# Patient Record
Sex: FEMALE | Race: WHITE
[De-identification: names, ages, dates, MRNs, and addresses within clinical notes are randomized per-mention and may not be internally consistent; named-entity substitution may affect disease eponyms.]

---

## 2017-11-16 ENCOUNTER — TRANSCRIPTION ENCOUNTER (OUTPATIENT)
Age: 42
End: 2017-11-16

## 2019-07-28 ENCOUNTER — INPATIENT (INPATIENT)
Facility: HOSPITAL | Age: 44
LOS: 11 days | Discharge: HOME | End: 2019-08-09
Attending: INTERNAL MEDICINE | Admitting: INTERNAL MEDICINE
Payer: MEDICAID

## 2019-07-28 VITALS
SYSTOLIC BLOOD PRESSURE: 117 MMHG | OXYGEN SATURATION: 98 % | HEART RATE: 140 BPM | WEIGHT: 130.07 LBS | TEMPERATURE: 97 F | RESPIRATION RATE: 20 BRPM | DIASTOLIC BLOOD PRESSURE: 88 MMHG

## 2019-07-28 DIAGNOSIS — Z98.890 OTHER SPECIFIED POSTPROCEDURAL STATES: Chronic | ICD-10-CM

## 2019-07-28 LAB
ALBUMIN SERPL ELPH-MCNC: 4 G/DL — SIGNIFICANT CHANGE UP (ref 3.5–5.2)
ALP SERPL-CCNC: 141 U/L — HIGH (ref 30–115)
ALT FLD-CCNC: 143 U/L — HIGH (ref 0–41)
ANION GAP SERPL CALC-SCNC: 21 MMOL/L — HIGH (ref 7–14)
APAP SERPL-MCNC: <5 UG/ML — LOW (ref 10–30)
APPEARANCE UR: ABNORMAL
AST SERPL-CCNC: 519 U/L — HIGH (ref 0–41)
BACTERIA # UR AUTO: ABNORMAL
BASOPHILS # BLD AUTO: 0.06 K/UL — SIGNIFICANT CHANGE UP (ref 0–0.2)
BASOPHILS NFR BLD AUTO: 0.5 % — SIGNIFICANT CHANGE UP (ref 0–1)
BILIRUB SERPL-MCNC: 1.6 MG/DL — HIGH (ref 0.2–1.2)
BILIRUB UR-MCNC: NEGATIVE — SIGNIFICANT CHANGE UP
BUN SERPL-MCNC: 13 MG/DL — SIGNIFICANT CHANGE UP (ref 10–20)
CALCIUM SERPL-MCNC: 9.2 MG/DL — SIGNIFICANT CHANGE UP (ref 8.5–10.1)
CHLORIDE SERPL-SCNC: 91 MMOL/L — LOW (ref 98–110)
CHOLEST SERPL-MCNC: 124 MG/DL — SIGNIFICANT CHANGE UP (ref 100–200)
CO2 SERPL-SCNC: 22 MMOL/L — SIGNIFICANT CHANGE UP (ref 17–32)
COLOR SPEC: YELLOW — SIGNIFICANT CHANGE UP
CREAT SERPL-MCNC: 0.9 MG/DL — SIGNIFICANT CHANGE UP (ref 0.7–1.5)
DIFF PNL FLD: NEGATIVE — SIGNIFICANT CHANGE UP
EOSINOPHIL # BLD AUTO: 0.01 K/UL — SIGNIFICANT CHANGE UP (ref 0–0.7)
EOSINOPHIL NFR BLD AUTO: 0.1 % — SIGNIFICANT CHANGE UP (ref 0–8)
EPI CELLS # UR: 12 /HPF — HIGH (ref 0–5)
ETHANOL SERPL-MCNC: <10 MG/DL — SIGNIFICANT CHANGE UP
GLUCOSE SERPL-MCNC: 113 MG/DL — HIGH (ref 70–99)
GLUCOSE UR QL: NEGATIVE — SIGNIFICANT CHANGE UP
HCG UR QL: NEGATIVE — SIGNIFICANT CHANGE UP
HCT VFR BLD CALC: 40.8 % — SIGNIFICANT CHANGE UP (ref 37–47)
HDLC SERPL-MCNC: 27 MG/DL — LOW
HGB BLD-MCNC: 14.7 G/DL — SIGNIFICANT CHANGE UP (ref 12–16)
HYALINE CASTS # UR AUTO: 6 /LPF — SIGNIFICANT CHANGE UP (ref 0–7)
IMM GRANULOCYTES NFR BLD AUTO: 0.3 % — SIGNIFICANT CHANGE UP (ref 0.1–0.3)
KETONES UR-MCNC: ABNORMAL
LACTATE SERPL-SCNC: 2.1 MMOL/L — SIGNIFICANT CHANGE UP (ref 0.5–2.2)
LEUKOCYTE ESTERASE UR-ACNC: ABNORMAL
LIDOCAIN IGE QN: >600 U/L — HIGH (ref 7–60)
LIPID PNL WITH DIRECT LDL SERPL: 37 MG/DL — SIGNIFICANT CHANGE UP (ref 4–129)
LYMPHOCYTES # BLD AUTO: 0.81 K/UL — LOW (ref 1.2–3.4)
LYMPHOCYTES # BLD AUTO: 6.6 % — LOW (ref 20.5–51.1)
MCHC RBC-ENTMCNC: 34.3 PG — HIGH (ref 27–31)
MCHC RBC-ENTMCNC: 36 G/DL — SIGNIFICANT CHANGE UP (ref 32–37)
MCV RBC AUTO: 95.1 FL — SIGNIFICANT CHANGE UP (ref 81–99)
MONOCYTES # BLD AUTO: 0.48 K/UL — SIGNIFICANT CHANGE UP (ref 0.1–0.6)
MONOCYTES NFR BLD AUTO: 3.9 % — SIGNIFICANT CHANGE UP (ref 1.7–9.3)
NEUTROPHILS # BLD AUTO: 10.85 K/UL — HIGH (ref 1.4–6.5)
NEUTROPHILS NFR BLD AUTO: 88.6 % — HIGH (ref 42.2–75.2)
NITRITE UR-MCNC: NEGATIVE — SIGNIFICANT CHANGE UP
NRBC # BLD: 0 /100 WBCS — SIGNIFICANT CHANGE UP (ref 0–0)
PH UR: 6 — SIGNIFICANT CHANGE UP (ref 5–8)
PLATELET # BLD AUTO: 257 K/UL — SIGNIFICANT CHANGE UP (ref 130–400)
POTASSIUM SERPL-MCNC: 3.6 MMOL/L — SIGNIFICANT CHANGE UP (ref 3.5–5)
POTASSIUM SERPL-SCNC: 3.6 MMOL/L — SIGNIFICANT CHANGE UP (ref 3.5–5)
PROT SERPL-MCNC: 8.3 G/DL — HIGH (ref 6–8)
PROT UR-MCNC: ABNORMAL
RBC # BLD: 4.29 M/UL — SIGNIFICANT CHANGE UP (ref 4.2–5.4)
RBC # FLD: 11.9 % — SIGNIFICANT CHANGE UP (ref 11.5–14.5)
RBC CASTS # UR COMP ASSIST: 3 /HPF — SIGNIFICANT CHANGE UP (ref 0–4)
SODIUM SERPL-SCNC: 134 MMOL/L — LOW (ref 135–146)
SP GR SPEC: 1.03 — HIGH (ref 1.01–1.02)
TOTAL CHOLESTEROL/HDL RATIO MEASUREMENT: 4.6 RATIO — SIGNIFICANT CHANGE UP (ref 4–5.5)
TRIGL SERPL-MCNC: 400 MG/DL — HIGH (ref 10–149)
UROBILINOGEN FLD QL: ABNORMAL
WBC # BLD: 12.25 K/UL — HIGH (ref 4.8–10.8)
WBC # FLD AUTO: 12.25 K/UL — HIGH (ref 4.8–10.8)
WBC UR QL: 13 /HPF — HIGH (ref 0–5)

## 2019-07-28 PROCEDURE — 76705 ECHO EXAM OF ABDOMEN: CPT | Mod: 26

## 2019-07-28 PROCEDURE — 74177 CT ABD & PELVIS W/CONTRAST: CPT | Mod: 26

## 2019-07-28 PROCEDURE — 99285 EMERGENCY DEPT VISIT HI MDM: CPT

## 2019-07-28 PROCEDURE — 71045 X-RAY EXAM CHEST 1 VIEW: CPT | Mod: 26

## 2019-07-28 PROCEDURE — 93010 ELECTROCARDIOGRAM REPORT: CPT

## 2019-07-28 PROCEDURE — 99223 1ST HOSP IP/OBS HIGH 75: CPT

## 2019-07-28 PROCEDURE — 99223 1ST HOSP IP/OBS HIGH 75: CPT | Mod: AI

## 2019-07-28 RX ORDER — OXYCODONE HYDROCHLORIDE 5 MG/1
5 TABLET ORAL EVERY 6 HOURS
Refills: 0 | Status: DISCONTINUED | OUTPATIENT
Start: 2019-07-28 | End: 2019-07-31

## 2019-07-28 RX ORDER — METRONIDAZOLE 500 MG
500 TABLET ORAL ONCE
Refills: 0 | Status: COMPLETED | OUTPATIENT
Start: 2019-07-28 | End: 2019-07-28

## 2019-07-28 RX ORDER — MORPHINE SULFATE 50 MG/1
4 CAPSULE, EXTENDED RELEASE ORAL ONCE
Refills: 0 | Status: DISCONTINUED | OUTPATIENT
Start: 2019-07-28 | End: 2019-07-28

## 2019-07-28 RX ORDER — THIAMINE MONONITRATE (VIT B1) 100 MG
100 TABLET ORAL ONCE
Refills: 0 | Status: DISCONTINUED | OUTPATIENT
Start: 2019-07-28 | End: 2019-07-28

## 2019-07-28 RX ORDER — CIPROFLOXACIN LACTATE 400MG/40ML
400 VIAL (ML) INTRAVENOUS EVERY 12 HOURS
Refills: 0 | Status: DISCONTINUED | OUTPATIENT
Start: 2019-07-28 | End: 2019-08-02

## 2019-07-28 RX ORDER — SODIUM CHLORIDE 9 MG/ML
1000 INJECTION INTRAMUSCULAR; INTRAVENOUS; SUBCUTANEOUS
Refills: 0 | Status: DISCONTINUED | OUTPATIENT
Start: 2019-07-28 | End: 2019-07-28

## 2019-07-28 RX ORDER — CIPROFLOXACIN LACTATE 400MG/40ML
VIAL (ML) INTRAVENOUS
Refills: 0 | Status: DISCONTINUED | OUTPATIENT
Start: 2019-07-28 | End: 2019-08-02

## 2019-07-28 RX ORDER — SODIUM CHLORIDE 9 MG/ML
2000 INJECTION INTRAMUSCULAR; INTRAVENOUS; SUBCUTANEOUS ONCE
Refills: 0 | Status: COMPLETED | OUTPATIENT
Start: 2019-07-28 | End: 2019-07-28

## 2019-07-28 RX ORDER — CIPROFLOXACIN LACTATE 400MG/40ML
400 VIAL (ML) INTRAVENOUS ONCE
Refills: 0 | Status: COMPLETED | OUTPATIENT
Start: 2019-07-28 | End: 2019-07-28

## 2019-07-28 RX ORDER — ENOXAPARIN SODIUM 100 MG/ML
40 INJECTION SUBCUTANEOUS DAILY
Refills: 0 | Status: DISCONTINUED | OUTPATIENT
Start: 2019-07-28 | End: 2019-08-09

## 2019-07-28 RX ORDER — ONDANSETRON 8 MG/1
4 TABLET, FILM COATED ORAL ONCE
Refills: 0 | Status: COMPLETED | OUTPATIENT
Start: 2019-07-28 | End: 2019-07-28

## 2019-07-28 RX ORDER — THIAMINE MONONITRATE (VIT B1) 100 MG
100 TABLET ORAL DAILY
Refills: 0 | Status: DISCONTINUED | OUTPATIENT
Start: 2019-07-28 | End: 2019-07-28

## 2019-07-28 RX ORDER — SODIUM CHLORIDE 9 MG/ML
1000 INJECTION, SOLUTION INTRAVENOUS
Refills: 0 | Status: DISCONTINUED | OUTPATIENT
Start: 2019-07-28 | End: 2019-07-28

## 2019-07-28 RX ORDER — SODIUM CHLORIDE 9 MG/ML
1000 INJECTION, SOLUTION INTRAVENOUS
Refills: 0 | Status: DISCONTINUED | OUTPATIENT
Start: 2019-07-28 | End: 2019-07-29

## 2019-07-28 RX ORDER — MORPHINE SULFATE 50 MG/1
2 CAPSULE, EXTENDED RELEASE ORAL EVERY 6 HOURS
Refills: 0 | Status: DISCONTINUED | OUTPATIENT
Start: 2019-07-28 | End: 2019-07-28

## 2019-07-28 RX ORDER — METRONIDAZOLE 500 MG
500 TABLET ORAL EVERY 8 HOURS
Refills: 0 | Status: DISCONTINUED | OUTPATIENT
Start: 2019-07-28 | End: 2019-08-02

## 2019-07-28 RX ORDER — FOLIC ACID 0.8 MG
1 TABLET ORAL DAILY
Refills: 0 | Status: DISCONTINUED | OUTPATIENT
Start: 2019-07-28 | End: 2019-08-09

## 2019-07-28 RX ORDER — PANTOPRAZOLE SODIUM 20 MG/1
40 TABLET, DELAYED RELEASE ORAL
Refills: 0 | Status: DISCONTINUED | OUTPATIENT
Start: 2019-07-28 | End: 2019-08-09

## 2019-07-28 RX ORDER — SODIUM CHLORIDE 9 MG/ML
1000 INJECTION, SOLUTION INTRAVENOUS ONCE
Refills: 0 | Status: COMPLETED | OUTPATIENT
Start: 2019-07-28 | End: 2019-07-28

## 2019-07-28 RX ORDER — SODIUM CHLORIDE 9 MG/ML
2000 INJECTION, SOLUTION INTRAVENOUS ONCE
Refills: 0 | Status: COMPLETED | OUTPATIENT
Start: 2019-07-28 | End: 2019-07-28

## 2019-07-28 RX ORDER — MORPHINE SULFATE 50 MG/1
2 CAPSULE, EXTENDED RELEASE ORAL
Refills: 0 | Status: DISCONTINUED | OUTPATIENT
Start: 2019-07-28 | End: 2019-07-28

## 2019-07-28 RX ORDER — THIAMINE MONONITRATE (VIT B1) 100 MG
100 TABLET ORAL DAILY
Refills: 0 | Status: DISCONTINUED | OUTPATIENT
Start: 2019-07-28 | End: 2019-08-09

## 2019-07-28 RX ORDER — CEFTRIAXONE 500 MG/1
1000 INJECTION, POWDER, FOR SOLUTION INTRAMUSCULAR; INTRAVENOUS EVERY 24 HOURS
Refills: 0 | Status: DISCONTINUED | OUTPATIENT
Start: 2019-07-28 | End: 2019-07-28

## 2019-07-28 RX ORDER — HYDROMORPHONE HYDROCHLORIDE 2 MG/ML
1 INJECTION INTRAMUSCULAR; INTRAVENOUS; SUBCUTANEOUS ONCE
Refills: 0 | Status: DISCONTINUED | OUTPATIENT
Start: 2019-07-28 | End: 2019-07-28

## 2019-07-28 RX ORDER — METRONIDAZOLE 500 MG
TABLET ORAL
Refills: 0 | Status: DISCONTINUED | OUTPATIENT
Start: 2019-07-28 | End: 2019-08-02

## 2019-07-28 RX ORDER — CEFEPIME 1 G/1
2000 INJECTION, POWDER, FOR SOLUTION INTRAMUSCULAR; INTRAVENOUS ONCE
Refills: 0 | Status: COMPLETED | OUTPATIENT
Start: 2019-07-28 | End: 2019-07-28

## 2019-07-28 RX ADMIN — MORPHINE SULFATE 2 MILLIGRAM(S): 50 CAPSULE, EXTENDED RELEASE ORAL at 20:58

## 2019-07-28 RX ADMIN — SODIUM CHLORIDE 250 MILLILITER(S): 9 INJECTION, SOLUTION INTRAVENOUS at 15:00

## 2019-07-28 RX ADMIN — SODIUM CHLORIDE 2000 MILLILITER(S): 9 INJECTION, SOLUTION INTRAVENOUS at 13:58

## 2019-07-28 RX ADMIN — Medication 1 TABLET(S): at 11:19

## 2019-07-28 RX ADMIN — OXYCODONE HYDROCHLORIDE 5 MILLIGRAM(S): 5 TABLET ORAL at 12:30

## 2019-07-28 RX ADMIN — MORPHINE SULFATE 2 MILLIGRAM(S): 50 CAPSULE, EXTENDED RELEASE ORAL at 15:00

## 2019-07-28 RX ADMIN — HYDROMORPHONE HYDROCHLORIDE 1 MILLIGRAM(S): 2 INJECTION INTRAMUSCULAR; INTRAVENOUS; SUBCUTANEOUS at 03:06

## 2019-07-28 RX ADMIN — MORPHINE SULFATE 4 MILLIGRAM(S): 50 CAPSULE, EXTENDED RELEASE ORAL at 01:48

## 2019-07-28 RX ADMIN — ONDANSETRON 4 MILLIGRAM(S): 8 TABLET, FILM COATED ORAL at 01:48

## 2019-07-28 RX ADMIN — HYDROMORPHONE HYDROCHLORIDE 1 MILLIGRAM(S): 2 INJECTION INTRAMUSCULAR; INTRAVENOUS; SUBCUTANEOUS at 05:00

## 2019-07-28 RX ADMIN — Medication 100 MILLIGRAM(S): at 05:59

## 2019-07-28 RX ADMIN — CEFEPIME 100 MILLIGRAM(S): 1 INJECTION, POWDER, FOR SOLUTION INTRAMUSCULAR; INTRAVENOUS at 07:40

## 2019-07-28 RX ADMIN — SODIUM CHLORIDE 250 MILLILITER(S): 9 INJECTION, SOLUTION INTRAVENOUS at 20:52

## 2019-07-28 RX ADMIN — MORPHINE SULFATE 4 MILLIGRAM(S): 50 CAPSULE, EXTENDED RELEASE ORAL at 03:00

## 2019-07-28 RX ADMIN — Medication 100 MILLIGRAM(S): at 14:29

## 2019-07-28 RX ADMIN — OXYCODONE HYDROCHLORIDE 5 MILLIGRAM(S): 5 TABLET ORAL at 22:43

## 2019-07-28 RX ADMIN — PANTOPRAZOLE SODIUM 40 MILLIGRAM(S): 20 TABLET, DELAYED RELEASE ORAL at 11:20

## 2019-07-28 RX ADMIN — Medication 100 MILLIGRAM(S): at 22:49

## 2019-07-28 RX ADMIN — HYDROMORPHONE HYDROCHLORIDE 1 MILLIGRAM(S): 2 INJECTION INTRAMUSCULAR; INTRAVENOUS; SUBCUTANEOUS at 23:52

## 2019-07-28 RX ADMIN — MORPHINE SULFATE 2 MILLIGRAM(S): 50 CAPSULE, EXTENDED RELEASE ORAL at 10:56

## 2019-07-28 RX ADMIN — OXYCODONE HYDROCHLORIDE 5 MILLIGRAM(S): 5 TABLET ORAL at 23:00

## 2019-07-28 RX ADMIN — MORPHINE SULFATE 2 MILLIGRAM(S): 50 CAPSULE, EXTENDED RELEASE ORAL at 18:15

## 2019-07-28 RX ADMIN — Medication 200 MILLIGRAM(S): at 18:15

## 2019-07-28 RX ADMIN — Medication 100 MILLIGRAM(S): at 11:19

## 2019-07-28 RX ADMIN — OXYCODONE HYDROCHLORIDE 5 MILLIGRAM(S): 5 TABLET ORAL at 11:50

## 2019-07-28 RX ADMIN — MORPHINE SULFATE 2 MILLIGRAM(S): 50 CAPSULE, EXTENDED RELEASE ORAL at 11:11

## 2019-07-28 RX ADMIN — SODIUM CHLORIDE 4000 MILLILITER(S): 9 INJECTION, SOLUTION INTRAVENOUS at 01:48

## 2019-07-28 RX ADMIN — Medication 200 MILLIGRAM(S): at 11:50

## 2019-07-28 RX ADMIN — SODIUM CHLORIDE 200 MILLILITER(S): 9 INJECTION, SOLUTION INTRAVENOUS at 09:21

## 2019-07-28 RX ADMIN — SODIUM CHLORIDE 4000 MILLILITER(S): 9 INJECTION INTRAMUSCULAR; INTRAVENOUS; SUBCUTANEOUS at 04:02

## 2019-07-28 RX ADMIN — ONDANSETRON 4 MILLIGRAM(S): 8 TABLET, FILM COATED ORAL at 22:45

## 2019-07-28 RX ADMIN — ENOXAPARIN SODIUM 40 MILLIGRAM(S): 100 INJECTION SUBCUTANEOUS at 11:20

## 2019-07-28 RX ADMIN — HYDROMORPHONE HYDROCHLORIDE 1 MILLIGRAM(S): 2 INJECTION INTRAMUSCULAR; INTRAVENOUS; SUBCUTANEOUS at 23:33

## 2019-07-28 RX ADMIN — Medication 1 MILLIGRAM(S): at 11:19

## 2019-07-28 NOTE — ED PROVIDER NOTE - OBJECTIVE STATEMENT
43 y/o female with PMH of EtOH abuse (drinks 23- alcoholic beverages per day, no history of withdraw or seizures) and pancreatitis secondary to EtOH abuse who presents to ED for 3 days of multiple episodes of NBNB vomiting and diffused crampy abdominal pain, similar to prior episodes of pancreatitis. Reports recent EtOH binge drinking as she was celebrating her birthday. No hx of abdominal surgery. No fever or chills.

## 2019-07-28 NOTE — ED ADULT NURSE REASSESSMENT NOTE - NS ED NURSE REASSESS COMMENT FT1
pt aox4 in no acute distress, ivf infusing as per order, abx administered. pt placed on cardiac monitor. nsr. report endorsed to ed hold rn

## 2019-07-28 NOTE — ED PROVIDER NOTE - PROGRESS NOTE DETAILS
pt w/ elevated LFTs, lipase. c/f gallstone pancreatitis, alt hb pathology- cholecystitis, choledocolithiasis, no fever- less likely cholangitis. pain improved, pending ct ap and RUQ sono Patient to be admitted to an inpatient floor. Case discussed with and care endorsed to medical admitting resident. Admitting physician notified.

## 2019-07-28 NOTE — H&P ADULT - ATTENDING COMMENTS
Agree with resident's note, HPI, PE, assessment and plan.  Pt was seen and examined independently.     Pt c/o severe pain in abdomen, states it is worse than when she came here, no more vomiting, states pain medications don't help. Pt states she did not have UO up until now when she urinated a little.    Pt is tachycardic, HR in , diaphoretic.    Physical exam:  pt is in mild distress due to pain  HEENT: PERRL  NECK: supple, no JVD  RESP: CTA b/l, no crackles, rhonchi  CVS: S1S2, tachycardia  GI: +BS, abd is very tender to palpation even in lower abdomen  Extremities: no c/c/edema  NEURO: AOx3, no focal deficit  H/L: no enlarged LN noted     Labs: Lipase, Serum: >600 U/L (07.28.19 @ 01:20)  lactate 2.1  07-28    134<L>  |  91<L>  |  13  ----------------------------<  113<H>  3.6   |  22  |  0.9    Ca    9.2      28 Jul 2019 01:20    TPro  8.3<H>  /  Alb  4.0  /  TBili  1.6<H>  /  DBili  x   /  AST  519<H>  /  ALT  143<H>  /  AlkPhos  141<H>  07-28      CT abd: IMPRESSION:  Moderate volume peripancreatic fluid consistent with acute pancreatitis;   no evidence of pancreatic necrosis or mesenteric vein thrombosis.  Segmental thickening of the ascending colon as well as the distal   transverse and descending colon consistent with concurrent mildly   complicated colitis.    US abd: Hepatic steatosis. Previously described dilation of the CBD is no longer   seen.  No sonographic evidence of acute cholecystitis.  Peripancreatic fluid, better demonstrated on concurrent CT    A/p: # acute pancreatitis related to   - NPO  - Agree with resident's note, HPI, PE, assessment and plan.  Pt was seen and examined independently.     Pt c/o severe pain in abdomen, states it is worse than when she came here, no more vomiting, states pain medications don't help. Pt states she did not have UO up until now when she urinated a little.    Pt is tachycardic, HR in , diaphoretic.    Physical exam:  pt is in mild distress due to pain  HEENT: PERRL  NECK: supple, no JVD  RESP: CTA b/l, no crackles, rhonchi  CVS: S1S2, tachycardia  GI: +BS, abd is very tender to palpation even in lower abdomen  Extremities: no c/c/edema  NEURO: AOx3, no focal deficit  H/L: no enlarged LN noted     Labs: Lipase, Serum: >600 U/L (07.28.19 @ 01:20)  lactate 2.1  07-28    134<L>  |  91<L>  |  13  ----------------------------<  113<H>  3.6   |  22  |  0.9    Ca    9.2      28 Jul 2019 01:20    TPro  8.3<H>  /  Alb  4.0  /  TBili  1.6<H>  /  DBili  x   /  AST  519<H>  /  ALT  143<H>  /  AlkPhos  141<H>  07-28      CT abd: IMPRESSION:  Moderate volume peripancreatic fluid consistent with acute pancreatitis;   no evidence of pancreatic necrosis or mesenteric vein thrombosis.  Segmental thickening of the ascending colon as well as the distal   transverse and descending colon consistent with concurrent mildly   complicated colitis.    US abd: Hepatic steatosis. Previously described dilation of the CBD is no longer   seen.  No sonographic evidence of acute cholecystitis.  Peripancreatic fluid, better demonstrated on concurrent CT    A/p: # acute pancreatitis related to alcohol intake  - NPO  - IV fluids, will increase to 250 cc/hr  - monitor UO  - pain meds Morphine 2 mg Q4 hrs PRN  - GI eval  - ICU eval as per GI recommendations  - send lipid profile   - repeat Lipase in AM    # Alcoholic hepatitis   - send hepatitis panel     # Alcohol abuse  - no Hx of withdrawal  - pt states she does not binge  - monitor for withdrawal, Ativan PRN  - check Mg, Phos  - start Thiamin and Folate    # Colitis on CT abd, pt has diffuse pain, hard to assess for pain due to diverticulitis, elevated WBC which might be due to pancreatitis  - cont ABx,     DVT ppx     #Progress Note Handoff  Pending  Consults: GI  Tests: lipase, lipids, hep panel  Family Discussion: not needed  Future Disposition: home
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
## 2019-07-28 NOTE — ED PROVIDER NOTE - CLINICAL SUMMARY MEDICAL DECISION MAKING FREE TEXT BOX
pt here abdominal pain, uncomfortable appearing req multiple doses narcotic pain medications. labs significant for elevated lipase, transaminitis, mild leukocytosis. CTAP showing colitis, pancreatitis. RUQ sono w/o evidence of cholecystitis, choledocolithiasis or cholangitis. pain w/ moderate improvement during ED course. will admit to medicine for further w/u, monitoring, treatment, gi eval
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
## 2019-07-28 NOTE — H&P ADULT - NSHPSOCIALHISTORY_GEN_ALL_CORE
Denied hx of smoking, drinks alcohol daily around 2-3 drinks per day and sometimes more.  Denied hx of recreational drug use.

## 2019-07-28 NOTE — CONSULT NOTE ADULT - ASSESSMENT
45 y/o female with PMH of EtOH abuse (drinks 2-3 alcoholic beverages per day, no history of withdraw or seizures) and recurrent pancreatitis secondary to EtOH abuse presents to ED for 3 days of multiple episodes of NBNB vomiting and diffused crampy abdominal pain, similar to prior episodes of pancreatitis.  patient was found to have high lipase and pancreatitis on CT , in addition to colitis.    ct abdomen: acute pancreatitis , no evidence of necrosis , colitis  US Abd: gallbladder sludge / hepatic steatosis peripancreatic fluid   lipase> 600  transaminitis     #severe alcoholic pancreatitis:   pulse 140 at presentation  ICU evaluation   keep patient NPO  aggressive IV hydration with /hr   trend HEMATOCRIT AND BUN  if not trending down , increase IV fluids   morphine PRN for pain control  please check lipid profile     #colitis? : episodes of diarrhea and vomiting   colonic thickening on CT scan   mostly reactive   continue with ciprofloxacin and flagyl for now     #transaminitis: with high AST/ ALT ration  mostly alcoholic hepatitis   alcohol abstinence   thiamin and folic acid   trend lfts     #alcohol abuse: watch for signs and symptoms of withdrawal

## 2019-07-28 NOTE — H&P ADULT - NSHPLABSRESULTS_GEN_ALL_CORE
Complete Blood Count + Automated Diff (07.28.19 @ 01:20)    WBC Count: 12.25 K/uL    RBC Count: 4.29 M/uL    Hemoglobin: 14.7 g/dL    Hematocrit: 40.8 %    Mean Cell Volume: 95.1 fL    Mean Cell Hemoglobin: 34.3 pg    Mean Cell Hemoglobin Conc: 36.0 g/dL    Red Cell Distrib Width: 11.9 %    Platelet Count - Automated: 257 K/uL    Auto Neutrophil #: 10.85 K/uL    Auto Lymphocyte #: 0.81 K/uL    Auto Monocyte #: 0.48 K/uL    Auto Eosinophil #: 0.01 K/uL    Auto Basophil #: 0.06 K/uL    Auto Neutrophil %: 88.6: Differential percentages must be correlated with absolute numbers for  clinical significance. %    Auto Lymphocyte %: 6.6 %    Auto Monocyte %: 3.9 %    Auto Eosinophil %: 0.1 %    Auto Basophil %: 0.5 %    Auto Immature Granulocyte %: 0.3 %    Nucleated RBC: 0 /100 WBCs    Comprehensive Metabolic Panel (07.28.19 @ 01:20)    Sodium, Serum: 134 mmol/L    Potassium, Serum: 3.6 mmol/L    Chloride, Serum: 91 mmol/L    Carbon Dioxide, Serum: 22 mmol/L    Anion Gap, Serum: 21 mmol/L    Blood Urea Nitrogen, Serum: 13 mg/dL    Creatinine, Serum: 0.9 mg/dL    Glucose, Serum: 113 mg/dL    Calcium, Total Serum: 9.2 mg/dL    Protein Total, Serum: 8.3 g/dL    Albumin, Serum: 4.0 g/dL    Bilirubin Total, Serum: 1.6 mg/dL    Alkaline Phosphatase, Serum: 141 U/L    Aspartate Aminotransferase (AST/SGOT): 519 U/L    Alanine Aminotransferase (ALT/SGPT): 143 U/L    eGFR if Non : 79: Interpretative comment    Lipase, Serum (07.28.19 @ 01:20)    Lipase, Serum: >600 U/L    < from: US Abdomen Limited (07.28.19 @ 04:57) >    IMPRESSION:    Hepatic steatosis. Previously described dilation of the CBD is no longer   seen.    No sonographic evidence of acute cholecystitis.    Peripancreatic fluid, better demonstrated on concurrent CT    < end of copied text >      < from: CT Abdomen and Pelvis w/ IV Cont (07.28.19 @ 04:21) >    IMPRESSION:    Moderate volume peripancreatic fluid consistent with acute pancreatitis;   no evidence of pancreatic necrosis or mesenteric vein thrombosis.    Segmental thickening of the ascending colon as well as the distal   transverse and descending colon consistent with concurrent mildly   complicated colitis.                    ******PRELIMINARY REPORT******    ******PRELIMINARY REPORT******          < end of copied text >
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
## 2019-07-28 NOTE — ED ADULT NURSE NOTE - OBJECTIVE STATEMENT
pt aox4 complaining of abdominal pain, nausea. denies radiation to chest. denies pmh/ difficulty with urinating, bm. pt aox4 complaining of abdominal pain, nausea.  denies radiation to chest. denies pmh/ difficulty with urinating, bm. admits to consuming alcohol on daily basis. denies withdrawal symptoms. pt afebrile. respirations even / unlabored. iv in place, labs sent. will continue to monitor / assess

## 2019-07-28 NOTE — CONSULT NOTE ADULT - ASSESSMENT
IMPRESSION:  - Acute Pancreatitis likely 2/2 alcohol use  - H/o Alcohol abuse  - Colitis   - Transaminitis     PLAN:    CNS: Avoid CNS depressants    HEENT: Oral care    PULMONARY:  HOB @ 45 degrees , aspiration precautions. Get abg      CARDIOVASCULAR: check CE 2 set , EKG     GI: GI prophylaxis.  NPO , LR @ 250 ml/hr       GI recs f/u       f/u TG and lipid profile stat       f/u LFTs        RENAL:  Follow up lytes.Correct as needed  Ketones+ likely alcoholic ketoacidosis                  INFECTIOUS DISEASE: c/w ciprofloxacin and flagyl as per GI for colitis . Follow up cultures    HEMATOLOGICAL:  DVT prophylaxis.    ENDOCRINE:  Follow up FS.  Insulin protocol if needed.    MUSCULOSKELETAL: OOB to chair     MICU monitoring IMPRESSION:  - Acute Pancreatitis likely 2/2 alcohol use  - H/o Alcohol abuse  - Colitis   - Transaminitis     PLAN:    CNS: Avoid CNS depressants, watch for DTs    HEENT: Oral care    PULMONARY:  HOB @ 45 degrees , aspiration precautions. Get abg      CARDIOVASCULAR: check CE 2 set , EKG     GI: GI prophylaxis.  NPO , LR @ 250 ml/hr       GI recs f/u       f/u TG and lipid profile stat       f/u LFTs        RENAL:  Follow up lytes. Correct as needed    Ketones+ likely alcoholic ketoacidosis                  INFECTIOUS DISEASE: c/w ciprofloxacin and flagyl as per GI for colitis . Follow up cultures    HEMATOLOGICAL:  DVT prophylaxis.    ENDOCRINE:  Follow up FS.  Insulin protocol if needed.    MUSCULOSKELETAL: OOB to chair     Transfer to MICU for monitoring IMPRESSION:  - Acute Pancreatitis likely 2/2 alcohol use  - H/o Alcohol abuse  - Colitis   - Transaminitis     PLAN:    CNS: Avoid CNS depressants, watch for DTs. CIWA protocol    HEENT: Oral care    PULMONARY:  HOB @ 45 degrees , aspiration precautions. Get abg      CARDIOVASCULAR: check CE 2 set , EKG     GI: GI prophylaxis.  NPO , LR @ 250 ml/hr       GI recs f/u       f/u TG and lipid profile stat       f/u LFTs        RENAL:  Follow up lytes. Correct as needed    Ketones+ likely alcoholic ketoacidosis                  INFECTIOUS DISEASE: c/w ciprofloxacin and flagyl as per GI for colitis . Follow up cultures    HEMATOLOGICAL:  DVT prophylaxis.    ENDOCRINE:  Follow up FS.  Insulin protocol if needed.    MUSCULOSKELETAL: OOB to chair     Transfer to MICU for monitoring

## 2019-07-28 NOTE — H&P ADULT - NSHPPHYSICALEXAM_GEN_ALL_CORE
PHYSICAL EXAM:  GENERAL: NAD,   HEAD:  Atraumatic, Normocephalic  EYES: EOMI, PERRLA, conjunctiva and sclera clear  ENT: Moist mucous membranes  NECK: Supple, No JVD  CHEST/LUNG: Clear to auscultation bilaterally; No rales, rhonchi, wheezing, or rubs. Unlabored respirations  HEART: Regular rate and rhythm; No murmurs, rubs, or gallops  ABDOMEN: Bowel sounds present; Soft, tender epigastrium and RUQ, No guarding or generalized tenderness. Nondistended. No hepatomegaly  EXTREMITIES:  2+ Peripheral Pulses, brisk capillary refill. No clubbing, cyanosis, or edema  NERVOUS SYSTEM:  Alert & Oriented X3, speech clear. No deficits   MSK: FROM all 4 extremities, full and equal strength  SKIN: No rashes or lesions
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
## 2019-07-28 NOTE — ED PROVIDER NOTE - PHYSICAL EXAMINATION
CONSTITUTIONAL: Uncomfortable  SKIN: warm, dry  HEAD: Normocephalic; atraumatic.  EYES: no conjunctival injection. PERRL.   ENT: No nasal discharge; airway clear.  NECK: Supple; non tender.  CARD: S1, S2 normal; no murmurs, gallops, or rubs. Regular rate and rhythm.   RESP: No wheezes, rales or rhonchi.  ABD: soft moderate diffuse ttp  EXT: Normal ROM.  No clubbing, cyanosis or edema.   LYMPH: No acute cervical adenopathy.  NEURO: Alert, oriented, grossly unremarkable  PSYCH: Cooperative, appropriate.

## 2019-07-28 NOTE — ED PROVIDER NOTE - ATTENDING CONTRIBUTION TO CARE
43 yo f hx etoh abuse, pancreatitis  pt states it was her birthday so she drank all week. pt w/ 3 days mid abdo pain c/w previous pancreatitis. nausea w/ nbnb vomiting. no urinary/vaginal sx. no f/c/cough.    vss  gen- uncomfortable appearing, aaox3  card-rrr  lungs-ctab, no wheezing or rhonchi  abd-moderate diffuse abdo tenderness  neuro- full str/sensation, cn ii-xii grossly intact, normal coordination    likely etoh pancreatitis  will get belly labs, supportive care w/ fluids, morphine, zofran  serial exam  likely admit 45 yo f hx etoh abuse, pancreatitis  pt states it was her birthday so she drank all week. pt w/ 3 days mid abdo pain c/w previous pancreatitis. nausea w/ nbnb vomiting. no urinary/vaginal sx. no f/c/cough.    vss  gen- uncomfortable appearing, aaox3  card-rrr  lungs-ctab, no wheezing or rhonchi  abd-moderate diffuse abdo tenderness  neuro- full str/sensation, cn ii-xii grossly intact, normal coordination    likely etoh pancreatitis, r/o alternate intraabdo pathology- hb disease  will get belly labs, supportive care w/ fluids, morphine, zofran  serial exam  likely admit

## 2019-07-29 LAB
ALBUMIN SERPL ELPH-MCNC: 2.8 G/DL — LOW (ref 3.5–5.2)
ALP SERPL-CCNC: 71 U/L — SIGNIFICANT CHANGE UP (ref 30–115)
ALT FLD-CCNC: 56 U/L — HIGH (ref 0–41)
ANION GAP SERPL CALC-SCNC: 12 MMOL/L — SIGNIFICANT CHANGE UP (ref 7–14)
ANION GAP SERPL CALC-SCNC: 13 MMOL/L — SIGNIFICANT CHANGE UP (ref 7–14)
ANION GAP SERPL CALC-SCNC: 22 MMOL/L — HIGH (ref 7–14)
APTT BLD: 28.9 SEC — SIGNIFICANT CHANGE UP (ref 27–39.2)
AST SERPL-CCNC: 208 U/L — HIGH (ref 0–41)
BASOPHILS # BLD AUTO: 0.02 K/UL — SIGNIFICANT CHANGE UP (ref 0–0.2)
BASOPHILS NFR BLD AUTO: 0.3 % — SIGNIFICANT CHANGE UP (ref 0–1)
BILIRUB DIRECT SERPL-MCNC: 0.6 MG/DL — HIGH (ref 0–0.2)
BILIRUB INDIRECT FLD-MCNC: 0.7 MG/DL — SIGNIFICANT CHANGE UP (ref 0.2–1.2)
BILIRUB SERPL-MCNC: 1.3 MG/DL — HIGH (ref 0.2–1.2)
BUN SERPL-MCNC: 3 MG/DL — LOW (ref 10–20)
BUN SERPL-MCNC: <3 MG/DL — LOW (ref 10–20)
BUN SERPL-MCNC: <3 MG/DL — LOW (ref 10–20)
CALCIUM SERPL-MCNC: 6.1 MG/DL — LOW (ref 8.5–10.1)
CALCIUM SERPL-MCNC: 7.8 MG/DL — LOW (ref 8.5–10.1)
CALCIUM SERPL-MCNC: 7.8 MG/DL — LOW (ref 8.5–10.1)
CHLORIDE SERPL-SCNC: 98 MMOL/L — SIGNIFICANT CHANGE UP (ref 98–110)
CHOLEST SERPL-MCNC: 110 MG/DL — SIGNIFICANT CHANGE UP (ref 100–200)
CK MB CFR SERPL CALC: <1 NG/ML — SIGNIFICANT CHANGE UP (ref 0.6–6.3)
CK MB CFR SERPL CALC: <1 NG/ML — SIGNIFICANT CHANGE UP (ref 0.6–6.3)
CK SERPL-CCNC: 11 U/L — SIGNIFICANT CHANGE UP (ref 0–225)
CK SERPL-CCNC: 35 U/L — SIGNIFICANT CHANGE UP (ref 0–225)
CO2 SERPL-SCNC: 24 MMOL/L — SIGNIFICANT CHANGE UP (ref 17–32)
CO2 SERPL-SCNC: 25 MMOL/L — SIGNIFICANT CHANGE UP (ref 17–32)
CO2 SERPL-SCNC: 9 MMOL/L — CRITICAL LOW (ref 17–32)
CREAT SERPL-MCNC: 0.5 MG/DL — LOW (ref 0.7–1.5)
CREAT SERPL-MCNC: 0.5 MG/DL — LOW (ref 0.7–1.5)
CREAT SERPL-MCNC: <0.5 MG/DL — LOW (ref 0.7–1.5)
CULTURE RESULTS: NO GROWTH — SIGNIFICANT CHANGE UP
CULTURE RESULTS: SIGNIFICANT CHANGE UP
EOSINOPHIL # BLD AUTO: 0.02 K/UL — SIGNIFICANT CHANGE UP (ref 0–0.7)
EOSINOPHIL NFR BLD AUTO: 0.3 % — SIGNIFICANT CHANGE UP (ref 0–8)
ESTIMATED AVERAGE GLUCOSE: 88 MG/DL — SIGNIFICANT CHANGE UP (ref 68–114)
GGT SERPL-CCNC: 544 U/L — HIGH (ref 1–40)
GLUCOSE BLDC GLUCOMTR-MCNC: 93 MG/DL — SIGNIFICANT CHANGE UP (ref 70–99)
GLUCOSE SERPL-MCNC: 26 MG/DL — CRITICAL LOW (ref 70–99)
GLUCOSE SERPL-MCNC: 84 MG/DL — SIGNIFICANT CHANGE UP (ref 70–99)
GLUCOSE SERPL-MCNC: 91 MG/DL — SIGNIFICANT CHANGE UP (ref 70–99)
HBA1C BLD-MCNC: 4.7 % — SIGNIFICANT CHANGE UP (ref 4–5.6)
HCT VFR BLD CALC: 37.7 % — SIGNIFICANT CHANGE UP (ref 37–47)
HDLC SERPL-MCNC: 27 MG/DL — LOW
HGB BLD-MCNC: 13 G/DL — SIGNIFICANT CHANGE UP (ref 12–16)
IMM GRANULOCYTES NFR BLD AUTO: 0.3 % — SIGNIFICANT CHANGE UP (ref 0.1–0.3)
INR BLD: 1.05 RATIO — SIGNIFICANT CHANGE UP (ref 0.65–1.3)
LACTATE SERPL-SCNC: 0.8 MMOL/L — SIGNIFICANT CHANGE UP (ref 0.5–2.2)
LIDOCAIN IGE QN: >600 U/L — HIGH (ref 7–60)
LIPID PNL WITH DIRECT LDL SERPL: 52 MG/DL — SIGNIFICANT CHANGE UP (ref 4–129)
LYMPHOCYTES # BLD AUTO: 0.95 K/UL — LOW (ref 1.2–3.4)
LYMPHOCYTES # BLD AUTO: 16.5 % — LOW (ref 20.5–51.1)
MAGNESIUM SERPL-MCNC: 0.4 MG/DL — CRITICAL LOW (ref 1.8–2.4)
MAGNESIUM SERPL-MCNC: 1.1 MG/DL — LOW (ref 1.8–2.4)
MAGNESIUM SERPL-MCNC: 2.1 MG/DL — SIGNIFICANT CHANGE UP (ref 1.8–2.4)
MCHC RBC-ENTMCNC: 33.8 PG — HIGH (ref 27–31)
MCHC RBC-ENTMCNC: 34.5 G/DL — SIGNIFICANT CHANGE UP (ref 32–37)
MCV RBC AUTO: 97.9 FL — SIGNIFICANT CHANGE UP (ref 81–99)
MONOCYTES # BLD AUTO: 0.25 K/UL — SIGNIFICANT CHANGE UP (ref 0.1–0.6)
MONOCYTES NFR BLD AUTO: 4.3 % — SIGNIFICANT CHANGE UP (ref 1.7–9.3)
NEUTROPHILS # BLD AUTO: 4.51 K/UL — SIGNIFICANT CHANGE UP (ref 1.4–6.5)
NEUTROPHILS NFR BLD AUTO: 78.3 % — HIGH (ref 42.2–75.2)
NRBC # BLD: 0 /100 WBCS — SIGNIFICANT CHANGE UP (ref 0–0)
PHOSPHATE SERPL-MCNC: 0.7 MG/DL — LOW (ref 2.1–4.9)
PHOSPHATE SERPL-MCNC: 2.3 MG/DL — SIGNIFICANT CHANGE UP (ref 2.1–4.9)
PHOSPHATE SERPL-MCNC: 2.3 MG/DL — SIGNIFICANT CHANGE UP (ref 2.1–4.9)
PLATELET # BLD AUTO: 158 K/UL — SIGNIFICANT CHANGE UP (ref 130–400)
POTASSIUM SERPL-MCNC: 3.3 MMOL/L — LOW (ref 3.5–5)
POTASSIUM SERPL-MCNC: 3.3 MMOL/L — LOW (ref 3.5–5)
POTASSIUM SERPL-MCNC: 3.6 MMOL/L — SIGNIFICANT CHANGE UP (ref 3.5–5)
POTASSIUM SERPL-SCNC: 3.3 MMOL/L — LOW (ref 3.5–5)
POTASSIUM SERPL-SCNC: 3.3 MMOL/L — LOW (ref 3.5–5)
POTASSIUM SERPL-SCNC: 3.6 MMOL/L — SIGNIFICANT CHANGE UP (ref 3.5–5)
PROT SERPL-MCNC: 5.6 G/DL — LOW (ref 6–8)
PROTHROM AB SERPL-ACNC: 12.1 SEC — SIGNIFICANT CHANGE UP (ref 9.95–12.87)
RBC # BLD: 3.85 M/UL — LOW (ref 4.2–5.4)
RBC # FLD: 12 % — SIGNIFICANT CHANGE UP (ref 11.5–14.5)
SODIUM SERPL-SCNC: 129 MMOL/L — LOW (ref 135–146)
SODIUM SERPL-SCNC: 134 MMOL/L — LOW (ref 135–146)
SODIUM SERPL-SCNC: 136 MMOL/L — SIGNIFICANT CHANGE UP (ref 135–146)
SPECIMEN SOURCE: SIGNIFICANT CHANGE UP
SPECIMEN SOURCE: SIGNIFICANT CHANGE UP
TOTAL CHOLESTEROL/HDL RATIO MEASUREMENT: 4.1 RATIO — SIGNIFICANT CHANGE UP (ref 4–5.5)
TRIGL SERPL-MCNC: 206 MG/DL — HIGH (ref 10–149)
TROPONIN T SERPL-MCNC: <0.01 NG/ML — SIGNIFICANT CHANGE UP
TROPONIN T SERPL-MCNC: <0.01 NG/ML — SIGNIFICANT CHANGE UP
WBC # BLD: 5.77 K/UL — SIGNIFICANT CHANGE UP (ref 4.8–10.8)
WBC # FLD AUTO: 5.77 K/UL — SIGNIFICANT CHANGE UP (ref 4.8–10.8)

## 2019-07-29 PROCEDURE — 99233 SBSQ HOSP IP/OBS HIGH 50: CPT

## 2019-07-29 PROCEDURE — 99223 1ST HOSP IP/OBS HIGH 75: CPT

## 2019-07-29 RX ORDER — MAGNESIUM SULFATE 500 MG/ML
2 VIAL (ML) INJECTION EVERY 4 HOURS
Refills: 0 | Status: COMPLETED | OUTPATIENT
Start: 2019-07-29 | End: 2019-07-29

## 2019-07-29 RX ORDER — HYDROMORPHONE HYDROCHLORIDE 2 MG/ML
1 INJECTION INTRAMUSCULAR; INTRAVENOUS; SUBCUTANEOUS EVERY 4 HOURS
Refills: 0 | Status: DISCONTINUED | OUTPATIENT
Start: 2019-07-29 | End: 2019-07-30

## 2019-07-29 RX ORDER — POTASSIUM CHLORIDE 20 MEQ
40 PACKET (EA) ORAL EVERY 4 HOURS
Refills: 0 | Status: COMPLETED | OUTPATIENT
Start: 2019-07-29 | End: 2019-07-30

## 2019-07-29 RX ORDER — POTASSIUM CHLORIDE 20 MEQ
20 PACKET (EA) ORAL ONCE
Refills: 0 | Status: COMPLETED | OUTPATIENT
Start: 2019-07-29 | End: 2019-07-29

## 2019-07-29 RX ORDER — CHLORHEXIDINE GLUCONATE 213 G/1000ML
1 SOLUTION TOPICAL
Refills: 0 | Status: DISCONTINUED | OUTPATIENT
Start: 2019-07-29 | End: 2019-08-09

## 2019-07-29 RX ORDER — MAGNESIUM SULFATE 500 MG/ML
2 VIAL (ML) INJECTION EVERY 4 HOURS
Refills: 0 | Status: DISCONTINUED | OUTPATIENT
Start: 2019-07-29 | End: 2019-07-29

## 2019-07-29 RX ORDER — HYDROMORPHONE HYDROCHLORIDE 2 MG/ML
1 INJECTION INTRAMUSCULAR; INTRAVENOUS; SUBCUTANEOUS ONCE
Refills: 0 | Status: DISCONTINUED | OUTPATIENT
Start: 2019-07-29 | End: 2019-07-29

## 2019-07-29 RX ORDER — ONDANSETRON 8 MG/1
4 TABLET, FILM COATED ORAL EVERY 8 HOURS
Refills: 0 | Status: DISCONTINUED | OUTPATIENT
Start: 2019-07-29 | End: 2019-08-09

## 2019-07-29 RX ORDER — SODIUM CHLORIDE 9 MG/ML
1000 INJECTION, SOLUTION INTRAVENOUS
Refills: 0 | Status: DISCONTINUED | OUTPATIENT
Start: 2019-07-29 | End: 2019-07-30

## 2019-07-29 RX ADMIN — Medication 200 MILLIGRAM(S): at 06:04

## 2019-07-29 RX ADMIN — SODIUM CHLORIDE 200 MILLILITER(S): 9 INJECTION, SOLUTION INTRAVENOUS at 10:35

## 2019-07-29 RX ADMIN — HYDROMORPHONE HYDROCHLORIDE 1 MILLIGRAM(S): 2 INJECTION INTRAMUSCULAR; INTRAVENOUS; SUBCUTANEOUS at 17:24

## 2019-07-29 RX ADMIN — HYDROMORPHONE HYDROCHLORIDE 1 MILLIGRAM(S): 2 INJECTION INTRAMUSCULAR; INTRAVENOUS; SUBCUTANEOUS at 14:40

## 2019-07-29 RX ADMIN — Medication 100 MILLIGRAM(S): at 11:56

## 2019-07-29 RX ADMIN — HYDROMORPHONE HYDROCHLORIDE 1 MILLIGRAM(S): 2 INJECTION INTRAMUSCULAR; INTRAVENOUS; SUBCUTANEOUS at 07:00

## 2019-07-29 RX ADMIN — Medication 50 MILLIEQUIVALENT(S): at 10:35

## 2019-07-29 RX ADMIN — Medication 100 MILLIGRAM(S): at 06:03

## 2019-07-29 RX ADMIN — HYDROMORPHONE HYDROCHLORIDE 1 MILLIGRAM(S): 2 INJECTION INTRAMUSCULAR; INTRAVENOUS; SUBCUTANEOUS at 14:55

## 2019-07-29 RX ADMIN — Medication 25 GRAM(S): at 17:15

## 2019-07-29 RX ADMIN — Medication 100 MILLIGRAM(S): at 13:25

## 2019-07-29 RX ADMIN — CHLORHEXIDINE GLUCONATE 1 APPLICATION(S): 213 SOLUTION TOPICAL at 06:04

## 2019-07-29 RX ADMIN — HYDROMORPHONE HYDROCHLORIDE 1 MILLIGRAM(S): 2 INJECTION INTRAMUSCULAR; INTRAVENOUS; SUBCUTANEOUS at 10:40

## 2019-07-29 RX ADMIN — Medication 25 GRAM(S): at 13:23

## 2019-07-29 RX ADMIN — PANTOPRAZOLE SODIUM 40 MILLIGRAM(S): 20 TABLET, DELAYED RELEASE ORAL at 06:03

## 2019-07-29 RX ADMIN — HYDROMORPHONE HYDROCHLORIDE 1 MILLIGRAM(S): 2 INJECTION INTRAMUSCULAR; INTRAVENOUS; SUBCUTANEOUS at 06:40

## 2019-07-29 RX ADMIN — Medication 200 MILLIGRAM(S): at 17:15

## 2019-07-29 RX ADMIN — HYDROMORPHONE HYDROCHLORIDE 1 MILLIGRAM(S): 2 INJECTION INTRAMUSCULAR; INTRAVENOUS; SUBCUTANEOUS at 02:54

## 2019-07-29 RX ADMIN — Medication 1 TABLET(S): at 11:56

## 2019-07-29 RX ADMIN — HYDROMORPHONE HYDROCHLORIDE 1 MILLIGRAM(S): 2 INJECTION INTRAMUSCULAR; INTRAVENOUS; SUBCUTANEOUS at 10:55

## 2019-07-29 RX ADMIN — HYDROMORPHONE HYDROCHLORIDE 1 MILLIGRAM(S): 2 INJECTION INTRAMUSCULAR; INTRAVENOUS; SUBCUTANEOUS at 22:12

## 2019-07-29 RX ADMIN — ENOXAPARIN SODIUM 40 MILLIGRAM(S): 100 INJECTION SUBCUTANEOUS at 11:56

## 2019-07-29 RX ADMIN — ONDANSETRON 4 MILLIGRAM(S): 8 TABLET, FILM COATED ORAL at 14:40

## 2019-07-29 RX ADMIN — HYDROMORPHONE HYDROCHLORIDE 1 MILLIGRAM(S): 2 INJECTION INTRAMUSCULAR; INTRAVENOUS; SUBCUTANEOUS at 21:42

## 2019-07-29 RX ADMIN — HYDROMORPHONE HYDROCHLORIDE 1 MILLIGRAM(S): 2 INJECTION INTRAMUSCULAR; INTRAVENOUS; SUBCUTANEOUS at 02:24

## 2019-07-29 RX ADMIN — Medication 100 MILLIGRAM(S): at 22:07

## 2019-07-29 RX ADMIN — HYDROMORPHONE HYDROCHLORIDE 1 MILLIGRAM(S): 2 INJECTION INTRAMUSCULAR; INTRAVENOUS; SUBCUTANEOUS at 18:00

## 2019-07-29 RX ADMIN — Medication 1 MILLIGRAM(S): at 11:56

## 2019-07-29 NOTE — PROGRESS NOTE ADULT - SUBJECTIVE AND OBJECTIVE BOX
Hepatology/GI follow up note: Pt seen and examined at bedside.     For questions and inquiries please page [phone].  For urgent matters or after 5pm and on weekends please page the fellow on call through the GI paging system.    44y Female seen for:    Subjective/Interval events:    Review of system  General:  (-) weight loss, (-) fevers  Eyes:  (-) visual changes  CV:  (-) chest pain  Resp: (-) SOB, (-) wheezing  GI: (-) abdominal pain,  (-) nausea, (-) vomiting, (-) dysphagia, (-) diarrhea, (-) constipation, (-) rectal bleeding, (-) melena, (-) hematemesis.  Neuro: (-) confusion, (-) weakness  Psych:  (-) Hallucinations  Heme:  (-) easy bruisability    Past medical/surgical Hx:  PAST MEDICAL & SURGICAL HISTORY:  No pertinent past medical history  S/P arthroscopy: meniscal repair    Home Medications:  Last Order Reconciliation Date: Not Done      Allergies:  Compazine (Unknown)      Current Medications:   chlorhexidine 4% Liquid 1 Application(s) Topical <User Schedule>  ciprofloxacin   IVPB      ciprofloxacin   IVPB 400 milliGRAM(s) IV Intermittent every 12 hours  enoxaparin Injectable 40 milliGRAM(s) SubCutaneous daily  folic acid 1 milliGRAM(s) Oral daily  HYDROmorphone  Injectable 1 milliGRAM(s) IV Push every 4 hours PRN  lactated ringers. 1000 milliLiter(s) IV Continuous <Continuous>  LORazepam     Tablet 2 milliGRAM(s) Oral every 2 hours PRN  magnesium sulfate  IVPB 2 Gram(s) IV Intermittent every 4 hours  metroNIDAZOLE  IVPB 500 milliGRAM(s) IV Intermittent every 8 hours  metroNIDAZOLE  IVPB      multivitamin 1 Tablet(s) Oral daily  ondansetron Injectable 4 milliGRAM(s) IV Push every 8 hours PRN  oxyCODONE    IR 5 milliGRAM(s) Oral every 6 hours PRN  pantoprazole    Tablet 40 milliGRAM(s) Oral before breakfast  thiamine 100 milliGRAM(s) Oral daily        Physical exam:  T(C): 37.4 (19 @ 12:00), Max: 37.6 (19 @ 08:00)  HR: 90 (19 @ 14:00) (86 - 110)  BP: 107/80 (19 @ 14:00) (89/52 - 124/82)  RR: 16 (19 @ 14:00) (13 - 48)  SpO2: 95% (19 @ 14:00) (94% - 100%)    GENERAL: NAD  HEAD:  Atraumatic, Normocephalic  EYES: Sclera:NL  NECK: Supple, no JVD or thyromegaly  CHEST/LUNG: Good bilateral air entry  HEART: normal S1, S2. Regular  ABDOMEN: (-) distended, (-) tender, (-) rebound, (+) BS, (-)HSM  EXTREMITIES: (-) edema  NEUROLOGY: (-) asterixis  SKIN: (-) jaundice  LEROY: (-) melena (-) brbpr      Data:                        13.0   5.77  )-----------( 158      ( 2019 05:16 )             37.7     MCV 97.9 (19)    RDW 12.0 (19)    HGB trend:  13.0  19 @ 05:16  14.7  19 @ 01:20        WBC trend:  5.77  19 @ 05:16  12.25  19 @ 01:20        134<L>  |  98  |  3<L>  ----------------------------<  91  3.3<L>   |  24  |  0.5<L>    Ca    7.8<L>      2019 05:16  Phos  2.3       Mg     1.1         TPro  5.6<L>  /  Alb  2.8<L>  /  TBili  1.3<H>  /  DBili  0.6<H>  /  AST  208<H>  /  ALT  56<H>  /  AlkPhos  71      Liver panel trend:  TBili 1.3   /      /   ALT 56   /   AlkP 71   /   Tptn 5.6   /   Alb 2.8    /   DBili 0.6        TBili 1.6   /      /      /   AlkP 141   /   Tptn 8.3   /   Alb 4.0    /   DBili --              PT/INR - ( 2019 05:16 )   PT: 12.10 sec;   INR: 1.05 ratio         PTT - ( 2019 05:16 )  PTT:28.9 sec    Culture - Urine (collected 2019 14:35)  Source: .Urine msue&#x27;d twice  Final Report (2019 13:22):    No growth    Culture - Urine (collected 2019 01:44)  Source: .Urine Clean Catch (Midstream)  Final Report (2019 08:47):    >=3 organisms. Probable collection contamination.      Urinalysis Basic - ( 2019 01:44 )    Color: Yellow / Appearance: Slightly Turbid / S.029 / pH: x  Gluc: x / Ketone: Moderate  / Bili: Negative / Urobili: 3 mg/dL   Blood: x / Protein: 30 mg/dL / Nitrite: Negative   Leuk Esterase: Small / RBC: 3 /HPF / WBC 13 /HPF   Sq Epi: x / Non Sq Epi: 12 /HPF / Bacteria: Moderate Hepatology/GI follow up note: Pt seen and examined at bedside.     For questions and inquiries please page [phone].  For urgent matters or after 5pm and on weekends please page the fellow on call through the GI paging system.    44y Female seen for: ETOH pancreatitis Chronic pancreatitis EToH hepatitis    Subjective/Interval events:  Still in excruciating pain  NPO    Review of system  General:  (-) weight loss, (-) fevers  Eyes:  (-) visual changes  CV:  (-) chest pain  Resp: (-) SOB, (-) wheezing  GI: (-) abdominal pain,  (-) nausea, (-) vomiting, (-) dysphagia, (-) diarrhea, (-) constipation, (-) rectal bleeding, (-) melena, (-) hematemesis.  Neuro: (-) confusion, (-) weakness  Psych:  (-) Hallucinations  Heme:  (-) easy bruisability    Past medical/surgical Hx:  PAST MEDICAL & SURGICAL HISTORY:  No pertinent past medical history  S/P arthroscopy: meniscal repair    Home Medications:  Last Order Reconciliation Date: Not Done      Allergies:  Compazine (Unknown)      Current Medications:   chlorhexidine 4% Liquid 1 Application(s) Topical <User Schedule>  ciprofloxacin   IVPB      ciprofloxacin   IVPB 400 milliGRAM(s) IV Intermittent every 12 hours  enoxaparin Injectable 40 milliGRAM(s) SubCutaneous daily  folic acid 1 milliGRAM(s) Oral daily  HYDROmorphone  Injectable 1 milliGRAM(s) IV Push every 4 hours PRN  lactated ringers. 1000 milliLiter(s) IV Continuous <Continuous>  LORazepam     Tablet 2 milliGRAM(s) Oral every 2 hours PRN  magnesium sulfate  IVPB 2 Gram(s) IV Intermittent every 4 hours  metroNIDAZOLE  IVPB 500 milliGRAM(s) IV Intermittent every 8 hours  metroNIDAZOLE  IVPB      multivitamin 1 Tablet(s) Oral daily  ondansetron Injectable 4 milliGRAM(s) IV Push every 8 hours PRN  oxyCODONE    IR 5 milliGRAM(s) Oral every 6 hours PRN  pantoprazole    Tablet 40 milliGRAM(s) Oral before breakfast  thiamine 100 milliGRAM(s) Oral daily        Physical exam:  T(C): 37.4 (19 @ 12:00), Max: 37.6 (19 @ 08:00)  HR: 90 (19 @ 14:00) (86 - 110)  BP: 107/80 (19 @ 14:00) (89/52 - 124/82)  RR: 16 (19 @ 14:00) (13 - 48)  SpO2: 95% (19 @ 14:00) (94% - 100%)    GENERAL: NAD  HEAD:  Atraumatic, Normocephalic  EYES: Sclera:NL  NECK: Supple, no JVD or thyromegaly  CHEST/LUNG: Good bilateral air entry  HEART: normal S1, S2. Regular  ABDOMEN: (-) distended, (-) tender, (-) rebound, (+) BS, (-)HSM  EXTREMITIES: (-) edema  NEUROLOGY: (-) asterixis  SKIN: (-) jaundice  LEROY: (-) melena (-) brbpr      Data:                        13.0   5.77  )-----------( 158      ( 2019 05:16 )             37.7     MCV 97.9 (19)    RDW 12.0 (19)    HGB trend:  13.0  19 @ 05:16  14.7  19 @ 01:20        WBC trend:  5.77  19 @ 05:16  12.25  19 @ 01:20        134<L>  |  98  |  3<L>  ----------------------------<  91  3.3<L>   |  24  |  0.5<L>    Ca    7.8<L>      2019 05:16  Phos  2.3       Mg     1.1         TPro  5.6<L>  /  Alb  2.8<L>  /  TBili  1.3<H>  /  DBili  0.6<H>  /  AST  208<H>  /  ALT  56<H>  /  AlkPhos  71      Liver panel trend:  TBili 1.3   /      /   ALT 56   /   AlkP 71   /   Tptn 5.6   /   Alb 2.8    /   DBili 0.6        TBili 1.6   /      /      /   AlkP 141   /   Tptn 8.3   /   Alb 4.0    /   DBili --              PT/INR - ( 2019 05:16 )   PT: 12.10 sec;   INR: 1.05 ratio         PTT - ( 2019 05:16 )  PTT:28.9 sec    Culture - Urine (collected 2019 14:35)  Source: .Urine msue&#x27;d twice  Final Report (2019 13:22):    No growth    Culture - Urine (collected 2019 01:44)  Source: .Urine Clean Catch (Midstream)  Final Report (2019 08:47):    >=3 organisms. Probable collection contamination.      Urinalysis Basic - ( 2019 01:44 )    Color: Yellow / Appearance: Slightly Turbid / S.029 / pH: x  Gluc: x / Ketone: Moderate  / Bili: Negative / Urobili: 3 mg/dL   Blood: x / Protein: 30 mg/dL / Nitrite: Negative   Leuk Esterase: Small / RBC: 3 /HPF / WBC 13 /HPF   Sq Epi: x / Non Sq Epi: 12 /HPF / Bacteria: Moderate
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
## 2019-07-29 NOTE — PROGRESS NOTE ADULT - ASSESSMENT
IMPRESSION:  Acute Pancreatitis likely 2/2 alcohol use  H/o Alcohol abuse  Colitis   Transaminitis improving       PLAN:    CNS: Pain control; CIWA protocol; Thiamine folate;     HEENT: Oral care    PULMONARY:  HOB @ 45 degrees , aspiration precautions.      CARDIOVASCULAR: Keep I=O; CE x2;      GI: GI prophylaxis.  Start clears once tolerated; LR @ 200 ml/hr; GI recs f/u        RENAL:  Follow up lytes. Correct as needed                   INFECTIOUS DISEASE: c/w ciprofloxacin and flagyl as per GI for colitis . Follow up cultures.    HEMATOLOGICAL:  DVT prophylaxis.     ENDOCRINE:  Follow up FS.  Insulin protocol if needed.    MUSCULOSKELETAL: OOB to chair     Possible transfer afternoon

## 2019-07-29 NOTE — PROGRESS NOTE ADULT - ASSESSMENT
43 y/o female with PMH of EtOH abuse (drinks 2-3 alcoholic beverages per day, no history of withdraw or seizures) and recurrent pancreatitis secondary to EtOH abuse presents to ED for 3 days of multiple episodes of NBNB vomiting and diffused crampy abdominal pain, similar to prior episodes of pancreatitis.  patient was found to have high lipase and pancreatitis on CT , in addition to colitis.    ct abdomen: acute pancreatitis , no evidence of necrosis , colitis  US Abd: gallbladder sludge / hepatic steatosis peripancreatic fluid   lipase> 600  transaminitis     1)ETOH pancreatitis. No end organ damage  2)Radiographic evidence suggestive of chronic pancreatitis  3)Abnormal CT- ?R colitis  4)Cholelithiasis/sludge  5)Alcohol use disease    Rec:  - IV hydration until pain resolves  - If by tomorrow cannot start oral feeds will recommend enteral NJ tube placement  - Check stool elastase, fat (qualitative) RO steatorhea (likely in part cause of diarrhea)  - Will likely benefit from pancreatic enzyme replacement  - Abstinence  - Elective CCY 43 y/o female with PMH of EtOH abuse (drinks 2-3 alcoholic beverages per day, no history of withdraw or seizures) and recurrent pancreatitis secondary to EtOH abuse presents to ED for 3 days of multiple episodes of NBNB vomiting and diffused crampy abdominal pain, similar to prior episodes of pancreatitis.  patient was found to have high lipase and pancreatitis on CT , in addition to colitis.    ct abdomen: acute pancreatitis , no evidence of necrosis , colitis  US Abd: gallbladder sludge / hepatic steatosis peripancreatic fluid   lipase> 600  transaminitis     1)ETOH pancreatitis. No end organ damage  2)Radiographic evidence suggestive of chronic pancreatitis  3)Abnormal CT- ?R colitis  4)Cholelithiasis/sludge  5)Alcohol use disease    Rec:  - IV hydration until pain resolves (@250 +cc/Hr of LR)  - If by tomorrow cannot start oral feeds will recommend enteral NJ tube placement  - Check stool elastase, fat (qualitative) RO steatorhea (likely in part cause of diarrhea)  - Will likely benefit from pancreatic enzyme replacement  - Abstinence  - Elective CCY

## 2019-07-30 LAB
ANION GAP SERPL CALC-SCNC: 11 MMOL/L — SIGNIFICANT CHANGE UP (ref 7–14)
BASOPHILS # BLD AUTO: 0.02 K/UL — SIGNIFICANT CHANGE UP (ref 0–0.2)
BASOPHILS NFR BLD AUTO: 0.4 % — SIGNIFICANT CHANGE UP (ref 0–1)
BUN SERPL-MCNC: <3 MG/DL — LOW (ref 10–20)
CALCIUM SERPL-MCNC: 7.9 MG/DL — LOW (ref 8.5–10.1)
CHLORIDE SERPL-SCNC: 95 MMOL/L — LOW (ref 98–110)
CO2 SERPL-SCNC: 28 MMOL/L — SIGNIFICANT CHANGE UP (ref 17–32)
CREAT SERPL-MCNC: 0.5 MG/DL — LOW (ref 0.7–1.5)
EOSINOPHIL # BLD AUTO: 0.04 K/UL — SIGNIFICANT CHANGE UP (ref 0–0.7)
EOSINOPHIL NFR BLD AUTO: 0.8 % — SIGNIFICANT CHANGE UP (ref 0–8)
GLUCOSE SERPL-MCNC: 99 MG/DL — SIGNIFICANT CHANGE UP (ref 70–99)
HCT VFR BLD CALC: 29 % — LOW (ref 37–47)
HGB BLD-MCNC: 10 G/DL — LOW (ref 12–16)
IMM GRANULOCYTES NFR BLD AUTO: 0.4 % — HIGH (ref 0.1–0.3)
LYMPHOCYTES # BLD AUTO: 0.65 K/UL — LOW (ref 1.2–3.4)
LYMPHOCYTES # BLD AUTO: 12.9 % — LOW (ref 20.5–51.1)
MAGNESIUM SERPL-MCNC: 1.4 MG/DL — LOW (ref 1.8–2.4)
MCHC RBC-ENTMCNC: 34 PG — HIGH (ref 27–31)
MCHC RBC-ENTMCNC: 34.5 G/DL — SIGNIFICANT CHANGE UP (ref 32–37)
MCV RBC AUTO: 98.6 FL — SIGNIFICANT CHANGE UP (ref 81–99)
MONOCYTES # BLD AUTO: 0.31 K/UL — SIGNIFICANT CHANGE UP (ref 0.1–0.6)
MONOCYTES NFR BLD AUTO: 6.2 % — SIGNIFICANT CHANGE UP (ref 1.7–9.3)
NEUTROPHILS # BLD AUTO: 3.99 K/UL — SIGNIFICANT CHANGE UP (ref 1.4–6.5)
NEUTROPHILS NFR BLD AUTO: 79.3 % — HIGH (ref 42.2–75.2)
NRBC # BLD: 0 /100 WBCS — SIGNIFICANT CHANGE UP (ref 0–0)
PHOSPHATE SERPL-MCNC: 2.3 MG/DL — SIGNIFICANT CHANGE UP (ref 2.1–4.9)
PLATELET # BLD AUTO: 136 K/UL — SIGNIFICANT CHANGE UP (ref 130–400)
POTASSIUM SERPL-MCNC: 3.6 MMOL/L — SIGNIFICANT CHANGE UP (ref 3.5–5)
POTASSIUM SERPL-SCNC: 3.6 MMOL/L — SIGNIFICANT CHANGE UP (ref 3.5–5)
RBC # BLD: 2.94 M/UL — LOW (ref 4.2–5.4)
RBC # FLD: 11.9 % — SIGNIFICANT CHANGE UP (ref 11.5–14.5)
SODIUM SERPL-SCNC: 134 MMOL/L — LOW (ref 135–146)
WBC # BLD: 5.03 K/UL — SIGNIFICANT CHANGE UP (ref 4.8–10.8)
WBC # FLD AUTO: 5.03 K/UL — SIGNIFICANT CHANGE UP (ref 4.8–10.8)

## 2019-07-30 PROCEDURE — 99232 SBSQ HOSP IP/OBS MODERATE 35: CPT

## 2019-07-30 PROCEDURE — 99233 SBSQ HOSP IP/OBS HIGH 50: CPT

## 2019-07-30 RX ORDER — MAGNESIUM SULFATE 500 MG/ML
2 VIAL (ML) INJECTION DAILY
Refills: 0 | Status: DISCONTINUED | OUTPATIENT
Start: 2019-07-30 | End: 2019-08-02

## 2019-07-30 RX ORDER — ACETAMINOPHEN 500 MG
650 TABLET ORAL EVERY 8 HOURS
Refills: 0 | Status: DISCONTINUED | OUTPATIENT
Start: 2019-07-30 | End: 2019-07-30

## 2019-07-30 RX ORDER — DOCUSATE SODIUM 100 MG
100 CAPSULE ORAL THREE TIMES A DAY
Refills: 0 | Status: DISCONTINUED | OUTPATIENT
Start: 2019-07-30 | End: 2019-08-09

## 2019-07-30 RX ORDER — ACETAMINOPHEN 500 MG
650 TABLET ORAL EVERY 6 HOURS
Refills: 0 | Status: DISCONTINUED | OUTPATIENT
Start: 2019-07-30 | End: 2019-08-08

## 2019-07-30 RX ORDER — SODIUM CHLORIDE 9 MG/ML
1000 INJECTION, SOLUTION INTRAVENOUS
Refills: 0 | Status: DISCONTINUED | OUTPATIENT
Start: 2019-07-30 | End: 2019-08-01

## 2019-07-30 RX ORDER — HYDROMORPHONE HYDROCHLORIDE 2 MG/ML
2 INJECTION INTRAMUSCULAR; INTRAVENOUS; SUBCUTANEOUS EVERY 4 HOURS
Refills: 0 | Status: DISCONTINUED | OUTPATIENT
Start: 2019-07-30 | End: 2019-07-31

## 2019-07-30 RX ORDER — POTASSIUM CHLORIDE 20 MEQ
40 PACKET (EA) ORAL ONCE
Refills: 0 | Status: COMPLETED | OUTPATIENT
Start: 2019-07-30 | End: 2019-07-30

## 2019-07-30 RX ORDER — SENNA PLUS 8.6 MG/1
2 TABLET ORAL AT BEDTIME
Refills: 0 | Status: DISCONTINUED | OUTPATIENT
Start: 2019-07-30 | End: 2019-08-09

## 2019-07-30 RX ADMIN — Medication 40 MILLIEQUIVALENT(S): at 02:04

## 2019-07-30 RX ADMIN — SODIUM CHLORIDE 200 MILLILITER(S): 9 INJECTION, SOLUTION INTRAVENOUS at 06:10

## 2019-07-30 RX ADMIN — Medication 100 MILLIGRAM(S): at 15:02

## 2019-07-30 RX ADMIN — Medication 100 MILLIGRAM(S): at 06:11

## 2019-07-30 RX ADMIN — Medication 100 MILLIGRAM(S): at 18:25

## 2019-07-30 RX ADMIN — TRAMADOL HYDROCHLORIDE 50 MILLIGRAM(S): 50 TABLET ORAL at 22:51

## 2019-07-30 RX ADMIN — HYDROMORPHONE HYDROCHLORIDE 1 MILLIGRAM(S): 2 INJECTION INTRAMUSCULAR; INTRAVENOUS; SUBCUTANEOUS at 02:07

## 2019-07-30 RX ADMIN — Medication 1 TABLET(S): at 15:02

## 2019-07-30 RX ADMIN — Medication 200 MILLIGRAM(S): at 06:11

## 2019-07-30 RX ADMIN — HYDROMORPHONE HYDROCHLORIDE 2 MILLIGRAM(S): 2 INJECTION INTRAMUSCULAR; INTRAVENOUS; SUBCUTANEOUS at 21:18

## 2019-07-30 RX ADMIN — SENNA PLUS 2 TABLET(S): 8.6 TABLET ORAL at 21:18

## 2019-07-30 RX ADMIN — Medication 40 MILLIEQUIVALENT(S): at 06:11

## 2019-07-30 RX ADMIN — Medication 50 GRAM(S): at 13:37

## 2019-07-30 RX ADMIN — HYDROMORPHONE HYDROCHLORIDE 1 MILLIGRAM(S): 2 INJECTION INTRAMUSCULAR; INTRAVENOUS; SUBCUTANEOUS at 06:13

## 2019-07-30 RX ADMIN — Medication 100 MILLIGRAM(S): at 21:18

## 2019-07-30 RX ADMIN — Medication 100 MILLIGRAM(S): at 22:09

## 2019-07-30 RX ADMIN — Medication 650 MILLIGRAM(S): at 21:18

## 2019-07-30 RX ADMIN — PANTOPRAZOLE SODIUM 40 MILLIGRAM(S): 20 TABLET, DELAYED RELEASE ORAL at 06:11

## 2019-07-30 RX ADMIN — Medication 1 MILLIGRAM(S): at 15:02

## 2019-07-30 RX ADMIN — HYDROMORPHONE HYDROCHLORIDE 1 MILLIGRAM(S): 2 INJECTION INTRAMUSCULAR; INTRAVENOUS; SUBCUTANEOUS at 06:48

## 2019-07-30 RX ADMIN — ONDANSETRON 4 MILLIGRAM(S): 8 TABLET, FILM COATED ORAL at 06:14

## 2019-07-30 RX ADMIN — HYDROMORPHONE HYDROCHLORIDE 2 MILLIGRAM(S): 2 INJECTION INTRAMUSCULAR; INTRAVENOUS; SUBCUTANEOUS at 15:00

## 2019-07-30 RX ADMIN — HYDROMORPHONE HYDROCHLORIDE 2 MILLIGRAM(S): 2 INJECTION INTRAMUSCULAR; INTRAVENOUS; SUBCUTANEOUS at 22:09

## 2019-07-30 RX ADMIN — HYDROMORPHONE HYDROCHLORIDE 1 MILLIGRAM(S): 2 INJECTION INTRAMUSCULAR; INTRAVENOUS; SUBCUTANEOUS at 10:58

## 2019-07-30 RX ADMIN — Medication 200 MILLIGRAM(S): at 18:25

## 2019-07-30 RX ADMIN — HYDROMORPHONE HYDROCHLORIDE 1 MILLIGRAM(S): 2 INJECTION INTRAMUSCULAR; INTRAVENOUS; SUBCUTANEOUS at 02:37

## 2019-07-30 RX ADMIN — SODIUM CHLORIDE 200 MILLILITER(S): 9 INJECTION, SOLUTION INTRAVENOUS at 00:36

## 2019-07-30 RX ADMIN — Medication 650 MILLIGRAM(S): at 22:09

## 2019-07-30 NOTE — PROGRESS NOTE ADULT - SUBJECTIVE AND OBJECTIVE BOX
Patient today still complains of abdominal pain, described as colicky, diffuse, associated with nausea but no vomiting.   No fever, no chills, no diaphoresis.   Patient today has had no episodes of diarrhea.   Trial of water caused the patient abdominal pain, will keep her on ice chips for today and follow tomorrow.       chlorhexidine 4% Liquid 1 Application(s) Topical <User Schedule>  ciprofloxacin   IVPB      ciprofloxacin   IVPB 400 milliGRAM(s) IV Intermittent every 12 hours  docusate sodium 100 milliGRAM(s) Oral three times a day  enoxaparin Injectable 40 milliGRAM(s) SubCutaneous daily  folic acid 1 milliGRAM(s) Oral daily  HYDROmorphone  Injectable 2 milliGRAM(s) IV Push every 4 hours PRN  lactated ringers. 1000 milliLiter(s) IV Continuous <Continuous>  LORazepam     Tablet 2 milliGRAM(s) Oral every 2 hours PRN  magnesium sulfate  IVPB 2 Gram(s) IV Intermittent daily  metroNIDAZOLE  IVPB 500 milliGRAM(s) IV Intermittent every 8 hours  metroNIDAZOLE  IVPB      multivitamin 1 Tablet(s) Oral daily  ondansetron Injectable 4 milliGRAM(s) IV Push every 8 hours PRN  oxyCODONE    IR 5 milliGRAM(s) Oral every 6 hours PRN  pantoprazole    Tablet 40 milliGRAM(s) Oral before breakfast  senna 2 Tablet(s) Oral at bedtime  thiamine 100 milliGRAM(s) Oral daily      PHYSICAL EXAM:  General: A/ox 3, No acute Distress  Neck: Supple, NO JVD  Cardiac: S1 S2 heard regular, No audible murmurs  Pulmonary: Good bilateral breath sounds, Breathing unlabored, No Rhonchi/Rales/Wheezing  Abdomen: Soft, tender diffusely on palpation, + normoactive BS   Extremities: No Rashes, No edema  Neuro: A/o x 3, No focal deficits  Psch: normal mood , normal affect    T(C): 37.1 (07-30-19 @ 05:42), Max: 37.3 (07-29-19 @ 20:20)  HR: 92 (07-30-19 @ 05:42) (90 - 106)  BP: 119/68 (07-30-19 @ 05:42) (107/80 - 132/81)  RR: 18 (07-30-19 @ 05:42) (12 - 18)  SpO2: 97% (07-29-19 @ 21:57) (94% - 100%)    LABS:                        10.0   5.03  )-----------( 136      ( 30 Jul 2019 07:13 )             29.0     07-30    134<L>  |  95<L>  |  <3<L>  ----------------------------<  99  3.6   |  28  |  0.5<L>    Ca    7.9<L>      30 Jul 2019 07:13  Phos  2.3     07-30  Mg     1.4     07-30    TPro  5.6<L>  /  Alb  2.8<L>  /  TBili  1.3<H>  /  DBili  0.6<H>  /  AST  208<H>  /  ALT  56<H>  /  AlkPhos  71  07-29    PT/INR - ( 29 Jul 2019 05:16 )   PT: 12.10 sec;   INR: 1.05 ratio         PTT - ( 29 Jul 2019 05:16 )  PTT:28.9 sec
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
## 2019-07-30 NOTE — PROGRESS NOTE ADULT - ASSESSMENT
43 y/o female with PMH of EtOH abuse (drinks 2-3 alcoholic beverages per day, no history of withdraw or seizures) and recurrent pancreatitis secondary to EtOH abuse presents to ED for 3 days of multiple episodes of NBNB vomiting and diffused crampy abdominal pain, similar to prior episodes of pancreatitis.  patient was found to have high lipase and pancreatitis on CT , in addition to colitis.    ct abdomen: acute pancreatitis , no evidence of necrosis , colitis  US Abd: gallbladder sludge / hepatic steatosis peripancreatic fluid   lipase> 600  transaminitis     1)ETOH pancreatitis. No end organ damage  2)Radiographic evidence suggestive of chronic pancreatitis  3)Abnormal CT- ?R colitis  4)Cholelithiasis/sludge  5)Alcohol use disease    Rec:  - REPEAT CMP TODAY (TREND LFTS)  - IV hydration until pain resolves (@250 +cc/Hr of LR)  - Trial of clears if tolerates than advance if not then start NJ tube and start feeds  - Check stool elastase, fat (qualitative) RO steatorhea (likely in part cause of diarrhea)  - Will likely benefit from pancreatic enzyme replacement  - Abstinence  - Elective CCY

## 2019-07-30 NOTE — PROGRESS NOTE ADULT - SUBJECTIVE AND OBJECTIVE BOX
Hepatology/GI follow up note: Pt seen and examined at bedside.     For questions and inquiries please page [phone].  For urgent matters or after 5pm and on weekends please page the fellow on call through the GI paging system.    44y Female seen for: Etoh pancreatitis, chronic pancreatitis    Subjective/Interval events:  still complaining of pain    Review of system  General:  (-) weight loss, (-) fevers  Eyes:  (-) visual changes  CV:  (-) chest pain  Resp: (-) SOB, (-) wheezing  GI: (-) abdominal pain,  (-) nausea, (-) vomiting, (-) dysphagia, (-) diarrhea, (-) constipation, (-) rectal bleeding, (-) melena, (-) hematemesis.  Neuro: (-) confusion, (-) weakness  Psych:  (-) Hallucinations  Heme:  (-) easy bruisability    Past medical/surgical Hx:  PAST MEDICAL & SURGICAL HISTORY:  No pertinent past medical history  S/P arthroscopy: meniscal repair    Home Medications:  Last Order Reconciliation Date: Not Done      Allergies:  Compazine (Unknown)      Current Medications:   chlorhexidine 4% Liquid 1 Application(s) Topical <User Schedule>  ciprofloxacin   IVPB      ciprofloxacin   IVPB 400 milliGRAM(s) IV Intermittent every 12 hours  docusate sodium 100 milliGRAM(s) Oral three times a day  enoxaparin Injectable 40 milliGRAM(s) SubCutaneous daily  folic acid 1 milliGRAM(s) Oral daily  HYDROmorphone  Injectable 2 milliGRAM(s) IV Push every 4 hours PRN  lactated ringers. 1000 milliLiter(s) IV Continuous <Continuous>  LORazepam     Tablet 2 milliGRAM(s) Oral every 2 hours PRN  magnesium sulfate  IVPB 2 Gram(s) IV Intermittent daily  metroNIDAZOLE  IVPB 500 milliGRAM(s) IV Intermittent every 8 hours  metroNIDAZOLE  IVPB      multivitamin 1 Tablet(s) Oral daily  ondansetron Injectable 4 milliGRAM(s) IV Push every 8 hours PRN  oxyCODONE    IR 5 milliGRAM(s) Oral every 6 hours PRN  pantoprazole    Tablet 40 milliGRAM(s) Oral before breakfast  senna 2 Tablet(s) Oral at bedtime  thiamine 100 milliGRAM(s) Oral daily        Physical exam:  T(C): 37.3 (07-30-19 @ 13:55), Max: 37.3 (07-29-19 @ 20:20)  HR: 98 (07-30-19 @ 13:55) (92 - 106)  BP: 122/86 (07-30-19 @ 13:55) (119/68 - 132/81)  RR: 18 (07-30-19 @ 13:55) (12 - 18)  SpO2: 97% (07-29-19 @ 21:57) (96% - 100%)    GENERAL: NAD  HEAD:  Atraumatic, Normocephalic  EYES: Sclera:NL  NECK: Supple, no JVD or thyromegaly  CHEST/LUNG: Good bilateral air entry  HEART: normal S1, S2. Regular  ABDOMEN: (-) distended, (-) tender, (-) rebound, (+) BS, (-)HSM  EXTREMITIES: (-) edema  NEUROLOGY: (-) asterixis  SKIN: (-) jaundice  LEROY: (-) melena (-) brbpr      Data:                        10.0   5.03  )-----------( 136      ( 30 Jul 2019 07:13 )             29.0     MCV 98.6 (07-30-19)    RDW 11.9 (07-30-19)    HGB trend:  10.0  07-30-19 @ 07:13  13.0  07-29-19 @ 05:16  14.7  07-28-19 @ 01:20        WBC trend:  5.03  07-30-19 @ 07:13  5.77  07-29-19 @ 05:16  12.25  07-28-19 @ 01:20    07-30    134<L>  |  95<L>  |  <3<L>  ----------------------------<  99  3.6   |  28  |  0.5<L>    Ca    7.9<L>      30 Jul 2019 07:13  Phos  2.3     07-30  Mg     1.4     07-30    TPro  5.6<L>  /  Alb  2.8<L>  /  TBili  1.3<H>  /  DBili  0.6<H>  /  AST  208<H>  /  ALT  56<H>  /  AlkPhos  71  07-29    Liver panel trend:  TBili 1.3   /      /   ALT 56   /   AlkP 71   /   Tptn 5.6   /   Alb 2.8    /   DBili 0.6      07-29  TBili 1.6   /      /      /   AlkP 141   /   Tptn 8.3   /   Alb 4.0    /   DBili --      07-28        PT/INR - ( 29 Jul 2019 05:16 )   PT: 12.10 sec;   INR: 1.05 ratio         PTT - ( 29 Jul 2019 05:16 )  PTT:28.9 sec    Culture - Blood (collected 29 Jul 2019 05:16)  Source: .Blood None  Preliminary Report (30 Jul 2019 14:02):    No growth to date.    Culture - Blood (collected 29 Jul 2019 05:16)  Source: .Blood None  Preliminary Report (30 Jul 2019 14:02):    No growth to date.    Culture - Urine (collected 28 Jul 2019 14:35)  Source: .Urine msue&#x27;d twice  Final Report (29 Jul 2019 13:22):    No growth    Culture - Urine (collected 28 Jul 2019 01:44)  Source: .Urine Clean Catch (Midstream)  Final Report (29 Jul 2019 08:47):    >=3 organisms. Probable collection contamination.

## 2019-07-30 NOTE — SBIRT NOTE ADULT - NSSBIRTNALOXDUR_GEN_A_CORE
15 minutes.  Pt. admitted to alcohol use but declined discussion due to too many previous discussions.  Pt. accepted resources.

## 2019-07-31 LAB
ALBUMIN SERPL ELPH-MCNC: 2.5 G/DL — LOW (ref 3.5–5.2)
ALP SERPL-CCNC: 59 U/L — SIGNIFICANT CHANGE UP (ref 30–115)
ALT FLD-CCNC: 33 U/L — SIGNIFICANT CHANGE UP (ref 0–41)
ANION GAP SERPL CALC-SCNC: 12 MMOL/L — SIGNIFICANT CHANGE UP (ref 7–14)
AST SERPL-CCNC: 134 U/L — HIGH (ref 0–41)
BASOPHILS # BLD AUTO: 0.03 K/UL — SIGNIFICANT CHANGE UP (ref 0–0.2)
BASOPHILS NFR BLD AUTO: 0.6 % — SIGNIFICANT CHANGE UP (ref 0–1)
BILIRUB SERPL-MCNC: 0.8 MG/DL — SIGNIFICANT CHANGE UP (ref 0.2–1.2)
BUN SERPL-MCNC: <3 MG/DL — LOW (ref 10–20)
CALCIUM SERPL-MCNC: 8.4 MG/DL — LOW (ref 8.5–10.1)
CHLORIDE SERPL-SCNC: 98 MMOL/L — SIGNIFICANT CHANGE UP (ref 98–110)
CO2 SERPL-SCNC: 28 MMOL/L — SIGNIFICANT CHANGE UP (ref 17–32)
CREAT SERPL-MCNC: 0.5 MG/DL — LOW (ref 0.7–1.5)
EOSINOPHIL # BLD AUTO: 0.1 K/UL — SIGNIFICANT CHANGE UP (ref 0–0.7)
EOSINOPHIL NFR BLD AUTO: 2 % — SIGNIFICANT CHANGE UP (ref 0–8)
GLUCOSE SERPL-MCNC: 74 MG/DL — SIGNIFICANT CHANGE UP (ref 70–99)
HCT VFR BLD CALC: 27.9 % — LOW (ref 37–47)
HGB BLD-MCNC: 9.5 G/DL — LOW (ref 12–16)
IMM GRANULOCYTES NFR BLD AUTO: 0.2 % — SIGNIFICANT CHANGE UP (ref 0.1–0.3)
LIDOCAIN IGE QN: 458 U/L — HIGH (ref 7–60)
LYMPHOCYTES # BLD AUTO: 0.84 K/UL — LOW (ref 1.2–3.4)
LYMPHOCYTES # BLD AUTO: 16.5 % — LOW (ref 20.5–51.1)
MCHC RBC-ENTMCNC: 33.8 PG — HIGH (ref 27–31)
MCHC RBC-ENTMCNC: 34.1 G/DL — SIGNIFICANT CHANGE UP (ref 32–37)
MCV RBC AUTO: 99.3 FL — HIGH (ref 81–99)
MONOCYTES # BLD AUTO: 0.41 K/UL — SIGNIFICANT CHANGE UP (ref 0.1–0.6)
MONOCYTES NFR BLD AUTO: 8.1 % — SIGNIFICANT CHANGE UP (ref 1.7–9.3)
NEUTROPHILS # BLD AUTO: 3.69 K/UL — SIGNIFICANT CHANGE UP (ref 1.4–6.5)
NEUTROPHILS NFR BLD AUTO: 72.6 % — SIGNIFICANT CHANGE UP (ref 42.2–75.2)
NRBC # BLD: 0 /100 WBCS — SIGNIFICANT CHANGE UP (ref 0–0)
PLATELET # BLD AUTO: 141 K/UL — SIGNIFICANT CHANGE UP (ref 130–400)
POTASSIUM SERPL-MCNC: 4.2 MMOL/L — SIGNIFICANT CHANGE UP (ref 3.5–5)
POTASSIUM SERPL-SCNC: 4.2 MMOL/L — SIGNIFICANT CHANGE UP (ref 3.5–5)
PROT SERPL-MCNC: 5.1 G/DL — LOW (ref 6–8)
RBC # BLD: 2.81 M/UL — LOW (ref 4.2–5.4)
RBC # FLD: 11.7 % — SIGNIFICANT CHANGE UP (ref 11.5–14.5)
SODIUM SERPL-SCNC: 138 MMOL/L — SIGNIFICANT CHANGE UP (ref 135–146)
WBC # BLD: 5.08 K/UL — SIGNIFICANT CHANGE UP (ref 4.8–10.8)
WBC # FLD AUTO: 5.08 K/UL — SIGNIFICANT CHANGE UP (ref 4.8–10.8)

## 2019-07-31 PROCEDURE — 99233 SBSQ HOSP IP/OBS HIGH 50: CPT

## 2019-07-31 RX ORDER — TRAMADOL HYDROCHLORIDE 50 MG/1
50 TABLET ORAL EVERY 6 HOURS
Refills: 0 | Status: DISCONTINUED | OUTPATIENT
Start: 2019-07-31 | End: 2019-08-02

## 2019-07-31 RX ORDER — HYDROMORPHONE HYDROCHLORIDE 2 MG/ML
1 INJECTION INTRAMUSCULAR; INTRAVENOUS; SUBCUTANEOUS EVERY 4 HOURS
Refills: 0 | Status: DISCONTINUED | OUTPATIENT
Start: 2019-07-31 | End: 2019-08-06

## 2019-07-31 RX ADMIN — HYDROMORPHONE HYDROCHLORIDE 1 MILLIGRAM(S): 2 INJECTION INTRAMUSCULAR; INTRAVENOUS; SUBCUTANEOUS at 18:46

## 2019-07-31 RX ADMIN — HYDROMORPHONE HYDROCHLORIDE 1 MILLIGRAM(S): 2 INJECTION INTRAMUSCULAR; INTRAVENOUS; SUBCUTANEOUS at 23:30

## 2019-07-31 RX ADMIN — Medication 50 GRAM(S): at 13:13

## 2019-07-31 RX ADMIN — HYDROMORPHONE HYDROCHLORIDE 2 MILLIGRAM(S): 2 INJECTION INTRAMUSCULAR; INTRAVENOUS; SUBCUTANEOUS at 01:51

## 2019-07-31 RX ADMIN — HYDROMORPHONE HYDROCHLORIDE 2 MILLIGRAM(S): 2 INJECTION INTRAMUSCULAR; INTRAVENOUS; SUBCUTANEOUS at 05:56

## 2019-07-31 RX ADMIN — Medication 1 MILLIGRAM(S): at 12:06

## 2019-07-31 RX ADMIN — TRAMADOL HYDROCHLORIDE 50 MILLIGRAM(S): 50 TABLET ORAL at 22:21

## 2019-07-31 RX ADMIN — SODIUM CHLORIDE 200 MILLILITER(S): 9 INJECTION, SOLUTION INTRAVENOUS at 05:30

## 2019-07-31 RX ADMIN — Medication 1 TABLET(S): at 12:06

## 2019-07-31 RX ADMIN — Medication 200 MILLIGRAM(S): at 18:46

## 2019-07-31 RX ADMIN — Medication 100 MILLIGRAM(S): at 05:29

## 2019-07-31 RX ADMIN — Medication 100 MILLIGRAM(S): at 13:14

## 2019-07-31 RX ADMIN — HYDROMORPHONE HYDROCHLORIDE 1 MILLIGRAM(S): 2 INJECTION INTRAMUSCULAR; INTRAVENOUS; SUBCUTANEOUS at 15:01

## 2019-07-31 RX ADMIN — TRAMADOL HYDROCHLORIDE 50 MILLIGRAM(S): 50 TABLET ORAL at 12:36

## 2019-07-31 RX ADMIN — Medication 100 MILLIGRAM(S): at 12:06

## 2019-07-31 RX ADMIN — Medication 100 MILLIGRAM(S): at 21:56

## 2019-07-31 RX ADMIN — Medication 200 MILLIGRAM(S): at 05:29

## 2019-07-31 RX ADMIN — ENOXAPARIN SODIUM 40 MILLIGRAM(S): 100 INJECTION SUBCUTANEOUS at 12:07

## 2019-07-31 RX ADMIN — SODIUM CHLORIDE 200 MILLILITER(S): 9 INJECTION, SOLUTION INTRAVENOUS at 01:02

## 2019-07-31 RX ADMIN — HYDROMORPHONE HYDROCHLORIDE 2 MILLIGRAM(S): 2 INJECTION INTRAMUSCULAR; INTRAVENOUS; SUBCUTANEOUS at 06:37

## 2019-07-31 RX ADMIN — PANTOPRAZOLE SODIUM 40 MILLIGRAM(S): 20 TABLET, DELAYED RELEASE ORAL at 05:29

## 2019-07-31 RX ADMIN — SODIUM CHLORIDE 200 MILLILITER(S): 9 INJECTION, SOLUTION INTRAVENOUS at 13:13

## 2019-07-31 RX ADMIN — SENNA PLUS 2 TABLET(S): 8.6 TABLET ORAL at 21:56

## 2019-07-31 RX ADMIN — HYDROMORPHONE HYDROCHLORIDE 1 MILLIGRAM(S): 2 INJECTION INTRAMUSCULAR; INTRAVENOUS; SUBCUTANEOUS at 14:46

## 2019-07-31 RX ADMIN — TRAMADOL HYDROCHLORIDE 50 MILLIGRAM(S): 50 TABLET ORAL at 12:06

## 2019-07-31 RX ADMIN — SODIUM CHLORIDE 200 MILLILITER(S): 9 INJECTION, SOLUTION INTRAVENOUS at 18:22

## 2019-07-31 RX ADMIN — Medication 100 MILLIGRAM(S): at 14:26

## 2019-07-31 RX ADMIN — HYDROMORPHONE HYDROCHLORIDE 1 MILLIGRAM(S): 2 INJECTION INTRAMUSCULAR; INTRAVENOUS; SUBCUTANEOUS at 19:01

## 2019-07-31 RX ADMIN — HYDROMORPHONE HYDROCHLORIDE 1 MILLIGRAM(S): 2 INJECTION INTRAMUSCULAR; INTRAVENOUS; SUBCUTANEOUS at 23:45

## 2019-07-31 RX ADMIN — ONDANSETRON 4 MILLIGRAM(S): 8 TABLET, FILM COATED ORAL at 12:06

## 2019-07-31 RX ADMIN — HYDROMORPHONE HYDROCHLORIDE 2 MILLIGRAM(S): 2 INJECTION INTRAMUSCULAR; INTRAVENOUS; SUBCUTANEOUS at 02:21
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
## 2019-07-31 NOTE — PROGRESS NOTE ADULT - SUBJECTIVE AND OBJECTIVE BOX
Hepatology/GI follow up note: Pt seen and examined at bedside.     For questions and inquiries please page [phone].  For urgent matters or after 5pm and on weekends please page the fellow on call through the GI paging system.    44y Female seen for: Etoh pancreatitis, chronic pancreatitis    Subjective/Interval events:  Still complaining of pain      Review of system  General:  (-) weight loss, (-) fevers  Eyes:  (-) visual changes  CV:  (-) chest pain  Resp: (-) SOB, (-) wheezing  GI: (+) abdominal pain,  (-) nausea, (-) vomiting, (-) dysphagia, (-) diarrhea, (-) constipation, (-) rectal bleeding, (-) melena, (-) hematemesis.  Neuro: (-) confusion, (-) weakness  Psych:  (-) Hallucinations  Heme:  (-) easy bruisability    Past medical/surgical Hx:  PAST MEDICAL & SURGICAL HISTORY:  No pertinent past medical history  S/P arthroscopy: meniscal repair    Home Medications:  Last Order Reconciliation Date: Not Done      Allergies:  Compazine (Unknown)      Current Medications:   acetaminophen   Tablet .. 650 milliGRAM(s) Oral every 6 hours PRN  chlorhexidine 4% Liquid 1 Application(s) Topical <User Schedule>  ciprofloxacin   IVPB      ciprofloxacin   IVPB 400 milliGRAM(s) IV Intermittent every 12 hours  docusate sodium 100 milliGRAM(s) Oral three times a day  enoxaparin Injectable 40 milliGRAM(s) SubCutaneous daily  folic acid 1 milliGRAM(s) Oral daily  HYDROmorphone  Injectable 2 milliGRAM(s) IV Push every 4 hours PRN  lactated ringers. 1000 milliLiter(s) IV Continuous <Continuous>  LORazepam     Tablet 2 milliGRAM(s) Oral every 2 hours PRN  magnesium sulfate  IVPB 2 Gram(s) IV Intermittent daily  metroNIDAZOLE  IVPB 500 milliGRAM(s) IV Intermittent every 8 hours  metroNIDAZOLE  IVPB      multivitamin 1 Tablet(s) Oral daily  ondansetron Injectable 4 milliGRAM(s) IV Push every 8 hours PRN  oxyCODONE    IR 5 milliGRAM(s) Oral every 6 hours PRN  pantoprazole    Tablet 40 milliGRAM(s) Oral before breakfast  senna 2 Tablet(s) Oral at bedtime  thiamine 100 milliGRAM(s) Oral daily        Physical exam:  T(C): 36.6 (07-31-19 @ 05:44), Max: 38.1 (07-30-19 @ 20:31)  HR: 87 (07-31-19 @ 05:44) (87 - 98)  BP: 109/78 (07-31-19 @ 05:44) (109/78 - 126/67)  RR: 17 (07-31-19 @ 05:44) (17 - 18)  SpO2: --    GENERAL: NAD  HEAD:  Atraumatic, Normocephalic  EYES: Sclera:NL  NECK: Supple, no JVD or thyromegaly  CHEST/LUNG: Good bilateral air entry  HEART: normal S1, S2. Regular  ABDOMEN: (-) distended, (+) tender, (-) rebound, (+) BS, (-)HSM  EXTREMITIES: (-) edema  NEUROLOGY: (-) asterixis  SKIN: (-) jaundice        Data:                        9.5    5.08  )-----------( 141      ( 31 Jul 2019 05:26 )             27.9     MCV 99.3 (07-31-19)    RDW 11.7 (07-31-19)    HGB trend:  9.5  07-31-19 @ 05:26  10.0  07-30-19 @ 07:13  13.0  07-29-19 @ 05:16        WBC trend:  5.08  07-31-19 @ 05:26  5.03  07-30-19 @ 07:13  5.77  07-29-19 @ 05:16    07-31    138  |  98  |  <3<L>  ----------------------------<  74  4.2   |  28  |  0.5<L>    Ca    8.4<L>      31 Jul 2019 05:26  Phos  2.3     07-30  Mg     1.4     07-30    TPro  5.1<L>  /  Alb  2.5<L>  /  TBili  0.8  /  DBili  x   /  AST  134<H>  /  ALT  33  /  AlkPhos  59  07-31    Liver panel trend:  TBili 0.8   /      /   ALT 33   /   AlkP 59   /   Tptn 5.1   /   Alb 2.5    /   DBili --      07-31  TBili 1.3   /      /   ALT 56   /   AlkP 71   /   Tptn 5.6   /   Alb 2.8    /   DBili 0.6      07-29  TBili 1.6   /      /      /   AlkP 141   /   Tptn 8.3   /   Alb 4.0    /   DBili --      07-28            Culture - Blood (collected 29 Jul 2019 05:16)  Source: .Blood None  Preliminary Report (30 Jul 2019 14:02):    No growth to date.    Culture - Blood (collected 29 Jul 2019 05:16)  Source: .Blood None  Preliminary Report (30 Jul 2019 14:02):    No growth to date.    Culture - Urine (collected 28 Jul 2019 14:35)  Source: .Urine msue&#x27;d twice  Final Report (29 Jul 2019 13:22):    No growth

## 2019-07-31 NOTE — PROGRESS NOTE ADULT - ASSESSMENT
43 y/o female with PMH of EtOH abuse (drinks 2-3 alcoholic beverages per day, no history of withdraw or seizures) and recurrent pancreatitis secondary to EtOH abuse presents to ED for 3 days of multiple episodes of NBNB vomiting and diffused crampy abdominal pain, similar to prior episodes of pancreatitis.  patient was found to have high lipase and pancreatitis on CT , in addition to colitis.    ct abdomen: acute pancreatitis , no evidence of necrosis , colitis  US Abd: gallbladder sludge / hepatic steatosis peripancreatic fluid   lipase> 600  transaminitis     1)ETOH pancreatitis. No end organ damage  2)Radiographic evidence suggestive of chronic pancreatitis  3)Abnormal CT- ?R colitis  4)Cholelithiasis/sludge  5)Alcohol use disease    Rec:  - Start PO tramadol  - IV hydration until pain resolves (@250 +cc/Hr of LR)  - Trial of clears if tolerates than advance if not then start NJ tube and start feeds  - Check stool elastase, fat (qualitative) RO steatorhea (likely in part cause of diarrhea)  - Will likely benefit from pancreatic enzyme replacement  - Abstinence  - Elective CCY 43 y/o female with PMH of EtOH abuse (drinks 2-3 alcoholic beverages per day, no history of withdraw or seizures) and recurrent pancreatitis secondary to EtOH abuse presents to ED for 3 days of multiple episodes of NBNB vomiting and diffused crampy abdominal pain, similar to prior episodes of pancreatitis.  patient was found to have high lipase and pancreatitis on CT , in addition to colitis.    ct abdomen: acute pancreatitis , no evidence of necrosis , colitis  US Abd: gallbladder sludge / hepatic steatosis peripancreatic fluid   lipase> 600  transaminitis     1)ETOH pancreatitis. No end organ damage  2)Radiographic evidence suggestive of chronic pancreatitis  3)Abnormal CT- ?R colitis  4)Cholelithiasis/sludge  5)Alcohol use disease    Rec:  - Start PO tramadol  - IV hydration until pain resolves (@250 +cc/Hr of LR)  - Trial of clears if tolerates than advance if not then start NJ tube and start feeds  - Check stool elastase, fat (qualitative) RO steatorhea (likely in part cause of diarrhea)  - Will likely benefit from pancreatic enzyme replacement  - Alcohol abstinence d/w pt  - Elective CCY

## 2019-07-31 NOTE — PROGRESS NOTE ADULT - SUBJECTIVE AND OBJECTIVE BOX
The patient today complains of abdominal pain and is not tolerating liquids.  She had one episode of low grade fever, no chills, no diaphoresis, no cough, no dyspnea, no dysuria, no change in bowel habits.        acetaminophen   Tablet .. 650 milliGRAM(s) Oral every 6 hours PRN  chlorhexidine 4% Liquid 1 Application(s) Topical <User Schedule>  ciprofloxacin   IVPB      ciprofloxacin   IVPB 400 milliGRAM(s) IV Intermittent every 12 hours  docusate sodium 100 milliGRAM(s) Oral three times a day  enoxaparin Injectable 40 milliGRAM(s) SubCutaneous daily  folic acid 1 milliGRAM(s) Oral daily  HYDROmorphone  Injectable 1 milliGRAM(s) IV Push every 4 hours PRN  lactated ringers. 1000 milliLiter(s) IV Continuous <Continuous>  LORazepam     Tablet 2 milliGRAM(s) Oral every 2 hours PRN  magnesium sulfate  IVPB 2 Gram(s) IV Intermittent daily  metroNIDAZOLE  IVPB 500 milliGRAM(s) IV Intermittent every 8 hours  metroNIDAZOLE  IVPB      multivitamin 1 Tablet(s) Oral daily  ondansetron Injectable 4 milliGRAM(s) IV Push every 8 hours PRN  pantoprazole    Tablet 40 milliGRAM(s) Oral before breakfast  senna 2 Tablet(s) Oral at bedtime  thiamine 100 milliGRAM(s) Oral daily  traMADol 50 milliGRAM(s) Oral every 6 hours PRN      PHYSICAL EXAM:  General: A/ox 3, No acute Distress  Neck: Supple, NO JVD  Cardiac: S1 S2 heard regular/irregular, No audible murmurs  Pulmonary: Good bilateral breath sounds, Breathing unlabored, No Rhonchi/Rales/Wheezing  Abdomen: Soft, Non -tender, +BS   Extremities: No Rashes, No edema  Neuro: A/o x 3, No focal deficits  Psch: normal mood , normal affect    T(C): 37.6 (07-31-19 @ 12:00), Max: 38.1 (07-30-19 @ 20:31)  HR: 104 (07-31-19 @ 12:00) (87 - 104)  BP: 134/86 (07-31-19 @ 12:00) (109/78 - 134/86)  RR: 18 (07-31-19 @ 12:00) (17 - 18)  SpO2: --    LABS:                        9.5    5.08  )-----------( 141      ( 31 Jul 2019 05:26 )             27.9     07-31    138  |  98  |  <3<L>  ----------------------------<  74  4.2   |  28  |  0.5<L>    Ca    8.4<L>      31 Jul 2019 05:26  Phos  2.3     07-30  Mg     1.4     07-30    TPro  5.1<L>  /  Alb  2.5<L>  /  TBili  0.8  /  DBili  x   /  AST  134<H>  /  ALT  33  /  AlkPhos  59  07-31      Alanine Aminotransferase (ALT/SGPT): 33 U/L (07-31-19 @ 05:26)  Aspartate Aminotransferase (AST/SGOT): 134 U/L (07-31-19 @ 05:26) The patient today complains of abdominal pain and is not tolerating liquids.  She had one episode of low grade fever, no chills, no diaphoresis, no cough, no dyspnea, no dysuria, no change in bowel habits.        acetaminophen   Tablet .. 650 milliGRAM(s) Oral every 6 hours PRN  chlorhexidine 4% Liquid 1 Application(s) Topical <User Schedule>  ciprofloxacin   IVPB      ciprofloxacin   IVPB 400 milliGRAM(s) IV Intermittent every 12 hours  docusate sodium 100 milliGRAM(s) Oral three times a day  enoxaparin Injectable 40 milliGRAM(s) SubCutaneous daily  folic acid 1 milliGRAM(s) Oral daily  HYDROmorphone  Injectable 1 milliGRAM(s) IV Push every 4 hours PRN  lactated ringers. 1000 milliLiter(s) IV Continuous <Continuous>  LORazepam     Tablet 2 milliGRAM(s) Oral every 2 hours PRN  magnesium sulfate  IVPB 2 Gram(s) IV Intermittent daily  metroNIDAZOLE  IVPB 500 milliGRAM(s) IV Intermittent every 8 hours  metroNIDAZOLE  IVPB      multivitamin 1 Tablet(s) Oral daily  ondansetron Injectable 4 milliGRAM(s) IV Push every 8 hours PRN  pantoprazole    Tablet 40 milliGRAM(s) Oral before breakfast  senna 2 Tablet(s) Oral at bedtime  thiamine 100 milliGRAM(s) Oral daily  traMADol 50 milliGRAM(s) Oral every 6 hours PRN      PHYSICAL EXAM:  General: A/ox 3, No acute Distress  Neck: Supple, NO JVD  Cardiac: S1 S2 heard regular, No audible murmurs  Pulmonary: Good bilateral breath sounds, Breathing unlabored, No Rhonchi/Rales/Wheezing  Abdomen: Soft, tender abdomen on minimal palpation, +ve normoactive BS.  Extremities: No Rashes, No edema  Neuro: A/o x 3, No focal deficits  Psch: normal mood , normal affect    T(C): 37.6 (07-31-19 @ 12:00), Max: 38.1 (07-30-19 @ 20:31)  HR: 104 (07-31-19 @ 12:00) (87 - 104)  BP: 134/86 (07-31-19 @ 12:00) (109/78 - 134/86)  RR: 18 (07-31-19 @ 12:00) (17 - 18)  SpO2: --    LABS:                        9.5    5.08  )-----------( 141      ( 31 Jul 2019 05:26 )             27.9     07-31    138  |  98  |  <3<L>  ----------------------------<  74  4.2   |  28  |  0.5<L>    Ca    8.4<L>      31 Jul 2019 05:26  Phos  2.3     07-30  Mg     1.4     07-30    TPro  5.1<L>  /  Alb  2.5<L>  /  TBili  0.8  /  DBili  x   /  AST  134<H>  /  ALT  33  /  AlkPhos  59  07-31      Alanine Aminotransferase (ALT/SGPT): 33 U/L (07-31-19 @ 05:26)  Aspartate Aminotransferase (AST/SGOT): 134 U/L (07-31-19 @ 05:26)

## 2019-08-01 LAB
ALBUMIN SERPL ELPH-MCNC: 2.8 G/DL — LOW (ref 3.5–5.2)
ALP SERPL-CCNC: 64 U/L — SIGNIFICANT CHANGE UP (ref 30–115)
ALT FLD-CCNC: 29 U/L — SIGNIFICANT CHANGE UP (ref 0–41)
ANION GAP SERPL CALC-SCNC: 12 MMOL/L — SIGNIFICANT CHANGE UP (ref 7–14)
ANION GAP SERPL CALC-SCNC: 14 MMOL/L — SIGNIFICANT CHANGE UP (ref 7–14)
AST SERPL-CCNC: 109 U/L — HIGH (ref 0–41)
BASOPHILS # BLD AUTO: 0.02 K/UL — SIGNIFICANT CHANGE UP (ref 0–0.2)
BASOPHILS NFR BLD AUTO: 0.5 % — SIGNIFICANT CHANGE UP (ref 0–1)
BILIRUB SERPL-MCNC: 0.5 MG/DL — SIGNIFICANT CHANGE UP (ref 0.2–1.2)
BUN SERPL-MCNC: <3 MG/DL — LOW (ref 10–20)
BUN SERPL-MCNC: <3 MG/DL — LOW (ref 10–20)
CALCIUM SERPL-MCNC: 8.1 MG/DL — LOW (ref 8.5–10.1)
CALCIUM SERPL-MCNC: 8.5 MG/DL — SIGNIFICANT CHANGE UP (ref 8.5–10.1)
CHLORIDE SERPL-SCNC: 96 MMOL/L — LOW (ref 98–110)
CHLORIDE SERPL-SCNC: 97 MMOL/L — LOW (ref 98–110)
CO2 SERPL-SCNC: 25 MMOL/L — SIGNIFICANT CHANGE UP (ref 17–32)
CO2 SERPL-SCNC: 31 MMOL/L — SIGNIFICANT CHANGE UP (ref 17–32)
CREAT SERPL-MCNC: 0.5 MG/DL — LOW (ref 0.7–1.5)
CREAT SERPL-MCNC: 0.5 MG/DL — LOW (ref 0.7–1.5)
EOSINOPHIL # BLD AUTO: 0.06 K/UL — SIGNIFICANT CHANGE UP (ref 0–0.7)
EOSINOPHIL NFR BLD AUTO: 1.5 % — SIGNIFICANT CHANGE UP (ref 0–8)
GLUCOSE SERPL-MCNC: 91 MG/DL — SIGNIFICANT CHANGE UP (ref 70–99)
GLUCOSE SERPL-MCNC: 95 MG/DL — SIGNIFICANT CHANGE UP (ref 70–99)
HCT VFR BLD CALC: 28.8 % — LOW (ref 37–47)
HGB BLD-MCNC: 10 G/DL — LOW (ref 12–16)
IMM GRANULOCYTES NFR BLD AUTO: 0.3 % — SIGNIFICANT CHANGE UP (ref 0.1–0.3)
LIDOCAIN IGE QN: >600 U/L — HIGH (ref 7–60)
LYMPHOCYTES # BLD AUTO: 0.68 K/UL — LOW (ref 1.2–3.4)
LYMPHOCYTES # BLD AUTO: 17.3 % — LOW (ref 20.5–51.1)
MAGNESIUM SERPL-MCNC: 1.4 MG/DL — LOW (ref 1.8–2.4)
MAGNESIUM SERPL-MCNC: 2.1 MG/DL — SIGNIFICANT CHANGE UP (ref 1.8–2.4)
MCHC RBC-ENTMCNC: 33.9 PG — HIGH (ref 27–31)
MCHC RBC-ENTMCNC: 34.7 G/DL — SIGNIFICANT CHANGE UP (ref 32–37)
MCV RBC AUTO: 97.6 FL — SIGNIFICANT CHANGE UP (ref 81–99)
MONOCYTES # BLD AUTO: 0.42 K/UL — SIGNIFICANT CHANGE UP (ref 0.1–0.6)
MONOCYTES NFR BLD AUTO: 10.7 % — HIGH (ref 1.7–9.3)
NEUTROPHILS # BLD AUTO: 2.75 K/UL — SIGNIFICANT CHANGE UP (ref 1.4–6.5)
NEUTROPHILS NFR BLD AUTO: 69.7 % — SIGNIFICANT CHANGE UP (ref 42.2–75.2)
NRBC # BLD: 0 /100 WBCS — SIGNIFICANT CHANGE UP (ref 0–0)
PLATELET # BLD AUTO: 166 K/UL — SIGNIFICANT CHANGE UP (ref 130–400)
POTASSIUM SERPL-MCNC: 3.3 MMOL/L — LOW (ref 3.5–5)
POTASSIUM SERPL-MCNC: 4.1 MMOL/L — SIGNIFICANT CHANGE UP (ref 3.5–5)
POTASSIUM SERPL-SCNC: 3.3 MMOL/L — LOW (ref 3.5–5)
POTASSIUM SERPL-SCNC: 4.1 MMOL/L — SIGNIFICANT CHANGE UP (ref 3.5–5)
PROT SERPL-MCNC: 5.5 G/DL — LOW (ref 6–8)
RBC # BLD: 2.95 M/UL — LOW (ref 4.2–5.4)
RBC # FLD: 11.5 % — SIGNIFICANT CHANGE UP (ref 11.5–14.5)
SODIUM SERPL-SCNC: 135 MMOL/L — SIGNIFICANT CHANGE UP (ref 135–146)
SODIUM SERPL-SCNC: 140 MMOL/L — SIGNIFICANT CHANGE UP (ref 135–146)
WBC # BLD: 3.94 K/UL — LOW (ref 4.8–10.8)
WBC # FLD AUTO: 3.94 K/UL — LOW (ref 4.8–10.8)

## 2019-08-01 PROCEDURE — 74177 CT ABD & PELVIS W/CONTRAST: CPT | Mod: 26

## 2019-08-01 PROCEDURE — 99233 SBSQ HOSP IP/OBS HIGH 50: CPT

## 2019-08-01 RX ORDER — POTASSIUM CHLORIDE 20 MEQ
20 PACKET (EA) ORAL ONCE
Refills: 0 | Status: COMPLETED | OUTPATIENT
Start: 2019-08-01 | End: 2019-08-01

## 2019-08-01 RX ORDER — SODIUM CHLORIDE 9 MG/ML
1000 INJECTION INTRAMUSCULAR; INTRAVENOUS; SUBCUTANEOUS
Refills: 0 | Status: DISCONTINUED | OUTPATIENT
Start: 2019-08-01 | End: 2019-08-08

## 2019-08-01 RX ORDER — MAGNESIUM SULFATE 500 MG/ML
2 VIAL (ML) INJECTION ONCE
Refills: 0 | Status: COMPLETED | OUTPATIENT
Start: 2019-08-01 | End: 2019-08-01

## 2019-08-01 RX ADMIN — SODIUM CHLORIDE 200 MILLILITER(S): 9 INJECTION, SOLUTION INTRAVENOUS at 08:03

## 2019-08-01 RX ADMIN — TRAMADOL HYDROCHLORIDE 50 MILLIGRAM(S): 50 TABLET ORAL at 21:49

## 2019-08-01 RX ADMIN — Medication 200 MILLIGRAM(S): at 06:32

## 2019-08-01 RX ADMIN — SODIUM CHLORIDE 250 MILLILITER(S): 9 INJECTION INTRAMUSCULAR; INTRAVENOUS; SUBCUTANEOUS at 21:48

## 2019-08-01 RX ADMIN — HYDROMORPHONE HYDROCHLORIDE 1 MILLIGRAM(S): 2 INJECTION INTRAMUSCULAR; INTRAVENOUS; SUBCUTANEOUS at 09:45

## 2019-08-01 RX ADMIN — Medication 50 GRAM(S): at 09:42

## 2019-08-01 RX ADMIN — SODIUM CHLORIDE 200 MILLILITER(S): 9 INJECTION, SOLUTION INTRAVENOUS at 00:47

## 2019-08-01 RX ADMIN — Medication 50 MILLIEQUIVALENT(S): at 12:27

## 2019-08-01 RX ADMIN — ENOXAPARIN SODIUM 40 MILLIGRAM(S): 100 INJECTION SUBCUTANEOUS at 12:26

## 2019-08-01 RX ADMIN — TRAMADOL HYDROCHLORIDE 50 MILLIGRAM(S): 50 TABLET ORAL at 12:27

## 2019-08-01 RX ADMIN — CHLORHEXIDINE GLUCONATE 1 APPLICATION(S): 213 SOLUTION TOPICAL at 05:29

## 2019-08-01 RX ADMIN — HYDROMORPHONE HYDROCHLORIDE 1 MILLIGRAM(S): 2 INJECTION INTRAMUSCULAR; INTRAVENOUS; SUBCUTANEOUS at 05:28

## 2019-08-01 RX ADMIN — Medication 100 MILLIGRAM(S): at 22:02

## 2019-08-01 RX ADMIN — Medication 100 MILLIGRAM(S): at 05:30

## 2019-08-01 RX ADMIN — Medication 100 MILLIGRAM(S): at 23:03

## 2019-08-01 RX ADMIN — SODIUM CHLORIDE 250 MILLILITER(S): 9 INJECTION INTRAMUSCULAR; INTRAVENOUS; SUBCUTANEOUS at 12:28

## 2019-08-01 RX ADMIN — Medication 100 MILLIGRAM(S): at 14:28

## 2019-08-01 RX ADMIN — Medication 100 MILLIGRAM(S): at 14:29

## 2019-08-01 RX ADMIN — TRAMADOL HYDROCHLORIDE 50 MILLIGRAM(S): 50 TABLET ORAL at 12:57

## 2019-08-01 RX ADMIN — SENNA PLUS 2 TABLET(S): 8.6 TABLET ORAL at 23:03

## 2019-08-01 RX ADMIN — Medication 50 GRAM(S): at 14:30

## 2019-08-01 RX ADMIN — Medication 100 MILLIGRAM(S): at 05:29

## 2019-08-01 RX ADMIN — HYDROMORPHONE HYDROCHLORIDE 1 MILLIGRAM(S): 2 INJECTION INTRAMUSCULAR; INTRAVENOUS; SUBCUTANEOUS at 10:00

## 2019-08-01 RX ADMIN — ONDANSETRON 4 MILLIGRAM(S): 8 TABLET, FILM COATED ORAL at 09:44

## 2019-08-01 RX ADMIN — SODIUM CHLORIDE 250 MILLILITER(S): 9 INJECTION, SOLUTION INTRAVENOUS at 09:41

## 2019-08-01 RX ADMIN — HYDROMORPHONE HYDROCHLORIDE 1 MILLIGRAM(S): 2 INJECTION INTRAMUSCULAR; INTRAVENOUS; SUBCUTANEOUS at 18:44

## 2019-08-01 RX ADMIN — HYDROMORPHONE HYDROCHLORIDE 1 MILLIGRAM(S): 2 INJECTION INTRAMUSCULAR; INTRAVENOUS; SUBCUTANEOUS at 19:00

## 2019-08-01 RX ADMIN — SODIUM CHLORIDE 250 MILLILITER(S): 9 INJECTION INTRAMUSCULAR; INTRAVENOUS; SUBCUTANEOUS at 18:47

## 2019-08-01 RX ADMIN — HYDROMORPHONE HYDROCHLORIDE 1 MILLIGRAM(S): 2 INJECTION INTRAMUSCULAR; INTRAVENOUS; SUBCUTANEOUS at 14:31

## 2019-08-01 RX ADMIN — PANTOPRAZOLE SODIUM 40 MILLIGRAM(S): 20 TABLET, DELAYED RELEASE ORAL at 05:29

## 2019-08-01 RX ADMIN — Medication 1 TABLET(S): at 12:26

## 2019-08-01 RX ADMIN — Medication 100 MILLIGRAM(S): at 12:26

## 2019-08-01 RX ADMIN — Medication 200 MILLIGRAM(S): at 18:41

## 2019-08-01 RX ADMIN — HYDROMORPHONE HYDROCHLORIDE 1 MILLIGRAM(S): 2 INJECTION INTRAMUSCULAR; INTRAVENOUS; SUBCUTANEOUS at 05:58

## 2019-08-01 RX ADMIN — HYDROMORPHONE HYDROCHLORIDE 1 MILLIGRAM(S): 2 INJECTION INTRAMUSCULAR; INTRAVENOUS; SUBCUTANEOUS at 23:03

## 2019-08-01 RX ADMIN — Medication 1 MILLIGRAM(S): at 12:26

## 2019-08-01 RX ADMIN — HYDROMORPHONE HYDROCHLORIDE 1 MILLIGRAM(S): 2 INJECTION INTRAMUSCULAR; INTRAVENOUS; SUBCUTANEOUS at 14:46

## 2019-08-01 RX ADMIN — ONDANSETRON 4 MILLIGRAM(S): 8 TABLET, FILM COATED ORAL at 23:03

## 2019-08-01 NOTE — DIETITIAN INITIAL EVALUATION ADULT. - PHYSICAL APPEARANCE
well nourished/BMI 22.1 IBW: 120# Pt reports on past hospital adm 2 months ago she went in at 140 and lost 20#. has now slowly regained some of it back. Unable to complete full NFPF as pt covered in blankets, but no physical wasting observed from face. BS 20 skin intact.

## 2019-08-01 NOTE — DIETITIAN INITIAL EVALUATION ADULT. - OTHER INFO
Pt adm for nausea, vomiting and pain in abdomen. Patient today complains of epigastric pain with every sip of water and claims that the pain has not abated. Alcohol induced acute pancreatitis. Diarrhea with crampy lower abd pain resolved. Transaminitis - most likely alcohol induced. [facility] protocol. GI following.
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
## 2019-08-01 NOTE — PROGRESS NOTE ADULT - SUBJECTIVE AND OBJECTIVE BOX
Patient today complains of epigastric pain with every sip of water and claims that the pain has not abated.   Patient had one episode of low grade fever, no other significant complaints.     acetaminophen   Tablet .. 650 milliGRAM(s) Oral every 6 hours PRN  chlorhexidine 4% Liquid 1 Application(s) Topical <User Schedule>  ciprofloxacin   IVPB      ciprofloxacin   IVPB 400 milliGRAM(s) IV Intermittent every 12 hours  docusate sodium 100 milliGRAM(s) Oral three times a day  enoxaparin Injectable 40 milliGRAM(s) SubCutaneous daily  folic acid 1 milliGRAM(s) Oral daily  HYDROmorphone  Injectable 1 milliGRAM(s) IV Push every 4 hours PRN  lactated ringers. 1000 milliLiter(s) IV Continuous <Continuous>  LORazepam     Tablet 2 milliGRAM(s) Oral every 2 hours PRN  magnesium sulfate  IVPB 2 Gram(s) IV Intermittent daily  metroNIDAZOLE  IVPB 500 milliGRAM(s) IV Intermittent every 8 hours  metroNIDAZOLE  IVPB      multivitamin 1 Tablet(s) Oral daily  ondansetron Injectable 4 milliGRAM(s) IV Push every 8 hours PRN  pantoprazole    Tablet 40 milliGRAM(s) Oral before breakfast  potassium chloride  20 mEq/100 mL IVPB 20 milliEquivalent(s) IV Intermittent once  senna 2 Tablet(s) Oral at bedtime  thiamine 100 milliGRAM(s) Oral daily  traMADol 50 milliGRAM(s) Oral every 6 hours PRN      PHYSICAL EXAM:  General: A/ox 3, No acute Distress  Neck: Supple, NO JVD  Cardiac: S1 S2 heard regular, No audible murmurs  Pulmonary: Good bilateral breath sounds, Breathing unlabored, No Rhonchi/Rales/Wheezing  Abdomen: Soft, tender abdomen on palpation around the epigastric and basil-umbilical region, +BS   Extremities: No Rashes, No edema  Neuro: A/o x 3, No focal deficits  Psch: normal mood , normal affect    T(C): 36.6 (08-01-19 @ 04:55), Max: 37.6 (07-31-19 @ 12:00)  HR: 86 (08-01-19 @ 04:55) (58 - 104)  BP: 125/77 (08-01-19 @ 04:55) (125/77 - 143/89)  RR: 18 (08-01-19 @ 04:55) (18 - 18)  SpO2: 93% (07-31-19 @ 20:01) (93% - 93%)    LABS:                        10.0   3.94  )-----------( 166      ( 01 Aug 2019 06:06 )             28.8     08-01    140  |  97<L>  |  <3<L>  ----------------------------<  95  3.3<L>   |  31  |  0.5<L>    Ca    8.5      01 Aug 2019 06:06  Mg     1.4     08-01    TPro  5.5<L>  /  Alb  2.8<L>  /  TBili  0.5  /  DBili  x   /  AST  109<H>  /  ALT  29  /  AlkPhos  64  08-01      Aspartate Aminotransferase (AST/SGOT): 109 U/L (08-01-19 @ 06:06)  Alanine Aminotransferase (ALT/SGPT): 29 U/L (08-01-19 @ 06:06)

## 2019-08-01 NOTE — CHART NOTE - NSCHARTNOTEFT_GEN_A_CORE
Upon Nutritional Assessment by the Registered Dietitian your patient was determined to meet criteria / has evidence of the following diagnosis/diagnoses:          [ ]  Mild Protein Calorie Malnutrition        [ ]  Moderate Protein Calorie Malnutrition        [x] Severe Protein Calorie Malnutrition (acute)        [ ] Unspecified Protein Calorie Malnutrition        [ ] Underweight / BMI <19        [ ] Morbid Obesity / BMI > 40      Findings as based on:  •  Comprehensive nutrition assessment and consultation  •  Food acceptance and intake status from observations by staff    Criteria Met:  energy intake <50% of estimated needs for >5 days and 2% weight loss in 1 week    Treatment:    The following diet has been recommended: When medically feasible, adv to clear liquid diet and add Ensure clear TID.       PROVIDER Section:     By signing this assessment you are acknowledging and agree with the diagnosis/diagnoses assigned by the Registered Dietician    Comments:

## 2019-08-01 NOTE — PROGRESS NOTE ADULT - SUBJECTIVE AND OBJECTIVE BOX
Hepatology/GI follow up note: Pt seen and examined at bedside.     For questions and inquiries please page [phone].  For urgent matters or after 5pm and on weekends please page the fellow on call through the GI paging system.    44y Female seen for: ETOH pancreatitis/ chronic pancreatitis    Subjective/Interval events:  still having pain with clears    Review of system  General:  (-) weight loss, (-) fevers  Eyes:  (-) visual changes  CV:  (-) chest pain  Resp: (-) SOB, (-) wheezing  GI: (+) abdominal pain,  (+) nausea, (-) vomiting, (-) dysphagia, (-) diarrhea, (-) constipation, (-) rectal bleeding, (-) melena, (-) hematemesis.  Neuro: (-) confusion, (-) weakness  Psych:  (-) Hallucinations  Heme:  (-) easy bruisability    Past medical/surgical Hx:  PAST MEDICAL & SURGICAL HISTORY:  No pertinent past medical history  S/P arthroscopy: meniscal repair    Home Medications:  Last Order Reconciliation Date: Not Done      Allergies:  Compazine (Unknown)      Current Medications:   acetaminophen   Tablet .. 650 milliGRAM(s) Oral every 6 hours PRN  chlorhexidine 4% Liquid 1 Application(s) Topical <User Schedule>  ciprofloxacin   IVPB      ciprofloxacin   IVPB 400 milliGRAM(s) IV Intermittent every 12 hours  docusate sodium 100 milliGRAM(s) Oral three times a day  enoxaparin Injectable 40 milliGRAM(s) SubCutaneous daily  folic acid 1 milliGRAM(s) Oral daily  HYDROmorphone  Injectable 1 milliGRAM(s) IV Push every 4 hours PRN  LORazepam     Tablet 2 milliGRAM(s) Oral every 2 hours PRN  magnesium sulfate  IVPB 2 Gram(s) IV Intermittent daily  metroNIDAZOLE  IVPB 500 milliGRAM(s) IV Intermittent every 8 hours  metroNIDAZOLE  IVPB      multivitamin 1 Tablet(s) Oral daily  ondansetron Injectable 4 milliGRAM(s) IV Push every 8 hours PRN  pantoprazole    Tablet 40 milliGRAM(s) Oral before breakfast  senna 2 Tablet(s) Oral at bedtime  sodium chloride 0.9%. 1000 milliLiter(s) IV Continuous <Continuous>  thiamine 100 milliGRAM(s) Oral daily  traMADol 50 milliGRAM(s) Oral every 6 hours PRN        Physical exam:  T(C): 36.5 (08-01-19 @ 21:33), Max: 36.6 (08-01-19 @ 04:55)  HR: 73 (08-01-19 @ 21:33) (73 - 86)  BP: 140/85 (08-01-19 @ 21:33) (125/77 - 140/85)  RR: 18 (08-01-19 @ 21:33) (18 - 18)  SpO2: 95% (08-01-19 @ 12:56) (95% - 95%)    GENERAL: NAD  HEAD:  Atraumatic, Normocephalic  EYES: Sclera:NL  NECK: Supple, no JVD or thyromegaly  CHEST/LUNG: Good bilateral air entry  HEART: normal S1, S2. Regular  ABDOMEN: (-) distended, (+) tender, (-) rebound, (+) BS, (-)HSM  EXTREMITIES: (-) edema  NEUROLOGY: (-) asterixis  SKIN: (-) jaundice  LEROY: (-) melena (-) brbpr      Data:                        10.0   3.94  )-----------( 166      ( 01 Aug 2019 06:06 )             28.8     MCV 97.6 (08-01-19)    RDW 11.5 (08-01-19)    HGB trend:  10.0  08-01-19 @ 06:06  9.5  07-31-19 @ 05:26  10.0  07-30-19 @ 07:13        WBC trend:  3.94  08-01-19 @ 06:06  5.08  07-31-19 @ 05:26  5.03  07-30-19 @ 07:13    08-01    135  |  96<L>  |  <3<L>  ----------------------------<  91  4.1   |  25  |  0.5<L>    Ca    8.1<L>      01 Aug 2019 17:49  Mg     2.1     08-01    TPro  5.5<L>  /  Alb  2.8<L>  /  TBili  0.5  /  DBili  x   /  AST  109<H>  /  ALT  29  /  AlkPhos  64  08-01    Liver panel trend:  TBili 0.5   /      /   ALT 29   /   AlkP 64   /   Tptn 5.5   /   Alb 2.8    /   DBili --      08-01  TBili 0.8   /      /   ALT 33   /   AlkP 59   /   Tptn 5.1   /   Alb 2.5    /   DBili --      07-31  TBili 1.3   /      /   ALT 56   /   AlkP 71   /   Tptn 5.6   /   Alb 2.8    /   DBili 0.6      07-29  TBili 1.6   /      /      /   AlkP 141   /   Tptn 8.3   /   Alb 4.0    /   DBili --      07-28

## 2019-08-01 NOTE — DIETITIAN INITIAL EVALUATION ADULT. - FACTORS AFF FOOD INTAKE
Pt reported eating a healthy diet at home (mostly salads and fruit, low fat diet). Pt reports not tolerating clear liquids yesterday c/o of pain. Does not want NJ tube - would like to trial clears again.  Pt is receptive to trial ensure clear as she has had this before. Pt expressed concern for her nutritional status. LBM 7/30. NKFA. No problems chewing/swallowing. No nutrition supplements PTA./pain

## 2019-08-01 NOTE — DIETITIAN INITIAL EVALUATION ADULT. - ENERGY NEEDS
estimated calorie needs: 1476 - 1845 kcals/day (MSJx 1.2 - 1.5 AF for acute PCM)  estimated protein needs: 70 - 87 gms/day (1.2 - 1.5gm/kg for same as above)  estimated fluid needs: per LIP

## 2019-08-01 NOTE — PROGRESS NOTE ADULT - ASSESSMENT
43 y/o female with PMH of EtOH abuse (drinks 2-3 alcoholic beverages per day, no history of withdraw or seizures) and recurrent pancreatitis secondary to EtOH abuse presents to ED for 3 days of multiple episodes of NBNB vomiting and diffused crampy abdominal pain, similar to prior episodes of pancreatitis.  patient was found to have high lipase and pancreatitis on CT , in addition to colitis.    ct abdomen: acute pancreatitis , no evidence of necrosis , colitis  US Abd: gallbladder sludge / hepatic steatosis peripancreatic fluid   lipase> 600  transaminitis     1)ETOH pancreatitis. No end organ damage  2)Radiographic evidence suggestive of chronic pancreatitis  3)Abnormal CT- ?R colitis  4)Cholelithiasis/sludge  5)Alcohol use disease    Rec:  - C/W PO tramadol  - IV hydration until pain resolves (@250 +cc/Hr of LR)  - Discussed NJ tube: pt refused and stated she might be able to eat with pain meds- Her pain is likely consistent with chronic pancreatitis rather than acute pancreatitis.  - If pain is limited and controled with meds with pain free periods between meal than ok to advance diet  - Check stool elastase, fat (qualitative) RO steatorhea (likely in part cause of diarrhea)  - Will likely benefit from pancreatic enzyme replacement  - Abstinence  - Elective CCY 43 y/o female with PMH of EtOH abuse (drinks 2-3 alcoholic beverages per day, no history of withdraw or seizures) and recurrent pancreatitis secondary to EtOH abuse presents to ED for 3 days of multiple episodes of NBNB vomiting and diffused crampy abdominal pain, similar to prior episodes of pancreatitis.  patient was found to have high lipase and pancreatitis on CT , in addition to colitis.    ct abdomen: acute pancreatitis , no evidence of necrosis , colitis  US Abd: gallbladder sludge / hepatic steatosis peripancreatic fluid   lipase> 600  transaminitis     1)ETOH pancreatitis. No end organ damage  2)Radiographic evidence suggestive of chronic pancreatitis  3)Abnormal CT- ?R colitis  4)Cholelithiasis/sludge  5)Alcohol use disease    Rec:  - C/W PO tramadol  - IV hydration until pain resolves (@250 +cc/Hr of LR)  - Discussed NJ tube: pt refused and stated she might be able to eat with pain meds- Her pain is likely consistent with chronic pancreatitis rather than acute pancreatitis.  - If pain is limited and controled with meds with pain free periods between meals than ok to advance diet  - Check stool elastase, fat (qualitative) RO steatorhea (likely in part cause of diarrhea)  - Will likely benefit from pancreatic enzyme replacement as outpt  - Alcohol abstinence  - Elective CCY

## 2019-08-02 LAB
ANION GAP SERPL CALC-SCNC: 14 MMOL/L — SIGNIFICANT CHANGE UP (ref 7–14)
BASOPHILS # BLD AUTO: 0.03 K/UL — SIGNIFICANT CHANGE UP (ref 0–0.2)
BASOPHILS NFR BLD AUTO: 0.9 % — SIGNIFICANT CHANGE UP (ref 0–1)
BUN SERPL-MCNC: <3 MG/DL — LOW (ref 10–20)
CALCIUM SERPL-MCNC: 8 MG/DL — LOW (ref 8.5–10.1)
CHLORIDE SERPL-SCNC: 98 MMOL/L — SIGNIFICANT CHANGE UP (ref 98–110)
CO2 SERPL-SCNC: 27 MMOL/L — SIGNIFICANT CHANGE UP (ref 17–32)
CREAT SERPL-MCNC: 0.5 MG/DL — LOW (ref 0.7–1.5)
EOSINOPHIL # BLD AUTO: 0.09 K/UL — SIGNIFICANT CHANGE UP (ref 0–0.7)
EOSINOPHIL NFR BLD AUTO: 2.6 % — SIGNIFICANT CHANGE UP (ref 0–8)
GLUCOSE SERPL-MCNC: 106 MG/DL — HIGH (ref 70–99)
HCT VFR BLD CALC: 28 % — LOW (ref 37–47)
HGB BLD-MCNC: 9.7 G/DL — LOW (ref 12–16)
IMM GRANULOCYTES NFR BLD AUTO: 0 % — LOW (ref 0.1–0.3)
LYMPHOCYTES # BLD AUTO: 0.83 K/UL — LOW (ref 1.2–3.4)
LYMPHOCYTES # BLD AUTO: 23.8 % — SIGNIFICANT CHANGE UP (ref 20.5–51.1)
MAGNESIUM SERPL-MCNC: 1.6 MG/DL — LOW (ref 1.8–2.4)
MCHC RBC-ENTMCNC: 34 PG — HIGH (ref 27–31)
MCHC RBC-ENTMCNC: 34.6 G/DL — SIGNIFICANT CHANGE UP (ref 32–37)
MCV RBC AUTO: 98.2 FL — SIGNIFICANT CHANGE UP (ref 81–99)
MONOCYTES # BLD AUTO: 0.43 K/UL — SIGNIFICANT CHANGE UP (ref 0.1–0.6)
MONOCYTES NFR BLD AUTO: 12.3 % — HIGH (ref 1.7–9.3)
NEUTROPHILS # BLD AUTO: 2.11 K/UL — SIGNIFICANT CHANGE UP (ref 1.4–6.5)
NEUTROPHILS NFR BLD AUTO: 60.4 % — SIGNIFICANT CHANGE UP (ref 42.2–75.2)
NRBC # BLD: 0 /100 WBCS — SIGNIFICANT CHANGE UP (ref 0–0)
PLATELET # BLD AUTO: 192 K/UL — SIGNIFICANT CHANGE UP (ref 130–400)
POTASSIUM SERPL-MCNC: 3.6 MMOL/L — SIGNIFICANT CHANGE UP (ref 3.5–5)
POTASSIUM SERPL-SCNC: 3.6 MMOL/L — SIGNIFICANT CHANGE UP (ref 3.5–5)
RBC # BLD: 2.85 M/UL — LOW (ref 4.2–5.4)
RBC # FLD: 11.6 % — SIGNIFICANT CHANGE UP (ref 11.5–14.5)
SODIUM SERPL-SCNC: 139 MMOL/L — SIGNIFICANT CHANGE UP (ref 135–146)
WBC # BLD: 3.49 K/UL — LOW (ref 4.8–10.8)
WBC # FLD AUTO: 3.49 K/UL — LOW (ref 4.8–10.8)

## 2019-08-02 PROCEDURE — 99233 SBSQ HOSP IP/OBS HIGH 50: CPT

## 2019-08-02 RX ORDER — TRAMADOL HYDROCHLORIDE 50 MG/1
50 TABLET ORAL EVERY 6 HOURS
Refills: 0 | Status: DISCONTINUED | OUTPATIENT
Start: 2019-08-02 | End: 2019-08-09

## 2019-08-02 RX ADMIN — HYDROMORPHONE HYDROCHLORIDE 1 MILLIGRAM(S): 2 INJECTION INTRAMUSCULAR; INTRAVENOUS; SUBCUTANEOUS at 03:11

## 2019-08-02 RX ADMIN — HYDROMORPHONE HYDROCHLORIDE 1 MILLIGRAM(S): 2 INJECTION INTRAMUSCULAR; INTRAVENOUS; SUBCUTANEOUS at 07:57

## 2019-08-02 RX ADMIN — Medication 200 MILLIGRAM(S): at 05:56

## 2019-08-02 RX ADMIN — HYDROMORPHONE HYDROCHLORIDE 1 MILLIGRAM(S): 2 INJECTION INTRAMUSCULAR; INTRAVENOUS; SUBCUTANEOUS at 08:44

## 2019-08-02 RX ADMIN — ONDANSETRON 4 MILLIGRAM(S): 8 TABLET, FILM COATED ORAL at 08:01

## 2019-08-02 RX ADMIN — Medication 100 MILLIGRAM(S): at 21:11

## 2019-08-02 RX ADMIN — Medication 100 MILLIGRAM(S): at 05:56

## 2019-08-02 RX ADMIN — HYDROMORPHONE HYDROCHLORIDE 1 MILLIGRAM(S): 2 INJECTION INTRAMUSCULAR; INTRAVENOUS; SUBCUTANEOUS at 12:14

## 2019-08-02 RX ADMIN — ENOXAPARIN SODIUM 40 MILLIGRAM(S): 100 INJECTION SUBCUTANEOUS at 11:21

## 2019-08-02 RX ADMIN — SODIUM CHLORIDE 250 MILLILITER(S): 9 INJECTION INTRAMUSCULAR; INTRAVENOUS; SUBCUTANEOUS at 20:41

## 2019-08-02 RX ADMIN — TRAMADOL HYDROCHLORIDE 50 MILLIGRAM(S): 50 TABLET ORAL at 05:58

## 2019-08-02 RX ADMIN — SODIUM CHLORIDE 250 MILLILITER(S): 9 INJECTION INTRAMUSCULAR; INTRAVENOUS; SUBCUTANEOUS at 17:00

## 2019-08-02 RX ADMIN — PANTOPRAZOLE SODIUM 40 MILLIGRAM(S): 20 TABLET, DELAYED RELEASE ORAL at 05:57

## 2019-08-02 RX ADMIN — Medication 100 MILLIGRAM(S): at 11:21

## 2019-08-02 RX ADMIN — SODIUM CHLORIDE 250 MILLILITER(S): 9 INJECTION INTRAMUSCULAR; INTRAVENOUS; SUBCUTANEOUS at 12:16

## 2019-08-02 RX ADMIN — Medication 1 MILLIGRAM(S): at 11:21

## 2019-08-02 RX ADMIN — HYDROMORPHONE HYDROCHLORIDE 1 MILLIGRAM(S): 2 INJECTION INTRAMUSCULAR; INTRAVENOUS; SUBCUTANEOUS at 16:43

## 2019-08-02 RX ADMIN — HYDROMORPHONE HYDROCHLORIDE 1 MILLIGRAM(S): 2 INJECTION INTRAMUSCULAR; INTRAVENOUS; SUBCUTANEOUS at 13:07

## 2019-08-02 RX ADMIN — HYDROMORPHONE HYDROCHLORIDE 1 MILLIGRAM(S): 2 INJECTION INTRAMUSCULAR; INTRAVENOUS; SUBCUTANEOUS at 20:41

## 2019-08-02 RX ADMIN — Medication 100 MILLIGRAM(S): at 05:57

## 2019-08-02 RX ADMIN — HYDROMORPHONE HYDROCHLORIDE 1 MILLIGRAM(S): 2 INJECTION INTRAMUSCULAR; INTRAVENOUS; SUBCUTANEOUS at 17:03

## 2019-08-02 RX ADMIN — Medication 1 TABLET(S): at 11:21

## 2019-08-02 RX ADMIN — SENNA PLUS 2 TABLET(S): 8.6 TABLET ORAL at 21:11

## 2019-08-02 RX ADMIN — ONDANSETRON 4 MILLIGRAM(S): 8 TABLET, FILM COATED ORAL at 17:05

## 2019-08-02 RX ADMIN — Medication 100 MILLIGRAM(S): at 13:06

## 2019-08-02 RX ADMIN — TRAMADOL HYDROCHLORIDE 50 MILLIGRAM(S): 50 TABLET ORAL at 19:43

## 2019-08-02 RX ADMIN — SODIUM CHLORIDE 250 MILLILITER(S): 9 INJECTION INTRAMUSCULAR; INTRAVENOUS; SUBCUTANEOUS at 08:05

## 2019-08-02 NOTE — PROGRESS NOTE ADULT - SUBJECTIVE AND OBJECTIVE BOX
Patient today still has pain intermittently and is still needing hydromorphone for pain control.  No fever today, no diarrhea. Reports that even though pain is still there today, it feels better than yesterday.  GI follow up today - will try to restart liquid diet.       acetaminophen   Tablet .. 650 milliGRAM(s) Oral every 6 hours PRN  chlorhexidine 4% Liquid 1 Application(s) Topical <User Schedule>  ciprofloxacin   IVPB      ciprofloxacin   IVPB 400 milliGRAM(s) IV Intermittent every 12 hours  docusate sodium 100 milliGRAM(s) Oral three times a day  enoxaparin Injectable 40 milliGRAM(s) SubCutaneous daily  folic acid 1 milliGRAM(s) Oral daily  HYDROmorphone  Injectable 1 milliGRAM(s) IV Push every 4 hours PRN  LORazepam     Tablet 2 milliGRAM(s) Oral every 2 hours PRN  metroNIDAZOLE  IVPB 500 milliGRAM(s) IV Intermittent every 8 hours  metroNIDAZOLE  IVPB      multivitamin 1 Tablet(s) Oral daily  ondansetron Injectable 4 milliGRAM(s) IV Push every 8 hours PRN  pantoprazole    Tablet 40 milliGRAM(s) Oral before breakfast  senna 2 Tablet(s) Oral at bedtime  sodium chloride 0.9%. 1000 milliLiter(s) IV Continuous <Continuous>  thiamine 100 milliGRAM(s) Oral daily  traMADol 50 milliGRAM(s) Oral every 6 hours PRN      PHYSICAL EXAM:  General: A/ox 3, No acute Distress  Neck: Supple, NO JVD  Cardiac: S1 S2 heard regular, No audible murmurs  Pulmonary: Good bilateral breath sounds, Breathing unlabored, No Rhonchi/Rales/Wheezing  Abdomen: Soft, tender abdomen on palpation around the epigastric and basil-umbilical region, +BS   Extremities: No Rashes, No edema  Neuro: A/o x 3, No focal deficits  Psch: normal mood , normal affect    T(C): 36.5 (08-02-19 @ 05:38), Max: 36.5 (08-01-19 @ 21:33)  HR: 81 (08-02-19 @ 05:38) (73 - 81)  BP: 135/80 (08-02-19 @ 05:38) (135/80 - 140/85)  RR: 17 (08-02-19 @ 05:38) (17 - 18)  SpO2: 95% (08-02-19 @ 07:50) (95% - 95%)    LABS:                        9.7    3.49  )-----------( 192      ( 02 Aug 2019 06:43 )             28.0     08-02    139  |  98  |  <3<L>  ----------------------------<  106<H>  3.6   |  27  |  0.5<L>    Ca    8.0<L>      02 Aug 2019 06:43  Mg     1.6     08-02    TPro  5.5<L>  /  Alb  2.8<L>  /  TBili  0.5  /  DBili  x   /  AST  109<H>  /  ALT  29  /  AlkPhos  64  08-01
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
## 2019-08-03 LAB
ANION GAP SERPL CALC-SCNC: 13 MMOL/L — SIGNIFICANT CHANGE UP (ref 7–14)
BASOPHILS # BLD AUTO: 0.02 K/UL — SIGNIFICANT CHANGE UP (ref 0–0.2)
BASOPHILS NFR BLD AUTO: 0.6 % — SIGNIFICANT CHANGE UP (ref 0–1)
BUN SERPL-MCNC: <3 MG/DL — LOW (ref 10–20)
CALCIUM SERPL-MCNC: 8.3 MG/DL — LOW (ref 8.5–10.1)
CHLORIDE SERPL-SCNC: 97 MMOL/L — LOW (ref 98–110)
CO2 SERPL-SCNC: 28 MMOL/L — SIGNIFICANT CHANGE UP (ref 17–32)
CREAT SERPL-MCNC: 0.5 MG/DL — LOW (ref 0.7–1.5)
CULTURE RESULTS: SIGNIFICANT CHANGE UP
CULTURE RESULTS: SIGNIFICANT CHANGE UP
EOSINOPHIL # BLD AUTO: 0.07 K/UL — SIGNIFICANT CHANGE UP (ref 0–0.7)
EOSINOPHIL NFR BLD AUTO: 2.3 % — SIGNIFICANT CHANGE UP (ref 0–8)
GLUCOSE SERPL-MCNC: 94 MG/DL — SIGNIFICANT CHANGE UP (ref 70–99)
HCT VFR BLD CALC: 30.6 % — LOW (ref 37–47)
HGB BLD-MCNC: 10.4 G/DL — LOW (ref 12–16)
IMM GRANULOCYTES NFR BLD AUTO: 0.3 % — SIGNIFICANT CHANGE UP (ref 0.1–0.3)
LYMPHOCYTES # BLD AUTO: 0.81 K/UL — LOW (ref 1.2–3.4)
LYMPHOCYTES # BLD AUTO: 26.1 % — SIGNIFICANT CHANGE UP (ref 20.5–51.1)
MCHC RBC-ENTMCNC: 33.2 PG — HIGH (ref 27–31)
MCHC RBC-ENTMCNC: 34 G/DL — SIGNIFICANT CHANGE UP (ref 32–37)
MCV RBC AUTO: 97.8 FL — SIGNIFICANT CHANGE UP (ref 81–99)
MONOCYTES # BLD AUTO: 0.41 K/UL — SIGNIFICANT CHANGE UP (ref 0.1–0.6)
MONOCYTES NFR BLD AUTO: 13.2 % — HIGH (ref 1.7–9.3)
NEUTROPHILS # BLD AUTO: 1.78 K/UL — SIGNIFICANT CHANGE UP (ref 1.4–6.5)
NEUTROPHILS NFR BLD AUTO: 57.5 % — SIGNIFICANT CHANGE UP (ref 42.2–75.2)
NRBC # BLD: 0 /100 WBCS — SIGNIFICANT CHANGE UP (ref 0–0)
PLATELET # BLD AUTO: 282 K/UL — SIGNIFICANT CHANGE UP (ref 130–400)
POTASSIUM SERPL-MCNC: 3.1 MMOL/L — LOW (ref 3.5–5)
POTASSIUM SERPL-SCNC: 3.1 MMOL/L — LOW (ref 3.5–5)
RBC # BLD: 3.13 M/UL — LOW (ref 4.2–5.4)
RBC # FLD: 11.6 % — SIGNIFICANT CHANGE UP (ref 11.5–14.5)
SODIUM SERPL-SCNC: 138 MMOL/L — SIGNIFICANT CHANGE UP (ref 135–146)
SPECIMEN SOURCE: SIGNIFICANT CHANGE UP
SPECIMEN SOURCE: SIGNIFICANT CHANGE UP
WBC # BLD: 3.1 K/UL — LOW (ref 4.8–10.8)
WBC # FLD AUTO: 3.1 K/UL — LOW (ref 4.8–10.8)

## 2019-08-03 PROCEDURE — 99233 SBSQ HOSP IP/OBS HIGH 50: CPT

## 2019-08-03 RX ORDER — POTASSIUM CHLORIDE 20 MEQ
20 PACKET (EA) ORAL ONCE
Refills: 0 | Status: COMPLETED | OUTPATIENT
Start: 2019-08-03 | End: 2019-08-03

## 2019-08-03 RX ORDER — MAGNESIUM SULFATE 500 MG/ML
2 VIAL (ML) INJECTION ONCE
Refills: 0 | Status: COMPLETED | OUTPATIENT
Start: 2019-08-03 | End: 2019-08-03

## 2019-08-03 RX ADMIN — TRAMADOL HYDROCHLORIDE 50 MILLIGRAM(S): 50 TABLET ORAL at 14:54

## 2019-08-03 RX ADMIN — Medication 100 MILLIGRAM(S): at 13:45

## 2019-08-03 RX ADMIN — HYDROMORPHONE HYDROCHLORIDE 1 MILLIGRAM(S): 2 INJECTION INTRAMUSCULAR; INTRAVENOUS; SUBCUTANEOUS at 09:42

## 2019-08-03 RX ADMIN — Medication 1 TABLET(S): at 11:57

## 2019-08-03 RX ADMIN — Medication 1 MILLIGRAM(S): at 11:57

## 2019-08-03 RX ADMIN — HYDROMORPHONE HYDROCHLORIDE 1 MILLIGRAM(S): 2 INJECTION INTRAMUSCULAR; INTRAVENOUS; SUBCUTANEOUS at 22:35

## 2019-08-03 RX ADMIN — SODIUM CHLORIDE 250 MILLILITER(S): 9 INJECTION INTRAMUSCULAR; INTRAVENOUS; SUBCUTANEOUS at 21:32

## 2019-08-03 RX ADMIN — TRAMADOL HYDROCHLORIDE 50 MILLIGRAM(S): 50 TABLET ORAL at 11:57

## 2019-08-03 RX ADMIN — SODIUM CHLORIDE 250 MILLILITER(S): 9 INJECTION INTRAMUSCULAR; INTRAVENOUS; SUBCUTANEOUS at 09:41

## 2019-08-03 RX ADMIN — Medication 100 MILLIGRAM(S): at 21:30

## 2019-08-03 RX ADMIN — PANTOPRAZOLE SODIUM 40 MILLIGRAM(S): 20 TABLET, DELAYED RELEASE ORAL at 06:00

## 2019-08-03 RX ADMIN — Medication 50 GRAM(S): at 21:32

## 2019-08-03 RX ADMIN — HYDROMORPHONE HYDROCHLORIDE 1 MILLIGRAM(S): 2 INJECTION INTRAMUSCULAR; INTRAVENOUS; SUBCUTANEOUS at 05:59

## 2019-08-03 RX ADMIN — ONDANSETRON 4 MILLIGRAM(S): 8 TABLET, FILM COATED ORAL at 17:55

## 2019-08-03 RX ADMIN — Medication 50 MILLIEQUIVALENT(S): at 11:57

## 2019-08-03 RX ADMIN — HYDROMORPHONE HYDROCHLORIDE 1 MILLIGRAM(S): 2 INJECTION INTRAMUSCULAR; INTRAVENOUS; SUBCUTANEOUS at 14:00

## 2019-08-03 RX ADMIN — SENNA PLUS 2 TABLET(S): 8.6 TABLET ORAL at 21:30

## 2019-08-03 RX ADMIN — HYDROMORPHONE HYDROCHLORIDE 1 MILLIGRAM(S): 2 INJECTION INTRAMUSCULAR; INTRAVENOUS; SUBCUTANEOUS at 22:04

## 2019-08-03 RX ADMIN — SODIUM CHLORIDE 250 MILLILITER(S): 9 INJECTION INTRAMUSCULAR; INTRAVENOUS; SUBCUTANEOUS at 13:44

## 2019-08-03 RX ADMIN — ENOXAPARIN SODIUM 40 MILLIGRAM(S): 100 INJECTION SUBCUTANEOUS at 11:57

## 2019-08-03 RX ADMIN — HYDROMORPHONE HYDROCHLORIDE 1 MILLIGRAM(S): 2 INJECTION INTRAMUSCULAR; INTRAVENOUS; SUBCUTANEOUS at 17:53

## 2019-08-03 RX ADMIN — Medication 100 MILLIGRAM(S): at 06:00

## 2019-08-03 RX ADMIN — HYDROMORPHONE HYDROCHLORIDE 1 MILLIGRAM(S): 2 INJECTION INTRAMUSCULAR; INTRAVENOUS; SUBCUTANEOUS at 09:37

## 2019-08-03 RX ADMIN — HYDROMORPHONE HYDROCHLORIDE 1 MILLIGRAM(S): 2 INJECTION INTRAMUSCULAR; INTRAVENOUS; SUBCUTANEOUS at 13:44

## 2019-08-03 RX ADMIN — SODIUM CHLORIDE 250 MILLILITER(S): 9 INJECTION INTRAMUSCULAR; INTRAVENOUS; SUBCUTANEOUS at 06:00

## 2019-08-03 RX ADMIN — HYDROMORPHONE HYDROCHLORIDE 1 MILLIGRAM(S): 2 INJECTION INTRAMUSCULAR; INTRAVENOUS; SUBCUTANEOUS at 17:58

## 2019-08-03 RX ADMIN — Medication 50 MILLIEQUIVALENT(S): at 14:48

## 2019-08-03 RX ADMIN — HYDROMORPHONE HYDROCHLORIDE 1 MILLIGRAM(S): 2 INJECTION INTRAMUSCULAR; INTRAVENOUS; SUBCUTANEOUS at 00:47

## 2019-08-03 RX ADMIN — ONDANSETRON 4 MILLIGRAM(S): 8 TABLET, FILM COATED ORAL at 05:59

## 2019-08-03 RX ADMIN — Medication 100 MILLIGRAM(S): at 11:57

## 2019-08-03 NOTE — PROGRESS NOTE ADULT - SUBJECTIVE AND OBJECTIVE BOX
S : No improvement  Still has abdo pain with whatever she eats/drinks.       All other pertinent ROS negative.      Vital Signs Last 24 Hrs  T(C): 36.4 (03 Aug 2019 13:21), Max: 37 (02 Aug 2019 21:00)  T(F): 97.5 (03 Aug 2019 13:21), Max: 98.6 (02 Aug 2019 21:00)  HR: 72 (03 Aug 2019 13:21) (71 - 74)  BP: 162/96 (03 Aug 2019 13:21) (133/89 - 162/96)  BP(mean): --  RR: 18 (03 Aug 2019 13:21) (17 - 18)  SpO2: --  PHYSICAL EXAM:    Constitutional: NAD, awake and alert, well-developed  HEENT: PERR, EOMI, Normal Hearing, MMM  Neck: Soft and supple, No LAD, No JVD  Respiratory: Breath sounds are clear bilaterally, No wheezing, rales or rhonchi  Cardiovascular: S1 and S2, regular rate and rhythm, no Murmurs, gallops or rubs  Gastrointestinal: Bowel Sounds present, soft, tender, nondistended, no guarding, no rebound  Extremities: No peripheral edema    MEDICATIONS:  MEDICATIONS  (STANDING):  chlorhexidine 4% Liquid 1 Application(s) Topical <User Schedule>  docusate sodium 100 milliGRAM(s) Oral three times a day  enoxaparin Injectable 40 milliGRAM(s) SubCutaneous daily  folic acid 1 milliGRAM(s) Oral daily  multivitamin 1 Tablet(s) Oral daily  pantoprazole    Tablet 40 milliGRAM(s) Oral before breakfast  senna 2 Tablet(s) Oral at bedtime  sodium chloride 0.9%. 1000 milliLiter(s) (250 mL/Hr) IV Continuous <Continuous>  thiamine 100 milliGRAM(s) Oral daily      LABS: All Labs Reviewed:                        10.4   3.10  )-----------( 282      ( 03 Aug 2019 06:14 )             30.6     08-03    138  |  97<L>  |  <3<L>  ----------------------------<  94  3.1<L>   |  28  |  0.5<L>    Ca    8.3<L>      03 Aug 2019 06:14  Mg     1.6     08-02            Blood Culture:     Radiology: reviewed

## 2019-08-03 NOTE — PROGRESS NOTE ADULT - ASSESSMENT
Patient seen and examined independently. I personally had a face-to-face encounter with the patient, examined the patient myself and reviewed the plan of care with the housestaff. Agree with resident's note but my note supersedes that of the resident in the matters hereby listed.     #Acuite alcohol induced pancreatitis : NS @ 250 . clear liquid diet  pain meds PRN  monitor.    #CT showing peripancreatic fluid collections : likely seroma. repeat CT in 2-4 weeks.   Still active .

## 2019-08-04 LAB
ALBUMIN SERPL ELPH-MCNC: 3.2 G/DL — LOW (ref 3.5–5.2)
ALP SERPL-CCNC: 58 U/L — SIGNIFICANT CHANGE UP (ref 30–115)
ALT FLD-CCNC: 23 U/L — SIGNIFICANT CHANGE UP (ref 0–41)
ANION GAP SERPL CALC-SCNC: 11 MMOL/L — SIGNIFICANT CHANGE UP (ref 7–14)
AST SERPL-CCNC: 92 U/L — HIGH (ref 0–41)
BASOPHILS # BLD AUTO: 0.03 K/UL — SIGNIFICANT CHANGE UP (ref 0–0.2)
BASOPHILS NFR BLD AUTO: 0.9 % — SIGNIFICANT CHANGE UP (ref 0–1)
BILIRUB DIRECT SERPL-MCNC: 0.2 MG/DL — SIGNIFICANT CHANGE UP (ref 0–0.2)
BILIRUB INDIRECT FLD-MCNC: 0.2 MG/DL — SIGNIFICANT CHANGE UP (ref 0.2–1.2)
BILIRUB SERPL-MCNC: 0.4 MG/DL — SIGNIFICANT CHANGE UP (ref 0.2–1.2)
BUN SERPL-MCNC: <3 MG/DL — LOW (ref 10–20)
CALCIUM SERPL-MCNC: 8.2 MG/DL — LOW (ref 8.5–10.1)
CHLORIDE SERPL-SCNC: 101 MMOL/L — SIGNIFICANT CHANGE UP (ref 98–110)
CO2 SERPL-SCNC: 28 MMOL/L — SIGNIFICANT CHANGE UP (ref 17–32)
CREAT SERPL-MCNC: 0.5 MG/DL — LOW (ref 0.7–1.5)
EOSINOPHIL # BLD AUTO: 0.08 K/UL — SIGNIFICANT CHANGE UP (ref 0–0.7)
EOSINOPHIL NFR BLD AUTO: 2.5 % — SIGNIFICANT CHANGE UP (ref 0–8)
GLUCOSE SERPL-MCNC: 96 MG/DL — SIGNIFICANT CHANGE UP (ref 70–99)
HCT VFR BLD CALC: 29.5 % — LOW (ref 37–47)
HGB BLD-MCNC: 10.1 G/DL — LOW (ref 12–16)
IMM GRANULOCYTES NFR BLD AUTO: 0.3 % — SIGNIFICANT CHANGE UP (ref 0.1–0.3)
LYMPHOCYTES # BLD AUTO: 0.93 K/UL — LOW (ref 1.2–3.4)
LYMPHOCYTES # BLD AUTO: 29.2 % — SIGNIFICANT CHANGE UP (ref 20.5–51.1)
MCHC RBC-ENTMCNC: 33.3 PG — HIGH (ref 27–31)
MCHC RBC-ENTMCNC: 34.2 G/DL — SIGNIFICANT CHANGE UP (ref 32–37)
MCV RBC AUTO: 97.4 FL — SIGNIFICANT CHANGE UP (ref 81–99)
MONOCYTES # BLD AUTO: 0.44 K/UL — SIGNIFICANT CHANGE UP (ref 0.1–0.6)
MONOCYTES NFR BLD AUTO: 13.8 % — HIGH (ref 1.7–9.3)
NEUTROPHILS # BLD AUTO: 1.7 K/UL — SIGNIFICANT CHANGE UP (ref 1.4–6.5)
NEUTROPHILS NFR BLD AUTO: 53.3 % — SIGNIFICANT CHANGE UP (ref 42.2–75.2)
NRBC # BLD: 0 /100 WBCS — SIGNIFICANT CHANGE UP (ref 0–0)
PLATELET # BLD AUTO: 330 K/UL — SIGNIFICANT CHANGE UP (ref 130–400)
POTASSIUM SERPL-MCNC: 3.3 MMOL/L — LOW (ref 3.5–5)
POTASSIUM SERPL-SCNC: 3.3 MMOL/L — LOW (ref 3.5–5)
PROT SERPL-MCNC: 5.8 G/DL — LOW (ref 6–8)
RBC # BLD: 3.03 M/UL — LOW (ref 4.2–5.4)
RBC # FLD: 11.6 % — SIGNIFICANT CHANGE UP (ref 11.5–14.5)
SODIUM SERPL-SCNC: 140 MMOL/L — SIGNIFICANT CHANGE UP (ref 135–146)
WBC # BLD: 3.19 K/UL — LOW (ref 4.8–10.8)
WBC # FLD AUTO: 3.19 K/UL — LOW (ref 4.8–10.8)

## 2019-08-04 PROCEDURE — 99233 SBSQ HOSP IP/OBS HIGH 50: CPT

## 2019-08-04 RX ORDER — MAGNESIUM SULFATE 500 MG/ML
2 VIAL (ML) INJECTION ONCE
Refills: 0 | Status: COMPLETED | OUTPATIENT
Start: 2019-08-04 | End: 2019-08-04

## 2019-08-04 RX ORDER — POTASSIUM CHLORIDE 20 MEQ
20 PACKET (EA) ORAL EVERY 6 HOURS
Refills: 0 | Status: COMPLETED | OUTPATIENT
Start: 2019-08-04 | End: 2019-08-04

## 2019-08-04 RX ADMIN — HYDROMORPHONE HYDROCHLORIDE 1 MILLIGRAM(S): 2 INJECTION INTRAMUSCULAR; INTRAVENOUS; SUBCUTANEOUS at 23:57

## 2019-08-04 RX ADMIN — SODIUM CHLORIDE 250 MILLILITER(S): 9 INJECTION INTRAMUSCULAR; INTRAVENOUS; SUBCUTANEOUS at 10:21

## 2019-08-04 RX ADMIN — Medication 50 GRAM(S): at 12:14

## 2019-08-04 RX ADMIN — Medication 100 MILLIGRAM(S): at 21:01

## 2019-08-04 RX ADMIN — HYDROMORPHONE HYDROCHLORIDE 1 MILLIGRAM(S): 2 INJECTION INTRAMUSCULAR; INTRAVENOUS; SUBCUTANEOUS at 19:21

## 2019-08-04 RX ADMIN — HYDROMORPHONE HYDROCHLORIDE 1 MILLIGRAM(S): 2 INJECTION INTRAMUSCULAR; INTRAVENOUS; SUBCUTANEOUS at 15:07

## 2019-08-04 RX ADMIN — Medication 1 TABLET(S): at 11:57

## 2019-08-04 RX ADMIN — HYDROMORPHONE HYDROCHLORIDE 1 MILLIGRAM(S): 2 INJECTION INTRAMUSCULAR; INTRAVENOUS; SUBCUTANEOUS at 02:35

## 2019-08-04 RX ADMIN — HYDROMORPHONE HYDROCHLORIDE 1 MILLIGRAM(S): 2 INJECTION INTRAMUSCULAR; INTRAVENOUS; SUBCUTANEOUS at 02:05

## 2019-08-04 RX ADMIN — TRAMADOL HYDROCHLORIDE 50 MILLIGRAM(S): 50 TABLET ORAL at 12:12

## 2019-08-04 RX ADMIN — HYDROMORPHONE HYDROCHLORIDE 1 MILLIGRAM(S): 2 INJECTION INTRAMUSCULAR; INTRAVENOUS; SUBCUTANEOUS at 23:26

## 2019-08-04 RX ADMIN — TRAMADOL HYDROCHLORIDE 50 MILLIGRAM(S): 50 TABLET ORAL at 12:42

## 2019-08-04 RX ADMIN — HYDROMORPHONE HYDROCHLORIDE 1 MILLIGRAM(S): 2 INJECTION INTRAMUSCULAR; INTRAVENOUS; SUBCUTANEOUS at 10:19

## 2019-08-04 RX ADMIN — Medication 1 MILLIGRAM(S): at 11:58

## 2019-08-04 RX ADMIN — Medication 50 MILLIEQUIVALENT(S): at 17:38

## 2019-08-04 RX ADMIN — Medication 50 MILLIEQUIVALENT(S): at 11:57

## 2019-08-04 RX ADMIN — SODIUM CHLORIDE 250 MILLILITER(S): 9 INJECTION INTRAMUSCULAR; INTRAVENOUS; SUBCUTANEOUS at 05:50

## 2019-08-04 RX ADMIN — Medication 100 MILLIGRAM(S): at 05:11

## 2019-08-04 RX ADMIN — HYDROMORPHONE HYDROCHLORIDE 1 MILLIGRAM(S): 2 INJECTION INTRAMUSCULAR; INTRAVENOUS; SUBCUTANEOUS at 20:36

## 2019-08-04 RX ADMIN — HYDROMORPHONE HYDROCHLORIDE 1 MILLIGRAM(S): 2 INJECTION INTRAMUSCULAR; INTRAVENOUS; SUBCUTANEOUS at 06:15

## 2019-08-04 RX ADMIN — HYDROMORPHONE HYDROCHLORIDE 1 MILLIGRAM(S): 2 INJECTION INTRAMUSCULAR; INTRAVENOUS; SUBCUTANEOUS at 06:43

## 2019-08-04 RX ADMIN — HYDROMORPHONE HYDROCHLORIDE 1 MILLIGRAM(S): 2 INJECTION INTRAMUSCULAR; INTRAVENOUS; SUBCUTANEOUS at 15:37

## 2019-08-04 RX ADMIN — Medication 100 MILLIGRAM(S): at 11:58

## 2019-08-04 RX ADMIN — SENNA PLUS 2 TABLET(S): 8.6 TABLET ORAL at 21:01

## 2019-08-04 RX ADMIN — ENOXAPARIN SODIUM 40 MILLIGRAM(S): 100 INJECTION SUBCUTANEOUS at 11:58

## 2019-08-04 RX ADMIN — PANTOPRAZOLE SODIUM 40 MILLIGRAM(S): 20 TABLET, DELAYED RELEASE ORAL at 05:12

## 2019-08-04 RX ADMIN — SODIUM CHLORIDE 250 MILLILITER(S): 9 INJECTION INTRAMUSCULAR; INTRAVENOUS; SUBCUTANEOUS at 01:47

## 2019-08-04 RX ADMIN — ONDANSETRON 4 MILLIGRAM(S): 8 TABLET, FILM COATED ORAL at 06:16

## 2019-08-04 RX ADMIN — SODIUM CHLORIDE 250 MILLILITER(S): 9 INJECTION INTRAMUSCULAR; INTRAVENOUS; SUBCUTANEOUS at 23:25

## 2019-08-04 RX ADMIN — TRAMADOL HYDROCHLORIDE 50 MILLIGRAM(S): 50 TABLET ORAL at 21:00

## 2019-08-04 RX ADMIN — HYDROMORPHONE HYDROCHLORIDE 1 MILLIGRAM(S): 2 INJECTION INTRAMUSCULAR; INTRAVENOUS; SUBCUTANEOUS at 10:49

## 2019-08-04 RX ADMIN — TRAMADOL HYDROCHLORIDE 50 MILLIGRAM(S): 50 TABLET ORAL at 21:42

## 2019-08-04 RX ADMIN — SODIUM CHLORIDE 250 MILLILITER(S): 9 INJECTION INTRAMUSCULAR; INTRAVENOUS; SUBCUTANEOUS at 19:18
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
## 2019-08-04 NOTE — PROGRESS NOTE ADULT - SUBJECTIVE AND OBJECTIVE BOX
Patient today still has abdominal pain when she eats anything, barely tolerating water.   Not tolerating jello.  No other complaints      acetaminophen   Tablet .. 650 milliGRAM(s) Oral every 6 hours PRN  chlorhexidine 4% Liquid 1 Application(s) Topical <User Schedule>  docusate sodium 100 milliGRAM(s) Oral three times a day  enoxaparin Injectable 40 milliGRAM(s) SubCutaneous daily  folic acid 1 milliGRAM(s) Oral daily  HYDROmorphone  Injectable 1 milliGRAM(s) IV Push every 4 hours PRN  LORazepam     Tablet 2 milliGRAM(s) Oral every 2 hours PRN  multivitamin 1 Tablet(s) Oral daily  ondansetron Injectable 4 milliGRAM(s) IV Push every 8 hours PRN  pantoprazole    Tablet 40 milliGRAM(s) Oral before breakfast  senna 2 Tablet(s) Oral at bedtime  sodium chloride 0.9%. 1000 milliLiter(s) IV Continuous <Continuous>  thiamine 100 milliGRAM(s) Oral daily  traMADol 50 milliGRAM(s) Oral every 6 hours PRN      PHYSICAL EXAM:  General: A/ox 3, No acute Distress  Neck: Supple, NO JVD  Cardiac: S1 S2 heard regular, No audible murmurs  Pulmonary: Good bilateral breath sounds, Breathing unlabored, No Rhonchi/Rales/Wheezing  Abdomen: Soft, Non -tender on palpation today, +BS   Extremities: No Rashes, No edema  Neuro: A/o x 3, No focal deficits  Psch: normal mood , normal affect    T(C): 36.4 (08-04-19 @ 05:34), Max: 36.4 (08-03-19 @ 13:21)  HR: 87 (08-04-19 @ 05:34) (72 - 87)  BP: 142/89 (08-04-19 @ 05:34) (138/91 - 162/96)  RR: 18 (08-04-19 @ 05:34) (18 - 18)  SpO2: --    LABS:                        10.1   3.19  )-----------( 330      ( 04 Aug 2019 09:20 )             29.5     08-04    140  |  101  |  <3<L>  ----------------------------<  96  3.3<L>   |  28  |  0.5<L>    Ca    8.2<L>      04 Aug 2019 09:20    TPro  5.8<L>  /  Alb  3.2<L>  /  TBili  0.4  /  DBili  0.2  /  AST  92<H>  /  ALT  23  /  AlkPhos  58  08-04      Aspartate Aminotransferase (AST/SGOT): 92 U/L (08-04-19 @ 09:20)  Alanine Aminotransferase (ALT/SGPT): 23 U/L (08-04-19 @ 09:20)

## 2019-08-05 LAB
ALBUMIN SERPL ELPH-MCNC: 3.2 G/DL — LOW (ref 3.5–5.2)
ALP SERPL-CCNC: 60 U/L — SIGNIFICANT CHANGE UP (ref 30–115)
ALT FLD-CCNC: 21 U/L — SIGNIFICANT CHANGE UP (ref 0–41)
ANION GAP SERPL CALC-SCNC: 10 MMOL/L — SIGNIFICANT CHANGE UP (ref 7–14)
AST SERPL-CCNC: 87 U/L — HIGH (ref 0–41)
BASOPHILS # BLD AUTO: 0.04 K/UL — SIGNIFICANT CHANGE UP (ref 0–0.2)
BASOPHILS NFR BLD AUTO: 1 % — SIGNIFICANT CHANGE UP (ref 0–1)
BILIRUB DIRECT SERPL-MCNC: 0.2 MG/DL — SIGNIFICANT CHANGE UP (ref 0–0.2)
BILIRUB INDIRECT FLD-MCNC: 0.1 MG/DL — LOW (ref 0.2–1.2)
BILIRUB SERPL-MCNC: 0.3 MG/DL — SIGNIFICANT CHANGE UP (ref 0.2–1.2)
BUN SERPL-MCNC: <3 MG/DL — LOW (ref 10–20)
CALCIUM SERPL-MCNC: 8.4 MG/DL — LOW (ref 8.5–10.1)
CHLORIDE SERPL-SCNC: 102 MMOL/L — SIGNIFICANT CHANGE UP (ref 98–110)
CO2 SERPL-SCNC: 29 MMOL/L — SIGNIFICANT CHANGE UP (ref 17–32)
CREAT SERPL-MCNC: 0.5 MG/DL — LOW (ref 0.7–1.5)
EOSINOPHIL # BLD AUTO: 0.08 K/UL — SIGNIFICANT CHANGE UP (ref 0–0.7)
EOSINOPHIL NFR BLD AUTO: 2 % — SIGNIFICANT CHANGE UP (ref 0–8)
GLUCOSE SERPL-MCNC: 88 MG/DL — SIGNIFICANT CHANGE UP (ref 70–99)
HCT VFR BLD CALC: 29.7 % — LOW (ref 37–47)
HGB BLD-MCNC: 10.1 G/DL — LOW (ref 12–16)
IMM GRANULOCYTES NFR BLD AUTO: 0.5 % — HIGH (ref 0.1–0.3)
LYMPHOCYTES # BLD AUTO: 1.19 K/UL — LOW (ref 1.2–3.4)
LYMPHOCYTES # BLD AUTO: 30.4 % — SIGNIFICANT CHANGE UP (ref 20.5–51.1)
MAGNESIUM SERPL-MCNC: 1.6 MG/DL — LOW (ref 1.8–2.4)
MCHC RBC-ENTMCNC: 33 PG — HIGH (ref 27–31)
MCHC RBC-ENTMCNC: 34 G/DL — SIGNIFICANT CHANGE UP (ref 32–37)
MCV RBC AUTO: 97.1 FL — SIGNIFICANT CHANGE UP (ref 81–99)
MONOCYTES # BLD AUTO: 0.62 K/UL — HIGH (ref 0.1–0.6)
MONOCYTES NFR BLD AUTO: 15.9 % — HIGH (ref 1.7–9.3)
NEUTROPHILS # BLD AUTO: 1.96 K/UL — SIGNIFICANT CHANGE UP (ref 1.4–6.5)
NEUTROPHILS NFR BLD AUTO: 50.2 % — SIGNIFICANT CHANGE UP (ref 42.2–75.2)
NRBC # BLD: 0 /100 WBCS — SIGNIFICANT CHANGE UP (ref 0–0)
PLATELET # BLD AUTO: 374 K/UL — SIGNIFICANT CHANGE UP (ref 130–400)
POTASSIUM SERPL-MCNC: 3.6 MMOL/L — SIGNIFICANT CHANGE UP (ref 3.5–5)
POTASSIUM SERPL-SCNC: 3.6 MMOL/L — SIGNIFICANT CHANGE UP (ref 3.5–5)
PROT SERPL-MCNC: 5.9 G/DL — LOW (ref 6–8)
RBC # BLD: 3.06 M/UL — LOW (ref 4.2–5.4)
RBC # FLD: 11.8 % — SIGNIFICANT CHANGE UP (ref 11.5–14.5)
SODIUM SERPL-SCNC: 141 MMOL/L — SIGNIFICANT CHANGE UP (ref 135–146)
WBC # BLD: 3.91 K/UL — LOW (ref 4.8–10.8)
WBC # FLD AUTO: 3.91 K/UL — LOW (ref 4.8–10.8)

## 2019-08-05 PROCEDURE — 99233 SBSQ HOSP IP/OBS HIGH 50: CPT

## 2019-08-05 PROCEDURE — 99232 SBSQ HOSP IP/OBS MODERATE 35: CPT

## 2019-08-05 RX ORDER — MAGNESIUM SULFATE 500 MG/ML
2 VIAL (ML) INJECTION ONCE
Refills: 0 | Status: COMPLETED | OUTPATIENT
Start: 2019-08-05 | End: 2019-08-05

## 2019-08-05 RX ORDER — LIPASE/PROTEASE/AMYLASE 16-48-48K
3 CAPSULE,DELAYED RELEASE (ENTERIC COATED) ORAL
Refills: 0 | Status: DISCONTINUED | OUTPATIENT
Start: 2019-08-05 | End: 2019-08-09

## 2019-08-05 RX ORDER — LIPASE/PROTEASE/AMYLASE 16-48-48K
1 CAPSULE,DELAYED RELEASE (ENTERIC COATED) ORAL
Refills: 0 | Status: DISCONTINUED | OUTPATIENT
Start: 2019-08-05 | End: 2019-08-05

## 2019-08-05 RX ADMIN — HYDROMORPHONE HYDROCHLORIDE 1 MILLIGRAM(S): 2 INJECTION INTRAMUSCULAR; INTRAVENOUS; SUBCUTANEOUS at 08:41

## 2019-08-05 RX ADMIN — HYDROMORPHONE HYDROCHLORIDE 1 MILLIGRAM(S): 2 INJECTION INTRAMUSCULAR; INTRAVENOUS; SUBCUTANEOUS at 03:28

## 2019-08-05 RX ADMIN — HYDROMORPHONE HYDROCHLORIDE 1 MILLIGRAM(S): 2 INJECTION INTRAMUSCULAR; INTRAVENOUS; SUBCUTANEOUS at 20:13

## 2019-08-05 RX ADMIN — Medication 100 MILLIGRAM(S): at 21:34

## 2019-08-05 RX ADMIN — HYDROMORPHONE HYDROCHLORIDE 1 MILLIGRAM(S): 2 INJECTION INTRAMUSCULAR; INTRAVENOUS; SUBCUTANEOUS at 04:00

## 2019-08-05 RX ADMIN — SODIUM CHLORIDE 250 MILLILITER(S): 9 INJECTION INTRAMUSCULAR; INTRAVENOUS; SUBCUTANEOUS at 11:57

## 2019-08-05 RX ADMIN — ENOXAPARIN SODIUM 40 MILLIGRAM(S): 100 INJECTION SUBCUTANEOUS at 11:43

## 2019-08-05 RX ADMIN — Medication 50 GRAM(S): at 17:45

## 2019-08-05 RX ADMIN — PANTOPRAZOLE SODIUM 40 MILLIGRAM(S): 20 TABLET, DELAYED RELEASE ORAL at 05:27

## 2019-08-05 RX ADMIN — Medication 1 TABLET(S): at 11:46

## 2019-08-05 RX ADMIN — ONDANSETRON 4 MILLIGRAM(S): 8 TABLET, FILM COATED ORAL at 20:17

## 2019-08-05 RX ADMIN — ONDANSETRON 4 MILLIGRAM(S): 8 TABLET, FILM COATED ORAL at 08:41

## 2019-08-05 RX ADMIN — HYDROMORPHONE HYDROCHLORIDE 1 MILLIGRAM(S): 2 INJECTION INTRAMUSCULAR; INTRAVENOUS; SUBCUTANEOUS at 20:40

## 2019-08-05 RX ADMIN — Medication 1 MILLIGRAM(S): at 11:43

## 2019-08-05 RX ADMIN — Medication 100 MILLIGRAM(S): at 11:44

## 2019-08-05 RX ADMIN — Medication 100 MILLIGRAM(S): at 05:27

## 2019-08-05 RX ADMIN — Medication 100 MILLIGRAM(S): at 16:07

## 2019-08-05 RX ADMIN — SODIUM CHLORIDE 250 MILLILITER(S): 9 INJECTION INTRAMUSCULAR; INTRAVENOUS; SUBCUTANEOUS at 03:28

## 2019-08-05 RX ADMIN — Medication 3 CAPSULE(S): at 21:34

## 2019-08-05 RX ADMIN — SODIUM CHLORIDE 250 MILLILITER(S): 9 INJECTION INTRAMUSCULAR; INTRAVENOUS; SUBCUTANEOUS at 16:15

## 2019-08-05 RX ADMIN — HYDROMORPHONE HYDROCHLORIDE 1 MILLIGRAM(S): 2 INJECTION INTRAMUSCULAR; INTRAVENOUS; SUBCUTANEOUS at 16:07

## 2019-08-05 RX ADMIN — HYDROMORPHONE HYDROCHLORIDE 1 MILLIGRAM(S): 2 INJECTION INTRAMUSCULAR; INTRAVENOUS; SUBCUTANEOUS at 11:42

## 2019-08-05 RX ADMIN — SENNA PLUS 2 TABLET(S): 8.6 TABLET ORAL at 21:34

## 2019-08-05 RX ADMIN — Medication 3 CAPSULE(S): at 17:45
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
## 2019-08-05 NOTE — PROGRESS NOTE ADULT - SUBJECTIVE AND OBJECTIVE BOX
Patient is a 44y old Female who presents with a chief complaint of nausea, vomiting and pain in abdomen.  The patient is no longer complaining of nausea or vomiting but she is still in pain. She said that the pain is 9-10/10 after 3 hours of taking the pain med. When she is not in pain she is tolerating soft det (jelly). when she is in pain she cant tolerate it because of the pain    Primary diagnosis of Pancreatitis     Today is hospital day 8d.     PAST MEDICAL & SURGICAL HISTORY  No pertinent past medical history  S/P arthroscopy: meniscal repair    SOCIAL HISTORY:  Negative for smoking/alcohol/drug use.     ALLERGIES:  Compazine (Unknown)    MEDICATIONS:  STANDING MEDICATIONS  chlorhexidine 4% Liquid 1 Application(s) Topical <User Schedule>  docusate sodium 100 milliGRAM(s) Oral three times a day  enoxaparin Injectable 40 milliGRAM(s) SubCutaneous daily  folic acid 1 milliGRAM(s) Oral daily  multivitamin 1 Tablet(s) Oral daily  pantoprazole    Tablet 40 milliGRAM(s) Oral before breakfast  senna 2 Tablet(s) Oral at bedtime  sodium chloride 0.9%. 1000 milliLiter(s) IV Continuous <Continuous>  thiamine 100 milliGRAM(s) Oral daily    PRN MEDICATIONS  acetaminophen   Tablet .. 650 milliGRAM(s) Oral every 6 hours PRN  HYDROmorphone  Injectable 1 milliGRAM(s) IV Push every 4 hours PRN  ondansetron Injectable 4 milliGRAM(s) IV Push every 8 hours PRN  traMADol 50 milliGRAM(s) Oral every 6 hours PRN    VITALS:   T(F): 97.5  HR: 77  BP: 148/96  RR: 18  SpO2: --    LABS:                        10.1   3.91  )-----------( 374      ( 05 Aug 2019 06:07 )             29.7     08-05    141  |  102  |  <3<L>  ----------------------------<  88  3.6   |  29  |  0.5<L>    Ca    8.4<L>      05 Aug 2019 06:07  Mg     1.6     08-05    TPro  5.9<L>  /  Alb  3.2<L>  /  TBili  0.3  /  DBili  0.2  /  AST  87<H>  /  ALT  21  /  AlkPhos  60  08-05                  RADIOLOGY:    PHYSICAL EXAM:  GENERAL: NAD, well-groomed, well-developed  HEAD:  Atraumatic, Normocephalic  EYES: EOMI, PERRLA, conjunctiva and sclera clear  ENMT: No tonsillar erythema, exudates, or enlargement; Moist mucous membranes, Good dentition, No lesions  NECK: Supple, No JVD, Normal thyroid  NERVOUS SYSTEM:  Alert & Oriented X3, Good concentration; Motor Strength 5/5 B/L upper and lower extremitie  CHEST/LUNG: Clear to percussion bilaterally; No rales, rhonchi, wheezing, or rubs  HEART: Regular rate and rhythm; No murmurs, rubs, or gallops  ABDOMEN: Soft, Nondistended; Bowel sounds present, tender to palpation mostly in the epigastric and RUQ regions  LYMPH: No lymphadenopathy noted  SKIN: No rashes or lesions Patient is a 44y old Female who presents with a chief complaint of nausea, vomiting and pain in abdomen.  The patient is no longer complaining of nausea or vomiting but she is still in pain. She said that the pain is 9-10/10 after 3 hours of taking the pain med. When she is not in pain she is tolerating clear liquid diet. when she is in pain she cant tolerate anything PO    Primary diagnosis of Pancreatitis     Today is hospital day 8d.     PAST MEDICAL & SURGICAL HISTORY  No pertinent past medical history  S/P arthroscopy: meniscal repair    SOCIAL HISTORY:  Negative for smoking/alcohol/drug use.     ALLERGIES:  Compazine (Unknown)    MEDICATIONS:  STANDING MEDICATIONS  chlorhexidine 4% Liquid 1 Application(s) Topical <User Schedule>  docusate sodium 100 milliGRAM(s) Oral three times a day  enoxaparin Injectable 40 milliGRAM(s) SubCutaneous daily  folic acid 1 milliGRAM(s) Oral daily  multivitamin 1 Tablet(s) Oral daily  pantoprazole    Tablet 40 milliGRAM(s) Oral before breakfast  senna 2 Tablet(s) Oral at bedtime  sodium chloride 0.9%. 1000 milliLiter(s) IV Continuous <Continuous>  thiamine 100 milliGRAM(s) Oral daily    PRN MEDICATIONS  acetaminophen   Tablet .. 650 milliGRAM(s) Oral every 6 hours PRN  HYDROmorphone  Injectable 1 milliGRAM(s) IV Push every 4 hours PRN  ondansetron Injectable 4 milliGRAM(s) IV Push every 8 hours PRN  traMADol 50 milliGRAM(s) Oral every 6 hours PRN    VITALS:   T(F): 97.5  HR: 77  BP: 148/96  RR: 18  SpO2: --    LABS:                        10.1   3.91  )-----------( 374      ( 05 Aug 2019 06:07 )             29.7     08-05    141  |  102  |  <3<L>  ----------------------------<  88  3.6   |  29  |  0.5<L>    Ca    8.4<L>      05 Aug 2019 06:07  Mg     1.6     08-05    TPro  5.9<L>  /  Alb  3.2<L>  /  TBili  0.3  /  DBili  0.2  /  AST  87<H>  /  ALT  21  /  AlkPhos  60  08-05                  RADIOLOGY:    PHYSICAL EXAM:  GENERAL: NAD, well-groomed, well-developed  HEAD:  Atraumatic, Normocephalic  EYES: EOMI, PERRLA, conjunctiva and sclera clear  ENMT: No tonsillar erythema, exudates, or enlargement; Moist mucous membranes, Good dentition, No lesions  NECK: Supple, No JVD, Normal thyroid  NERVOUS SYSTEM:  Alert & Oriented X3, Good concentration; Motor Strength 5/5 B/L upper and lower extremitie  CHEST/LUNG: Clear to percussion bilaterally; No rales, rhonchi, wheezing, or rubs  HEART: Regular rate and rhythm; No murmurs, rubs, or gallops  ABDOMEN: Soft, Nondistended; Bowel sounds present, tender to palpation mostly in the epigastric and RUQ regions  LYMPH: No lymphadenopathy noted  SKIN: No rashes or lesions

## 2019-08-05 NOTE — PROGRESS NOTE ADULT - SUBJECTIVE AND OBJECTIVE BOX
Patient is a 45 y/o female with PMH of EtOH abuse (drinks 2-3 alcoholic beverages per day, no history of withdraw or seizures) and recurrent pancreatitis( first started five years ago secondary to EtOH abuse) that presented to ED for 3 days of multiple episodes of NBNB vomiting and diffused crampy abdominal pain, similar to prior episodes of pancreatitis. Patient feels somewhat better since admission but still having trouble tolerating diet. When she tries to eat she becomes nauseous and has abdominal pain.     PAST MEDICAL & SURGICAL HISTORY:  No pertinent past medical history  S/P arthroscopy: meniscal repair        MEDICATIONS  (STANDING):  chlorhexidine 4% Liquid 1 Application(s) Topical <User Schedule>  docusate sodium 100 milliGRAM(s) Oral three times a day  enoxaparin Injectable 40 milliGRAM(s) SubCutaneous daily  folic acid 1 milliGRAM(s) Oral daily  magnesium sulfate  IVPB 2 Gram(s) IV Intermittent once  multivitamin 1 Tablet(s) Oral daily  pantoprazole    Tablet 40 milliGRAM(s) Oral before breakfast  senna 2 Tablet(s) Oral at bedtime  sodium chloride 0.9%. 1000 milliLiter(s) (250 mL/Hr) IV Continuous <Continuous>  thiamine 100 milliGRAM(s) Oral daily    MEDICATIONS  (PRN):  acetaminophen   Tablet .. 650 milliGRAM(s) Oral every 6 hours PRN Temp greater or equal to 38C (100.4F)  HYDROmorphone  Injectable 1 milliGRAM(s) IV Push every 4 hours PRN Severe Pain (7 - 10)  ondansetron Injectable 4 milliGRAM(s) IV Push every 8 hours PRN Nausea and/or Vomiting  traMADol 50 milliGRAM(s) Oral every 6 hours PRN Moderate Pain (4 - 6)      Allergies  Compazine (Unknown)    Review of Systems  General:  Denies Fatigue, Denies Fever, Denies Weakness ,Denies Weight Loss   HEENT: Denies Trouble Swallowing ,Denies  Sore Throat , Denies Change in hearing/vision/speech ,Denies Dizziness    Cardio: Denies  Chest Pain , Palpitations    Respiratory: Denies worsening of SOB, Denies Cough  Abdomen: See detailed HPI  Neuro: Denies Headache Denies Dizziness, Denies Paresthesias  MSK: Denies pain in Bones/Joints/Muscles   Psych: Patient denies depression, denies suicidal or homicidal ideations  Integ: Patient Denies rash, or new skin lesions     Vital Signs:  T(F): 97.6   HR: 66  BP: 148/85  RR: 18  Physical Exam  Gen: NAD  HEENT: NC/AT  Cardio: S1/S2   Resp: CTA B/L  Abdomen: Soft, ND/NT  Neuro: AAOx3  Extremities: FROM x 4                            10.1   3.91  )-----------( 374      ( 05 Aug 2019 06:07 )             29.7       Auto Neutrophil %: 50.2 % (08-05-19 @ 06:07)  Auto Immature Granulocyte %: 0.5 % (08-05-19 @ 06:07)    08-05    141  |  102  |  <3<L>  ----------------------------<  88  3.6   |  29  |  0.5<L>      Calcium, Total Serum: 8.4 mg/dL (08-05-19 @ 06:07)      LFTs:             5.9  | 0.3  | 87       ------------------[60      ( 05 Aug 2019 06:07 )  3.2  | 0.2  | 21              RADIOLOGY & ADDITIONAL STUDIES:  CT Abdomen and Pelvis w/ IV Cont 08.01.19  IMPRESSION:     No evidence of pancreatic process at this time.    Developing acute peripancreatic fluid collection measuring 3.1 x 8.1 cm

## 2019-08-05 NOTE — PROGRESS NOTE ADULT - ASSESSMENT
Patient is a 43 y/o female with PMH of EtOH abuse (drinks 2-3 alcoholic beverages per day, no history of withdraw or seizures) and recurrent pancreatitis( first started five years ago secondary to EtOH abuse) that presented to ED for 3 days of multiple episodes of NBNB vomiting and diffused crampy abdominal pain, similar to prior episodes of pancreatitis. Patient feels somewhat better since admission but still having trouble tolerating diet. When she tries to eat she becomes nauseous and has abdominal pain. [facility] notified for concern of collection. She does have a small 3.8cm collection on CT but not mature and small.    Pancreatitis / Pancreatic collection  - I ordered Creon to take four times daily  - Low fat diet  - Will need repeat CT scan in 2 weeks, if pain more profound can order sooner  - Will likely do better tolerating diet after on Pancreatic enzymes  - Will follow

## 2019-08-05 NOTE — CHART NOTE - NSCHARTNOTEFT_GEN_A_CORE
Registered Dietitian Follow-Up     Patient Profile Reviewed                           Yes [x]   No []     Nutrition History Previously Obtained        Yes [x]  No []       Pertinent Subjective Information: Saw pt at lunch time, reports she is NPO now for tests. Brought pt Ensure clear - pt will save it until she is on clear liquid diet again. Pt is happy that it will get her some nutrition. She was on liquids yesterday and reports she did not tolerate it well.      Pertinent Medical Interventions: GI following - added creon. Alcohol induced acute pancreatitis. Diarrhea resolved.      Diet order: NPO as of this afternoon     Anthropometrics:  - Ht. 162.56 cm  - Wt. 58.3 (7/29)   - %wt change - no new weights   - BMI 22.1   - IBW: 120#      Pertinent Lab Data: (8/5) H/H 10.1/29.8  Cr 0.5  BUN <3  Mg 1.6      Pertinent Meds: lovenox, NaCl 0.9%, MgSO4, creon, ultram colace, senna, zofran, folic acid, MVI, protonix     Physical Findings:  - Appearance: alert oriented  - GI function: LBM 7/27  - Tubes:  - Oral/Mouth cavity: No issues chewing/swallowing.   - Skin: BS 19 skin intact     Nutrition Requirements  Weight Used: 58.3 kg needs cont from RD assessment 8/1     estimated calorie needs: 1476 - 1845 kcals/day (MSJx 1.2 - 1.5 AF for acute PCM)  estimated protein needs: 70 - 87 gms/day (1.2 - 1.5gm/kg for same as above)  estimated fluid needs: per LIP     Nutrient Intake: not meeting needs         [] Previous Nutrition Diagnosis: Inadeqate Energy Intake/ severe PCM            [x} Ongoing          [] Resolved    [] No active nutrition diagnosis identified at this time     Nutrition Diagnostic #1  Problem:  Etiology:  Statement:     Nutrition Diagnostic #2  Problem:  Etiology:  Statement:     Nutrition Intervention meals/snack, med food supplement      Goal/Expected Outcome: Pt to adv diet and consume >75% of liquids, aidan supplements in 3 days      Indicator/Monitoring: RD to monitor energy intake, diet order, body comp, NFPF    Recommendation: When diet adv, add Ensure Clear TID

## 2019-08-06 LAB
ANION GAP SERPL CALC-SCNC: 13 MMOL/L — SIGNIFICANT CHANGE UP (ref 7–14)
BUN SERPL-MCNC: <3 MG/DL — LOW (ref 10–20)
CALCIUM SERPL-MCNC: 8.4 MG/DL — LOW (ref 8.5–10.1)
CHLORIDE SERPL-SCNC: 103 MMOL/L — SIGNIFICANT CHANGE UP (ref 98–110)
CO2 SERPL-SCNC: 27 MMOL/L — SIGNIFICANT CHANGE UP (ref 17–32)
CREAT SERPL-MCNC: 0.5 MG/DL — LOW (ref 0.7–1.5)
GLUCOSE SERPL-MCNC: 85 MG/DL — SIGNIFICANT CHANGE UP (ref 70–99)
MAGNESIUM SERPL-MCNC: 1.4 MG/DL — LOW (ref 1.8–2.4)
POTASSIUM SERPL-MCNC: 3.2 MMOL/L — LOW (ref 3.5–5)
POTASSIUM SERPL-SCNC: 3.2 MMOL/L — LOW (ref 3.5–5)
SODIUM SERPL-SCNC: 143 MMOL/L — SIGNIFICANT CHANGE UP (ref 135–146)

## 2019-08-06 PROCEDURE — 99233 SBSQ HOSP IP/OBS HIGH 50: CPT

## 2019-08-06 RX ORDER — POTASSIUM CHLORIDE 20 MEQ
40 PACKET (EA) ORAL EVERY 12 HOURS
Refills: 0 | Status: COMPLETED | OUTPATIENT
Start: 2019-08-06 | End: 2019-08-07

## 2019-08-06 RX ORDER — POLYETHYLENE GLYCOL 3350 17 G/17G
17 POWDER, FOR SOLUTION ORAL DAILY
Refills: 0 | Status: DISCONTINUED | OUTPATIENT
Start: 2019-08-06 | End: 2019-08-09

## 2019-08-06 RX ORDER — DOCUSATE SODIUM 100 MG
100 CAPSULE ORAL DAILY
Refills: 0 | Status: DISCONTINUED | OUTPATIENT
Start: 2019-08-06 | End: 2019-08-06

## 2019-08-06 RX ORDER — MAGNESIUM SULFATE 500 MG/ML
2 VIAL (ML) INJECTION ONCE
Refills: 0 | Status: COMPLETED | OUTPATIENT
Start: 2019-08-06 | End: 2019-08-06

## 2019-08-06 RX ORDER — HYDROMORPHONE HYDROCHLORIDE 2 MG/ML
1.5 INJECTION INTRAMUSCULAR; INTRAVENOUS; SUBCUTANEOUS
Refills: 0 | Status: DISCONTINUED | OUTPATIENT
Start: 2019-08-06 | End: 2019-08-08

## 2019-08-06 RX ORDER — SENNA PLUS 8.6 MG/1
2 TABLET ORAL AT BEDTIME
Refills: 0 | Status: DISCONTINUED | OUTPATIENT
Start: 2019-08-06 | End: 2019-08-06

## 2019-08-06 RX ORDER — HYDROMORPHONE HYDROCHLORIDE 2 MG/ML
1.5 INJECTION INTRAMUSCULAR; INTRAVENOUS; SUBCUTANEOUS EVERY 4 HOURS
Refills: 0 | Status: DISCONTINUED | OUTPATIENT
Start: 2019-08-06 | End: 2019-08-06

## 2019-08-06 RX ORDER — HYDROMORPHONE HYDROCHLORIDE 2 MG/ML
1.5 INJECTION INTRAMUSCULAR; INTRAVENOUS; SUBCUTANEOUS
Refills: 0 | Status: DISCONTINUED | OUTPATIENT
Start: 2019-08-06 | End: 2019-08-06

## 2019-08-06 RX ADMIN — SENNA PLUS 2 TABLET(S): 8.6 TABLET ORAL at 21:45

## 2019-08-06 RX ADMIN — HYDROMORPHONE HYDROCHLORIDE 1 MILLIGRAM(S): 2 INJECTION INTRAMUSCULAR; INTRAVENOUS; SUBCUTANEOUS at 08:01

## 2019-08-06 RX ADMIN — HYDROMORPHONE HYDROCHLORIDE 1.5 MILLIGRAM(S): 2 INJECTION INTRAMUSCULAR; INTRAVENOUS; SUBCUTANEOUS at 12:04

## 2019-08-06 RX ADMIN — HYDROMORPHONE HYDROCHLORIDE 1.5 MILLIGRAM(S): 2 INJECTION INTRAMUSCULAR; INTRAVENOUS; SUBCUTANEOUS at 16:34

## 2019-08-06 RX ADMIN — ENOXAPARIN SODIUM 40 MILLIGRAM(S): 100 INJECTION SUBCUTANEOUS at 12:06

## 2019-08-06 RX ADMIN — Medication 3 CAPSULE(S): at 07:25

## 2019-08-06 RX ADMIN — HYDROMORPHONE HYDROCHLORIDE 1.5 MILLIGRAM(S): 2 INJECTION INTRAMUSCULAR; INTRAVENOUS; SUBCUTANEOUS at 20:22

## 2019-08-06 RX ADMIN — SODIUM CHLORIDE 250 MILLILITER(S): 9 INJECTION INTRAMUSCULAR; INTRAVENOUS; SUBCUTANEOUS at 07:24

## 2019-08-06 RX ADMIN — Medication 50 GRAM(S): at 22:30

## 2019-08-06 RX ADMIN — Medication 1 TABLET(S): at 12:05

## 2019-08-06 RX ADMIN — Medication 1 MILLIGRAM(S): at 12:05

## 2019-08-06 RX ADMIN — Medication 3 CAPSULE(S): at 12:05

## 2019-08-06 RX ADMIN — Medication 100 MILLIGRAM(S): at 13:42

## 2019-08-06 RX ADMIN — Medication 40 MILLIEQUIVALENT(S): at 20:22

## 2019-08-06 RX ADMIN — Medication 100 MILLIGRAM(S): at 06:13

## 2019-08-06 RX ADMIN — Medication 100 MILLIGRAM(S): at 21:45

## 2019-08-06 RX ADMIN — ONDANSETRON 4 MILLIGRAM(S): 8 TABLET, FILM COATED ORAL at 22:32

## 2019-08-06 RX ADMIN — HYDROMORPHONE HYDROCHLORIDE 1 MILLIGRAM(S): 2 INJECTION INTRAMUSCULAR; INTRAVENOUS; SUBCUTANEOUS at 04:17

## 2019-08-06 RX ADMIN — Medication 3 CAPSULE(S): at 16:34

## 2019-08-06 RX ADMIN — HYDROMORPHONE HYDROCHLORIDE 1 MILLIGRAM(S): 2 INJECTION INTRAMUSCULAR; INTRAVENOUS; SUBCUTANEOUS at 04:42

## 2019-08-06 RX ADMIN — Medication 3 CAPSULE(S): at 21:45

## 2019-08-06 RX ADMIN — PANTOPRAZOLE SODIUM 40 MILLIGRAM(S): 20 TABLET, DELAYED RELEASE ORAL at 07:25

## 2019-08-06 RX ADMIN — HYDROMORPHONE HYDROCHLORIDE 1.5 MILLIGRAM(S): 2 INJECTION INTRAMUSCULAR; INTRAVENOUS; SUBCUTANEOUS at 20:52

## 2019-08-06 RX ADMIN — Medication 100 MILLIGRAM(S): at 12:05

## 2019-08-06 RX ADMIN — HYDROMORPHONE HYDROCHLORIDE 1 MILLIGRAM(S): 2 INJECTION INTRAMUSCULAR; INTRAVENOUS; SUBCUTANEOUS at 00:40

## 2019-08-06 RX ADMIN — HYDROMORPHONE HYDROCHLORIDE 1 MILLIGRAM(S): 2 INJECTION INTRAMUSCULAR; INTRAVENOUS; SUBCUTANEOUS at 00:17

## 2019-08-06 RX ADMIN — SODIUM CHLORIDE 250 MILLILITER(S): 9 INJECTION INTRAMUSCULAR; INTRAVENOUS; SUBCUTANEOUS at 21:45

## 2019-08-06 RX ADMIN — SODIUM CHLORIDE 250 MILLILITER(S): 9 INJECTION INTRAMUSCULAR; INTRAVENOUS; SUBCUTANEOUS at 16:12

## 2019-08-06 RX ADMIN — HYDROMORPHONE HYDROCHLORIDE 1.5 MILLIGRAM(S): 2 INJECTION INTRAMUSCULAR; INTRAVENOUS; SUBCUTANEOUS at 23:33

## 2019-08-06 NOTE — MEDICAL STUDENT PROGRESS NOTE(EDUCATION) - SUBJECTIVE AND OBJECTIVE BOX
45 y/o female with PMH of EtOH abuse and recurrent pancreatitis admitted after 3 days of NBNB vomiting and abdominal pain following an episode of binge drinking. This is hospital day 9.  Overnight, she was in a lot of pain, waking up every couple of hours. This morning the pain continues in the epigastric area radiating to the back. She states that the pain subsides only temporarily for an hour after dilaudid is administered, but resurfaces shortly after.  She was not able to tolerate clears yesterday , as pain and nausea follow shortly after any attempts. Denies diarrhea, fever, chills, diaphoresis.       PAST MEDICAL & SURGICAL HISTORY  No pertinent past medical history  S/P arthroscopy: meniscal repair    ALLERGIES:  Compazine (Unknown)    MEDICATIONS:  STANDING MEDICATIONS  chlorhexidine 4% Liquid 1 Application(s) Topical <User Schedule>  docusate sodium 100 milliGRAM(s) Oral three times a day  enoxaparin Injectable 40 milliGRAM(s) SubCutaneous daily  folic acid 1 milliGRAM(s) Oral daily  multivitamin 1 Tablet(s) Oral daily  pancrelipase  (CREON 12,000 Lipase Units) 3 Capsule(s) Oral four times a day with meals  pantoprazole    Tablet 40 milliGRAM(s) Oral before breakfast  senna 2 Tablet(s) Oral at bedtime  sodium chloride 0.9%. 1000 milliLiter(s) IV Continuous <Continuous>  thiamine 100 milliGRAM(s) Oral daily    PRN MEDICATIONS  acetaminophen   Tablet .. 650 milliGRAM(s) Oral every 6 hours PRN  HYDROmorphone  Injectable 1.5 milliGRAM(s) IV Push four times a day PRN  ondansetron Injectable 4 milliGRAM(s) IV Push every 8 hours PRN  traMADol 50 milliGRAM(s) Oral every 6 hours PRN    VITALS:   T(F): 97.4  HR: 88  BP: 137/77  RR: 18  SpO2: --    LABS:                        10.1   3.91  )-----------( 374      ( 05 Aug 2019 06:07 )             29.7     08-06    143  |  103  |  <3<L>  ----------------------------<  85  3.2<L>   |  27  |  0.5<L>    Ca    8.4<L>      06 Aug 2019 06:39  Mg     1.4     08-06    TPro  5.9<L>  /  Alb  3.2<L>  /  TBili  0.3  /  DBili  0.2  /  AST  87<H>  /  ALT  21  /  AlkPhos  60  08-05      RADIOLOGY:  CT Abdomen and Pelvis w/IV contrast - 8/1/19  Impression:  No evidence of pancreatic process at this time.  Developing acute peripancreatic fluid collection measuring 3.1 x 8.1 cm        PHYSICAL EXAM:  General: A/O x3, afebrile, no acute distress, but winces with pain with movement, visibly frustrated with constant pain   Head: atraumatic, normocephalic  Neck: supple, no JVD, no lymphadenopathy, no masses   Cardio: S1S2 Regular, no audible murmurs, rubs, gallops  Respiratory: Lungs clear bilaterally, no rales, rhonchi, wheezing, no use of accessory muscle   Abdomen: soft, nondistended, tenderness to palpation in all 4 quadrants more so in epigastric region, +bowel sounds  Extremities: No swelling, no pitting edema, no redness, no tenderness   Skin: No rashes  Psych: Normal affect, normal mood 43 y/o female with PMH of EtOH abuse and recurrent pancreatitis admitted after 3 days of NBNB vomiting and abdominal pain following an episode of binge drinking. This is hospital day 9.  Overnight, she was in a lot of pain, waking up every couple of hours. This morning the pain continues in the epigastric area radiating to the back. She states that the pain subsides only temporarily for an hour after dilaudid is administered, but resurfaces shortly after.  She was not able to tolerate clears yesterday , as pain and nausea follow shortly after any attempts. Denies diarrhea, fever, chills, diaphoresis.       PAST MEDICAL & SURGICAL HISTORY  No pertinent past medical history  S/P arthroscopy: meniscal repair    ALLERGIES:  Compazine (Unknown)    MEDICATIONS:  STANDING MEDICATIONS  chlorhexidine 4% Liquid 1 Application(s) Topical <User Schedule>  docusate sodium 100 milliGRAM(s) Oral three times a day  enoxaparin Injectable 40 milliGRAM(s) SubCutaneous daily  folic acid 1 milliGRAM(s) Oral daily  multivitamin 1 Tablet(s) Oral daily  pancrelipase  (CREON 12,000 Lipase Units) 3 Capsule(s) Oral four times a day with meals  pantoprazole    Tablet 40 milliGRAM(s) Oral before breakfast  senna 2 Tablet(s) Oral at bedtime  sodium chloride 0.9%. 1000 milliLiter(s) IV Continuous <Continuous>  thiamine 100 milliGRAM(s) Oral daily    PRN MEDICATIONS  acetaminophen   Tablet .. 650 milliGRAM(s) Oral every 6 hours PRN  HYDROmorphone  Injectable 1.5 milliGRAM(s) IV Push four times a day PRN  ondansetron Injectable 4 milliGRAM(s) IV Push every 8 hours PRN  traMADol 50 milliGRAM(s) Oral every 6 hours PRN    VITALS:   T(F): 97.4  HR: 88  BP: 137/77  RR: 18  SpO2: --    LABS:                        10.1   3.91  )-----------( 374      ( 05 Aug 2019 06:07 )             29.7     08-06    143  |  103  |  <3<L>  ----------------------------<  85  3.2<L>   |  27  |  0.5<L>    Ca    8.4<L>      06 Aug 2019 06:39  Mg     1.4     08-06    TPro  5.9<L>  /  Alb  3.2<L>  /  TBili  0.3  /  DBili  0.2  /  AST  87<H>  /  ALT  21  /  AlkPhos  60  08-05      RADIOLOGY:  CT Abdomen and Pelvis w/IV contrast - 8/1/19  Impression:  No evidence of pancreatic process at this time.  Developing acute peripancreatic fluid collection measuring 3.1 x 8.1 cm        PHYSICAL EXAM:  General: A/O x3, afebrile, no acute distress, but winces with pain with movement, visibly frustrated with constant pain   Head: atraumatic, normocephalic  Neck: supple, no JVD, no lymphadenopathy, no masses   Cardio: S1S2 Regular, no audible murmurs, rubs, gallops  Respiratory: Lungs clear bilaterally, no rales, rhonchi, wheezing, no use of accessory muscle   Abdomen: soft, nondistended, tenderness to palpation in all 4 quadrants more so in epigastric region, Hypoactive bowel sounds  Extremities: No swelling, no pitting edema, no redness, no tenderness   Skin: No rashes  Psych: Normal affect, normal mood

## 2019-08-06 NOTE — PROGRESS NOTE ADULT - ASSESSMENT
Patient seen and examined independently. I personally had a face-to-face encounter with the patient, examined the patient myself and reviewed the plan of care with the housestaff. Agree with resident's note but my note supersedes that of the resident in the matters hereby listed.     #Acuite alcohol induced pancreatitis : NS @ 250 .   Start introducing low fat diet  increased to dilaudid IV 1.5mg Q3H PRN  pain meds PRN   administer pacnreatic enzymes along with low fat diet.    #CT showing peripancreatic fluid collections : likely seroma. repeat CT in 2-4 weeks.     #Hypomagnesemia, hypokalemia: replete.     Still active .

## 2019-08-06 NOTE — MEDICAL STUDENT PROGRESS NOTE(EDUCATION) - NS MD HP STUD ASPLAN ASSES FT
This is a 44 year old female with PMH of recurrent pancreatitis secondary to chronic EtOH abuse, admitted with chief complaint of nausea, NBNB vomiting and abdominal pain x3 says following an episode of binge drinking, currently still in pain, not tolerating clears.
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
## 2019-08-06 NOTE — PROGRESS NOTE ADULT - SUBJECTIVE AND OBJECTIVE BOX
S : No new events/complaints      All other pertinent ROS negative.      08-05-19 @ 07:01  -  08-06-19 @ 07:00  --------------------------------------------------------  IN: 2500 mL / OUT: 0 mL / NET: 2500 mL      Vital Signs Last 24 Hrs  T(C): 36.6 (06 Aug 2019 12:43), Max: 37.1 (05 Aug 2019 21:05)  T(F): 97.8 (06 Aug 2019 12:43), Max: 98.7 (05 Aug 2019 21:05)  HR: 91 (06 Aug 2019 12:43) (74 - 91)  BP: 154/96 (06 Aug 2019 12:43) (137/77 - 154/96)  BP(mean): --  RR: 18 (06 Aug 2019 12:43) (18 - 18)  SpO2: --  PHYSICAL EXAM:    Constitutional: NAD, awake and alert, well-developed  HEENT: PERR, EOMI, Normal Hearing, MMM  Neck: Soft and supple, No LAD, No JVD  Respiratory: Breath sounds are clear bilaterally, No wheezing, rales or rhonchi  Cardiovascular: S1 and S2, regular rate and rhythm, no Murmurs, gallops or rubs  Gastrointestinal: Bowel Sounds present, soft. epigastrium tender, nondistended, no guarding, no rebound  Extremities: No peripheral edema      MEDICATIONS:  MEDICATIONS  (STANDING):  chlorhexidine 4% Liquid 1 Application(s) Topical <User Schedule>  docusate sodium 100 milliGRAM(s) Oral three times a day  enoxaparin Injectable 40 milliGRAM(s) SubCutaneous daily  folic acid 1 milliGRAM(s) Oral daily  multivitamin 1 Tablet(s) Oral daily  pancrelipase  (CREON 12,000 Lipase Units) 3 Capsule(s) Oral four times a day with meals  pantoprazole    Tablet 40 milliGRAM(s) Oral before breakfast  senna 2 Tablet(s) Oral at bedtime  sodium chloride 0.9%. 1000 milliLiter(s) (250 mL/Hr) IV Continuous <Continuous>  thiamine 100 milliGRAM(s) Oral daily      LABS: All Labs Reviewed:                        10.1   3.91  )-----------( 374      ( 05 Aug 2019 06:07 )             29.7     08-06    143  |  103  |  <3<L>  ----------------------------<  85  3.2<L>   |  27  |  0.5<L>    Ca    8.4<L>      06 Aug 2019 06:39  Mg     1.4     08-06    TPro  5.9<L>  /  Alb  3.2<L>  /  TBili  0.3  /  DBili  0.2  /  AST  87<H>  /  ALT  21  /  AlkPhos  60  08-05          Blood Culture:     Radiology: reviewed

## 2019-08-07 LAB
ALBUMIN SERPL ELPH-MCNC: 3.5 G/DL — SIGNIFICANT CHANGE UP (ref 3.5–5.2)
ALP SERPL-CCNC: 65 U/L — SIGNIFICANT CHANGE UP (ref 30–115)
ALT FLD-CCNC: 23 U/L — SIGNIFICANT CHANGE UP (ref 0–41)
ANION GAP SERPL CALC-SCNC: 10 MMOL/L — SIGNIFICANT CHANGE UP (ref 7–14)
AST SERPL-CCNC: 100 U/L — HIGH (ref 0–41)
BASOPHILS # BLD AUTO: 0.03 K/UL — SIGNIFICANT CHANGE UP (ref 0–0.2)
BASOPHILS NFR BLD AUTO: 0.8 % — SIGNIFICANT CHANGE UP (ref 0–1)
BILIRUB DIRECT SERPL-MCNC: 0.2 MG/DL — SIGNIFICANT CHANGE UP (ref 0–0.2)
BILIRUB INDIRECT FLD-MCNC: 0.1 MG/DL — LOW (ref 0.2–1.2)
BILIRUB SERPL-MCNC: 0.3 MG/DL — SIGNIFICANT CHANGE UP (ref 0.2–1.2)
BUN SERPL-MCNC: <3 MG/DL — LOW (ref 10–20)
CALCIUM SERPL-MCNC: 9 MG/DL — SIGNIFICANT CHANGE UP (ref 8.5–10.1)
CHLORIDE SERPL-SCNC: 104 MMOL/L — SIGNIFICANT CHANGE UP (ref 98–110)
CO2 SERPL-SCNC: 27 MMOL/L — SIGNIFICANT CHANGE UP (ref 17–32)
CREAT SERPL-MCNC: 0.5 MG/DL — LOW (ref 0.7–1.5)
EOSINOPHIL # BLD AUTO: 0.07 K/UL — SIGNIFICANT CHANGE UP (ref 0–0.7)
EOSINOPHIL NFR BLD AUTO: 1.8 % — SIGNIFICANT CHANGE UP (ref 0–8)
GLUCOSE SERPL-MCNC: 91 MG/DL — SIGNIFICANT CHANGE UP (ref 70–99)
HCT VFR BLD CALC: 31.1 % — LOW (ref 37–47)
HGB BLD-MCNC: 10.8 G/DL — LOW (ref 12–16)
IMM GRANULOCYTES NFR BLD AUTO: 0.3 % — SIGNIFICANT CHANGE UP (ref 0.1–0.3)
LYMPHOCYTES # BLD AUTO: 1.26 K/UL — SIGNIFICANT CHANGE UP (ref 1.2–3.4)
LYMPHOCYTES # BLD AUTO: 33.1 % — SIGNIFICANT CHANGE UP (ref 20.5–51.1)
MAGNESIUM SERPL-MCNC: 1.7 MG/DL — LOW (ref 1.8–2.4)
MCHC RBC-ENTMCNC: 34.1 PG — HIGH (ref 27–31)
MCHC RBC-ENTMCNC: 34.7 G/DL — SIGNIFICANT CHANGE UP (ref 32–37)
MCV RBC AUTO: 98.1 FL — SIGNIFICANT CHANGE UP (ref 81–99)
MONOCYTES # BLD AUTO: 0.43 K/UL — SIGNIFICANT CHANGE UP (ref 0.1–0.6)
MONOCYTES NFR BLD AUTO: 11.3 % — HIGH (ref 1.7–9.3)
NEUTROPHILS # BLD AUTO: 2.01 K/UL — SIGNIFICANT CHANGE UP (ref 1.4–6.5)
NEUTROPHILS NFR BLD AUTO: 52.7 % — SIGNIFICANT CHANGE UP (ref 42.2–75.2)
NRBC # BLD: 0 /100 WBCS — SIGNIFICANT CHANGE UP (ref 0–0)
PHOSPHATE SERPL-MCNC: 4.1 MG/DL — SIGNIFICANT CHANGE UP (ref 2.1–4.9)
PLATELET # BLD AUTO: 477 K/UL — HIGH (ref 130–400)
POTASSIUM SERPL-MCNC: 3.7 MMOL/L — SIGNIFICANT CHANGE UP (ref 3.5–5)
POTASSIUM SERPL-SCNC: 3.7 MMOL/L — SIGNIFICANT CHANGE UP (ref 3.5–5)
PROT SERPL-MCNC: 6.6 G/DL — SIGNIFICANT CHANGE UP (ref 6–8)
RBC # BLD: 3.17 M/UL — LOW (ref 4.2–5.4)
RBC # FLD: 11.8 % — SIGNIFICANT CHANGE UP (ref 11.5–14.5)
SODIUM SERPL-SCNC: 141 MMOL/L — SIGNIFICANT CHANGE UP (ref 135–146)
WBC # BLD: 3.81 K/UL — LOW (ref 4.8–10.8)
WBC # FLD AUTO: 3.81 K/UL — LOW (ref 4.8–10.8)

## 2019-08-07 PROCEDURE — 99233 SBSQ HOSP IP/OBS HIGH 50: CPT

## 2019-08-07 RX ORDER — ACETAMINOPHEN 500 MG
650 TABLET ORAL ONCE
Refills: 0 | Status: COMPLETED | OUTPATIENT
Start: 2019-08-07 | End: 2019-08-07

## 2019-08-07 RX ORDER — MAGNESIUM SULFATE 500 MG/ML
1 VIAL (ML) INJECTION ONCE
Refills: 0 | Status: COMPLETED | OUTPATIENT
Start: 2019-08-07 | End: 2019-08-07

## 2019-08-07 RX ADMIN — Medication 3 CAPSULE(S): at 21:54

## 2019-08-07 RX ADMIN — Medication 3 CAPSULE(S): at 08:07

## 2019-08-07 RX ADMIN — HYDROMORPHONE HYDROCHLORIDE 1.5 MILLIGRAM(S): 2 INJECTION INTRAMUSCULAR; INTRAVENOUS; SUBCUTANEOUS at 12:16

## 2019-08-07 RX ADMIN — SODIUM CHLORIDE 250 MILLILITER(S): 9 INJECTION INTRAMUSCULAR; INTRAVENOUS; SUBCUTANEOUS at 22:01

## 2019-08-07 RX ADMIN — HYDROMORPHONE HYDROCHLORIDE 1.5 MILLIGRAM(S): 2 INJECTION INTRAMUSCULAR; INTRAVENOUS; SUBCUTANEOUS at 00:03

## 2019-08-07 RX ADMIN — Medication 100 GRAM(S): at 14:39

## 2019-08-07 RX ADMIN — Medication 3 CAPSULE(S): at 18:34

## 2019-08-07 RX ADMIN — HYDROMORPHONE HYDROCHLORIDE 1.5 MILLIGRAM(S): 2 INJECTION INTRAMUSCULAR; INTRAVENOUS; SUBCUTANEOUS at 18:33

## 2019-08-07 RX ADMIN — HYDROMORPHONE HYDROCHLORIDE 1.5 MILLIGRAM(S): 2 INJECTION INTRAMUSCULAR; INTRAVENOUS; SUBCUTANEOUS at 18:48

## 2019-08-07 RX ADMIN — Medication 100 MILLIGRAM(S): at 21:54

## 2019-08-07 RX ADMIN — ONDANSETRON 4 MILLIGRAM(S): 8 TABLET, FILM COATED ORAL at 21:54

## 2019-08-07 RX ADMIN — HYDROMORPHONE HYDROCHLORIDE 1.5 MILLIGRAM(S): 2 INJECTION INTRAMUSCULAR; INTRAVENOUS; SUBCUTANEOUS at 15:43

## 2019-08-07 RX ADMIN — PANTOPRAZOLE SODIUM 40 MILLIGRAM(S): 20 TABLET, DELAYED RELEASE ORAL at 05:49

## 2019-08-07 RX ADMIN — HYDROMORPHONE HYDROCHLORIDE 1.5 MILLIGRAM(S): 2 INJECTION INTRAMUSCULAR; INTRAVENOUS; SUBCUTANEOUS at 05:47

## 2019-08-07 RX ADMIN — SODIUM CHLORIDE 250 MILLILITER(S): 9 INJECTION INTRAMUSCULAR; INTRAVENOUS; SUBCUTANEOUS at 05:47

## 2019-08-07 RX ADMIN — HYDROMORPHONE HYDROCHLORIDE 1.5 MILLIGRAM(S): 2 INJECTION INTRAMUSCULAR; INTRAVENOUS; SUBCUTANEOUS at 15:28

## 2019-08-07 RX ADMIN — SENNA PLUS 2 TABLET(S): 8.6 TABLET ORAL at 21:54

## 2019-08-07 RX ADMIN — Medication 100 MILLIGRAM(S): at 05:48

## 2019-08-07 RX ADMIN — Medication 3 CAPSULE(S): at 12:19

## 2019-08-07 RX ADMIN — HYDROMORPHONE HYDROCHLORIDE 1.5 MILLIGRAM(S): 2 INJECTION INTRAMUSCULAR; INTRAVENOUS; SUBCUTANEOUS at 08:48

## 2019-08-07 RX ADMIN — Medication 40 MILLIEQUIVALENT(S): at 05:48

## 2019-08-07 RX ADMIN — ENOXAPARIN SODIUM 40 MILLIGRAM(S): 100 INJECTION SUBCUTANEOUS at 12:19

## 2019-08-07 RX ADMIN — HYDROMORPHONE HYDROCHLORIDE 1.5 MILLIGRAM(S): 2 INJECTION INTRAMUSCULAR; INTRAVENOUS; SUBCUTANEOUS at 06:17

## 2019-08-07 RX ADMIN — HYDROMORPHONE HYDROCHLORIDE 1.5 MILLIGRAM(S): 2 INJECTION INTRAMUSCULAR; INTRAVENOUS; SUBCUTANEOUS at 12:31

## 2019-08-07 RX ADMIN — Medication 1 TABLET(S): at 12:19

## 2019-08-07 RX ADMIN — HYDROMORPHONE HYDROCHLORIDE 1.5 MILLIGRAM(S): 2 INJECTION INTRAMUSCULAR; INTRAVENOUS; SUBCUTANEOUS at 21:54

## 2019-08-07 RX ADMIN — HYDROMORPHONE HYDROCHLORIDE 1.5 MILLIGRAM(S): 2 INJECTION INTRAMUSCULAR; INTRAVENOUS; SUBCUTANEOUS at 02:35

## 2019-08-07 RX ADMIN — Medication 1 MILLIGRAM(S): at 12:19

## 2019-08-07 RX ADMIN — HYDROMORPHONE HYDROCHLORIDE 1.5 MILLIGRAM(S): 2 INJECTION INTRAMUSCULAR; INTRAVENOUS; SUBCUTANEOUS at 03:05

## 2019-08-07 RX ADMIN — Medication 100 MILLIGRAM(S): at 13:26

## 2019-08-07 RX ADMIN — HYDROMORPHONE HYDROCHLORIDE 1.5 MILLIGRAM(S): 2 INJECTION INTRAMUSCULAR; INTRAVENOUS; SUBCUTANEOUS at 22:05

## 2019-08-07 RX ADMIN — Medication 100 MILLIGRAM(S): at 12:18

## 2019-08-07 RX ADMIN — POLYETHYLENE GLYCOL 3350 17 GRAM(S): 17 POWDER, FOR SOLUTION ORAL at 12:20

## 2019-08-07 NOTE — MEDICAL STUDENT PROGRESS NOTE(EDUCATION) - SUBJECTIVE AND OBJECTIVE BOX
24H events:    Patient is a 44 year old female with history of recurrent pancreatitis secondary to chronic alcohol abuse, admitted for episode of acute pancreatitis with chief complain of nausea, vomiting, and abdominal pain, now on hospital day 10. Overnight, there were no acute events. This morning she feels better, admitting the pain is manageable with the Dilaudid q3H. Admits to constant abdominal pain in the epigastric region, which is exacerbated by eating, accompanied shortly after with nausea. She was able to tolerate some soup last night and this morning. Denies fever, chills, diaphoresis, vomiting, urinary frequency, diarrhea.         PAST MEDICAL & SURGICAL HISTORY  No pertinent past medical history  S/P arthroscopy: meniscal repair    SOCIAL HISTORY:  Positive for chronic alcohol use ( 2-3 drinks/day)     ALLERGIES:  Compazine (Unknown)    MEDICATIONS:  STANDING MEDICATIONS  chlorhexidine 4% Liquid 1 Application(s) Topical <User Schedule>  docusate sodium 100 milliGRAM(s) Oral three times a day  enoxaparin Injectable 40 milliGRAM(s) SubCutaneous daily  folic acid 1 milliGRAM(s) Oral daily  multivitamin 1 Tablet(s) Oral daily  pancrelipase  (CREON 12,000 Lipase Units) 3 Capsule(s) Oral four times a day with meals  pantoprazole    Tablet 40 milliGRAM(s) Oral before breakfast  polyethylene glycol 3350 17 Gram(s) Oral daily  senna 2 Tablet(s) Oral at bedtime  sodium chloride 0.9%. 1000 milliLiter(s) IV Continuous <Continuous>  thiamine 100 milliGRAM(s) Oral daily    PRN MEDICATIONS  acetaminophen   Tablet .. 650 milliGRAM(s) Oral every 6 hours PRN  HYDROmorphone  Injectable 1.5 milliGRAM(s) IV Push every 3 hours PRN  ondansetron Injectable 4 milliGRAM(s) IV Push every 8 hours PRN  traMADol 50 milliGRAM(s) Oral every 6 hours PRN    VITALS:   T(F): 97.9  HR: 72  BP: 153/88  RR: 18  SpO2: --    LABS:    08-06    143  |  103  |  <3<L>  ----------------------------<  85  3.2<L>   |  27  |  0.5<L>    Ca    8.4<L>      06 Aug 2019 06:39  Mg     1.4     08-06                    RADIOLOGY:  CT abdomen and pelvis with IV contrast (8/1)    No evidence of pancreatic process at this time.    Developing acute peripancreatic fluid collection measuring 3.1 x 8.1 cm      PHYSICAL EXAM:  General: A/O x3, no acute distress, resting comfortably, afebrile   Head: Atraumatic, normocephalic   Neck: Supple, no lymphadenopathy, no JVD   Cardio: S1 S2 regular, no murmurs, rubs, or gallops   Respiratory: Lungs clear to ausculation bilaterally, no rales, rhonchi, or wheezing   Abdomen: Soft, nondistended, tender to light palpation in all 4 quadrants, + bowel sounds   Extremities: no swelling, no edema   Skin: no rashes  Psych: Normal mood, normal affect
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
## 2019-08-07 NOTE — MEDICAL STUDENT PROGRESS NOTE(EDUCATION) - NS MD HP STUD ASPLAN ASSES FT
This is a 44 year old female with a history of recurrent pancreatitis secondary to chronic alcohol abuse(2-3 drinks daily), who presented to the ED on 7/28 following 3 days of NBNB vomiting and abdominal pain, diagnosed with acute pancreatitis, clinically doing better and tolerating low fat diet, but still experiencing constant abdominal pain.

## 2019-08-07 NOTE — PROGRESS NOTE ADULT - SUBJECTIVE AND OBJECTIVE BOX
LAURA BARAJAS  [facility] T2-3A 022 A ([facility] T2-3A)            Patient was evaluated and examined  by bedside, still c/o on/off abdominal pain, requesting IV pain meds, no nausea, no vomiting, poor oral intake, no BM        REVIEW OF SYSTEMS:  please see pertinent positives mentioned above, all other 12 ROS negative      T(C): , Max: 37.3 (08-06-19 @ 20:32)  HR: 86 (08-07-19 @ 13:03)  BP: 140/96 (08-07-19 @ 13:03)  RR: 18 (08-07-19 @ 13:03)  SpO2: 97% (08-07-19 @ 14:17)  CAPILLARY BLOOD GLUCOSE          PHYSICAL EXAM:  General: NAD, AAOX3, patient is laying comfortably in bed  HEENT: AT, NC, Supple, NO JVD, NO CB  Lungs: CTA B/L, no wheezing, no rhonchi  CVS: normal S1, S2, RRR, NO M/G/R  Abdomen: soft, bowel sounds present, epigastric tenderness, non-distended  Extremities: no edema, no clubbing, no cyanosis, positive peripheral pulses b/l  Neuro: no acute focal neurological deficits  Skin: no rush, no ecchymosis      LAB  CBC  Date: 08-07-19 @ 12:29  Mean cell Fniizzrpan21.1  Mean cell Hemoglobin Conc34.7  Mean cell Volum 98.1  Platelet count-Automate 477  RBC Count 3.17  Red Cell Distrib Width11.8  WBC Count3.81  % Albumin, Urine--  Hematocrit 31.1  Hemoglobin 10.8  CBC  Date: 08-05-19 @ 06:07  Mean cell Jwzywryews31.0  Mean cell Hemoglobin Conc34.0  Mean cell Volum 97.1  Platelet count-Automate 374  RBC Count 3.06  Red Cell Distrib Width11.8  WBC Count3.91  % Albumin, Urine--  Hematocrit 29.7  Hemoglobin 10.1  CBC  Date: 08-04-19 @ 09:20  Mean cell Lvjpwdhccf91.3  Mean cell Hemoglobin Conc34.2  Mean cell Volum 97.4  Platelet count-Automate 330  RBC Count 3.03  Red Cell Distrib Width11.6  WBC Count3.19  % Albumin, Urine--  Hematocrit 29.5  Hemoglobin 10.1  CBC  Date: 08-03-19 @ 06:14  Mean cell Irnclqczbi60.2  Mean cell Hemoglobin Conc34.0  Mean cell Volum 97.8  Platelet count-Automate 282  RBC Count 3.13  Red Cell Distrib Width11.6  WBC Count3.10  % Albumin, Urine--  Hematocrit 30.6  Hemoglobin 10.4  CBC  Date: 08-02-19 @ 06:43  Mean cell Tpkkmxymjj11.0  Mean cell Hemoglobin Conc34.6  Mean cell Volum 98.2  Platelet count-Automate 192  RBC Count 2.85  Red Cell Distrib Width11.6  WBC Count3.49  % Albumin, Urine--  Hematocrit 28.0  Hemoglobin 9.7  CBC  Date: 08-01-19 @ 06:06  Mean cell Yhxefpaauz27.9  Mean cell Hemoglobin Conc34.7  Mean cell Volum 97.6  Platelet count-Automate 166  RBC Count 2.95  Red Cell Distrib Width11.5  WBC Count3.94  % Albumin, Urine--  Hematocrit 28.8  Hemoglobin 10.0    [facility]  08-07-19 @ 12:29  Blood Gas Arterial-Calcium,Ionized--  Blood Urea Nitrogen, Serum <3 mg/dL<L> [10 - 20]  Carbon Dioxide, Serum27 mmol/L [17 - 32]  Chloride, Zizrz683 mmol/L [98 - 110]  Creatinie, Serum0.5 mg/dL<L> [0.7 - 1.5]  Glucose, Serum91 mg/dL [70 - 99]  Potassium, Serum3.7 mmol/L [3.5 - 5.0]  Sodium, Serum 141 mmol/L [135 - 146]  [facility]  08-06-19 @ 06:39  Blood Gas Arterial-Calcium,Ionized--  Blood Urea Nitrogen, Serum <3 mg/dL<L> [10 - 20]  Carbon Dioxide, Serum27 mmol/L [17 - 32]  Chloride, [id number] mmol/L [98 - 110]  Creatinie, Serum0.5 mg/dL<L> [0.7 - 1.5]  Glucose, Serum85 mg/dL [70 - 99]  Potassium, Serum3.2 mmol/L<L> [3.5 - 5.0]  Sodium, Serum 143 mmol/L [135 - 146]  [facility]  08-05-19 @ 06:07  Blood Gas Arterial-Calcium,Ionized--  Blood Urea Nitrogen, Serum <3 mg/dL<L> [10 - 20]  Carbon Dioxide, Serum29 mmol/L [17 - 32]  Chloride, [id number] mmol/L [98 - 110]  Creatinie, Serum0.5 mg/dL<L> [0.7 - 1.5]  Glucose, Serum88 mg/dL [70 - 99]  Potassium, Serum3.6 mmol/L [3.5 - 5.0]  Sodium, Serum 141 mmol/L [135 - 146]  [facility]  08-04-19 @ 09:20  Blood Gas Arterial-Calcium,Ionized--  Blood Urea Nitrogen, Serum <3 mg/dL<L> [10 - 20]  Carbon Dioxide, Serum28 mmol/L [17 - 32]  Chloride, [id number] mmol/L [98 - 110]  Creatinie, Serum0.5 mg/dL<L> [0.7 - 1.5]  Glucose, Serum96 mg/dL [70 - 99]  Potassium, Serum3.3 mmol/L<L> [3.5 - 5.0]  Sodium, Serum 140 mmol/L [135 - 146]  [facility]  08-03-19 @ 06:14  Blood Gas Arterial-Calcium,Ionized--  Blood Urea Nitrogen, Serum <3 mg/dL<L> [10 - 20]  Carbon Dioxide, Serum28 mmol/L [17 - 32]  Chloride, Serum97 mmol/L<L> [98 - 110]  Creatinie, Serum0.5 mg/dL<L> [0.7 - 1.5]  Glucose, Serum94 mg/dL [70 - 99]  Potassium, Serum3.1 mmol/L<L> [3.5 - 5.0]  Sodium, Serum 138 mmol/L [135 - 146]              Microbiology:    Culture - Blood (collected 07-29-19 @ 05:16)  Source: .Blood None  Final Report (08-03-19 @ 14:00):    No growth at 5 days.    Culture - Blood (collected 07-29-19 @ 05:16)  Source: .Blood None  Final Report (08-03-19 @ 14:00):    No growth at 5 days.    Culture - Urine (collected 07-28-19 @ 14:35)  Source: .Urine msue&#x27;d twice  Final Report (07-29-19 @ 13:22):    No growth    Culture - Urine (collected 07-28-19 @ 01:44)  Source: .Urine Clean Catch (Midstream)  Final Report (07-29-19 @ 08:47):    >=3 organisms. Probable collection contamination.        Medications:  acetaminophen   Tablet .. 650 milliGRAM(s) Oral every 6 hours PRN  chlorhexidine 4% Liquid 1 Application(s) Topical <User Schedule>  docusate sodium 100 milliGRAM(s) Oral three times a day  enoxaparin Injectable 40 milliGRAM(s) SubCutaneous daily  folic acid 1 milliGRAM(s) Oral daily  HYDROmorphone  Injectable 1.5 milliGRAM(s) IV Push every 3 hours PRN  multivitamin 1 Tablet(s) Oral daily  ondansetron Injectable 4 milliGRAM(s) IV Push every 8 hours PRN  pancrelipase  (CREON 12,000 Lipase Units) 3 Capsule(s) Oral four times a day with meals  pantoprazole    Tablet 40 milliGRAM(s) Oral before breakfast  polyethylene glycol 3350 17 Gram(s) Oral daily  senna 2 Tablet(s) Oral at bedtime  sodium chloride 0.9%. 1000 milliLiter(s) IV Continuous <Continuous>  thiamine 100 milliGRAM(s) Oral daily  traMADol 50 milliGRAM(s) Oral every 6 hours PRN        Assessment and Plan:  #Acuite alcohol induced pancreatitis : continue IVF, f/up lipase in am  increased to dilaudid IV 1.5mg Q3H PRN  pain meds PRN   administer pacnreatic enzymes along with low fat diet.    #CT showing peripancreatic fluid collections : likely seroma. repeat CT in 2-4 weeks.     #Hypomagnesemia, hypokalemia: replete.     # Anemia due to chronic alcohol abuse- h/h stable    #Progress Note Handoff: will try to taper down IV dilaudid, once pt. will tolerate diet  Family discussion: pt by bedside Disposition: Home_once medically stable

## 2019-08-08 LAB
ALBUMIN SERPL ELPH-MCNC: 3.2 G/DL — LOW (ref 3.5–5.2)
ALP SERPL-CCNC: 58 U/L — SIGNIFICANT CHANGE UP (ref 30–115)
ALT FLD-CCNC: 22 U/L — SIGNIFICANT CHANGE UP (ref 0–41)
ANION GAP SERPL CALC-SCNC: 12 MMOL/L — SIGNIFICANT CHANGE UP (ref 7–14)
AST SERPL-CCNC: 92 U/L — HIGH (ref 0–41)
BILIRUB DIRECT SERPL-MCNC: <0.2 MG/DL — SIGNIFICANT CHANGE UP (ref 0–0.2)
BILIRUB INDIRECT FLD-MCNC: >0.1 MG/DL — LOW (ref 0.2–1.2)
BILIRUB SERPL-MCNC: 0.3 MG/DL — SIGNIFICANT CHANGE UP (ref 0.2–1.2)
BUN SERPL-MCNC: 3 MG/DL — LOW (ref 10–20)
CALCIUM SERPL-MCNC: 8.8 MG/DL — SIGNIFICANT CHANGE UP (ref 8.5–10.1)
CHLORIDE SERPL-SCNC: 105 MMOL/L — SIGNIFICANT CHANGE UP (ref 98–110)
CO2 SERPL-SCNC: 26 MMOL/L — SIGNIFICANT CHANGE UP (ref 17–32)
CREAT SERPL-MCNC: 0.5 MG/DL — LOW (ref 0.7–1.5)
GLUCOSE SERPL-MCNC: 91 MG/DL — SIGNIFICANT CHANGE UP (ref 70–99)
HCT VFR BLD CALC: 28.2 % — LOW (ref 37–47)
HGB BLD-MCNC: 9.5 G/DL — LOW (ref 12–16)
LIDOCAIN IGE QN: 84 U/L — HIGH (ref 7–60)
MCHC RBC-ENTMCNC: 33.3 PG — HIGH (ref 27–31)
MCHC RBC-ENTMCNC: 33.7 G/DL — SIGNIFICANT CHANGE UP (ref 32–37)
MCV RBC AUTO: 98.9 FL — SIGNIFICANT CHANGE UP (ref 81–99)
NRBC # BLD: 0 /100 WBCS — SIGNIFICANT CHANGE UP (ref 0–0)
PLATELET # BLD AUTO: 461 K/UL — HIGH (ref 130–400)
POTASSIUM SERPL-MCNC: 3.7 MMOL/L — SIGNIFICANT CHANGE UP (ref 3.5–5)
POTASSIUM SERPL-SCNC: 3.7 MMOL/L — SIGNIFICANT CHANGE UP (ref 3.5–5)
PROT SERPL-MCNC: 6.1 G/DL — SIGNIFICANT CHANGE UP (ref 6–8)
RBC # BLD: 2.85 M/UL — LOW (ref 4.2–5.4)
RBC # FLD: 11.8 % — SIGNIFICANT CHANGE UP (ref 11.5–14.5)
SODIUM SERPL-SCNC: 143 MMOL/L — SIGNIFICANT CHANGE UP (ref 135–146)
WBC # BLD: 3.41 K/UL — LOW (ref 4.8–10.8)
WBC # FLD AUTO: 3.41 K/UL — LOW (ref 4.8–10.8)

## 2019-08-08 PROCEDURE — 99233 SBSQ HOSP IP/OBS HIGH 50: CPT

## 2019-08-08 RX ORDER — ACETAMINOPHEN 500 MG
650 TABLET ORAL EVERY 6 HOURS
Refills: 0 | Status: DISCONTINUED | OUTPATIENT
Start: 2019-08-08 | End: 2019-08-09

## 2019-08-08 RX ORDER — HYDROMORPHONE HYDROCHLORIDE 2 MG/ML
2 INJECTION INTRAMUSCULAR; INTRAVENOUS; SUBCUTANEOUS EVERY 6 HOURS
Refills: 0 | Status: DISCONTINUED | OUTPATIENT
Start: 2019-08-08 | End: 2019-08-09

## 2019-08-08 RX ORDER — SODIUM CHLORIDE 9 MG/ML
1000 INJECTION INTRAMUSCULAR; INTRAVENOUS; SUBCUTANEOUS
Refills: 0 | Status: DISCONTINUED | OUTPATIENT
Start: 2019-08-08 | End: 2019-08-09

## 2019-08-08 RX ORDER — HYDROMORPHONE HYDROCHLORIDE 2 MG/ML
1 INJECTION INTRAMUSCULAR; INTRAVENOUS; SUBCUTANEOUS EVERY 4 HOURS
Refills: 0 | Status: DISCONTINUED | OUTPATIENT
Start: 2019-08-08 | End: 2019-08-08

## 2019-08-08 RX ADMIN — SENNA PLUS 2 TABLET(S): 8.6 TABLET ORAL at 21:51

## 2019-08-08 RX ADMIN — ONDANSETRON 4 MILLIGRAM(S): 8 TABLET, FILM COATED ORAL at 08:15

## 2019-08-08 RX ADMIN — SODIUM CHLORIDE 250 MILLILITER(S): 9 INJECTION INTRAMUSCULAR; INTRAVENOUS; SUBCUTANEOUS at 08:30

## 2019-08-08 RX ADMIN — CHLORHEXIDINE GLUCONATE 1 APPLICATION(S): 213 SOLUTION TOPICAL at 05:41

## 2019-08-08 RX ADMIN — HYDROMORPHONE HYDROCHLORIDE 1.5 MILLIGRAM(S): 2 INJECTION INTRAMUSCULAR; INTRAVENOUS; SUBCUTANEOUS at 04:45

## 2019-08-08 RX ADMIN — Medication 1 MILLIGRAM(S): at 11:56

## 2019-08-08 RX ADMIN — HYDROMORPHONE HYDROCHLORIDE 2 MILLIGRAM(S): 2 INJECTION INTRAMUSCULAR; INTRAVENOUS; SUBCUTANEOUS at 19:11

## 2019-08-08 RX ADMIN — Medication 100 MILLIGRAM(S): at 11:56

## 2019-08-08 RX ADMIN — Medication 1 TABLET(S): at 11:56

## 2019-08-08 RX ADMIN — POLYETHYLENE GLYCOL 3350 17 GRAM(S): 17 POWDER, FOR SOLUTION ORAL at 11:56

## 2019-08-08 RX ADMIN — Medication 3 CAPSULE(S): at 21:50

## 2019-08-08 RX ADMIN — Medication 100 MILLIGRAM(S): at 13:11

## 2019-08-08 RX ADMIN — Medication 3 CAPSULE(S): at 08:15

## 2019-08-08 RX ADMIN — SODIUM CHLORIDE 100 MILLILITER(S): 9 INJECTION INTRAMUSCULAR; INTRAVENOUS; SUBCUTANEOUS at 11:57

## 2019-08-08 RX ADMIN — SODIUM CHLORIDE 100 MILLILITER(S): 9 INJECTION INTRAMUSCULAR; INTRAVENOUS; SUBCUTANEOUS at 17:28

## 2019-08-08 RX ADMIN — Medication 100 MILLIGRAM(S): at 21:50

## 2019-08-08 RX ADMIN — HYDROMORPHONE HYDROCHLORIDE 1.5 MILLIGRAM(S): 2 INJECTION INTRAMUSCULAR; INTRAVENOUS; SUBCUTANEOUS at 07:53

## 2019-08-08 RX ADMIN — HYDROMORPHONE HYDROCHLORIDE 1.5 MILLIGRAM(S): 2 INJECTION INTRAMUSCULAR; INTRAVENOUS; SUBCUTANEOUS at 01:27

## 2019-08-08 RX ADMIN — TRAMADOL HYDROCHLORIDE 50 MILLIGRAM(S): 50 TABLET ORAL at 22:21

## 2019-08-08 RX ADMIN — HYDROMORPHONE HYDROCHLORIDE 1.5 MILLIGRAM(S): 2 INJECTION INTRAMUSCULAR; INTRAVENOUS; SUBCUTANEOUS at 07:38

## 2019-08-08 RX ADMIN — Medication 3 CAPSULE(S): at 17:27

## 2019-08-08 RX ADMIN — ENOXAPARIN SODIUM 40 MILLIGRAM(S): 100 INJECTION SUBCUTANEOUS at 11:57

## 2019-08-08 RX ADMIN — HYDROMORPHONE HYDROCHLORIDE 2 MILLIGRAM(S): 2 INJECTION INTRAMUSCULAR; INTRAVENOUS; SUBCUTANEOUS at 11:55

## 2019-08-08 RX ADMIN — HYDROMORPHONE HYDROCHLORIDE 2 MILLIGRAM(S): 2 INJECTION INTRAMUSCULAR; INTRAVENOUS; SUBCUTANEOUS at 12:25

## 2019-08-08 RX ADMIN — TRAMADOL HYDROCHLORIDE 50 MILLIGRAM(S): 50 TABLET ORAL at 15:10

## 2019-08-08 RX ADMIN — Medication 100 MILLIGRAM(S): at 05:40

## 2019-08-08 RX ADMIN — Medication 3 CAPSULE(S): at 11:56

## 2019-08-08 RX ADMIN — TRAMADOL HYDROCHLORIDE 50 MILLIGRAM(S): 50 TABLET ORAL at 14:41

## 2019-08-08 RX ADMIN — TRAMADOL HYDROCHLORIDE 50 MILLIGRAM(S): 50 TABLET ORAL at 21:51

## 2019-08-08 RX ADMIN — HYDROMORPHONE HYDROCHLORIDE 2 MILLIGRAM(S): 2 INJECTION INTRAMUSCULAR; INTRAVENOUS; SUBCUTANEOUS at 18:44

## 2019-08-08 RX ADMIN — HYDROMORPHONE HYDROCHLORIDE 1.5 MILLIGRAM(S): 2 INJECTION INTRAMUSCULAR; INTRAVENOUS; SUBCUTANEOUS at 04:24

## 2019-08-08 RX ADMIN — PANTOPRAZOLE SODIUM 40 MILLIGRAM(S): 20 TABLET, DELAYED RELEASE ORAL at 05:40

## 2019-08-08 RX ADMIN — HYDROMORPHONE HYDROCHLORIDE 1.5 MILLIGRAM(S): 2 INJECTION INTRAMUSCULAR; INTRAVENOUS; SUBCUTANEOUS at 01:11

## 2019-08-08 NOTE — MEDICAL STUDENT PROGRESS NOTE(EDUCATION) - NS MD HP STUD ASPLAN PLAN FT
Acute on chronic pancreatitis secondary to chronic alcohol abuse, resolving   - Change Dilaudid to PO 1mg Q4H, Tramadol Q6H and Tyelonol PRN  - Reduce IV hydration to NS @100   - Continue with low fat diet and Creon 4 x daily   - Lipase level 84, improved significantly since admission   - Inquire about getting CT scan sooner to assess for any changes in peripancreatic fluid collection seen on last CT   - Speak with GI regarding prolonged course of this episode of pancreatitis     Diarrhea on admission, now resolved   - CT showed colitis   - completed 4 day course of Cipro and Flagyl  - no diarrhea since admission   - pending stool elastase     Transaminitis, hepatocellular pattern  - most likely due to chronic alcohol abuse  - trending down, AST 92, ALT and ALP WNL   - trend LFTs and avoid hepatoxic drugs     Chronic alcohol abuse   - patient educated on the likelihood of pancreatitis reoccurring if she continues to drink alcohol  - CIWA protocol  - no history of withdrawal seizures  - supplement with thiamine/folate for suspected deficiency     DVT ppx  - Lovenox 40mg     GI ppx  - Protonix 40 mg PO    Diet  - low fat diet, advance as tolerated     Activity  -increase as tolerated     Dispo  -from home     Code status  - full code

## 2019-08-08 NOTE — CHART NOTE - NSCHARTNOTEFT_GEN_A_CORE
Registered Dietitian Follow-Up     Patient Profile Reviewed                           Yes [x]   No []     Nutrition History Previously Obtained        Yes [x]  No []       Pertinent Subjective Information: Pt reports she was tolerating clears better yesterday and drank ensure clear. Diet adv to Low fat today for lunch. Pt had not started on lunch at time of visit.      Pertinent Medical Interventions: Acute alcohol induced pancreatitis : clinically patient is stable. Patient has been on IV opioids for 1 week and has high risk to develop dependency to it. Continue pancreatic enzymes along with low fat diet. Hypomagnesemia, hypokalemia: replete.      Diet order: Low Fat, ensure clear BID     Anthropometrics:   Ht. 162.56 cm  - Wt. 58.3 (7/29)   - %wt change - no new weights   - BMI 22.1   - IBW: 120#      Pertinent Lab Data: (8/8) H/H 9.5/28.2  BUN 3  Cr 0.5       Pertinent Meds: miralax, creon, lovenox, creon, colace, zofran, folic acid, MVI, thiamin      Physical Findings:  - Appearance: alert oriented  - GI function: LBM 7/27  - Tubes:  - Oral/Mouth cavity: No issues chewing/swallowing.   - Skin: BS 22 skin intact       Nutrition Requirements  Weight Used: 58.3 kg needs cont from RD assessment 8/1     estimated calorie needs: 1476 - 1845 kcals/day (MSJx 1.2 - 1.5 AF for acute PCM)  estimated protein needs: 70 - 87 gms/day (1.2 - 1.5gm/kg for same as above)  estimated fluid needs: per LIP     Nutrient Intake: not meeting needs         [] Previous Nutrition Diagnosis: Inadeqate Energy Intake/ severe PCM            [x} Ongoing          [] Resolved    [] No active nutrition diagnosis identified at this time     Nutrition Intervention meals/snack, med food supplement      Goal/Expected Outcome: Pt to  consume >75% of meal tray, aidan supplements in 4 days      Indicator/Monitoring: RD to monitor energy intake, diet order, body comp, NFPF    Recommendation: Continue Low fat diet w/ ensure clear BID

## 2019-08-08 NOTE — PROGRESS NOTE ADULT - ASSESSMENT
Patient is a 43 y/o female with PMH of EtOH abuse (drinks 2-3 alcoholic beverages per day, no history of withdraw or seizures) and recurrent pancreatitis( first started five years ago secondary to EtOH abuse) that presented to ED for 3 days of multiple episodes of NBNB vomiting and diffused crampy abdominal pain, similar to prior episodes of pancreatitis. Patient feels somewhat better since admission but still having trouble tolerating diet. When she tries to eat she becomes nauseous and has abdominal pain. Advanced GI notified for concern of collection. She does have a small 3.8cm collection on CT but not mature and small.    1- recent Pancreatitis with 3 x 8 cm Pancreatic collection on ct abd 8/1, GI called again for persistent complains of abdominal pain, patient notes she is improved since her admission but says pain is recurring and prefers to get iv pain meds instead of po, she said she did not eat today bcz of her pain, admits to nausea, no V, no Fever, no indication for endoscopic intervention at this time, c/w low fat diet, pain control, ambulate patient, c/w Creon, would repeat imaging in few weeks

## 2019-08-08 NOTE — MEDICAL STUDENT PROGRESS NOTE(EDUCATION) - NS MD HP STUD ASPLAN ASSES FT
This is a 44 year old patients with history of EtOH abuse and recurrent pancreatitis, on hospital day 11, for acute pancreatitis following an episode of heavy drinking. She is clinically improving with a decrease in lipase, and able to tolerate a low fat diet with administration of pain medication and creon.

## 2019-08-08 NOTE — PROGRESS NOTE ADULT - SUBJECTIVE AND OBJECTIVE BOX
LAURA BARAJAS  [facility] T2-3A [id number] A ([facility] T2-3A)            Patient was evaluated and examined  by bedside, still c/o abdominal pain, does not want to eat, unless she receives a dose of Dilaudid, I had discussed with patient that she needs to be transitioned to PO Dilaudid and IV Dilaudid is not the drug of choice for treatment of epigastric pain, patient was encouraged to ambulate and continue slow po intake with clear ensure supplements. Clinically patient looked very comfortable during my bedside conversation, patient was very demanding to leave her on IV Dilaudid.        REVIEW OF SYSTEMS:  please see pertinent positives mentioned above, all other 12 ROS negative      T(C): , Max: 36.8 (08-07-19 @ 22:12)  HR: 83 (08-08-19 @ 04:46)  BP: 132/87 (08-08-19 @ 04:46)  RR: 18 (08-08-19 @ 04:46)  SpO2: 96% (08-08-19 @ 10:13)  CAPILLARY BLOOD GLUCOSE          PHYSICAL EXAM:  General: NAD, AAOX3, patient is laying comfortably in bed  HEENT: AT, NC, Supple, NO JVD, NO CB  Lungs: CTA B/L, no wheezing, no rhonchi  CVS: normal S1, S2, RRR, NO M/G/R  Abdomen: soft, bowel sounds present, normoactive in all 4 quadrants, mild  epigastric tenderness, non-distended  Extremities: no edema, no clubbing, no cyanosis, positive peripheral pulses b/l  Neuro: no acute focal neurological deficits  Skin: no rush, no ecchymosis      LAB  CBC  Date: 08-08-19 @ 06:11  Mean cell Nmlpyuyoxg83.3  Mean cell Hemoglobin Conc33.7  Mean cell Volum 98.9  Platelet count-Automate 461  RBC Count 2.85  Red Cell Distrib Width11.8  WBC Count3.41  % Albumin, Urine--  Hematocrit 28.2  Hemoglobin 9.5  CBC  Date: 08-07-19 @ 12:29  Mean cell Qsafxluqvw76.1  Mean cell Hemoglobin Conc34.7  Mean cell Volum 98.1  Platelet count-Automate 477  RBC Count 3.17  Red Cell Distrib Width11.8  WBC Count3.81  % Albumin, Urine--  Hematocrit 31.1  Hemoglobin 10.8  CBC  Date: 08-05-19 @ 06:07  Mean cell Iajqiombpf38.0  Mean cell Hemoglobin Conc34.0  Mean cell Volum 97.1  Platelet count-Automate 374  RBC Count 3.06  Red Cell Distrib Width11.8  WBC Count3.91  % Albumin, Urine--  Hematocrit 29.7  Hemoglobin 10.1  CBC  Date: 08-04-19 @ 09:20  Mean cell Yfztkjxgad76.3  Mean cell Hemoglobin Conc34.2  Mean cell Volum 97.4  Platelet count-Automate 330  RBC Count 3.03  Red Cell Distrib Width11.6  WBC Count3.19  % Albumin, Urine--  Hematocrit 29.5  Hemoglobin 10.1  CBC  Date: 08-03-19 @ 06:14  Mean cell Kxenbhyqqe99.2  Mean cell Hemoglobin Conc34.0  Mean cell Volum 97.8  Platelet count-Automate 282  RBC Count 3.13  Red Cell Distrib Width11.6  WBC Count3.10  % Albumin, Urine--  Hematocrit 30.6  Hemoglobin 10.4  CBC  Date: 08-02-19 @ 06:43  Mean cell Ylriokwrio86.0  Mean cell Hemoglobin Conc34.6  Mean cell Volum 98.2  Platelet count-Automate 192  RBC Count 2.85  Red Cell Distrib Width11.6  WBC Count3.49  % Albumin, Urine--  Hematocrit 28.0  Hemoglobin 9.7    BMP  08-08-19 @ 06:11  Blood Gas Arterial-Calcium,Ionized--  Blood Urea Nitrogen, Serum 3 mg/dL<L> [10 - 20]  Carbon Dioxide, Serum26 mmol/L [17 - 32]  Chloride, [id number] mmol/L [98 - 110]  Creatinie, Serum0.5 mg/dL<L> [0.7 - 1.5]  Glucose, Serum91 mg/dL [70 - 99]  Potassium, Serum3.7 mmol/L [3.5 - 5.0]  Sodium, Serum 143 mmol/L [135 - 146]  [facility]  08-07-19 @ 12:29  Blood Gas Arterial-Calcium,Ionized--  Blood Urea Nitrogen, Serum <3 mg/dL<L> [10 - 20]  Carbon Dioxide, Serum27 mmol/L [17 - 32]  Chloride, Fzisy083 mmol/L [98 - 110]  Creatinie, Serum0.5 mg/dL<L> [0.7 - 1.5]  Glucose, Serum91 mg/dL [70 - 99]  Potassium, Serum3.7 mmol/L [3.5 - 5.0]  Sodium, Serum 141 mmol/L [135 - 146]  [facility]  08-06-19 @ 06:39  Blood Gas Arterial-Calcium,Ionized--  Blood Urea Nitrogen, Serum <3 mg/dL<L> [10 - 20]  Carbon Dioxide, Serum27 mmol/L [17 - 32]  Chloride, Mrhfo050 mmol/L [98 - 110]  Creatinie, Serum0.5 mg/dL<L> [0.7 - 1.5]  Glucose, Serum85 mg/dL [70 - 99]  Potassium, Serum3.2 mmol/L<L> [3.5 - 5.0]  Sodium, Serum 143 mmol/L [135 - 146]  [facility]  08-05-19 @ 06:07  Blood Gas Arterial-Calcium,Ionized--  Blood Urea Nitrogen, Serum <3 mg/dL<L> [10 - 20]  Carbon Dioxide, Serum29 mmol/L [17 - 32]  Chloride, Gtcgb857 mmol/L [98 - 110]  Creatinie, Serum0.5 mg/dL<L> [0.7 - 1.5]  Glucose, Serum88 mg/dL [70 - 99]  Potassium, Serum3.6 mmol/L [3.5 - 5.0]  Sodium, Serum 141 mmol/L [135 - 146]  [facility]  08-04-19 @ 09:20  Blood Gas Arterial-Calcium,Ionized--  Blood Urea Nitrogen, Serum <3 mg/dL<L> [10 - 20]  Carbon Dioxide, Serum28 mmol/L [17 - 32]  Chloride, Bieqv242 mmol/L [98 - 110]  Creatinie, Serum0.5 mg/dL<L> [0.7 - 1.5]  Glucose, Serum96 mg/dL [70 - 99]  Potassium, Serum3.3 mmol/L<L> [3.5 - 5.0]  Sodium, Serum 140 mmol/L [135 - 146]              Microbiology:    Culture - Blood (collected 07-29-19 @ 05:16)  Source: .Blood None  Final Report (08-03-19 @ 14:00):    No growth at 5 days.    Culture - Blood (collected 07-29-19 @ 05:16)  Source: .Blood None  Final Report (08-03-19 @ 14:00):    No growth at 5 days.    Culture - Urine (collected 07-28-19 @ 14:35)  Source: .Urine msue&#x27;d twice  Final Report (07-29-19 @ 13:22):    No growth    Culture - Urine (collected 07-28-19 @ 01:44)  Source: .Urine Clean Catch (Midstream)  Final Report (07-29-19 @ 08:47):    >=3 organisms. Probable collection contamination.        Medications:  acetaminophen   Tablet .. 650 milliGRAM(s) Oral every 6 hours PRN  aluminum hydroxide/magnesium hydroxide/simethicone Suspension 30 milliLiter(s) Oral every 4 hours PRN  chlorhexidine 4% Liquid 1 Application(s) Topical <User Schedule>  docusate sodium 100 milliGRAM(s) Oral three times a day  enoxaparin Injectable 40 milliGRAM(s) SubCutaneous daily  folic acid 1 milliGRAM(s) Oral daily  HYDROmorphone   Tablet 2 milliGRAM(s) Oral every 6 hours PRN  multivitamin 1 Tablet(s) Oral daily  ondansetron Injectable 4 milliGRAM(s) IV Push every 8 hours PRN  pancrelipase  (CREON 12,000 Lipase Units) 3 Capsule(s) Oral four times a day with meals  pantoprazole    Tablet 40 milliGRAM(s) Oral before breakfast  polyethylene glycol 3350 17 Gram(s) Oral daily  senna 2 Tablet(s) Oral at bedtime  sodium chloride 0.9%. 1000 milliLiter(s) IV Continuous <Continuous>  thiamine 100 milliGRAM(s) Oral daily  traMADol 50 milliGRAM(s) Oral every 6 hours PRN        Assessment and Plan:  #Acuite alcohol induced pancreatitis : clinically patient is stable, on abdominal exam, abdomen is soft with normoactive bowel sounds, no abdominal distention. Patient has been on IV opioids for 1 week and has high risk to develop dependency to it. will d/c IV dilaudid today and switch it to PO Dilaudid with tapering down dose.   -decrease IVF to NS@ 100ml/hr  -add on clear ensure supplement tx.   -continue  pancreatic enzymes along with low fat diet.  -f/up advanced GI     #CT showing peripancreatic fluid collections : likely seroma. repeat CT in 2-4 weeks.     #Hypomagnesemia, hypokalemia: replete.     # Anemia due to chronic alcohol abuse- h/h stable    #Progress Note Handoff: tapering down dilaudid to prevent dependency.   Family discussion: pt by bedside Disposition: Home_once patient tolerating diet well

## 2019-08-08 NOTE — PROGRESS NOTE ADULT - SUBJECTIVE AND OBJECTIVE BOX
INTERVAL HPI/OVERNIGHT EVENTS:    Patient is a 45 y/o female with PMH of EtOH abuse (drinks 2-3 alcoholic beverages per day, no history of withdraw or seizures) and recurrent pancreatitis( first started five years ago secondary to EtOH abuse) that presented to ED for 3 days of multiple episodes of NBNB vomiting and diffused crampy abdominal pain, similar to prior episodes of pancreatitis. Patient feels somewhat better since admission but still having trouble tolerating diet. When she tries to eat she becomes nauseous and has abdominal pain.     8/8 GI called again for persistent complains of abdominal pain, patient notes she is improved since her admission but says pain is recurring and prefers to get iv pain meds instead of po, she said she did not eat today bcz of her pain, admits to nausea, no V    PAST MEDICAL & SURGICAL HISTORY:  No pertinent past medical history  S/P arthroscopy: meniscal repair      Home Medications:      MEDICATIONS  (STANDING):  chlorhexidine 4% Liquid 1 Application(s) Topical <User Schedule>  docusate sodium 100 milliGRAM(s) Oral three times a day  enoxaparin Injectable 40 milliGRAM(s) SubCutaneous daily  folic acid 1 milliGRAM(s) Oral daily  multivitamin 1 Tablet(s) Oral daily  pancrelipase  (CREON 12,000 Lipase Units) 3 Capsule(s) Oral four times a day with meals  pantoprazole    Tablet 40 milliGRAM(s) Oral before breakfast  polyethylene glycol 3350 17 Gram(s) Oral daily  senna 2 Tablet(s) Oral at bedtime  sodium chloride 0.9%. 1000 milliLiter(s) (100 mL/Hr) IV Continuous <Continuous>  thiamine 100 milliGRAM(s) Oral daily    MEDICATIONS  (PRN):  acetaminophen   Tablet .. 650 milliGRAM(s) Oral every 6 hours PRN Temp greater or equal to 38C (100.4F), Mild Pain (1 - 3)  aluminum hydroxide/magnesium hydroxide/simethicone Suspension 30 milliLiter(s) Oral every 4 hours PRN Dyspepsia  HYDROmorphone   Tablet 2 milliGRAM(s) Oral every 6 hours PRN Severe Pain (7 - 10)  ondansetron Injectable 4 milliGRAM(s) IV Push every 8 hours PRN Nausea and/or Vomiting  traMADol 50 milliGRAM(s) Oral every 6 hours PRN Moderate Pain (4 - 6)      Allergies    Compazine (Unknown)    Intolerances        Review of systems  General: mild fatigue   Skin: no new rash   Ophtalmologic: no new visual changes   Respiratory: no new shortness of breath   Cardiovascular: no new chest pain   Gastrointestinal: as per H&P   Genitourinary: no new dysuria   Neurological: no new weakness   otherwise as described above     PHYSICAL EXAM:   Vital Signs:  Vital Signs Last 24 Hrs  T(C): 36.5 (08 Aug 2019 13:28), Max: 36.8 (07 Aug 2019 22:12)  T(F): 97.7 (08 Aug 2019 13:28), Max: 98.2 (07 Aug 2019 22:12)  HR: 97 (08 Aug 2019 13:28) (69 - 97)  BP: 136/94 (08 Aug 2019 13:28) (132/87 - 167/97)  BP(mean): --  RR: 18 (08 Aug 2019 13:28) (18 - 18)  SpO2: 96% (08 Aug 2019 10:13) (95% - 96%)  Daily     Daily     GENERAL:  no distress  SKIN: no new changes   HEENT:  NC/AT,  anicteric  CHEST:   no new increased effort, breath sounds clear  HEART:  Regular rhythm  ABDOMEN:  Soft, + epigastric tender, no rigidity   EXTREMITIES:  no new cyanosis  PSYCHIATRIC: normal affect      LABS:                        9.5    3.41  )-----------( 461      ( 08 Aug 2019 06:11 )             28.2       Hemoglobin: 9.5 g/dL (08-08-19 @ 06:11)  Hemoglobin: 10.8 g/dL (08-07-19 @ 12:29)      08-08    143  |  105  |  3<L>  ----------------------------<  91  3.7   |  26  |  0.5<L>    Ca    8.8      08 Aug 2019 06:11  Phos  4.1     08-07  Mg     1.7     08-07    TPro  6.1  /  Alb  3.2<L>  /  TBili  0.3  /  DBili  <0.2  /  AST  92<H>  /  ALT  22  /  AlkPhos  58  08-08          Alanine Aminotransferase (ALT/SGPT): 22 U/L (08-08-19 @ 06:11)  Alkaline Phosphatase, Serum: 58 U/L (08-08-19 @ 06:11)  Bilirubin Direct, Serum: <0.2 mg/dL (08-08-19 @ 06:11)  Aspartate Aminotransferase (AST/SGOT): 92 U/L (08-08-19 @ 06:11)  Alanine Aminotransferase (ALT/SGPT): 23 U/L (08-07-19 @ 12:29)  Alkaline Phosphatase, Serum: 65 U/L (08-07-19 @ 12:29)  Bilirubin Direct, Serum: 0.2 mg/dL (08-07-19 @ 12:29)  Aspartate Aminotransferase (AST/SGOT): 100 U/L (08-07-19 @ 12:29)            RADIOLOGY & ADDITIONAL TESTS:

## 2019-08-08 NOTE — MEDICAL STUDENT PROGRESS NOTE(EDUCATION) - SUBJECTIVE AND OBJECTIVE BOX
24H events:    This is a 44 year old female with hx of EtOH abuse and recurrent pancreatitis, admitted following 3 days of NBNB vomiting and abdominal pain, diagnosed with acute pancreatitis. This is hospital day 11. Overnight, there were no acute events, although she stated she had abdominal pain on/off throughout the night. Last night she was able to tolerate 2 soups but when she attempted to eat salad at dinner it resulted in too much pain. This morning she is resting comfortably and admits to feeling better than yesterday, but still complains of epigastric pain radiating to the back for which she is using an ice pack to help alleviate in between administration of Dilaudid. Denies fever, chills, diaphoresis, vomiting, diarrhea.     PAST MEDICAL & SURGICAL HISTORY  No pertinent past medical history  S/P arthroscopy: meniscal repair    SOCIAL HISTORY:  Chronic alcohol abuse (2-3 drinks daily)    ALLERGIES:  Compazine (Unknown)    MEDICATIONS:  STANDING MEDICATIONS  chlorhexidine 4% Liquid 1 Application(s) Topical <User Schedule>  docusate sodium 100 milliGRAM(s) Oral three times a day  enoxaparin Injectable 40 milliGRAM(s) SubCutaneous daily  folic acid 1 milliGRAM(s) Oral daily  multivitamin 1 Tablet(s) Oral daily  pancrelipase  (CREON 12,000 Lipase Units) 3 Capsule(s) Oral four times a day with meals  pantoprazole    Tablet 40 milliGRAM(s) Oral before breakfast  polyethylene glycol 3350 17 Gram(s) Oral daily  senna 2 Tablet(s) Oral at bedtime  sodium chloride 0.9%. 1000 milliLiter(s) IV Continuous <Continuous>  thiamine 100 milliGRAM(s) Oral daily    PRN MEDICATIONS  acetaminophen   Tablet .. 650 milliGRAM(s) Oral every 6 hours PRN  HYDROmorphone  Injectable 1.5 milliGRAM(s) IV Push every 3 hours PRN  ondansetron Injectable 4 milliGRAM(s) IV Push every 8 hours PRN  traMADol 50 milliGRAM(s) Oral every 6 hours PRN    VITALS:   T(F): 97.6  HR: 83  BP: 132/87  RR: 18  SpO2: 95%    LABS:                        9.5    3.41  )-----------( 461      ( 08 Aug 2019 06:11 )             28.2     08-08    143  |  105  |  3<L>  ----------------------------<  91  3.7   |  26  |  0.5<L>    Ca    8.8      08 Aug 2019 06:11  Phos  4.1     08-07  Mg     1.7     08-07    TPro  6.1  /  Alb  3.2<L>  /  TBili  0.3  /  DBili  <0.2  /  AST  92<H>  /  ALT  22  /  AlkPhos  58  08-08      Lipase: 84            RADIOLOGY:  CT Abdomen and Pelvis w/ IV Cont 8/1  IMPRESSION:     No evidence of pancreatic process at this time.    Developing acute peripancreatic fluid collection measuring 3.1 x 8.1 cm    PHYSICAL EXAM:  General: A/O x3, resting comfortably, no acute distress   Head: atraumatic, normocephalic   Neck: supple, no lymphadenopathy, no masses, no JVD   Chest/lungs: Lungs clear to auscultation bilaterally, no rales, rhonchi, crackles, wheezing   Heart: S1S2 regular, no murmurs, rubs, or gallops   Abdomen: soft, nondistended, bowel sounds present, tenderness to palpation in epigastric region  Extremities: No swelling, no cyanosis, no edema  Skin: No rashes, no redness   Pysch: normal mood, normal affect

## 2019-08-09 ENCOUNTER — TRANSCRIPTION ENCOUNTER (OUTPATIENT)
Age: 44
End: 2019-08-09

## 2019-08-09 VITALS
TEMPERATURE: 97 F | RESPIRATION RATE: 18 BRPM | DIASTOLIC BLOOD PRESSURE: 91 MMHG | HEART RATE: 76 BPM | SYSTOLIC BLOOD PRESSURE: 129 MMHG

## 2019-08-09 LAB
ALBUMIN SERPL ELPH-MCNC: 3.5 G/DL — SIGNIFICANT CHANGE UP (ref 3.5–5.2)
ALP SERPL-CCNC: 67 U/L — SIGNIFICANT CHANGE UP (ref 30–115)
ALT FLD-CCNC: 25 U/L — SIGNIFICANT CHANGE UP (ref 0–41)
ANION GAP SERPL CALC-SCNC: 13 MMOL/L — SIGNIFICANT CHANGE UP (ref 7–14)
AST SERPL-CCNC: 102 U/L — HIGH (ref 0–41)
BILIRUB DIRECT SERPL-MCNC: 0.2 MG/DL — SIGNIFICANT CHANGE UP (ref 0–0.2)
BILIRUB INDIRECT FLD-MCNC: 0.1 MG/DL — LOW (ref 0.2–1.2)
BILIRUB SERPL-MCNC: 0.3 MG/DL — SIGNIFICANT CHANGE UP (ref 0.2–1.2)
BUN SERPL-MCNC: <3 MG/DL — LOW (ref 10–20)
CALCIUM SERPL-MCNC: 9.5 MG/DL — SIGNIFICANT CHANGE UP (ref 8.5–10.1)
CHLORIDE SERPL-SCNC: 100 MMOL/L — SIGNIFICANT CHANGE UP (ref 98–110)
CO2 SERPL-SCNC: 29 MMOL/L — SIGNIFICANT CHANGE UP (ref 17–32)
CREAT SERPL-MCNC: 0.7 MG/DL — SIGNIFICANT CHANGE UP (ref 0.7–1.5)
GLUCOSE SERPL-MCNC: 103 MG/DL — HIGH (ref 70–99)
HCT VFR BLD CALC: 31.8 % — LOW (ref 37–47)
HGB BLD-MCNC: 11 G/DL — LOW (ref 12–16)
MCHC RBC-ENTMCNC: 33.4 PG — HIGH (ref 27–31)
MCHC RBC-ENTMCNC: 34.6 G/DL — SIGNIFICANT CHANGE UP (ref 32–37)
MCV RBC AUTO: 96.7 FL — SIGNIFICANT CHANGE UP (ref 81–99)
NRBC # BLD: 0 /100 WBCS — SIGNIFICANT CHANGE UP (ref 0–0)
PLATELET # BLD AUTO: 526 K/UL — HIGH (ref 130–400)
POTASSIUM SERPL-MCNC: 3.7 MMOL/L — SIGNIFICANT CHANGE UP (ref 3.5–5)
POTASSIUM SERPL-SCNC: 3.7 MMOL/L — SIGNIFICANT CHANGE UP (ref 3.5–5)
PROT SERPL-MCNC: 6.9 G/DL — SIGNIFICANT CHANGE UP (ref 6–8)
RBC # BLD: 3.29 M/UL — LOW (ref 4.2–5.4)
RBC # FLD: 11.6 % — SIGNIFICANT CHANGE UP (ref 11.5–14.5)
SODIUM SERPL-SCNC: 142 MMOL/L — SIGNIFICANT CHANGE UP (ref 135–146)
WBC # BLD: 3.76 K/UL — LOW (ref 4.8–10.8)
WBC # FLD AUTO: 3.76 K/UL — LOW (ref 4.8–10.8)

## 2019-08-09 PROCEDURE — 99239 HOSP IP/OBS DSCHRG MGMT >30: CPT

## 2019-08-09 RX ORDER — LIPASE/PROTEASE/AMYLASE 16-48-48K
3 CAPSULE,DELAYED RELEASE (ENTERIC COATED) ORAL
Qty: 360 | Refills: 0
Start: 2019-08-09 | End: 2019-09-07

## 2019-08-09 RX ORDER — FOLIC ACID 0.8 MG
1 TABLET ORAL
Qty: 14 | Refills: 0
Start: 2019-08-09 | End: 2019-08-22

## 2019-08-09 RX ADMIN — SODIUM CHLORIDE 100 MILLILITER(S): 9 INJECTION INTRAMUSCULAR; INTRAVENOUS; SUBCUTANEOUS at 02:54

## 2019-08-09 RX ADMIN — Medication 100 MILLIGRAM(S): at 11:23

## 2019-08-09 RX ADMIN — ENOXAPARIN SODIUM 40 MILLIGRAM(S): 100 INJECTION SUBCUTANEOUS at 11:22

## 2019-08-09 RX ADMIN — PANTOPRAZOLE SODIUM 40 MILLIGRAM(S): 20 TABLET, DELAYED RELEASE ORAL at 05:30

## 2019-08-09 RX ADMIN — HYDROMORPHONE HYDROCHLORIDE 2 MILLIGRAM(S): 2 INJECTION INTRAMUSCULAR; INTRAVENOUS; SUBCUTANEOUS at 16:46

## 2019-08-09 RX ADMIN — TRAMADOL HYDROCHLORIDE 50 MILLIGRAM(S): 50 TABLET ORAL at 05:36

## 2019-08-09 RX ADMIN — POLYETHYLENE GLYCOL 3350 17 GRAM(S): 17 POWDER, FOR SOLUTION ORAL at 11:23

## 2019-08-09 RX ADMIN — Medication 3 CAPSULE(S): at 08:03

## 2019-08-09 RX ADMIN — Medication 1 TABLET(S): at 11:23

## 2019-08-09 RX ADMIN — Medication 3 CAPSULE(S): at 16:45

## 2019-08-09 RX ADMIN — HYDROMORPHONE HYDROCHLORIDE 2 MILLIGRAM(S): 2 INJECTION INTRAMUSCULAR; INTRAVENOUS; SUBCUTANEOUS at 01:35

## 2019-08-09 RX ADMIN — HYDROMORPHONE HYDROCHLORIDE 2 MILLIGRAM(S): 2 INJECTION INTRAMUSCULAR; INTRAVENOUS; SUBCUTANEOUS at 10:32

## 2019-08-09 RX ADMIN — Medication 3 CAPSULE(S): at 11:22

## 2019-08-09 RX ADMIN — Medication 100 MILLIGRAM(S): at 05:30

## 2019-08-09 RX ADMIN — HYDROMORPHONE HYDROCHLORIDE 2 MILLIGRAM(S): 2 INJECTION INTRAMUSCULAR; INTRAVENOUS; SUBCUTANEOUS at 01:03

## 2019-08-09 RX ADMIN — TRAMADOL HYDROCHLORIDE 50 MILLIGRAM(S): 50 TABLET ORAL at 06:07

## 2019-08-09 RX ADMIN — TRAMADOL HYDROCHLORIDE 50 MILLIGRAM(S): 50 TABLET ORAL at 14:44

## 2019-08-09 RX ADMIN — Medication 1 MILLIGRAM(S): at 11:23

## 2019-08-09 NOTE — PROGRESS NOTE ADULT - SUBJECTIVE AND OBJECTIVE BOX
INTERVAL HPI/OVERNIGHT EVENTS:    Patient is a 43 y/o female with PMH of EtOH abuse (drinks 2-3 alcoholic beverages per day, no history of withdraw or seizures) and recurrent pancreatitis( first started five years ago secondary to EtOH abuse) that presented to ED for 3 days of multiple episodes of NBNB vomiting and diffused crampy abdominal pain, similar to prior episodes of pancreatitis. Patient feels somewhat better since admission but still having trouble tolerating diet. When she tries to eat she becomes nauseous and has abdominal pain.     8/8 GI called again for persistent complains of abdominal pain, patient notes she is improved since her admission but says pain is recurring and prefers to get iv pain meds instead of po, she said she did not eat today bcz of her pain, admits to nausea, no V  8/9 still c/o abd pain and is asking to have her pain meds iv and more frequently     PAST MEDICAL & SURGICAL HISTORY:  No pertinent past medical history  S/P arthroscopy: meniscal repair      Home Medications:      MEDICATIONS  (STANDING):  chlorhexidine 4% Liquid 1 Application(s) Topical <User Schedule>  docusate sodium 100 milliGRAM(s) Oral three times a day  enoxaparin Injectable 40 milliGRAM(s) SubCutaneous daily  folic acid 1 milliGRAM(s) Oral daily  multivitamin 1 Tablet(s) Oral daily  pancrelipase  (CREON 12,000 Lipase Units) 3 Capsule(s) Oral four times a day with meals  pantoprazole    Tablet 40 milliGRAM(s) Oral before breakfast  polyethylene glycol 3350 17 Gram(s) Oral daily  senna 2 Tablet(s) Oral at bedtime  sodium chloride 0.9%. 1000 milliLiter(s) (100 mL/Hr) IV Continuous <Continuous>  thiamine 100 milliGRAM(s) Oral daily    MEDICATIONS  (PRN):  acetaminophen   Tablet .. 650 milliGRAM(s) Oral every 6 hours PRN Temp greater or equal to 38C (100.4F), Mild Pain (1 - 3)  aluminum hydroxide/magnesium hydroxide/simethicone Suspension 30 milliLiter(s) Oral every 4 hours PRN Dyspepsia  HYDROmorphone   Tablet 2 milliGRAM(s) Oral every 6 hours PRN Severe Pain (7 - 10)  ondansetron Injectable 4 milliGRAM(s) IV Push every 8 hours PRN Nausea and/or Vomiting  traMADol 50 milliGRAM(s) Oral every 6 hours PRN Moderate Pain (4 - 6)      Allergies    Compazine (Unknown)    Intolerances        Review of systems  General: mild fatigue   Skin: no new rash   Ophtalmologic: no new visual changes   Respiratory: no new shortness of breath   Cardiovascular: no new chest pain   Gastrointestinal: as per H&P   Genitourinary: no new dysuria   Neurological: no new weakness   otherwise as described above     PHYSICAL EXAM:   Vital Signs:  Vital Signs Last 24 Hrs  T(C): 36.1 (09 Aug 2019 04:56), Max: 37.2 (08 Aug 2019 21:41)  T(F): 97 (09 Aug 2019 04:56), Max: 99 (08 Aug 2019 21:41)  HR: 76 (09 Aug 2019 04:56) (76 - 97)  BP: 129/91 (09 Aug 2019 04:56) (129/91 - 139/90)  BP(mean): --  RR: 18 (09 Aug 2019 04:56) (18 - 18)  SpO2: 98% (08 Aug 2019 19:42) (96% - 98%)  Daily     Daily     GENERAL:  no distress  SKIN: no new changes   HEENT:  NC/AT,  anicteric  CHEST:   no new increased effort, breath sounds clear  HEART:  Regular rhythm  ABDOMEN:  Soft, +  abd tender, no rigidity, no rebound   EXTREMITIES:  no new cyanosis  PSYCHIATRIC: normal affect      LABS:                        11.0   3.76  )-----------( 526      ( 09 Aug 2019 06:33 )             31.8       Hemoglobin: 11.0 g/dL (08-09-19 @ 06:33)  Hemoglobin: 9.5 g/dL (08-08-19 @ 06:11)  Hemoglobin: 10.8 g/dL (08-07-19 @ 12:29)      08-09    142  |  100  |  <3<L>  ----------------------------<  103<H>  3.7   |  29  |  0.7    Ca    9.5      09 Aug 2019 06:33  Phos  4.1     08-07  Mg     1.7     08-07    TPro  6.9  /  Alb  3.5  /  TBili  0.3  /  DBili  0.2  /  AST  102<H>  /  ALT  25  /  AlkPhos  67  08-09          Bilirubin Direct, Serum: 0.2 mg/dL (08-09-19 @ 06:33)  Aspartate Aminotransferase (AST/SGOT): 102 U/L (08-09-19 @ 06:33)  Alanine Aminotransferase (ALT/SGPT): 25 U/L (08-09-19 @ 06:33)  Alkaline Phosphatase, Serum: 67 U/L (08-09-19 @ 06:33)  Alanine Aminotransferase (ALT/SGPT): 22 U/L (08-08-19 @ 06:11)  Alkaline Phosphatase, Serum: 58 U/L (08-08-19 @ 06:11)  Bilirubin Direct, Serum: <0.2 mg/dL (08-08-19 @ 06:11)  Aspartate Aminotransferase (AST/SGOT): 92 U/L (08-08-19 @ 06:11)  Alanine Aminotransferase (ALT/SGPT): 23 U/L (08-07-19 @ 12:29)  Alkaline Phosphatase, Serum: 65 U/L (08-07-19 @ 12:29)  Bilirubin Direct, Serum: 0.2 mg/dL (08-07-19 @ 12:29)  Aspartate Aminotransferase (AST/SGOT): 100 U/L (08-07-19 @ 12:29)            RADIOLOGY & ADDITIONAL TESTS:

## 2019-08-09 NOTE — PROGRESS NOTE ADULT - NSHPATTENDINGPLANDISCUSS_GEN_ALL_CORE
ICU team
house staff
care team
resident, RN, CM, patient
care team

## 2019-08-09 NOTE — DISCHARGE NOTE PROVIDER - PROVIDER TOKENS
PROVIDER:[TOKEN:[[id number]],FOLLOWUP:[2 weeks]],FREE:[LAST:[MAP],FIRST:[Clinic],PHONE:[phone]],FAX:[(   )    -],ADDRESS:[21st of August 2019. 12:30 pm  [facility] clinic   [address] [place] [address]],FOLLOWUP:[2 weeks]]

## 2019-08-09 NOTE — MEDICAL STUDENT PROGRESS NOTE(EDUCATION) - NS MD HP STUD ASPLAN PLAN FT
Acute on chronic pancreatitis secondary to chronic alcohol abuse, resolving   - Changed to Dilaudid to PO 2mg Q6H, Tramadol Q6H and Tylenol PRN  - IV hydration to NS @100   - Continue with low fat diet and Creon 4 x daily   - As per GI, no endoscopic intervention needed at this time, follow up with CT in 2 weeks   - get [facility] follow up outpatient     Diarrhea on admission, now resolved   - Last BM last night, normal, no diarrhea, no blood, no mucous  - CT on admission showed colitis   - completed 4 day course of Cipro and Flagyl  - pending stool elastase     Transaminitis, hepatocellular pattern  - most likely due to chronic alcohol abuse  - trend LFTs and avoid hepatoxic drugs     Chronic alcohol abuse   - patient educated on the likelihood of pancreatitis reoccurring if she continues to drink alcohol  - no history of withdrawal seizures  - follow up with outpatient alcohol rehab    DVT ppx  - Lovenox 40mg     GI ppx  - Protonix 40 mg PO    Diet  - low fat diet, advance as tolerated     Activity  -increase as tolerated     Dispo  -from home     Code status  - full code

## 2019-08-09 NOTE — PROGRESS NOTE ADULT - ASSESSMENT
Patient is a 45 y/o female with PMH of EtOH abuse (drinks 2-3 alcoholic beverages per day, no history of withdraw or seizures) and recurrent pancreatitis( first started five years ago secondary to EtOH abuse) that presented to ED for 3 days of multiple episodes of NBNB vomiting and diffused crampy abdominal pain, similar to prior episodes of pancreatitis. Patient feels somewhat better since admission but still having trouble tolerating diet. When she tries to eat she becomes nauseous and has abdominal pain. [facility] notified for concern of collection. She does have a small 3.8cm collection on CT but not mature and small.    1- recent Pancreatitis with 3 x 8 cm Pancreatic collection on ct abd 8/1, GI called again for persistent complains of abdominal pain, patient notes she is improved since her admission but says pain is recurring and prefers to get iv pain meds instead of po, she said she did not eat today bcz of her pain, admits to nausea, no V, no Fever, no indication for endoscopic intervention at this time, c/w low fat diet, pain control, ambulate patient, c/w Creon, would repeat imaging in few weeks

## 2019-08-09 NOTE — MEDICAL STUDENT PROGRESS NOTE(EDUCATION) - SUBJECTIVE AND OBJECTIVE BOX
24H events:  44 year old patient with history of EtOH abuse and recurrent pancreatitis, admitted for acute pancreatitis following 3 days of NBNB vomiting and abdominal pain x 3 days after an episode of heavy drinking. This is hospital day 12 and she is clinically improving. Was able to tolerate water, but did not eat anything last night or this morning. Continues to complain of epigastric pain. Normal BM last night. Felt clammy this morning and is requesting pain meds to be Q4 instead of Q6. Denies fever, chills, vomiting, urinary frequency, diarrhea.     PAST MEDICAL & SURGICAL HISTORY  No pertinent past medical history  S/P arthroscopy: meniscal repair    SOCIAL HISTORY:  Chronic alcohol abuse     ALLERGIES:  Compazine (Unknown)    MEDICATIONS:  STANDING MEDICATIONS  chlorhexidine 4% Liquid 1 Application(s) Topical <User Schedule>  docusate sodium 100 milliGRAM(s) Oral three times a day  enoxaparin Injectable 40 milliGRAM(s) SubCutaneous daily  folic acid 1 milliGRAM(s) Oral daily  multivitamin 1 Tablet(s) Oral daily  pancrelipase  (CREON 12,000 Lipase Units) 3 Capsule(s) Oral four times a day with meals  pantoprazole    Tablet 40 milliGRAM(s) Oral before breakfast  polyethylene glycol 3350 17 Gram(s) Oral daily  senna 2 Tablet(s) Oral at bedtime  sodium chloride 0.9%. 1000 milliLiter(s) IV Continuous <Continuous>  thiamine 100 milliGRAM(s) Oral daily    PRN MEDICATIONS  acetaminophen   Tablet .. 650 milliGRAM(s) Oral every 6 hours PRN  aluminum hydroxide/magnesium hydroxide/simethicone Suspension 30 milliLiter(s) Oral every 4 hours PRN  HYDROmorphone   Tablet 2 milliGRAM(s) Oral every 6 hours PRN  ondansetron Injectable 4 milliGRAM(s) IV Push every 8 hours PRN  traMADol 50 milliGRAM(s) Oral every 6 hours PRN    VITALS:   T(F): 97  HR: 76  BP: 129/91  RR: 18  SpO2: 98%    LABS:                        11.0   3.76  )-----------( 526      ( 09 Aug 2019 06:33 )             31.8     08-09    142  |  100  |  <3<L>  ----------------------------<  103<H>  3.7   |  29  |  0.7    Ca    9.5      09 Aug 2019 06:33  Phos  4.1     08-07  Mg     1.7     08-07    TPro  6.9  /  Alb  3.5  /  TBili  0.3  /  DBili  0.2  /  AST  102<H>  /  ALT  25  /  AlkPhos  67  08-09                  RADIOLOGY:    CT Abdomen and Pelvis w/ IV Cont (08.01.19)  No evidence of pancreatic process at this time.    Developing acute peripancreatic fluid collection measuring 3.1 x 8.1 cm      PHYSICAL EXAM:  General: A/O x3, no acute distress  Head: normocephalic, atraumatic   Neck: Supple, no lymphadenopathy , no masses, no JVD   Chest: Lungs clear to auscultation bilaterally, no wheezing, rales, rhonchi  Heart: S1S2 regular, no murmurs, rubs, gallops   Abdomen: soft, nondistended, positive bowel sounds, tenderness to palpation in epigastric region   Extremities: no swelling, no cyanosis   Skin: no rashes, no redness   Psych: normal affect, normal affect

## 2019-08-09 NOTE — MEDICAL STUDENT PROGRESS NOTE(EDUCATION) - NS MD HP STUD ASPLAN ASSES FT
44 year old female with history of recurrent pancreatitis secondary to chronic alcohol abuse, admitted for acute pancreatitis following 3 days of NBNB vomiting and abdominal pain after an episode of heavy drinking, still complaining of epigastric pain but improving from a clinical standpoint. Patients states she cannot tolerate PO intake without more pain medication and she is adamant about receiving more. She is refusing to eat without it. As per GI, no endoscopic intervention at this time.

## 2019-08-09 NOTE — DISCHARGE NOTE PROVIDER - NSDCCPCAREPLAN_GEN_ALL_CORE_FT
PRINCIPAL DISCHARGE DIAGNOSIS  Diagnosis: Pancreatitis  Assessment and Plan of Treatment: You were diagnosed with acute pancreatitis which is an inflammation of the pancreas. You had an Abdominal CT scan which didnt show any necrosis or infection. You recieved analgesics and IV fluid. Your vitals were stable and lab work up were within normal limits, If you had any serious concern or complaints present yourself to the nearest ED or call 911.      SECONDARY DISCHARGE DIAGNOSES  Diagnosis: Colitis  Assessment and Plan of Treatment: PRINCIPAL DISCHARGE DIAGNOSIS  Diagnosis: Pancreatitis  Assessment and Plan of Treatment: You were diagnosed with acute pancreatitis which is an inflammation of the pancreas. You had an Abdominal CT scan which didnt show any necrosis or infection. You recieved analgesics and IV fluid. Your vitals were stable and lab work up were within normal limits, If you had any serious concern or complaints present yourself to the nearest ED or call 911. You need to obtain a complete blood count on August the 21st on your primary care doctor follow up      SECONDARY DISCHARGE DIAGNOSES  Diagnosis: Colitis  Assessment and Plan of Treatment:

## 2019-08-09 NOTE — DISCHARGE NOTE NURSING/CASE MANAGEMENT/SOCIAL WORK - NSDCDPATPORTLINK_GEN_ALL_CORE
You can access the [facility] Patient Portal, offered by [facility], by registering with the following website: [email or web address]

## 2019-08-09 NOTE — PROGRESS NOTE ADULT - SUBJECTIVE AND OBJECTIVE BOX
BARAJASLAURA WILKERSON  [facility] T2-3A 022 A ([facility] T2-3A)            Patient was evaluated and examined  by bedside, reports that abdominal pain has decreased today, still requesting Dilaudid medication for pain control, tolerating diet( small amounts) , no nausea , no vomiting, normal BM, no dysuria         REVIEW OF SYSTEMS:  please see pertinent positives mentioned above, all other 12 ROS negative      T(C): , Max: 37.2 (08-08-19 @ 21:41)  HR: 76 (08-09-19 @ 04:56)  BP: 129/91 (08-09-19 @ 04:56)  RR: 18 (08-09-19 @ 04:56)  SpO2: 98% (08-08-19 @ 19:42)  CAPILLARY BLOOD GLUCOSE          PHYSICAL EXAM:  General: NAD, AAOX3, patient is laying comfortably in bed  HEENT: AT, NC, Supple, NO JVD, NO CB  Lungs: CTA B/L, no wheezing, no rhonchi  CVS: normal S1, S2, RRR, NO M/G/R  Abdomen: soft, bowel sounds present, minimally  tender in epigastric area, non-distended  Extremities: no edema, no clubbing, no cyanosis, positive peripheral pulses b/l  Neuro: no acute focal neurological deficits  Skin: no rash, no ecchymosis      LAB  CBC  Date: 08-09-19 @ 06:33  Mean cell Tqtlgmwaxn42.4  Mean cell Hemoglobin Conc34.6  Mean cell Volum 96.7  Platelet count-Automate 526  RBC Count 3.29  Red Cell Distrib Width11.6  WBC Count3.76  % Albumin, Urine--  Hematocrit 31.8  Hemoglobin 11.0  CBC  Date: 08-08-19 @ 06:11  Mean cell Vyzfpgbbfg60.3  Mean cell Hemoglobin Conc33.7  Mean cell Volum 98.9  Platelet count-Automate 461  RBC Count 2.85  Red Cell Distrib Width11.8  WBC Count3.41  % Albumin, Urine--  Hematocrit 28.2  Hemoglobin 9.5  CBC  Date: 08-07-19 @ 12:29  Mean cell Pmsqddsepg44.1  Mean cell Hemoglobin Conc34.7  Mean cell Volum 98.1  Platelet count-Automate 477  RBC Count 3.17  Red Cell Distrib Width11.8  WBC Count3.81  % Albumin, Urine--  Hematocrit 31.1  Hemoglobin 10.8  CBC  Date: 08-05-19 @ 06:07  Mean cell Ikfzslnxbn03.0  Mean cell Hemoglobin Conc34.0  Mean cell Volum 97.1  Platelet count-Automate 374  RBC Count 3.06  Red Cell Distrib Width11.8  WBC Count3.91  % Albumin, Urine--  Hematocrit 29.7  Hemoglobin 10.1  CBC  Date: 08-04-19 @ 09:20  Mean cell Drmeohjfaj65.3  Mean cell Hemoglobin Conc34.2  Mean cell Volum 97.4  Platelet count-Automate 330  RBC Count 3.03  Red Cell Distrib Width11.6  WBC Count3.19  % Albumin, Urine--  Hematocrit 29.5  Hemoglobin 10.1  CBC  Date: 08-03-19 @ 06:14  Mean cell Rjmnbniaaj68.2  Mean cell Hemoglobin Conc34.0  Mean cell Volum 97.8  Platelet count-Automate 282  RBC Count 3.13  Red Cell Distrib Width11.6  WBC Count3.10  % Albumin, Urine--  Hematocrit 30.6  Hemoglobin 10.4    [facility]  08-09-19 @ 06:33  Blood Gas Arterial-Calcium,Ionized--  Blood Urea Nitrogen, Serum <3 mg/dL<L> [10 - 20]  Carbon Dioxide, Serum29 mmol/L [17 - 32]  Chloride, Vcjyp201 mmol/L [98 - 110]  Creatinie, Serum0.7 mg/dL [0.7 - 1.5]  Glucose, Kwvug542 mg/dL<H> [70 - 99]  Potassium, Serum3.7 mmol/L [3.5 - 5.0]  Sodium, Serum 142 mmol/L [135 - 146]  [facility]  08-08-19 @ 06:11  Blood Gas Arterial-Calcium,Ionized--  Blood Urea Nitrogen, Serum 3 mg/dL<L> [10 - 20]  Carbon Dioxide, Serum26 mmol/L [17 - 32]  Chloride, Jycct599 mmol/L [98 - 110]  Creatinie, Serum0.5 mg/dL<L> [0.7 - 1.5]  Glucose, Serum91 mg/dL [70 - 99]  Potassium, Serum3.7 mmol/L [3.5 - 5.0]  Sodium, Serum 143 mmol/L [135 - 146]  [facility]  08-07-19 @ 12:29  Blood Gas Arterial-Calcium,Ionized--  Blood Urea Nitrogen, Serum <3 mg/dL<L> [10 - 20]  Carbon Dioxide, Serum27 mmol/L [17 - 32]  Chloride, Egvqd169 mmol/L [98 - 110]  Creatinie, Serum0.5 mg/dL<L> [0.7 - 1.5]  Glucose, Serum91 mg/dL [70 - 99]  Potassium, Serum3.7 mmol/L [3.5 - 5.0]  Sodium, Serum 141 mmol/L [135 - 146]  [facility]  08-06-19 @ 06:39  Blood Gas Arterial-Calcium,Ionized--  Blood Urea Nitrogen, Serum <3 mg/dL<L> [10 - 20]  Carbon Dioxide, Serum27 mmol/L [17 - 32]  Chloride, Hjkol347 mmol/L [98 - 110]  Creatinie, Serum0.5 mg/dL<L> [0.7 - 1.5]  Glucose, Serum85 mg/dL [70 - 99]  Potassium, Serum3.2 mmol/L<L> [3.5 - 5.0]  Sodium, Serum 143 mmol/L [135 - 146]  [facility]  08-05-19 @ 06:07  Blood Gas Arterial-Calcium,Ionized--  Blood Urea Nitrogen, Serum <3 mg/dL<L> [10 - 20]  Carbon Dioxide, Serum29 mmol/L [17 - 32]  Chloride, Alxvp241 mmol/L [98 - 110]  Creatinie, Serum0.5 mg/dL<L> [0.7 - 1.5]  Glucose, Serum88 mg/dL [70 - 99]  Potassium, Serum3.6 mmol/L [3.5 - 5.0]  Sodium, Serum 141 mmol/L [135 - 146]              Microbiology:    Culture - Blood (collected 07-29-19 @ 05:16)  Source: .Blood None  Final Report (08-03-19 @ 14:00):    No growth at 5 days.    Culture - Blood (collected 07-29-19 @ 05:16)  Source: .Blood None  Final Report (08-03-19 @ 14:00):    No growth at 5 days.    Culture - Urine (collected 07-28-19 @ 14:35)  Source: .Urine msue&#x27;d twice  Final Report (07-29-19 @ 13:22):    No growth    Culture - Urine (collected 07-28-19 @ 01:44)  Source: .Urine Clean Catch (Midstream)  Final Report (07-29-19 @ 08:47):    >=3 organisms. Probable collection contamination.        Medications:  acetaminophen   Tablet .. 650 milliGRAM(s) Oral every 6 hours PRN  aluminum hydroxide/magnesium hydroxide/simethicone Suspension 30 milliLiter(s) Oral every 4 hours PRN  chlorhexidine 4% Liquid 1 Application(s) Topical <User Schedule>  docusate sodium 100 milliGRAM(s) Oral three times a day  enoxaparin Injectable 40 milliGRAM(s) SubCutaneous daily  folic acid 1 milliGRAM(s) Oral daily  HYDROmorphone   Tablet 2 milliGRAM(s) Oral every 6 hours PRN  multivitamin 1 Tablet(s) Oral daily  ondansetron Injectable 4 milliGRAM(s) IV Push every 8 hours PRN  pancrelipase  (CREON 12,000 Lipase Units) 3 Capsule(s) Oral four times a day with meals  pantoprazole    Tablet 40 milliGRAM(s) Oral before breakfast  polyethylene glycol 3350 17 Gram(s) Oral daily  senna 2 Tablet(s) Oral at bedtime  thiamine 100 milliGRAM(s) Oral daily  traMADol 50 milliGRAM(s) Oral every 6 hours PRN        Assessment and Plan:  #Acuite alcohol induced pancreatitis : clinically patient is stable, on abdominal exam, abdomen is soft with normoactive bowel sounds, no abdominal distention. Patient has been on IV opioids for 1 week and has high risk to develop dependency to it. IV dilaudid was d/c  and switched it to PO Dilaudid with tapering down dose. - upon d/c home patient was prescribed Percocet 5/325 mg - 1 tab po every 8 hours as needed for severe pain for next 5 days.   -will d/c IVF  --continue  pancreatic enzymes along with low fat diet.  -f/up [facility] in 2 weeks post d/c home   -alcohol cessation counseling d/w pt.     #CT showing peripancreatic fluid collections : likely seroma. repeat CT in 2-4 weeks and f/up results with GI specialist    #Hypomagnesemia, hypokalemia: repleted    # Anemia due to chronic alcohol abuse- h/h stable, daily thiamine and folate tx.     # Reactive thrombocytosis- patient was instructed to f/up CBC  post d/c home at [facility] to assess platelets count.       #Progress Note Handoff: patient is clinically improved today, tolerating diet , will be d/c home today with outpatient GI f/up   Family discussion: pt by bedside Disposition: Home_today

## 2019-08-09 NOTE — DISCHARGE NOTE PROVIDER - HOSPITAL COURSE
This is a 4 year old patient with history of EtOH abuse and recurrent pancreatitis, admitted for acute pancreatitis following 3 days of NBNB vomiting and abdominal pain x 3 days after an episode of heavy drinking. This is hospital day 12 and she is clinically improving. Was able to tolerate water, but did not eat anything last night or this morning. Continues to complain of epigastric pain. Normal BM last night. Felt clammy this morning and is requesting pain meds to be Q4 instead of Q6. Denies fever, chills, vomiting, urinary frequency, diarrhea.         Abdominal CT scan was done at admission and it showed evidence of Acute pancreatitis. The patient was treated by keeping NPO, IV fluid and pain management.        A repeat CT scan of The Abdomen on August 1st showed no active process in the pancreas and no evidence of necrosis or abscess formation. However, it showed some peripancreatic fluid collection.         The patient was switched from IV to oral pain medications and she was tolerating soft diet of small amounts. Beside the improving pain, she denied any symptoms and had a bowel movement one day prior to discharge.        -Follow up with the GI doctor    -Follow up with your PCP This is a 44 year old patient with history of EtOH abuse and recurrent pancreatitis, admitted for acute pancreatitis following 3 days of NBNB vomiting and abdominal pain x 3 days after an episode of heavy drinking. This is hospital day 12 and she is clinically improving. Was able to tolerate water, but did not eat anything last night or this morning. Continues to complain of epigastric pain. Normal BM last night. Felt clammy this morning and is requesting pain meds to be Q4 instead of Q6. Denies fever, chills, vomiting, urinary frequency, diarrhea.         Abdominal CT scan was done at admission and it showed evidence of Acute pancreatitis. The patient was treated by keeping NPO, IV fluid and pain management.        A repeat CT scan of The Abdomen on August 1st showed no active process in the pancreas and no evidence of necrosis or abscess formation. However, it showed some peripancreatic fluid collection.         The patient was switched from IV to oral pain medications and she was tolerating soft diet of small amounts. Beside the improving pain, she denied any symptoms and had a bowel movement one day prior to discharge.        -Follow up with the GI doctor    -Follow up with your PCP    -Repeat CT Abdomen  scan on the 15th of August 2019

## 2019-08-09 NOTE — PROGRESS NOTE ADULT - REASON FOR ADMISSION
nausea, vomiting and pain in abdomen

## 2019-08-14 DIAGNOSIS — E87.6 HYPOKALEMIA: ICD-10-CM

## 2019-08-14 DIAGNOSIS — K80.20 CALCULUS OF GALLBLADDER WITHOUT CHOLECYSTITIS WITHOUT OBSTRUCTION: ICD-10-CM

## 2019-08-14 DIAGNOSIS — E51.9 THIAMINE DEFICIENCY, UNSPECIFIED: ICD-10-CM

## 2019-08-14 DIAGNOSIS — K85.20 ALCOHOL INDUCED ACUTE PANCREATITIS WITHOUT NECROSIS OR INFECTION: ICD-10-CM

## 2019-08-14 DIAGNOSIS — E83.42 HYPOMAGNESEMIA: ICD-10-CM

## 2019-08-14 DIAGNOSIS — E43 UNSPECIFIED SEVERE PROTEIN-CALORIE MALNUTRITION: ICD-10-CM

## 2019-08-14 DIAGNOSIS — K70.10 ALCOHOLIC HEPATITIS WITHOUT ASCITES: ICD-10-CM

## 2019-08-14 DIAGNOSIS — D64.9 ANEMIA, UNSPECIFIED: ICD-10-CM

## 2019-08-14 DIAGNOSIS — K52.9 NONINFECTIVE GASTROENTERITIS AND COLITIS, UNSPECIFIED: ICD-10-CM

## 2019-08-14 DIAGNOSIS — R74.0 NONSPECIFIC ELEVATION OF LEVELS OF TRANSAMINASE AND LACTIC ACID DEHYDROGENASE [LDH]: ICD-10-CM

## 2019-08-14 DIAGNOSIS — E53.8 DEFICIENCY OF OTHER SPECIFIED B GROUP VITAMINS: ICD-10-CM

## 2019-08-14 DIAGNOSIS — F41.9 ANXIETY DISORDER, UNSPECIFIED: ICD-10-CM

## 2019-08-14 DIAGNOSIS — F10.10 ALCOHOL ABUSE, UNCOMPLICATED: ICD-10-CM

## 2019-08-18 ENCOUNTER — INPATIENT (INPATIENT)
Facility: HOSPITAL | Age: 44
LOS: 2 days | Discharge: HOME | End: 2019-08-21
Attending: INTERNAL MEDICINE | Admitting: INTERNAL MEDICINE
Payer: MEDICAID

## 2019-08-18 VITALS
DIASTOLIC BLOOD PRESSURE: 74 MMHG | HEART RATE: 126 BPM | TEMPERATURE: 98 F | RESPIRATION RATE: 18 BRPM | SYSTOLIC BLOOD PRESSURE: 126 MMHG | OXYGEN SATURATION: 98 %

## 2019-08-18 DIAGNOSIS — Z98.890 OTHER SPECIFIED POSTPROCEDURAL STATES: Chronic | ICD-10-CM

## 2019-08-18 LAB
ALBUMIN SERPL ELPH-MCNC: 4.6 G/DL — SIGNIFICANT CHANGE UP (ref 3.5–5.2)
ALP SERPL-CCNC: 72 U/L — SIGNIFICANT CHANGE UP (ref 30–115)
ALT FLD-CCNC: 24 U/L — SIGNIFICANT CHANGE UP (ref 0–41)
ANION GAP SERPL CALC-SCNC: 14 MMOL/L — SIGNIFICANT CHANGE UP (ref 7–14)
AST SERPL-CCNC: 73 U/L — HIGH (ref 0–41)
BASOPHILS # BLD AUTO: 0.04 K/UL — SIGNIFICANT CHANGE UP (ref 0–0.2)
BASOPHILS NFR BLD AUTO: 0.9 % — SIGNIFICANT CHANGE UP (ref 0–1)
BILIRUB SERPL-MCNC: 0.4 MG/DL — SIGNIFICANT CHANGE UP (ref 0.2–1.2)
BUN SERPL-MCNC: 6 MG/DL — LOW (ref 10–20)
CALCIUM SERPL-MCNC: 10.3 MG/DL — HIGH (ref 8.5–10.1)
CHLORIDE SERPL-SCNC: 101 MMOL/L — SIGNIFICANT CHANGE UP (ref 98–110)
CO2 SERPL-SCNC: 25 MMOL/L — SIGNIFICANT CHANGE UP (ref 17–32)
CREAT SERPL-MCNC: 0.7 MG/DL — SIGNIFICANT CHANGE UP (ref 0.7–1.5)
EOSINOPHIL # BLD AUTO: 0 K/UL — SIGNIFICANT CHANGE UP (ref 0–0.7)
EOSINOPHIL NFR BLD AUTO: 0 % — SIGNIFICANT CHANGE UP (ref 0–8)
GLUCOSE SERPL-MCNC: 98 MG/DL — SIGNIFICANT CHANGE UP (ref 70–99)
HCG SERPL QL: NEGATIVE — SIGNIFICANT CHANGE UP
HCT VFR BLD CALC: 40.4 % — SIGNIFICANT CHANGE UP (ref 37–47)
HGB BLD-MCNC: 13.5 G/DL — SIGNIFICANT CHANGE UP (ref 12–16)
IMM GRANULOCYTES NFR BLD AUTO: 0.4 % — HIGH (ref 0.1–0.3)
LACTATE SERPL-SCNC: 1.1 MMOL/L — SIGNIFICANT CHANGE UP (ref 0.5–2.2)
LIDOCAIN IGE QN: 96 U/L — HIGH (ref 7–60)
LYMPHOCYTES # BLD AUTO: 0.64 K/UL — LOW (ref 1.2–3.4)
LYMPHOCYTES # BLD AUTO: 14 % — LOW (ref 20.5–51.1)
MCHC RBC-ENTMCNC: 32.6 PG — HIGH (ref 27–31)
MCHC RBC-ENTMCNC: 33.4 G/DL — SIGNIFICANT CHANGE UP (ref 32–37)
MCV RBC AUTO: 97.6 FL — SIGNIFICANT CHANGE UP (ref 81–99)
MONOCYTES # BLD AUTO: 0.28 K/UL — SIGNIFICANT CHANGE UP (ref 0.1–0.6)
MONOCYTES NFR BLD AUTO: 6.1 % — SIGNIFICANT CHANGE UP (ref 1.7–9.3)
NEUTROPHILS # BLD AUTO: 3.59 K/UL — SIGNIFICANT CHANGE UP (ref 1.4–6.5)
NEUTROPHILS NFR BLD AUTO: 78.6 % — HIGH (ref 42.2–75.2)
NRBC # BLD: 0 /100 WBCS — SIGNIFICANT CHANGE UP (ref 0–0)
PLATELET # BLD AUTO: 318 K/UL — SIGNIFICANT CHANGE UP (ref 130–400)
POTASSIUM SERPL-MCNC: 4.1 MMOL/L — SIGNIFICANT CHANGE UP (ref 3.5–5)
POTASSIUM SERPL-SCNC: 4.1 MMOL/L — SIGNIFICANT CHANGE UP (ref 3.5–5)
PROT SERPL-MCNC: 8.1 G/DL — HIGH (ref 6–8)
RBC # BLD: 4.14 M/UL — LOW (ref 4.2–5.4)
RBC # FLD: 11.7 % — SIGNIFICANT CHANGE UP (ref 11.5–14.5)
SODIUM SERPL-SCNC: 140 MMOL/L — SIGNIFICANT CHANGE UP (ref 135–146)
WBC # BLD: 4.57 K/UL — LOW (ref 4.8–10.8)
WBC # FLD AUTO: 4.57 K/UL — LOW (ref 4.8–10.8)

## 2019-08-18 PROCEDURE — 74177 CT ABD & PELVIS W/CONTRAST: CPT | Mod: 26

## 2019-08-18 PROCEDURE — 99285 EMERGENCY DEPT VISIT HI MDM: CPT

## 2019-08-18 RX ORDER — MORPHINE SULFATE 50 MG/1
4 CAPSULE, EXTENDED RELEASE ORAL ONCE
Refills: 0 | Status: DISCONTINUED | OUTPATIENT
Start: 2019-08-18 | End: 2019-08-18

## 2019-08-18 RX ORDER — ONDANSETRON 8 MG/1
4 TABLET, FILM COATED ORAL ONCE
Refills: 0 | Status: COMPLETED | OUTPATIENT
Start: 2019-08-18 | End: 2019-08-18

## 2019-08-18 RX ORDER — MORPHINE SULFATE 50 MG/1
6 CAPSULE, EXTENDED RELEASE ORAL ONCE
Refills: 0 | Status: DISCONTINUED | OUTPATIENT
Start: 2019-08-18 | End: 2019-08-18

## 2019-08-18 RX ORDER — SODIUM CHLORIDE 9 MG/ML
2300 INJECTION, SOLUTION INTRAVENOUS ONCE
Refills: 0 | Status: COMPLETED | OUTPATIENT
Start: 2019-08-18 | End: 2019-08-18

## 2019-08-18 RX ORDER — FAMOTIDINE 10 MG/ML
20 INJECTION INTRAVENOUS ONCE
Refills: 0 | Status: COMPLETED | OUTPATIENT
Start: 2019-08-18 | End: 2019-08-18

## 2019-08-18 RX ADMIN — SODIUM CHLORIDE 2300 MILLILITER(S): 9 INJECTION, SOLUTION INTRAVENOUS at 19:59

## 2019-08-18 RX ADMIN — MORPHINE SULFATE 4 MILLIGRAM(S): 50 CAPSULE, EXTENDED RELEASE ORAL at 21:34

## 2019-08-18 RX ADMIN — FAMOTIDINE 20 MILLIGRAM(S): 10 INJECTION INTRAVENOUS at 21:33

## 2019-08-18 RX ADMIN — MORPHINE SULFATE 6 MILLIGRAM(S): 50 CAPSULE, EXTENDED RELEASE ORAL at 20:00

## 2019-08-18 RX ADMIN — ONDANSETRON 4 MILLIGRAM(S): 8 TABLET, FILM COATED ORAL at 20:00

## 2019-08-18 RX ADMIN — MORPHINE SULFATE 4 MILLIGRAM(S): 50 CAPSULE, EXTENDED RELEASE ORAL at 22:50

## 2019-08-18 NOTE — ED ADULT TRIAGE NOTE - CHIEF COMPLAINT QUOTE
Patient states she was recently diagnosed with pancreatitis, complains of abdominal pain, nausea/vomiting and diarrhea, difficulty keeping food and fluids down.

## 2019-08-18 NOTE — ED ADULT NURSE NOTE - OBJECTIVE STATEMENT
pt states that she was recently admitted to ICU for 2 weeks for pancreatitis, state she was sent home with prescription for pancreatic enzymes and vitamins which she wasn't able to  until Wednesday, states that friday she developed vomitting, states last night she had sweats/ chills, n/v, pain, unable to keep food or fluids down

## 2019-08-19 LAB
ANION GAP SERPL CALC-SCNC: 11 MMOL/L — SIGNIFICANT CHANGE UP (ref 7–14)
BASOPHILS # BLD AUTO: 0.03 K/UL — SIGNIFICANT CHANGE UP (ref 0–0.2)
BASOPHILS NFR BLD AUTO: 0.9 % — SIGNIFICANT CHANGE UP (ref 0–1)
BUN SERPL-MCNC: 4 MG/DL — LOW (ref 10–20)
CALCIUM SERPL-MCNC: 9.5 MG/DL — SIGNIFICANT CHANGE UP (ref 8.5–10.1)
CHLORIDE SERPL-SCNC: 102 MMOL/L — SIGNIFICANT CHANGE UP (ref 98–110)
CHOLEST SERPL-MCNC: 158 MG/DL — SIGNIFICANT CHANGE UP (ref 100–200)
CO2 SERPL-SCNC: 25 MMOL/L — SIGNIFICANT CHANGE UP (ref 17–32)
CREAT SERPL-MCNC: 0.5 MG/DL — LOW (ref 0.7–1.5)
EOSINOPHIL # BLD AUTO: 0.09 K/UL — SIGNIFICANT CHANGE UP (ref 0–0.7)
EOSINOPHIL NFR BLD AUTO: 2.8 % — SIGNIFICANT CHANGE UP (ref 0–8)
ETHANOL SERPL-MCNC: <10 MG/DL — SIGNIFICANT CHANGE UP
GLUCOSE SERPL-MCNC: 88 MG/DL — SIGNIFICANT CHANGE UP (ref 70–99)
HCT VFR BLD CALC: 33.3 % — LOW (ref 37–47)
HDLC SERPL-MCNC: 57 MG/DL — SIGNIFICANT CHANGE UP
HGB BLD-MCNC: 11 G/DL — LOW (ref 12–16)
IMM GRANULOCYTES NFR BLD AUTO: 0.3 % — SIGNIFICANT CHANGE UP (ref 0.1–0.3)
LIPID PNL WITH DIRECT LDL SERPL: 89 MG/DL — SIGNIFICANT CHANGE UP (ref 4–129)
LYMPHOCYTES # BLD AUTO: 0.93 K/UL — LOW (ref 1.2–3.4)
LYMPHOCYTES # BLD AUTO: 29 % — SIGNIFICANT CHANGE UP (ref 20.5–51.1)
MAGNESIUM SERPL-MCNC: 1.3 MG/DL — LOW (ref 1.8–2.4)
MCHC RBC-ENTMCNC: 32.4 PG — HIGH (ref 27–31)
MCHC RBC-ENTMCNC: 33 G/DL — SIGNIFICANT CHANGE UP (ref 32–37)
MCV RBC AUTO: 97.9 FL — SIGNIFICANT CHANGE UP (ref 81–99)
MONOCYTES # BLD AUTO: 0.36 K/UL — SIGNIFICANT CHANGE UP (ref 0.1–0.6)
MONOCYTES NFR BLD AUTO: 11.2 % — HIGH (ref 1.7–9.3)
NEUTROPHILS # BLD AUTO: 1.79 K/UL — SIGNIFICANT CHANGE UP (ref 1.4–6.5)
NEUTROPHILS NFR BLD AUTO: 55.8 % — SIGNIFICANT CHANGE UP (ref 42.2–75.2)
NRBC # BLD: 0 /100 WBCS — SIGNIFICANT CHANGE UP (ref 0–0)
PHOSPHATE SERPL-MCNC: 4.5 MG/DL — SIGNIFICANT CHANGE UP (ref 2.1–4.9)
PLATELET # BLD AUTO: 228 K/UL — SIGNIFICANT CHANGE UP (ref 130–400)
POTASSIUM SERPL-MCNC: 3.8 MMOL/L — SIGNIFICANT CHANGE UP (ref 3.5–5)
POTASSIUM SERPL-SCNC: 3.8 MMOL/L — SIGNIFICANT CHANGE UP (ref 3.5–5)
RBC # BLD: 3.4 M/UL — LOW (ref 4.2–5.4)
RBC # FLD: 11.6 % — SIGNIFICANT CHANGE UP (ref 11.5–14.5)
SODIUM SERPL-SCNC: 138 MMOL/L — SIGNIFICANT CHANGE UP (ref 135–146)
TOTAL CHOLESTEROL/HDL RATIO MEASUREMENT: 2.8 RATIO — LOW (ref 4–5.5)
TRIGL SERPL-MCNC: 144 MG/DL — SIGNIFICANT CHANGE UP (ref 10–149)
WBC # BLD: 3.21 K/UL — LOW (ref 4.8–10.8)
WBC # FLD AUTO: 3.21 K/UL — LOW (ref 4.8–10.8)

## 2019-08-19 PROCEDURE — 76705 ECHO EXAM OF ABDOMEN: CPT | Mod: 26

## 2019-08-19 PROCEDURE — 99223 1ST HOSP IP/OBS HIGH 75: CPT

## 2019-08-19 PROCEDURE — 93010 ELECTROCARDIOGRAM REPORT: CPT

## 2019-08-19 RX ORDER — FAMOTIDINE 10 MG/ML
20 INJECTION INTRAVENOUS DAILY
Refills: 0 | Status: DISCONTINUED | OUTPATIENT
Start: 2019-08-19 | End: 2019-08-20

## 2019-08-19 RX ORDER — ENOXAPARIN SODIUM 100 MG/ML
40 INJECTION SUBCUTANEOUS DAILY
Refills: 0 | Status: DISCONTINUED | OUTPATIENT
Start: 2019-08-19 | End: 2019-08-21

## 2019-08-19 RX ORDER — FOLIC ACID 0.8 MG
1 TABLET ORAL DAILY
Refills: 0 | Status: DISCONTINUED | OUTPATIENT
Start: 2019-08-19 | End: 2019-08-21

## 2019-08-19 RX ORDER — ONDANSETRON 8 MG/1
4 TABLET, FILM COATED ORAL EVERY 6 HOURS
Refills: 0 | Status: DISCONTINUED | OUTPATIENT
Start: 2019-08-19 | End: 2019-08-21

## 2019-08-19 RX ORDER — OXYCODONE HYDROCHLORIDE 5 MG/1
10 TABLET ORAL EVERY 12 HOURS
Refills: 0 | Status: DISCONTINUED | OUTPATIENT
Start: 2019-08-19 | End: 2019-08-21

## 2019-08-19 RX ORDER — LIPASE/PROTEASE/AMYLASE 16-48-48K
3 CAPSULE,DELAYED RELEASE (ENTERIC COATED) ORAL
Refills: 0 | Status: DISCONTINUED | OUTPATIENT
Start: 2019-08-19 | End: 2019-08-21

## 2019-08-19 RX ORDER — CHLORHEXIDINE GLUCONATE 213 G/1000ML
1 SOLUTION TOPICAL DAILY
Refills: 0 | Status: DISCONTINUED | OUTPATIENT
Start: 2019-08-19 | End: 2019-08-21

## 2019-08-19 RX ORDER — HYDROMORPHONE HYDROCHLORIDE 2 MG/ML
1 INJECTION INTRAMUSCULAR; INTRAVENOUS; SUBCUTANEOUS ONCE
Refills: 0 | Status: DISCONTINUED | OUTPATIENT
Start: 2019-08-19 | End: 2019-08-19

## 2019-08-19 RX ORDER — HYDROMORPHONE HYDROCHLORIDE 2 MG/ML
1 INJECTION INTRAMUSCULAR; INTRAVENOUS; SUBCUTANEOUS EVERY 4 HOURS
Refills: 0 | Status: DISCONTINUED | OUTPATIENT
Start: 2019-08-19 | End: 2019-08-20

## 2019-08-19 RX ORDER — SODIUM CHLORIDE 9 MG/ML
1000 INJECTION, SOLUTION INTRAVENOUS
Refills: 0 | Status: DISCONTINUED | OUTPATIENT
Start: 2019-08-19 | End: 2019-08-21

## 2019-08-19 RX ADMIN — HYDROMORPHONE HYDROCHLORIDE 1 MILLIGRAM(S): 2 INJECTION INTRAMUSCULAR; INTRAVENOUS; SUBCUTANEOUS at 06:29

## 2019-08-19 RX ADMIN — MORPHINE SULFATE 4 MILLIGRAM(S): 50 CAPSULE, EXTENDED RELEASE ORAL at 00:04

## 2019-08-19 RX ADMIN — Medication 3 CAPSULE(S): at 22:24

## 2019-08-19 RX ADMIN — HYDROMORPHONE HYDROCHLORIDE 1 MILLIGRAM(S): 2 INJECTION INTRAMUSCULAR; INTRAVENOUS; SUBCUTANEOUS at 15:42

## 2019-08-19 RX ADMIN — OXYCODONE HYDROCHLORIDE 10 MILLIGRAM(S): 5 TABLET ORAL at 18:16

## 2019-08-19 RX ADMIN — HYDROMORPHONE HYDROCHLORIDE 1 MILLIGRAM(S): 2 INJECTION INTRAMUSCULAR; INTRAVENOUS; SUBCUTANEOUS at 11:00

## 2019-08-19 RX ADMIN — ENOXAPARIN SODIUM 40 MILLIGRAM(S): 100 INJECTION SUBCUTANEOUS at 11:08

## 2019-08-19 RX ADMIN — FAMOTIDINE 20 MILLIGRAM(S): 10 INJECTION INTRAVENOUS at 15:41

## 2019-08-19 RX ADMIN — SODIUM CHLORIDE 200 MILLILITER(S): 9 INJECTION, SOLUTION INTRAVENOUS at 06:19

## 2019-08-19 RX ADMIN — HYDROMORPHONE HYDROCHLORIDE 1 MILLIGRAM(S): 2 INJECTION INTRAMUSCULAR; INTRAVENOUS; SUBCUTANEOUS at 07:00

## 2019-08-19 RX ADMIN — MORPHINE SULFATE 4 MILLIGRAM(S): 50 CAPSULE, EXTENDED RELEASE ORAL at 00:30

## 2019-08-19 RX ADMIN — ONDANSETRON 4 MILLIGRAM(S): 8 TABLET, FILM COATED ORAL at 10:46

## 2019-08-19 RX ADMIN — HYDROMORPHONE HYDROCHLORIDE 1 MILLIGRAM(S): 2 INJECTION INTRAMUSCULAR; INTRAVENOUS; SUBCUTANEOUS at 20:32

## 2019-08-19 RX ADMIN — SODIUM CHLORIDE 200 MILLILITER(S): 9 INJECTION, SOLUTION INTRAVENOUS at 11:07

## 2019-08-19 RX ADMIN — Medication 3 CAPSULE(S): at 09:04

## 2019-08-19 RX ADMIN — SODIUM CHLORIDE 200 MILLILITER(S): 9 INJECTION, SOLUTION INTRAVENOUS at 22:24

## 2019-08-19 RX ADMIN — MORPHINE SULFATE 6 MILLIGRAM(S): 50 CAPSULE, EXTENDED RELEASE ORAL at 07:20

## 2019-08-19 RX ADMIN — HYDROMORPHONE HYDROCHLORIDE 1 MILLIGRAM(S): 2 INJECTION INTRAMUSCULAR; INTRAVENOUS; SUBCUTANEOUS at 02:30

## 2019-08-19 RX ADMIN — HYDROMORPHONE HYDROCHLORIDE 1 MILLIGRAM(S): 2 INJECTION INTRAMUSCULAR; INTRAVENOUS; SUBCUTANEOUS at 02:25

## 2019-08-19 RX ADMIN — Medication 1 MILLIGRAM(S): at 11:08

## 2019-08-19 RX ADMIN — Medication 1 TABLET(S): at 11:08

## 2019-08-19 RX ADMIN — SODIUM CHLORIDE 200 MILLILITER(S): 9 INJECTION, SOLUTION INTRAVENOUS at 18:16

## 2019-08-19 RX ADMIN — HYDROMORPHONE HYDROCHLORIDE 1 MILLIGRAM(S): 2 INJECTION INTRAMUSCULAR; INTRAVENOUS; SUBCUTANEOUS at 10:46

## 2019-08-19 RX ADMIN — HYDROMORPHONE HYDROCHLORIDE 1 MILLIGRAM(S): 2 INJECTION INTRAMUSCULAR; INTRAVENOUS; SUBCUTANEOUS at 20:02

## 2019-08-19 NOTE — H&P ADULT - NSHPLABSRESULTS_GEN_ALL_CORE
Labs:                        13.5   4.57  )-----------( 318      ( 18 Aug 2019 19:55 )             40.4             08-18    140  |  101  |  6<L>  ----------------------------<  98  4.1   |  25  |  0.7    Ca    10.3<H>      18 Aug 2019 19:55    TPro  8.1<H>  /  Alb  4.6  /  TBili  0.4  /  DBili  x   /  AST  73<H>  /  ALT  24  /  AlkPhos  72  08-18    LIVER FUNCTIONS - ( 18 Aug 2019 19:55 )  Alb: 4.6 g/dL / Pro: 8.1 g/dL / ALK PHOS: 72 U/L / ALT: 24 U/L / AST: 73 U/L / GGT: x                  Lipase, Serum (08.18.19 @ 19:55)    Lipase, Serum: 96 U/L    Imaging:  < from: CT Abdomen and Pelvis w/ IV Cont (08.18.19 @ 23:55) >  IMPRESSION:  Acute on chronic pancreatitis.  No peripancreatic fluid collection.  < end of copied text >    ECG: Labs:                        13.5   4.57  )-----------( 318      ( 18 Aug 2019 19:55 )             40.4             08-18    140  |  101  |  6<L>  ----------------------------<  98  4.1   |  25  |  0.7    Ca    10.3<H>      18 Aug 2019 19:55    TPro  8.1<H>  /  Alb  4.6  /  TBili  0.4  /  DBili  x   /  AST  73<H>  /  ALT  24  /  AlkPhos  72  08-18    LIVER FUNCTIONS - ( 18 Aug 2019 19:55 )  Alb: 4.6 g/dL / Pro: 8.1 g/dL / ALK PHOS: 72 U/L / ALT: 24 U/L / AST: 73 U/L / GGT: x                  Lipase, Serum (08.18.19 @ 19:55)    Lipase, Serum: 96 U/L    Imaging:  < from: CT Abdomen and Pelvis w/ IV Cont (08.18.19 @ 23:55) >  IMPRESSION:  Acute on chronic pancreatitis.  No peripancreatic fluid collection.  < end of copied text >

## 2019-08-19 NOTE — ED PROVIDER NOTE - PHYSICAL EXAMINATION
Physical Exam    Vital Signs: I have reviewed the initial vital signs.  Constitutional: well-nourished, appears stated age, no acute distress  Eyes: PERRLA, and symmetrical lids.  ENT: Neck supple with no adenopathy, moist MM.  Cardiovascular: regular rate, regular rhythm, well-perfused extremities  Respiratory: unlabored respiratory effort, clear to auscultation bilaterally  Gastrointestinal: soft, +epigastric abd pian, no guarding, non distended   Musculoskeletal: supple neck, no lower extremity edema  Integumentary: warm, dry, no rash  Neurologic: awake, alert, extremities’ motor and sensory functions grossly intact  Psychiatric: A&Ox3, appropriate mood, appropriate affect

## 2019-08-19 NOTE — H&P ADULT - NSHPREVIEWOFSYSTEMS_GEN_ALL_CORE
CONSTITUTIONAL: subjective chills, No weakness, fevers   EYES/ENT: No visual changes;  No vertigo or throat pain   NECK: No pain or stiffness  RESPIRATORY: No cough, wheezing, hemoptysis; No shortness of breath  CARDIOVASCULAR: No chest pain or palpitations  GASTROINTESTINAL: see HPI  GENITOURINARY: No dysuria, frequency or hematuria  NEUROLOGICAL: No numbness or weakness  SKIN: No itching, rashes

## 2019-08-19 NOTE — ED PROVIDER NOTE - NS ED ROS FT
Review of Systems    Constitutional: (-) fever  Eyes/ENT: (-) change in vision, (-) sore throat, (-) ear pain  Cardiovascular: (-) chest pain, (-) palpitation   Respiratory: (-) cough, (-) shortness of breath  Gastrointestinal: (-) diarrhea  Musculoskeletal: (-) neck pain, (-) back pain, (-) joint pain  Integumentary: (-) rash, (-) edema  Neurological: (-) headache, (-) altered mental status  Heme/Lymph: (-) easy bruising (-) easy bleeding  Allergic/Immunologic: (-) pruritus
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
## 2019-08-19 NOTE — CONSULT NOTE ADULT - ASSESSMENT
Patient is a 45 y/o with PMHx of  alcohol abuse (drinks 2-3 alcoholic beverages per day, no history of withdraw or seizures) and recurrent pancreatitis ( first started five years ago secondary to EtOH abuse) that presented to ED for abdominal pain. Advanced GI consulted as had previous small Pancreatic collection which was not seen on this CT. She does note while this episode was severe, it wasn't quite as bad as last time.    Pancreatitis  - Denies Toxic habits to me, only had one drink since last admission  - Triglycerides 144  - Get Igg levels  - No need to trend Pancreatic enzymes  - LR 250ml/hr  - Can eat clear diet as already ate and tolerated food in the ED  - No Family Hx of Pancreatic disease or pancreatic cancer  - Will follow

## 2019-08-19 NOTE — ED PROVIDER NOTE - OBJECTIVE STATEMENT
45 yo f pmhx sig for pnacreatitis pw sever epigastric abd pain radiating to the back began 2 d ago no provoking or modifying factors. Associated with multiple episodes of NBNB vomiting. Unable to tollerate PO liquids and solids starting since 1 d ago. No fevers, no cp, no sob.    I have reviewed available current nursing and previous documentation of past medical, surgical, family, and/or social history.

## 2019-08-19 NOTE — H&P ADULT - HISTORY OF PRESENT ILLNESS
Patient is a 44 year old woman with history of alcohol abuse (drinks 2-3 alcoholic beverages per day, no history of withdraw or seizures) and recurrent pancreatitis ( first started five years ago secondary to EtOH abuse) presented to ED for Patient is a 44 year old woman with history of alcohol abuse (drinks 2-3 alcoholic beverages per day, no history of withdraw or seizures) and recurrent pancreatitis ( first started five years ago secondary to EtOH abuse) presented to ED for abdominal pain. it is epigastric, stabbing, radiating to the back, 10/10 in intensity, similar to the previous episodes of pancreatitis, not relieved by tums, associated with nausea, multiple episodes of NBNB vomiting, lower abdominal cramps, loose stools and inability to tolerate oral intake. she was discharged on august 9th after being treated for acute alcoholic pancreatitis. last night she had 1 glass of wine and confirmed that this is her only drink since discharge.  no fever, she has subjective chills, no headache, no chest pain, no dyspnea, no abdominal trauma, over the counter medications.     in the ED she was tachycardic UG=501  RZ=692/74  T=98.5  sat=98  Lipase=96 CT: acute on chronic pancreatitis  s/p 2.3L LR and morphine

## 2019-08-19 NOTE — CONSULT NOTE ADULT - SUBJECTIVE AND OBJECTIVE BOX
Patient is a 43 y/o with PMHx of  alcohol abuse (drinks 2-3 alcoholic beverages per day, no history of withdraw or seizures) and recurrent pancreatitis ( first started five years ago secondary to EtOH abuse) that presented to ED for abdominal pain. Pain is epigastric, stabbing, radiating to the back, and was 10/10 in intensity, not as severe as previous episodes of pancreatitis. Pain is associated with nausea, multiple episodes of NBNB vomiting, lower abdominal cramps, loose stools and inability to tolerate oral intake. She was discharged on august 9th after being treated for acute alcoholic pancreatitis. She had a follow up at the [facility] clinic this week and had some difficulty obtaining her Pancreatic enzymes.       PAST MEDICAL & SURGICAL HISTORY:  Chronic pancreatitis  Pancreatitis  S/P arthroscopy: meniscal repair        MEDICATIONS  (STANDING):  chlorhexidine 4% Liquid 1 Application(s) Topical daily  enoxaparin Injectable 40 milliGRAM(s) SubCutaneous daily  famotidine Injectable 20 milliGRAM(s) IV Push daily  folic acid 1 milliGRAM(s) Oral daily  lactated ringers. 1000 milliLiter(s) (200 mL/Hr) IV Continuous <Continuous>  multivitamin 1 Tablet(s) Oral daily  oxyCODONE  ER Tablet 10 milliGRAM(s) Oral every 12 hours  pancrelipase  (CREON 12,000 Lipase Units) 3 Capsule(s) Oral four times a day with meals    MEDICATIONS  (PRN):  HYDROmorphone  Injectable 1 milliGRAM(s) IV Push every 4 hours PRN Severe Pain (7 - 10)  ondansetron Injectable 4 milliGRAM(s) IV Push every 6 hours PRN Nausea and/or Vomiting      Allergies  Compazine (Unknown)    Review of Systems  General:  See HPI  HEENT: Denies Trouble Swallowing ,Denies  Sore Throat , Denies Change in hearing/vision/speech ,Denies Dizziness    Cardio: Denies  Chest Pain , Palpitations    Respiratory: Denies worsening of SOB, Denies Cough  Abdomen: See detailed HPI  Neuro: Denies Headache Denies Dizziness, Denies Paresthesias  MSK: Denies pain in Bones/Joints/Muscles   Psych: Patient denies depression  Integ: Patient Denies rash, or new skin lesions     Vital Signs:  T(F): 96.3   HR: 75   BP: 128/86  RR: 20  SpO2: 99%   Physical Exam  Gen: NAD  HEENT: NC/AT  Cardio: S1/S2  Resp: CTA B/L  Abdomen: Soft, ND/NT  Neuro: AAOx3  Extremities: FROM x 4                   11.0   3.21  )-----------( 228      ( 19 Aug 2019 08:23 )             33.3       08-19    138  |  102  |  4<L>  ----------------------------<  88  3.8   |  25  |  0.5<L>      Calcium, Total Serum: 9.5 mg/dL (08-19-19 @ 08:23)      LFTs:             8.1  | 0.4  | 73       ------------------[72      ( 18 Aug 2019 19:55 )  4.6  | x    | 24          Lipase:96         Lactate, Blood: 1.1 mmol/L (08-18-19 @ 19:55)      Alcohol, Blood: <10 mg/dL (08-19-19 @ 08:23)      RADIOLOGY & ADDITIONAL STUDIES:    CT Abdomen and Pelvis w/ IV Cont 08.18.19  IMPRESSION:    Acute on chronic pancreatitis.    No peripancreatic fluid collection.

## 2019-08-19 NOTE — ED PROVIDER NOTE - ATTENDING CONTRIBUTION TO CARE
Patient is c/o abdominal pain with nausea/vomiting, denies cp/sob/syncope.   lungs: CTA, no crackles  abd: +BS, +periumbilical tenderness, ND, soft  A/P: Abdominal pain/vomiting  labs, IVF, symptomatic treatment  CT, reevaluation

## 2019-08-19 NOTE — H&P ADULT - NSHPPHYSICALEXAM_GEN_ALL_CORE
Vital Signs Last 24 Hrs  T(C): 36.3 (19 Aug 2019 00:48), Max: 36.9 (18 Aug 2019 18:51)  T(F): 97.3 (19 Aug 2019 00:48), Max: 98.5 (18 Aug 2019 18:51)  HR: 82 (19 Aug 2019 00:48) (82 - 126)  BP: 127/89 (19 Aug 2019 00:48) (126/74 - 127/89)  RR: 18 (19 Aug 2019 00:48) (18 - 18)  SpO2: 100% (19 Aug 2019 00:48) (98% - 100%)    GENERAL: NAD, speaks in full sentences, no signs of respiratory distress or signs of severe pain  HEAD:  Atraumatic, Normocephalic  EYES: EOMI, PERRLA   NECK: Supple, No JVD  CHEST/LUNG: Clear to auscultation bilaterally; No wheeze; No crackles; No accessory muscles used  HEART: Regular rate and rhythm; No murmurs;   ABDOMEN:  Nondistended; Bowel sounds present; No guarding, Soft, diffuse tenderness?  EXTREMITIES:  no edema  PSYCH: AAOx3  NEUROLOGY: non-focal, no tremor  SKIN: No rashes or lesions

## 2019-08-19 NOTE — H&P ADULT - NSICDXFAMILYHX_GEN_ALL_CORE_FT
FAMILY HISTORY:  Family history of cholecystectomy, many female members in the family  Family history of hypertension, both father and mother  FH: pancreatic cancer, cousin

## 2019-08-19 NOTE — H&P ADULT - NSHPSOCIALHISTORY_GEN_ALL_CORE
Denied alcohol intake currently except for the glass of wine last night  Denied hx of smoking, Denied hx of recreational drug use

## 2019-08-19 NOTE — ED PROVIDER NOTE - CLINICAL SUMMARY MEDICAL DECISION MAKING FREE TEXT BOX
I have reviewed available current nursing and previous documentation of past medical, surgical, family, and/or social history.   patient remained stable in ED, improved well, labs/EKG/CXR findings noted, and discussed with patient and, discussed with MAR, patient is admitted to medicine in stable condition.

## 2019-08-19 NOTE — CHART NOTE - NSCHARTNOTEFT_GEN_A_CORE
Patient currently not tolerating diet, still very nausea and requiring Zofran   Is requesting higher doses of Dilaudid   Hct 33 from 40 on admission   - Can continue IVFs for now   - GI consult as this is recurrent   - Advance diet as tolerated   - Keep same Dilaudid dose Patient currently wants to start a diet. Still has nausea and pain but feels hungry and would like to try   Is requesting higher doses of Dilaudid   Hct 33 from 40 on admission   - Can continue IVFs for now will decrease as patient tolerated diet   - GI consult as this is recurrent   - Advance diet as tolerated: soft with low residue low fat for now   - Keep same Dilaudid dose, will add long active PO medication

## 2019-08-20 LAB
ALBUMIN SERPL ELPH-MCNC: 3.9 G/DL — SIGNIFICANT CHANGE UP (ref 3.5–5.2)
ALP SERPL-CCNC: 72 U/L — SIGNIFICANT CHANGE UP (ref 30–115)
ALT FLD-CCNC: 31 U/L — SIGNIFICANT CHANGE UP (ref 0–41)
ANION GAP SERPL CALC-SCNC: 11 MMOL/L — SIGNIFICANT CHANGE UP (ref 7–14)
AST SERPL-CCNC: 81 U/L — HIGH (ref 0–41)
BASOPHILS # BLD AUTO: 0.04 K/UL — SIGNIFICANT CHANGE UP (ref 0–0.2)
BASOPHILS NFR BLD AUTO: 1.6 % — HIGH (ref 0–1)
BILIRUB SERPL-MCNC: 0.2 MG/DL — SIGNIFICANT CHANGE UP (ref 0.2–1.2)
BUN SERPL-MCNC: 3 MG/DL — LOW (ref 10–20)
CALCIUM SERPL-MCNC: 9.2 MG/DL — SIGNIFICANT CHANGE UP (ref 8.5–10.1)
CHLORIDE SERPL-SCNC: 99 MMOL/L — SIGNIFICANT CHANGE UP (ref 98–110)
CO2 SERPL-SCNC: 31 MMOL/L — SIGNIFICANT CHANGE UP (ref 17–32)
CREAT SERPL-MCNC: 0.5 MG/DL — LOW (ref 0.7–1.5)
EOSINOPHIL # BLD AUTO: 0.15 K/UL — SIGNIFICANT CHANGE UP (ref 0–0.7)
EOSINOPHIL NFR BLD AUTO: 6.1 % — SIGNIFICANT CHANGE UP (ref 0–8)
GLUCOSE SERPL-MCNC: 101 MG/DL — HIGH (ref 70–99)
HCT VFR BLD CALC: 30.2 % — LOW (ref 37–47)
HGB BLD-MCNC: 10.3 G/DL — LOW (ref 12–16)
IMM GRANULOCYTES NFR BLD AUTO: 0 % — LOW (ref 0.1–0.3)
LYMPHOCYTES # BLD AUTO: 0.96 K/UL — LOW (ref 1.2–3.4)
LYMPHOCYTES # BLD AUTO: 38.9 % — SIGNIFICANT CHANGE UP (ref 20.5–51.1)
MAGNESIUM SERPL-MCNC: 1.3 MG/DL — LOW (ref 1.8–2.4)
MCHC RBC-ENTMCNC: 32.8 PG — HIGH (ref 27–31)
MCHC RBC-ENTMCNC: 34.1 G/DL — SIGNIFICANT CHANGE UP (ref 32–37)
MCV RBC AUTO: 96.2 FL — SIGNIFICANT CHANGE UP (ref 81–99)
MONOCYTES # BLD AUTO: 0.3 K/UL — SIGNIFICANT CHANGE UP (ref 0.1–0.6)
MONOCYTES NFR BLD AUTO: 12.1 % — HIGH (ref 1.7–9.3)
NEUTROPHILS # BLD AUTO: 1.02 K/UL — LOW (ref 1.4–6.5)
NEUTROPHILS NFR BLD AUTO: 41.3 % — LOW (ref 42.2–75.2)
NRBC # BLD: 0 /100 WBCS — SIGNIFICANT CHANGE UP (ref 0–0)
PLATELET # BLD AUTO: 227 K/UL — SIGNIFICANT CHANGE UP (ref 130–400)
POTASSIUM SERPL-MCNC: 3.5 MMOL/L — SIGNIFICANT CHANGE UP (ref 3.5–5)
POTASSIUM SERPL-SCNC: 3.5 MMOL/L — SIGNIFICANT CHANGE UP (ref 3.5–5)
PROT SERPL-MCNC: 6.3 G/DL — SIGNIFICANT CHANGE UP (ref 6–8)
RBC # BLD: 3.14 M/UL — LOW (ref 4.2–5.4)
RBC # FLD: 11.6 % — SIGNIFICANT CHANGE UP (ref 11.5–14.5)
SODIUM SERPL-SCNC: 141 MMOL/L — SIGNIFICANT CHANGE UP (ref 135–146)
WBC # BLD: 2.47 K/UL — LOW (ref 4.8–10.8)
WBC # FLD AUTO: 2.47 K/UL — LOW (ref 4.8–10.8)

## 2019-08-20 PROCEDURE — 99233 SBSQ HOSP IP/OBS HIGH 50: CPT

## 2019-08-20 RX ORDER — HYDROMORPHONE HYDROCHLORIDE 2 MG/ML
1 INJECTION INTRAMUSCULAR; INTRAVENOUS; SUBCUTANEOUS
Refills: 0 | Status: DISCONTINUED | OUTPATIENT
Start: 2019-08-20 | End: 2019-08-21

## 2019-08-20 RX ORDER — PANTOPRAZOLE SODIUM 20 MG/1
40 TABLET, DELAYED RELEASE ORAL
Refills: 0 | Status: DISCONTINUED | OUTPATIENT
Start: 2019-08-20 | End: 2019-08-21

## 2019-08-20 RX ORDER — DIPHENHYDRAMINE HCL 50 MG
25 CAPSULE ORAL ONCE
Refills: 0 | Status: COMPLETED | OUTPATIENT
Start: 2019-08-20 | End: 2019-08-20

## 2019-08-20 RX ADMIN — HYDROMORPHONE HYDROCHLORIDE 1 MILLIGRAM(S): 2 INJECTION INTRAMUSCULAR; INTRAVENOUS; SUBCUTANEOUS at 06:54

## 2019-08-20 RX ADMIN — SODIUM CHLORIDE 200 MILLILITER(S): 9 INJECTION, SOLUTION INTRAVENOUS at 03:31

## 2019-08-20 RX ADMIN — OXYCODONE HYDROCHLORIDE 10 MILLIGRAM(S): 5 TABLET ORAL at 05:40

## 2019-08-20 RX ADMIN — HYDROMORPHONE HYDROCHLORIDE 1 MILLIGRAM(S): 2 INJECTION INTRAMUSCULAR; INTRAVENOUS; SUBCUTANEOUS at 07:30

## 2019-08-20 RX ADMIN — Medication 3 CAPSULE(S): at 18:25

## 2019-08-20 RX ADMIN — Medication 25 MILLIGRAM(S): at 22:18

## 2019-08-20 RX ADMIN — HYDROMORPHONE HYDROCHLORIDE 1 MILLIGRAM(S): 2 INJECTION INTRAMUSCULAR; INTRAVENOUS; SUBCUTANEOUS at 00:34

## 2019-08-20 RX ADMIN — HYDROMORPHONE HYDROCHLORIDE 1 MILLIGRAM(S): 2 INJECTION INTRAMUSCULAR; INTRAVENOUS; SUBCUTANEOUS at 20:31

## 2019-08-20 RX ADMIN — HYDROMORPHONE HYDROCHLORIDE 1 MILLIGRAM(S): 2 INJECTION INTRAMUSCULAR; INTRAVENOUS; SUBCUTANEOUS at 18:12

## 2019-08-20 RX ADMIN — Medication 3 CAPSULE(S): at 07:59

## 2019-08-20 RX ADMIN — ONDANSETRON 4 MILLIGRAM(S): 8 TABLET, FILM COATED ORAL at 13:05

## 2019-08-20 RX ADMIN — Medication 3 CAPSULE(S): at 21:11

## 2019-08-20 RX ADMIN — HYDROMORPHONE HYDROCHLORIDE 1 MILLIGRAM(S): 2 INJECTION INTRAMUSCULAR; INTRAVENOUS; SUBCUTANEOUS at 03:31

## 2019-08-20 RX ADMIN — HYDROMORPHONE HYDROCHLORIDE 1 MILLIGRAM(S): 2 INJECTION INTRAMUSCULAR; INTRAVENOUS; SUBCUTANEOUS at 23:43

## 2019-08-20 RX ADMIN — Medication 3 CAPSULE(S): at 13:06

## 2019-08-20 RX ADMIN — ENOXAPARIN SODIUM 40 MILLIGRAM(S): 100 INJECTION SUBCUTANEOUS at 13:05

## 2019-08-20 RX ADMIN — HYDROMORPHONE HYDROCHLORIDE 1 MILLIGRAM(S): 2 INJECTION INTRAMUSCULAR; INTRAVENOUS; SUBCUTANEOUS at 16:18

## 2019-08-20 RX ADMIN — HYDROMORPHONE HYDROCHLORIDE 1 MILLIGRAM(S): 2 INJECTION INTRAMUSCULAR; INTRAVENOUS; SUBCUTANEOUS at 10:23

## 2019-08-20 RX ADMIN — HYDROMORPHONE HYDROCHLORIDE 1 MILLIGRAM(S): 2 INJECTION INTRAMUSCULAR; INTRAVENOUS; SUBCUTANEOUS at 04:01

## 2019-08-20 RX ADMIN — Medication 30 MILLILITER(S): at 09:23

## 2019-08-20 RX ADMIN — HYDROMORPHONE HYDROCHLORIDE 1 MILLIGRAM(S): 2 INJECTION INTRAMUSCULAR; INTRAVENOUS; SUBCUTANEOUS at 09:57

## 2019-08-20 RX ADMIN — HYDROMORPHONE HYDROCHLORIDE 1 MILLIGRAM(S): 2 INJECTION INTRAMUSCULAR; INTRAVENOUS; SUBCUTANEOUS at 13:50

## 2019-08-20 RX ADMIN — HYDROMORPHONE HYDROCHLORIDE 1 MILLIGRAM(S): 2 INJECTION INTRAMUSCULAR; INTRAVENOUS; SUBCUTANEOUS at 20:01

## 2019-08-20 RX ADMIN — Medication 1 TABLET(S): at 15:32

## 2019-08-20 RX ADMIN — OXYCODONE HYDROCHLORIDE 10 MILLIGRAM(S): 5 TABLET ORAL at 05:50

## 2019-08-20 RX ADMIN — HYDROMORPHONE HYDROCHLORIDE 1 MILLIGRAM(S): 2 INJECTION INTRAMUSCULAR; INTRAVENOUS; SUBCUTANEOUS at 13:04

## 2019-08-20 RX ADMIN — OXYCODONE HYDROCHLORIDE 10 MILLIGRAM(S): 5 TABLET ORAL at 18:31

## 2019-08-20 RX ADMIN — Medication 1 MILLIGRAM(S): at 15:32

## 2019-08-20 RX ADMIN — HYDROMORPHONE HYDROCHLORIDE 1 MILLIGRAM(S): 2 INJECTION INTRAMUSCULAR; INTRAVENOUS; SUBCUTANEOUS at 01:04

## 2019-08-20 RX ADMIN — ONDANSETRON 4 MILLIGRAM(S): 8 TABLET, FILM COATED ORAL at 23:43
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
## 2019-08-20 NOTE — PROGRESS NOTE ADULT - SUBJECTIVE AND OBJECTIVE BOX
Patient is a 43 y/o with PMHx of  alcohol abuse (drinks 2-3 alcoholic beverages per day, no history of withdraw or seizures) and recurrent pancreatitis ( first started five years ago secondary to EtOH abuse) that presented to ED for abdominal pain. Pain is epigastric, stabbing, radiating to the back, and was 10/10 in intensity, not as severe as previous episodes of pancreatitis. Pain is associated with nausea, multiple episodes of NBNB vomiting, lower abdominal cramps, loose stools and inability to tolerate oral intake. She was discharged on august 9th after being treated for acute alcoholic pancreatitis. She had a follow up at the [facility] clinic this week and had some difficulty obtaining her Pancreatic enzymes. This morning she appeared comfortable. She requested her pain medications which she has been taking every three hours.       PAST MEDICAL & SURGICAL HISTORY:  Chronic pancreatitis  Pancreatitis  S/P arthroscopy: meniscal repair        MEDICATIONS  (STANDING):  chlorhexidine 4% Liquid 1 Application(s) Topical daily  enoxaparin Injectable 40 milliGRAM(s) SubCutaneous daily  famotidine Injectable 20 milliGRAM(s) IV Push daily  folic acid 1 milliGRAM(s) Oral daily  lactated ringers. 1000 milliLiter(s) (200 mL/Hr) IV Continuous <Continuous>  multivitamin 1 Tablet(s) Oral daily  oxyCODONE  ER Tablet 10 milliGRAM(s) Oral every 12 hours  pancrelipase  (CREON 12,000 Lipase Units) 3 Capsule(s) Oral four times a day with meals        Allergies  Compazine (Unknown)    Review of Systems  General:  See HPI  HEENT: Denies Trouble Swallowing ,Denies  Sore Throat , Denies Change in hearing/vision/speech ,Denies Dizziness    Cardio: Denies  Chest Pain , Palpitations    Respiratory: Denies worsening of SOB, Denies Cough  Abdomen: See detailed HPI  Neuro: Denies Headache Denies Dizziness, Denies Paresthesias  MSK: Denies pain in Bones/Joints/Muscles   Psych: Patient denies depression  Integ: Patient Denies rash, or new skin lesions     Vital Signs:  T(F): 96.3   HR: 75   BP: 128/86  RR: 20  SpO2: 99%   Physical Exam  Gen: NAD  HEENT: NC/AT  Cardio: S1/S2  Resp: CTA B/L  Abdomen: Soft, ND/NT  Neuro: AAOx3  Extremities: FROM x 4                   11.0   3.21  )-----------( 228      ( 19 Aug 2019 08:23 )             33.3       08-19    138  |  102  |  4<L>  ----------------------------<  88  3.8   |  25  |  0.5<L>      Calcium, Total Serum: 9.5 mg/dL (08-19-19 @ 08:23)      LFTs:             8.1  | 0.4  | 73       ------------------[72      ( 18 Aug 2019 19:55 )  4.6  | x    | 24          Lipase:96         Lactate, Blood: 1.1 mmol/L (08-18-19 @ 19:55)      Alcohol, Blood: <10 mg/dL (08-19-19 @ 08:23)      RADIOLOGY & ADDITIONAL STUDIES:    CT Abdomen and Pelvis w/ IV Cont 08.18.19  IMPRESSION:    Acute on chronic pancreatitis.    No peripancreatic fluid collection.

## 2019-08-20 NOTE — PROGRESS NOTE ADULT - ATTENDING COMMENTS
Agree with above, she notes she can tolerate diet. Follow up with [facility] at [facility]. Can likely discharge tomorrow or latter today if patient feels comfortable. Recall us if needed

## 2019-08-20 NOTE — PROGRESS NOTE ADULT - ASSESSMENT
Patient is a 43 y/o with PMHx of  alcohol abuse (drinks 2-3 alcoholic beverages per day, no history of withdraw or seizures) and recurrent pancreatitis ( first started five years ago secondary to EtOH abuse) that presented to ED for abdominal pain. Advanced GI consulted as had previous small Pancreatic collection which was not seen on this CT. She does note while this episode was severe, it wasn't quite as bad as last time. Continue hydration as is. She tolerated diet. While we should treat her pain do not give too much Opiods as she does appear comfortable with appropriate vitals.     Pancreatitis  - Denies Toxic habits to me, only had one drink since last admission, ( U Tox done last admission + for THC and Opiods  - Triglycerides 144  - Get Igg levels  - No need to trend Pancreatic enzymes  - LR 200ml/hr  - Advanced diet to low fat  - Dose of enzymes started is perfect to continue   - No Family Hx of Pancreatic disease or pancreatic cancer  - Will follow

## 2019-08-20 NOTE — PROGRESS NOTE ADULT - SUBJECTIVE AND OBJECTIVE BOX
HPI  Patient is a 44y old Female who presents with a chief complaint of Abdominal pain and nausea (20 Aug 2019 07:22)    Currently admitted to medicine with the primary diagnosis of Acute pancreatitis     Today is hospital day 1d.     INTERVAL HPI / OVERNIGHT EVENTS:  Patient was examined and seen at bedside. This morning she is resting comfortably in bed and reports no new issues or overnight events.   Pt still with nausea, no vomiting, was able to eat solid foods today    ROS: Otherwise unremarkable     PAST MEDICAL & SURGICAL HISTORY  Chronic pancreatitis  Pancreatitis  S/P arthroscopy: meniscal repair    ALLERGIES  Compazine (Unknown)    MEDICATIONS  STANDING MEDICATIONS  chlorhexidine 4% Liquid 1 Application(s) Topical daily  enoxaparin Injectable 40 milliGRAM(s) SubCutaneous daily  famotidine Injectable 20 milliGRAM(s) IV Push daily  folic acid 1 milliGRAM(s) Oral daily  lactated ringers. 1000 milliLiter(s) IV Continuous <Continuous>  multivitamin 1 Tablet(s) Oral daily  oxyCODONE  ER Tablet 10 milliGRAM(s) Oral every 12 hours  pancrelipase  (CREON 12,000 Lipase Units) 3 Capsule(s) Oral four times a day with meals    PRN MEDICATIONS  HYDROmorphone  Injectable 1 milliGRAM(s) IV Push every 3 hours PRN  ondansetron Injectable 4 milliGRAM(s) IV Push every 6 hours PRN    VITALS:  T(F): 97.2  HR: 88  BP: 138/88  RR: 18  SpO2: 99%    PHYSICAL EXAM  GEN: NAD, Resting comfortably in bed  PULM: Clear to auscultation bilaterally, No wheezes  CVS: Regular rate and rhythm, S1-S2, no murmurs  ABD: Soft, tender  EXT: No edema  NEURO: AAOx3, no focal deficits    LABS                        10.3   2.47  )-----------( 227      ( 20 Aug 2019 08:32 )             30.2     08-20    141  |  99  |  3<L>  ----------------------------<  101<H>  3.5   |  31  |  0.5<L>    Ca    9.2      20 Aug 2019 08:32  Phos  4.5     08-19  Mg     1.3     08-20    TPro  6.3  /  Alb  3.9  /  TBili  0.2  /  DBili  x   /  AST  81<H>  /  ALT  31  /  AlkPhos  72  08-20                  RADIOLOGY

## 2019-08-21 ENCOUNTER — TRANSCRIPTION ENCOUNTER (OUTPATIENT)
Age: 44
End: 2019-08-21

## 2019-08-21 VITALS — WEIGHT: 134.92 LBS | HEIGHT: 63 IN

## 2019-08-21 PROCEDURE — 99238 HOSP IP/OBS DSCHRG MGMT 30/<: CPT

## 2019-08-21 RX ORDER — ONDANSETRON 8 MG/1
1 TABLET, FILM COATED ORAL
Qty: 60 | Refills: 0
Start: 2019-08-21 | End: 2019-09-04

## 2019-08-21 RX ORDER — ACETAMINOPHEN 500 MG
650 TABLET ORAL EVERY 6 HOURS
Refills: 0 | Status: DISCONTINUED | OUTPATIENT
Start: 2019-08-21 | End: 2019-08-21

## 2019-08-21 RX ORDER — PANTOPRAZOLE SODIUM 20 MG/1
1 TABLET, DELAYED RELEASE ORAL
Qty: 30 | Refills: 0
Start: 2019-08-21 | End: 2019-09-19

## 2019-08-21 RX ADMIN — OXYCODONE HYDROCHLORIDE 10 MILLIGRAM(S): 5 TABLET ORAL at 05:17

## 2019-08-21 RX ADMIN — Medication 1 MILLIGRAM(S): at 12:17

## 2019-08-21 RX ADMIN — Medication 3 CAPSULE(S): at 07:41

## 2019-08-21 RX ADMIN — HYDROMORPHONE HYDROCHLORIDE 1 MILLIGRAM(S): 2 INJECTION INTRAMUSCULAR; INTRAVENOUS; SUBCUTANEOUS at 03:44

## 2019-08-21 RX ADMIN — PANTOPRAZOLE SODIUM 40 MILLIGRAM(S): 20 TABLET, DELAYED RELEASE ORAL at 07:40

## 2019-08-21 RX ADMIN — Medication 1 TABLET(S): at 12:17

## 2019-08-21 RX ADMIN — SODIUM CHLORIDE 200 MILLILITER(S): 9 INJECTION, SOLUTION INTRAVENOUS at 02:15

## 2019-08-21 RX ADMIN — ONDANSETRON 4 MILLIGRAM(S): 8 TABLET, FILM COATED ORAL at 07:43

## 2019-08-21 RX ADMIN — Medication 3 CAPSULE(S): at 12:17

## 2019-08-21 RX ADMIN — Medication 650 MILLIGRAM(S): at 12:18

## 2019-08-21 RX ADMIN — HYDROMORPHONE HYDROCHLORIDE 1 MILLIGRAM(S): 2 INJECTION INTRAMUSCULAR; INTRAVENOUS; SUBCUTANEOUS at 07:48

## 2019-08-21 RX ADMIN — HYDROMORPHONE HYDROCHLORIDE 1 MILLIGRAM(S): 2 INJECTION INTRAMUSCULAR; INTRAVENOUS; SUBCUTANEOUS at 00:13

## 2019-08-21 RX ADMIN — HYDROMORPHONE HYDROCHLORIDE 1 MILLIGRAM(S): 2 INJECTION INTRAMUSCULAR; INTRAVENOUS; SUBCUTANEOUS at 03:14

## 2019-08-21 RX ADMIN — SODIUM CHLORIDE 200 MILLILITER(S): 9 INJECTION, SOLUTION INTRAVENOUS at 06:48

## 2019-08-21 NOTE — DISCHARGE NOTE PROVIDER - CARE PROVIDER_API CALL
Petey Elizondo)  Gastroenterology; Internal Medicine  [address] [place] [address]  Phone: [phone]  Fax: [phone]  Follow Up Time:     PCP,   Phone: (   )    -  Fax: (   )    -  Follow Up Time:

## 2019-08-21 NOTE — DISCHARGE NOTE NURSING/CASE MANAGEMENT/SOCIAL WORK - NSDCDPATPORTLINK_GEN_ALL_CORE
You can access the [facility] Patient Portal, offered by [facility], by registering with the following website: [email or web address]

## 2019-08-21 NOTE — DIETITIAN INITIAL EVALUATION ADULT. - RD TO REMAIN AVAILABLE
yes/INTERVENTION: meals and snacks; ME: RD to monitor diet order, energy intake, body composition, NFPF

## 2019-08-21 NOTE — PROGRESS NOTE ADULT - ASSESSMENT
44 F w hx ETOH abuse p/w recurrent pancreatitis      *Acute on chronic  pancreatitis   alcohol cessation counseling  adv diet as sheree  DC home today         #Progress Note Handoff    Pending (specify):  none    Family discussion: ptaao3/3    Disposition: Home_x__/SNF___/Other________/Unknown at this time________ today

## 2019-08-21 NOTE — DISCHARGE NOTE PROVIDER - NSDCCPCAREPLAN_GEN_ALL_CORE_FT
PRINCIPAL DISCHARGE DIAGNOSIS  Diagnosis: Acute pancreatitis  Assessment and Plan of Treatment: Please follow up with GI outpatient, and please discuss IGG level for autoimmune pancreatitis

## 2019-08-21 NOTE — DIETITIAN INITIAL EVALUATION ADULT. - OTHER INFO
Pt with acute on chronic pancreatitis. Received consult for unintentional wt loss. Pt reports a UBW of 135 lbs, but was down to 124 lbs over a month ago. Pt reports that wt loss was d/t pancreatitis flare up and hospitalization. This is a 8% unintentional wt loss over the period of 1 month. Pt current wt is 130 lbs. This is a 3.7% wt loss in comparison to UBW from 1 month ago. This is not significant in this time frame. (>5% loss in 1 month for malnutrition), pt reports that she was able to gain some wt back s/p previous hospital admission. Pt does not show any physical signs of muscle wasting/fat loss at this time.  Pt reports that she has been eating well with a good appetite and has been able to keep food down. Pt reports that pta she follows dietary restrictions r/t pancreatits (low fat, low fiber, sm. frequent meals). Skin assessment on 8/20 shows skin is intact. Pt takes MVI and folic acid pta. Pt has NKFA/intolerances. Pt denies chewing/swallowing difficulty. Pt reports previous nausea but reports that she has been given meds which alleviated symptoms. Pt denies diarrhea/constipation at this time. Unsure of last BM, none documented in EMR- but no s/s GI discomfort noted.

## 2019-08-21 NOTE — PROGRESS NOTE ADULT - SUBJECTIVE AND OBJECTIVE BOX
no overnight events    Patient's symptoms of abdominal pain improving.  she has been tolerating PO intake and is ready for dispo home      Vital Signs Last 24 Hrs  T(C): 36 (21 Aug 2019 05:09), Max: 36.5 (20 Aug 2019 20:00)  T(F): 96.8 (21 Aug 2019 05:09), Max: 97.7 (20 Aug 2019 20:00)  HR: 72 (21 Aug 2019 05:09) (72 - 91)  BP: 127/82 (21 Aug 2019 05:09) (111/64 - 134/90)  BP(mean): --  RR: 18 (21 Aug 2019 05:09) (16 - 18)  SpO2: --      PHYSICAL EXAM  GEN: NAD, Resting comfortably in bed  PULM: Clear to auscultation bilaterally, No wheezes  CVS: Regular rate and rhythm, S1-S2, no murmurs  ABD: Soft, tender  EXT: No edema  NEURO: AAOx3, no focal deficits      LABS:                                 10.3   2.47  )-----------( 227      ( 20 Aug 2019 08:32 )             30.2     08-20    141  |  99  |  3<L>  ----------------------------<  101<H>  3.5   |  31  |  0.5<L>    Ca    9.2      20 Aug 2019 08:32  Mg     1.3     08-20    TPro  6.3  /  Alb  3.9  /  TBili  0.2  /  DBili  x   /  AST  81<H>  /  ALT  31  /  AlkPhos  72  08-20

## 2019-08-21 NOTE — DIETITIAN INITIAL EVALUATION ADULT. - ENERGY NEEDS
kcal: [id number] (MSJ x 1.2-1.3 AF)   protein: 71-89 g (1.2-1.5 g/kg) acute on chronic pancreatitis  fluid: 1mL/kcal or per LIP

## 2019-08-21 NOTE — DISCHARGE NOTE PROVIDER - HOSPITAL COURSE
44 year old woman with history of alcohol abuse (drinks 2-3 alcoholic beverages per day, no history of withdraw or seizures) and recurrent pancreatitis ( first started five years ago secondary to EtOH abuse) presented to ED for abdominal pain.        *Acute on chronic pancreatitis     -lipase=96 typical abdominal pain and positive findings on CT scan    -IV hydration given, monitored urine output    -Hct 40 baseline 30s, BUN=6, no evidence of fluid collection    -pain controled with dilaudid    -completed alcohol abstinence discussion    -RUQ US normal    - TG -144    - Looking at IGG levels for autoimmune pancreatitis, f/u with opt GI                DVT PPx: Lovenox 40 mg s/c    GI PPX: Protonix 40 mg PO, to continue outpatient    Diet: NPO for now    Activity: Increase as tolerated

## 2019-08-22 ENCOUNTER — CHART COPY (OUTPATIENT)
Age: 44
End: 2019-08-22

## 2019-08-22 ENCOUNTER — OUTPATIENT (OUTPATIENT)
Dept: OUTPATIENT SERVICES | Facility: HOSPITAL | Age: 44
LOS: 1 days | Discharge: HOME | End: 2019-08-22
Payer: MEDICAID

## 2019-08-22 ENCOUNTER — APPOINTMENT (OUTPATIENT)
Dept: INTERNAL MEDICINE | Facility: CLINIC | Age: 44
End: 2019-08-22

## 2019-08-22 ENCOUNTER — OUTPATIENT (OUTPATIENT)
Dept: OUTPATIENT SERVICES | Facility: HOSPITAL | Age: 44
LOS: 1 days | Discharge: HOME | End: 2019-08-22

## 2019-08-22 VITALS
SYSTOLIC BLOOD PRESSURE: 114 MMHG | DIASTOLIC BLOOD PRESSURE: 78 MMHG | HEART RATE: 103 BPM | BODY MASS INDEX: 21.51 KG/M2 | TEMPERATURE: 97.8 F | WEIGHT: 126 LBS | HEIGHT: 64 IN

## 2019-08-22 DIAGNOSIS — H91.11 PRESBYCUSIS, RIGHT EAR: ICD-10-CM

## 2019-08-22 DIAGNOSIS — Z98.890 OTHER SPECIFIED POSTPROCEDURAL STATES: Chronic | ICD-10-CM

## 2019-08-22 LAB
IGG SERPL-MCNC: 1055 MG/DL — SIGNIFICANT CHANGE UP (ref 700–1600)
IGG1 SER-MCNC: 432 MG/DL — SIGNIFICANT CHANGE UP (ref 248–810)
IGG2 SER-MCNC: 471 MG/DL — SIGNIFICANT CHANGE UP (ref 130–555)
IGG3 SER-MCNC: 51 MG/DL — SIGNIFICANT CHANGE UP (ref 15–102)
IGG4 SER-MCNC: 59 MG/DL — SIGNIFICANT CHANGE UP (ref 2–96)

## 2019-08-22 PROCEDURE — 99201 OFFICE OUTPATIENT NEW 10 MINUTES: CPT

## 2019-08-22 NOTE — REVIEW OF SYSTEMS
[Chills] : chills [Abdominal Pain] : abdominal pain [Fever] : no fever [Discharge] : no discharge [Chest Pain] : no chest pain [Shortness Of Breath] : no shortness of breath [Wheezing] : no wheezing [Cough] : no cough [Nausea] : no nausea [Constipation] : no constipation [Diarrhea] : diarrhea [Vomiting] : no vomiting [Dysuria] : no dysuria [Joint Pain] : no joint pain [Muscle Pain] : no muscle pain
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
## 2019-08-22 NOTE — PHYSICAL EXAM
[Well Nourished] : well nourished [Well Developed] : well developed [No Respiratory Distress] : no respiratory distress  [No Accessory Muscle Use] : no accessory muscle use [Normal Rate] : normal rate  [Regular Rhythm] : with a regular rhythm [Soft] : abdomen soft [Non-distended] : non-distended [Normal Bowel Sounds] : normal bowel sounds [de-identified] : epigastric tenderness [de-identified] : anxious

## 2019-08-23 DIAGNOSIS — K85.90 ACUTE PANCREATITIS WITHOUT NECROSIS OR INFECTION, UNSPECIFIED: ICD-10-CM

## 2019-08-23 DIAGNOSIS — Z91.419 PERSONAL HISTORY OF UNSPECIFIED ADULT ABUSE: ICD-10-CM

## 2019-08-23 DIAGNOSIS — F41.9 ANXIETY DISORDER, UNSPECIFIED: ICD-10-CM

## 2019-08-23 DIAGNOSIS — K86.0 ALCOHOL-INDUCED CHRONIC PANCREATITIS: ICD-10-CM

## 2019-08-23 DIAGNOSIS — Z80.0 FAMILY HISTORY OF MALIGNANT NEOPLASM OF DIGESTIVE ORGANS: ICD-10-CM
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
## 2019-08-23 DIAGNOSIS — Z88.8 ALLERGY STATUS TO OTHER DRUGS, MEDICAMENTS AND BIOLOGICAL SUBSTANCES STATUS: ICD-10-CM

## 2019-08-23 DIAGNOSIS — F10.10 ALCOHOL ABUSE, UNCOMPLICATED: ICD-10-CM

## 2019-08-23 DIAGNOSIS — R10.9 UNSPECIFIED ABDOMINAL PAIN: ICD-10-CM

## 2019-08-28 DIAGNOSIS — F32.1 MAJOR DEPRESSIVE DISORDER, SINGLE EPISODE, MODERATE: ICD-10-CM

## 2019-09-12 ENCOUNTER — APPOINTMENT (OUTPATIENT)
Dept: OTOLARYNGOLOGY | Facility: CLINIC | Age: 44
End: 2019-09-12

## 2019-09-18 ENCOUNTER — APPOINTMENT (OUTPATIENT)
Dept: PAIN MANAGEMENT | Facility: CLINIC | Age: 44
End: 2019-09-18

## 2019-10-04 ENCOUNTER — OUTPATIENT (OUTPATIENT)
Dept: OUTPATIENT SERVICES | Facility: HOSPITAL | Age: 44
LOS: 1 days | Discharge: HOME | End: 2019-10-04

## 2019-10-04 ENCOUNTER — APPOINTMENT (OUTPATIENT)
Dept: GASTROENTEROLOGY | Facility: CLINIC | Age: 44
End: 2019-10-04
Payer: MEDICAID

## 2019-10-04 VITALS
DIASTOLIC BLOOD PRESSURE: 98 MMHG | SYSTOLIC BLOOD PRESSURE: 140 MMHG | BODY MASS INDEX: 21.85 KG/M2 | WEIGHT: 128 LBS | HEART RATE: 98 BPM | HEIGHT: 64 IN

## 2019-10-04 DIAGNOSIS — Z98.890 OTHER SPECIFIED POSTPROCEDURAL STATES: Chronic | ICD-10-CM

## 2019-10-04 DIAGNOSIS — Z72.89 OTHER PROBLEMS RELATED TO LIFESTYLE: ICD-10-CM

## 2019-10-04 PROCEDURE — 99204 OFFICE O/P NEW MOD 45 MIN: CPT

## 2019-10-04 PROCEDURE — 99214 OFFICE O/P EST MOD 30 MIN: CPT

## 2019-10-04 NOTE — HISTORY OF PRESENT ILLNESS
[Heartburn] : denies heartburn [Vomiting] : denies vomiting [Diarrhea] : denies diarrhea [Constipation] : denies constipation [Yellow Skin Or Eyes (Jaundice)] : denies jaundice [Abdominal Swelling] : denies abdominal swelling [Rectal Pain] : denies rectal pain [Nausea] : nausea [Abdominal Pain] : abdominal pain [Wt Loss ___ Lbs] : no recent weight loss [de-identified] : 44 year old female with history of etoh abuse, recurrent pancreatitis since 4 years with four episodes most recent was last month and she is following up from a 13 day hospital stay with one day in the ICU. She was Discharged on oxycodone, anti nausea meds , pancreatic enzymes . \par Today pt is here for GI evaluation \par Pt still c/o epigastric sharp pain on and off with radiation to back , moderate intensity , no clear aggravating and reliving factors associated with nausea. Pt reports cut down of alcohol intake but still drinks 2-3 glasses of wine per weeks \par No weight loss\par No family h/o pancreatic disease \par \par \par  \par

## 2019-10-04 NOTE — PHYSICAL EXAM
[General Appearance - Alert] : alert [General Appearance - In No Acute Distress] : in no acute distress [Sclera] : the sclera and conjunctiva were normal [PERRL With Normal Accommodation] : pupils were equal in size, round, and reactive to light [Extraocular Movements] : extraocular movements were intact [Outer Ear] : the ears and nose were normal in appearance [Oropharynx] : the oropharynx was normal [Neck Appearance] : the appearance of the neck was normal [Neck Cervical Mass (___cm)] : no neck mass was observed [Jugular Venous Distention Increased] : there was no jugular-venous distention [Thyroid Diffuse Enlargement] : the thyroid was not enlarged [Thyroid Nodule] : there were no palpable thyroid nodules [Auscultation Breath Sounds / Voice Sounds] : lungs were clear to auscultation bilaterally [Heart Rate And Rhythm] : heart rate was normal and rhythm regular [Heart Sounds] : normal S1 and S2 [Heart Sounds Gallop] : no gallops [Murmurs] : no murmurs [Bowel Sounds] : normal bowel sounds [Heart Sounds Pericardial Friction Rub] : no pericardial rub [Abdomen Soft] : soft [Abdomen Tenderness] : non-tender [] : no hepato-splenomegaly [Abdomen Mass (___ Cm)] : no abdominal mass palpated [No CVA Tenderness] : no ~M costovertebral angle tenderness [No Spinal Tenderness] : no spinal tenderness [Abnormal Walk] : normal gait [Nail Clubbing] : no clubbing  or cyanosis of the fingernails [Musculoskeletal - Swelling] : no joint swelling seen [Motor Tone] : muscle strength and tone were normal [Deep Tendon Reflexes (DTR)] : deep tendon reflexes were 2+ and symmetric [Sensation] : the sensory exam was normal to light touch and pinprick [No Focal Deficits] : no focal deficits

## 2019-10-07 ENCOUNTER — INPATIENT (INPATIENT)
Facility: HOSPITAL | Age: 44
LOS: 4 days | Discharge: HOME | End: 2019-10-12
Attending: INTERNAL MEDICINE | Admitting: INTERNAL MEDICINE
Payer: MEDICAID

## 2019-10-07 VITALS
WEIGHT: 128.09 LBS | SYSTOLIC BLOOD PRESSURE: 165 MMHG | TEMPERATURE: 98 F | OXYGEN SATURATION: 100 % | RESPIRATION RATE: 20 BRPM | DIASTOLIC BLOOD PRESSURE: 99 MMHG | HEART RATE: 130 BPM

## 2019-10-07 DIAGNOSIS — Z72.89 OTHER PROBLEMS RELATED TO LIFESTYLE: ICD-10-CM

## 2019-10-07 DIAGNOSIS — K85.90 ACUTE PANCREATITIS WITHOUT NECROSIS OR INFECTION, UNSPECIFIED: ICD-10-CM

## 2019-10-07 DIAGNOSIS — R94.5 ABNORMAL RESULTS OF LIVER FUNCTION STUDIES: ICD-10-CM

## 2019-10-07 DIAGNOSIS — Z98.890 OTHER SPECIFIED POSTPROCEDURAL STATES: Chronic | ICD-10-CM

## 2019-10-07 LAB
ALBUMIN SERPL ELPH-MCNC: 5.1 G/DL — SIGNIFICANT CHANGE UP (ref 3.5–5.2)
ALP SERPL-CCNC: 75 U/L — SIGNIFICANT CHANGE UP (ref 30–115)
ALT FLD-CCNC: 19 U/L — SIGNIFICANT CHANGE UP (ref 0–41)
ANION GAP SERPL CALC-SCNC: 18 MMOL/L — HIGH (ref 7–14)
AST SERPL-CCNC: 77 U/L — HIGH (ref 0–41)
BASOPHILS # BLD AUTO: 0.04 K/UL — SIGNIFICANT CHANGE UP (ref 0–0.2)
BASOPHILS NFR BLD AUTO: 1 % — SIGNIFICANT CHANGE UP (ref 0–1)
BILIRUB SERPL-MCNC: 0.8 MG/DL — SIGNIFICANT CHANGE UP (ref 0.2–1.2)
BUN SERPL-MCNC: 11 MG/DL — SIGNIFICANT CHANGE UP (ref 10–20)
CALCIUM SERPL-MCNC: 10.9 MG/DL — HIGH (ref 8.5–10.1)
CHLORIDE SERPL-SCNC: 98 MMOL/L — SIGNIFICANT CHANGE UP (ref 98–110)
CO2 SERPL-SCNC: 21 MMOL/L — SIGNIFICANT CHANGE UP (ref 17–32)
CREAT SERPL-MCNC: 0.7 MG/DL — SIGNIFICANT CHANGE UP (ref 0.7–1.5)
EOSINOPHIL # BLD AUTO: 0.01 K/UL — SIGNIFICANT CHANGE UP (ref 0–0.7)
EOSINOPHIL NFR BLD AUTO: 0.3 % — SIGNIFICANT CHANGE UP (ref 0–8)
GLUCOSE SERPL-MCNC: 105 MG/DL — HIGH (ref 70–99)
HCG SERPL QL: NEGATIVE — SIGNIFICANT CHANGE UP
HCT VFR BLD CALC: 41.3 % — SIGNIFICANT CHANGE UP (ref 37–47)
HGB BLD-MCNC: 14.5 G/DL — SIGNIFICANT CHANGE UP (ref 12–16)
IMM GRANULOCYTES NFR BLD AUTO: 0.3 % — SIGNIFICANT CHANGE UP (ref 0.1–0.3)
LACTATE SERPL-SCNC: 1.9 MMOL/L — SIGNIFICANT CHANGE UP (ref 0.5–2.2)
LIDOCAIN IGE QN: 44 U/L — SIGNIFICANT CHANGE UP (ref 7–60)
LYMPHOCYTES # BLD AUTO: 0.9 K/UL — LOW (ref 1.2–3.4)
LYMPHOCYTES # BLD AUTO: 22.7 % — SIGNIFICANT CHANGE UP (ref 20.5–51.1)
MCHC RBC-ENTMCNC: 32.9 PG — HIGH (ref 27–31)
MCHC RBC-ENTMCNC: 35.1 G/DL — SIGNIFICANT CHANGE UP (ref 32–37)
MCV RBC AUTO: 93.7 FL — SIGNIFICANT CHANGE UP (ref 81–99)
MONOCYTES # BLD AUTO: 0.3 K/UL — SIGNIFICANT CHANGE UP (ref 0.1–0.6)
MONOCYTES NFR BLD AUTO: 7.6 % — SIGNIFICANT CHANGE UP (ref 1.7–9.3)
NEUTROPHILS # BLD AUTO: 2.71 K/UL — SIGNIFICANT CHANGE UP (ref 1.4–6.5)
NEUTROPHILS NFR BLD AUTO: 68.1 % — SIGNIFICANT CHANGE UP (ref 42.2–75.2)
NRBC # BLD: 0 /100 WBCS — SIGNIFICANT CHANGE UP (ref 0–0)
PLATELET # BLD AUTO: 276 K/UL — SIGNIFICANT CHANGE UP (ref 130–400)
POTASSIUM SERPL-MCNC: 3.6 MMOL/L — SIGNIFICANT CHANGE UP (ref 3.5–5)
POTASSIUM SERPL-SCNC: 3.6 MMOL/L — SIGNIFICANT CHANGE UP (ref 3.5–5)
PROT SERPL-MCNC: 9 G/DL — HIGH (ref 6–8)
RBC # BLD: 4.41 M/UL — SIGNIFICANT CHANGE UP (ref 4.2–5.4)
RBC # FLD: 11.9 % — SIGNIFICANT CHANGE UP (ref 11.5–14.5)
SODIUM SERPL-SCNC: 137 MMOL/L — SIGNIFICANT CHANGE UP (ref 135–146)
WBC # BLD: 3.97 K/UL — LOW (ref 4.8–10.8)
WBC # FLD AUTO: 3.97 K/UL — LOW (ref 4.8–10.8)

## 2019-10-07 PROCEDURE — 99285 EMERGENCY DEPT VISIT HI MDM: CPT

## 2019-10-07 PROCEDURE — 74177 CT ABD & PELVIS W/CONTRAST: CPT | Mod: 26

## 2019-10-07 PROCEDURE — 93010 ELECTROCARDIOGRAM REPORT: CPT

## 2019-10-07 RX ORDER — SODIUM CHLORIDE 9 MG/ML
1800 INJECTION, SOLUTION INTRAVENOUS ONCE
Refills: 0 | Status: COMPLETED | OUTPATIENT
Start: 2019-10-07 | End: 2019-10-07

## 2019-10-07 RX ORDER — FAMOTIDINE 10 MG/ML
20 INJECTION INTRAVENOUS ONCE
Refills: 0 | Status: COMPLETED | OUTPATIENT
Start: 2019-10-07 | End: 2019-10-07

## 2019-10-07 RX ORDER — SODIUM CHLORIDE 9 MG/ML
1000 INJECTION, SOLUTION INTRAVENOUS
Refills: 0 | Status: DISCONTINUED | OUTPATIENT
Start: 2019-10-07 | End: 2019-10-10

## 2019-10-07 RX ORDER — ONDANSETRON 8 MG/1
4 TABLET, FILM COATED ORAL EVERY 6 HOURS
Refills: 0 | Status: DISCONTINUED | OUTPATIENT
Start: 2019-10-07 | End: 2019-10-12

## 2019-10-07 RX ORDER — HYDROMORPHONE HYDROCHLORIDE 2 MG/ML
1 INJECTION INTRAMUSCULAR; INTRAVENOUS; SUBCUTANEOUS ONCE
Refills: 0 | Status: DISCONTINUED | OUTPATIENT
Start: 2019-10-07 | End: 2019-10-07

## 2019-10-07 RX ORDER — ONDANSETRON 8 MG/1
4 TABLET, FILM COATED ORAL ONCE
Refills: 0 | Status: COMPLETED | OUTPATIENT
Start: 2019-10-07 | End: 2019-10-07

## 2019-10-07 RX ORDER — HYDROMORPHONE HYDROCHLORIDE 2 MG/ML
1 INJECTION INTRAMUSCULAR; INTRAVENOUS; SUBCUTANEOUS EVERY 4 HOURS
Refills: 0 | Status: DISCONTINUED | OUTPATIENT
Start: 2019-10-07 | End: 2019-10-10

## 2019-10-07 RX ORDER — MORPHINE SULFATE 50 MG/1
4 CAPSULE, EXTENDED RELEASE ORAL ONCE
Refills: 0 | Status: DISCONTINUED | OUTPATIENT
Start: 2019-10-07 | End: 2019-10-07

## 2019-10-07 RX ORDER — LIPASE/PROTEASE/AMYLASE 16-48-48K
3 CAPSULE,DELAYED RELEASE (ENTERIC COATED) ORAL
Refills: 0 | Status: DISCONTINUED | OUTPATIENT
Start: 2019-10-07 | End: 2019-10-12

## 2019-10-07 RX ORDER — HEPARIN SODIUM 5000 [USP'U]/ML
5000 INJECTION INTRAVENOUS; SUBCUTANEOUS EVERY 12 HOURS
Refills: 0 | Status: DISCONTINUED | OUTPATIENT
Start: 2019-10-07 | End: 2019-10-12

## 2019-10-07 RX ADMIN — FAMOTIDINE 20 MILLIGRAM(S): 10 INJECTION INTRAVENOUS at 18:08

## 2019-10-07 RX ADMIN — MORPHINE SULFATE 4 MILLIGRAM(S): 50 CAPSULE, EXTENDED RELEASE ORAL at 18:08

## 2019-10-07 RX ADMIN — MORPHINE SULFATE 4 MILLIGRAM(S): 50 CAPSULE, EXTENDED RELEASE ORAL at 20:44

## 2019-10-07 RX ADMIN — ONDANSETRON 4 MILLIGRAM(S): 8 TABLET, FILM COATED ORAL at 18:08

## 2019-10-07 RX ADMIN — SODIUM CHLORIDE 1800 MILLILITER(S): 9 INJECTION, SOLUTION INTRAVENOUS at 18:08

## 2019-10-07 RX ADMIN — HYDROMORPHONE HYDROCHLORIDE 1 MILLIGRAM(S): 2 INJECTION INTRAMUSCULAR; INTRAVENOUS; SUBCUTANEOUS at 21:08

## 2019-10-07 RX ADMIN — ONDANSETRON 4 MILLIGRAM(S): 8 TABLET, FILM COATED ORAL at 21:11

## 2019-10-07 NOTE — H&P ADULT - NSHPPHYSICALEXAM_GEN_ALL_CORE
PHYSICAL EXAM:  GENERAL: NAD, well-developed  HEAD:  Atraumatic, Normocephalic  EYES: EOMI, PERRLA, conjunctiva and sclera clear  NECK: Supple, No JVD  CHEST/LUNG: Clear to auscultation bilaterally; No wheeze  HEART: Regular rate and rhythm; No murmurs, rubs, or gallops  ABDOMEN: abdominal tenderness  EXTREMITIES:  2+ Peripheral Pulses, No clubbing, cyanosis, or edema  PSYCH: AAOx3  NEUROLOGY: non-focal  SKIN: No rashes or lesions

## 2019-10-07 NOTE — H&P ADULT - HISTORY OF PRESENT ILLNESS
44 year old female with a PMH  of pancreatitis presents here for abdominal pain. Patient states over the last 24 hours she's been having increasing pain "all over her abdomen". Symptoms have been associated with nausea and vomiting. Patient was seen by her GI doctor and Friday before symptoms began and was started back on her enzymes. Patient states her pancreatitis was due to alcohol. Patient endorses drinking wine on Friday Patient denies any fever chills cough cp sob urinary frequency urgency or burning. LMP in august.   In the ED pt underwent Ct , which was negative for any acute changes.

## 2019-10-07 NOTE — ED ADULT NURSE NOTE - NSIMPLEMENTINTERV_GEN_ALL_ED
Implemented All Universal Safety Interventions:  [place] to call system. Call bell, personal items and telephone within reach. Instruct patient to call for assistance. Room bathroom lighting operational. Non-slip footwear when patient is off stretcher. Physically safe environment: no spills, clutter or unnecessary equipment. Stretcher in lowest position, wheels locked, appropriate side rails in place.

## 2019-10-07 NOTE — ED ADULT TRIAGE NOTE - CHIEF COMPLAINT QUOTE
hx of pancreatitis. flare up started yesterday afternoon. nausea and vomiting. heartburn. went to gi and started back on medications. not helping,

## 2019-10-07 NOTE — H&P ADULT - NSHPLABSRESULTS_GEN_ALL_CORE
CBC Full  -  ( 07 Oct 2019 17:30 )  WBC Count : 3.97 K/uL  RBC Count : 4.41 M/uL  Hemoglobin : 14.5 g/dL  Hematocrit : 41.3 %  Platelet Count - Automated : 276 K/uL  Mean Cell Volume : 93.7 fL  Mean Cell Hemoglobin : 32.9 pg  Mean Cell Hemoglobin Concentration : 35.1 g/dL  Auto Neutrophil # : 2.71 K/uL  Auto Lymphocyte # : 0.90 K/uL  Auto Monocyte # : 0.30 K/uL  Auto Eosinophil # : 0.01 K/uL  Auto Basophil # : 0.04 K/uL  Auto Neutrophil % : 68.1 %  Auto Lymphocyte % : 22.7 %  Auto Monocyte % : 7.6 %  Auto Eosinophil % : 0.3 %  Auto Basophil % : 1.0 %    10-07    137  |  98  |  11  ----------------------------<  105<H>  3.6   |  21  |  0.7    Ca    10.9<H>      07 Oct 2019 17:30    TPro  9.0<H>  /  Alb  5.1  /  TBili  0.8  /  DBili  x   /  AST  77<H>  /  ALT  19  /  AlkPhos  75  10-07

## 2019-10-07 NOTE — ED PROVIDER NOTE - ATTENDING CONTRIBUTION TO CARE
I personally evaluated the patient. I reviewed the Resident’s or Physician Assistant’s note (as assigned above), and agree with the findings and plan except as documented in my note.     44 female here for epigastric pain from home, known history of pancreatitis, this was exacerbated by recent intake of red wine while making dinner. States epigastric pain, persistent, associated with nausea and vomiting, PO intolerance. Symptoms for several days not improving.     ROS otherwise unremarkable    PE: female in no distress. Appears uncomfortable. CV: pulses intact. CHEST: normal work of breathing. ABD: nondistended. tender in epigastrium without rebound or guarding. SKIN: normal. EXT: FROM. NEURO: AAO 3 no focal deficits. HEENT: mucosa normal non icteric     Impression: epigastric pain     Plan: IV labs imaging supportive care and reevaluation

## 2019-10-07 NOTE — ED PROVIDER NOTE - NS ED ROS FT
Constitutional: See HPI.  Eyes: No visual changes,  ENMT:  No neck pain   Cardiac: No cp  Respiratory: No cough   GI: see hpi  : No dysuria, frequency or burning.  MS: No myalgia, muscle weakness, joint pain or back pain.  Psych: No suicidal or homicidal ideations.  Neuro: No headache   Skin: No skin rash.

## 2019-10-07 NOTE — ED PROVIDER NOTE - CLINICAL SUMMARY MEDICAL DECISION MAKING FREE TEXT BOX
44 female here for abdominal pain, known history of pancreatitis, had screening labs imaging supportive care and reevaluation, required IV analgesia with opiate Rx and antiemetics for control of nausea, patient feels uncomfortable going home due to intractable pain and PO intolerance. Will admit for continued management.

## 2019-10-07 NOTE — ED ADULT NURSE NOTE - OBJECTIVE STATEMENT
Pt c/o abdominal pain, nausea, vomiting, diarrhea, pt states she has a history if pancreatitis and believes this is a pancreatitis flare up. Denies fevers, chills, chest pain, or SOB. Pt is A&Ox4. Abdomen soft nondistended.

## 2019-10-07 NOTE — ED PROVIDER NOTE - PHYSICAL EXAMINATION
CONSTITUTIONAL: WA / WN / NAD  HEAD: NCAT  EYES: PERRL; EOMI;   ENT: Normal pharynx; mucous membranes pink/moist, no erythema.  NECK: Supple; no meningeal signs  CARD: RRR; nl S1/S2; no M/R/G.   RESP: Respiratory rate and effort are normal; breath sounds clear and equal bilaterally.  ABD: Soft, ND + epigastric ttp  MSK/EXT: No gross deformities; full range of motion.  SKIN: Warm and dry;   NEURO: AAOx3,  PSYCH: Memory Intact, Normal Affect

## 2019-10-07 NOTE — H&P ADULT - ASSESSMENT
44 year old female with a PMH  of pancreatitis presents here for abdominal pain. Patient states over the last 24 hours she's been having increasing pain "all over her abdomen". Symptoms have been associated with nausea and vomiting. Patient was seen by her GI doctor and Friday before symptoms began and was started back on her enzymes. Patient states her pancreatitis was due to alcohol. Patient endorses drinking wine on Friday Patient denies any fever chills cough cp sob urinary frequency urgency or burning. LMP in august.   In the ED pt underwent Ct , which was negative for any acute changes.     #) Chronic Pancreatitis  - admit to medicine  - IV LR @ 150  - Pain control with Dilaudid and Zofran for nausea  - Last drink on friday  - CIWA protocol  - Enzymes replacement      #) Alcohol ABUSE  - CIWA protocol      #) Diet Advance as tolerated    #)DVT PPX Heparin    Dispo Acute

## 2019-10-07 NOTE — H&P ADULT - ATTENDING COMMENTS
44 year old female with a PMH of alcoholism and chronic pancreatitis came to ED c/o abdominal pain. Patient reports chronic abdominal pain but since yesterday she started with severe epigastric pain radiates to the back with nausea and non-bloody vomiting. She was seen recently by GI and the plan for abdomen MRI and she was started on pancreatic enzymes, she denies any diarrhea, no chest pain or SOB. She admit she had wine two days ago. In the ED , her vital signs were stable, labs showed normal LFTs and Lipase. pt underwent Ct , which was negative for any acute changes.     PHYSICAL EXAM:  GENERAL: NAD, well-developed  HEAD:  Atraumatic, Normocephalic  EYES: EOMI, PERRLA, conjunctiva and sclera clear  NECK: Supple, No JVD  CHEST/LUNG: Clear to auscultation bilaterally; No wheeze  HEART: Regular rate and rhythm; No murmurs, rubs, or gallops  ABDOMEN: Soft, mild epigastric tenderness.   EXTREMITIES:  2+ Peripheral Pulses, No clubbing, cyanosis, or edema  PSYCH: AAOx3  NEUROLOGY: non-focal  SKIN: No rashes or lesions    A/P:   Chronic Pancreatitis:   Abdominal pain:   No signs of acute pancreatitis.   Continue IV fluid, advance diet as tolerated  Continue IV pain medication for today, switch to PO tomorrow.    Suspected thiamin and folate deficiency  Continue Thiamin and folic acid.     Chronic alcoholism,  No signs of alcohol withdrawal, monitor for DT.

## 2019-10-07 NOTE — ED PROVIDER NOTE - OBJECTIVE STATEMENT
44 year old female with a pmh of pancreatitis presents here for abdominal pain. Patient states over the last 24 hours she's been having increasing pain "all over her abdomen". Symptoms have been associated with nausea and vomiting. Patient was seen by her GI doctor and friday before symptoms began and was started back on her enzymes. PAtient states her pancreatitis was due to alcohol. Patient endorses drinking wine on friday. PAtient denies any fever chills cough cp sob urinary frequency urgency or burning. LMP in august.

## 2019-10-08 LAB
ANION GAP SERPL CALC-SCNC: 15 MMOL/L — HIGH (ref 7–14)
BUN SERPL-MCNC: 5 MG/DL — LOW (ref 10–20)
CALCIUM SERPL-MCNC: 9.4 MG/DL — SIGNIFICANT CHANGE UP (ref 8.5–10.1)
CHLORIDE SERPL-SCNC: 101 MMOL/L — SIGNIFICANT CHANGE UP (ref 98–110)
CO2 SERPL-SCNC: 24 MMOL/L — SIGNIFICANT CHANGE UP (ref 17–32)
CREAT SERPL-MCNC: 0.6 MG/DL — LOW (ref 0.7–1.5)
GLUCOSE SERPL-MCNC: 96 MG/DL — SIGNIFICANT CHANGE UP (ref 70–99)
HCT VFR BLD CALC: 34.1 % — LOW (ref 37–47)
HGB BLD-MCNC: 11.8 G/DL — LOW (ref 12–16)
MCHC RBC-ENTMCNC: 32.6 PG — HIGH (ref 27–31)
MCHC RBC-ENTMCNC: 34.6 G/DL — SIGNIFICANT CHANGE UP (ref 32–37)
MCV RBC AUTO: 94.2 FL — SIGNIFICANT CHANGE UP (ref 81–99)
NRBC # BLD: 0 /100 WBCS — SIGNIFICANT CHANGE UP (ref 0–0)
PLATELET # BLD AUTO: 219 K/UL — SIGNIFICANT CHANGE UP (ref 130–400)
POTASSIUM SERPL-MCNC: 3.5 MMOL/L — SIGNIFICANT CHANGE UP (ref 3.5–5)
POTASSIUM SERPL-SCNC: 3.5 MMOL/L — SIGNIFICANT CHANGE UP (ref 3.5–5)
RBC # BLD: 3.62 M/UL — LOW (ref 4.2–5.4)
RBC # FLD: 12 % — SIGNIFICANT CHANGE UP (ref 11.5–14.5)
SODIUM SERPL-SCNC: 140 MMOL/L — SIGNIFICANT CHANGE UP (ref 135–146)
WBC # BLD: 3.99 K/UL — LOW (ref 4.8–10.8)
WBC # FLD AUTO: 3.99 K/UL — LOW (ref 4.8–10.8)

## 2019-10-08 PROCEDURE — 99222 1ST HOSP IP/OBS MODERATE 55: CPT | Mod: AI

## 2019-10-08 RX ORDER — FAMOTIDINE 10 MG/ML
20 INJECTION INTRAVENOUS DAILY
Refills: 0 | Status: DISCONTINUED | OUTPATIENT
Start: 2019-10-08 | End: 2019-10-12

## 2019-10-08 RX ORDER — ACETAMINOPHEN 500 MG
650 TABLET ORAL ONCE
Refills: 0 | Status: COMPLETED | OUTPATIENT
Start: 2019-10-08 | End: 2019-10-08

## 2019-10-08 RX ORDER — INFLUENZA VIRUS VACCINE 15; 15; 15; 15 UG/.5ML; UG/.5ML; UG/.5ML; UG/.5ML
0.5 SUSPENSION INTRAMUSCULAR ONCE
Refills: 0 | Status: COMPLETED | OUTPATIENT
Start: 2019-10-08 | End: 2019-10-12

## 2019-10-08 RX ADMIN — ONDANSETRON 4 MILLIGRAM(S): 8 TABLET, FILM COATED ORAL at 21:12

## 2019-10-08 RX ADMIN — HYDROMORPHONE HYDROCHLORIDE 1 MILLIGRAM(S): 2 INJECTION INTRAMUSCULAR; INTRAVENOUS; SUBCUTANEOUS at 13:00

## 2019-10-08 RX ADMIN — Medication 3 CAPSULE(S): at 12:05

## 2019-10-08 RX ADMIN — HYDROMORPHONE HYDROCHLORIDE 1 MILLIGRAM(S): 2 INJECTION INTRAMUSCULAR; INTRAVENOUS; SUBCUTANEOUS at 21:12

## 2019-10-08 RX ADMIN — FAMOTIDINE 20 MILLIGRAM(S): 10 INJECTION INTRAVENOUS at 14:56

## 2019-10-08 RX ADMIN — HYDROMORPHONE HYDROCHLORIDE 1 MILLIGRAM(S): 2 INJECTION INTRAMUSCULAR; INTRAVENOUS; SUBCUTANEOUS at 01:28

## 2019-10-08 RX ADMIN — ONDANSETRON 4 MILLIGRAM(S): 8 TABLET, FILM COATED ORAL at 02:31

## 2019-10-08 RX ADMIN — SODIUM CHLORIDE 150 MILLILITER(S): 9 INJECTION, SOLUTION INTRAVENOUS at 08:38

## 2019-10-08 RX ADMIN — HYDROMORPHONE HYDROCHLORIDE 1 MILLIGRAM(S): 2 INJECTION INTRAMUSCULAR; INTRAVENOUS; SUBCUTANEOUS at 09:00

## 2019-10-08 RX ADMIN — HYDROMORPHONE HYDROCHLORIDE 1 MILLIGRAM(S): 2 INJECTION INTRAMUSCULAR; INTRAVENOUS; SUBCUTANEOUS at 12:43

## 2019-10-08 RX ADMIN — Medication 3 CAPSULE(S): at 17:04

## 2019-10-08 RX ADMIN — HYDROMORPHONE HYDROCHLORIDE 1 MILLIGRAM(S): 2 INJECTION INTRAMUSCULAR; INTRAVENOUS; SUBCUTANEOUS at 08:38

## 2019-10-08 RX ADMIN — HYDROMORPHONE HYDROCHLORIDE 1 MILLIGRAM(S): 2 INJECTION INTRAMUSCULAR; INTRAVENOUS; SUBCUTANEOUS at 04:37

## 2019-10-08 RX ADMIN — SODIUM CHLORIDE 150 MILLILITER(S): 9 INJECTION, SOLUTION INTRAVENOUS at 00:52

## 2019-10-08 RX ADMIN — HYDROMORPHONE HYDROCHLORIDE 1 MILLIGRAM(S): 2 INJECTION INTRAMUSCULAR; INTRAVENOUS; SUBCUTANEOUS at 16:57

## 2019-10-08 RX ADMIN — Medication 650 MILLIGRAM(S): at 00:50

## 2019-10-08 RX ADMIN — HYDROMORPHONE HYDROCHLORIDE 1 MILLIGRAM(S): 2 INJECTION INTRAMUSCULAR; INTRAVENOUS; SUBCUTANEOUS at 00:50

## 2019-10-08 RX ADMIN — Medication 650 MILLIGRAM(S): at 01:28

## 2019-10-08 RX ADMIN — ONDANSETRON 4 MILLIGRAM(S): 8 TABLET, FILM COATED ORAL at 12:45

## 2019-10-09 DIAGNOSIS — R10.9 UNSPECIFIED ABDOMINAL PAIN: ICD-10-CM

## 2019-10-09 PROCEDURE — 99233 SBSQ HOSP IP/OBS HIGH 50: CPT

## 2019-10-09 RX ADMIN — HEPARIN SODIUM 5000 UNIT(S): 5000 INJECTION INTRAVENOUS; SUBCUTANEOUS at 17:18

## 2019-10-09 RX ADMIN — ONDANSETRON 4 MILLIGRAM(S): 8 TABLET, FILM COATED ORAL at 09:55

## 2019-10-09 RX ADMIN — SODIUM CHLORIDE 150 MILLILITER(S): 9 INJECTION, SOLUTION INTRAVENOUS at 17:22

## 2019-10-09 RX ADMIN — HYDROMORPHONE HYDROCHLORIDE 1 MILLIGRAM(S): 2 INJECTION INTRAMUSCULAR; INTRAVENOUS; SUBCUTANEOUS at 15:17

## 2019-10-09 RX ADMIN — HYDROMORPHONE HYDROCHLORIDE 1 MILLIGRAM(S): 2 INJECTION INTRAMUSCULAR; INTRAVENOUS; SUBCUTANEOUS at 18:32

## 2019-10-09 RX ADMIN — HYDROMORPHONE HYDROCHLORIDE 1 MILLIGRAM(S): 2 INJECTION INTRAMUSCULAR; INTRAVENOUS; SUBCUTANEOUS at 14:31

## 2019-10-09 RX ADMIN — HYDROMORPHONE HYDROCHLORIDE 1 MILLIGRAM(S): 2 INJECTION INTRAMUSCULAR; INTRAVENOUS; SUBCUTANEOUS at 10:30

## 2019-10-09 RX ADMIN — Medication 3 CAPSULE(S): at 17:19

## 2019-10-09 RX ADMIN — HEPARIN SODIUM 5000 UNIT(S): 5000 INJECTION INTRAVENOUS; SUBCUTANEOUS at 05:54

## 2019-10-09 RX ADMIN — HYDROMORPHONE HYDROCHLORIDE 1 MILLIGRAM(S): 2 INJECTION INTRAMUSCULAR; INTRAVENOUS; SUBCUTANEOUS at 22:54

## 2019-10-09 RX ADMIN — HYDROMORPHONE HYDROCHLORIDE 1 MILLIGRAM(S): 2 INJECTION INTRAMUSCULAR; INTRAVENOUS; SUBCUTANEOUS at 01:11

## 2019-10-09 RX ADMIN — HYDROMORPHONE HYDROCHLORIDE 1 MILLIGRAM(S): 2 INJECTION INTRAMUSCULAR; INTRAVENOUS; SUBCUTANEOUS at 05:59

## 2019-10-09 RX ADMIN — HYDROMORPHONE HYDROCHLORIDE 1 MILLIGRAM(S): 2 INJECTION INTRAMUSCULAR; INTRAVENOUS; SUBCUTANEOUS at 10:14

## 2019-10-09 RX ADMIN — Medication 3 CAPSULE(S): at 12:10

## 2019-10-09 RX ADMIN — FAMOTIDINE 20 MILLIGRAM(S): 10 INJECTION INTRAVENOUS at 12:10

## 2019-10-09 RX ADMIN — Medication 3 CAPSULE(S): at 09:53

## 2019-10-09 RX ADMIN — SODIUM CHLORIDE 150 MILLILITER(S): 9 INJECTION, SOLUTION INTRAVENOUS at 05:55

## 2019-10-09 RX ADMIN — HYDROMORPHONE HYDROCHLORIDE 1 MILLIGRAM(S): 2 INJECTION INTRAMUSCULAR; INTRAVENOUS; SUBCUTANEOUS at 22:38

## 2019-10-09 RX ADMIN — HYDROMORPHONE HYDROCHLORIDE 1 MILLIGRAM(S): 2 INJECTION INTRAMUSCULAR; INTRAVENOUS; SUBCUTANEOUS at 19:02

## 2019-10-09 RX ADMIN — HYDROMORPHONE HYDROCHLORIDE 1 MILLIGRAM(S): 2 INJECTION INTRAMUSCULAR; INTRAVENOUS; SUBCUTANEOUS at 01:41

## 2019-10-09 NOTE — PROGRESS NOTE ADULT - ASSESSMENT
45yo F with Past Medical History Prior Ethanol Abuse and Chronic Pancreatitis admitted for abdominal pain secondary to chronic pancreatitis    Acute abdominal pain secondary to chronic pancreatitis: continue treatment with aggressive IV hydration (LR@250cc/hr).  Continue Dilaudid 1mg IVP q4PRN for pain.  Pain Management evaluation requested for acute on chronic pancreatitis and suspected opioid dependence.  For possible GERD, continue Protonix 40mg PO q24.  Continue clear liquid diet for an additional 24 hours due to increased discomfort associated with PO intake.  Continue supportive care with Creon tablets.    Chronic Ethanol Abuse: no evidence of withdrawal symptoms.  Nursing was advised to monitor CIWA protocol.  Continue supplemental thiamine and folate for possible thiamine and folate deficiencies.  GI/DVT prophylaxis    #Progress Note Handoff    Pending:  Other: Monitor for persistent pain  Monitor CIWA protocol    Future Disposition: Home

## 2019-10-09 NOTE — CONSULT NOTE ADULT - ASSESSMENT
REVIEW OF SYSTEMS    General:	NAD   Skin/Breast:	Neg  Ophthalmologic:	Neg  ENMT: Neg	  Respiratory and Thorax: Neg	  Cardiovascular:	Neg  Gastrointestinal:	Neg  Genitourinary:	Neg  Musculoskeletal:	Neg  Neurological:	Neg  Psychiatric:	Neg  Hematology/Lymphatics:	Neg  Endocrine:	Neg        PHYSICAL EXAM:    GENERAL: NAD, well-groomed, well-developed  HEAD:  Atraumatic, Normocephalic  EYES: EOMI, PERRLA, conjunctiva and sclera clear  ENMT: No tonsillar erythema, exudates, or enlargement; Moist mucous membranes, Good dentition, No lesions  NECK: Supple, No JVD, Normal thyroid  NERVOUS SYSTEM:  Alert & Oriented X3, Good concentration; Motor Strength 5/5 B/L upper and lower extremities; DTRs 2+ intact and symmetric  CHEST/LUNG: Clear to percussion bilaterally; No rales, rhonchi, wheezing, or rubs  HEART: Regular rate and rhythm; No murmurs, rubs, or gallops  ABDOMEN: Soft, Nontender, Nondistended; Bowel sounds present  EXTREMITIES:  2+ Peripheral Pulses, No clubbing, cyanosis, or edema  LYMPH: No lymphadenopathy noted  SKIN: No rashes or lesions      Patient with "No findings to suggest acute pancreatitis." as per CT report. Patient with c/o nausea and mid epigastric pain whenever she eats, which also makes the pain worse. Feels the pain travel upwards. Patient positive for opiates and THC.

## 2019-10-09 NOTE — CONSULT NOTE ADULT - SUBJECTIVE AND OBJECTIVE BOX
Chief Complaint:     44 year old female with a PMH  of pancreatitis, possible GERD. Came to the ED for abd pain since Saturday, but much worse over the past 2 days.           Allergies    Compazine (Unknown)    Intolerances        PAST MEDICAL & SURGICAL HISTORY:  Chronic pancreatitis  Pancreatitis  S/P arthroscopy: meniscal repair        SOCIAL HISTORY:  Denies Smoking, Alcohol, or Drug Use    Compazine (Unknown)      PAIN MEDICATIONS:  HYDROmorphone  Injectable 1 milliGRAM(s) IV Push every 4 hours PRN  LORazepam   Injectable 2 milliGRAM(s) IV Push every 1 hour PRN  ondansetron Injectable 4 milliGRAM(s) IV Push every 6 hours PRN    Heme:  heparin  Injectable 5000 Unit(s) SubCutaneous every 12 hours    Antibiotics:    Cardiovascular:    GI:  aluminum hydroxide/magnesium hydroxide/simethicone Suspension 30 milliLiter(s) Oral every 4 hours PRN  famotidine    Tablet 20 milliGRAM(s) Oral daily  pancrelipase  (CREON 12,000 Lipase Units) 3 Capsule(s) Oral three times a day with meals    Endocrine:    All Other Medications:  influenza   Vaccine 0.5 milliLiter(s) IntraMuscular once  lactated ringers. 1000 milliLiter(s) IV Continuous <Continuous>      Vital Signs Last 24 Hrs  T(C): 36.1 (09 Oct 2019 15:20), Max: 36.3 (09 Oct 2019 07:30)  T(F): 97 (09 Oct 2019 15:20), Max: 97.4 (09 Oct 2019 07:30)  HR: 100 (09 Oct 2019 15:20) (77 - 100)  BP: 145/96 (09 Oct 2019 15:20) (108/77 - 145/96)  BP(mean): --  RR: 18 (09 Oct 2019 15:20) (18 - 18)  SpO2: --      10-08-19 @ 07:01  -  10-09-19 @ 07:00  --------------------------------------------------------  IN: 1200 mL / OUT: 0 mL / NET: 1200 mL    10-09-19 @ 07:01  -  10-09-19 @ 15:50  --------------------------------------------------------  IN: 1520 mL / OUT: 0 mL / NET: 1520 mL        LABS:                          11.8   3.99  )-----------( 219      ( 08 Oct 2019 06:35 )             34.1     10-08    140  |  101  |  5<L>  ----------------------------<  96  3.5   |  24  |  0.6<L>    Ca    9.4      08 Oct 2019 06:35    TPro  9.0<H>  /  Alb  5.1  /  TBili  0.8  /  DBili  x   /  AST  77<H>  /  ALT  19  /  AlkPhos  75  10-07          RADIOLOGY:  EXAM:  CT ABDOMEN AND PELVIS IC            PROCEDURE DATE:  10/07/2019            INTERPRETATION:  CLINICAL HISTORY / REASON FOR EXAM: Epigastric pain.   History of pancreatitis.    TECHNIQUE: Contiguous axial CT images were obtained from the lower chest   to the pubic symphysis following administration of 100 mL Optiray 320   intravenous contrast. Oral contrast was not administered. Reformatted   images in the coronal and sagittal planes were acquired.    COMPARISON CT: CT abdomen and pelvis from 8/18/2019    OTHER STUDIES USED FOR CORRELATION: None.       FINDINGS:    LOWER CHEST: Unremarkable.    HEPATOBILIARY: Hepatic steatosis.    SPLEEN: Unremarkable.    PANCREAS: Pancreatic calcifications consistent with chronic pancreatitis.   No findings to suggest acute pancreatitis. No peripancreatic fluid   collection.    ADRENAL GLANDS: Unremarkable.    KIDNEYS: Unremarkable.    ABDOMINOPELVIC NODES: Unremarkable.    PELVIC ORGANS: Bilateral ovarian cyst..    PERITONEUM/MESENTERY/BOWEL: No bowel obstruction, ascites or   intraperitoneal free air. Normal caliber appendix.    BONES/SOFT TISSUES: Degenerative changes of the spine.      IMPRESSION:    Pancreatic calcifications consistent with chronic pancreatitis. No   findings to suggest acute pancreatitis.    Hepatic steatosis.      FÁTIMA CARRANZA M.D., RESIDENT RADIOLOGIST  This document has been electronically signed.  MILA NUGENT M.D., ATTENDING RADIOLOGIST  This document has been electronically signed. Oct  7 2019  9:09PM          Drug Screen:        [ ]  [place]  Reviewed on

## 2019-10-09 NOTE — PROGRESS NOTE ADULT - ASSESSMENT
44 year old female with a PMH  of pancreatitis presents here for abdominal pain. Patient states over the last 24 hours she's been having increasing pain "all over her abdomen". Symptoms have been associated with nausea and vomiting. Patient was seen by her GI doctor and Friday before symptoms began and was started back on her enzymes. Patient states her pancreatitis was due to alcohol. Patient endorses drinking wine on Friday Patient denies any fever chills cough cp sob urinary frequency urgency or burning. LMP in august.   In the ED pt underwent Ct , which was negative for any acute changes.     # Chronic pancreatitis  - IV hydromorphone  - IV hydration  - clear liquid diet.  - Pending pain management consult    # Alcohol abuse  - [facility] protocol  - supplement thiamine/folate for suspected deficiency      DVT PPx: Lovenox 40 mg s/c  GI PPX: Protonix 40 mg PO  Diet: liquids as tolerated  Activity: Increase as tolerated  Dispo: from home, no needs  Code Status: full code

## 2019-10-09 NOTE — CONSULT NOTE ADULT - PROBLEM SELECTOR RECOMMENDATION 9
DC HYDROmorphone  Injectable 1 milliGRAM(s) IV Push every 4 hours PRN  Start Acetaminophen 650mg PO Q6hrs Standing  No Opioids for mid epigastric pain

## 2019-10-10 LAB
ANION GAP SERPL CALC-SCNC: 10 MMOL/L — SIGNIFICANT CHANGE UP (ref 7–14)
BASOPHILS # BLD AUTO: 0.03 K/UL — SIGNIFICANT CHANGE UP (ref 0–0.2)
BASOPHILS NFR BLD AUTO: 0.8 % — SIGNIFICANT CHANGE UP (ref 0–1)
BUN SERPL-MCNC: <3 MG/DL — LOW (ref 10–20)
CALCIUM SERPL-MCNC: 9.3 MG/DL — SIGNIFICANT CHANGE UP (ref 8.5–10.1)
CHLORIDE SERPL-SCNC: 100 MMOL/L — SIGNIFICANT CHANGE UP (ref 98–110)
CO2 SERPL-SCNC: 31 MMOL/L — SIGNIFICANT CHANGE UP (ref 17–32)
CREAT SERPL-MCNC: 0.6 MG/DL — LOW (ref 0.7–1.5)
EOSINOPHIL # BLD AUTO: 0.06 K/UL — SIGNIFICANT CHANGE UP (ref 0–0.7)
EOSINOPHIL NFR BLD AUTO: 1.6 % — SIGNIFICANT CHANGE UP (ref 0–8)
GLUCOSE SERPL-MCNC: 92 MG/DL — SIGNIFICANT CHANGE UP (ref 70–99)
HCT VFR BLD CALC: 33.2 % — LOW (ref 37–47)
HGB BLD-MCNC: 11.5 G/DL — LOW (ref 12–16)
IMM GRANULOCYTES NFR BLD AUTO: 0.3 % — SIGNIFICANT CHANGE UP (ref 0.1–0.3)
LYMPHOCYTES # BLD AUTO: 1.31 K/UL — SIGNIFICANT CHANGE UP (ref 1.2–3.4)
LYMPHOCYTES # BLD AUTO: 34.9 % — SIGNIFICANT CHANGE UP (ref 20.5–51.1)
MAGNESIUM SERPL-MCNC: 1.5 MG/DL — LOW (ref 1.8–2.4)
MCHC RBC-ENTMCNC: 32.8 PG — HIGH (ref 27–31)
MCHC RBC-ENTMCNC: 34.6 G/DL — SIGNIFICANT CHANGE UP (ref 32–37)
MCV RBC AUTO: 94.6 FL — SIGNIFICANT CHANGE UP (ref 81–99)
MONOCYTES # BLD AUTO: 0.47 K/UL — SIGNIFICANT CHANGE UP (ref 0.1–0.6)
MONOCYTES NFR BLD AUTO: 12.5 % — HIGH (ref 1.7–9.3)
NEUTROPHILS # BLD AUTO: 1.87 K/UL — SIGNIFICANT CHANGE UP (ref 1.4–6.5)
NEUTROPHILS NFR BLD AUTO: 49.9 % — SIGNIFICANT CHANGE UP (ref 42.2–75.2)
NRBC # BLD: 0 /100 WBCS — SIGNIFICANT CHANGE UP (ref 0–0)
PLATELET # BLD AUTO: 207 K/UL — SIGNIFICANT CHANGE UP (ref 130–400)
POTASSIUM SERPL-MCNC: 3.5 MMOL/L — SIGNIFICANT CHANGE UP (ref 3.5–5)
POTASSIUM SERPL-SCNC: 3.5 MMOL/L — SIGNIFICANT CHANGE UP (ref 3.5–5)
RBC # BLD: 3.51 M/UL — LOW (ref 4.2–5.4)
RBC # FLD: 11.9 % — SIGNIFICANT CHANGE UP (ref 11.5–14.5)
SODIUM SERPL-SCNC: 141 MMOL/L — SIGNIFICANT CHANGE UP (ref 135–146)
WBC # BLD: 3.75 K/UL — LOW (ref 4.8–10.8)
WBC # FLD AUTO: 3.75 K/UL — LOW (ref 4.8–10.8)

## 2019-10-10 PROCEDURE — 99233 SBSQ HOSP IP/OBS HIGH 50: CPT

## 2019-10-10 RX ORDER — SODIUM CHLORIDE 9 MG/ML
1000 INJECTION, SOLUTION INTRAVENOUS
Refills: 0 | Status: DISCONTINUED | OUTPATIENT
Start: 2019-10-10 | End: 2019-10-12

## 2019-10-10 RX ORDER — MAGNESIUM SULFATE 500 MG/ML
2 VIAL (ML) INJECTION EVERY 4 HOURS
Refills: 0 | Status: COMPLETED | OUTPATIENT
Start: 2019-10-10 | End: 2019-10-10

## 2019-10-10 RX ORDER — MAGNESIUM SULFATE 500 MG/ML
2 VIAL (ML) INJECTION EVERY 4 HOURS
Refills: 0 | Status: DISCONTINUED | OUTPATIENT
Start: 2019-10-10 | End: 2019-10-10

## 2019-10-10 RX ORDER — HYDROMORPHONE HYDROCHLORIDE 2 MG/ML
0.5 INJECTION INTRAMUSCULAR; INTRAVENOUS; SUBCUTANEOUS EVERY 4 HOURS
Refills: 0 | Status: DISCONTINUED | OUTPATIENT
Start: 2019-10-10 | End: 2019-10-11

## 2019-10-10 RX ADMIN — HEPARIN SODIUM 5000 UNIT(S): 5000 INJECTION INTRAVENOUS; SUBCUTANEOUS at 05:13

## 2019-10-10 RX ADMIN — SODIUM CHLORIDE 200 MILLILITER(S): 9 INJECTION, SOLUTION INTRAVENOUS at 18:29

## 2019-10-10 RX ADMIN — HEPARIN SODIUM 5000 UNIT(S): 5000 INJECTION INTRAVENOUS; SUBCUTANEOUS at 17:24

## 2019-10-10 RX ADMIN — Medication 3 CAPSULE(S): at 17:23

## 2019-10-10 RX ADMIN — HYDROMORPHONE HYDROCHLORIDE 0.5 MILLIGRAM(S): 2 INJECTION INTRAMUSCULAR; INTRAVENOUS; SUBCUTANEOUS at 12:17

## 2019-10-10 RX ADMIN — Medication 16.67 GRAM(S): at 13:20

## 2019-10-10 RX ADMIN — SODIUM CHLORIDE 200 MILLILITER(S): 9 INJECTION, SOLUTION INTRAVENOUS at 23:45

## 2019-10-10 RX ADMIN — SODIUM CHLORIDE 200 MILLILITER(S): 9 INJECTION, SOLUTION INTRAVENOUS at 14:39

## 2019-10-10 RX ADMIN — Medication 3 CAPSULE(S): at 08:17

## 2019-10-10 RX ADMIN — ONDANSETRON 4 MILLIGRAM(S): 8 TABLET, FILM COATED ORAL at 13:20

## 2019-10-10 RX ADMIN — Medication 3 CAPSULE(S): at 12:18

## 2019-10-10 RX ADMIN — HYDROMORPHONE HYDROCHLORIDE 1 MILLIGRAM(S): 2 INJECTION INTRAMUSCULAR; INTRAVENOUS; SUBCUTANEOUS at 03:52

## 2019-10-10 RX ADMIN — HYDROMORPHONE HYDROCHLORIDE 0.5 MILLIGRAM(S): 2 INJECTION INTRAMUSCULAR; INTRAVENOUS; SUBCUTANEOUS at 12:20

## 2019-10-10 RX ADMIN — HYDROMORPHONE HYDROCHLORIDE 1 MILLIGRAM(S): 2 INJECTION INTRAMUSCULAR; INTRAVENOUS; SUBCUTANEOUS at 08:16

## 2019-10-10 RX ADMIN — SODIUM CHLORIDE 150 MILLILITER(S): 9 INJECTION, SOLUTION INTRAVENOUS at 12:52

## 2019-10-10 RX ADMIN — HYDROMORPHONE HYDROCHLORIDE 1 MILLIGRAM(S): 2 INJECTION INTRAMUSCULAR; INTRAVENOUS; SUBCUTANEOUS at 08:30

## 2019-10-10 RX ADMIN — HYDROMORPHONE HYDROCHLORIDE 0.5 MILLIGRAM(S): 2 INJECTION INTRAMUSCULAR; INTRAVENOUS; SUBCUTANEOUS at 17:00

## 2019-10-10 RX ADMIN — HYDROMORPHONE HYDROCHLORIDE 0.5 MILLIGRAM(S): 2 INJECTION INTRAMUSCULAR; INTRAVENOUS; SUBCUTANEOUS at 16:25

## 2019-10-10 RX ADMIN — Medication 16.67 GRAM(S): at 10:39

## 2019-10-10 RX ADMIN — HYDROMORPHONE HYDROCHLORIDE 0.5 MILLIGRAM(S): 2 INJECTION INTRAMUSCULAR; INTRAVENOUS; SUBCUTANEOUS at 21:05

## 2019-10-10 RX ADMIN — HYDROMORPHONE HYDROCHLORIDE 1 MILLIGRAM(S): 2 INJECTION INTRAMUSCULAR; INTRAVENOUS; SUBCUTANEOUS at 03:37

## 2019-10-10 RX ADMIN — FAMOTIDINE 20 MILLIGRAM(S): 10 INJECTION INTRAVENOUS at 12:18

## 2019-10-10 NOTE — CDI QUERY NOTE - NSCDIOTHERTXTBX_GEN_ALL_CORE_HH
Query for Abnormal Magnesium level:  =======================================================    Pt. diagnosed with Chronic Pancreatitis.   Hx. of Alcohol abuse.  Being treated w/ IVF of LR    On 10/10 Magnesium level of 1.5   Magnesium Sulfate 2G IVPB ordered and given    Please document diagnosis significant with the above clinical indicators.

## 2019-10-10 NOTE — PROGRESS NOTE ADULT - ASSESSMENT
45yo F with Past Medical History Prior Ethanol Abuse and Chronic Pancreatitis admitted for abdominal pain secondary to chronic pancreatitis    Acute abdominal pain secondary to chronic pancreatitis/h/o ETOH abuse  - Pain is not improved on clear liquid diet  - c/w clear liquid diet today, advance once pain improved  - c/w IVF ( CC/HR)  - pain mgmt eval appreciated: dc IV dilaudid. start Tylenol 650 Q6H ATC  - c/w creon     ETOH abuse/Suspected FOlate and thiamine deifciency  - patient states she drinks occasionally, last drink 6 days ago - few glasses of wine  - no evidence of withdrawal at this time  - c/w folate and thiamine supplementation    Hypomagnesemia  repleted today, c/w monitoring    #Progress Note Handoff  Pending (specify):  improvement in pain  Family discussion: Plan of care discussed with patient, aware and agreeable   Disposition:  home when pain controlled

## 2019-10-11 ENCOUNTER — TRANSCRIPTION ENCOUNTER (OUTPATIENT)
Age: 44
End: 2019-10-11

## 2019-10-11 LAB
ANION GAP SERPL CALC-SCNC: 10 MMOL/L — SIGNIFICANT CHANGE UP (ref 7–14)
BUN SERPL-MCNC: 3 MG/DL — LOW (ref 10–20)
CALCIUM SERPL-MCNC: 8.8 MG/DL — SIGNIFICANT CHANGE UP (ref 8.5–10.1)
CHLORIDE SERPL-SCNC: 104 MMOL/L — SIGNIFICANT CHANGE UP (ref 98–110)
CO2 SERPL-SCNC: 25 MMOL/L — SIGNIFICANT CHANGE UP (ref 17–32)
CREAT SERPL-MCNC: 0.6 MG/DL — LOW (ref 0.7–1.5)
GLUCOSE SERPL-MCNC: 95 MG/DL — SIGNIFICANT CHANGE UP (ref 70–99)
HCT VFR BLD CALC: 30.7 % — LOW (ref 37–47)
HGB BLD-MCNC: 10.7 G/DL — LOW (ref 12–16)
MCHC RBC-ENTMCNC: 32.8 PG — HIGH (ref 27–31)
MCHC RBC-ENTMCNC: 34.9 G/DL — SIGNIFICANT CHANGE UP (ref 32–37)
MCV RBC AUTO: 94.2 FL — SIGNIFICANT CHANGE UP (ref 81–99)
NRBC # BLD: 0 /100 WBCS — SIGNIFICANT CHANGE UP (ref 0–0)
PLATELET # BLD AUTO: 194 K/UL — SIGNIFICANT CHANGE UP (ref 130–400)
POTASSIUM SERPL-MCNC: 3.6 MMOL/L — SIGNIFICANT CHANGE UP (ref 3.5–5)
POTASSIUM SERPL-SCNC: 3.6 MMOL/L — SIGNIFICANT CHANGE UP (ref 3.5–5)
RBC # BLD: 3.26 M/UL — LOW (ref 4.2–5.4)
RBC # FLD: 11.8 % — SIGNIFICANT CHANGE UP (ref 11.5–14.5)
SODIUM SERPL-SCNC: 139 MMOL/L — SIGNIFICANT CHANGE UP (ref 135–146)
WBC # BLD: 3.21 K/UL — LOW (ref 4.8–10.8)
WBC # FLD AUTO: 3.21 K/UL — LOW (ref 4.8–10.8)

## 2019-10-11 PROCEDURE — 99233 SBSQ HOSP IP/OBS HIGH 50: CPT

## 2019-10-11 RX ORDER — TRAMADOL HYDROCHLORIDE 50 MG/1
50 TABLET ORAL EVERY 6 HOURS
Refills: 0 | Status: DISCONTINUED | OUTPATIENT
Start: 2019-10-11 | End: 2019-10-12

## 2019-10-11 RX ORDER — HYDROMORPHONE HYDROCHLORIDE 2 MG/ML
0.5 INJECTION INTRAMUSCULAR; INTRAVENOUS; SUBCUTANEOUS
Refills: 0 | Status: DISCONTINUED | OUTPATIENT
Start: 2019-10-11 | End: 2019-10-12

## 2019-10-11 RX ADMIN — HYDROMORPHONE HYDROCHLORIDE 0.5 MILLIGRAM(S): 2 INJECTION INTRAMUSCULAR; INTRAVENOUS; SUBCUTANEOUS at 20:42

## 2019-10-11 RX ADMIN — SODIUM CHLORIDE 200 MILLILITER(S): 9 INJECTION, SOLUTION INTRAVENOUS at 14:55

## 2019-10-11 RX ADMIN — HYDROMORPHONE HYDROCHLORIDE 0.5 MILLIGRAM(S): 2 INJECTION INTRAMUSCULAR; INTRAVENOUS; SUBCUTANEOUS at 01:12

## 2019-10-11 RX ADMIN — SODIUM CHLORIDE 200 MILLILITER(S): 9 INJECTION, SOLUTION INTRAVENOUS at 09:08

## 2019-10-11 RX ADMIN — Medication 3 CAPSULE(S): at 17:07

## 2019-10-11 RX ADMIN — ONDANSETRON 4 MILLIGRAM(S): 8 TABLET, FILM COATED ORAL at 10:54

## 2019-10-11 RX ADMIN — HYDROMORPHONE HYDROCHLORIDE 0.5 MILLIGRAM(S): 2 INJECTION INTRAMUSCULAR; INTRAVENOUS; SUBCUTANEOUS at 06:19

## 2019-10-11 RX ADMIN — Medication 3 CAPSULE(S): at 09:08

## 2019-10-11 RX ADMIN — HYDROMORPHONE HYDROCHLORIDE 0.5 MILLIGRAM(S): 2 INJECTION INTRAMUSCULAR; INTRAVENOUS; SUBCUTANEOUS at 06:50

## 2019-10-11 RX ADMIN — TRAMADOL HYDROCHLORIDE 50 MILLIGRAM(S): 50 TABLET ORAL at 17:15

## 2019-10-11 RX ADMIN — TRAMADOL HYDROCHLORIDE 50 MILLIGRAM(S): 50 TABLET ORAL at 16:44

## 2019-10-11 RX ADMIN — TRAMADOL HYDROCHLORIDE 50 MILLIGRAM(S): 50 TABLET ORAL at 23:29

## 2019-10-11 RX ADMIN — Medication 3 CAPSULE(S): at 12:26

## 2019-10-11 RX ADMIN — SODIUM CHLORIDE 200 MILLILITER(S): 9 INJECTION, SOLUTION INTRAVENOUS at 06:19

## 2019-10-11 RX ADMIN — HYDROMORPHONE HYDROCHLORIDE 0.5 MILLIGRAM(S): 2 INJECTION INTRAMUSCULAR; INTRAVENOUS; SUBCUTANEOUS at 12:45

## 2019-10-11 RX ADMIN — HEPARIN SODIUM 5000 UNIT(S): 5000 INJECTION INTRAVENOUS; SUBCUTANEOUS at 17:07

## 2019-10-11 RX ADMIN — HEPARIN SODIUM 5000 UNIT(S): 5000 INJECTION INTRAVENOUS; SUBCUTANEOUS at 06:23

## 2019-10-11 RX ADMIN — FAMOTIDINE 20 MILLIGRAM(S): 10 INJECTION INTRAVENOUS at 12:26

## 2019-10-11 RX ADMIN — HYDROMORPHONE HYDROCHLORIDE 0.5 MILLIGRAM(S): 2 INJECTION INTRAMUSCULAR; INTRAVENOUS; SUBCUTANEOUS at 01:45

## 2019-10-11 RX ADMIN — HYDROMORPHONE HYDROCHLORIDE 0.5 MILLIGRAM(S): 2 INJECTION INTRAMUSCULAR; INTRAVENOUS; SUBCUTANEOUS at 12:26

## 2019-10-11 NOTE — DISCHARGE NOTE PROVIDER - NSDCCPCAREPLAN_GEN_ALL_CORE_FT
PRINCIPAL DISCHARGE DIAGNOSIS  Diagnosis: Chronic pancreatitis  Assessment and Plan of Treatment: Your pain was due to your chronic pancreatitis. Try to refrain from ingesing any food or drink which can inflame your pancreas, such as alcohol. Please follow up with your primary care provider within one week of discharge.

## 2019-10-11 NOTE — DISCHARGE NOTE PROVIDER - NSDCFUSCHEDAPPT_GEN_ALL_CORE_FT
LAURA BARAJAS ; 10/31/2019 ; [facility] [address] ; 11/08/2019 ; [facility]  LAURA Hall ; 12/11/2019 ; [facility] [address] LAURA BARAJAS ; 10/31/2019 ; [facility] [address] ; 11/08/2019 ; [facility] Gastro  LAUAR Hall ; 12/11/2019 ; [facility] Ot[facility] [address]

## 2019-10-11 NOTE — PROGRESS NOTE ADULT - ASSESSMENT
43yo F with Past Medical History Prior Ethanol Abuse and Chronic Pancreatitis admitted for abdominal pain secondary to chronic pancreatitis    Acute abdominal pain secondary to chronic pancreatitis: continue treatment with aggressive IV hydration.  Pain Management recommendations were reviewed; will taper off IV Dilaudid.  Decrease to 0.5mg IVP q6PRN today, and then discontinue tomorrow.  In the interim, start Tramadol 50mg PO q6PRN for pain.  For possible GERD, continue Protonix 40mg PO q24 and Creon tablets with meals.  Advance diet from clear liquids to low fat.     Chronic Ethanol Abuse: no evidence of withdrawal symptoms.  Nursing was advised to monitor CIWA protocol.  Continue supplemental thiamine and folate for possible thiamine and folate deficiencies.  GI/DVT prophylaxis    #Progress Note Handoff      Pending:  Other: Monitor for persistent pain  Advance diet as tolerated    Future Disposition: Anticipate dc in home in 24 hours

## 2019-10-11 NOTE — DISCHARGE NOTE PROVIDER - HOSPITAL COURSE
44 year old female with a PMH  of pancreatitis presents here for abdominal pain. Patient states over the last 24 hours she's been having increasing pain "all over her abdomen". Symptoms have been associated with nausea and vomiting. Patient was seen by her GI doctor and Friday before symptoms began and was started back on her enzymes. Patient states her pancreatitis was due to alcohol. Patient endorses drinking wine on Friday Patient denies any fever chills cough cp sob urinary frequency urgency or burning. LMP in august.     In the ED pt underwent Ct , which was negative for any acute changes.         Patient was admitted with diagnosis of pancreatitis. She was started on pain meds, IV hydration. She began tolerating a regular diet so she was discharged to home.

## 2019-10-11 NOTE — DISCHARGE NOTE PROVIDER - NSDCFUADDAPPT_GEN_ALL_CORE_FT
Please call and make an appointment with your PMD within a week after discharge and follow up with them for routine health care assements and further monitoring and management.

## 2019-10-11 NOTE — DISCHARGE NOTE PROVIDER - CARE PROVIDER_API CALL
Haley Cobos)  Internal Medicine  [address], [address]  [place] [address]  Phone: [phone]  Fax: [phone]  Follow Up Time:

## 2019-10-12 ENCOUNTER — TRANSCRIPTION ENCOUNTER (OUTPATIENT)
Age: 44
End: 2019-10-12

## 2019-10-12 VITALS
DIASTOLIC BLOOD PRESSURE: 79 MMHG | HEART RATE: 88 BPM | SYSTOLIC BLOOD PRESSURE: 137 MMHG | RESPIRATION RATE: 18 BRPM | TEMPERATURE: 96 F

## 2019-10-12 LAB
ANION GAP SERPL CALC-SCNC: 9 MMOL/L — SIGNIFICANT CHANGE UP (ref 7–14)
BUN SERPL-MCNC: <3 MG/DL — LOW (ref 10–20)
CALCIUM SERPL-MCNC: 9.2 MG/DL — SIGNIFICANT CHANGE UP (ref 8.5–10.1)
CHLORIDE SERPL-SCNC: 103 MMOL/L — SIGNIFICANT CHANGE UP (ref 98–110)
CO2 SERPL-SCNC: 27 MMOL/L — SIGNIFICANT CHANGE UP (ref 17–32)
CREAT SERPL-MCNC: 0.6 MG/DL — LOW (ref 0.7–1.5)
GLUCOSE SERPL-MCNC: 99 MG/DL — SIGNIFICANT CHANGE UP (ref 70–99)
HCT VFR BLD CALC: 30.8 % — LOW (ref 37–47)
HGB BLD-MCNC: 10.6 G/DL — LOW (ref 12–16)
MCHC RBC-ENTMCNC: 32.8 PG — HIGH (ref 27–31)
MCHC RBC-ENTMCNC: 34.4 G/DL — SIGNIFICANT CHANGE UP (ref 32–37)
MCV RBC AUTO: 95.4 FL — SIGNIFICANT CHANGE UP (ref 81–99)
NRBC # BLD: 0 /100 WBCS — SIGNIFICANT CHANGE UP (ref 0–0)
PLATELET # BLD AUTO: 186 K/UL — SIGNIFICANT CHANGE UP (ref 130–400)
POTASSIUM SERPL-MCNC: 4 MMOL/L — SIGNIFICANT CHANGE UP (ref 3.5–5)
POTASSIUM SERPL-SCNC: 4 MMOL/L — SIGNIFICANT CHANGE UP (ref 3.5–5)
RBC # BLD: 3.23 M/UL — LOW (ref 4.2–5.4)
RBC # FLD: 11.9 % — SIGNIFICANT CHANGE UP (ref 11.5–14.5)
SODIUM SERPL-SCNC: 139 MMOL/L — SIGNIFICANT CHANGE UP (ref 135–146)
WBC # BLD: 3.85 K/UL — LOW (ref 4.8–10.8)
WBC # FLD AUTO: 3.85 K/UL — LOW (ref 4.8–10.8)

## 2019-10-12 PROCEDURE — 99239 HOSP IP/OBS DSCHRG MGMT >30: CPT

## 2019-10-12 RX ORDER — PANTOPRAZOLE SODIUM 20 MG/1
40 TABLET, DELAYED RELEASE ORAL
Refills: 0 | Status: DISCONTINUED | OUTPATIENT
Start: 2019-10-12 | End: 2019-10-12

## 2019-10-12 RX ORDER — LIPASE/PROTEASE/AMYLASE 16-48-48K
3 CAPSULE,DELAYED RELEASE (ENTERIC COATED) ORAL
Qty: 0 | Refills: 0 | DISCHARGE
Start: 2019-10-12

## 2019-10-12 RX ORDER — THIAMINE MONONITRATE (VIT B1) 100 MG
1 TABLET ORAL
Qty: 30 | Refills: 0
Start: 2019-10-12 | End: 2019-11-10

## 2019-10-12 RX ORDER — FOLIC ACID 0.8 MG
1 TABLET ORAL
Qty: 14 | Refills: 0
Start: 2019-10-12 | End: 2019-10-25

## 2019-10-12 RX ORDER — LIPASE/PROTEASE/AMYLASE 16-48-48K
3 CAPSULE,DELAYED RELEASE (ENTERIC COATED) ORAL
Qty: 270 | Refills: 0
Start: 2019-10-12 | End: 2019-11-10

## 2019-10-12 RX ORDER — ONDANSETRON 8 MG/1
1 TABLET, FILM COATED ORAL
Qty: 60 | Refills: 0
Start: 2019-10-12 | End: 2019-10-26

## 2019-10-12 RX ORDER — PANTOPRAZOLE SODIUM 20 MG/1
1 TABLET, DELAYED RELEASE ORAL
Qty: 30 | Refills: 0
Start: 2019-10-12 | End: 2019-11-10

## 2019-10-12 RX ORDER — TRAMADOL HYDROCHLORIDE 50 MG/1
1 TABLET ORAL
Qty: 28 | Refills: 0
Start: 2019-10-12 | End: 2019-10-18

## 2019-10-12 RX ADMIN — Medication 3 CAPSULE(S): at 08:19

## 2019-10-12 RX ADMIN — TRAMADOL HYDROCHLORIDE 50 MILLIGRAM(S): 50 TABLET ORAL at 11:26

## 2019-10-12 RX ADMIN — SODIUM CHLORIDE 200 MILLILITER(S): 9 INJECTION, SOLUTION INTRAVENOUS at 08:20

## 2019-10-12 RX ADMIN — HYDROMORPHONE HYDROCHLORIDE 0.5 MILLIGRAM(S): 2 INJECTION INTRAMUSCULAR; INTRAVENOUS; SUBCUTANEOUS at 05:03

## 2019-10-12 RX ADMIN — TRAMADOL HYDROCHLORIDE 50 MILLIGRAM(S): 50 TABLET ORAL at 10:56

## 2019-10-12 RX ADMIN — Medication 3 CAPSULE(S): at 12:03

## 2019-10-12 RX ADMIN — SODIUM CHLORIDE 200 MILLILITER(S): 9 INJECTION, SOLUTION INTRAVENOUS at 01:57

## 2019-10-12 RX ADMIN — INFLUENZA VIRUS VACCINE 0.5 MILLILITER(S): 15; 15; 15; 15 SUSPENSION INTRAMUSCULAR at 14:23

## 2019-10-12 RX ADMIN — HEPARIN SODIUM 5000 UNIT(S): 5000 INJECTION INTRAVENOUS; SUBCUTANEOUS at 05:04

## 2019-10-12 RX ADMIN — ONDANSETRON 4 MILLIGRAM(S): 8 TABLET, FILM COATED ORAL at 10:55

## 2019-10-12 NOTE — PROGRESS NOTE ADULT - SUBJECTIVE AND OBJECTIVE BOX
LAURA BARAJAS  44y  [facility] [id number]      Patient is a 44y old  Female who presents with a chief complaint of Abdominal Pain (11 Oct 2019 16:04)      INTERVAL HPI/OVERNIGHT EVENTS:    no acute events overnight     REVIEW OF SYSTEMS:  CONSTITUTIONAL: No fever, weight loss, or fatigue  EYES: No eye pain, visual disturbances, or discharge  ENMT:  No difficulty hearing, tinnitus, vertigo; No sinus or throat pain  NECK: No pain or stiffness  BREASTS: No pain, masses, or nipple discharge  RESPIRATORY: No cough, wheezing, chills or hemoptysis; No shortness of breath  CARDIOVASCULAR: No chest pain, palpitations, dizziness, or leg swelling  GASTROINTESTINAL: No abdominal or epigastric pain. No nausea, vomiting, or hematemesis; No diarrhea or constipation. No melena or hematochezia.  GENITOURINARY: No dysuria, frequency, hematuria, or incontinence  NEUROLOGICAL: No headaches, memory loss, loss of strength, numbness, or tremors  SKIN: No itching, burning, rashes, or lesions   LYMPH NODES: No enlarged glands  ENDOCRINE: No heat or cold intolerance; No hair loss  MUSCULOSKELETAL: No joint pain or swelling; No muscle, back, or extremity pain  PSYCHIATRIC: No depression, anxiety, mood swings, or difficulty sleeping  HEME/LYMPH: No easy bruising, or bleeding gums  ALLERY AND IMMUNOLOGIC: No hives or eczema  FAMILY HISTORY:  Family history of cholecystectomy: many female members in the family  FH: pancreatic cancer: cousin  Family history of hypertension: both father and mother    T(C): 35.7 (10-12-19 @ 07:21), Max: 36.6 (10-11-19 @ 15:57)  HR: 88 (10-12-19 @ 07:21) (88 - 94)  BP: 137/79 (10-12-19 @ 07:21) (132/85 - 141/99)  RR: 18 (10-12-19 @ 07:21) (18 - 18)  SpO2: --  Wt(kg): --Vital Signs Last 24 Hrs  T(C): 35.7 (12 Oct 2019 07:21), Max: 36.6 (11 Oct 2019 15:57)  T(F): 96.3 (12 Oct 2019 07:21), Max: 97.8 (11 Oct 2019 15:57)  HR: 88 (12 Oct 2019 07:21) (88 - 94)  BP: 137/79 (12 Oct 2019 07:21) (132/85 - 141/99)  BP(mean): --  RR: 18 (12 Oct 2019 07:21) (18 - 18)  SpO2: --    PHYSICAL EXAM:  GENERAL: NAD, well-groomed, well-developed  HEAD:  Atraumatic, Normocephalic  EYES: EOMI, PERRLA, conjunctiva and sclera clear  ENMT: No tonsillar erythema, exudates, or enlargement; Moist mucous membranes, Good dentition, No lesions  NECK: Supple, No JVD, Normal thyroid  NERVOUS SYSTEM:  Alert & Oriented X3, Good concentration; Motor Strength 5/5 B/L upper and lower extremities; DTRs 2+ intact and symmetric  PULM: Clear to auscultation bilaterally  CARDIAC: Regular rate and rhythm; No murmurs, rubs, or gallops  GI: Soft, Nontender, Nondistended; Bowel sounds present  EXTREMITIES:  2+ Peripheral Pulses, No clubbing, cyanosis, or edema  LYMPH: No lymphadenopathy noted  SKIN: No rashes or lesions    Consultant(s) Notes Reviewed:  [x ] YES  [ ] NO  Care Discussed with Consultants/Other Providers [ x] YES  [ ] NO    LABS:                            10.6   3.85  )-----------( 186      ( 12 Oct 2019 04:30 )             30.8   10-12    139  |  103  |  <3<L>  ----------------------------<  99  4.0   |  27  |  0.6<L>    Ca    9.2      12 Oct 2019 04:30              aluminum hydroxide/magnesium hydroxide/simethicone Suspension 30 milliLiter(s) Oral every 4 hours PRN  heparin  Injectable 5000 Unit(s) SubCutaneous every 12 hours  influenza   Vaccine 0.5 milliLiter(s) IntraMuscular once  LORazepam   Injectable 2 milliGRAM(s) IV Push every 1 hour PRN  ondansetron Injectable 4 milliGRAM(s) IV Push every 6 hours PRN  pancrelipase  (CREON 12,000 Lipase Units) 3 Capsule(s) Oral three times a day with meals  pantoprazole    Tablet 40 milliGRAM(s) Oral before breakfast  traMADol 50 milliGRAM(s) Oral every 6 hours PRN      HEALTH ISSUES - PROBLEM Dx:          Case Discussed with House Staff     Spectra [phone]
Attempted to visit patient for pain mgmt f/u, but was not in his room. RN states he sometimes goes for long walks.
Hospital Day:  3d    Subjective:    Patient is a 44y old Female who presents with a chief complaint of Abdominal Pain (10 Oct 2019 13:47)  Patient reports her pain is improving and would like to try a regular diet now. The fruit juices are causing pain but she tolerates the jello. She denies N/V, diarrhea, constipation.    Past Medical Hx:   Chronic pancreatitis  Pancreatitis  No pertinent past medical history    Past Sx:  S/P arthroscopy    Allergies:  Compazine (Unknown)    Current Meds:   Standng Meds:  famotidine    Tablet 20 milliGRAM(s) Oral daily  heparin  Injectable 5000 Unit(s) SubCutaneous every 12 hours  influenza   Vaccine 0.5 milliLiter(s) IntraMuscular once  lactated ringers. 1000 milliLiter(s) (200 mL/Hr) IV Continuous <Continuous>  pancrelipase  (CREON 12,000 Lipase Units) 3 Capsule(s) Oral three times a day with meals    PRN Meds:  aluminum hydroxide/magnesium hydroxide/simethicone Suspension 30 milliLiter(s) Oral every 4 hours PRN Dyspepsia  HYDROmorphone  Injectable 0.5 milliGRAM(s) IV Push every 4 hours PRN Moderate Pain (4 - 6)  LORazepam   Injectable 2 milliGRAM(s) IV Push every 1 hour PRN Symptom-triggered: each CIWA -Ar score 8 or GREATER  ondansetron Injectable 4 milliGRAM(s) IV Push every 6 hours PRN Nausea and/or Vomiting    HOME MEDICATIONS:      Vital Signs:   T(F): 96.5 (10-10-19 @ 07:36), Max: 98.2 (10-10-19 @ 00:00)  HR: 82 (10-10-19 @ 07:36) (82 - 100)  BP: 137/71 (10-10-19 @ 07:36) (120/60 - 145/96)  RR: 18 (10-10-19 @ 07:36) (18 - 18)  SpO2: --      10-09-19 @ 07:01  -  10-10-19 @ 07:00  --------------------------------------------------------  IN: 4220 mL / OUT: 0 mL / NET: 4220 mL    10-10-19 @ 07:01  -  10-10-19 @ 14:31  --------------------------------------------------------  IN: 600 mL / OUT: 0 mL / NET: 600 mL        Physical Exam:   GENERAL: NAD  HEENT: NCAT  CHEST/LUNG: CTAB  HEART: Regular rate and rhythm; s1 s2 appreciated, No murmurs, rubs, or gallops  ABDOMEN: Soft, mildly tender in epigastric area, Nondistended; Bowel sounds present  EXTREMITIES: No LE edema b/l  NERVOUS SYSTEM:  Alert & Oriented X3        Labs:                         11.5   3.75  )-----------( 207      ( 10 Oct 2019 07:03 )             33.2     Neutophil% 49.9, Lymphocyte% 34.9, Monocyte% 12.5, Bands% 0.3 10-10-19 @ 07:03    10 Oct 2019 07:03    141    |  100    |  <3     ----------------------------<  92     3.5     |  31     |  0.6      Ca    9.3        10 Oct 2019 07:03  Mg     1.5       10 Oct 2019 07:03          Amylase --, Lipase 44, 10-07-19 @ 17:30          Assessment and Plan:     44 year old female with a PMH  of pancreatitis presents here for abdominal pain. Patient states over the last 24 hours she's been having increasing pain "all over her abdomen". Symptoms have been associated with nausea and vomiting. Patient was seen by her GI doctor and Friday before symptoms began and was started back on her enzymes. Patient states her pancreatitis was due to alcohol. Patient endorses drinking wine on Friday Patient denies any fever chills cough cp sob urinary frequency urgency or burning. LMP in august.   In the ED pt underwent Ct , which was negative for any acute changes.     # Chronic pancreatitis  - Pain management consult appreciated; will taper off IV hydromorphone; decreased from 1Q4 to 0.5Q4  - IV hydration  - Will attempt regular diet    # Alcohol abuse  - [facility] protocol  - supplement thiamine/folate for suspected deficiency      DVT PPx: Lovenox 40 mg s/c  GI PPX: Protonix 40 mg PO  Diet: Regular; if unable to tolerate will go back to clears  Activity: Increase as tolerated  Dispo: from home, no needs  Code Status: full code
Hospital Day:  4d    Subjective:    Patient is a 44y old  Female who presents with a chief complaint of Abdominal Pain (10 Oct 2019 17:46)  Patient still complaining of abdominal pain and nausea that worsens with meals. She says she feels like she "has broken glass going through her system".    Past Medical Hx:   Chronic pancreatitis  Pancreatitis  No pertinent past medical history    Past Sx:  S/P arthroscopy    Allergies:  Compazine (Unknown)    Current Meds:   Standng Meds:  famotidine    Tablet 20 milliGRAM(s) Oral daily  heparin  Injectable 5000 Unit(s) SubCutaneous every 12 hours  influenza   Vaccine 0.5 milliLiter(s) IntraMuscular once  lactated ringers. 1000 milliLiter(s) (200 mL/Hr) IV Continuous <Continuous>  pancrelipase  (CREON 12,000 Lipase Units) 3 Capsule(s) Oral three times a day with meals    PRN Meds:  aluminum hydroxide/magnesium hydroxide/simethicone Suspension 30 milliLiter(s) Oral every 4 hours PRN Dyspepsia  HYDROmorphone  Injectable 0.5 milliGRAM(s) IV Push four times a day PRN Severe Pain (7 - 10)  LORazepam   Injectable 2 milliGRAM(s) IV Push every 1 hour PRN Symptom-triggered: each CIWA -Ar score 8 or GREATER  ondansetron Injectable 4 milliGRAM(s) IV Push every 6 hours PRN Nausea and/or Vomiting  traMADol 50 milliGRAM(s) Oral every 6 hours PRN Moderate Pain (4 - 6)    HOME MEDICATIONS:      Vital Signs:   T(F): 96.7 (10-11-19 @ 07:53), Max: 97.2 (10-10-19 @ 23:49)  HR: 85 (10-11-19 @ 07:53) (81 - 85)  BP: 140/86 (10-11-19 @ 07:53) (134/83 - 140/86)  RR: 18 (10-11-19 @ 07:53) (18 - 18)  SpO2: --      10-10-19 @ 07:01  -  10-11-19 @ 07:00  --------------------------------------------------------  IN: 2900 mL / OUT: 0 mL / NET: 2900 mL        Physical Exam:   GENERAL: NAD  HEENT: NCAT  CHEST/LUNG: CTAB  HEART: Regular rate and rhythm; s1 s2 appreciated, No murmurs, rubs, or gallops  ABDOMEN: Soft, diffusely tender to palpation predominantly over epigastric area, Nondistended; Bowel sounds present  EXTREMITIES: No LE edema b/l  NERVOUS SYSTEM:  Alert & Oriented X3        Labs:                         10.7   3.21  )-----------( 194      ( 11 Oct 2019 05:47 )             30.7       11 Oct 2019 05:47    139    |  104    |  3      ----------------------------<  95     3.6     |  25     |  0.6      Ca    8.8        11 Oct 2019 05:47  Mg     1.5       10 Oct 2019 07:03          Amylase --, Lipase 44, 10-07-19 @ 17:30          Assessment and Plan:     44 year old female with a PMH  of pancreatitis presents here for abdominal pain. Patient states over the last 24 hours she's been having increasing pain "all over her abdomen". Symptoms have been associated with nausea and vomiting. Patient was seen by her GI doctor and Friday before symptoms began and was started back on her enzymes. Patient states her pancreatitis was due to alcohol. Patient endorses drinking wine on Friday Patient denies any fever chills cough cp sob urinary frequency urgency or burning. LMP in august.   In the ED pt underwent Ct , which was negative for any acute changes.     # Chronic pancreatitis  - Pain management consult appreciated; will taper off IV hydromorphone; decreased from  0.5Q4 to 0.5Q6  - IV hydration  - Tolerating parts of regular diet  - Anticipate discharge tomorrow    # Alcohol abuse  - [facility] protocol  - supplement thiamine/folate for suspected deficiency      DVT PPx: Lovenox 40 mg s/c  GI PPX: Protonix 40 mg PO  Diet: Regular; if unable to tolerate will go back to clears  Activity: Increase as tolerated  Dispo: from home, no needs  Code Status: full codePO:
LAURA BARAJAS  44y Female    CHIEF COMPLAINT:    Patient is a 44y old  Female who presents with a chief complaint of Abdominal Pain (09 Oct 2019 15:53)      INTERVAL HPI/OVERNIGHT EVENTS:    Patient seen and examined. No acute events overnight. No improvement in pain    ROS: All other systems are negative.    Vital Signs:    T(F): 96.5 (10-10-19 @ 07:36), Max: 98.2 (10-10-19 @ 00:00)  HR: 82 (10-10-19 @ 07:36) (82 - 100)  BP: 137/71 (10-10-19 @ 07:36) (120/60 - 145/96)  RR: 18 (10-10-19 @ 07:36) (18 - 18)  09 Oct 2019 07:01  -  10 Oct 2019 07:00  --------------------------------------------------------  IN: 4220 mL / OUT: 0 mL / NET: 4220 mL    10 Oct 2019 07:01  -  10 Oct 2019 14:00  --------------------------------------------------------  IN: 350 mL / OUT: 0 mL / NET: 350 mL    PHYSICAL EXAM:    GENERAL:  NAD  SKIN: No rashes or lesions  HEENT: Atraumatic. Normocephalic.    NECK: Supple, No JVD. No lymphadenopathy.  PULMONARY: CTA B/L. No wheezing. No rales  CVS: Normal S1, S2. Rate and Rhythm are regular. No murmurs.  ABDOMEN/GI: Soft, epigastric TTP, radiating to the back, BS present  MSK:  No edema B/L LE. No clubbing or cyanosis  NEUROLOGIC:  No motor or sensory deficit.  PSYCH: Alert & oriented x 3, normal affect    LABS:                        11.5   3.75  )-----------( 207      ( 10 Oct 2019 07:03 )             33.2     141  |  100  |  <3<L>  ----------------------------<  92  3.5   |  31  |  0.6<L>    Ca    9.3      10 Oct 2019 07:03  Mg     1.5     10-10    RADIOLOGY & ADDITIONAL TESTS:  Imaging or report Personally Reviewed:  [x] YES  [ ] NO  EKG reviewed: [x] YES  [ ] NO    Medications:  Standing  famotidine    Tablet 20 milliGRAM(s) Oral daily  heparin  Injectable 5000 Unit(s) SubCutaneous every 12 hours  influenza   Vaccine 0.5 milliLiter(s) IntraMuscular once  lactated ringers. 1000 milliLiter(s) IV Continuous <Continuous>  pancrelipase  (CREON 12,000 Lipase Units) 3 Capsule(s) Oral three times a day with meals    PRN Meds  aluminum hydroxide/magnesium hydroxide/simethicone Suspension 30 milliLiter(s) Oral every 4 hours PRN  HYDROmorphone  Injectable 0.5 milliGRAM(s) IV Push every 4 hours PRN  LORazepam   Injectable 2 milliGRAM(s) IV Push every 1 hour PRN  ondansetron Injectable 4 milliGRAM(s) IV Push every 6 hours PRN      Brenda Bermudez MD  s. [phone]
LAURA BARAJAS [id number]    Hospitalist Note  43yo F with Past Medical History Prior Ethanol Abuse and Chronic Pancreatitis admitted for abdominal pain secondary to chronic pancreatitis. Lipase levels were within normal limits on admission, and CT Abdomen/Pelvis was consistent with chronic pancreatitis.    Overnight events/Updates: The pt endorses persistent abdominal pain which was worsened with liquid intake this AM.  Her abdomen appears mildly distended with epigastric tenderness to deep palpation.    Vital Signs Last 24 Hrs  T(C): 36.6 (11 Oct 2019 15:57), Max: 36.6 (11 Oct 2019 15:57)  T(F): 97.8 (11 Oct 2019 15:57), Max: 97.8 (11 Oct 2019 15:57)  HR: 94 (11 Oct 2019 15:57) (81 - 94)  BP: 132/85 (11 Oct 2019 15:57) (132/85 - 140/86)  BP(mean): --  RR: 18 (11 Oct 2019 15:57) (18 - 18)  SpO2: --    Physical Examination:  General: AAO x 3  HEENT: PERRLA, EOMI  CV= S1 & S2 appreciated  Lungs=CTA BL  Abdominal Examination= + BS, Soft, ND, mid epigastric tenderness  + rebound  Extremity Examination= No C/C/E    ROS: No chest pain, no shortness of breath.  All other systems reviewed and are within normal limits except for the complaints in the HPI.    MEDICATIONS  (STANDING):  famotidine    Tablet 20 milliGRAM(s) Oral daily  heparin  Injectable 5000 Unit(s) SubCutaneous every 12 hours  influenza   Vaccine 0.5 milliLiter(s) IntraMuscular once  lactated ringers. 1000 milliLiter(s) (200 mL/Hr) IV Continuous <Continuous>  pancrelipase  (CREON 12,000 Lipase Units) 3 Capsule(s) Oral three times a day with meals    MEDICATIONS  (PRN):  aluminum hydroxide/magnesium hydroxide/simethicone Suspension 30 milliLiter(s) Oral every 4 hours PRN Dyspepsia  HYDROmorphone  Injectable 0.5 milliGRAM(s) IV Push four times a day PRN Severe Pain (7 - 10)  LORazepam   Injectable 2 milliGRAM(s) IV Push every 1 hour PRN Symptom-triggered: each CIWA -Ar score 8 or GREATER  ondansetron Injectable 4 milliGRAM(s) IV Push every 6 hours PRN Nausea and/or Vomiting  traMADol 50 milliGRAM(s) Oral every 6 hours PRN Moderate Pain (4 - 6)                            10.7   3.21  )-----------( 194      ( 11 Oct 2019 05:47 )             30.7     10-11    139  |  104  |  3<L>  ----------------------------<  95  3.6   |  25  |  0.6<L>    Ca    8.8      11 Oct 2019 05:47  Mg     1.5     10-10        Case discussed with housestaff & family  MINOOJorge Caruso [id number]
LAURA BARAJAS [id number]    Hospitalist Note  45yo F with Past Medical History Prior Ethanol Abuse and Chronic Pancreatitis admitted for abdominal pain secondary to chronic pancreatitis. Lipase levels were within normal limits on admission, and CT Abdomen/Pelvis was consistent with chronic pancreatitis.    Overnight events/Updates: The pt endorses persistent abdominal pain which was worsened with liquid intake this AM.  Her abdomen appears mildly distended with epigastric tenderness to deep palpation.    Vital Signs Last 24 Hrs  T(C): 36.1 (09 Oct 2019 15:20), Max: 36.3 (09 Oct 2019 07:30)  T(F): 97 (09 Oct 2019 15:20), Max: 97.4 (09 Oct 2019 07:30)  HR: 100 (09 Oct 2019 15:20) (77 - 100)  BP: 145/96 (09 Oct 2019 15:20) (108/77 - 145/96)  BP(mean): --  RR: 18 (09 Oct 2019 15:20) (18 - 18)  SpO2: --    Physical Examination:  General: AAO x 3  HEENT: PERRLA, EOMI  CV= S1 & S2 appreciated  Lungs=CTA BL  Abdominal Examination= + BS, mild distension, soft to palpation, + epigastric tenderness  Extremity Examination= No C/C/E    ROS: No chest pain, no shortness of breath.  All other systems reviewed and are within normal limits except for the complaints in the HPI.    MEDICATIONS  (STANDING):  famotidine    Tablet 20 milliGRAM(s) Oral daily  heparin  Injectable 5000 Unit(s) SubCutaneous every 12 hours  influenza   Vaccine 0.5 milliLiter(s) IntraMuscular once  lactated ringers. 1000 milliLiter(s) (150 mL/Hr) IV Continuous <Continuous>  pancrelipase  (CREON 12,000 Lipase Units) 3 Capsule(s) Oral three times a day with meals    MEDICATIONS  (PRN):  aluminum hydroxide/magnesium hydroxide/simethicone Suspension 30 milliLiter(s) Oral every 4 hours PRN Dyspepsia  HYDROmorphone  Injectable 1 milliGRAM(s) IV Push every 4 hours PRN Moderate Pain (4 - 6)  LORazepam   Injectable 2 milliGRAM(s) IV Push every 1 hour PRN Symptom-triggered: each CIWA -Ar score 8 or GREATER  ondansetron Injectable 4 milliGRAM(s) IV Push every 6 hours PRN Nausea and/or Vomiting                            11.8   3.99  )-----------( 219      ( 08 Oct 2019 06:35 )             34.1     10-08    140  |  101  |  5<L>  ----------------------------<  96  3.5   |  24  |  0.6<L>    Ca    9.4      08 Oct 2019 06:35    TPro  9.0<H>  /  Alb  5.1  /  TBili  0.8  /  DBili  x   /  AST  77<H>  /  ALT  19  /  AlkPhos  75  10-07      Case discussed with housestaff & family  IRENA Caruso [id number]
Subjective:  Patient still complains of epigastric abdominal pain and is requiring dilaudid around the clock.    Objective:      aluminum hydroxide/magnesium hydroxide/simethicone Suspension 30 milliLiter(s) Oral every 4 hours PRN  famotidine    Tablet 20 milliGRAM(s) Oral daily  heparin  Injectable 5000 Unit(s) SubCutaneous every 12 hours  HYDROmorphone  Injectable 1 milliGRAM(s) IV Push every 4 hours PRN  influenza   Vaccine 0.5 milliLiter(s) IntraMuscular once  lactated ringers. 1000 milliLiter(s) IV Continuous <Continuous>  LORazepam   Injectable 2 milliGRAM(s) IV Push every 1 hour PRN  ondansetron Injectable 4 milliGRAM(s) IV Push every 6 hours PRN  pancrelipase  (CREON 12,000 Lipase Units) 3 Capsule(s) Oral three times a day with meals      PHYSICAL EXAM:  	GENERAL: NAD, well-developed  	HEAD:  Atraumatic, Normocephalic  	EYES: EOMI, PERRLA, conjunctiva and sclera clear  	NECK: Supple, No JVD  	CHEST/LUNG: Clear to auscultation bilaterally; No wheeze  	HEART: Regular rate and rhythm; No murmurs, rubs, or gallops  	ABDOMEN: abdominal tenderness  	EXTREMITIES:  2+ Peripheral Pulses, No clubbing, cyanosis, or edema  	PSYCH: AAOx3  	NEUROLOGY: non-focal    SKIN: No rashes or lesions    T(C): 36.3 (10-09-19 @ 07:30), Max: 36.3 (10-09-19 @ 07:30)  HR: 97 (10-09-19 @ 07:30) (77 - 97)  BP: 108/77 (10-09-19 @ 07:30) (108/77 - 136/81)  RR: 18 (10-09-19 @ 07:30) (18 - 18)  SpO2: --    LABS:                        11.8   3.99  )-----------( 219      ( 08 Oct 2019 06:35 )             34.1     10-08    140  |  101  |  5<L>  ----------------------------<  96  3.5   |  24  |  0.6<L>    Ca    9.4      08 Oct 2019 06:35    TPro  9.0<H>  /  Alb  5.1  /  TBili  0.8  /  DBili  x   /  AST  77<H>  /  ALT  19  /  AlkPhos  75  10-07

## 2019-10-12 NOTE — CHART NOTE - NSCHARTNOTEFT_GEN_A_CORE
<<<RESIDENT DISCHARGE NOTE>>>     LAURA BARAJAS  MRN-[id number]    VITAL SIGNS:  T(F): 96.3 (10-12-19 @ 07:21), Max: 97.8 (10-11-19 @ 15:57)  HR: 88 (10-12-19 @ 07:21)  BP: 137/79 (10-12-19 @ 07:21)  SpO2: --      PHYSICAL EXAMINATION:  General: NAD  Head & Neck: NCAT  Pulmonary: CTA b/l  Cardiovascular: +s1s2 RRR  Gastrointestinal/Abdomen & Pelvis: soft, NT/ND (+) bs  Neurologic/Motor: FROM x 4 5/5    TEST RESULTS:                        10.6   3.85  )-----------( 186      ( 12 Oct 2019 04:30 )             30.8       10-12    139  |  103  |  <3<L>  ----------------------------<  99  4.0   |  27  |  0.6<L>    Ca    9.2      12 Oct 2019 04:30        FINAL DISCHARGE INTERVIEW:  Resident(s) Present: (Name: Gia), RN Present: (Name:Celsa)    DISCHARGE MEDICATION RECONCILIATION  reviewed with Attending (Name: Samantha)    DISPOSITION:   [ x ] Home,    [  ] Home with Visiting Nursing Services,   [    ]  SNF/ NH,    [   ] Acute Rehab (4A),   [   ] Other (Specify:)

## 2019-10-12 NOTE — PROGRESS NOTE ADULT - ASSESSMENT
Patient is a 44y old  Female who presents with a chief complaint of Abdominal Pain (11 Oct 2019 16:04)    #Acute abdominal pain secondary acute on chronic pancreatitis secondary to ethanol abuse  pain is resolved  on creon   tramadol 50 mg q6h prn   pain management outpatient     #Suspected folic acid and thiamine deficiency cw thiamine and folic acid     #Hepatic steatosis     #Bilateral ovarian cysts     Progress Note Handoff    Pending:  dc home today  Family discussion: patient is of sound mind and agrees to plan of care    Disposition: Home___ Patient is a 44y old  Female who presents with a chief complaint of Abdominal Pain (11 Oct 2019 16:04)    #Acute abdominal pain secondary acute on chronic pancreatitis secondary to ethanol abuse  pain is resolved  on creon   tramadol 50 mg q6h prn   pain management outpatient     #Suspected folic acid and thiamine deficiency cw thiamine and folic acid     #Hepatic steatosis     #Bilateral ovarian cysts     Progress Note Handoff    Pending:  dc home today time spent 42 min  Family discussion: patient is of sound mind and agrees to plan of care    Disposition: Home___

## 2019-10-12 NOTE — PROGRESS NOTE ADULT - PROVIDER SPECIALTY LIST ADULT
Hospitalist
Hospitalist
Internal Medicine
Pain Medicine
Hospitalist

## 2019-10-12 NOTE — DISCHARGE NOTE NURSING/CASE MANAGEMENT/SOCIAL WORK - PATIENT PORTAL LINK FT
You can access the FollowMyHealth Patient Portal offered by [facility] by registering at the following website: [email or web address]. By joining [facility]’s FollowMyHealth portal, you will also be able to view your health information using other applications (apps) compatible with our system.

## 2019-10-16 DIAGNOSIS — E53.8 DEFICIENCY OF OTHER SPECIFIED B GROUP VITAMINS: ICD-10-CM

## 2019-10-16 DIAGNOSIS — R10.9 UNSPECIFIED ABDOMINAL PAIN: ICD-10-CM

## 2019-10-16 DIAGNOSIS — E51.9 THIAMINE DEFICIENCY, UNSPECIFIED: ICD-10-CM

## 2019-10-16 DIAGNOSIS — F11.29 OPIOID DEPENDENCE WITH UNSPECIFIED OPIOID-INDUCED DISORDER: ICD-10-CM

## 2019-10-16 DIAGNOSIS — Z88.8 ALLERGY STATUS TO OTHER DRUGS, MEDICAMENTS AND BIOLOGICAL SUBSTANCES STATUS: ICD-10-CM

## 2019-10-16 DIAGNOSIS — K76.0 FATTY (CHANGE OF) LIVER, NOT ELSEWHERE CLASSIFIED: ICD-10-CM

## 2019-10-16 DIAGNOSIS — F10.10 ALCOHOL ABUSE, UNCOMPLICATED: ICD-10-CM

## 2019-10-16 DIAGNOSIS — R11.2 NAUSEA WITH VOMITING, UNSPECIFIED: ICD-10-CM

## 2019-10-16 DIAGNOSIS — K21.9 GASTRO-ESOPHAGEAL REFLUX DISEASE WITHOUT ESOPHAGITIS: ICD-10-CM

## 2019-10-16 DIAGNOSIS — K86.81 EXOCRINE PANCREATIC INSUFFICIENCY: ICD-10-CM

## 2019-10-16 DIAGNOSIS — K86.0 ALCOHOL-INDUCED CHRONIC PANCREATITIS: ICD-10-CM

## 2019-10-16 DIAGNOSIS — E83.42 HYPOMAGNESEMIA: ICD-10-CM

## 2019-10-16 DIAGNOSIS — K85.20 ALCOHOL INDUCED ACUTE PANCREATITIS WITHOUT NECROSIS OR INFECTION: ICD-10-CM

## 2019-11-06 ENCOUNTER — INPATIENT (INPATIENT)
Facility: HOSPITAL | Age: 44
LOS: 4 days | Discharge: HOME | End: 2019-11-11
Attending: INTERNAL MEDICINE | Admitting: INTERNAL MEDICINE
Payer: MEDICAID

## 2019-11-06 VITALS
SYSTOLIC BLOOD PRESSURE: 172 MMHG | HEART RATE: 108 BPM | OXYGEN SATURATION: 100 % | WEIGHT: 130.07 LBS | RESPIRATION RATE: 18 BRPM | DIASTOLIC BLOOD PRESSURE: 81 MMHG | TEMPERATURE: 98 F

## 2019-11-06 DIAGNOSIS — Z98.890 OTHER SPECIFIED POSTPROCEDURAL STATES: Chronic | ICD-10-CM

## 2019-11-06 LAB
ALBUMIN SERPL ELPH-MCNC: 4.7 G/DL — SIGNIFICANT CHANGE UP (ref 3.5–5.2)
ALP SERPL-CCNC: 72 U/L — SIGNIFICANT CHANGE UP (ref 30–115)
ALT FLD-CCNC: 17 U/L — SIGNIFICANT CHANGE UP (ref 0–41)
ANION GAP SERPL CALC-SCNC: 20 MMOL/L — HIGH (ref 7–14)
AST SERPL-CCNC: 59 U/L — HIGH (ref 0–41)
BASOPHILS # BLD AUTO: 0.05 K/UL — SIGNIFICANT CHANGE UP (ref 0–0.2)
BASOPHILS NFR BLD AUTO: 0.7 % — SIGNIFICANT CHANGE UP (ref 0–1)
BILIRUB DIRECT SERPL-MCNC: <0.2 MG/DL — SIGNIFICANT CHANGE UP (ref 0–0.2)
BILIRUB INDIRECT FLD-MCNC: >0.1 MG/DL — LOW (ref 0.2–1.2)
BILIRUB SERPL-MCNC: 0.3 MG/DL — SIGNIFICANT CHANGE UP (ref 0.2–1.2)
BUN SERPL-MCNC: 15 MG/DL — SIGNIFICANT CHANGE UP (ref 10–20)
CALCIUM SERPL-MCNC: 9.8 MG/DL — SIGNIFICANT CHANGE UP (ref 8.5–10.1)
CHLORIDE SERPL-SCNC: 99 MMOL/L — SIGNIFICANT CHANGE UP (ref 98–110)
CO2 SERPL-SCNC: 20 MMOL/L — SIGNIFICANT CHANGE UP (ref 17–32)
CREAT SERPL-MCNC: 0.8 MG/DL — SIGNIFICANT CHANGE UP (ref 0.7–1.5)
EOSINOPHIL # BLD AUTO: 0 K/UL — SIGNIFICANT CHANGE UP (ref 0–0.7)
EOSINOPHIL NFR BLD AUTO: 0 % — SIGNIFICANT CHANGE UP (ref 0–8)
GLUCOSE SERPL-MCNC: 99 MG/DL — SIGNIFICANT CHANGE UP (ref 70–99)
HCT VFR BLD CALC: 36.9 % — LOW (ref 37–47)
HGB BLD-MCNC: 13 G/DL — SIGNIFICANT CHANGE UP (ref 12–16)
IMM GRANULOCYTES NFR BLD AUTO: 0.3 % — SIGNIFICANT CHANGE UP (ref 0.1–0.3)
LACTATE SERPL-SCNC: 2.8 MMOL/L — HIGH (ref 0.5–2.2)
LIDOCAIN IGE QN: >600 U/L — HIGH (ref 7–60)
LYMPHOCYTES # BLD AUTO: 1.11 K/UL — LOW (ref 1.2–3.4)
LYMPHOCYTES # BLD AUTO: 15.7 % — LOW (ref 20.5–51.1)
MCHC RBC-ENTMCNC: 32.3 PG — HIGH (ref 27–31)
MCHC RBC-ENTMCNC: 35.2 G/DL — SIGNIFICANT CHANGE UP (ref 32–37)
MCV RBC AUTO: 91.8 FL — SIGNIFICANT CHANGE UP (ref 81–99)
MONOCYTES # BLD AUTO: 0.43 K/UL — SIGNIFICANT CHANGE UP (ref 0.1–0.6)
MONOCYTES NFR BLD AUTO: 6.1 % — SIGNIFICANT CHANGE UP (ref 1.7–9.3)
NEUTROPHILS # BLD AUTO: 5.48 K/UL — SIGNIFICANT CHANGE UP (ref 1.4–6.5)
NEUTROPHILS NFR BLD AUTO: 77.2 % — HIGH (ref 42.2–75.2)
NRBC # BLD: 0 /100 WBCS — SIGNIFICANT CHANGE UP (ref 0–0)
PLATELET # BLD AUTO: 271 K/UL — SIGNIFICANT CHANGE UP (ref 130–400)
POTASSIUM SERPL-MCNC: 3.8 MMOL/L — SIGNIFICANT CHANGE UP (ref 3.5–5)
POTASSIUM SERPL-SCNC: 3.8 MMOL/L — SIGNIFICANT CHANGE UP (ref 3.5–5)
PROT SERPL-MCNC: 8.1 G/DL — HIGH (ref 6–8)
RBC # BLD: 4.02 M/UL — LOW (ref 4.2–5.4)
RBC # FLD: 11.7 % — SIGNIFICANT CHANGE UP (ref 11.5–14.5)
SODIUM SERPL-SCNC: 139 MMOL/L — SIGNIFICANT CHANGE UP (ref 135–146)
WBC # BLD: 7.09 K/UL — SIGNIFICANT CHANGE UP (ref 4.8–10.8)
WBC # FLD AUTO: 7.09 K/UL — SIGNIFICANT CHANGE UP (ref 4.8–10.8)

## 2019-11-06 PROCEDURE — 71045 X-RAY EXAM CHEST 1 VIEW: CPT | Mod: 26

## 2019-11-06 PROCEDURE — 99285 EMERGENCY DEPT VISIT HI MDM: CPT

## 2019-11-06 RX ORDER — MORPHINE SULFATE 50 MG/1
4 CAPSULE, EXTENDED RELEASE ORAL ONCE
Refills: 0 | Status: DISCONTINUED | OUTPATIENT
Start: 2019-11-06 | End: 2019-11-06

## 2019-11-06 RX ORDER — SODIUM CHLORIDE 9 MG/ML
1000 INJECTION INTRAMUSCULAR; INTRAVENOUS; SUBCUTANEOUS ONCE
Refills: 0 | Status: COMPLETED | OUTPATIENT
Start: 2019-11-06 | End: 2019-11-06

## 2019-11-06 RX ORDER — FAMOTIDINE 10 MG/ML
20 INJECTION INTRAVENOUS ONCE
Refills: 0 | Status: COMPLETED | OUTPATIENT
Start: 2019-11-06 | End: 2019-11-06

## 2019-11-06 RX ORDER — SODIUM CHLORIDE 9 MG/ML
1000 INJECTION, SOLUTION INTRAVENOUS
Refills: 0 | Status: DISCONTINUED | OUTPATIENT
Start: 2019-11-06 | End: 2019-11-11

## 2019-11-06 RX ORDER — ONDANSETRON 8 MG/1
4 TABLET, FILM COATED ORAL ONCE
Refills: 0 | Status: COMPLETED | OUTPATIENT
Start: 2019-11-06 | End: 2019-11-06

## 2019-11-06 RX ADMIN — MORPHINE SULFATE 4 MILLIGRAM(S): 50 CAPSULE, EXTENDED RELEASE ORAL at 21:22

## 2019-11-06 RX ADMIN — SODIUM CHLORIDE 1000 MILLILITER(S): 9 INJECTION INTRAMUSCULAR; INTRAVENOUS; SUBCUTANEOUS at 21:22

## 2019-11-06 RX ADMIN — MORPHINE SULFATE 4 MILLIGRAM(S): 50 CAPSULE, EXTENDED RELEASE ORAL at 21:52

## 2019-11-06 RX ADMIN — SODIUM CHLORIDE 1000 MILLILITER(S): 9 INJECTION INTRAMUSCULAR; INTRAVENOUS; SUBCUTANEOUS at 23:31

## 2019-11-06 RX ADMIN — SODIUM CHLORIDE 1000 MILLILITER(S): 9 INJECTION INTRAMUSCULAR; INTRAVENOUS; SUBCUTANEOUS at 21:30

## 2019-11-06 RX ADMIN — ONDANSETRON 4 MILLIGRAM(S): 8 TABLET, FILM COATED ORAL at 21:22

## 2019-11-06 RX ADMIN — FAMOTIDINE 20 MILLIGRAM(S): 10 INJECTION INTRAVENOUS at 22:35

## 2019-11-06 RX ADMIN — FAMOTIDINE 104 MILLIGRAM(S): 10 INJECTION INTRAVENOUS at 22:35

## 2019-11-06 RX ADMIN — SODIUM CHLORIDE 1000 MILLILITER(S): 9 INJECTION INTRAMUSCULAR; INTRAVENOUS; SUBCUTANEOUS at 23:21

## 2019-11-06 RX ADMIN — MORPHINE SULFATE 4 MILLIGRAM(S): 50 CAPSULE, EXTENDED RELEASE ORAL at 23:20

## 2019-11-06 RX ADMIN — MORPHINE SULFATE 4 MILLIGRAM(S): 50 CAPSULE, EXTENDED RELEASE ORAL at 23:50

## 2019-11-06 NOTE — ED PROVIDER NOTE - CLINICAL SUMMARY MEDICAL DECISION MAKING FREE TEXT BOX
44 female here for recurrent pancreatitis provoked by alcohol intake, had prior history of similar. Had screening labs imaging medications and reevaluation, plan is for inpatient admission for continued management. Alcohol abstinence d/w patient in detail. Charting done contemporaneously but timestamps may not reflect the actual time of occurrence.

## 2019-11-06 NOTE — ED PROVIDER NOTE - PHYSICAL EXAMINATION
Constitutional: Well developed, well nourished in mild distress due to pain  Head: Normocephalic, atraumatic.  Eyes: PERRL, EOMI.  ENT: No nasal discharge. Mucous membranes dry.  Neck: Supple. Painless ROM.  Cardiovascular: Normal S1, S2. Regular rate and rhythm. No murmurs, rubs, or gallops.  Pulmonary:  Lungs clear to auscultation bilaterally.   Abdominal: Soft diffusely tender abdomen; no rebound, guarding;   Extremities. Pelvis stable. No lower extremity edema, symmetric calves.  Skin: No rashes, cyanosis.  Neuro: AAOx3. No focal neurological deficits.  Psych: Normal mood. Normal affect.

## 2019-11-06 NOTE — ED ADULT NURSE NOTE - OBJECTIVE STATEMENT
pt with abdominal pain  h/o chronic pancreatitis  recently seen and d/c from ED for similar symptoms

## 2019-11-06 NOTE — ED PROVIDER NOTE - ATTENDING CONTRIBUTION TO CARE
I personally evaluated the patient. I reviewed the Resident’s or Physician Assistant’s note (as assigned above), and agree with the findings and plan except as documented in my note.     44 female here for abdominal pain consistent with prior, has diagnosis of pancreatitis with recent ED eval /admission but unable to secure outpatient regimen preventing return. Admits to recently drinking alcohol again despite being warned of its dangers given her condition. Now complains of midepigastric abdominal pain non radiating persistent and unresolved by Rx.     ROS otherwise unremarkable    PE: female in no distress. appears uncomfortable. CV: pulses intact. CHEST: normal work of breathing. ABD: nondistended. tender in midepigastrium. non rigid. SKIN: normal. EXT: FROM. NEURO: AAO 3 no focal deficits. HEENT: mucosa normal non icteric    Impression: abdominal pain    Plan: IV labs imaging supportive care and reevaluation

## 2019-11-06 NOTE — ED PROVIDER NOTE - NS ED ROS FT
Constitutional: No fever, chills.  Eyes: No visual changes.  ENT: No hearing changes.  Neck: No neck pain or stiffness.  Cardiovascular: No chest pain, palpitations, edema.  Pulmonary: No SOB, cough. No hemoptysis.  Abdominal: see hpi  : No dysuria, frequency.  Neuro: No headache, syncope, dizziness.  MS: No back pain. No calf pain/swelling.  Psych: No suicidal ideations.

## 2019-11-06 NOTE — ED PROVIDER NOTE - OBJECTIVE STATEMENT
44 yold female to ED [facility] alcoholic pancreatitis c/o abdominal pain, n/v started yesterday after drinking alcohol; pt s/p admission to [facility] 1 week ago d/c yesterday; pt unable to tolerate po due to pain and n/v; pt denies fever, chills, back pain;

## 2019-11-07 LAB
ANION GAP SERPL CALC-SCNC: 15 MMOL/L — HIGH (ref 7–14)
BASE EXCESS BLDV CALC-SCNC: -3.4 MMOL/L — LOW (ref -2–2)
BASOPHILS # BLD AUTO: 0.04 K/UL — SIGNIFICANT CHANGE UP (ref 0–0.2)
BASOPHILS NFR BLD AUTO: 0.6 % — SIGNIFICANT CHANGE UP (ref 0–1)
BUN SERPL-MCNC: 7 MG/DL — LOW (ref 10–20)
CA-I SERPL-SCNC: 1.11 MMOL/L — LOW (ref 1.12–1.3)
CALCIUM SERPL-MCNC: 8.4 MG/DL — LOW (ref 8.5–10.1)
CHLORIDE SERPL-SCNC: 104 MMOL/L — SIGNIFICANT CHANGE UP (ref 98–110)
CHOLEST SERPL-MCNC: 136 MG/DL — SIGNIFICANT CHANGE UP (ref 100–200)
CO2 SERPL-SCNC: 21 MMOL/L — SIGNIFICANT CHANGE UP (ref 17–32)
CREAT SERPL-MCNC: 0.7 MG/DL — SIGNIFICANT CHANGE UP (ref 0.7–1.5)
EOSINOPHIL # BLD AUTO: 0.02 K/UL — SIGNIFICANT CHANGE UP (ref 0–0.7)
EOSINOPHIL NFR BLD AUTO: 0.3 % — SIGNIFICANT CHANGE UP (ref 0–8)
GAS PNL BLDV: 140 MMOL/L — SIGNIFICANT CHANGE UP (ref 136–145)
GAS PNL BLDV: SIGNIFICANT CHANGE UP
GLUCOSE SERPL-MCNC: 83 MG/DL — SIGNIFICANT CHANGE UP (ref 70–99)
HCO3 BLDV-SCNC: 22 MMOL/L — SIGNIFICANT CHANGE UP (ref 22–29)
HCT VFR BLD CALC: 31.4 % — LOW (ref 37–47)
HCT VFR BLDA CALC: 34 % — SIGNIFICANT CHANGE UP (ref 34–44)
HDLC SERPL-MCNC: 63 MG/DL — SIGNIFICANT CHANGE UP
HGB BLD CALC-MCNC: 11.1 G/DL — LOW (ref 14–18)
HGB BLD-MCNC: 11 G/DL — LOW (ref 12–16)
IMM GRANULOCYTES NFR BLD AUTO: 0.4 % — HIGH (ref 0.1–0.3)
LACTATE BLDV-MCNC: 1.8 MMOL/L — HIGH (ref 0.5–1.6)
LACTATE SERPL-SCNC: 0.7 MMOL/L — SIGNIFICANT CHANGE UP (ref 0.5–2.2)
LIPID PNL WITH DIRECT LDL SERPL: 57 MG/DL — SIGNIFICANT CHANGE UP (ref 4–129)
LYMPHOCYTES # BLD AUTO: 1.89 K/UL — SIGNIFICANT CHANGE UP (ref 1.2–3.4)
LYMPHOCYTES # BLD AUTO: 27.3 % — SIGNIFICANT CHANGE UP (ref 20.5–51.1)
MCHC RBC-ENTMCNC: 32.5 PG — HIGH (ref 27–31)
MCHC RBC-ENTMCNC: 35 G/DL — SIGNIFICANT CHANGE UP (ref 32–37)
MCV RBC AUTO: 92.9 FL — SIGNIFICANT CHANGE UP (ref 81–99)
MONOCYTES # BLD AUTO: 0.65 K/UL — HIGH (ref 0.1–0.6)
MONOCYTES NFR BLD AUTO: 9.4 % — HIGH (ref 1.7–9.3)
NEUTROPHILS # BLD AUTO: 4.29 K/UL — SIGNIFICANT CHANGE UP (ref 1.4–6.5)
NEUTROPHILS NFR BLD AUTO: 62 % — SIGNIFICANT CHANGE UP (ref 42.2–75.2)
NRBC # BLD: 0 /100 WBCS — SIGNIFICANT CHANGE UP (ref 0–0)
PCO2 BLDV: 40 MMHG — LOW (ref 41–51)
PH BLDV: 7.35 — SIGNIFICANT CHANGE UP (ref 7.26–7.43)
PLATELET # BLD AUTO: 220 K/UL — SIGNIFICANT CHANGE UP (ref 130–400)
PO2 BLDV: 49 MMHG — HIGH (ref 20–40)
POTASSIUM BLDV-SCNC: 3.3 MMOL/L — SIGNIFICANT CHANGE UP (ref 3.3–5.6)
POTASSIUM SERPL-MCNC: 3.5 MMOL/L — SIGNIFICANT CHANGE UP (ref 3.5–5)
POTASSIUM SERPL-SCNC: 3.5 MMOL/L — SIGNIFICANT CHANGE UP (ref 3.5–5)
RBC # BLD: 3.38 M/UL — LOW (ref 4.2–5.4)
RBC # FLD: 11.8 % — SIGNIFICANT CHANGE UP (ref 11.5–14.5)
SAO2 % BLDV: 80 % — SIGNIFICANT CHANGE UP
SODIUM SERPL-SCNC: 140 MMOL/L — SIGNIFICANT CHANGE UP (ref 135–146)
TOTAL CHOLESTEROL/HDL RATIO MEASUREMENT: 2.2 RATIO — LOW (ref 4–5.5)
TRIGL SERPL-MCNC: 154 MG/DL — HIGH (ref 10–149)
WBC # BLD: 6.92 K/UL — SIGNIFICANT CHANGE UP (ref 4.8–10.8)
WBC # FLD AUTO: 6.92 K/UL — SIGNIFICANT CHANGE UP (ref 4.8–10.8)

## 2019-11-07 PROCEDURE — 74177 CT ABD & PELVIS W/CONTRAST: CPT | Mod: 26

## 2019-11-07 PROCEDURE — 99223 1ST HOSP IP/OBS HIGH 75: CPT

## 2019-11-07 RX ORDER — FOLIC ACID 0.8 MG
1 TABLET ORAL DAILY
Refills: 0 | Status: DISCONTINUED | OUTPATIENT
Start: 2019-11-07 | End: 2019-11-07

## 2019-11-07 RX ORDER — ONDANSETRON 8 MG/1
4 TABLET, FILM COATED ORAL EVERY 8 HOURS
Refills: 0 | Status: DISCONTINUED | OUTPATIENT
Start: 2019-11-07 | End: 2019-11-11

## 2019-11-07 RX ORDER — ENOXAPARIN SODIUM 100 MG/ML
40 INJECTION SUBCUTANEOUS AT BEDTIME
Refills: 0 | Status: DISCONTINUED | OUTPATIENT
Start: 2019-11-07 | End: 2019-11-11

## 2019-11-07 RX ORDER — LIPASE/PROTEASE/AMYLASE 16-48-48K
3 CAPSULE,DELAYED RELEASE (ENTERIC COATED) ORAL
Refills: 0 | Status: DISCONTINUED | OUTPATIENT
Start: 2019-11-07 | End: 2019-11-07

## 2019-11-07 RX ORDER — HYDROMORPHONE HYDROCHLORIDE 2 MG/ML
1 INJECTION INTRAMUSCULAR; INTRAVENOUS; SUBCUTANEOUS EVERY 4 HOURS
Refills: 0 | Status: DISCONTINUED | OUTPATIENT
Start: 2019-11-07 | End: 2019-11-08

## 2019-11-07 RX ORDER — HYDROMORPHONE HYDROCHLORIDE 2 MG/ML
1 INJECTION INTRAMUSCULAR; INTRAVENOUS; SUBCUTANEOUS ONCE
Refills: 0 | Status: DISCONTINUED | OUTPATIENT
Start: 2019-11-07 | End: 2019-11-07

## 2019-11-07 RX ORDER — HYDROMORPHONE HYDROCHLORIDE 2 MG/ML
0.5 INJECTION INTRAMUSCULAR; INTRAVENOUS; SUBCUTANEOUS EVERY 4 HOURS
Refills: 0 | Status: DISCONTINUED | OUTPATIENT
Start: 2019-11-07 | End: 2019-11-07

## 2019-11-07 RX ORDER — HYDROMORPHONE HYDROCHLORIDE 2 MG/ML
0.5 INJECTION INTRAMUSCULAR; INTRAVENOUS; SUBCUTANEOUS ONCE
Refills: 0 | Status: DISCONTINUED | OUTPATIENT
Start: 2019-11-07 | End: 2019-11-07

## 2019-11-07 RX ORDER — PANTOPRAZOLE SODIUM 20 MG/1
40 TABLET, DELAYED RELEASE ORAL
Refills: 0 | Status: DISCONTINUED | OUTPATIENT
Start: 2019-11-07 | End: 2019-11-07

## 2019-11-07 RX ORDER — THIAMINE MONONITRATE (VIT B1) 100 MG
100 TABLET ORAL DAILY
Refills: 0 | Status: DISCONTINUED | OUTPATIENT
Start: 2019-11-07 | End: 2019-11-07

## 2019-11-07 RX ORDER — CHLORHEXIDINE GLUCONATE 213 G/1000ML
1 SOLUTION TOPICAL
Refills: 0 | Status: DISCONTINUED | OUTPATIENT
Start: 2019-11-07 | End: 2019-11-11

## 2019-11-07 RX ADMIN — HYDROMORPHONE HYDROCHLORIDE 1 MILLIGRAM(S): 2 INJECTION INTRAMUSCULAR; INTRAVENOUS; SUBCUTANEOUS at 23:35

## 2019-11-07 RX ADMIN — HYDROMORPHONE HYDROCHLORIDE 1 MILLIGRAM(S): 2 INJECTION INTRAMUSCULAR; INTRAVENOUS; SUBCUTANEOUS at 00:56

## 2019-11-07 RX ADMIN — HYDROMORPHONE HYDROCHLORIDE 1 MILLIGRAM(S): 2 INJECTION INTRAMUSCULAR; INTRAVENOUS; SUBCUTANEOUS at 02:12

## 2019-11-07 RX ADMIN — HYDROMORPHONE HYDROCHLORIDE 1 MILLIGRAM(S): 2 INJECTION INTRAMUSCULAR; INTRAVENOUS; SUBCUTANEOUS at 01:46

## 2019-11-07 RX ADMIN — HYDROMORPHONE HYDROCHLORIDE 0.5 MILLIGRAM(S): 2 INJECTION INTRAMUSCULAR; INTRAVENOUS; SUBCUTANEOUS at 08:25

## 2019-11-07 RX ADMIN — HYDROMORPHONE HYDROCHLORIDE 0.5 MILLIGRAM(S): 2 INJECTION INTRAMUSCULAR; INTRAVENOUS; SUBCUTANEOUS at 08:09

## 2019-11-07 RX ADMIN — HYDROMORPHONE HYDROCHLORIDE 1 MILLIGRAM(S): 2 INJECTION INTRAMUSCULAR; INTRAVENOUS; SUBCUTANEOUS at 20:36

## 2019-11-07 RX ADMIN — HYDROMORPHONE HYDROCHLORIDE 0.5 MILLIGRAM(S): 2 INJECTION INTRAMUSCULAR; INTRAVENOUS; SUBCUTANEOUS at 04:22

## 2019-11-07 RX ADMIN — HYDROMORPHONE HYDROCHLORIDE 1 MILLIGRAM(S): 2 INJECTION INTRAMUSCULAR; INTRAVENOUS; SUBCUTANEOUS at 00:26

## 2019-11-07 RX ADMIN — HYDROMORPHONE HYDROCHLORIDE 0.5 MILLIGRAM(S): 2 INJECTION INTRAMUSCULAR; INTRAVENOUS; SUBCUTANEOUS at 04:05

## 2019-11-07 RX ADMIN — HYDROMORPHONE HYDROCHLORIDE 0.5 MILLIGRAM(S): 2 INJECTION INTRAMUSCULAR; INTRAVENOUS; SUBCUTANEOUS at 06:43

## 2019-11-07 RX ADMIN — SODIUM CHLORIDE 1000 MILLILITER(S): 9 INJECTION INTRAMUSCULAR; INTRAVENOUS; SUBCUTANEOUS at 00:35

## 2019-11-07 RX ADMIN — SODIUM CHLORIDE 200 MILLILITER(S): 9 INJECTION, SOLUTION INTRAVENOUS at 01:46

## 2019-11-07 RX ADMIN — HYDROMORPHONE HYDROCHLORIDE 1 MILLIGRAM(S): 2 INJECTION INTRAMUSCULAR; INTRAVENOUS; SUBCUTANEOUS at 20:04

## 2019-11-07 RX ADMIN — SODIUM CHLORIDE 1000 MILLILITER(S): 9 INJECTION INTRAMUSCULAR; INTRAVENOUS; SUBCUTANEOUS at 01:01

## 2019-11-07 RX ADMIN — HYDROMORPHONE HYDROCHLORIDE 1 MILLIGRAM(S): 2 INJECTION INTRAMUSCULAR; INTRAVENOUS; SUBCUTANEOUS at 12:20

## 2019-11-07 RX ADMIN — HYDROMORPHONE HYDROCHLORIDE 1 MILLIGRAM(S): 2 INJECTION INTRAMUSCULAR; INTRAVENOUS; SUBCUTANEOUS at 16:13

## 2019-11-07 RX ADMIN — HYDROMORPHONE HYDROCHLORIDE 1 MILLIGRAM(S): 2 INJECTION INTRAMUSCULAR; INTRAVENOUS; SUBCUTANEOUS at 23:50

## 2019-11-07 RX ADMIN — SODIUM CHLORIDE 200 MILLILITER(S): 9 INJECTION, SOLUTION INTRAVENOUS at 22:36

## 2019-11-07 RX ADMIN — SODIUM CHLORIDE 200 MILLILITER(S): 9 INJECTION, SOLUTION INTRAVENOUS at 10:40

## 2019-11-07 RX ADMIN — ONDANSETRON 4 MILLIGRAM(S): 8 TABLET, FILM COATED ORAL at 04:09

## 2019-11-07 NOTE — CHART NOTE - NSCHARTNOTEFT_GEN_A_CORE
Attending:     Pt seen and examined independently. Still with epigastric pain and she states it worsened on clear liquids.  Keep NPO, continue IVF and current dose of dilaudid.  VS reviewed  general - NAD, resting comfortably on a stretcher  chest - CTA b/l  CVS - RRR  abdomen - soft, ND, tender in epigastric area mainly and over entire abdomen, no guarding  extremities - no edema    Acute over chronic pancreatitis due to alcohol use  pt counseled to quit ETOH since even small amounts are exacerbating her condition  She was advised to f/u with psychiatry re: treatment for anxiety and insomnia    Case reviewed with PGY 2 resident

## 2019-11-07 NOTE — H&P ADULT - HISTORY OF PRESENT ILLNESS
44 year old female with Hx of chronic pancreatitis presenting due to abdominal pain of 1 day duration. Patient discharged 11/3 from a hospital [facility] for pancreatitis. Yesterday had 2 drinks at dinner yesterday, woke up Wednesday morning with nausea, and later that day started developed severe abdominal pain. Denies fevers, chills, vomiting, diarrhea.   Has a history of alcohol abuse, 2-3 drinks nightly 'for years'. Currently drinks occasionally but almost always complicated by pancreatitis. Last drink Tuesday 11/5.  Currently reports severe generalized abdominal pain, no appetite for food.

## 2019-11-07 NOTE — H&P ADULT - ATTENDING COMMENTS
44 year old female with Hx of chronic pancreatitis presenting due to abdominal pain of 1 day duration. Patient discharged 11/3 from a hospital [facility] for pancreatitis. Yesterday had 2 drinks at dinner yesterday, woke up Wednesday morning with nausea, and later that day started developed severe abdominal pain. Denies fevers, chills, vomiting, diarrhea.   Has a history of alcohol abuse, 2-3 drinks nightly 'for years'. Currently drinks occasionally but almost always complicated by pancreatitis. Last drink Tuesday 11/5.  Currently reports severe generalized abdominal pain, no appetite for food.     REVIEW OF SYSTEMS: see cc/HPI  CONSTITUTIONAL: No weakness, fevers or chills  EYES/ENT: No visual changes;  No vertigo or throat pain   NECK: No pain or stiffness  RESPIRATORY: No cough, wheezing, hemoptysis; No shortness of breath  CARDIOVASCULAR: No chest pain or palpitations  GASTROINTESTINAL: (+) abdominal - epigastric pain. (+) nausea, vomiting, or hematemesis; No diarrhea or constipation. No melena or hematochezia.  GENITOURINARY: No dysuria, frequency or hematuria  NEUROLOGICAL: No numbness or weakness  SKIN: No itching, rashes    Physical Exam:    General: WN/WD NAD  Neurology: A&Ox3, nonfocal, follows commands  Eyes: PERRLA/ EOMI  ENT/Neck: Neck supple, trachea midline, No JVD  Respiratory: CTA B/L, No wheezing, rales, rhonchi  CV: Normal rate regular rhythm, S1S2, no murmurs, rubs or gallops  Abdominal: Soft, (+) tenderness, ND +BS,   Extremities: No edema, + peripheral pulses  Skin: No Rashes, Hematoma, Ecchymosis  Incisions:   Tubes:    A/P  Acute pancreatitis 2/2 alcohol abuse  -NPO, IV fluids, Is and Os  -PRN pain Rx  -Zofran PRN   -Once pain / nausea improved can start liquid diet and advance    Alcohol abuse  -CIWA and Ativan PRN  -MVI , Thiamine , Folic acid    Depression   -Psyche referral     DVT prophylaxis 44 year old female with Hx of chronic pancreatitis presenting due to abdominal pain of 1 day duration. Patient discharged 11/3 from a hospital [facility] for pancreatitis. Yesterday had 2 drinks at dinner yesterday, woke up Wednesday morning with nausea, and later that day started developed severe abdominal pain. Denies fevers, chills, vomiting, diarrhea.   Has a history of alcohol abuse, 2-3 drinks nightly 'for years'. Currently drinks occasionally but almost always complicated by pancreatitis. Last drink Tuesday 11/5.  Currently reports severe generalized abdominal pain, no appetite for food.     REVIEW OF SYSTEMS: see cc/HPI  CONSTITUTIONAL: No weakness, fevers or chills  EYES/ENT: No visual changes;  No vertigo or throat pain   NECK: No pain or stiffness  RESPIRATORY: No cough, wheezing, hemoptysis; No shortness of breath  CARDIOVASCULAR: No chest pain or palpitations  GASTROINTESTINAL: (+) abdominal - epigastric pain. (+) nausea, vomiting, or hematemesis; No diarrhea or constipation. No melena or hematochezia.  GENITOURINARY: No dysuria, frequency or hematuria  NEUROLOGICAL: No numbness or weakness  SKIN: No itching, rashes    Physical Exam:    General: WN/WD NAD  Neurology: A&Ox3, nonfocal, follows commands  Eyes: PERRLA/ EOMI  ENT/Neck: Neck supple, trachea midline, No JVD  Respiratory: CTA B/L, No wheezing, rales, rhonchi  CV: Normal rate regular rhythm, S1S2, no murmurs, rubs or gallops  Abdominal: Soft, (+) tenderness - diffuse and worse at the epigastrum, mild bloating, +BS,   Extremities: No edema, + peripheral pulses  Skin: No Rashes, Hematoma, Ecchymosis  Incisions:   Tubes:    A/P  Acute pancreatitis 2/2 alcohol abuse  -NPO, IV fluids, Is and Os  -PRN pain Rx  -Zofran PRN   -Once pain / nausea improved can start liquid diet and advance    Alcohol abuse  -CIWA and Ativan PRN  -MVI , Thiamine , Folic acid    Depression   -Psyche referral     DVT prophylaxis

## 2019-11-07 NOTE — H&P ADULT - NSHPLABSRESULTS_GEN_ALL_CORE
13.0   7.09  )-----------( 271      ( 06 Nov 2019 21:18 )             36.9         11-06    139  |  99  |  15  ----------------------------<  99  3.8   |  20  |  0.8    Ca    9.8      06 Nov 2019 21:18    TPro  8.1<H>  /  Alb  4.7  /  TBili  0.3  /  DBili  <0.2  /  AST  59<H>  /  ALT  17  /  AlkPhos  72  11-06        < from: CT Abdomen and Pelvis w/ IV Cont (11.07.19 @ 01:06) >    IMPRESSION:     Acute pancreatitis. No drainable fluid collection.      < end of copied text >

## 2019-11-07 NOTE — ED ADULT NURSE REASSESSMENT NOTE - NS ED NURSE REASSESS COMMENT FT1
Pt admitted to medicine, awaiting bed assignment. Pt resting comfortably in bed; IVF infusing as ordered. No s/s of pain of discomfort noted. Will continue to monitor.
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
## 2019-11-07 NOTE — PROVIDER CONTACT NOTE (MEDICATION) - SITUATION
Notified MD that pt is requesting pain medication b/c she is in severe pain but is not due for pain medication until 12:10 PM.

## 2019-11-07 NOTE — SBIRT NOTE ADULT - NSSBIRTBRIEFINTDET_GEN_A_CORE
Provided SBIRT services:  Full Screen Positive. Brief Intervention Performed. Screening results were reviewed with the patient and patient was provided information about healthy guidelines and potential negative consequences associated with level of risk. Motivation and readiness to reduce or stop use was discussed and goals and activities to make changes were suggested/offered.  Pt reported interest in alcohol cessation but was not interested in a referral to outpatient rehab programs. Patient requested and was provided with referral information to [facility], [address] [place] [address], [phone].

## 2019-11-07 NOTE — H&P ADULT - NSHPPHYSICALEXAM_GEN_ALL_CORE
PHYSICAL EXAM:T(C): 37.1 (11-06-19 @ 23:51), Max: 37.1 (11-06-19 @ 23:51)  HR: 101 (11-06-19 @ 23:51) (101 - 108)  BP: 138/85 (11-06-19 @ 23:51) (138/85 - 172/81)  RR: 18 (11-06-19 @ 23:51) (18 - 18)  SpO2: 100% (11-06-19 @ 23:51) (100% - 100%)  GENERAL: NAD, well-developed  HEAD:  Atraumatic, Normocephalic  EYES: EOMI, PERRLA, conjunctiva and sclera clear  NECK: Supple, No JVD  CHEST/LUNG: Clear to auscultation bilaterally; No wheeze  HEART: Regular rate and rhythm; No murmurs, rubs, or gallops  ABDOMEN: Soft, Tender in all quadrants, most pronounced in epigastrium  EXTREMITIES:  2+ Peripheral Pulses, No clubbing, cyanosis, or edema  PSYCH: AAOx3  NEUROLOGY: non-focal  SKIN: No rashes or lesions

## 2019-11-08 ENCOUNTER — APPOINTMENT (OUTPATIENT)
Dept: GASTROENTEROLOGY | Facility: CLINIC | Age: 44
End: 2019-11-08

## 2019-11-08 LAB
ALBUMIN SERPL ELPH-MCNC: 3.7 G/DL — SIGNIFICANT CHANGE UP (ref 3.5–5.2)
ALP SERPL-CCNC: 55 U/L — SIGNIFICANT CHANGE UP (ref 30–115)
ALT FLD-CCNC: 14 U/L — SIGNIFICANT CHANGE UP (ref 0–41)
ANION GAP SERPL CALC-SCNC: 14 MMOL/L — SIGNIFICANT CHANGE UP (ref 7–14)
AST SERPL-CCNC: 53 U/L — HIGH (ref 0–41)
BASOPHILS # BLD AUTO: 0.04 K/UL — SIGNIFICANT CHANGE UP (ref 0–0.2)
BASOPHILS NFR BLD AUTO: 1 % — SIGNIFICANT CHANGE UP (ref 0–1)
BILIRUB DIRECT SERPL-MCNC: 0.2 MG/DL — SIGNIFICANT CHANGE UP (ref 0–0.2)
BILIRUB INDIRECT FLD-MCNC: 0.5 MG/DL — SIGNIFICANT CHANGE UP (ref 0.2–1.2)
BILIRUB SERPL-MCNC: 0.7 MG/DL — SIGNIFICANT CHANGE UP (ref 0.2–1.2)
BUN SERPL-MCNC: <3 MG/DL — LOW (ref 10–20)
CALCIUM SERPL-MCNC: 9.1 MG/DL — SIGNIFICANT CHANGE UP (ref 8.5–10.1)
CHLORIDE SERPL-SCNC: 101 MMOL/L — SIGNIFICANT CHANGE UP (ref 98–110)
CO2 SERPL-SCNC: 26 MMOL/L — SIGNIFICANT CHANGE UP (ref 17–32)
CREAT SERPL-MCNC: 0.6 MG/DL — LOW (ref 0.7–1.5)
EOSINOPHIL # BLD AUTO: 0.1 K/UL — SIGNIFICANT CHANGE UP (ref 0–0.7)
EOSINOPHIL NFR BLD AUTO: 2.4 % — SIGNIFICANT CHANGE UP (ref 0–8)
GLUCOSE SERPL-MCNC: 95 MG/DL — SIGNIFICANT CHANGE UP (ref 70–99)
HCT VFR BLD CALC: 31.7 % — LOW (ref 37–47)
HGB BLD-MCNC: 11.1 G/DL — LOW (ref 12–16)
IMM GRANULOCYTES NFR BLD AUTO: 0.2 % — SIGNIFICANT CHANGE UP (ref 0.1–0.3)
LYMPHOCYTES # BLD AUTO: 1.16 K/UL — LOW (ref 1.2–3.4)
LYMPHOCYTES # BLD AUTO: 27.6 % — SIGNIFICANT CHANGE UP (ref 20.5–51.1)
MAGNESIUM SERPL-MCNC: 1.4 MG/DL — LOW (ref 1.8–2.4)
MCHC RBC-ENTMCNC: 32.8 PG — HIGH (ref 27–31)
MCHC RBC-ENTMCNC: 35 G/DL — SIGNIFICANT CHANGE UP (ref 32–37)
MCV RBC AUTO: 93.8 FL — SIGNIFICANT CHANGE UP (ref 81–99)
MONOCYTES # BLD AUTO: 0.41 K/UL — SIGNIFICANT CHANGE UP (ref 0.1–0.6)
MONOCYTES NFR BLD AUTO: 9.8 % — HIGH (ref 1.7–9.3)
NEUTROPHILS # BLD AUTO: 2.48 K/UL — SIGNIFICANT CHANGE UP (ref 1.4–6.5)
NEUTROPHILS NFR BLD AUTO: 59 % — SIGNIFICANT CHANGE UP (ref 42.2–75.2)
NRBC # BLD: 0 /100 WBCS — SIGNIFICANT CHANGE UP (ref 0–0)
PLATELET # BLD AUTO: 207 K/UL — SIGNIFICANT CHANGE UP (ref 130–400)
POTASSIUM SERPL-MCNC: 3.4 MMOL/L — LOW (ref 3.5–5)
POTASSIUM SERPL-SCNC: 3.4 MMOL/L — LOW (ref 3.5–5)
PROT SERPL-MCNC: 6.3 G/DL — SIGNIFICANT CHANGE UP (ref 6–8)
RBC # BLD: 3.38 M/UL — LOW (ref 4.2–5.4)
RBC # FLD: 11.5 % — SIGNIFICANT CHANGE UP (ref 11.5–14.5)
SODIUM SERPL-SCNC: 141 MMOL/L — SIGNIFICANT CHANGE UP (ref 135–146)
WBC # BLD: 4.2 K/UL — LOW (ref 4.8–10.8)
WBC # FLD AUTO: 4.2 K/UL — LOW (ref 4.8–10.8)

## 2019-11-08 PROCEDURE — 99233 SBSQ HOSP IP/OBS HIGH 50: CPT

## 2019-11-08 RX ORDER — POTASSIUM CHLORIDE 20 MEQ
20 PACKET (EA) ORAL ONCE
Refills: 0 | Status: COMPLETED | OUTPATIENT
Start: 2019-11-08 | End: 2019-11-08

## 2019-11-08 RX ORDER — HYDROMORPHONE HYDROCHLORIDE 2 MG/ML
1 INJECTION INTRAMUSCULAR; INTRAVENOUS; SUBCUTANEOUS EVERY 4 HOURS
Refills: 0 | Status: DISCONTINUED | OUTPATIENT
Start: 2019-11-08 | End: 2019-11-09

## 2019-11-08 RX ORDER — MAGNESIUM SULFATE 500 MG/ML
2 VIAL (ML) INJECTION DAILY
Refills: 0 | Status: DISCONTINUED | OUTPATIENT
Start: 2019-11-08 | End: 2019-11-08

## 2019-11-08 RX ORDER — MAGNESIUM SULFATE 500 MG/ML
2 VIAL (ML) INJECTION ONCE
Refills: 0 | Status: COMPLETED | OUTPATIENT
Start: 2019-11-08 | End: 2019-11-08

## 2019-11-08 RX ORDER — MORPHINE SULFATE 50 MG/1
2 CAPSULE, EXTENDED RELEASE ORAL EVERY 4 HOURS
Refills: 0 | Status: DISCONTINUED | OUTPATIENT
Start: 2019-11-08 | End: 2019-11-08

## 2019-11-08 RX ORDER — KETOROLAC TROMETHAMINE 30 MG/ML
15 SYRINGE (ML) INJECTION ONCE
Refills: 0 | Status: DISCONTINUED | OUTPATIENT
Start: 2019-11-08 | End: 2019-11-08

## 2019-11-08 RX ADMIN — HYDROMORPHONE HYDROCHLORIDE 1 MILLIGRAM(S): 2 INJECTION INTRAMUSCULAR; INTRAVENOUS; SUBCUTANEOUS at 07:45

## 2019-11-08 RX ADMIN — HYDROMORPHONE HYDROCHLORIDE 1 MILLIGRAM(S): 2 INJECTION INTRAMUSCULAR; INTRAVENOUS; SUBCUTANEOUS at 03:43

## 2019-11-08 RX ADMIN — SODIUM CHLORIDE 200 MILLILITER(S): 9 INJECTION, SOLUTION INTRAVENOUS at 18:00

## 2019-11-08 RX ADMIN — HYDROMORPHONE HYDROCHLORIDE 1 MILLIGRAM(S): 2 INJECTION INTRAMUSCULAR; INTRAVENOUS; SUBCUTANEOUS at 15:39

## 2019-11-08 RX ADMIN — MORPHINE SULFATE 2 MILLIGRAM(S): 50 CAPSULE, EXTENDED RELEASE ORAL at 11:17

## 2019-11-08 RX ADMIN — HYDROMORPHONE HYDROCHLORIDE 1 MILLIGRAM(S): 2 INJECTION INTRAMUSCULAR; INTRAVENOUS; SUBCUTANEOUS at 20:10

## 2019-11-08 RX ADMIN — ONDANSETRON 4 MILLIGRAM(S): 8 TABLET, FILM COATED ORAL at 09:50

## 2019-11-08 RX ADMIN — Medication 16.67 GRAM(S): at 09:50

## 2019-11-08 RX ADMIN — HYDROMORPHONE HYDROCHLORIDE 1 MILLIGRAM(S): 2 INJECTION INTRAMUSCULAR; INTRAVENOUS; SUBCUTANEOUS at 15:54

## 2019-11-08 RX ADMIN — Medication 15 MILLIGRAM(S): at 23:43

## 2019-11-08 RX ADMIN — MORPHINE SULFATE 2 MILLIGRAM(S): 50 CAPSULE, EXTENDED RELEASE ORAL at 11:32

## 2019-11-08 RX ADMIN — Medication 15 MILLIGRAM(S): at 23:13

## 2019-11-08 RX ADMIN — HYDROMORPHONE HYDROCHLORIDE 1 MILLIGRAM(S): 2 INJECTION INTRAMUSCULAR; INTRAVENOUS; SUBCUTANEOUS at 20:25

## 2019-11-08 RX ADMIN — HYDROMORPHONE HYDROCHLORIDE 1 MILLIGRAM(S): 2 INJECTION INTRAMUSCULAR; INTRAVENOUS; SUBCUTANEOUS at 08:00

## 2019-11-08 RX ADMIN — HYDROMORPHONE HYDROCHLORIDE 1 MILLIGRAM(S): 2 INJECTION INTRAMUSCULAR; INTRAVENOUS; SUBCUTANEOUS at 03:58

## 2019-11-08 NOTE — PROGRESS NOTE ADULT - ATTENDING COMMENTS
Pt seen and examined independently on 11/8 (unable to document at that time due to computer issue).  She was still c/o epigastric pain and had tenderness on exam.  She had clear liquids - no N/V.  restart pancreatic enzymes.  May have dilaudid on 11/8 and de-escalate pain meds over the weekend.  Alcohol cessation.  Pt counseled and advised to see psychiatry as outpt re: insomnia.    I discussed the case with the resident and I reviewed his note.  Agree with the physical exam, assessment and plan with additions and exceptions as above.    PROGRESS NOTE HANDOFF    Pending: improvement in abdominal pain    Disposition: home
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
## 2019-11-09 LAB
ANION GAP SERPL CALC-SCNC: 14 MMOL/L — SIGNIFICANT CHANGE UP (ref 7–14)
BASOPHILS # BLD AUTO: 0.05 K/UL — SIGNIFICANT CHANGE UP (ref 0–0.2)
BASOPHILS NFR BLD AUTO: 1.2 % — HIGH (ref 0–1)
BUN SERPL-MCNC: 5 MG/DL — LOW (ref 10–20)
CALCIUM SERPL-MCNC: 8.7 MG/DL — SIGNIFICANT CHANGE UP (ref 8.5–10.1)
CHLORIDE SERPL-SCNC: 105 MMOL/L — SIGNIFICANT CHANGE UP (ref 98–110)
CO2 SERPL-SCNC: 24 MMOL/L — SIGNIFICANT CHANGE UP (ref 17–32)
CREAT SERPL-MCNC: 0.6 MG/DL — LOW (ref 0.7–1.5)
EOSINOPHIL # BLD AUTO: 0.14 K/UL — SIGNIFICANT CHANGE UP (ref 0–0.7)
EOSINOPHIL NFR BLD AUTO: 3.3 % — SIGNIFICANT CHANGE UP (ref 0–8)
GLUCOSE SERPL-MCNC: 114 MG/DL — HIGH (ref 70–99)
HCT VFR BLD CALC: 30.4 % — LOW (ref 37–47)
HGB BLD-MCNC: 10.5 G/DL — LOW (ref 12–16)
IMM GRANULOCYTES NFR BLD AUTO: 0.2 % — SIGNIFICANT CHANGE UP (ref 0.1–0.3)
LIDOCAIN IGE QN: 31 U/L — SIGNIFICANT CHANGE UP (ref 7–60)
LYMPHOCYTES # BLD AUTO: 1.18 K/UL — LOW (ref 1.2–3.4)
LYMPHOCYTES # BLD AUTO: 27.8 % — SIGNIFICANT CHANGE UP (ref 20.5–51.1)
MAGNESIUM SERPL-MCNC: 1.7 MG/DL — LOW (ref 1.8–2.4)
MCHC RBC-ENTMCNC: 32.3 PG — HIGH (ref 27–31)
MCHC RBC-ENTMCNC: 34.5 G/DL — SIGNIFICANT CHANGE UP (ref 32–37)
MCV RBC AUTO: 93.5 FL — SIGNIFICANT CHANGE UP (ref 81–99)
MONOCYTES # BLD AUTO: 0.36 K/UL — SIGNIFICANT CHANGE UP (ref 0.1–0.6)
MONOCYTES NFR BLD AUTO: 8.5 % — SIGNIFICANT CHANGE UP (ref 1.7–9.3)
NEUTROPHILS # BLD AUTO: 2.5 K/UL — SIGNIFICANT CHANGE UP (ref 1.4–6.5)
NEUTROPHILS NFR BLD AUTO: 59 % — SIGNIFICANT CHANGE UP (ref 42.2–75.2)
NRBC # BLD: 0 /100 WBCS — SIGNIFICANT CHANGE UP (ref 0–0)
PLATELET # BLD AUTO: 215 K/UL — SIGNIFICANT CHANGE UP (ref 130–400)
POTASSIUM SERPL-MCNC: 3.5 MMOL/L — SIGNIFICANT CHANGE UP (ref 3.5–5)
POTASSIUM SERPL-SCNC: 3.5 MMOL/L — SIGNIFICANT CHANGE UP (ref 3.5–5)
RBC # BLD: 3.25 M/UL — LOW (ref 4.2–5.4)
RBC # FLD: 11.4 % — LOW (ref 11.5–14.5)
SODIUM SERPL-SCNC: 143 MMOL/L — SIGNIFICANT CHANGE UP (ref 135–146)
WBC # BLD: 4.24 K/UL — LOW (ref 4.8–10.8)
WBC # FLD AUTO: 4.24 K/UL — LOW (ref 4.8–10.8)

## 2019-11-09 PROCEDURE — 99233 SBSQ HOSP IP/OBS HIGH 50: CPT

## 2019-11-09 RX ORDER — TRAMADOL HYDROCHLORIDE 50 MG/1
50 TABLET ORAL ONCE
Refills: 0 | Status: DISCONTINUED | OUTPATIENT
Start: 2019-11-09 | End: 2019-11-09

## 2019-11-09 RX ORDER — LIPASE/PROTEASE/AMYLASE 16-48-48K
1 CAPSULE,DELAYED RELEASE (ENTERIC COATED) ORAL
Refills: 0 | Status: DISCONTINUED | OUTPATIENT
Start: 2019-11-09 | End: 2019-11-09

## 2019-11-09 RX ORDER — HYDROMORPHONE HYDROCHLORIDE 2 MG/ML
1 INJECTION INTRAMUSCULAR; INTRAVENOUS; SUBCUTANEOUS EVERY 4 HOURS
Refills: 0 | Status: DISCONTINUED | OUTPATIENT
Start: 2019-11-09 | End: 2019-11-10

## 2019-11-09 RX ORDER — MORPHINE SULFATE 50 MG/1
2 CAPSULE, EXTENDED RELEASE ORAL EVERY 6 HOURS
Refills: 0 | Status: DISCONTINUED | OUTPATIENT
Start: 2019-11-09 | End: 2019-11-10

## 2019-11-09 RX ORDER — LIPASE/PROTEASE/AMYLASE 16-48-48K
3 CAPSULE,DELAYED RELEASE (ENTERIC COATED) ORAL
Refills: 0 | Status: DISCONTINUED | OUTPATIENT
Start: 2019-11-09 | End: 2019-11-11

## 2019-11-09 RX ADMIN — HYDROMORPHONE HYDROCHLORIDE 1 MILLIGRAM(S): 2 INJECTION INTRAMUSCULAR; INTRAVENOUS; SUBCUTANEOUS at 00:10

## 2019-11-09 RX ADMIN — MORPHINE SULFATE 2 MILLIGRAM(S): 50 CAPSULE, EXTENDED RELEASE ORAL at 17:01

## 2019-11-09 RX ADMIN — MORPHINE SULFATE 2 MILLIGRAM(S): 50 CAPSULE, EXTENDED RELEASE ORAL at 17:31

## 2019-11-09 RX ADMIN — ONDANSETRON 4 MILLIGRAM(S): 8 TABLET, FILM COATED ORAL at 13:11

## 2019-11-09 RX ADMIN — HYDROMORPHONE HYDROCHLORIDE 1 MILLIGRAM(S): 2 INJECTION INTRAMUSCULAR; INTRAVENOUS; SUBCUTANEOUS at 09:27

## 2019-11-09 RX ADMIN — TRAMADOL HYDROCHLORIDE 50 MILLIGRAM(S): 50 TABLET ORAL at 20:45

## 2019-11-09 RX ADMIN — Medication 1 CAPSULE(S): at 12:07

## 2019-11-09 RX ADMIN — SODIUM CHLORIDE 200 MILLILITER(S): 9 INJECTION, SOLUTION INTRAVENOUS at 08:59

## 2019-11-09 RX ADMIN — TRAMADOL HYDROCHLORIDE 50 MILLIGRAM(S): 50 TABLET ORAL at 20:15

## 2019-11-09 RX ADMIN — HYDROMORPHONE HYDROCHLORIDE 1 MILLIGRAM(S): 2 INJECTION INTRAMUSCULAR; INTRAVENOUS; SUBCUTANEOUS at 08:57

## 2019-11-09 RX ADMIN — HYDROMORPHONE HYDROCHLORIDE 1 MILLIGRAM(S): 2 INJECTION INTRAMUSCULAR; INTRAVENOUS; SUBCUTANEOUS at 04:45

## 2019-11-09 RX ADMIN — Medication 1 CAPSULE(S): at 17:02

## 2019-11-09 RX ADMIN — Medication 3 CAPSULE(S): at 21:26

## 2019-11-09 RX ADMIN — HYDROMORPHONE HYDROCHLORIDE 1 MILLIGRAM(S): 2 INJECTION INTRAMUSCULAR; INTRAVENOUS; SUBCUTANEOUS at 05:00

## 2019-11-09 RX ADMIN — HYDROMORPHONE HYDROCHLORIDE 1 MILLIGRAM(S): 2 INJECTION INTRAMUSCULAR; INTRAVENOUS; SUBCUTANEOUS at 13:03

## 2019-11-09 RX ADMIN — HYDROMORPHONE HYDROCHLORIDE 1 MILLIGRAM(S): 2 INJECTION INTRAMUSCULAR; INTRAVENOUS; SUBCUTANEOUS at 21:26

## 2019-11-09 RX ADMIN — HYDROMORPHONE HYDROCHLORIDE 1 MILLIGRAM(S): 2 INJECTION INTRAMUSCULAR; INTRAVENOUS; SUBCUTANEOUS at 00:25

## 2019-11-09 RX ADMIN — HYDROMORPHONE HYDROCHLORIDE 1 MILLIGRAM(S): 2 INJECTION INTRAMUSCULAR; INTRAVENOUS; SUBCUTANEOUS at 21:41

## 2019-11-09 RX ADMIN — HYDROMORPHONE HYDROCHLORIDE 1 MILLIGRAM(S): 2 INJECTION INTRAMUSCULAR; INTRAVENOUS; SUBCUTANEOUS at 13:33
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
## 2019-11-10 LAB
ANION GAP SERPL CALC-SCNC: 10 MMOL/L — SIGNIFICANT CHANGE UP (ref 7–14)
BASOPHILS # BLD AUTO: 0.05 K/UL — SIGNIFICANT CHANGE UP (ref 0–0.2)
BASOPHILS NFR BLD AUTO: 1.3 % — HIGH (ref 0–1)
BUN SERPL-MCNC: 5 MG/DL — LOW (ref 10–20)
CALCIUM SERPL-MCNC: 8.7 MG/DL — SIGNIFICANT CHANGE UP (ref 8.5–10.1)
CHLORIDE SERPL-SCNC: 103 MMOL/L — SIGNIFICANT CHANGE UP (ref 98–110)
CO2 SERPL-SCNC: 28 MMOL/L — SIGNIFICANT CHANGE UP (ref 17–32)
CREAT SERPL-MCNC: 0.6 MG/DL — LOW (ref 0.7–1.5)
EOSINOPHIL # BLD AUTO: 0.13 K/UL — SIGNIFICANT CHANGE UP (ref 0–0.7)
EOSINOPHIL NFR BLD AUTO: 3.3 % — SIGNIFICANT CHANGE UP (ref 0–8)
GLUCOSE SERPL-MCNC: 81 MG/DL — SIGNIFICANT CHANGE UP (ref 70–99)
HCT VFR BLD CALC: 29.5 % — LOW (ref 37–47)
HGB BLD-MCNC: 10.2 G/DL — LOW (ref 12–16)
IMM GRANULOCYTES NFR BLD AUTO: 0.3 % — SIGNIFICANT CHANGE UP (ref 0.1–0.3)
LIDOCAIN IGE QN: 28 U/L — SIGNIFICANT CHANGE UP (ref 7–60)
LYMPHOCYTES # BLD AUTO: 1.18 K/UL — LOW (ref 1.2–3.4)
LYMPHOCYTES # BLD AUTO: 30 % — SIGNIFICANT CHANGE UP (ref 20.5–51.1)
MCHC RBC-ENTMCNC: 32.4 PG — HIGH (ref 27–31)
MCHC RBC-ENTMCNC: 34.6 G/DL — SIGNIFICANT CHANGE UP (ref 32–37)
MCV RBC AUTO: 93.7 FL — SIGNIFICANT CHANGE UP (ref 81–99)
MONOCYTES # BLD AUTO: 0.34 K/UL — SIGNIFICANT CHANGE UP (ref 0.1–0.6)
MONOCYTES NFR BLD AUTO: 8.7 % — SIGNIFICANT CHANGE UP (ref 1.7–9.3)
NEUTROPHILS # BLD AUTO: 2.22 K/UL — SIGNIFICANT CHANGE UP (ref 1.4–6.5)
NEUTROPHILS NFR BLD AUTO: 56.4 % — SIGNIFICANT CHANGE UP (ref 42.2–75.2)
NRBC # BLD: 0 /100 WBCS — SIGNIFICANT CHANGE UP (ref 0–0)
PLATELET # BLD AUTO: 238 K/UL — SIGNIFICANT CHANGE UP (ref 130–400)
POTASSIUM SERPL-MCNC: 3.5 MMOL/L — SIGNIFICANT CHANGE UP (ref 3.5–5)
POTASSIUM SERPL-SCNC: 3.5 MMOL/L — SIGNIFICANT CHANGE UP (ref 3.5–5)
RBC # BLD: 3.15 M/UL — LOW (ref 4.2–5.4)
RBC # FLD: 11.6 % — SIGNIFICANT CHANGE UP (ref 11.5–14.5)
SODIUM SERPL-SCNC: 141 MMOL/L — SIGNIFICANT CHANGE UP (ref 135–146)
WBC # BLD: 3.93 K/UL — LOW (ref 4.8–10.8)
WBC # FLD AUTO: 3.93 K/UL — LOW (ref 4.8–10.8)

## 2019-11-10 PROCEDURE — 99233 SBSQ HOSP IP/OBS HIGH 50: CPT

## 2019-11-10 RX ORDER — MORPHINE SULFATE 50 MG/1
2 CAPSULE, EXTENDED RELEASE ORAL EVERY 4 HOURS
Refills: 0 | Status: DISCONTINUED | OUTPATIENT
Start: 2019-11-10 | End: 2019-11-11

## 2019-11-10 RX ORDER — OXYCODONE AND ACETAMINOPHEN 5; 325 MG/1; MG/1
1 TABLET ORAL EVERY 6 HOURS
Refills: 0 | Status: DISCONTINUED | OUTPATIENT
Start: 2019-11-10 | End: 2019-11-11

## 2019-11-10 RX ORDER — HYDROMORPHONE HYDROCHLORIDE 2 MG/ML
0.5 INJECTION INTRAMUSCULAR; INTRAVENOUS; SUBCUTANEOUS ONCE
Refills: 0 | Status: DISCONTINUED | OUTPATIENT
Start: 2019-11-10 | End: 2019-11-10

## 2019-11-10 RX ADMIN — Medication 3 CAPSULE(S): at 16:59

## 2019-11-10 RX ADMIN — MORPHINE SULFATE 2 MILLIGRAM(S): 50 CAPSULE, EXTENDED RELEASE ORAL at 10:34

## 2019-11-10 RX ADMIN — Medication 3 CAPSULE(S): at 12:13

## 2019-11-10 RX ADMIN — OXYCODONE AND ACETAMINOPHEN 1 TABLET(S): 5; 325 TABLET ORAL at 22:06

## 2019-11-10 RX ADMIN — Medication 3 CAPSULE(S): at 21:37

## 2019-11-10 RX ADMIN — HYDROMORPHONE HYDROCHLORIDE 1 MILLIGRAM(S): 2 INJECTION INTRAMUSCULAR; INTRAVENOUS; SUBCUTANEOUS at 01:51

## 2019-11-10 RX ADMIN — MORPHINE SULFATE 2 MILLIGRAM(S): 50 CAPSULE, EXTENDED RELEASE ORAL at 22:22

## 2019-11-10 RX ADMIN — HYDROMORPHONE HYDROCHLORIDE 0.5 MILLIGRAM(S): 2 INJECTION INTRAMUSCULAR; INTRAVENOUS; SUBCUTANEOUS at 06:15

## 2019-11-10 RX ADMIN — OXYCODONE AND ACETAMINOPHEN 1 TABLET(S): 5; 325 TABLET ORAL at 21:36

## 2019-11-10 RX ADMIN — MORPHINE SULFATE 2 MILLIGRAM(S): 50 CAPSULE, EXTENDED RELEASE ORAL at 16:23

## 2019-11-10 RX ADMIN — MORPHINE SULFATE 2 MILLIGRAM(S): 50 CAPSULE, EXTENDED RELEASE ORAL at 10:04

## 2019-11-10 RX ADMIN — OXYCODONE AND ACETAMINOPHEN 1 TABLET(S): 5; 325 TABLET ORAL at 14:28

## 2019-11-10 RX ADMIN — HYDROMORPHONE HYDROCHLORIDE 0.5 MILLIGRAM(S): 2 INJECTION INTRAMUSCULAR; INTRAVENOUS; SUBCUTANEOUS at 06:00

## 2019-11-10 RX ADMIN — Medication 3 CAPSULE(S): at 07:47

## 2019-11-10 RX ADMIN — OXYCODONE AND ACETAMINOPHEN 1 TABLET(S): 5; 325 TABLET ORAL at 13:58

## 2019-11-10 RX ADMIN — MORPHINE SULFATE 2 MILLIGRAM(S): 50 CAPSULE, EXTENDED RELEASE ORAL at 16:08

## 2019-11-10 RX ADMIN — HYDROMORPHONE HYDROCHLORIDE 1 MILLIGRAM(S): 2 INJECTION INTRAMUSCULAR; INTRAVENOUS; SUBCUTANEOUS at 01:36

## 2019-11-10 RX ADMIN — MORPHINE SULFATE 2 MILLIGRAM(S): 50 CAPSULE, EXTENDED RELEASE ORAL at 22:37

## 2019-11-11 ENCOUNTER — TRANSCRIPTION ENCOUNTER (OUTPATIENT)
Age: 44
End: 2019-11-11

## 2019-11-11 VITALS — OXYGEN SATURATION: 98 %

## 2019-11-11 LAB
ANION GAP SERPL CALC-SCNC: 16 MMOL/L — HIGH (ref 7–14)
BASOPHILS # BLD AUTO: 0.04 K/UL — SIGNIFICANT CHANGE UP (ref 0–0.2)
BASOPHILS NFR BLD AUTO: 1 % — SIGNIFICANT CHANGE UP (ref 0–1)
BUN SERPL-MCNC: 5 MG/DL — LOW (ref 10–20)
CALCIUM SERPL-MCNC: 9.4 MG/DL — SIGNIFICANT CHANGE UP (ref 8.5–10.1)
CHLORIDE SERPL-SCNC: 101 MMOL/L — SIGNIFICANT CHANGE UP (ref 98–110)
CO2 SERPL-SCNC: 25 MMOL/L — SIGNIFICANT CHANGE UP (ref 17–32)
CREAT SERPL-MCNC: 0.6 MG/DL — LOW (ref 0.7–1.5)
EOSINOPHIL # BLD AUTO: 0.13 K/UL — SIGNIFICANT CHANGE UP (ref 0–0.7)
EOSINOPHIL NFR BLD AUTO: 3.4 % — SIGNIFICANT CHANGE UP (ref 0–8)
GLUCOSE SERPL-MCNC: 93 MG/DL — SIGNIFICANT CHANGE UP (ref 70–99)
HCT VFR BLD CALC: 30.2 % — LOW (ref 37–47)
HGB BLD-MCNC: 10.6 G/DL — LOW (ref 12–16)
IMM GRANULOCYTES NFR BLD AUTO: 0.3 % — SIGNIFICANT CHANGE UP (ref 0.1–0.3)
LYMPHOCYTES # BLD AUTO: 1.37 K/UL — SIGNIFICANT CHANGE UP (ref 1.2–3.4)
LYMPHOCYTES # BLD AUTO: 35.3 % — SIGNIFICANT CHANGE UP (ref 20.5–51.1)
MCHC RBC-ENTMCNC: 32.4 PG — HIGH (ref 27–31)
MCHC RBC-ENTMCNC: 35.1 G/DL — SIGNIFICANT CHANGE UP (ref 32–37)
MCV RBC AUTO: 92.4 FL — SIGNIFICANT CHANGE UP (ref 81–99)
MONOCYTES # BLD AUTO: 0.37 K/UL — SIGNIFICANT CHANGE UP (ref 0.1–0.6)
MONOCYTES NFR BLD AUTO: 9.5 % — HIGH (ref 1.7–9.3)
NEUTROPHILS # BLD AUTO: 1.96 K/UL — SIGNIFICANT CHANGE UP (ref 1.4–6.5)
NEUTROPHILS NFR BLD AUTO: 50.5 % — SIGNIFICANT CHANGE UP (ref 42.2–75.2)
NRBC # BLD: 0 /100 WBCS — SIGNIFICANT CHANGE UP (ref 0–0)
PLATELET # BLD AUTO: 258 K/UL — SIGNIFICANT CHANGE UP (ref 130–400)
POTASSIUM SERPL-MCNC: 3.5 MMOL/L — SIGNIFICANT CHANGE UP (ref 3.5–5)
POTASSIUM SERPL-SCNC: 3.5 MMOL/L — SIGNIFICANT CHANGE UP (ref 3.5–5)
RBC # BLD: 3.27 M/UL — LOW (ref 4.2–5.4)
RBC # FLD: 11.5 % — SIGNIFICANT CHANGE UP (ref 11.5–14.5)
SODIUM SERPL-SCNC: 142 MMOL/L — SIGNIFICANT CHANGE UP (ref 135–146)
WBC # BLD: 3.88 K/UL — LOW (ref 4.8–10.8)
WBC # FLD AUTO: 3.88 K/UL — LOW (ref 4.8–10.8)

## 2019-11-11 PROCEDURE — 99239 HOSP IP/OBS DSCHRG MGMT >30: CPT

## 2019-11-11 RX ORDER — MORPHINE SULFATE 50 MG/1
2 CAPSULE, EXTENDED RELEASE ORAL EVERY 6 HOURS
Refills: 0 | Status: DISCONTINUED | OUTPATIENT
Start: 2019-11-11 | End: 2019-11-11

## 2019-11-11 RX ORDER — ACETAMINOPHEN 500 MG
650 TABLET ORAL ONCE
Refills: 0 | Status: COMPLETED | OUTPATIENT
Start: 2019-11-11 | End: 2019-11-11

## 2019-11-11 RX ORDER — HYDROMORPHONE HYDROCHLORIDE 2 MG/ML
1 INJECTION INTRAMUSCULAR; INTRAVENOUS; SUBCUTANEOUS ONCE
Refills: 0 | Status: DISCONTINUED | OUTPATIENT
Start: 2019-11-11 | End: 2019-11-11

## 2019-11-11 RX ADMIN — Medication 3 CAPSULE(S): at 08:24

## 2019-11-11 RX ADMIN — Medication 3 CAPSULE(S): at 11:07

## 2019-11-11 RX ADMIN — Medication 650 MILLIGRAM(S): at 00:41

## 2019-11-11 RX ADMIN — MORPHINE SULFATE 2 MILLIGRAM(S): 50 CAPSULE, EXTENDED RELEASE ORAL at 02:47

## 2019-11-11 RX ADMIN — SODIUM CHLORIDE 100 MILLILITER(S): 9 INJECTION, SOLUTION INTRAVENOUS at 08:24

## 2019-11-11 RX ADMIN — MORPHINE SULFATE 2 MILLIGRAM(S): 50 CAPSULE, EXTENDED RELEASE ORAL at 02:32

## 2019-11-11 RX ADMIN — MORPHINE SULFATE 2 MILLIGRAM(S): 50 CAPSULE, EXTENDED RELEASE ORAL at 09:16

## 2019-11-11 RX ADMIN — HYDROMORPHONE HYDROCHLORIDE 1 MILLIGRAM(S): 2 INJECTION INTRAMUSCULAR; INTRAVENOUS; SUBCUTANEOUS at 04:41

## 2019-11-11 RX ADMIN — HYDROMORPHONE HYDROCHLORIDE 1 MILLIGRAM(S): 2 INJECTION INTRAMUSCULAR; INTRAVENOUS; SUBCUTANEOUS at 04:24

## 2019-11-11 RX ADMIN — Medication 650 MILLIGRAM(S): at 01:00

## 2019-11-11 RX ADMIN — MORPHINE SULFATE 2 MILLIGRAM(S): 50 CAPSULE, EXTENDED RELEASE ORAL at 08:26

## 2019-11-11 NOTE — DISCHARGE NOTE PROVIDER - CARE PROVIDER_API CALL
Walt Foley)  Anesthesiology; Pain Medicine  [address] [place] [address]  Phone: [phone]  Fax: [phone]  Follow Up Time:     Haley Cobos)  Internal Medicine  [address], [address]  [place] [address]  Phone: [phone]  Fax: [phone]  Follow Up Time:

## 2019-11-11 NOTE — DISCHARGE NOTE NURSING/CASE MANAGEMENT/SOCIAL WORK - NSDCVIVACCINE_GEN_ALL_CORE_FT
Influenza , 2019/10/12 14:23 , Celsa Martins (RN)  Tdap , 2014/9/1 17:06 , Cheryl Cotto (RN)  Tdap , 2014/9/1 17:09 , Cheryl Cotto (RN)

## 2019-11-11 NOTE — DISCHARGE NOTE PROVIDER - HOSPITAL COURSE
44 year old female with a PMH  of pancreatitis presents here for abdominal pain. Patient states over the last 24 hours she's been having increasing pain "all over her abdomen". Symptoms have been associated with nausea and vomiting. CT scan showed acute pancreatitis and patient was admitted with diagnosis of pancreatitis. She was started on pain meds, IV hydration. She began tolerating a soft diet and fluids were discontinued. She was still complaining on pain on IV morphine so pain consult was ordered. She began tolerating a full diet and was discharged to home. 44 year old female with a PMH  of pancreatitis presents here for abdominal pain. Patient states over the last 24 hours she's been having increasing pain "all over her abdomen". Symptoms have been associated with nausea and vomiting. CT scan showed acute pancreatitis and patient was admitted with diagnosis of pancreatitis. She was started on pain meds, IV hydration. She began tolerating a soft diet and fluids were discontinued. She was still complaining on pain on IV morphine so pain consult was ordered. She began tolerating a full diet and was discharged to home.    Attending Attestation:    Patient was seen & examined independently. At least 10 systems were reviewed in ROS. All systems reviewed  are within normal limits. Latest vital signs and labs were reviewed today. Case was discussed with house staff in morning rounds for assessment and plan.  Patient is medically stable for discharge . About 34 mins spent on discharge disposition.

## 2019-11-11 NOTE — DISCHARGE NOTE PROVIDER - NSDCCPCAREPLAN_GEN_ALL_CORE_FT
PRINCIPAL DISCHARGE DIAGNOSIS  Diagnosis: Pancreatitis  Assessment and Plan of Treatment: Your pain is due to acute pancreatitis. Try to refrain from ingesing any food or drink which can inflame your pancreas, such as alcohol or high fat foods. Please follow up with your primary care provider within one week of discharge.

## 2019-11-11 NOTE — DISCHARGE NOTE NURSING/CASE MANAGEMENT/SOCIAL WORK - PATIENT PORTAL LINK FT
You can access the FollowMyHealth Patient Portal offered by [facility] by registering at the following website: [email or web address]. By joining [employer]’s FollowMyHealth portal, you will also be able to view your health information using other applications (apps) compatible with our system.

## 2019-11-11 NOTE — DISCHARGE NOTE PROVIDER - NSDCMRMEDTOKEN_GEN_ALL_CORE_FT
folic acid 1 mg oral tablet: 1 tab(s) orally once a day  Multiple Vitamins oral tablet: 1 tab(s) orally once a day  ondansetron 4 mg oral tablet: 1 tab(s) orally every 6 hours as needed for nausea  pancrelipase 12,000 units-38,000 units-60,000 units oral delayed release capsule: 3 cap(s) orally 3 times a day (with meals)  pantoprazole 40 mg oral delayed release tablet: 1 tab(s) orally once a day (before a meal)  thiamine 50 mg oral tablet: 1 tab(s) orally once a day
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
## 2019-11-11 NOTE — DISCHARGE NOTE PROVIDER - CARE PROVIDERS DIRECT ADDRESSES
[email or web address]

## 2019-11-11 NOTE — DISCHARGE NOTE PROVIDER - NSDCFUSCHEDAPPT_GEN_ALL_CORE_FT
LAURA BARAJAS P ; 12/11/2019 ; NPP Otol[facility] [address] LAURA Mendez ; 12/20/2019 ; NPP OBG[facility]YLER [address] LAURA BARAJAS P ; 12/11/2019 ; NPP Otolaryng [address] LAURA Mendez ; 12/20/2019 ; NPP [facility]BG[facility] [address] LAURA BARAJAS P ; 12/11/2019 ; NPP Otolaryng [address] LAURA Mendez ; 12/20/2019 ; NPP [facility] [address]

## 2019-11-14 DIAGNOSIS — Z88.8 ALLERGY STATUS TO OTHER DRUGS, MEDICAMENTS AND BIOLOGICAL SUBSTANCES STATUS: ICD-10-CM

## 2019-11-14 DIAGNOSIS — K86.1 OTHER CHRONIC PANCREATITIS: ICD-10-CM

## 2019-11-14 DIAGNOSIS — E53.8 DEFICIENCY OF OTHER SPECIFIED B GROUP VITAMINS: ICD-10-CM

## 2019-11-14 DIAGNOSIS — K85.20 ALCOHOL INDUCED ACUTE PANCREATITIS WITHOUT NECROSIS OR INFECTION: ICD-10-CM

## 2019-11-14 DIAGNOSIS — Z91.419 PERSONAL HISTORY OF UNSPECIFIED ADULT ABUSE: ICD-10-CM

## 2019-11-14 DIAGNOSIS — F32.9 MAJOR DEPRESSIVE DISORDER, SINGLE EPISODE, UNSPECIFIED: ICD-10-CM
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
## 2019-11-14 DIAGNOSIS — G47.00 INSOMNIA, UNSPECIFIED: ICD-10-CM

## 2019-11-14 DIAGNOSIS — E51.9 THIAMINE DEFICIENCY, UNSPECIFIED: ICD-10-CM

## 2019-11-14 DIAGNOSIS — R10.9 UNSPECIFIED ABDOMINAL PAIN: ICD-10-CM

## 2019-11-14 DIAGNOSIS — F10.10 ALCOHOL ABUSE, UNCOMPLICATED: ICD-10-CM

## 2019-12-11 ENCOUNTER — APPOINTMENT (OUTPATIENT)
Dept: OTOLARYNGOLOGY | Facility: CLINIC | Age: 44
End: 2019-12-11

## 2019-12-20 ENCOUNTER — APPOINTMENT (OUTPATIENT)
Dept: OBGYN | Facility: CLINIC | Age: 44
End: 2019-12-20
Payer: MEDICAID

## 2019-12-20 ENCOUNTER — LABORATORY RESULT (OUTPATIENT)
Age: 44
End: 2019-12-20

## 2019-12-20 ENCOUNTER — OUTPATIENT (OUTPATIENT)
Dept: OUTPATIENT SERVICES | Facility: HOSPITAL | Age: 44
LOS: 1 days | Discharge: HOME | End: 2019-12-20

## 2019-12-20 ENCOUNTER — INPATIENT (INPATIENT)
Facility: HOSPITAL | Age: 44
LOS: 3 days | Discharge: HOME | End: 2019-12-24
Attending: INTERNAL MEDICINE | Admitting: INTERNAL MEDICINE
Payer: MEDICAID

## 2019-12-20 VITALS
SYSTOLIC BLOOD PRESSURE: 150 MMHG | DIASTOLIC BLOOD PRESSURE: 99 MMHG | TEMPERATURE: 98 F | HEART RATE: 109 BPM | OXYGEN SATURATION: 97 % | RESPIRATION RATE: 18 BRPM

## 2019-12-20 VITALS
HEIGHT: 64 IN | DIASTOLIC BLOOD PRESSURE: 86 MMHG | WEIGHT: 132.5 LBS | SYSTOLIC BLOOD PRESSURE: 132 MMHG | BODY MASS INDEX: 22.62 KG/M2

## 2019-12-20 DIAGNOSIS — Z98.890 OTHER SPECIFIED POSTPROCEDURAL STATES: Chronic | ICD-10-CM

## 2019-12-20 DIAGNOSIS — Z87.19 PERSONAL HISTORY OF OTHER DISEASES OF THE DIGESTIVE SYSTEM: ICD-10-CM

## 2019-12-20 DIAGNOSIS — Z82.49 FAMILY HISTORY OF ISCHEMIC HEART DISEASE AND OTHER DISEASES OF THE CIRCULATORY SYSTEM: ICD-10-CM

## 2019-12-20 DIAGNOSIS — Z00.00 ENCOUNTER FOR GENERAL ADULT MEDICAL EXAMINATION W/OUT ABNORMAL FINDINGS: ICD-10-CM

## 2019-12-20 DIAGNOSIS — Z01.419 ENCOUNTER FOR GYNECOLOGICAL EXAMINATION (GENERAL) (ROUTINE) W/OUT ABNORMAL FINDINGS: ICD-10-CM

## 2019-12-20 DIAGNOSIS — R94.5 ABNORMAL RESULTS OF LIVER FUNCTION STUDIES: ICD-10-CM

## 2019-12-20 LAB
ALBUMIN SERPL ELPH-MCNC: 4.3 G/DL — SIGNIFICANT CHANGE UP (ref 3.5–5.2)
ALP SERPL-CCNC: 59 U/L — SIGNIFICANT CHANGE UP (ref 30–115)
ALT FLD-CCNC: 21 U/L — SIGNIFICANT CHANGE UP (ref 0–41)
ANION GAP SERPL CALC-SCNC: 16 MMOL/L — HIGH (ref 7–14)
APPEARANCE UR: CLEAR — SIGNIFICANT CHANGE UP
AST SERPL-CCNC: 71 U/L — HIGH (ref 0–41)
BASOPHILS # BLD AUTO: 0.04 K/UL — SIGNIFICANT CHANGE UP (ref 0–0.2)
BASOPHILS NFR BLD AUTO: 0.6 % — SIGNIFICANT CHANGE UP (ref 0–1)
BILIRUB SERPL-MCNC: 0.6 MG/DL — SIGNIFICANT CHANGE UP (ref 0.2–1.2)
BILIRUB UR-MCNC: NEGATIVE — SIGNIFICANT CHANGE UP
BUN SERPL-MCNC: 14 MG/DL — SIGNIFICANT CHANGE UP (ref 10–20)
CALCIUM SERPL-MCNC: 9.4 MG/DL — SIGNIFICANT CHANGE UP (ref 8.5–10.1)
CHLORIDE SERPL-SCNC: 98 MMOL/L — SIGNIFICANT CHANGE UP (ref 98–110)
CO2 SERPL-SCNC: 24 MMOL/L — SIGNIFICANT CHANGE UP (ref 17–32)
COLOR SPEC: COLORLESS — SIGNIFICANT CHANGE UP
CREAT SERPL-MCNC: 0.7 MG/DL — SIGNIFICANT CHANGE UP (ref 0.7–1.5)
DIFF PNL FLD: NEGATIVE — SIGNIFICANT CHANGE UP
EOSINOPHIL # BLD AUTO: 0.01 K/UL — SIGNIFICANT CHANGE UP (ref 0–0.7)
EOSINOPHIL NFR BLD AUTO: 0.1 % — SIGNIFICANT CHANGE UP (ref 0–8)
GLUCOSE SERPL-MCNC: 101 MG/DL — HIGH (ref 70–99)
GLUCOSE UR QL: NEGATIVE — SIGNIFICANT CHANGE UP
HCT VFR BLD CALC: 35.8 % — LOW (ref 37–47)
HGB BLD-MCNC: 12.4 G/DL — SIGNIFICANT CHANGE UP (ref 12–16)
IMM GRANULOCYTES NFR BLD AUTO: 0.3 % — SIGNIFICANT CHANGE UP (ref 0.1–0.3)
KETONES UR-MCNC: NEGATIVE — SIGNIFICANT CHANGE UP
LACTATE SERPL-SCNC: 1.9 MMOL/L — SIGNIFICANT CHANGE UP (ref 0.7–2)
LEUKOCYTE ESTERASE UR-ACNC: NEGATIVE — SIGNIFICANT CHANGE UP
LIDOCAIN IGE QN: 117 U/L — HIGH (ref 7–60)
LYMPHOCYTES # BLD AUTO: 1.27 K/UL — SIGNIFICANT CHANGE UP (ref 1.2–3.4)
LYMPHOCYTES # BLD AUTO: 17.7 % — LOW (ref 20.5–51.1)
MCHC RBC-ENTMCNC: 32.6 PG — HIGH (ref 27–31)
MCHC RBC-ENTMCNC: 34.6 G/DL — SIGNIFICANT CHANGE UP (ref 32–37)
MCV RBC AUTO: 94.2 FL — SIGNIFICANT CHANGE UP (ref 81–99)
MONOCYTES # BLD AUTO: 0.42 K/UL — SIGNIFICANT CHANGE UP (ref 0.1–0.6)
MONOCYTES NFR BLD AUTO: 5.8 % — SIGNIFICANT CHANGE UP (ref 1.7–9.3)
NEUTROPHILS # BLD AUTO: 5.43 K/UL — SIGNIFICANT CHANGE UP (ref 1.4–6.5)
NEUTROPHILS NFR BLD AUTO: 75.5 % — HIGH (ref 42.2–75.2)
NITRITE UR-MCNC: NEGATIVE — SIGNIFICANT CHANGE UP
NRBC # BLD: 0 /100 WBCS — SIGNIFICANT CHANGE UP (ref 0–0)
PH UR: 6 — SIGNIFICANT CHANGE UP (ref 5–8)
PLATELET # BLD AUTO: 290 K/UL — SIGNIFICANT CHANGE UP (ref 130–400)
POTASSIUM SERPL-MCNC: 3.9 MMOL/L — SIGNIFICANT CHANGE UP (ref 3.5–5)
POTASSIUM SERPL-SCNC: 3.9 MMOL/L — SIGNIFICANT CHANGE UP (ref 3.5–5)
PROT SERPL-MCNC: 7.8 G/DL — SIGNIFICANT CHANGE UP (ref 6–8)
PROT UR-MCNC: NEGATIVE — SIGNIFICANT CHANGE UP
RBC # BLD: 3.8 M/UL — LOW (ref 4.2–5.4)
RBC # FLD: 11.6 % — SIGNIFICANT CHANGE UP (ref 11.5–14.5)
SODIUM SERPL-SCNC: 138 MMOL/L — SIGNIFICANT CHANGE UP (ref 135–146)
SP GR SPEC: 1.01 — LOW (ref 1.01–1.02)
UROBILINOGEN FLD QL: SIGNIFICANT CHANGE UP
WBC # BLD: 7.19 K/UL — SIGNIFICANT CHANGE UP (ref 4.8–10.8)
WBC # FLD AUTO: 7.19 K/UL — SIGNIFICANT CHANGE UP (ref 4.8–10.8)

## 2019-12-20 PROCEDURE — 74177 CT ABD & PELVIS W/CONTRAST: CPT | Mod: 26

## 2019-12-20 PROCEDURE — 99223 1ST HOSP IP/OBS HIGH 75: CPT | Mod: AI

## 2019-12-20 PROCEDURE — 99396 PREV VISIT EST AGE 40-64: CPT

## 2019-12-20 PROCEDURE — 99285 EMERGENCY DEPT VISIT HI MDM: CPT

## 2019-12-20 PROCEDURE — 71046 X-RAY EXAM CHEST 2 VIEWS: CPT | Mod: 26

## 2019-12-20 RX ORDER — SODIUM CHLORIDE 9 MG/ML
1000 INJECTION, SOLUTION INTRAVENOUS ONCE
Refills: 0 | Status: COMPLETED | OUTPATIENT
Start: 2019-12-20 | End: 2019-12-20

## 2019-12-20 RX ORDER — FOLIC ACID 0.8 MG
1 TABLET ORAL DAILY
Refills: 0 | Status: DISCONTINUED | OUTPATIENT
Start: 2019-12-20 | End: 2019-12-24

## 2019-12-20 RX ORDER — FAMOTIDINE 10 MG/ML
20 INJECTION INTRAVENOUS ONCE
Refills: 0 | Status: COMPLETED | OUTPATIENT
Start: 2019-12-20 | End: 2019-12-20

## 2019-12-20 RX ORDER — ENOXAPARIN SODIUM 100 MG/ML
40 INJECTION SUBCUTANEOUS DAILY
Refills: 0 | Status: DISCONTINUED | OUTPATIENT
Start: 2019-12-20 | End: 2019-12-24

## 2019-12-20 RX ORDER — ACETAMINOPHEN 500 MG
650 TABLET ORAL ONCE
Refills: 0 | Status: COMPLETED | OUTPATIENT
Start: 2019-12-20 | End: 2019-12-20

## 2019-12-20 RX ORDER — SODIUM CHLORIDE 9 MG/ML
1000 INJECTION INTRAMUSCULAR; INTRAVENOUS; SUBCUTANEOUS ONCE
Refills: 0 | Status: COMPLETED | OUTPATIENT
Start: 2019-12-20 | End: 2019-12-20

## 2019-12-20 RX ORDER — MORPHINE SULFATE 50 MG/1
2 CAPSULE, EXTENDED RELEASE ORAL EVERY 4 HOURS
Refills: 0 | Status: DISCONTINUED | OUTPATIENT
Start: 2019-12-20 | End: 2019-12-24

## 2019-12-20 RX ORDER — SUCRALFATE 1 G
1 TABLET ORAL ONCE
Refills: 0 | Status: COMPLETED | OUTPATIENT
Start: 2019-12-20 | End: 2019-12-20

## 2019-12-20 RX ORDER — THIAMINE MONONITRATE (VIT B1) 100 MG
100 TABLET ORAL DAILY
Refills: 0 | Status: DISCONTINUED | OUTPATIENT
Start: 2019-12-20 | End: 2019-12-24

## 2019-12-20 RX ORDER — ONDANSETRON 8 MG/1
4 TABLET, FILM COATED ORAL THREE TIMES A DAY
Refills: 0 | Status: DISCONTINUED | OUTPATIENT
Start: 2019-12-20 | End: 2019-12-24

## 2019-12-20 RX ORDER — PANTOPRAZOLE SODIUM 20 MG/1
40 TABLET, DELAYED RELEASE ORAL
Refills: 0 | Status: DISCONTINUED | OUTPATIENT
Start: 2019-12-20 | End: 2019-12-24

## 2019-12-20 RX ORDER — DIPHENHYDRAMINE HYDROCHLORIDE AND LIDOCAINE HYDROCHLORIDE AND ALUMINUM HYDROXIDE AND MAGNESIUM HYDRO
30 KIT ONCE
Refills: 0 | Status: COMPLETED | OUTPATIENT
Start: 2019-12-20 | End: 2019-12-20

## 2019-12-20 RX ORDER — ONDANSETRON 8 MG/1
4 TABLET, FILM COATED ORAL ONCE
Refills: 0 | Status: COMPLETED | OUTPATIENT
Start: 2019-12-20 | End: 2019-12-20

## 2019-12-20 RX ORDER — MORPHINE SULFATE 50 MG/1
2 CAPSULE, EXTENDED RELEASE ORAL ONCE
Refills: 0 | Status: DISCONTINUED | OUTPATIENT
Start: 2019-12-20 | End: 2019-12-20

## 2019-12-20 RX ORDER — LIPASE/PROTEASE/AMYLASE 16-48-48K
3 CAPSULE,DELAYED RELEASE (ENTERIC COATED) ORAL
Refills: 0 | Status: DISCONTINUED | OUTPATIENT
Start: 2019-12-20 | End: 2019-12-24

## 2019-12-20 RX ORDER — SODIUM CHLORIDE 9 MG/ML
1000 INJECTION, SOLUTION INTRAVENOUS
Refills: 0 | Status: DISCONTINUED | OUTPATIENT
Start: 2019-12-20 | End: 2019-12-22

## 2019-12-20 RX ADMIN — MORPHINE SULFATE 2 MILLIGRAM(S): 50 CAPSULE, EXTENDED RELEASE ORAL at 21:33

## 2019-12-20 RX ADMIN — Medication 650 MILLIGRAM(S): at 18:18

## 2019-12-20 RX ADMIN — FAMOTIDINE 104 MILLIGRAM(S): 10 INJECTION INTRAVENOUS at 19:50

## 2019-12-20 RX ADMIN — MORPHINE SULFATE 2 MILLIGRAM(S): 50 CAPSULE, EXTENDED RELEASE ORAL at 23:52

## 2019-12-20 RX ADMIN — ONDANSETRON 4 MILLIGRAM(S): 8 TABLET, FILM COATED ORAL at 23:53

## 2019-12-20 RX ADMIN — SODIUM CHLORIDE 75 MILLILITER(S): 9 INJECTION, SOLUTION INTRAVENOUS at 23:53

## 2019-12-20 RX ADMIN — SODIUM CHLORIDE 1000 MILLILITER(S): 9 INJECTION INTRAMUSCULAR; INTRAVENOUS; SUBCUTANEOUS at 20:35

## 2019-12-20 RX ADMIN — SODIUM CHLORIDE 1000 MILLILITER(S): 9 INJECTION, SOLUTION INTRAVENOUS at 16:47

## 2019-12-20 RX ADMIN — ONDANSETRON 4 MILLIGRAM(S): 8 TABLET, FILM COATED ORAL at 16:47

## 2019-12-20 RX ADMIN — Medication 1 GRAM(S): at 20:32

## 2019-12-20 RX ADMIN — DIPHENHYDRAMINE HYDROCHLORIDE AND LIDOCAINE HYDROCHLORIDE AND ALUMINUM HYDROXIDE AND MAGNESIUM HYDRO 30 MILLILITER(S): KIT at 18:17
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
## 2019-12-20 NOTE — H&P ADULT - NSHPLABSRESULTS_GEN_ALL_CORE
Vitals:    Vital Signs Last 24 Hrs  T(C): 36.8 (20 Dec 2019 14:51), Max: 36.8 (20 Dec 2019 14:51)  T(F): 98.3 (20 Dec 2019 14:51), Max: 98.3 (20 Dec 2019 14:51)  HR: 75 (20 Dec 2019 22:00) (75 - 109)  BP: 123/82 (20 Dec 2019 22:00) (123/82 - 150/99)  BP(mean): --  RR: 18 (20 Dec 2019 22:00) (18 - 18)  SpO2: 98% (20 Dec 2019 22:00) (97% - 98%)    Labs:         138  |  98  |  14  ----------------------------<  101<H>  3.9   |  24  |  0.7    Ca    9.4      20 Dec 2019 16:08    TPro  7.8  /  Alb  4.3  /  TBili  0.6  /  DBili  x   /  AST  71<H>  /  ALT  21  /  AlkPhos  59                            12.4   7.19  )-----------( 290      ( 20 Dec 2019 16:08 )             35.8         LIVER FUNCTIONS - ( 20 Dec 2019 16:08 )  Alb: 4.3 g/dL / Pro: 7.8 g/dL / ALK PHOS: 59 U/L / ALT: 21 U/L / AST: 71 U/L / GGT: x             Urinalysis Basic - ( 20 Dec 2019 16:08 )    Color: Colorless / Appearance: Clear / S.008 / pH: x  Gluc: x / Ketone: Negative  / Bili: Negative / Urobili: <2 mg/dL   Blood: x / Protein: Negative / Nitrite: Negative   Leuk Esterase: Negative / RBC: x / WBC x   Sq Epi: x / Non Sq Epi: x / Bacteria: x    RADIOLOGY    < from: CT Abdomen and Pelvis w/ IV Cont (19 @ 17:43) >      IMPRESSION: No evidence of intra-abdominal or pelvic acute inflammatory process.     Compared with the previous CT scan of 2019, there is been resolution of the acute pancreatitis.    If symptoms progress, a follow-up scan may be performed    < end of copied text >
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
## 2019-12-20 NOTE — ED PROVIDER NOTE - PHYSICAL EXAMINATION
CONSTITUTIONAL: Well-developed; well-nourished; in no acute distress.   SKIN: warm, dry  HEAD: Normocephalic; atraumatic.  EYES: no conj injection  ENT: No nasal discharge; airway clear.  NECK: Supple; non tender.  CARD: S1, S2 normal; no murmurs, gallops, or rubs. Regular rate and rhythm.   RESP: No wheezes, rales or rhonchi.  ABD: soft; +epigastric tenderness; no ecchymosis noted   EXT: Normal ROM.  No clubbing, cyanosis or edema.   NEURO: Alert, oriented, grossly unremarkable  PSYCH: Cooperative, appropriate.

## 2019-12-20 NOTE — ED PROVIDER NOTE - NS ED ROS FT
Constitutional: See HPI.  Eyes: No visual changes, eye pain or discharge.  ENMT: No hearing changes, pain, discharge or infections. No neck pain or stiffness.  Cardiac: No chest pain, SOB or edema. No chest pain with exertion.  Respiratory: No cough or respiratory distress.   GI: + nausea, vomiting, and abdominal pain; no diarrhea   : No dysuria, frequency or burning.  MS: No myalgia, muscle weakness, joint pain or back pain.  Neuro: No headache or weakness. No LOC.  Skin: No skin rash.  Endo: No hx of DM, thyroid disease  Except as documented in HPI, all other review of systems is negative
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
## 2019-12-20 NOTE — H&P ADULT - NSHPPHYSICALEXAM_GEN_ALL_CORE
PHYSICAL EXAM:  GEN: No acute distress  LUNGS: Clear to auscultation bilaterally; no wheeze  HEART: S1/S2 present. RRR.  ABD: Tender x 4 quadrant, worse in the epigastric area, not distended, soft, bowel sounds present    MSK: No LE edema   NEURO: AAOX3, non-focal
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
## 2019-12-21 LAB
ANION GAP SERPL CALC-SCNC: 14 MMOL/L — SIGNIFICANT CHANGE UP (ref 7–14)
BUN SERPL-MCNC: 5 MG/DL — LOW (ref 10–20)
CALCIUM SERPL-MCNC: 8.5 MG/DL — SIGNIFICANT CHANGE UP (ref 8.5–10.1)
CHLORIDE SERPL-SCNC: 101 MMOL/L — SIGNIFICANT CHANGE UP (ref 98–110)
CO2 SERPL-SCNC: 25 MMOL/L — SIGNIFICANT CHANGE UP (ref 17–32)
CREAT SERPL-MCNC: 0.6 MG/DL — LOW (ref 0.7–1.5)
GLUCOSE SERPL-MCNC: 92 MG/DL — SIGNIFICANT CHANGE UP (ref 70–99)
POTASSIUM SERPL-MCNC: 3.7 MMOL/L — SIGNIFICANT CHANGE UP (ref 3.5–5)
POTASSIUM SERPL-SCNC: 3.7 MMOL/L — SIGNIFICANT CHANGE UP (ref 3.5–5)
SODIUM SERPL-SCNC: 140 MMOL/L — SIGNIFICANT CHANGE UP (ref 135–146)

## 2019-12-21 RX ORDER — MORPHINE SULFATE 50 MG/1
2 CAPSULE, EXTENDED RELEASE ORAL ONCE
Refills: 0 | Status: DISCONTINUED | OUTPATIENT
Start: 2019-12-21 | End: 2019-12-21

## 2019-12-21 RX ADMIN — MORPHINE SULFATE 2 MILLIGRAM(S): 50 CAPSULE, EXTENDED RELEASE ORAL at 03:44

## 2019-12-21 RX ADMIN — ENOXAPARIN SODIUM 40 MILLIGRAM(S): 100 INJECTION SUBCUTANEOUS at 12:00

## 2019-12-21 RX ADMIN — SODIUM CHLORIDE 75 MILLILITER(S): 9 INJECTION, SOLUTION INTRAVENOUS at 02:01

## 2019-12-21 RX ADMIN — MORPHINE SULFATE 2 MILLIGRAM(S): 50 CAPSULE, EXTENDED RELEASE ORAL at 18:06

## 2019-12-21 RX ADMIN — Medication 100 MILLIGRAM(S): at 13:50

## 2019-12-21 RX ADMIN — MORPHINE SULFATE 2 MILLIGRAM(S): 50 CAPSULE, EXTENDED RELEASE ORAL at 14:00

## 2019-12-21 RX ADMIN — Medication 3 CAPSULE(S): at 12:00

## 2019-12-21 RX ADMIN — PANTOPRAZOLE SODIUM 40 MILLIGRAM(S): 20 TABLET, DELAYED RELEASE ORAL at 06:15

## 2019-12-21 RX ADMIN — MORPHINE SULFATE 2 MILLIGRAM(S): 50 CAPSULE, EXTENDED RELEASE ORAL at 18:04

## 2019-12-21 RX ADMIN — MORPHINE SULFATE 2 MILLIGRAM(S): 50 CAPSULE, EXTENDED RELEASE ORAL at 09:06

## 2019-12-21 RX ADMIN — Medication 1 MILLIGRAM(S): at 13:50

## 2019-12-21 RX ADMIN — MORPHINE SULFATE 2 MILLIGRAM(S): 50 CAPSULE, EXTENDED RELEASE ORAL at 13:50

## 2019-12-21 RX ADMIN — ONDANSETRON 4 MILLIGRAM(S): 8 TABLET, FILM COATED ORAL at 09:06

## 2019-12-21 RX ADMIN — MORPHINE SULFATE 2 MILLIGRAM(S): 50 CAPSULE, EXTENDED RELEASE ORAL at 22:09

## 2019-12-21 RX ADMIN — MORPHINE SULFATE 2 MILLIGRAM(S): 50 CAPSULE, EXTENDED RELEASE ORAL at 05:08

## 2019-12-21 RX ADMIN — MORPHINE SULFATE 2 MILLIGRAM(S): 50 CAPSULE, EXTENDED RELEASE ORAL at 06:09

## 2019-12-21 RX ADMIN — MORPHINE SULFATE 2 MILLIGRAM(S): 50 CAPSULE, EXTENDED RELEASE ORAL at 23:12

## 2019-12-21 RX ADMIN — SODIUM CHLORIDE 75 MILLILITER(S): 9 INJECTION, SOLUTION INTRAVENOUS at 15:24

## 2019-12-21 RX ADMIN — Medication 3 CAPSULE(S): at 17:39

## 2019-12-21 RX ADMIN — Medication 3 CAPSULE(S): at 08:21

## 2019-12-21 NOTE — CHART NOTE - NSCHARTNOTEFT_GEN_A_CORE
Labs reviewed, vital signs stable. Advance diet as tolerated, social work consult pending Pt seen and examined at bedside. Labs reviewed, vital signs stable. Advance diet as tolerated, social work consult pending. Reports pain still present but improving. Wants to try eating, had luck in past with Jace[facility], will order soft diet.
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
## 2019-12-22 DIAGNOSIS — F32.1 MAJOR DEPRESSIVE DISORDER, SINGLE EPISODE, MODERATE: ICD-10-CM

## 2019-12-22 DIAGNOSIS — F43.10 POST-TRAUMATIC STRESS DISORDER, UNSPECIFIED: ICD-10-CM

## 2019-12-22 LAB
ALBUMIN SERPL ELPH-MCNC: 4 G/DL — SIGNIFICANT CHANGE UP (ref 3.5–5.2)
ALP SERPL-CCNC: 47 U/L — SIGNIFICANT CHANGE UP (ref 30–115)
ALT FLD-CCNC: 19 U/L — SIGNIFICANT CHANGE UP (ref 0–41)
ANION GAP SERPL CALC-SCNC: 16 MMOL/L — HIGH (ref 7–14)
AST SERPL-CCNC: 68 U/L — HIGH (ref 0–41)
BASOPHILS # BLD AUTO: 0.02 K/UL — SIGNIFICANT CHANGE UP (ref 0–0.2)
BASOPHILS NFR BLD AUTO: 0.6 % — SIGNIFICANT CHANGE UP (ref 0–1)
BILIRUB SERPL-MCNC: 0.6 MG/DL — SIGNIFICANT CHANGE UP (ref 0.2–1.2)
BUN SERPL-MCNC: 4 MG/DL — LOW (ref 10–20)
CALCIUM SERPL-MCNC: 9.1 MG/DL — SIGNIFICANT CHANGE UP (ref 8.5–10.1)
CHLORIDE SERPL-SCNC: 99 MMOL/L — SIGNIFICANT CHANGE UP (ref 98–110)
CO2 SERPL-SCNC: 26 MMOL/L — SIGNIFICANT CHANGE UP (ref 17–32)
CREAT SERPL-MCNC: 0.6 MG/DL — LOW (ref 0.7–1.5)
EOSINOPHIL # BLD AUTO: 0.05 K/UL — SIGNIFICANT CHANGE UP (ref 0–0.7)
EOSINOPHIL NFR BLD AUTO: 1.5 % — SIGNIFICANT CHANGE UP (ref 0–8)
GLUCOSE SERPL-MCNC: 90 MG/DL — SIGNIFICANT CHANGE UP (ref 70–99)
HCT VFR BLD CALC: 33.8 % — LOW (ref 37–47)
HGB BLD-MCNC: 11.7 G/DL — LOW (ref 12–16)
IMM GRANULOCYTES NFR BLD AUTO: 0.3 % — SIGNIFICANT CHANGE UP (ref 0.1–0.3)
LYMPHOCYTES # BLD AUTO: 1.26 K/UL — SIGNIFICANT CHANGE UP (ref 1.2–3.4)
LYMPHOCYTES # BLD AUTO: 37.4 % — SIGNIFICANT CHANGE UP (ref 20.5–51.1)
MCHC RBC-ENTMCNC: 32.9 PG — HIGH (ref 27–31)
MCHC RBC-ENTMCNC: 34.6 G/DL — SIGNIFICANT CHANGE UP (ref 32–37)
MCV RBC AUTO: 94.9 FL — SIGNIFICANT CHANGE UP (ref 81–99)
MONOCYTES # BLD AUTO: 0.3 K/UL — SIGNIFICANT CHANGE UP (ref 0.1–0.6)
MONOCYTES NFR BLD AUTO: 8.9 % — SIGNIFICANT CHANGE UP (ref 1.7–9.3)
NEUTROPHILS # BLD AUTO: 1.73 K/UL — SIGNIFICANT CHANGE UP (ref 1.4–6.5)
NEUTROPHILS NFR BLD AUTO: 51.3 % — SIGNIFICANT CHANGE UP (ref 42.2–75.2)
NRBC # BLD: 0 /100 WBCS — SIGNIFICANT CHANGE UP (ref 0–0)
PLATELET # BLD AUTO: 235 K/UL — SIGNIFICANT CHANGE UP (ref 130–400)
POTASSIUM SERPL-MCNC: 3.6 MMOL/L — SIGNIFICANT CHANGE UP (ref 3.5–5)
POTASSIUM SERPL-SCNC: 3.6 MMOL/L — SIGNIFICANT CHANGE UP (ref 3.5–5)
PROT SERPL-MCNC: 6.7 G/DL — SIGNIFICANT CHANGE UP (ref 6–8)
RBC # BLD: 3.56 M/UL — LOW (ref 4.2–5.4)
RBC # FLD: 11.6 % — SIGNIFICANT CHANGE UP (ref 11.5–14.5)
SODIUM SERPL-SCNC: 141 MMOL/L — SIGNIFICANT CHANGE UP (ref 135–146)
WBC # BLD: 3.37 K/UL — LOW (ref 4.8–10.8)
WBC # FLD AUTO: 3.37 K/UL — LOW (ref 4.8–10.8)

## 2019-12-22 PROCEDURE — 99233 SBSQ HOSP IP/OBS HIGH 50: CPT

## 2019-12-22 RX ORDER — SODIUM CHLORIDE 9 MG/ML
1000 INJECTION, SOLUTION INTRAVENOUS
Refills: 0 | Status: DISCONTINUED | OUTPATIENT
Start: 2019-12-22 | End: 2019-12-24

## 2019-12-22 RX ORDER — SODIUM CHLORIDE 9 MG/ML
1000 INJECTION, SOLUTION INTRAVENOUS
Refills: 0 | Status: DISCONTINUED | OUTPATIENT
Start: 2019-12-22 | End: 2019-12-22

## 2019-12-22 RX ORDER — ALPRAZOLAM 0.25 MG
0.25 TABLET ORAL ONCE
Refills: 0 | Status: DISCONTINUED | OUTPATIENT
Start: 2019-12-22 | End: 2019-12-22

## 2019-12-22 RX ADMIN — MORPHINE SULFATE 2 MILLIGRAM(S): 50 CAPSULE, EXTENDED RELEASE ORAL at 02:25

## 2019-12-22 RX ADMIN — MORPHINE SULFATE 2 MILLIGRAM(S): 50 CAPSULE, EXTENDED RELEASE ORAL at 21:50

## 2019-12-22 RX ADMIN — Medication 3 CAPSULE(S): at 17:09

## 2019-12-22 RX ADMIN — Medication 3 CAPSULE(S): at 08:32

## 2019-12-22 RX ADMIN — Medication 1 MILLIGRAM(S): at 11:14

## 2019-12-22 RX ADMIN — MORPHINE SULFATE 2 MILLIGRAM(S): 50 CAPSULE, EXTENDED RELEASE ORAL at 20:50

## 2019-12-22 RX ADMIN — MORPHINE SULFATE 2 MILLIGRAM(S): 50 CAPSULE, EXTENDED RELEASE ORAL at 16:24

## 2019-12-22 RX ADMIN — MORPHINE SULFATE 2 MILLIGRAM(S): 50 CAPSULE, EXTENDED RELEASE ORAL at 11:14

## 2019-12-22 RX ADMIN — MORPHINE SULFATE 2 MILLIGRAM(S): 50 CAPSULE, EXTENDED RELEASE ORAL at 03:20

## 2019-12-22 RX ADMIN — Medication 3 CAPSULE(S): at 12:06

## 2019-12-22 RX ADMIN — SODIUM CHLORIDE 100 MILLILITER(S): 9 INJECTION, SOLUTION INTRAVENOUS at 13:53

## 2019-12-22 RX ADMIN — MORPHINE SULFATE 2 MILLIGRAM(S): 50 CAPSULE, EXTENDED RELEASE ORAL at 06:42

## 2019-12-22 RX ADMIN — PANTOPRAZOLE SODIUM 40 MILLIGRAM(S): 20 TABLET, DELAYED RELEASE ORAL at 05:21

## 2019-12-22 RX ADMIN — Medication 100 MILLIGRAM(S): at 11:14

## 2019-12-22 RX ADMIN — Medication 0.25 MILLIGRAM(S): at 05:57

## 2019-12-22 RX ADMIN — ENOXAPARIN SODIUM 40 MILLIGRAM(S): 100 INJECTION SUBCUTANEOUS at 11:14

## 2019-12-22 RX ADMIN — MORPHINE SULFATE 2 MILLIGRAM(S): 50 CAPSULE, EXTENDED RELEASE ORAL at 17:00

## 2019-12-22 RX ADMIN — MORPHINE SULFATE 2 MILLIGRAM(S): 50 CAPSULE, EXTENDED RELEASE ORAL at 12:00
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
## 2019-12-22 NOTE — BEHAVIORAL HEALTH ASSESSMENT NOTE - NSBHCHARTREVIEWVS_PSY_A_CORE FT
Vital Signs Last 24 Hrs  T(C): 36.3 (22 Dec 2019 13:41), Max: 36.3 (21 Dec 2019 20:56)  T(F): 97.4 (22 Dec 2019 13:41), Max: 97.4 (21 Dec 2019 20:56)  HR: 75 (22 Dec 2019 13:41) (75 - 77)  BP: 122/79 (22 Dec 2019 13:41) (112/70 - 136/82)  BP(mean): --  RR: 16 (22 Dec 2019 13:41) (16 - 20)  SpO2: --
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
## 2019-12-22 NOTE — BEHAVIORAL HEALTH ASSESSMENT NOTE - ACTIVATING EVENTS/STRESSORS
Inadequate social supports/Acute medical problem/Triggering events leading to humiliation, shame, and/or despair (e.g. Loss of relationship, financial or health status) (real or anticipated)
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
## 2019-12-22 NOTE — BEHAVIORAL HEALTH ASSESSMENT NOTE - NS ED [facility] REVIEW OF ED CHART AVAILABLE INVESTIGATIONS REVIEWED
----- Message from Federico Sabina sent at 7/26/2019  9:39 AM EDT -----  Regarding: RE: tubal  Please call pt and schedule with Dr Nahun Lacey for a pre op  ----- Message -----  From: Howard Parisi MD  Sent: 7/25/2019   3:46 PM EDT  To: Federico Muhammad  Subject: tubal                                            [place] would like a tubal asap - what would be my soonest surgery date? Is there anyone who could do it sooner? None available

## 2019-12-22 NOTE — PROGRESS NOTE ADULT - SUBJECTIVE AND OBJECTIVE BOX
CHIEF COMPLAINT:    Patient is a 44y old  Female who presents with a chief complaint of epigastric pain    INTERVAL HPI/OVERNIGHT EVENTS:    Patient seen and examined at bedside. No acute overnight events occurred.    ROS: Reports significant pain while eating cream of wheat this morning. Feels overwhelmed, depressed. All other systems are negative.    Vital Signs:    T(F): 97.2 (12-22-19 @ 04:35), Max: 97.4 (12-21-19 @ 20:56)  HR: 77 (12-21-19 @ 20:56) (75 - 77)  BP: 136/82 (12-22-19 @ 04:35) (112/70 - 136/82)  RR: 20 (12-22-19 @ 04:35) (20 - 20)        PHYSICAL EXAM:  GENERAL:  NAD  SKIN: No rashes or lesions  HEENT: Atraumatic. Normocephalic. Anicteric  NECK:  No JVD.   PULMONARY: Clear to ausculation bilaterally. No wheezing. No rales  CVS: Normal S1, S2. Regular rate and rhythm. No murmurs.  ABDOMEN/GI: Soft,+epigastric tenderness to palpation. Nondistended; Bowel sounds are present  EXTREMITIES:  No edema B/L LE.  NEUROLOGIC:  No motor deficit.  PSYCH: Alert & oriented x 3, normal affect      LABS:                        11.7   3.37  )-----------( 235      ( 22 Dec 2019 07:26 )             33.8     12-22    141  |  99  |  4<L>  ----------------------------<  90  3.6   |  26  |  0.6<L>    Ca    9.1      22 Dec 2019 07:26    TPro  6.7  /  Alb  4.0  /  TBili  0.6  /  DBili  x   /  AST  68<H>  /  ALT  19  /  AlkPhos  47  12-22      RADIOLOGY & ADDITIONAL TESTS:  No new imaging    Medications:  Standing  enoxaparin Injectable 40 milliGRAM(s) SubCutaneous daily  folic acid 1 milliGRAM(s) Oral daily  lactated ringers. 1000 milliLiter(s) IV Continuous <Continuous>  pancrelipase  (CREON 12,000 Lipase Units) 3 Capsule(s) Oral three times a day with meals  pantoprazole    Tablet 40 milliGRAM(s) Oral before breakfast  thiamine 100 milliGRAM(s) Oral daily    PRN Meds  morphine  - Injectable 2 milliGRAM(s) IV Push every 4 hours PRN  ondansetron Injectable 4 milliGRAM(s) IV Push three times a day PRN      Case discussed with resident  Care discussed with pt

## 2019-12-22 NOTE — BEHAVIORAL HEALTH ASSESSMENT NOTE - NSBHCONSULTFOLLOWAFTERCARE_PSY_A_CORE FT
- Pt to follow up with [facility] outpatient psychiatric clinic located at [address] [place] [address], [phone] for appointmnets; pt given information and agreeable with plan
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
## 2019-12-22 NOTE — BEHAVIORAL HEALTH ASSESSMENT NOTE - NSBHCHARTREVIEWLAB_PSY_A_CORE FT
CBC Full  -  ( 22 Dec 2019 07:26 )  WBC Count : 3.37 K/uL  RBC Count : 3.56 M/uL  Hemoglobin : 11.7 g/dL  Hematocrit : 33.8 %  Platelet Count - Automated : 235 K/uL  Mean Cell Volume : 94.9 fL  Mean Cell Hemoglobin : 32.9 pg  Mean Cell Hemoglobin Concentration : 34.6 g/dL  Auto Neutrophil # : 1.73 K/uL  Auto Lymphocyte # : 1.26 K/uL  Auto Monocyte # : 0.30 K/uL  Auto Eosinophil # : 0.05 K/uL  Auto Basophil # : 0.02 K/uL  Auto Neutrophil % : 51.3 %  Auto Lymphocyte % : 37.4 %  Auto Monocyte % : 8.9 %  Auto Eosinophil % : 1.5 %  Auto Basophil % : 0.6 %    12-22    141  |  99  |  4<L>  ----------------------------<  90  3.6   |  26  |  0.6<L>    Ca    9.1      22 Dec 2019 07:26    TPro  6.7  /  Alb  4.0  /  TBili  0.6  /  DBili  x   /  AST  68<H>  /  ALT  19  /  AlkPhos  47  12-22
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
## 2019-12-23 ENCOUNTER — TRANSCRIPTION ENCOUNTER (OUTPATIENT)
Age: 44
End: 2019-12-23

## 2019-12-23 DIAGNOSIS — Z02.9 ENCOUNTER FOR ADMINISTRATIVE EXAMINATIONS, UNSPECIFIED: ICD-10-CM

## 2019-12-23 DIAGNOSIS — Z00.00 ENCOUNTER FOR GENERAL ADULT MEDICAL EXAMINATION WITHOUT ABNORMAL FINDINGS: ICD-10-CM

## 2019-12-23 LAB
ALBUMIN SERPL ELPH-MCNC: 3.9 G/DL — SIGNIFICANT CHANGE UP (ref 3.5–5.2)
ALP SERPL-CCNC: 48 U/L — SIGNIFICANT CHANGE UP (ref 30–115)
ALT FLD-CCNC: 15 U/L — SIGNIFICANT CHANGE UP (ref 0–41)
ANION GAP SERPL CALC-SCNC: 13 MMOL/L — SIGNIFICANT CHANGE UP (ref 7–14)
AST SERPL-CCNC: 55 U/L — HIGH (ref 0–41)
BASOPHILS # BLD AUTO: 0.03 K/UL — SIGNIFICANT CHANGE UP (ref 0–0.2)
BASOPHILS NFR BLD AUTO: 0.8 % — SIGNIFICANT CHANGE UP (ref 0–1)
BILIRUB SERPL-MCNC: 0.3 MG/DL — SIGNIFICANT CHANGE UP (ref 0.2–1.2)
BUN SERPL-MCNC: 5 MG/DL — LOW (ref 10–20)
CALCIUM SERPL-MCNC: 9.5 MG/DL — SIGNIFICANT CHANGE UP (ref 8.5–10.1)
CHLORIDE SERPL-SCNC: 99 MMOL/L — SIGNIFICANT CHANGE UP (ref 98–110)
CO2 SERPL-SCNC: 29 MMOL/L — SIGNIFICANT CHANGE UP (ref 17–32)
CREAT SERPL-MCNC: 0.6 MG/DL — LOW (ref 0.7–1.5)
EOSINOPHIL # BLD AUTO: 0.04 K/UL — SIGNIFICANT CHANGE UP (ref 0–0.7)
EOSINOPHIL NFR BLD AUTO: 1.1 % — SIGNIFICANT CHANGE UP (ref 0–8)
GLUCOSE SERPL-MCNC: 95 MG/DL — SIGNIFICANT CHANGE UP (ref 70–99)
HCT VFR BLD CALC: 31.8 % — LOW (ref 37–47)
HGB BLD-MCNC: 10.8 G/DL — LOW (ref 12–16)
IMM GRANULOCYTES NFR BLD AUTO: 0 % — LOW (ref 0.1–0.3)
LYMPHOCYTES # BLD AUTO: 1.22 K/UL — SIGNIFICANT CHANGE UP (ref 1.2–3.4)
LYMPHOCYTES # BLD AUTO: 33.2 % — SIGNIFICANT CHANGE UP (ref 20.5–51.1)
MCHC RBC-ENTMCNC: 32.4 PG — HIGH (ref 27–31)
MCHC RBC-ENTMCNC: 34 G/DL — SIGNIFICANT CHANGE UP (ref 32–37)
MCV RBC AUTO: 95.5 FL — SIGNIFICANT CHANGE UP (ref 81–99)
MONOCYTES # BLD AUTO: 0.3 K/UL — SIGNIFICANT CHANGE UP (ref 0.1–0.6)
MONOCYTES NFR BLD AUTO: 8.2 % — SIGNIFICANT CHANGE UP (ref 1.7–9.3)
NEUTROPHILS # BLD AUTO: 2.09 K/UL — SIGNIFICANT CHANGE UP (ref 1.4–6.5)
NEUTROPHILS NFR BLD AUTO: 56.7 % — SIGNIFICANT CHANGE UP (ref 42.2–75.2)
NRBC # BLD: 0 /100 WBCS — SIGNIFICANT CHANGE UP (ref 0–0)
PLATELET # BLD AUTO: 242 K/UL — SIGNIFICANT CHANGE UP (ref 130–400)
POTASSIUM SERPL-MCNC: 4 MMOL/L — SIGNIFICANT CHANGE UP (ref 3.5–5)
POTASSIUM SERPL-SCNC: 4 MMOL/L — SIGNIFICANT CHANGE UP (ref 3.5–5)
PROT SERPL-MCNC: 6.7 G/DL — SIGNIFICANT CHANGE UP (ref 6–8)
RBC # BLD: 3.33 M/UL — LOW (ref 4.2–5.4)
RBC # FLD: 11.6 % — SIGNIFICANT CHANGE UP (ref 11.5–14.5)
SODIUM SERPL-SCNC: 141 MMOL/L — SIGNIFICANT CHANGE UP (ref 135–146)
WBC # BLD: 3.68 K/UL — LOW (ref 4.8–10.8)
WBC # FLD AUTO: 3.68 K/UL — LOW (ref 4.8–10.8)

## 2019-12-23 PROCEDURE — 99238 HOSP IP/OBS DSCHRG MGMT 30/<: CPT

## 2019-12-23 PROCEDURE — 99231 SBSQ HOSP IP/OBS SF/LOW 25: CPT

## 2019-12-23 RX ORDER — ALPRAZOLAM 0.25 MG
0.25 TABLET ORAL ONCE
Refills: 0 | Status: DISCONTINUED | OUTPATIENT
Start: 2019-12-23 | End: 2019-12-23

## 2019-12-23 RX ORDER — LIPASE/PROTEASE/AMYLASE 16-48-48K
3 CAPSULE,DELAYED RELEASE (ENTERIC COATED) ORAL
Qty: 63 | Refills: 0
Start: 2019-12-23 | End: 2019-12-29

## 2019-12-23 RX ORDER — SENNA PLUS 8.6 MG/1
2 TABLET ORAL AT BEDTIME
Refills: 0 | Status: DISCONTINUED | OUTPATIENT
Start: 2019-12-23 | End: 2019-12-24

## 2019-12-23 RX ORDER — ALPRAZOLAM 0.25 MG
0.25 TABLET ORAL EVERY 12 HOURS
Refills: 0 | Status: DISCONTINUED | OUTPATIENT
Start: 2019-12-23 | End: 2019-12-24

## 2019-12-23 RX ORDER — PANTOPRAZOLE SODIUM 20 MG/1
1 TABLET, DELAYED RELEASE ORAL
Qty: 7 | Refills: 0
Start: 2019-12-23 | End: 2019-12-29

## 2019-12-23 RX ADMIN — MORPHINE SULFATE 2 MILLIGRAM(S): 50 CAPSULE, EXTENDED RELEASE ORAL at 23:00

## 2019-12-23 RX ADMIN — Medication 3 CAPSULE(S): at 17:18

## 2019-12-23 RX ADMIN — Medication 100 MILLIGRAM(S): at 11:22

## 2019-12-23 RX ADMIN — Medication 1 MILLIGRAM(S): at 11:22

## 2019-12-23 RX ADMIN — Medication 3 CAPSULE(S): at 12:07

## 2019-12-23 RX ADMIN — MORPHINE SULFATE 2 MILLIGRAM(S): 50 CAPSULE, EXTENDED RELEASE ORAL at 02:38

## 2019-12-23 RX ADMIN — MORPHINE SULFATE 2 MILLIGRAM(S): 50 CAPSULE, EXTENDED RELEASE ORAL at 13:55

## 2019-12-23 RX ADMIN — MORPHINE SULFATE 2 MILLIGRAM(S): 50 CAPSULE, EXTENDED RELEASE ORAL at 10:00

## 2019-12-23 RX ADMIN — Medication 0.25 MILLIGRAM(S): at 03:40

## 2019-12-23 RX ADMIN — ONDANSETRON 4 MILLIGRAM(S): 8 TABLET, FILM COATED ORAL at 09:45

## 2019-12-23 RX ADMIN — ENOXAPARIN SODIUM 40 MILLIGRAM(S): 100 INJECTION SUBCUTANEOUS at 11:22

## 2019-12-23 RX ADMIN — MORPHINE SULFATE 2 MILLIGRAM(S): 50 CAPSULE, EXTENDED RELEASE ORAL at 01:38

## 2019-12-23 RX ADMIN — MORPHINE SULFATE 2 MILLIGRAM(S): 50 CAPSULE, EXTENDED RELEASE ORAL at 22:23

## 2019-12-23 RX ADMIN — ONDANSETRON 4 MILLIGRAM(S): 8 TABLET, FILM COATED ORAL at 17:51

## 2019-12-23 RX ADMIN — MORPHINE SULFATE 2 MILLIGRAM(S): 50 CAPSULE, EXTENDED RELEASE ORAL at 17:51

## 2019-12-23 RX ADMIN — Medication 3 CAPSULE(S): at 08:25

## 2019-12-23 RX ADMIN — SENNA PLUS 2 TABLET(S): 8.6 TABLET ORAL at 22:23

## 2019-12-23 RX ADMIN — PANTOPRAZOLE SODIUM 40 MILLIGRAM(S): 20 TABLET, DELAYED RELEASE ORAL at 05:41

## 2019-12-23 RX ADMIN — SODIUM CHLORIDE 100 MILLILITER(S): 9 INJECTION, SOLUTION INTRAVENOUS at 05:41

## 2019-12-23 RX ADMIN — MORPHINE SULFATE 2 MILLIGRAM(S): 50 CAPSULE, EXTENDED RELEASE ORAL at 05:45

## 2019-12-23 RX ADMIN — MORPHINE SULFATE 2 MILLIGRAM(S): 50 CAPSULE, EXTENDED RELEASE ORAL at 09:45
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
## 2019-12-23 NOTE — PROGRESS NOTE ADULT - ATTENDING COMMENTS
D/W Patient. She has tolerated solid diet. Comfortable and wants to go home today.   D/C home today.   Medically and hemodynamically stable.  Patient is seen by social workers

## 2019-12-23 NOTE — DISCHARGE NOTE PROVIDER - NSDCFUSCHEDAPPT_GEN_ALL_CORE_FT
LAURA BARAJAS P ; 01/10/2020 ; [facility] [address] LAURA CISNEROS ; 02/12/2020 ; [facility] [address] LAURA BARAJAS P ; 01/10/2020 ; [facility] [address] LAURA CISNEROS ; 02/12/2020 ; [facility] [address] LAURA BARAJAS P ; 01/10/2020 ; [facility] [address] LAURA CISNEROS ; 02/12/2020 ; [facility] [address]

## 2019-12-23 NOTE — DISCHARGE NOTE PROVIDER - NSDCMRMEDTOKEN_GEN_ALL_CORE_FT
Multiple Vitamins oral tablet: 1 tab(s) orally once a day  pancrelipase 12,000 units-38,000 units-60,000 units oral delayed release capsule: 3 cap(s) orally 3 times a day (with meals)  pantoprazole 40 mg oral delayed release tablet: 1 tab(s) orally once a day methocarbamol 500 mg oral tablet: 1 tab(s) orally every 8 hours, As needed, muscle spasms  Multiple Vitamins oral tablet: 1 tab(s) orally once a day  ondansetron 4 mg oral tablet: 1 tab(s) orally 2 times a day, As Needed for nausea  pancrelipase 12,000 units-38,000 units-60,000 units oral delayed release capsule: 3 cap(s) orally 3 times a day (with meals)  pantoprazole 40 mg oral delayed release tablet: 1 tab(s) orally once a day

## 2019-12-23 NOTE — DISCHARGE NOTE PROVIDER - CARE PROVIDER_API CALL
Patient educated on discharge instructions and one prescription. Patient given discharge papers. Patient ambulated out of the  
ED alone. [facility],   [address]  Phone: [phone]  Fax: (   )    -  Follow Up Time:
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
## 2019-12-23 NOTE — DISCHARGE NOTE PROVIDER - PROVIDER TOKENS
FREE:[LAST:[[facility]],PHONE:[phone]],FAX:[(   )    -],ADDRESS:[[address] [place] [address]]]

## 2019-12-23 NOTE — SBIRT NOTE ADULT - NSSBIRTBRIEFINTDET_GEN_A_CORE
LMSW reviewed results of assessment with Pt, provided support and psychoeducation regarding impact of current consumption on overall health and functioning. Pt indicated that she can moderate current use and required no referrals.

## 2019-12-24 ENCOUNTER — TRANSCRIPTION ENCOUNTER (OUTPATIENT)
Age: 44
End: 2019-12-24

## 2019-12-24 VITALS — OXYGEN SATURATION: 98 %

## 2019-12-24 DIAGNOSIS — Z01.419 ENCOUNTER FOR GYNECOLOGICAL EXAMINATION (GENERAL) (ROUTINE) WITHOUT ABNORMAL FINDINGS: ICD-10-CM

## 2019-12-24 LAB
ALBUMIN SERPL ELPH-MCNC: 4.3 G/DL — SIGNIFICANT CHANGE UP (ref 3.5–5.2)
ALP SERPL-CCNC: 51 U/L — SIGNIFICANT CHANGE UP (ref 30–115)
ALT FLD-CCNC: 19 U/L — SIGNIFICANT CHANGE UP (ref 0–41)
ANION GAP SERPL CALC-SCNC: 15 MMOL/L — HIGH (ref 7–14)
AST SERPL-CCNC: 59 U/L — HIGH (ref 0–41)
BASOPHILS # BLD AUTO: 0.03 K/UL — SIGNIFICANT CHANGE UP (ref 0–0.2)
BASOPHILS NFR BLD AUTO: 0.6 % — SIGNIFICANT CHANGE UP (ref 0–1)
BILIRUB SERPL-MCNC: 0.4 MG/DL — SIGNIFICANT CHANGE UP (ref 0.2–1.2)
BUN SERPL-MCNC: 5 MG/DL — LOW (ref 10–20)
CALCIUM SERPL-MCNC: 9.6 MG/DL — SIGNIFICANT CHANGE UP (ref 8.5–10.1)
CHLORIDE SERPL-SCNC: 99 MMOL/L — SIGNIFICANT CHANGE UP (ref 98–110)
CO2 SERPL-SCNC: 27 MMOL/L — SIGNIFICANT CHANGE UP (ref 17–32)
CREAT SERPL-MCNC: 0.6 MG/DL — LOW (ref 0.7–1.5)
EOSINOPHIL # BLD AUTO: 0.05 K/UL — SIGNIFICANT CHANGE UP (ref 0–0.7)
EOSINOPHIL NFR BLD AUTO: 0.9 % — SIGNIFICANT CHANGE UP (ref 0–8)
GLUCOSE SERPL-MCNC: 84 MG/DL — SIGNIFICANT CHANGE UP (ref 70–99)
HCT VFR BLD CALC: 35.7 % — LOW (ref 37–47)
HGB BLD-MCNC: 12.1 G/DL — SIGNIFICANT CHANGE UP (ref 12–16)
IMM GRANULOCYTES NFR BLD AUTO: 0.2 % — SIGNIFICANT CHANGE UP (ref 0.1–0.3)
LYMPHOCYTES # BLD AUTO: 1.34 K/UL — SIGNIFICANT CHANGE UP (ref 1.2–3.4)
LYMPHOCYTES # BLD AUTO: 25.1 % — SIGNIFICANT CHANGE UP (ref 20.5–51.1)
MCHC RBC-ENTMCNC: 32.7 PG — HIGH (ref 27–31)
MCHC RBC-ENTMCNC: 33.9 G/DL — SIGNIFICANT CHANGE UP (ref 32–37)
MCV RBC AUTO: 96.5 FL — SIGNIFICANT CHANGE UP (ref 81–99)
MONOCYTES # BLD AUTO: 0.36 K/UL — SIGNIFICANT CHANGE UP (ref 0.1–0.6)
MONOCYTES NFR BLD AUTO: 6.7 % — SIGNIFICANT CHANGE UP (ref 1.7–9.3)
NEUTROPHILS # BLD AUTO: 3.55 K/UL — SIGNIFICANT CHANGE UP (ref 1.4–6.5)
NEUTROPHILS NFR BLD AUTO: 66.5 % — SIGNIFICANT CHANGE UP (ref 42.2–75.2)
NRBC # BLD: 0 /100 WBCS — SIGNIFICANT CHANGE UP (ref 0–0)
PLATELET # BLD AUTO: 254 K/UL — SIGNIFICANT CHANGE UP (ref 130–400)
POTASSIUM SERPL-MCNC: 3.8 MMOL/L — SIGNIFICANT CHANGE UP (ref 3.5–5)
POTASSIUM SERPL-SCNC: 3.8 MMOL/L — SIGNIFICANT CHANGE UP (ref 3.5–5)
PROT SERPL-MCNC: 7.2 G/DL — SIGNIFICANT CHANGE UP (ref 6–8)
RBC # BLD: 3.7 M/UL — LOW (ref 4.2–5.4)
RBC # FLD: 11.7 % — SIGNIFICANT CHANGE UP (ref 11.5–14.5)
SODIUM SERPL-SCNC: 141 MMOL/L — SIGNIFICANT CHANGE UP (ref 135–146)
WBC # BLD: 5.34 K/UL — SIGNIFICANT CHANGE UP (ref 4.8–10.8)
WBC # FLD AUTO: 5.34 K/UL — SIGNIFICANT CHANGE UP (ref 4.8–10.8)

## 2019-12-24 PROCEDURE — 99238 HOSP IP/OBS DSCHRG MGMT 30/<: CPT

## 2019-12-24 RX ORDER — ONDANSETRON 8 MG/1
1 TABLET, FILM COATED ORAL
Qty: 10 | Refills: 0
Start: 2019-12-24 | End: 2019-12-28

## 2019-12-24 RX ORDER — METHOCARBAMOL 500 MG/1
1 TABLET, FILM COATED ORAL
Qty: 21 | Refills: 0
Start: 2019-12-24 | End: 2019-12-30

## 2019-12-24 RX ORDER — METHOCARBAMOL 500 MG/1
500 TABLET, FILM COATED ORAL EVERY 8 HOURS
Refills: 0 | Status: DISCONTINUED | OUTPATIENT
Start: 2019-12-24 | End: 2019-12-24

## 2019-12-24 RX ADMIN — METHOCARBAMOL 500 MILLIGRAM(S): 500 TABLET, FILM COATED ORAL at 11:42

## 2019-12-24 RX ADMIN — Medication 0.25 MILLIGRAM(S): at 00:02

## 2019-12-24 RX ADMIN — PANTOPRAZOLE SODIUM 40 MILLIGRAM(S): 20 TABLET, DELAYED RELEASE ORAL at 06:13

## 2019-12-24 RX ADMIN — Medication 3 CAPSULE(S): at 11:43

## 2019-12-24 RX ADMIN — ENOXAPARIN SODIUM 40 MILLIGRAM(S): 100 INJECTION SUBCUTANEOUS at 11:43

## 2019-12-24 RX ADMIN — MORPHINE SULFATE 2 MILLIGRAM(S): 50 CAPSULE, EXTENDED RELEASE ORAL at 06:45

## 2019-12-24 RX ADMIN — MORPHINE SULFATE 2 MILLIGRAM(S): 50 CAPSULE, EXTENDED RELEASE ORAL at 10:38

## 2019-12-24 RX ADMIN — Medication 1 MILLIGRAM(S): at 11:44

## 2019-12-24 RX ADMIN — MORPHINE SULFATE 2 MILLIGRAM(S): 50 CAPSULE, EXTENDED RELEASE ORAL at 10:21

## 2019-12-24 RX ADMIN — MORPHINE SULFATE 2 MILLIGRAM(S): 50 CAPSULE, EXTENDED RELEASE ORAL at 02:34

## 2019-12-24 RX ADMIN — Medication 100 MILLIGRAM(S): at 11:44

## 2019-12-24 RX ADMIN — SODIUM CHLORIDE 100 MILLILITER(S): 9 INJECTION, SOLUTION INTRAVENOUS at 06:13

## 2019-12-24 RX ADMIN — Medication 3 CAPSULE(S): at 08:37

## 2019-12-24 NOTE — CHART NOTE - NSCHARTNOTEFT_GEN_A_CORE
Patient is sitting up in bed comfortably. She reports she is tolerating her soft diet. She denies other complaints.     Vital Signs Last 24 Hrs  T(C): 36.2 (24 Dec 2019 05:00), Max: 36.9 (23 Dec 2019 13:25)  T(F): 97.2 (24 Dec 2019 05:00), Max: 98.4 (23 Dec 2019 13:25)  HR: 72 (24 Dec 2019 05:00) (72 - 83)  BP: 123/71 (24 Dec 2019 05:00) (123/71 - 131/91)  BP(mean): --  RR: 18 (24 Dec 2019 05:00) (18 - 18)  SpO2: 98% (24 Dec 2019 08:31) (98% - 98%)      GENERAL: NAD, lying in bed comfortably  ENT: Moist mucous membranes  CHEST/LUNG: Clear to auscultation bilaterally; No rales, rhonchi, wheezing, or rubs. Unlabored respirations  HEART: Regular rate and rhythm; No murmurs, rubs, or gallops  ABDOMEN: Bowel sounds present; Soft, Nontender, Nondistended. No hepatomegally  EXTREMITIES:  2+ Peripheral Pulses, brisk capillary refill. No clubbing, cyanosis, or edema  NERVOUS SYSTEM:  Alert & Oriented X3, speech clear. No deficits       LABS:  cret                        12.1   5.34  )-----------( 254      ( 24 Dec 2019 06:48 )             35.7     12-23    141  |  99  |  5<L>  ----------------------------<  95  4.0   |  29  |  0.6<L>    Ca    9.5      23 Dec 2019 08:15    TPro  6.7  /  Alb  3.9  /  TBili  0.3  /  DBili  x   /  AST  55<H>  /  ALT  15  /  AlkPhos  48  12-23

## 2019-12-24 NOTE — DISCHARGE NOTE NURSING/CASE MANAGEMENT/SOCIAL WORK - PATIENT PORTAL LINK FT
You can access the FollowMyHealth Patient Portal offered by [facility] by registering at the following website: [email or web address]. By joining [facility]’s FollowMyHealth portal, you will also be able to view your health information using other applications (apps) compatible with our system.

## 2019-12-26 NOTE — ED ADULT TRIAGE NOTE - TEMPERATURE IN CELSIUS (DEGREES C)
Pt returned call. Appointment scheduled as follows:    Future Appointments   Date Time Provider Department Center   1/20/2020  8:30 AM OSW LAB [email or web address] OSW   1/28/2020  8:00 AM Brenden Stephens MD [email or web address] OSW   4/28/2020  9:20 AM Alberto Avery DO [email or web address] OSW      36.9

## 2019-12-30 DIAGNOSIS — E53.8 DEFICIENCY OF OTHER SPECIFIED B GROUP VITAMINS: ICD-10-CM

## 2019-12-30 DIAGNOSIS — F10.20 ALCOHOL DEPENDENCE, UNCOMPLICATED: ICD-10-CM

## 2019-12-30 DIAGNOSIS — K86.0 ALCOHOL-INDUCED CHRONIC PANCREATITIS: ICD-10-CM

## 2019-12-30 DIAGNOSIS — K85.20 ALCOHOL INDUCED ACUTE PANCREATITIS WITHOUT NECROSIS OR INFECTION: ICD-10-CM

## 2019-12-30 DIAGNOSIS — F32.1 MAJOR DEPRESSIVE DISORDER, SINGLE EPISODE, MODERATE: ICD-10-CM

## 2019-12-30 DIAGNOSIS — E51.9 THIAMINE DEFICIENCY, UNSPECIFIED: ICD-10-CM

## 2019-12-30 DIAGNOSIS — G47.00 INSOMNIA, UNSPECIFIED: ICD-10-CM

## 2019-12-30 DIAGNOSIS — Z76.5 MALINGERER [CONSCIOUS SIMULATION]: ICD-10-CM

## 2019-12-30 DIAGNOSIS — F43.10 POST-TRAUMATIC STRESS DISORDER, UNSPECIFIED: ICD-10-CM

## 2019-12-30 DIAGNOSIS — Z88.8 ALLERGY STATUS TO OTHER DRUGS, MEDICAMENTS AND BIOLOGICAL SUBSTANCES STATUS: ICD-10-CM

## 2020-01-06 LAB — HPV HIGH+LOW RISK DNA PNL CVX: DETECTED

## 2020-01-10 ENCOUNTER — APPOINTMENT (OUTPATIENT)
Dept: OBGYN | Facility: CLINIC | Age: 45
End: 2020-01-10

## 2020-01-17 ENCOUNTER — APPOINTMENT (OUTPATIENT)
Dept: OBGYN | Facility: CLINIC | Age: 45
End: 2020-01-17

## 2020-02-01 ENCOUNTER — OUTPATIENT (OUTPATIENT)
Dept: OUTPATIENT SERVICES | Facility: HOSPITAL | Age: 45
LOS: 1 days | End: 2020-02-01
Payer: MEDICAID

## 2020-02-01 DIAGNOSIS — Z98.890 OTHER SPECIFIED POSTPROCEDURAL STATES: Chronic | ICD-10-CM

## 2020-02-04 ENCOUNTER — RX RENEWAL (OUTPATIENT)
Age: 45
End: 2020-02-04

## 2020-02-04 DIAGNOSIS — K86.89 OTHER SPECIFIED DISEASES OF PANCREAS: ICD-10-CM

## 2020-02-04 RX ORDER — PANCRELIPASE 36000; 180000; 114000 [USP'U]/1; [USP'U]/1; [USP'U]/1
36000-114000 CAPSULE, DELAYED RELEASE PELLETS ORAL
Qty: 120 | Refills: 1 | Status: ACTIVE | COMMUNITY
Start: 2019-10-04 | End: 1900-01-01

## 2020-02-12 ENCOUNTER — INPATIENT (INPATIENT)
Facility: HOSPITAL | Age: 45
LOS: 3 days | Discharge: HOME | End: 2020-02-16
Attending: INTERNAL MEDICINE | Admitting: INTERNAL MEDICINE
Payer: MEDICAID

## 2020-02-12 ENCOUNTER — APPOINTMENT (OUTPATIENT)
Dept: INTERNAL MEDICINE | Facility: CLINIC | Age: 45
End: 2020-02-12

## 2020-02-12 VITALS
RESPIRATION RATE: 20 BRPM | HEART RATE: 125 BPM | SYSTOLIC BLOOD PRESSURE: 132 MMHG | DIASTOLIC BLOOD PRESSURE: 94 MMHG | TEMPERATURE: 97 F | OXYGEN SATURATION: 99 %

## 2020-02-12 DIAGNOSIS — Z98.890 OTHER SPECIFIED POSTPROCEDURAL STATES: Chronic | ICD-10-CM

## 2020-02-12 LAB
ALBUMIN SERPL ELPH-MCNC: 4.8 G/DL — SIGNIFICANT CHANGE UP (ref 3.5–5.2)
ALP SERPL-CCNC: 68 U/L — SIGNIFICANT CHANGE UP (ref 30–115)
ALT FLD-CCNC: 24 U/L — SIGNIFICANT CHANGE UP (ref 0–41)
ANION GAP SERPL CALC-SCNC: 18 MMOL/L — HIGH (ref 7–14)
APPEARANCE UR: ABNORMAL
AST SERPL-CCNC: 96 U/L — HIGH (ref 0–41)
BACTERIA # UR AUTO: NEGATIVE — SIGNIFICANT CHANGE UP
BASOPHILS # BLD AUTO: 0.05 K/UL — SIGNIFICANT CHANGE UP (ref 0–0.2)
BASOPHILS NFR BLD AUTO: 0.6 % — SIGNIFICANT CHANGE UP (ref 0–1)
BILIRUB DIRECT SERPL-MCNC: 0.2 MG/DL — SIGNIFICANT CHANGE UP (ref 0–0.2)
BILIRUB INDIRECT FLD-MCNC: 0.8 MG/DL — SIGNIFICANT CHANGE UP (ref 0.2–1.2)
BILIRUB SERPL-MCNC: 1 MG/DL — SIGNIFICANT CHANGE UP (ref 0.2–1.2)
BILIRUB UR-MCNC: NEGATIVE — SIGNIFICANT CHANGE UP
BUN SERPL-MCNC: 7 MG/DL — LOW (ref 10–20)
CALCIUM SERPL-MCNC: 9.5 MG/DL — SIGNIFICANT CHANGE UP (ref 8.5–10.1)
CHLORIDE SERPL-SCNC: 99 MMOL/L — SIGNIFICANT CHANGE UP (ref 98–110)
CO2 SERPL-SCNC: 20 MMOL/L — SIGNIFICANT CHANGE UP (ref 17–32)
COLOR SPEC: YELLOW — SIGNIFICANT CHANGE UP
CREAT SERPL-MCNC: 0.7 MG/DL — SIGNIFICANT CHANGE UP (ref 0.7–1.5)
DIFF PNL FLD: NEGATIVE — SIGNIFICANT CHANGE UP
EOSINOPHIL # BLD AUTO: 0 K/UL — SIGNIFICANT CHANGE UP (ref 0–0.7)
EOSINOPHIL NFR BLD AUTO: 0 % — SIGNIFICANT CHANGE UP (ref 0–8)
EPI CELLS # UR: 9 /HPF — HIGH (ref 0–5)
GLUCOSE SERPL-MCNC: 111 MG/DL — HIGH (ref 70–99)
GLUCOSE UR QL: NEGATIVE — SIGNIFICANT CHANGE UP
HCG SERPL QL: NEGATIVE — SIGNIFICANT CHANGE UP
HCT VFR BLD CALC: 38.6 % — SIGNIFICANT CHANGE UP (ref 37–47)
HGB BLD-MCNC: 13.5 G/DL — SIGNIFICANT CHANGE UP (ref 12–16)
HYALINE CASTS # UR AUTO: 11 /LPF — HIGH (ref 0–7)
IMM GRANULOCYTES NFR BLD AUTO: 0.2 % — SIGNIFICANT CHANGE UP (ref 0.1–0.3)
KETONES UR-MCNC: SIGNIFICANT CHANGE UP
LACTATE SERPL-SCNC: 2.2 MMOL/L — HIGH (ref 0.7–2)
LEUKOCYTE ESTERASE UR-ACNC: NEGATIVE — SIGNIFICANT CHANGE UP
LIDOCAIN IGE QN: 78 U/L — HIGH (ref 7–60)
LYMPHOCYTES # BLD AUTO: 1.1 K/UL — LOW (ref 1.2–3.4)
LYMPHOCYTES # BLD AUTO: 13.1 % — LOW (ref 20.5–51.1)
MCHC RBC-ENTMCNC: 33.8 PG — HIGH (ref 27–31)
MCHC RBC-ENTMCNC: 35 G/DL — SIGNIFICANT CHANGE UP (ref 32–37)
MCV RBC AUTO: 96.7 FL — SIGNIFICANT CHANGE UP (ref 81–99)
MONOCYTES # BLD AUTO: 0.48 K/UL — SIGNIFICANT CHANGE UP (ref 0.1–0.6)
MONOCYTES NFR BLD AUTO: 5.7 % — SIGNIFICANT CHANGE UP (ref 1.7–9.3)
NEUTROPHILS # BLD AUTO: 6.74 K/UL — HIGH (ref 1.4–6.5)
NEUTROPHILS NFR BLD AUTO: 80.4 % — HIGH (ref 42.2–75.2)
NITRITE UR-MCNC: NEGATIVE — SIGNIFICANT CHANGE UP
NRBC # BLD: 0 /100 WBCS — SIGNIFICANT CHANGE UP (ref 0–0)
PH UR: 6.5 — SIGNIFICANT CHANGE UP (ref 5–8)
PLATELET # BLD AUTO: 207 K/UL — SIGNIFICANT CHANGE UP (ref 130–400)
POTASSIUM SERPL-MCNC: 4.6 MMOL/L — SIGNIFICANT CHANGE UP (ref 3.5–5)
POTASSIUM SERPL-SCNC: 4.6 MMOL/L — SIGNIFICANT CHANGE UP (ref 3.5–5)
PROT SERPL-MCNC: 8.4 G/DL — HIGH (ref 6–8)
PROT UR-MCNC: SIGNIFICANT CHANGE UP
RBC # BLD: 3.99 M/UL — LOW (ref 4.2–5.4)
RBC # FLD: 11.8 % — SIGNIFICANT CHANGE UP (ref 11.5–14.5)
RBC CASTS # UR COMP ASSIST: 6 /HPF — HIGH (ref 0–4)
SODIUM SERPL-SCNC: 137 MMOL/L — SIGNIFICANT CHANGE UP (ref 135–146)
SP GR SPEC: 1.01 — SIGNIFICANT CHANGE UP (ref 1.01–1.02)
UROBILINOGEN FLD QL: SIGNIFICANT CHANGE UP
WBC # BLD: 8.39 K/UL — SIGNIFICANT CHANGE UP (ref 4.8–10.8)
WBC # FLD AUTO: 8.39 K/UL — SIGNIFICANT CHANGE UP (ref 4.8–10.8)
WBC UR QL: 3 /HPF — SIGNIFICANT CHANGE UP (ref 0–5)

## 2020-02-12 PROCEDURE — 99285 EMERGENCY DEPT VISIT HI MDM: CPT

## 2020-02-12 PROCEDURE — 74177 CT ABD & PELVIS W/CONTRAST: CPT | Mod: 26

## 2020-02-12 RX ORDER — SODIUM CHLORIDE 9 MG/ML
1000 INJECTION INTRAMUSCULAR; INTRAVENOUS; SUBCUTANEOUS ONCE
Refills: 0 | Status: COMPLETED | OUTPATIENT
Start: 2020-02-12 | End: 2020-02-12

## 2020-02-12 RX ORDER — KETOROLAC TROMETHAMINE 30 MG/ML
15 SYRINGE (ML) INJECTION ONCE
Refills: 0 | Status: DISCONTINUED | OUTPATIENT
Start: 2020-02-12 | End: 2020-02-12

## 2020-02-12 RX ORDER — MORPHINE SULFATE 50 MG/1
4 CAPSULE, EXTENDED RELEASE ORAL ONCE
Refills: 0 | Status: DISCONTINUED | OUTPATIENT
Start: 2020-02-12 | End: 2020-02-12

## 2020-02-12 RX ORDER — METHOCARBAMOL 500 MG/1
1500 TABLET, FILM COATED ORAL ONCE
Refills: 0 | Status: COMPLETED | OUTPATIENT
Start: 2020-02-12 | End: 2020-02-12

## 2020-02-12 RX ORDER — ONDANSETRON 8 MG/1
4 TABLET, FILM COATED ORAL ONCE
Refills: 0 | Status: COMPLETED | OUTPATIENT
Start: 2020-02-12 | End: 2020-02-12

## 2020-02-12 RX ORDER — MORPHINE SULFATE 50 MG/1
6 CAPSULE, EXTENDED RELEASE ORAL ONCE
Refills: 0 | Status: DISCONTINUED | OUTPATIENT
Start: 2020-02-12 | End: 2020-02-12

## 2020-02-12 RX ADMIN — ONDANSETRON 4 MILLIGRAM(S): 8 TABLET, FILM COATED ORAL at 20:24

## 2020-02-12 RX ADMIN — SODIUM CHLORIDE 1000 MILLILITER(S): 9 INJECTION INTRAMUSCULAR; INTRAVENOUS; SUBCUTANEOUS at 21:23

## 2020-02-12 RX ADMIN — Medication 15 MILLIGRAM(S): at 20:24

## 2020-02-12 RX ADMIN — Medication 15 MILLIGRAM(S): at 20:54

## 2020-02-12 RX ADMIN — MORPHINE SULFATE 6 MILLIGRAM(S): 50 CAPSULE, EXTENDED RELEASE ORAL at 21:10

## 2020-02-12 RX ADMIN — Medication 20 MILLIGRAM(S): at 23:13

## 2020-02-12 RX ADMIN — MORPHINE SULFATE 6 MILLIGRAM(S): 50 CAPSULE, EXTENDED RELEASE ORAL at 21:40

## 2020-02-12 RX ADMIN — SODIUM CHLORIDE 1000 MILLILITER(S): 9 INJECTION INTRAMUSCULAR; INTRAVENOUS; SUBCUTANEOUS at 20:23

## 2020-02-12 NOTE — ED PROVIDER NOTE - NS ED ROS FT
Constitutional: no fever, chills, no recent weight loss, change in appetite or malaise  Eyes: no redness/discharge/pain/vision changes  ENT: no rhinorrhea/ear pain/sore throat  Cardiac: No chest pain, SOB or edema.  Respiratory: No cough or respiratory distress  GI: + n/v/epigastric pain. No diarrhea  : No dysuria, frequency, urgency or hematuria  MS: + lower back pain, no loss of ROM, no weakness  Neuro: No headache or weakness. No LOC.  Skin: No skin rash.  Except as documented in the HPI, all other systems are negative.
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
## 2020-02-12 NOTE — ED PROVIDER NOTE - NS ED ATTENDING STATEMENT MOD
Pt arrived today for a self cath specimen. Procedure explained to pt.  All questions answered prior.  He states he has been cathing nightly, with typically 700ml residuals. He voids somewhat on his own.  Advised pt to cath at least 2 or 3x daily to have smaller residuals.  Cath sample obtained and sent for culture.  Pt will call Friday afternoon for the results.     I have personally performed a face to face diagnostic evaluation on this patient. I have reviewed the ACP note and agree with the history, exam and plan of care, except as noted.
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
## 2020-02-12 NOTE — ED PROVIDER NOTE - PHYSICAL EXAMINATION
CONSTITUTIONAL: Well-appearing; well-nourished; in no apparent distress.   NECK: Supple; non-tender; no cervical lymphadenopathy.   CARDIOVASCULAR: Normal S1, S2; no murmurs, rubs, or gallops.   RESPIRATORY: Normal chest excursion with respiration; breath sounds clear and equal bilaterally; no wheezes, rhonchi, or rales.  GI/: Normal bowel sounds; non-distended; + ttp epigastric region with guarding. No CVA ttp. No ttp at mcburneys point  MS: No evidence of trauma or deformity. No midline spinal ttp. Stable pelvis. + ttp over lower lumbar paraspinal region. Strength equal  SKIN: Normal for age and race; warm; dry; good turgor; no apparent lesions or exudate.   NEURO/PSYCH: A & O x 4; grossly unremarkable. mood and manner are appropriate. Grooming and personal hygiene are appropriate.
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
## 2020-02-12 NOTE — ED ADULT NURSE NOTE - NSIMPLEMENTINTERV_GEN_ALL_ED
Implemented All Fall Risk Interventions:  [place] to call system. Call bell, personal items and telephone within reach. Instruct patient to call for assistance. Room bathroom lighting operational. Non-slip footwear when patient is off stretcher. Physically safe environment: no spills, clutter or unnecessary equipment. Stretcher in lowest position, wheels locked, appropriate side rails in place. Provide visual cue, wrist band, yellow gown, etc. Monitor gait and stability. Monitor for mental status changes and reorient to person, place, and time. Review medications for side effects contributing to fall risk. Reinforce activity limits and safety measures with patient and family.

## 2020-02-13 LAB
BASOPHILS # BLD AUTO: 0.02 K/UL — SIGNIFICANT CHANGE UP (ref 0–0.2)
BASOPHILS NFR BLD AUTO: 0.5 % — SIGNIFICANT CHANGE UP (ref 0–1)
EOSINOPHIL # BLD AUTO: 0.03 K/UL — SIGNIFICANT CHANGE UP (ref 0–0.7)
EOSINOPHIL NFR BLD AUTO: 0.7 % — SIGNIFICANT CHANGE UP (ref 0–8)
HCT VFR BLD CALC: 31 % — LOW (ref 37–47)
HGB BLD-MCNC: 10.6 G/DL — LOW (ref 12–16)
IMM GRANULOCYTES NFR BLD AUTO: 0.5 % — HIGH (ref 0.1–0.3)
LYMPHOCYTES # BLD AUTO: 0.84 K/UL — LOW (ref 1.2–3.4)
LYMPHOCYTES # BLD AUTO: 19.2 % — LOW (ref 20.5–51.1)
MCHC RBC-ENTMCNC: 33.1 PG — HIGH (ref 27–31)
MCHC RBC-ENTMCNC: 34.2 G/DL — SIGNIFICANT CHANGE UP (ref 32–37)
MCV RBC AUTO: 96.9 FL — SIGNIFICANT CHANGE UP (ref 81–99)
MONOCYTES # BLD AUTO: 0.32 K/UL — SIGNIFICANT CHANGE UP (ref 0.1–0.6)
MONOCYTES NFR BLD AUTO: 7.3 % — SIGNIFICANT CHANGE UP (ref 1.7–9.3)
NEUTROPHILS # BLD AUTO: 3.15 K/UL — SIGNIFICANT CHANGE UP (ref 1.4–6.5)
NEUTROPHILS NFR BLD AUTO: 71.8 % — SIGNIFICANT CHANGE UP (ref 42.2–75.2)
NRBC # BLD: 0 /100 WBCS — SIGNIFICANT CHANGE UP (ref 0–0)
PLATELET # BLD AUTO: 167 K/UL — SIGNIFICANT CHANGE UP (ref 130–400)
RBC # BLD: 3.2 M/UL — LOW (ref 4.2–5.4)
RBC # FLD: 11.9 % — SIGNIFICANT CHANGE UP (ref 11.5–14.5)
WBC # BLD: 4.38 K/UL — LOW (ref 4.8–10.8)
WBC # FLD AUTO: 4.38 K/UL — LOW (ref 4.8–10.8)

## 2020-02-13 PROCEDURE — 72146 MRI CHEST SPINE W/O DYE: CPT | Mod: 26

## 2020-02-13 PROCEDURE — 99223 1ST HOSP IP/OBS HIGH 75: CPT

## 2020-02-13 PROCEDURE — 93010 ELECTROCARDIOGRAM REPORT: CPT

## 2020-02-13 PROCEDURE — 72148 MRI LUMBAR SPINE W/O DYE: CPT | Mod: 26

## 2020-02-13 RX ORDER — MORPHINE SULFATE 50 MG/1
4 CAPSULE, EXTENDED RELEASE ORAL EVERY 4 HOURS
Refills: 0 | Status: DISCONTINUED | OUTPATIENT
Start: 2020-02-13 | End: 2020-02-13

## 2020-02-13 RX ORDER — CYCLOBENZAPRINE HYDROCHLORIDE 10 MG/1
5 TABLET, FILM COATED ORAL THREE TIMES A DAY
Refills: 0 | Status: DISCONTINUED | OUTPATIENT
Start: 2020-02-13 | End: 2020-02-16

## 2020-02-13 RX ORDER — METHOCARBAMOL 500 MG/1
1500 TABLET, FILM COATED ORAL THREE TIMES A DAY
Refills: 0 | Status: DISCONTINUED | OUTPATIENT
Start: 2020-02-13 | End: 2020-02-13

## 2020-02-13 RX ORDER — MAGNESIUM SULFATE 500 MG/ML
2 VIAL (ML) INJECTION ONCE
Refills: 0 | Status: COMPLETED | OUTPATIENT
Start: 2020-02-13 | End: 2020-02-13

## 2020-02-13 RX ORDER — CHLORHEXIDINE GLUCONATE 213 G/1000ML
1 SOLUTION TOPICAL
Refills: 0 | Status: DISCONTINUED | OUTPATIENT
Start: 2020-02-13 | End: 2020-02-16

## 2020-02-13 RX ORDER — SODIUM CHLORIDE 9 MG/ML
1000 INJECTION, SOLUTION INTRAVENOUS
Refills: 0 | Status: DISCONTINUED | OUTPATIENT
Start: 2020-02-13 | End: 2020-02-14

## 2020-02-13 RX ORDER — FOLIC ACID 0.8 MG
1 TABLET ORAL DAILY
Refills: 0 | Status: DISCONTINUED | OUTPATIENT
Start: 2020-02-13 | End: 2020-02-16

## 2020-02-13 RX ORDER — THIAMINE MONONITRATE (VIT B1) 100 MG
100 TABLET ORAL DAILY
Refills: 0 | Status: DISCONTINUED | OUTPATIENT
Start: 2020-02-13 | End: 2020-02-16

## 2020-02-13 RX ORDER — CYCLOBENZAPRINE HYDROCHLORIDE 10 MG/1
5 TABLET, FILM COATED ORAL ONCE
Refills: 0 | Status: COMPLETED | OUTPATIENT
Start: 2020-02-13 | End: 2020-02-13

## 2020-02-13 RX ORDER — OXYCODONE HYDROCHLORIDE 5 MG/1
5 TABLET ORAL EVERY 4 HOURS
Refills: 0 | Status: DISCONTINUED | OUTPATIENT
Start: 2020-02-13 | End: 2020-02-14

## 2020-02-13 RX ORDER — MULTIVIT-MIN/FERROUS GLUCONATE 9 MG/15 ML
1 LIQUID (ML) ORAL DAILY
Refills: 0 | Status: DISCONTINUED | OUTPATIENT
Start: 2020-02-13 | End: 2020-02-16

## 2020-02-13 RX ORDER — LIDOCAINE 4 G/100G
1 CREAM TOPICAL DAILY
Refills: 0 | Status: DISCONTINUED | OUTPATIENT
Start: 2020-02-13 | End: 2020-02-16

## 2020-02-13 RX ORDER — ENOXAPARIN SODIUM 100 MG/ML
40 INJECTION SUBCUTANEOUS DAILY
Refills: 0 | Status: DISCONTINUED | OUTPATIENT
Start: 2020-02-13 | End: 2020-02-16

## 2020-02-13 RX ORDER — ONDANSETRON 8 MG/1
4 TABLET, FILM COATED ORAL EVERY 6 HOURS
Refills: 0 | Status: DISCONTINUED | OUTPATIENT
Start: 2020-02-13 | End: 2020-02-16

## 2020-02-13 RX ORDER — ACETAMINOPHEN 500 MG
650 TABLET ORAL EVERY 6 HOURS
Refills: 0 | Status: DISCONTINUED | OUTPATIENT
Start: 2020-02-13 | End: 2020-02-13

## 2020-02-13 RX ORDER — MORPHINE SULFATE 50 MG/1
2 CAPSULE, EXTENDED RELEASE ORAL EVERY 4 HOURS
Refills: 0 | Status: DISCONTINUED | OUTPATIENT
Start: 2020-02-13 | End: 2020-02-13

## 2020-02-13 RX ORDER — ACETAMINOPHEN 500 MG
650 TABLET ORAL EVERY 6 HOURS
Refills: 0 | Status: DISCONTINUED | OUTPATIENT
Start: 2020-02-13 | End: 2020-02-16

## 2020-02-13 RX ORDER — LIPASE/PROTEASE/AMYLASE 16-48-48K
3 CAPSULE,DELAYED RELEASE (ENTERIC COATED) ORAL
Refills: 0 | Status: DISCONTINUED | OUTPATIENT
Start: 2020-02-13 | End: 2020-02-16

## 2020-02-13 RX ORDER — MORPHINE SULFATE 50 MG/1
2 CAPSULE, EXTENDED RELEASE ORAL EVERY 4 HOURS
Refills: 0 | Status: DISCONTINUED | OUTPATIENT
Start: 2020-02-13 | End: 2020-02-14

## 2020-02-13 RX ADMIN — SODIUM CHLORIDE 1000 MILLILITER(S): 9 INJECTION INTRAMUSCULAR; INTRAVENOUS; SUBCUTANEOUS at 01:27

## 2020-02-13 RX ADMIN — MORPHINE SULFATE 2 MILLIGRAM(S): 50 CAPSULE, EXTENDED RELEASE ORAL at 18:49

## 2020-02-13 RX ADMIN — Medication 50 GRAM(S): at 18:28

## 2020-02-13 RX ADMIN — Medication 1 TABLET(S): at 12:13

## 2020-02-13 RX ADMIN — Medication 1 MILLIGRAM(S): at 12:13

## 2020-02-13 RX ADMIN — MORPHINE SULFATE 2 MILLIGRAM(S): 50 CAPSULE, EXTENDED RELEASE ORAL at 13:32

## 2020-02-13 RX ADMIN — LIDOCAINE 1 PATCH: 4 CREAM TOPICAL at 19:37

## 2020-02-13 RX ADMIN — MORPHINE SULFATE 2 MILLIGRAM(S): 50 CAPSULE, EXTENDED RELEASE ORAL at 03:00

## 2020-02-13 RX ADMIN — Medication 100 MILLIGRAM(S): at 12:13

## 2020-02-13 RX ADMIN — MORPHINE SULFATE 2 MILLIGRAM(S): 50 CAPSULE, EXTENDED RELEASE ORAL at 02:40

## 2020-02-13 RX ADMIN — SODIUM CHLORIDE 1000 MILLILITER(S): 9 INJECTION INTRAMUSCULAR; INTRAVENOUS; SUBCUTANEOUS at 00:27

## 2020-02-13 RX ADMIN — Medication 3 CAPSULE(S): at 12:13

## 2020-02-13 RX ADMIN — SODIUM CHLORIDE 150 MILLILITER(S): 9 INJECTION, SOLUTION INTRAVENOUS at 02:30

## 2020-02-13 RX ADMIN — MORPHINE SULFATE 2 MILLIGRAM(S): 50 CAPSULE, EXTENDED RELEASE ORAL at 15:21

## 2020-02-13 RX ADMIN — ENOXAPARIN SODIUM 40 MILLIGRAM(S): 100 INJECTION SUBCUTANEOUS at 12:12

## 2020-02-13 RX ADMIN — MORPHINE SULFATE 4 MILLIGRAM(S): 50 CAPSULE, EXTENDED RELEASE ORAL at 00:18

## 2020-02-13 RX ADMIN — Medication 650 MILLIGRAM(S): at 23:01

## 2020-02-13 RX ADMIN — MORPHINE SULFATE 2 MILLIGRAM(S): 50 CAPSULE, EXTENDED RELEASE ORAL at 06:50

## 2020-02-13 RX ADMIN — MORPHINE SULFATE 2 MILLIGRAM(S): 50 CAPSULE, EXTENDED RELEASE ORAL at 10:11

## 2020-02-13 RX ADMIN — OXYCODONE HYDROCHLORIDE 5 MILLIGRAM(S): 5 TABLET ORAL at 22:59

## 2020-02-13 RX ADMIN — MORPHINE SULFATE 2 MILLIGRAM(S): 50 CAPSULE, EXTENDED RELEASE ORAL at 06:28

## 2020-02-13 RX ADMIN — OXYCODONE HYDROCHLORIDE 5 MILLIGRAM(S): 5 TABLET ORAL at 21:54

## 2020-02-13 RX ADMIN — SODIUM CHLORIDE 150 MILLILITER(S): 9 INJECTION, SOLUTION INTRAVENOUS at 12:13

## 2020-02-13 RX ADMIN — MORPHINE SULFATE 2 MILLIGRAM(S): 50 CAPSULE, EXTENDED RELEASE ORAL at 10:25

## 2020-02-13 RX ADMIN — Medication 1 MILLIGRAM(S): at 07:47

## 2020-02-13 RX ADMIN — LIDOCAINE 1 PATCH: 4 CREAM TOPICAL at 12:11

## 2020-02-13 RX ADMIN — METHOCARBAMOL 1500 MILLIGRAM(S): 500 TABLET, FILM COATED ORAL at 05:18

## 2020-02-13 RX ADMIN — METHOCARBAMOL 1500 MILLIGRAM(S): 500 TABLET, FILM COATED ORAL at 00:17

## 2020-02-13 RX ADMIN — CYCLOBENZAPRINE HYDROCHLORIDE 5 MILLIGRAM(S): 10 TABLET, FILM COATED ORAL at 14:32

## 2020-02-13 RX ADMIN — MORPHINE SULFATE 2 MILLIGRAM(S): 50 CAPSULE, EXTENDED RELEASE ORAL at 22:57

## 2020-02-13 RX ADMIN — Medication 3 CAPSULE(S): at 17:26

## 2020-02-13 RX ADMIN — MORPHINE SULFATE 2 MILLIGRAM(S): 50 CAPSULE, EXTENDED RELEASE ORAL at 23:53

## 2020-02-13 RX ADMIN — METHOCARBAMOL 1500 MILLIGRAM(S): 500 TABLET, FILM COATED ORAL at 14:04
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
## 2020-02-13 NOTE — PHYSICAL THERAPY INITIAL EVALUATION ADULT - GAIT DEVIATIONS NOTED, PT EVAL
increased time in double stance/decreased jing/decreased velocity of limb motion/decreased step length/decreased stride length

## 2020-02-13 NOTE — H&P ADULT - NSHPPHYSICALEXAM_GEN_ALL_CORE
GEN: NAD, comfortable  CARDIO: RRR, no m/r/g  RESP: CTAB, no w/r/r  ABD: tender to palpation over epigastric region  EXT: no edema, pp b/l  NEURO: AAOx3, grossly normal GEN: NAD, comfortable  CARDIO: RRR, no m/r/g  RESP: CTAB, no w/r/r  ABD: tender to palpation over epigastric region  BACK: no midline tenderness, tender to palpation over R gluteal region, no abrasion or ecchymoses noted  EXT: no edema, pp b/l  NEURO: AAOx3, grossly normal
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
## 2020-02-13 NOTE — PHYSICAL THERAPY INITIAL EVALUATION ADULT - GENERAL OBSERVATIONS, REHAB EVAL
encountered in bed in [facility]. pt reports her pain is severe 10/10 with all movements and at rest. She was originally declining OOB and PT due to pain but had to use the bathroom and agreed to get OOB encountered in bed in [facility]. pt reports her pain is severe 10/10 with all movements and at rest. She was originally declining OOB and PT due to pain but had to use the bathroom and agreed to get OOB. total time 1 hour

## 2020-02-13 NOTE — H&P ADULT - NSHPLABSRESULTS_GEN_ALL_CORE
13.5   8.39  )-----------( 207      ( 2020 20:20 )             38.6     0212    137  |  99  |  7<L>  ----------------------------<  111<H>  4.6   |  20  |  0.7    Ca    9.5      2020 20:20    TPro  8.4<H>  /  Alb  4.8  /  TBili  1.0  /  DBili  0.2  /  AST  96<H>  /  ALT  24  /  AlkPhos  68  0212       Urinalysis Basic - ( 2020 21:10 )    Color: Yellow / Appearance: Slightly Turbid / S.011 / pH: x  Gluc: x / Ketone: Trace  / Bili: Negative / Urobili: <2 mg/dL   Blood: x / Protein: Trace / Nitrite: Negative   Leuk Esterase: Negative / RBC: 6 /HPF / WBC 3 /HPF   Sq Epi: x / Non Sq Epi: 9 /HPF / Bacteria: Negative    Lactate Trend   @ 20:20 Lactate:2.2     < from: CT Abdomen and Pelvis w/ IV Cont (20 @ 22:44) >    Findings consistent with acute on chronic pancreatitis. No evidence of acute fluid collection.

## 2020-02-14 ENCOUNTER — TRANSCRIPTION ENCOUNTER (OUTPATIENT)
Age: 45
End: 2020-02-14

## 2020-02-14 DIAGNOSIS — Z87.898 PERSONAL HISTORY OF OTHER SPECIFIED CONDITIONS: ICD-10-CM

## 2020-02-14 LAB
ALBUMIN SERPL ELPH-MCNC: 3.8 G/DL — SIGNIFICANT CHANGE UP (ref 3.5–5.2)
ALBUMIN SERPL ELPH-MCNC: 4.2 G/DL — SIGNIFICANT CHANGE UP (ref 3.5–5.2)
ALP SERPL-CCNC: 51 U/L — SIGNIFICANT CHANGE UP (ref 30–115)
ALP SERPL-CCNC: 59 U/L — SIGNIFICANT CHANGE UP (ref 30–115)
ALT FLD-CCNC: 17 U/L — SIGNIFICANT CHANGE UP (ref 0–41)
ALT FLD-CCNC: 19 U/L — SIGNIFICANT CHANGE UP (ref 0–41)
ANION GAP SERPL CALC-SCNC: 11 MMOL/L — SIGNIFICANT CHANGE UP (ref 7–14)
ANION GAP SERPL CALC-SCNC: 11 MMOL/L — SIGNIFICANT CHANGE UP (ref 7–14)
AST SERPL-CCNC: 69 U/L — HIGH (ref 0–41)
AST SERPL-CCNC: 78 U/L — HIGH (ref 0–41)
BASOPHILS # BLD AUTO: 0.02 K/UL — SIGNIFICANT CHANGE UP (ref 0–0.2)
BASOPHILS NFR BLD AUTO: 0.7 % — SIGNIFICANT CHANGE UP (ref 0–1)
BILIRUB DIRECT SERPL-MCNC: 0.2 MG/DL — SIGNIFICANT CHANGE UP (ref 0–0.2)
BILIRUB INDIRECT FLD-MCNC: 0.3 MG/DL — SIGNIFICANT CHANGE UP (ref 0.2–1.2)
BILIRUB SERPL-MCNC: 0.5 MG/DL — SIGNIFICANT CHANGE UP (ref 0.2–1.2)
BILIRUB SERPL-MCNC: 0.6 MG/DL — SIGNIFICANT CHANGE UP (ref 0.2–1.2)
BUN SERPL-MCNC: 3 MG/DL — LOW (ref 10–20)
BUN SERPL-MCNC: 4 MG/DL — LOW (ref 10–20)
CALCIUM SERPL-MCNC: 9 MG/DL — SIGNIFICANT CHANGE UP (ref 8.5–10.1)
CALCIUM SERPL-MCNC: 9.3 MG/DL — SIGNIFICANT CHANGE UP (ref 8.5–10.1)
CHLORIDE SERPL-SCNC: 102 MMOL/L — SIGNIFICANT CHANGE UP (ref 98–110)
CHLORIDE SERPL-SCNC: 104 MMOL/L — SIGNIFICANT CHANGE UP (ref 98–110)
CO2 SERPL-SCNC: 26 MMOL/L — SIGNIFICANT CHANGE UP (ref 17–32)
CO2 SERPL-SCNC: 28 MMOL/L — SIGNIFICANT CHANGE UP (ref 17–32)
CREAT SERPL-MCNC: 0.6 MG/DL — LOW (ref 0.7–1.5)
CREAT SERPL-MCNC: 0.6 MG/DL — LOW (ref 0.7–1.5)
EOSINOPHIL # BLD AUTO: 0.07 K/UL — SIGNIFICANT CHANGE UP (ref 0–0.7)
EOSINOPHIL NFR BLD AUTO: 2.5 % — SIGNIFICANT CHANGE UP (ref 0–8)
GLUCOSE SERPL-MCNC: 76 MG/DL — SIGNIFICANT CHANGE UP (ref 70–99)
GLUCOSE SERPL-MCNC: 91 MG/DL — SIGNIFICANT CHANGE UP (ref 70–99)
HCT VFR BLD CALC: 29.6 % — LOW (ref 37–47)
HGB BLD-MCNC: 10 G/DL — LOW (ref 12–16)
IMM GRANULOCYTES NFR BLD AUTO: 0 % — LOW (ref 0.1–0.3)
LYMPHOCYTES # BLD AUTO: 0.94 K/UL — LOW (ref 1.2–3.4)
LYMPHOCYTES # BLD AUTO: 33.6 % — SIGNIFICANT CHANGE UP (ref 20.5–51.1)
MAGNESIUM SERPL-MCNC: 1.9 MG/DL — SIGNIFICANT CHANGE UP (ref 1.8–2.4)
MAGNESIUM SERPL-MCNC: 2.1 MG/DL — SIGNIFICANT CHANGE UP (ref 1.8–2.4)
MCHC RBC-ENTMCNC: 33.7 PG — HIGH (ref 27–31)
MCHC RBC-ENTMCNC: 33.8 G/DL — SIGNIFICANT CHANGE UP (ref 32–37)
MCV RBC AUTO: 99.7 FL — HIGH (ref 81–99)
MONOCYTES # BLD AUTO: 0.25 K/UL — SIGNIFICANT CHANGE UP (ref 0.1–0.6)
MONOCYTES NFR BLD AUTO: 8.9 % — SIGNIFICANT CHANGE UP (ref 1.7–9.3)
NEUTROPHILS # BLD AUTO: 1.52 K/UL — SIGNIFICANT CHANGE UP (ref 1.4–6.5)
NEUTROPHILS NFR BLD AUTO: 54.3 % — SIGNIFICANT CHANGE UP (ref 42.2–75.2)
NRBC # BLD: 0 /100 WBCS — SIGNIFICANT CHANGE UP (ref 0–0)
PHOSPHATE SERPL-MCNC: 3.8 MG/DL — SIGNIFICANT CHANGE UP (ref 2.1–4.9)
PLATELET # BLD AUTO: 157 K/UL — SIGNIFICANT CHANGE UP (ref 130–400)
POTASSIUM SERPL-MCNC: 3.8 MMOL/L — SIGNIFICANT CHANGE UP (ref 3.5–5)
POTASSIUM SERPL-MCNC: 4.2 MMOL/L — SIGNIFICANT CHANGE UP (ref 3.5–5)
POTASSIUM SERPL-SCNC: 3.8 MMOL/L — SIGNIFICANT CHANGE UP (ref 3.5–5)
POTASSIUM SERPL-SCNC: 4.2 MMOL/L — SIGNIFICANT CHANGE UP (ref 3.5–5)
PROT SERPL-MCNC: 6.3 G/DL — SIGNIFICANT CHANGE UP (ref 6–8)
PROT SERPL-MCNC: 7.2 G/DL — SIGNIFICANT CHANGE UP (ref 6–8)
RBC # BLD: 2.97 M/UL — LOW (ref 4.2–5.4)
RBC # FLD: 11.8 % — SIGNIFICANT CHANGE UP (ref 11.5–14.5)
SODIUM SERPL-SCNC: 141 MMOL/L — SIGNIFICANT CHANGE UP (ref 135–146)
SODIUM SERPL-SCNC: 141 MMOL/L — SIGNIFICANT CHANGE UP (ref 135–146)
WBC # BLD: 2.8 K/UL — LOW (ref 4.8–10.8)
WBC # FLD AUTO: 2.8 K/UL — LOW (ref 4.8–10.8)

## 2020-02-14 PROCEDURE — 99233 SBSQ HOSP IP/OBS HIGH 50: CPT

## 2020-02-14 RX ORDER — METHOCARBAMOL 500 MG/1
500 TABLET, FILM COATED ORAL THREE TIMES A DAY
Refills: 0 | Status: DISCONTINUED | OUTPATIENT
Start: 2020-02-14 | End: 2020-02-14

## 2020-02-14 RX ORDER — MORPHINE SULFATE 50 MG/1
1 CAPSULE, EXTENDED RELEASE ORAL
Refills: 0 | Status: DISCONTINUED | OUTPATIENT
Start: 2020-02-14 | End: 2020-02-15

## 2020-02-14 RX ORDER — SODIUM CHLORIDE 9 MG/ML
1000 INJECTION, SOLUTION INTRAVENOUS
Refills: 0 | Status: DISCONTINUED | OUTPATIENT
Start: 2020-02-14 | End: 2020-02-16

## 2020-02-14 RX ADMIN — SODIUM CHLORIDE 150 MILLILITER(S): 9 INJECTION, SOLUTION INTRAVENOUS at 18:09

## 2020-02-14 RX ADMIN — CYCLOBENZAPRINE HYDROCHLORIDE 5 MILLIGRAM(S): 10 TABLET, FILM COATED ORAL at 12:55

## 2020-02-14 RX ADMIN — Medication 1 MILLIGRAM(S): at 11:21

## 2020-02-14 RX ADMIN — MORPHINE SULFATE 1 MILLIGRAM(S): 50 CAPSULE, EXTENDED RELEASE ORAL at 16:20

## 2020-02-14 RX ADMIN — LIDOCAINE 1 PATCH: 4 CREAM TOPICAL at 19:00

## 2020-02-14 RX ADMIN — Medication 650 MILLIGRAM(S): at 23:24

## 2020-02-14 RX ADMIN — Medication 3 CAPSULE(S): at 13:41

## 2020-02-14 RX ADMIN — Medication 650 MILLIGRAM(S): at 06:06

## 2020-02-14 RX ADMIN — MORPHINE SULFATE 2 MILLIGRAM(S): 50 CAPSULE, EXTENDED RELEASE ORAL at 12:00

## 2020-02-14 RX ADMIN — MORPHINE SULFATE 2 MILLIGRAM(S): 50 CAPSULE, EXTENDED RELEASE ORAL at 07:10

## 2020-02-14 RX ADMIN — LIDOCAINE 1 PATCH: 4 CREAM TOPICAL at 11:21

## 2020-02-14 RX ADMIN — MORPHINE SULFATE 2 MILLIGRAM(S): 50 CAPSULE, EXTENDED RELEASE ORAL at 03:04

## 2020-02-14 RX ADMIN — CYCLOBENZAPRINE HYDROCHLORIDE 5 MILLIGRAM(S): 10 TABLET, FILM COATED ORAL at 21:17

## 2020-02-14 RX ADMIN — OXYCODONE HYDROCHLORIDE 5 MILLIGRAM(S): 5 TABLET ORAL at 09:22

## 2020-02-14 RX ADMIN — Medication 1 TABLET(S): at 11:21

## 2020-02-14 RX ADMIN — MORPHINE SULFATE 1 MILLIGRAM(S): 50 CAPSULE, EXTENDED RELEASE ORAL at 20:10

## 2020-02-14 RX ADMIN — ENOXAPARIN SODIUM 40 MILLIGRAM(S): 100 INJECTION SUBCUTANEOUS at 11:20

## 2020-02-14 RX ADMIN — Medication 100 MILLIGRAM(S): at 11:21

## 2020-02-14 RX ADMIN — LIDOCAINE 1 PATCH: 4 CREAM TOPICAL at 23:20

## 2020-02-14 RX ADMIN — Medication 3 CAPSULE(S): at 09:08

## 2020-02-14 RX ADMIN — Medication 650 MILLIGRAM(S): at 11:20

## 2020-02-14 RX ADMIN — OXYCODONE HYDROCHLORIDE 5 MILLIGRAM(S): 5 TABLET ORAL at 09:52

## 2020-02-14 RX ADMIN — MORPHINE SULFATE 1 MILLIGRAM(S): 50 CAPSULE, EXTENDED RELEASE ORAL at 20:30

## 2020-02-14 RX ADMIN — MORPHINE SULFATE 2 MILLIGRAM(S): 50 CAPSULE, EXTENDED RELEASE ORAL at 04:01

## 2020-02-14 RX ADMIN — LIDOCAINE 1 PATCH: 4 CREAM TOPICAL at 00:18

## 2020-02-14 RX ADMIN — Medication 650 MILLIGRAM(S): at 18:07

## 2020-02-14 RX ADMIN — METHOCARBAMOL 500 MILLIGRAM(S): 500 TABLET, FILM COATED ORAL at 01:17

## 2020-02-14 RX ADMIN — MORPHINE SULFATE 1 MILLIGRAM(S): 50 CAPSULE, EXTENDED RELEASE ORAL at 23:24

## 2020-02-14 RX ADMIN — Medication 3 CAPSULE(S): at 18:08

## 2020-02-14 RX ADMIN — MORPHINE SULFATE 2 MILLIGRAM(S): 50 CAPSULE, EXTENDED RELEASE ORAL at 11:19

## 2020-02-14 RX ADMIN — Medication 650 MILLIGRAM(S): at 11:30

## 2020-02-14 RX ADMIN — SODIUM CHLORIDE 150 MILLILITER(S): 9 INJECTION, SOLUTION INTRAVENOUS at 09:23
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
## 2020-02-14 NOTE — DISCHARGE NOTE PROVIDER - NSDCFUSCHEDAPPT_GEN_ALL_CORE_FT
LAURA BARAJAS ; 03/13/2020 ; [facility] [address] [place] [address] LAURA BARAJAS ; 03/13/2020 ; [facility] [address] [place] [address]
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
## 2020-02-14 NOTE — DISCHARGE NOTE PROVIDER - CARE PROVIDER_API CALL
Haley Cobos)  Internal Medicine  [address], [address]  [place] [address]  Phone: [phone]  Fax: [phone]  Follow Up Time: Haley Cobos)  Internal Medicine  [address] [facility], [address]  [place] [address]  Phone: [phone]  Fax: [phone]  Follow Up Time:     Grecia Patel; MPH)  Anesthesiology  [address] [place] [address]  Phone: [phone]  Fax: [phone]  Follow Up Time:

## 2020-02-14 NOTE — SBIRT NOTE ADULT - NSSBIRTALCNOACTINTDET_GEN_A_CORE
Patient reported she only socially drinks a couple times per week and does not drink more than 2 drinks per occasion. Patient denies any dependence and declined community resources at this time.

## 2020-02-14 NOTE — DISCHARGE NOTE PROVIDER - CARE PROVIDERS DIRECT ADDRESSES
,[email or web address]
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
## 2020-02-14 NOTE — DISCHARGE NOTE PROVIDER - NSDCMRMEDTOKEN_GEN_ALL_CORE_FT
pancrelipase 12,000 units-38,000 units-60,000 units oral delayed release capsule: 3 cap(s) orally 3 times a day (with meals) cyclobenzaprine 5 mg oral tablet: 1 tab(s) orally 3 times a day, As needed, Muscle Spasm  diclofenac sodium 75 mg oral delayed release tablet: 1 tab(s) orally 2 times a day   DULoxetine 30 mg oral delayed release capsule: 1 cap(s) orally once a day (at bedtime)  folic acid 1 mg oral tablet: 1 tab(s) orally once a day  Multiple Vitamins with Minerals oral tablet: 1 tab(s) orally once a day  pancrelipase 12,000 units-38,000 units-60,000 units oral delayed release capsule: 1 cap(s) orally 3 times a day   thiamine 100 mg oral tablet: 1 tab(s) orally once a day  Zofran 4 mg oral tablet: 1 tab(s) orally 2 times a day  as Needed for nausea

## 2020-02-14 NOTE — DISCHARGE NOTE PROVIDER - PROVIDER TOKENS
PROVIDER:[TOKEN:[[id number]]] PROVIDER:[TOKEN:[[id number]]],PROVIDER:[TOKEN:[[id number]]]
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
## 2020-02-14 NOTE — DISCHARGE NOTE PROVIDER - NSDCCPCAREPLAN_GEN_ALL_CORE_FT
PRINCIPAL DISCHARGE DIAGNOSIS  Diagnosis: Acute on chronic pancreatitis  Assessment and Plan of Treatment: Your acute abdominal pain was from your recurrent pancreatitis. It is likely that your pancreatisis returns due to your admitted history of alcohol use. It is very important that you try not to consume alcohol in the future to further prevent recurrent pancreatitis. Please follow up with your primary care doctor within one week. If you feel any acute sharp pain, low blood pressure, dizziness, please return to the ED.      SECONDARY DISCHARGE DIAGNOSES  Diagnosis: Lower back pain  Assessment and Plan of Treatment: your lower back pain is likely from a contusion from your recent fall and exacerbated by the small buldign disc on your lower spine. You however, do not have any spinal stenosis. The contusion should resolve in couple of days with conservative management. Please follow up with pain management if your pain is still not controlled.

## 2020-02-15 LAB
ANION GAP SERPL CALC-SCNC: 13 MMOL/L — SIGNIFICANT CHANGE UP (ref 7–14)
BASOPHILS # BLD AUTO: 0.02 K/UL — SIGNIFICANT CHANGE UP (ref 0–0.2)
BASOPHILS NFR BLD AUTO: 0.8 % — SIGNIFICANT CHANGE UP (ref 0–1)
BUN SERPL-MCNC: 5 MG/DL — LOW (ref 10–20)
CALCIUM SERPL-MCNC: 9.3 MG/DL — SIGNIFICANT CHANGE UP (ref 8.5–10.1)
CHLORIDE SERPL-SCNC: 102 MMOL/L — SIGNIFICANT CHANGE UP (ref 98–110)
CO2 SERPL-SCNC: 25 MMOL/L — SIGNIFICANT CHANGE UP (ref 17–32)
CREAT SERPL-MCNC: 0.6 MG/DL — LOW (ref 0.7–1.5)
EOSINOPHIL # BLD AUTO: 0.05 K/UL — SIGNIFICANT CHANGE UP (ref 0–0.7)
EOSINOPHIL NFR BLD AUTO: 1.9 % — SIGNIFICANT CHANGE UP (ref 0–8)
GLUCOSE SERPL-MCNC: 99 MG/DL — SIGNIFICANT CHANGE UP (ref 70–99)
HCT VFR BLD CALC: 32.3 % — LOW (ref 37–47)
HGB BLD-MCNC: 10.9 G/DL — LOW (ref 12–16)
IMM GRANULOCYTES NFR BLD AUTO: 0 % — LOW (ref 0.1–0.3)
LYMPHOCYTES # BLD AUTO: 0.79 K/UL — LOW (ref 1.2–3.4)
LYMPHOCYTES # BLD AUTO: 30.7 % — SIGNIFICANT CHANGE UP (ref 20.5–51.1)
MAGNESIUM SERPL-MCNC: 1.8 MG/DL — SIGNIFICANT CHANGE UP (ref 1.8–2.4)
MCHC RBC-ENTMCNC: 33.2 PG — HIGH (ref 27–31)
MCHC RBC-ENTMCNC: 33.7 G/DL — SIGNIFICANT CHANGE UP (ref 32–37)
MCV RBC AUTO: 98.5 FL — SIGNIFICANT CHANGE UP (ref 81–99)
MONOCYTES # BLD AUTO: 0.17 K/UL — SIGNIFICANT CHANGE UP (ref 0.1–0.6)
MONOCYTES NFR BLD AUTO: 6.6 % — SIGNIFICANT CHANGE UP (ref 1.7–9.3)
NEUTROPHILS # BLD AUTO: 1.54 K/UL — SIGNIFICANT CHANGE UP (ref 1.4–6.5)
NEUTROPHILS NFR BLD AUTO: 60 % — SIGNIFICANT CHANGE UP (ref 42.2–75.2)
NRBC # BLD: 0 /100 WBCS — SIGNIFICANT CHANGE UP (ref 0–0)
PLATELET # BLD AUTO: 171 K/UL — SIGNIFICANT CHANGE UP (ref 130–400)
POTASSIUM SERPL-MCNC: 4.8 MMOL/L — SIGNIFICANT CHANGE UP (ref 3.5–5)
POTASSIUM SERPL-SCNC: 4.8 MMOL/L — SIGNIFICANT CHANGE UP (ref 3.5–5)
RBC # BLD: 3.28 M/UL — LOW (ref 4.2–5.4)
RBC # FLD: 11.6 % — SIGNIFICANT CHANGE UP (ref 11.5–14.5)
SODIUM SERPL-SCNC: 140 MMOL/L — SIGNIFICANT CHANGE UP (ref 135–146)
WBC # BLD: 2.57 K/UL — LOW (ref 4.8–10.8)
WBC # FLD AUTO: 2.57 K/UL — LOW (ref 4.8–10.8)

## 2020-02-15 PROCEDURE — 99233 SBSQ HOSP IP/OBS HIGH 50: CPT

## 2020-02-15 RX ORDER — MORPHINE SULFATE 50 MG/1
1 CAPSULE, EXTENDED RELEASE ORAL ONCE
Refills: 0 | Status: DISCONTINUED | OUTPATIENT
Start: 2020-02-15 | End: 2020-02-15

## 2020-02-15 RX ORDER — KETOROLAC TROMETHAMINE 30 MG/ML
15 SYRINGE (ML) INJECTION EVERY 6 HOURS
Refills: 0 | Status: DISCONTINUED | OUTPATIENT
Start: 2020-02-15 | End: 2020-02-16

## 2020-02-15 RX ORDER — DULOXETINE HYDROCHLORIDE 30 MG/1
30 CAPSULE, DELAYED RELEASE ORAL AT BEDTIME
Refills: 0 | Status: DISCONTINUED | OUTPATIENT
Start: 2020-02-15 | End: 2020-02-16

## 2020-02-15 RX ADMIN — LIDOCAINE 1 PATCH: 4 CREAM TOPICAL at 11:59

## 2020-02-15 RX ADMIN — Medication 3 CAPSULE(S): at 07:39

## 2020-02-15 RX ADMIN — MORPHINE SULFATE 1 MILLIGRAM(S): 50 CAPSULE, EXTENDED RELEASE ORAL at 12:01

## 2020-02-15 RX ADMIN — SODIUM CHLORIDE 150 MILLILITER(S): 9 INJECTION, SOLUTION INTRAVENOUS at 13:41

## 2020-02-15 RX ADMIN — Medication 3 CAPSULE(S): at 11:59

## 2020-02-15 RX ADMIN — Medication 100 MILLIGRAM(S): at 11:59

## 2020-02-15 RX ADMIN — Medication 650 MILLIGRAM(S): at 17:58

## 2020-02-15 RX ADMIN — Medication 15 MILLIGRAM(S): at 11:17

## 2020-02-15 RX ADMIN — DULOXETINE HYDROCHLORIDE 30 MILLIGRAM(S): 30 CAPSULE, DELAYED RELEASE ORAL at 21:13

## 2020-02-15 RX ADMIN — Medication 1 TABLET(S): at 11:59

## 2020-02-15 RX ADMIN — ONDANSETRON 4 MILLIGRAM(S): 8 TABLET, FILM COATED ORAL at 07:38

## 2020-02-15 RX ADMIN — Medication 650 MILLIGRAM(S): at 00:30

## 2020-02-15 RX ADMIN — MORPHINE SULFATE 1 MILLIGRAM(S): 50 CAPSULE, EXTENDED RELEASE ORAL at 07:38

## 2020-02-15 RX ADMIN — Medication 650 MILLIGRAM(S): at 12:00

## 2020-02-15 RX ADMIN — Medication 3 CAPSULE(S): at 17:58

## 2020-02-15 RX ADMIN — MORPHINE SULFATE 1 MILLIGRAM(S): 50 CAPSULE, EXTENDED RELEASE ORAL at 00:00

## 2020-02-15 RX ADMIN — MORPHINE SULFATE 1 MILLIGRAM(S): 50 CAPSULE, EXTENDED RELEASE ORAL at 12:30

## 2020-02-15 RX ADMIN — Medication 650 MILLIGRAM(S): at 05:58

## 2020-02-15 RX ADMIN — CYCLOBENZAPRINE HYDROCHLORIDE 5 MILLIGRAM(S): 10 TABLET, FILM COATED ORAL at 13:56

## 2020-02-15 RX ADMIN — MORPHINE SULFATE 1 MILLIGRAM(S): 50 CAPSULE, EXTENDED RELEASE ORAL at 08:09

## 2020-02-15 RX ADMIN — ENOXAPARIN SODIUM 40 MILLIGRAM(S): 100 INJECTION SUBCUTANEOUS at 12:00

## 2020-02-15 RX ADMIN — CYCLOBENZAPRINE HYDROCHLORIDE 5 MILLIGRAM(S): 10 TABLET, FILM COATED ORAL at 05:58

## 2020-02-15 RX ADMIN — Medication 650 MILLIGRAM(S): at 12:30

## 2020-02-15 RX ADMIN — Medication 1 MILLIGRAM(S): at 11:59

## 2020-02-15 RX ADMIN — Medication 15 MILLIGRAM(S): at 17:59

## 2020-02-15 RX ADMIN — Medication 15 MILLIGRAM(S): at 10:47
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
## 2020-02-16 ENCOUNTER — TRANSCRIPTION ENCOUNTER (OUTPATIENT)
Age: 45
End: 2020-02-16

## 2020-02-16 VITALS
SYSTOLIC BLOOD PRESSURE: 142 MMHG | DIASTOLIC BLOOD PRESSURE: 91 MMHG | RESPIRATION RATE: 18 BRPM | HEART RATE: 69 BPM | TEMPERATURE: 98 F

## 2020-02-16 LAB
ANION GAP SERPL CALC-SCNC: 13 MMOL/L — SIGNIFICANT CHANGE UP (ref 7–14)
BASOPHILS # BLD AUTO: 0.01 K/UL — SIGNIFICANT CHANGE UP (ref 0–0.2)
BASOPHILS NFR BLD AUTO: 0.3 % — SIGNIFICANT CHANGE UP (ref 0–1)
BUN SERPL-MCNC: 6 MG/DL — LOW (ref 10–20)
CALCIUM SERPL-MCNC: 9 MG/DL — SIGNIFICANT CHANGE UP (ref 8.5–10.1)
CHLORIDE SERPL-SCNC: 102 MMOL/L — SIGNIFICANT CHANGE UP (ref 98–110)
CO2 SERPL-SCNC: 26 MMOL/L — SIGNIFICANT CHANGE UP (ref 17–32)
CREAT SERPL-MCNC: 0.7 MG/DL — SIGNIFICANT CHANGE UP (ref 0.7–1.5)
EOSINOPHIL # BLD AUTO: 0.05 K/UL — SIGNIFICANT CHANGE UP (ref 0–0.7)
EOSINOPHIL NFR BLD AUTO: 1.6 % — SIGNIFICANT CHANGE UP (ref 0–8)
GLUCOSE SERPL-MCNC: 99 MG/DL — SIGNIFICANT CHANGE UP (ref 70–99)
HCT VFR BLD CALC: 29.7 % — LOW (ref 37–47)
HGB BLD-MCNC: 10.2 G/DL — LOW (ref 12–16)
IMM GRANULOCYTES NFR BLD AUTO: 0.3 % — SIGNIFICANT CHANGE UP (ref 0.1–0.3)
LYMPHOCYTES # BLD AUTO: 0.66 K/UL — LOW (ref 1.2–3.4)
LYMPHOCYTES # BLD AUTO: 20.6 % — SIGNIFICANT CHANGE UP (ref 20.5–51.1)
MAGNESIUM SERPL-MCNC: 1.6 MG/DL — LOW (ref 1.8–2.4)
MCHC RBC-ENTMCNC: 33 PG — HIGH (ref 27–31)
MCHC RBC-ENTMCNC: 34.3 G/DL — SIGNIFICANT CHANGE UP (ref 32–37)
MCV RBC AUTO: 96.1 FL — SIGNIFICANT CHANGE UP (ref 81–99)
MONOCYTES # BLD AUTO: 0.33 K/UL — SIGNIFICANT CHANGE UP (ref 0.1–0.6)
MONOCYTES NFR BLD AUTO: 10.3 % — HIGH (ref 1.7–9.3)
NEUTROPHILS # BLD AUTO: 2.15 K/UL — SIGNIFICANT CHANGE UP (ref 1.4–6.5)
NEUTROPHILS NFR BLD AUTO: 66.9 % — SIGNIFICANT CHANGE UP (ref 42.2–75.2)
NRBC # BLD: 0 /100 WBCS — SIGNIFICANT CHANGE UP (ref 0–0)
PHOSPHATE SERPL-MCNC: 4.4 MG/DL — SIGNIFICANT CHANGE UP (ref 2.1–4.9)
PLATELET # BLD AUTO: 179 K/UL — SIGNIFICANT CHANGE UP (ref 130–400)
POTASSIUM SERPL-MCNC: 3.7 MMOL/L — SIGNIFICANT CHANGE UP (ref 3.5–5)
POTASSIUM SERPL-SCNC: 3.7 MMOL/L — SIGNIFICANT CHANGE UP (ref 3.5–5)
RBC # BLD: 3.09 M/UL — LOW (ref 4.2–5.4)
RBC # FLD: 11.7 % — SIGNIFICANT CHANGE UP (ref 11.5–14.5)
SODIUM SERPL-SCNC: 141 MMOL/L — SIGNIFICANT CHANGE UP (ref 135–146)
WBC # BLD: 3.21 K/UL — LOW (ref 4.8–10.8)
WBC # FLD AUTO: 3.21 K/UL — LOW (ref 4.8–10.8)

## 2020-02-16 PROCEDURE — 99233 SBSQ HOSP IP/OBS HIGH 50: CPT

## 2020-02-16 RX ORDER — CYCLOBENZAPRINE HYDROCHLORIDE 10 MG/1
1 TABLET, FILM COATED ORAL
Qty: 42 | Refills: 0
Start: 2020-02-16 | End: 2020-02-29

## 2020-02-16 RX ORDER — THIAMINE MONONITRATE (VIT B1) 100 MG
1 TABLET ORAL
Qty: 30 | Refills: 0
Start: 2020-02-16 | End: 2020-03-16

## 2020-02-16 RX ORDER — MORPHINE SULFATE 50 MG/1
1 CAPSULE, EXTENDED RELEASE ORAL ONCE
Refills: 0 | Status: DISCONTINUED | OUTPATIENT
Start: 2020-02-16 | End: 2020-02-16

## 2020-02-16 RX ORDER — MULTIVIT-MIN/FERROUS GLUCONATE 9 MG/15 ML
1 LIQUID (ML) ORAL
Qty: 0 | Refills: 0 | DISCHARGE
Start: 2020-02-16

## 2020-02-16 RX ORDER — FOLIC ACID 0.8 MG
1 TABLET ORAL
Qty: 30 | Refills: 0
Start: 2020-02-16 | End: 2020-03-16

## 2020-02-16 RX ORDER — DICLOFENAC SODIUM 75 MG/1
1 TABLET, DELAYED RELEASE ORAL
Qty: 60 | Refills: 0
Start: 2020-02-16 | End: 2020-03-16

## 2020-02-16 RX ORDER — ONDANSETRON 8 MG/1
1 TABLET, FILM COATED ORAL
Qty: 30 | Refills: 0
Start: 2020-02-16 | End: 2020-03-01

## 2020-02-16 RX ORDER — DULOXETINE HYDROCHLORIDE 30 MG/1
1 CAPSULE, DELAYED RELEASE ORAL
Qty: 30 | Refills: 0
Start: 2020-02-16 | End: 2020-03-16

## 2020-02-16 RX ORDER — LIPASE/PROTEASE/AMYLASE 16-48-48K
1 CAPSULE,DELAYED RELEASE (ENTERIC COATED) ORAL
Qty: 90 | Refills: 0
Start: 2020-02-16 | End: 2020-03-16

## 2020-02-16 RX ADMIN — Medication 1 MILLIGRAM(S): at 11:54

## 2020-02-16 RX ADMIN — Medication 650 MILLIGRAM(S): at 00:08

## 2020-02-16 RX ADMIN — Medication 3 CAPSULE(S): at 11:55

## 2020-02-16 RX ADMIN — Medication 100 MILLIGRAM(S): at 11:55

## 2020-02-16 RX ADMIN — Medication 650 MILLIGRAM(S): at 01:30

## 2020-02-16 RX ADMIN — LIDOCAINE 1 PATCH: 4 CREAM TOPICAL at 11:55

## 2020-02-16 RX ADMIN — Medication 650 MILLIGRAM(S): at 11:54

## 2020-02-16 RX ADMIN — MORPHINE SULFATE 1 MILLIGRAM(S): 50 CAPSULE, EXTENDED RELEASE ORAL at 00:45

## 2020-02-16 RX ADMIN — MORPHINE SULFATE 1 MILLIGRAM(S): 50 CAPSULE, EXTENDED RELEASE ORAL at 00:09

## 2020-02-16 RX ADMIN — CYCLOBENZAPRINE HYDROCHLORIDE 5 MILLIGRAM(S): 10 TABLET, FILM COATED ORAL at 14:19

## 2020-02-16 RX ADMIN — ENOXAPARIN SODIUM 40 MILLIGRAM(S): 100 INJECTION SUBCUTANEOUS at 11:55

## 2020-02-16 RX ADMIN — MORPHINE SULFATE 1 MILLIGRAM(S): 50 CAPSULE, EXTENDED RELEASE ORAL at 14:58

## 2020-02-16 RX ADMIN — Medication 650 MILLIGRAM(S): at 06:29

## 2020-02-16 RX ADMIN — Medication 1 TABLET(S): at 11:54

## 2020-02-16 RX ADMIN — CYCLOBENZAPRINE HYDROCHLORIDE 5 MILLIGRAM(S): 10 TABLET, FILM COATED ORAL at 04:13

## 2020-02-16 RX ADMIN — Medication 3 CAPSULE(S): at 08:16

## 2020-02-16 RX ADMIN — Medication 650 MILLIGRAM(S): at 12:24
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
## 2020-02-16 NOTE — DISCHARGE NOTE NURSING/CASE MANAGEMENT/SOCIAL WORK - PATIENT PORTAL LINK FT
You can access the FollowMyHealth Patient Portal offered by [facility] by registering at the following website: [email or web address]. By joining [employer]’s FollowMyHealth portal, you will also be able to view your health information using other applications (apps) compatible with our system.
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
## 2020-02-20 DIAGNOSIS — K85.20 ALCOHOL INDUCED ACUTE PANCREATITIS WITHOUT NECROSIS OR INFECTION: ICD-10-CM

## 2020-02-20 DIAGNOSIS — E51.9 THIAMINE DEFICIENCY, UNSPECIFIED: ICD-10-CM

## 2020-02-20 DIAGNOSIS — K86.0 ALCOHOL-INDUCED CHRONIC PANCREATITIS: ICD-10-CM

## 2020-02-20 DIAGNOSIS — T74.11XA ADULT PHYSICAL ABUSE, CONFIRMED, INITIAL ENCOUNTER: ICD-10-CM

## 2020-02-20 DIAGNOSIS — S30.0XXA CONTUSION OF LOWER BACK AND PELVIS, INITIAL ENCOUNTER: ICD-10-CM

## 2020-02-20 DIAGNOSIS — E53.8 DEFICIENCY OF OTHER SPECIFIED B GROUP VITAMINS: ICD-10-CM

## 2020-02-20 DIAGNOSIS — F10.10 ALCOHOL ABUSE, UNCOMPLICATED: ICD-10-CM

## 2020-02-20 DIAGNOSIS — M51.16 INTERVERTEBRAL DISC DISORDERS WITH RADICULOPATHY, LUMBAR REGION: ICD-10-CM
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
## 2020-03-03 DIAGNOSIS — Z71.89 OTHER SPECIFIED COUNSELING: ICD-10-CM

## 2020-03-13 ENCOUNTER — APPOINTMENT (OUTPATIENT)
Dept: OBGYN | Facility: CLINIC | Age: 45
End: 2020-03-13

## 2020-05-03 ENCOUNTER — INPATIENT (INPATIENT)
Facility: HOSPITAL | Age: 45
LOS: 9 days | Discharge: LEFT BEFORE DISCHARGE | End: 2020-05-13
Attending: INTERNAL MEDICINE | Admitting: INTERNAL MEDICINE
Payer: MEDICAID

## 2020-05-03 VITALS
DIASTOLIC BLOOD PRESSURE: 88 MMHG | OXYGEN SATURATION: 99 % | SYSTOLIC BLOOD PRESSURE: 140 MMHG | HEART RATE: 120 BPM | TEMPERATURE: 98 F | RESPIRATION RATE: 18 BRPM

## 2020-05-03 DIAGNOSIS — Z98.890 OTHER SPECIFIED POSTPROCEDURAL STATES: Chronic | ICD-10-CM

## 2020-05-03 DIAGNOSIS — R74.0 NONSPECIFIC ELEVATION OF LEVELS OF TRANSAMINASE AND LACTIC ACID DEHYDROGENASE [LDH]: ICD-10-CM

## 2020-05-03 LAB
ALBUMIN SERPL ELPH-MCNC: 4.7 G/DL — SIGNIFICANT CHANGE UP (ref 3.5–5.2)
ALP SERPL-CCNC: 180 U/L — HIGH (ref 30–115)
ALT FLD-CCNC: 146 U/L — HIGH (ref 0–41)
ANION GAP SERPL CALC-SCNC: 24 MMOL/L — HIGH (ref 7–14)
APTT BLD: 24.7 SEC — LOW (ref 27–39.2)
AST SERPL-CCNC: 398 U/L — HIGH (ref 0–41)
BASE EXCESS BLDV CALC-SCNC: -2.1 MMOL/L — LOW (ref -2–2)
BASOPHILS # BLD AUTO: 0.07 K/UL — SIGNIFICANT CHANGE UP (ref 0–0.2)
BASOPHILS NFR BLD AUTO: 0.7 % — SIGNIFICANT CHANGE UP (ref 0–1)
BILIRUB SERPL-MCNC: 0.7 MG/DL — SIGNIFICANT CHANGE UP (ref 0.2–1.2)
BUN SERPL-MCNC: 12 MG/DL — SIGNIFICANT CHANGE UP (ref 10–20)
CA-I SERPL-SCNC: 1.01 MMOL/L — LOW (ref 1.12–1.3)
CALCIUM SERPL-MCNC: 8.9 MG/DL — SIGNIFICANT CHANGE UP (ref 8.5–10.1)
CHLORIDE SERPL-SCNC: 94 MMOL/L — LOW (ref 98–110)
CHOLEST SERPL-MCNC: 185 MG/DL — SIGNIFICANT CHANGE UP (ref 100–200)
CO2 SERPL-SCNC: 19 MMOL/L — SIGNIFICANT CHANGE UP (ref 17–32)
CREAT SERPL-MCNC: 0.7 MG/DL — SIGNIFICANT CHANGE UP (ref 0.7–1.5)
EOSINOPHIL # BLD AUTO: 0 K/UL — SIGNIFICANT CHANGE UP (ref 0–0.7)
EOSINOPHIL NFR BLD AUTO: 0 % — SIGNIFICANT CHANGE UP (ref 0–8)
ETHANOL SERPL-MCNC: <10 MG/DL — SIGNIFICANT CHANGE UP
GAS PNL BLDV: 140 MMOL/L — SIGNIFICANT CHANGE UP (ref 136–145)
GAS PNL BLDV: SIGNIFICANT CHANGE UP
GLUCOSE SERPL-MCNC: 154 MG/DL — HIGH (ref 70–99)
HCG SERPL QL: NEGATIVE — SIGNIFICANT CHANGE UP
HCO3 BLDV-SCNC: 23 MMOL/L — SIGNIFICANT CHANGE UP (ref 22–29)
HCT VFR BLD CALC: 39.6 % — SIGNIFICANT CHANGE UP (ref 37–47)
HCT VFR BLDA CALC: 36.2 % — SIGNIFICANT CHANGE UP (ref 34–44)
HDLC SERPL-MCNC: 87 MG/DL — SIGNIFICANT CHANGE UP
HGB BLD CALC-MCNC: 11.8 G/DL — LOW (ref 14–18)
HGB BLD-MCNC: 14.2 G/DL — SIGNIFICANT CHANGE UP (ref 12–16)
IMM GRANULOCYTES NFR BLD AUTO: 0.4 % — HIGH (ref 0.1–0.3)
INR BLD: 1.04 RATIO — SIGNIFICANT CHANGE UP (ref 0.65–1.3)
LACTATE BLDV-MCNC: 1.2 MMOL/L — SIGNIFICANT CHANGE UP (ref 0.5–1.6)
LIDOCAIN IGE QN: >600 U/L — HIGH (ref 7–60)
LIPID PNL WITH DIRECT LDL SERPL: 83 MG/DL — SIGNIFICANT CHANGE UP (ref 4–129)
LYMPHOCYTES # BLD AUTO: 0.63 K/UL — LOW (ref 1.2–3.4)
LYMPHOCYTES # BLD AUTO: 5.9 % — LOW (ref 20.5–51.1)
MCHC RBC-ENTMCNC: 34.4 PG — HIGH (ref 27–31)
MCHC RBC-ENTMCNC: 35.9 G/DL — SIGNIFICANT CHANGE UP (ref 32–37)
MCV RBC AUTO: 95.9 FL — SIGNIFICANT CHANGE UP (ref 81–99)
MONOCYTES # BLD AUTO: 0.42 K/UL — SIGNIFICANT CHANGE UP (ref 0.1–0.6)
MONOCYTES NFR BLD AUTO: 4 % — SIGNIFICANT CHANGE UP (ref 1.7–9.3)
NEUTROPHILS # BLD AUTO: 9.45 K/UL — HIGH (ref 1.4–6.5)
NEUTROPHILS NFR BLD AUTO: 89 % — HIGH (ref 42.2–75.2)
NRBC # BLD: 0 /100 WBCS — SIGNIFICANT CHANGE UP (ref 0–0)
PCO2 BLDV: 40 MMHG — LOW (ref 41–51)
PH BLDV: 7.37 — SIGNIFICANT CHANGE UP (ref 7.26–7.43)
PLATELET # BLD AUTO: 260 K/UL — SIGNIFICANT CHANGE UP (ref 130–400)
PO2 BLDV: 42 MMHG — HIGH (ref 20–40)
POTASSIUM BLDV-SCNC: 3 MMOL/L — LOW (ref 3.3–5.6)
POTASSIUM SERPL-MCNC: 3.7 MMOL/L — SIGNIFICANT CHANGE UP (ref 3.5–5)
POTASSIUM SERPL-SCNC: 3.7 MMOL/L — SIGNIFICANT CHANGE UP (ref 3.5–5)
PROT SERPL-MCNC: 8.1 G/DL — HIGH (ref 6–8)
PROTHROM AB SERPL-ACNC: 12 SEC — SIGNIFICANT CHANGE UP (ref 9.95–12.87)
RBC # BLD: 4.13 M/UL — LOW (ref 4.2–5.4)
RBC # FLD: 12.1 % — SIGNIFICANT CHANGE UP (ref 11.5–14.5)
SAO2 % BLDV: 76 % — SIGNIFICANT CHANGE UP
SODIUM SERPL-SCNC: 137 MMOL/L — SIGNIFICANT CHANGE UP (ref 135–146)
TOTAL CHOLESTEROL/HDL RATIO MEASUREMENT: 2.1 RATIO — LOW (ref 4–5.5)
TRIGL SERPL-MCNC: 122 MG/DL — SIGNIFICANT CHANGE UP (ref 10–149)
TROPONIN T SERPL-MCNC: <0.01 NG/ML — SIGNIFICANT CHANGE UP
WBC # BLD: 10.61 K/UL — SIGNIFICANT CHANGE UP (ref 4.8–10.8)
WBC # FLD AUTO: 10.61 K/UL — SIGNIFICANT CHANGE UP (ref 4.8–10.8)

## 2020-05-03 PROCEDURE — 74177 CT ABD & PELVIS W/CONTRAST: CPT | Mod: 26

## 2020-05-03 PROCEDURE — 71045 X-RAY EXAM CHEST 1 VIEW: CPT | Mod: 26

## 2020-05-03 PROCEDURE — 93010 ELECTROCARDIOGRAM REPORT: CPT

## 2020-05-03 PROCEDURE — 99223 1ST HOSP IP/OBS HIGH 75: CPT | Mod: AI

## 2020-05-03 PROCEDURE — 99285 EMERGENCY DEPT VISIT HI MDM: CPT

## 2020-05-03 PROCEDURE — 76705 ECHO EXAM OF ABDOMEN: CPT | Mod: 26

## 2020-05-03 PROCEDURE — 99223 1ST HOSP IP/OBS HIGH 75: CPT

## 2020-05-03 RX ORDER — ONDANSETRON 8 MG/1
4 TABLET, FILM COATED ORAL THREE TIMES A DAY
Refills: 0 | Status: DISCONTINUED | OUTPATIENT
Start: 2020-05-03 | End: 2020-05-13

## 2020-05-03 RX ORDER — FAMOTIDINE 10 MG/ML
20 INJECTION INTRAVENOUS ONCE
Refills: 0 | Status: COMPLETED | OUTPATIENT
Start: 2020-05-03 | End: 2020-05-03

## 2020-05-03 RX ORDER — FOLIC ACID 0.8 MG
1 TABLET ORAL DAILY
Refills: 0 | Status: DISCONTINUED | OUTPATIENT
Start: 2020-05-03 | End: 2020-05-13

## 2020-05-03 RX ORDER — SODIUM CHLORIDE 9 MG/ML
1000 INJECTION INTRAMUSCULAR; INTRAVENOUS; SUBCUTANEOUS ONCE
Refills: 0 | Status: COMPLETED | OUTPATIENT
Start: 2020-05-03 | End: 2020-05-03

## 2020-05-03 RX ORDER — HYDROMORPHONE HYDROCHLORIDE 2 MG/ML
0.5 INJECTION INTRAMUSCULAR; INTRAVENOUS; SUBCUTANEOUS ONCE
Refills: 0 | Status: DISCONTINUED | OUTPATIENT
Start: 2020-05-03 | End: 2020-05-03

## 2020-05-03 RX ORDER — HEPARIN SODIUM 5000 [USP'U]/ML
5000 INJECTION INTRAVENOUS; SUBCUTANEOUS EVERY 8 HOURS
Refills: 0 | Status: DISCONTINUED | OUTPATIENT
Start: 2020-05-03 | End: 2020-05-06

## 2020-05-03 RX ORDER — PANTOPRAZOLE SODIUM 20 MG/1
40 TABLET, DELAYED RELEASE ORAL DAILY
Refills: 0 | Status: DISCONTINUED | OUTPATIENT
Start: 2020-05-03 | End: 2020-05-05

## 2020-05-03 RX ORDER — MORPHINE SULFATE 50 MG/1
4 CAPSULE, EXTENDED RELEASE ORAL ONCE
Refills: 0 | Status: DISCONTINUED | OUTPATIENT
Start: 2020-05-03 | End: 2020-05-03

## 2020-05-03 RX ORDER — KETOROLAC TROMETHAMINE 30 MG/ML
30 SYRINGE (ML) INJECTION ONCE
Refills: 0 | Status: DISCONTINUED | OUTPATIENT
Start: 2020-05-03 | End: 2020-05-03

## 2020-05-03 RX ORDER — THIAMINE MONONITRATE (VIT B1) 100 MG
100 TABLET ORAL DAILY
Refills: 0 | Status: DISCONTINUED | OUTPATIENT
Start: 2020-05-03 | End: 2020-05-13

## 2020-05-03 RX ORDER — ONDANSETRON 8 MG/1
4 TABLET, FILM COATED ORAL ONCE
Refills: 0 | Status: COMPLETED | OUTPATIENT
Start: 2020-05-03 | End: 2020-05-03

## 2020-05-03 RX ORDER — HYDROMORPHONE HYDROCHLORIDE 2 MG/ML
2 INJECTION INTRAMUSCULAR; INTRAVENOUS; SUBCUTANEOUS
Refills: 0 | Status: DISCONTINUED | OUTPATIENT
Start: 2020-05-03 | End: 2020-05-04

## 2020-05-03 RX ORDER — SODIUM CHLORIDE 9 MG/ML
1000 INJECTION, SOLUTION INTRAVENOUS
Refills: 0 | Status: DISCONTINUED | OUTPATIENT
Start: 2020-05-03 | End: 2020-05-09

## 2020-05-03 RX ORDER — POTASSIUM CHLORIDE 20 MEQ
20 PACKET (EA) ORAL ONCE
Refills: 0 | Status: COMPLETED | OUTPATIENT
Start: 2020-05-03 | End: 2020-05-03

## 2020-05-03 RX ADMIN — SODIUM CHLORIDE 250 MILLILITER(S): 9 INJECTION, SOLUTION INTRAVENOUS at 20:28

## 2020-05-03 RX ADMIN — HEPARIN SODIUM 5000 UNIT(S): 5000 INJECTION INTRAVENOUS; SUBCUTANEOUS at 17:50

## 2020-05-03 RX ADMIN — HYDROMORPHONE HYDROCHLORIDE 2 MILLIGRAM(S): 2 INJECTION INTRAMUSCULAR; INTRAVENOUS; SUBCUTANEOUS at 17:47

## 2020-05-03 RX ADMIN — ONDANSETRON 4 MILLIGRAM(S): 8 TABLET, FILM COATED ORAL at 07:48

## 2020-05-03 RX ADMIN — Medication 100 MILLIGRAM(S): at 17:51

## 2020-05-03 RX ADMIN — Medication 30 MILLIGRAM(S): at 08:35

## 2020-05-03 RX ADMIN — HYDROMORPHONE HYDROCHLORIDE 0.5 MILLIGRAM(S): 2 INJECTION INTRAMUSCULAR; INTRAVENOUS; SUBCUTANEOUS at 11:20

## 2020-05-03 RX ADMIN — PANTOPRAZOLE SODIUM 40 MILLIGRAM(S): 20 TABLET, DELAYED RELEASE ORAL at 17:52

## 2020-05-03 RX ADMIN — Medication 50 MILLIEQUIVALENT(S): at 18:04

## 2020-05-03 RX ADMIN — SODIUM CHLORIDE 1000 MILLILITER(S): 9 INJECTION INTRAMUSCULAR; INTRAVENOUS; SUBCUTANEOUS at 09:09

## 2020-05-03 RX ADMIN — HEPARIN SODIUM 5000 UNIT(S): 5000 INJECTION INTRAVENOUS; SUBCUTANEOUS at 23:31

## 2020-05-03 RX ADMIN — FAMOTIDINE 20 MILLIGRAM(S): 10 INJECTION INTRAVENOUS at 07:48

## 2020-05-03 RX ADMIN — MORPHINE SULFATE 4 MILLIGRAM(S): 50 CAPSULE, EXTENDED RELEASE ORAL at 07:48

## 2020-05-03 RX ADMIN — Medication 2 MILLIGRAM(S): at 17:55

## 2020-05-03 RX ADMIN — ONDANSETRON 4 MILLIGRAM(S): 8 TABLET, FILM COATED ORAL at 17:24

## 2020-05-03 RX ADMIN — Medication 1 MILLIGRAM(S): at 17:51

## 2020-05-03 RX ADMIN — HYDROMORPHONE HYDROCHLORIDE 0.5 MILLIGRAM(S): 2 INJECTION INTRAMUSCULAR; INTRAVENOUS; SUBCUTANEOUS at 09:10

## 2020-05-03 RX ADMIN — SODIUM CHLORIDE 1000 MILLILITER(S): 9 INJECTION INTRAMUSCULAR; INTRAVENOUS; SUBCUTANEOUS at 07:48

## 2020-05-03 RX ADMIN — SODIUM CHLORIDE 250 MILLILITER(S): 9 INJECTION, SOLUTION INTRAVENOUS at 11:17

## 2020-05-03 RX ADMIN — HYDROMORPHONE HYDROCHLORIDE 2 MILLIGRAM(S): 2 INJECTION INTRAMUSCULAR; INTRAVENOUS; SUBCUTANEOUS at 23:40

## 2020-05-03 RX ADMIN — Medication 2 MILLIGRAM(S): at 12:20

## 2020-05-03 NOTE — ED PROVIDER NOTE - CLINICAL SUMMARY MEDICAL DECISION MAKING FREE TEXT BOX
pt found to have pancreatitis w ?abscess (on prelim read), no gallstones, transaminitis - likely ETOH related AST>SLT, mild ascites, pt npo, fluid resucitated, pain controlled, admit to medicine will need GI/IR
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
## 2020-05-03 NOTE — ED PROVIDER NOTE - PHYSICAL EXAMINATION
VITAL SIGNS: AFebrile, vital signs stable  CONSTITUTIONAL: Well-developed; well-nourished; in no acute distress.  SKIN: Skin exam is warm and dry, no acute rash.  HEAD: Normocephalic; atraumatic.  EYES: Pupils equal round reactive to light, Extraocular movements intact; conjunctiva and sclera clear.  ENT: No nasal discharge; airway clear. Moist mucus membranes.  NECK: Supple; non tender. No rigidity  CARD: Regular rate and rhythm. Normal S1, S2; no murmurs, gallops, or rubs.  RESP: Lungs clear to auscultation bilaterally. No wheezes, rales or rhonchi.  ABD: Abdomen soft; mild epigastric and luq ttp, no rebound or rigidity; non-distended;  no hepatosplenomegaly. No costovertebral angle tenderness.   EXT: Normal ROM. No clubbing, cyanosis or edema. No calf tenderness to palpation.  NEURO: Alert and oriented x 3. No focal deficits.  PSYCH: Cooperative, appropriate. VITAL SIGNS: AFebrile, vital signs stable  CONSTITUTIONAL: Well-developed; well-nourished; in no acute distress.  SKIN: Skin exam is warm and dry, no acute rash.  HEAD: Normocephalic; atraumatic.  EYES: Pupils equal round reactive to light, Extraocular movements intact; conjunctiva and sclera clear.  ENT: No nasal discharge; airway clear. Moist mucus membranes.  NECK: Supple; non tender. No rigidity  CARD: Regular rate and rhythm. Normal S1, S2; no murmurs, gallops, or rubs.  RESP: Lungs clear to auscultation bilaterally. No wheezes, rales or rhonchi.  ABD: Abdomen soft; mild epigastric and luq ttp, no rebound or rigidity; non-distended;  no hepatosplenomegaly. No costovertebral angle tenderness.   EXT: Normal ROM. No clubbing, cyanosis or edema. No calf tenderness to palpation.  NEURO: Alert and oriented x 3. No focal deficits. no tremors or tongue fasciculation   PSYCH: Cooperative, appropriate.

## 2020-05-03 NOTE — CONSULT NOTE ADULT - ASSESSMENT
This patient is 45 y/o female with past medical history of Chronic pancreatitis, alcohol abuse, alcoholic hepatic steatosis. She is here with epigastric pain that started 12 hours back associated with non bilious and non bloody vomiting. Positive history of alcohol use . Labs positive for Lipase > 600. CT showing acute on chronic pancreatitis with a 2.8 x 3.1 x 4.0 rim-enhancing fluid collection in the region of the pancreatic tail. Heterogeneous low attenuation with the body of the pancreas and early necrosis is not excluded. US : no cholelithiasis , CBD 6 mm , hepatic steatosis. LFTs with alcoholic hepatitis pattern.       #recurrent acute on chronic pancreatitis / pancreatic tail pseudocyst ( less likely abscess in absence of fever or leucocytosis)   likely secondary to alcohol intake , no cholelithiases on US   Triglyceride normal IgG Subsets   Normal IGG 4 level last admission   HD stable     REC:   NPO   IV hydration .  cc/ hr   pain control   trend BUN/ HCT     #transaminitis secondary to alcoholic hepatitis  Maddrey score <32   no indication for steroid    REC:   alcohol abstinence        #alcohol abuse :     REC:   watch for withdrawal , check CIWA score   thiamine and folate

## 2020-05-03 NOTE — CONSULT NOTE ADULT - SUBJECTIVE AND OBJECTIVE BOX
Gastroenterology Consultation:    Patient is a 44y old  Female who presents with a chief complaint of Epigastric pain (03 May 2020 11:03)      Admitted on: 05-03-20  HPI:  This patient is 45 y/o female with past medical history of Chronic pancreatitis, alcohol abuse, alcoholic hepatic steatosis. She is here with chief complaint of epigastric pain that started 12 hours back. As per patient pain is sharp, is 10/10 in intensity, radiating to back, associated with non bilious and non bloody vomiting. Patient had 2-3 glasses of Vodka on Thursday and Friday night after she had a fight with her boy friend. She has no other complaints. (03 May 2020 11:03)      Prior records Reviewed (Y/N): Y  History obtained from person other than patient (Y/N): Y         PAST MEDICAL & SURGICAL HISTORY:  AA (alcohol abuse)  Chronic pancreatitis  Pancreatitis  S/P arthroscopy: meniscal repair      FAMILY HISTORY:  Family history of cholecystectomy: many female members in the family  FH: pancreatic cancer: cousin  Family history of hypertension: both father and mother      Social History:  Alcohol: alcohol abuse   Drugs: denies     Home Medications:  Multiple Vitamins with Minerals oral tablet: 1 tab(s) orally once a day (16 Feb 2020 13:00)    MEDICATIONS  (STANDING):  folic acid 1 milliGRAM(s) Oral daily  heparin   Injectable 5000 Unit(s) SubCutaneous every 8 hours  lactated ringers. 1000 milliLiter(s) (250 mL/Hr) IV Continuous <Continuous>  LORazepam   Injectable   IV Push   LORazepam   Injectable 2 milliGRAM(s) IV Push every 4 hours  pantoprazole  Injectable 40 milliGRAM(s) IV Push daily  potassium chloride  20 mEq/100 mL IVPB 20 milliEquivalent(s) IV Intermittent once  thiamine 100 milliGRAM(s) Oral daily    MEDICATIONS  (PRN):  HYDROmorphone  Injectable 2 milliGRAM(s) IV Push four times a day PRN Moderate Pain (4 - 6)  ondansetron Injectable 4 milliGRAM(s) IV Push three times a day PRN Nausea and/or Vomiting      Allergies  Compazine (Unknown)      Review of Systems:   Constitutional:  No Fever, No Chills  ENT/Mouth:  No Hearing Changes,  No Difficulty Swallowing  Eyes:  No Eye Pain, No Vision Changes  Cardiovascular:  No Chest Pain, No Palpitations  Respiratory:  No Cough, No Dyspnea  Gastrointestinal:  As described in HPI  Musculoskeletal:  No Joint Swelling, No Back Pain  Skin:  No Skin Lesions, No Jaundice  Neuro:  No Syncope, No Dizziness  Heme/Lymph:  No Bruising, No Bleeding.          Physical Examination:  T(C): 36.8 (05-03-20 @ 11:30), Max: 36.8 (05-03-20 @ 11:30)  HR: 70 (05-03-20 @ 11:30) (70 - 120)  BP: 110/54 (05-03-20 @ 11:30) (110/54 - 140/88)  RR: 18 (05-03-20 @ 11:30) (18 - 18)  SpO2: 99% (05-03-20 @ 11:30) (99% - 99%)        Constitutional: No acute distress.  Eyes:. Conjunctivae are clear, Sclera is non-icteric.  Ears Nose and Throat: The external ears are normal appearing,  Oral mucosa is pink and moist.  Respiratory:  No signs of respiratory distress. Lung sounds are clear bilaterally.  Cardiovascular:  S1 S2, Regular rate and rhythm.  GI: Abdomen is soft, symmetric, and non-tender without distention. Bowel sounds are present and normoactive in all four quadrants. No masses, hepatomegaly, or splenomegaly are noted.   Neuro: No Tremor, No involuntary movements  Skin: No rashes, No Jaundice.          Data: (reviewed by attending)                        14.2   10.61 )-----------( 260      ( 03 May 2020 07:35 )             39.6     Hgb Trend:  14.2  05-03-20 @ 07:35      05-03    137  |  94<L>  |  12  ----------------------------<  154<H>  3.7   |  19  |  0.7    Ca    8.9      03 May 2020 07:35    TPro  8.1<H>  /  Alb  4.7  /  TBili  0.7  /  DBili  x   /  AST  398<H>  /  ALT  146<H>  /  AlkPhos  180<H>  05-03    Liver panel trend:  TBili 0.7   /      /      /   AlkP 180   /   Tptn 8.1   /   Alb 4.7    /   DBili --      05-03      PT/INR - ( 03 May 2020 07:35 )   PT: 12.00 sec;   INR: 1.04 ratio         PTT - ( 03 May 2020 07:35 )  PTT:24.7 sec        Radiology:(reviewed by attending)  CT Abdomen and Pelvis w/ IV Cont:   EXAM:  CT ABDOMEN AND PELVIS IC            PROCEDURE DATE:  05/03/2020            INTERPRETATION:  CLINICAL HISTORY: Abdominal pain.    TECHNIQUE: Contiguous axial CT images were obtained from the lower chest to the pubic symphysis following administration of Optiray intravenous contrast. Oral contrast was not administered. Reformatted images in the coronal and sagittal planes were acquired.    COMPARISON: CT abdomen dated 2/12/2020.      FINDINGS:    LOWER CHEST: Atelectasis..    HEPATOBILIARY: Hepatic steatosis. The gallbladder is present. There is a small amount of perihepatic ascites.    SPLEEN: Unremarkable..    PANCREAS: Diffuse parenchymal hypoattenuation and peripancreatic fluid. Scattered coarse pancreatic calcifications. There is a 2.8 x 3.1 x 4.0 rim-enhancing collection in the region of the pancreatic tail which borders the posterior gastric wall. Heterogeneous low-attenuation within the body the pancreas in image 31 of series 3 is noted. Early necrosis cannot be excluded. No splenic vein thrombosis..    ADRENAL GLANDS: Unremarkable..    KIDNEYS: No hydronephrosis bilaterally. Symmetric nephrograms. Subcentimeter hypodensities are too small to characterize..    ABDOMINOPELVIC NODES: No lymphadenopathy..    PELVIC ORGANS:Unremarkable..    PERITONEUM/MESENTERY/BOWEL: Small abdominopelvic ascites. No pneumoperitoneum. No evidence of bowel obstruction. Normal appendix. There is gastric wall thickening, likely secondary to the acute pancreatitis.    BONES/SOFT TISSUES: Degenerative change      IMPRESSION:    Acute on chronic pancreatitis with a 2.8 x 3.1 x 4.0 rim-enhancing fluid collection in the region of the pancreatic tail which borders the posterior gastric wall.     Collection may represent pseudocyst. Abscess is not excluded.    Heterogeneous low attenuation with the body of the pancreas and early necrosis is not excluded.              CITLALY MARTINEZ M.D., RESIDENT RADIOLOGIST  This document has been electronically signed.  RAMY RAMSAY M.D., ATTENDING RADIOLOGIST  This document has been electronically signed. May  3 2020 11:01AM             (05-03-20 @ 10:24)    US Abdomen Limited:   EXAM:  US ABDOMEN LIMITED            PROCEDURE DATE:  05/03/2020            INTERPRETATION:  CLINICAL HISTORY: Abdominal pain    COMPARISON: Abdominal ultrasound dated 8/19/2019.    PROCEDURE: Sonographic examination of the right upper quadrant wasperformed.    FINDINGS:     LIVER: Normal contour, but is echogenic    GALLBLADDER/BILIARY TREE: No cholelithiasis. No gallbladder wall thickening, pericholecystic fluid or sonographic Louie's sign. No intrahepatic biliary ductal dilatation. The common bile duct measures 6 mm, which is normal    PANCREAS: The pancreatic head and neck are hypoechoic with a small amount of peripancreatic fluid. The body and tail are obscured.    KIDNEYS: The right kidney measures 9.6 cm in length. No hydronephrosis.    ASCITES: Small perihepatic ascites      IMPRESSION:    Findings compatible with pancreatitis. Small ascites. No cholelithiasis.    Echogenic liver consistent with fatty infiltration              CITLALY MARTINEZ M.D., RESIDENT RADIOLOGIST  This document has been electronically signed.  RAMY RAMSAY M.D., ATTENDING RADIOLOGIST  This document has been electronically signed. May  3 2020 11:03AM             (05-03-20 @ 10:12)

## 2020-05-03 NOTE — ED PROVIDER NOTE - OBJECTIVE STATEMENT
44F PMH ETOH pancreatitis, p/w abd pain x 12 hours. epigastric/luq sharp constant radiates to back and diffuse abdomen and chest. feels exactly like prior pancreatitis. admits to recent ETOH use, last drink 24 hrs ago but drank approx 4 drinks per day x several days. assoc w 10 episodes nbnb emesis and abd bloating. no d/c. no dysuria, freq, hematuria. lmp 1 week ago. no prior abd surgeries. 8/2019 normal RUQ sono and triglycerides. never smoker. no drug use. no fever, cough. no sob. no trauma. no tearing back pain. no le edema, le pain, immobilization, hormones, hemoptysis. PMD Papito. Doesn't remember her GI but states he is here. no recent travel, sick contacts. 44F PMH ETOH pancreatitis, p/w abd pain x 12 hours. epigastric/luq sharp constant radiates to back and diffuse abdomen and chest. feels exactly like prior pancreatitis but this is the worst per pt. admits to recent ETOH use, last drink 24 hrs ago but drank approx 4 drinks per day x several days. assoc w 10 episodes nbnb emesis and abd bloating. no d/c. no dysuria, freq, hematuria. lmp 1 week ago. no prior abd surgeries. 8/2019 normal RUQ sono and triglycerides. never smoker. no drug use. no fever, cough. no sob. no trauma. no tearing back pain. no le edema, le pain, immobilization, hormones, hemoptysis. PMD Papito. Doesn't remember her GI but states he is here. no recent travel, sick contacts.

## 2020-05-03 NOTE — ED PROVIDER NOTE - CARE PLAN
Assessment and plan of treatment:	a/p: abd pain, will do labs, ekg, cxr, ct a/p, ua/upreg, ivf, pain control, antiemetics, re-eval. npo. Principal Discharge DX:	Pancreatitis  Assessment and plan of treatment:	a/p: abd pain, will do labs, ekg, cxr, ct a/p, ua/upreg, ivf, pain control, antiemetics, re-eval. npo.  Secondary Diagnosis:	Pancreatic abscess Principal Discharge DX:	Pancreatitis  Assessment and plan of treatment:	a/p: abd pain, will do labs, ekg, cxr, ct a/p, ua/upreg, ivf, pain control, antiemetics, re-eval. npo.  Secondary Diagnosis:	Pancreatic abscess  Secondary Diagnosis:	Transaminitis Principal Discharge DX:	Pancreatitis  Assessment and plan of treatment:	a/p: abd pain, will do labs, ekg, cxr, ct a/p, ua/upreg, ivf, pain control, antiemetics, re-eval. npo. concern for pancreatitis. no sign of ETOH w/d at this time  Secondary Diagnosis:	Pancreatic abscess  Secondary Diagnosis:	Transaminitis

## 2020-05-03 NOTE — ED PROVIDER NOTE - NS ED ROS FT
Review of Systems:  •	CONSTITUTIONAL - No fever, No diaphoresis, No weight change  •	SKIN - No rash  •	HEMATOLOGIC - No abnormal bleeding or bruising  •	EYES - No eye pain, No blurred vision  •	ENT - No change in hearing, No sore throat, No neck pain, No rhinorrhea, No ear pain  •	RESPIRATORY - No shortness of breath, No cough  •	CARDIAC -+ chest pain, No palpitations  •	GI - +abdominal pain,+ nausea+ vomiting, No diarrhea, No constipation, No bright red blood per rectum or melena. No flank pain  •                 - No dysuria, frequency, hematuria.   •	ENDO - No polydypsia, No polyuria, No heat/cold intolerance  •	MUSCULOSKELETAL - No joint paint, No swelling, No back pain  •	NEUROLOGIC - No numbness, No focal weakness, No headache, No dizziness  All other systems negative, unless specified in HPI

## 2020-05-03 NOTE — H&P ADULT - ASSESSMENT
Assessment and plan     Acute on chronic pancreatitis  - Elevated lipase with consistent CT findings- also shows possible necrosis and possible pseudocyst vs abscess   - Hemodynamically stable without evidence of any end organ damage   - known chronic pancreatitis secondary to alcohol abuse  - Lr at 250cc/hour for now  - F/U TG levels, No evidence of cholelithiasis   - No evidence of alcoholic hepatitis or acute liver failure- normal bilirubin, normal INR. Though Patient does have underlying alcoholic steatosis   - Monitor BUN and Hematocrit and liver enzymes   - NPO for now    - Pain control by PRN Dilaudid   - Ondansetron for nausea/vomiting   - GI consulted     Suspected Folic acid and Thiamine deficiency   Continue supplementation      Monitor electrolytes and correct if needed     Monitor for signs of alcohol withdrawal- CIWA taper protocol      and Detox consulted     DVT prophylaxis   GI prophylaxis

## 2020-05-03 NOTE — H&P ADULT - NSHPPHYSICALEXAM_GEN_ALL_CORE
ICU Vital Signs Last 24 Hrs  T(C): 36.7 (03 May 2020 07:20), Max: 36.7 (03 May 2020 07:20)  T(F): 98 (03 May 2020 07:20), Max: 98 (03 May 2020 07:20)  HR: 95 (03 May 2020 08:44) (95 - 120)  BP: 140/88 (03 May 2020 07:20) (140/88 - 140/88)  RR: 18 (03 May 2020 07:20) (18 - 18)  SpO2: 99% (03 May 2020 07:20) (99% - 99%)

## 2020-05-03 NOTE — H&P ADULT - ATTENDING COMMENTS
A 44 female with PMH of Chronic pancreatitis, alcohol abuse came to ED for severe epigastric pain started last night severe sharp 10/10 radiates to the back and lower abdomen. She also reports nausea, vomiting, non-bloody, no diarrhea, no fever, she also reports feeling of chest pain this morning which got better now. She drinks alcohol last drink 2 days ago 3 glass of Vodka. In the ED /88, , RR 18, SpO2 99%, labs showed Lipase>600, LFTs elevated     PHYSICAL EXAM:  GENERAL: NAD, well-developed  HEAD:  Atraumatic, Normocephalic  EYES: EOMI, PERRLA, conjunctiva and sclera clear  NECK: Supple, No JVD  CHEST/LUNG: Clear to auscultation bilaterally; No wheeze  HEART: Regular rate and rhythm; No murmurs, rubs, or gallops  ABDOMEN: epigastric tenderness with mild guarding.   EXTREMITIES:  2+ Peripheral Pulses, No clubbing, cyanosis, or edema  PSYCH: AAOx3  NEUROLOGY: non-focal  SKIN: No rashes or lesions    A/P:   Acute on chronic pancreatitis: possible pseudocyst.   Likely from alcohol.   Abdomen CT showed Acute on chronic pancreatitis with a 2.8 x 3.1 x 4.0 rim-enhancing fluid collection in pancreatic tail may represent pseudocyst. Abscess is not excluded. Heterogeneous low attenuation with the body of the pancreas and early necrosis is not excluded.    NPO, IV RL 125cc/hr, pain control, GI consult. Surgery consult.   Pseudocyst on CT abdomen, less likely abscess, no fever or leukocytosis, will start antibiotics if developed fever.     Alcoholic hepatitis:   Elevated LFTs, alcoholic pattern. Monitor for now.    Suspected Folic acid and Thiamine deficiency   Continue supplementation     Chronic alcoholism:   Monitor for alcohol withdrawal, ativan prn,

## 2020-05-03 NOTE — H&P ADULT - NSHPLABSRESULTS_GEN_ALL_CORE
Assessment and plan     Acute on chronic pancreatitis complicated by pancreatic abscess   - Elevated lipase with consistent CT findings   - Hemodynamically stable without evidence of any end organ damage   - known chronic pancreatitis secondary to alcohol abuse  - Lr at 250cc/hour for now  - F/U TG levels, No evidence of cholelithiasis   - No evidence of alcoholic hepatitis or acute liver failure- normal bilirubin, normal INR. Though Patient does have underlying alcoholic steatosis   - Monitor BUN and Hematocrit and liver enzymes   - NPO for now    - Pain control by PRN Dilaudid   - Ondansetron for nausea/vomiting   - GI consulted     Suspected Folic acid and Thiamine deficiency   Continue supplementation      Monitor electrolytes and correct if needed     Monitor for signs of alcohol withdrawal- CIWA monitoring     DVT prophylaxis   GI prophylaxis 14.2   10.61 )-----------( 260      ( 03 May 2020 07:35 )             39.6     05-03    137  |  94<L>  |  12  ----------------------------<  154<H>  3.7   |  19  |  0.7    Ca    8.9      03 May 2020 07:35    TPro  8.1<H>  /  Alb  4.7  /  TBili  0.7  /  DBili  x   /  AST  398<H>  /  ALT  146<H>  /  AlkPhos  180<H>  05-03      < from: CT Abdomen and Pelvis w/ IV Cont (05.03.20 @ 10:24) >    MPRESSION:    Acute on chronic pancreatitis with a 2.8 x 3.1 x 4.0 rim-enhancing fluid collection in the region of the pancreatic tail which borders the posterior gastric wall.     Collection may represent pseudocyst. Abscess is not excluded.    Heterogeneous low attenuation with the body of the pancreas and early necrosis is not excluded.    < end of copied text >

## 2020-05-03 NOTE — ED PROVIDER NOTE - PLAN OF CARE
a/p: abd pain, will do labs, ekg, cxr, ct a/p, ua/upreg, ivf, pain control, antiemetics, re-eval. npo. a/p: abd pain, will do labs, ekg, cxr, ct a/p, ua/upreg, ivf, pain control, antiemetics, re-eval. npo. concern for pancreatitis. no sign of ETOH w/d at this time

## 2020-05-04 LAB
ALBUMIN SERPL ELPH-MCNC: 3.5 G/DL — SIGNIFICANT CHANGE UP (ref 3.5–5.2)
ALBUMIN SERPL ELPH-MCNC: 3.5 G/DL — SIGNIFICANT CHANGE UP (ref 3.5–5.2)
ALP SERPL-CCNC: 122 U/L — HIGH (ref 30–115)
ALP SERPL-CCNC: 132 U/L — HIGH (ref 30–115)
ALT FLD-CCNC: 72 U/L — HIGH (ref 0–41)
ALT FLD-CCNC: 81 U/L — HIGH (ref 0–41)
ANION GAP SERPL CALC-SCNC: 12 MMOL/L — SIGNIFICANT CHANGE UP (ref 7–14)
ANION GAP SERPL CALC-SCNC: 12 MMOL/L — SIGNIFICANT CHANGE UP (ref 7–14)
AST SERPL-CCNC: 197 U/L — HIGH (ref 0–41)
AST SERPL-CCNC: 221 U/L — HIGH (ref 0–41)
BASOPHILS # BLD AUTO: 0.02 K/UL — SIGNIFICANT CHANGE UP (ref 0–0.2)
BASOPHILS NFR BLD AUTO: 0.5 % — SIGNIFICANT CHANGE UP (ref 0–1)
BILIRUB DIRECT SERPL-MCNC: 0.4 MG/DL — HIGH (ref 0–0.2)
BILIRUB INDIRECT FLD-MCNC: 0.6 MG/DL — SIGNIFICANT CHANGE UP (ref 0.2–1.2)
BILIRUB SERPL-MCNC: 1 MG/DL — SIGNIFICANT CHANGE UP (ref 0.2–1.2)
BILIRUB SERPL-MCNC: 1.1 MG/DL — SIGNIFICANT CHANGE UP (ref 0.2–1.2)
BUN SERPL-MCNC: 5 MG/DL — LOW (ref 10–20)
BUN SERPL-MCNC: 9 MG/DL — LOW (ref 10–20)
CALCIUM SERPL-MCNC: 7.6 MG/DL — LOW (ref 8.5–10.1)
CALCIUM SERPL-MCNC: 7.8 MG/DL — LOW (ref 8.5–10.1)
CHLORIDE SERPL-SCNC: 102 MMOL/L — SIGNIFICANT CHANGE UP (ref 98–110)
CHLORIDE SERPL-SCNC: 98 MMOL/L — SIGNIFICANT CHANGE UP (ref 98–110)
CO2 SERPL-SCNC: 25 MMOL/L — SIGNIFICANT CHANGE UP (ref 17–32)
CO2 SERPL-SCNC: 25 MMOL/L — SIGNIFICANT CHANGE UP (ref 17–32)
CREAT SERPL-MCNC: 0.5 MG/DL — LOW (ref 0.7–1.5)
CREAT SERPL-MCNC: 0.5 MG/DL — LOW (ref 0.7–1.5)
EOSINOPHIL # BLD AUTO: 0.02 K/UL — SIGNIFICANT CHANGE UP (ref 0–0.7)
EOSINOPHIL NFR BLD AUTO: 0.5 % — SIGNIFICANT CHANGE UP (ref 0–8)
GLUCOSE SERPL-MCNC: 100 MG/DL — HIGH (ref 70–99)
GLUCOSE SERPL-MCNC: 94 MG/DL — SIGNIFICANT CHANGE UP (ref 70–99)
HCT VFR BLD CALC: 33.3 % — LOW (ref 37–47)
HGB BLD-MCNC: 11.3 G/DL — LOW (ref 12–16)
IMM GRANULOCYTES NFR BLD AUTO: 0.2 % — SIGNIFICANT CHANGE UP (ref 0.1–0.3)
LYMPHOCYTES # BLD AUTO: 0.63 K/UL — LOW (ref 1.2–3.4)
LYMPHOCYTES # BLD AUTO: 14.3 % — LOW (ref 20.5–51.1)
MAGNESIUM SERPL-MCNC: 1 MG/DL — LOW (ref 1.8–2.4)
MAGNESIUM SERPL-MCNC: 1.9 MG/DL — SIGNIFICANT CHANGE UP (ref 1.8–2.4)
MCHC RBC-ENTMCNC: 33.5 PG — HIGH (ref 27–31)
MCHC RBC-ENTMCNC: 33.9 G/DL — SIGNIFICANT CHANGE UP (ref 32–37)
MCV RBC AUTO: 98.8 FL — SIGNIFICANT CHANGE UP (ref 81–99)
MONOCYTES # BLD AUTO: 0.38 K/UL — SIGNIFICANT CHANGE UP (ref 0.1–0.6)
MONOCYTES NFR BLD AUTO: 8.6 % — SIGNIFICANT CHANGE UP (ref 1.7–9.3)
NEUTROPHILS # BLD AUTO: 3.36 K/UL — SIGNIFICANT CHANGE UP (ref 1.4–6.5)
NEUTROPHILS NFR BLD AUTO: 75.9 % — HIGH (ref 42.2–75.2)
NRBC # BLD: 0 /100 WBCS — SIGNIFICANT CHANGE UP (ref 0–0)
PLATELET # BLD AUTO: 188 K/UL — SIGNIFICANT CHANGE UP (ref 130–400)
POTASSIUM SERPL-MCNC: 3.1 MMOL/L — LOW (ref 3.5–5)
POTASSIUM SERPL-MCNC: 3.2 MMOL/L — LOW (ref 3.5–5)
POTASSIUM SERPL-SCNC: 3.1 MMOL/L — LOW (ref 3.5–5)
POTASSIUM SERPL-SCNC: 3.2 MMOL/L — LOW (ref 3.5–5)
PROT SERPL-MCNC: 6 G/DL — SIGNIFICANT CHANGE UP (ref 6–8)
PROT SERPL-MCNC: 6 G/DL — SIGNIFICANT CHANGE UP (ref 6–8)
RBC # BLD: 3.37 M/UL — LOW (ref 4.2–5.4)
RBC # FLD: 12.2 % — SIGNIFICANT CHANGE UP (ref 11.5–14.5)
SODIUM SERPL-SCNC: 135 MMOL/L — SIGNIFICANT CHANGE UP (ref 135–146)
SODIUM SERPL-SCNC: 139 MMOL/L — SIGNIFICANT CHANGE UP (ref 135–146)
WBC # BLD: 4.42 K/UL — LOW (ref 4.8–10.8)
WBC # FLD AUTO: 4.42 K/UL — LOW (ref 4.8–10.8)

## 2020-05-04 PROCEDURE — 71045 X-RAY EXAM CHEST 1 VIEW: CPT | Mod: 26

## 2020-05-04 PROCEDURE — 99233 SBSQ HOSP IP/OBS HIGH 50: CPT

## 2020-05-04 RX ORDER — MAGNESIUM SULFATE 500 MG/ML
2 VIAL (ML) INJECTION EVERY 4 HOURS
Refills: 0 | Status: COMPLETED | OUTPATIENT
Start: 2020-05-04 | End: 2020-05-04

## 2020-05-04 RX ORDER — HYDROMORPHONE HYDROCHLORIDE 2 MG/ML
2 INJECTION INTRAMUSCULAR; INTRAVENOUS; SUBCUTANEOUS EVERY 6 HOURS
Refills: 0 | Status: DISCONTINUED | OUTPATIENT
Start: 2020-05-04 | End: 2020-05-04

## 2020-05-04 RX ORDER — POTASSIUM CHLORIDE 20 MEQ
20 PACKET (EA) ORAL
Refills: 0 | Status: DISCONTINUED | OUTPATIENT
Start: 2020-05-04 | End: 2020-05-05

## 2020-05-04 RX ORDER — HYDROMORPHONE HYDROCHLORIDE 2 MG/ML
2 INJECTION INTRAMUSCULAR; INTRAVENOUS; SUBCUTANEOUS ONCE
Refills: 0 | Status: DISCONTINUED | OUTPATIENT
Start: 2020-05-04 | End: 2020-05-04

## 2020-05-04 RX ORDER — HYDROMORPHONE HYDROCHLORIDE 2 MG/ML
1 INJECTION INTRAMUSCULAR; INTRAVENOUS; SUBCUTANEOUS EVERY 6 HOURS
Refills: 0 | Status: DISCONTINUED | OUTPATIENT
Start: 2020-05-04 | End: 2020-05-05

## 2020-05-04 RX ADMIN — HYDROMORPHONE HYDROCHLORIDE 2 MILLIGRAM(S): 2 INJECTION INTRAMUSCULAR; INTRAVENOUS; SUBCUTANEOUS at 23:50

## 2020-05-04 RX ADMIN — HEPARIN SODIUM 5000 UNIT(S): 5000 INJECTION INTRAVENOUS; SUBCUTANEOUS at 14:39

## 2020-05-04 RX ADMIN — HYDROMORPHONE HYDROCHLORIDE 2 MILLIGRAM(S): 2 INJECTION INTRAMUSCULAR; INTRAVENOUS; SUBCUTANEOUS at 04:23

## 2020-05-04 RX ADMIN — SODIUM CHLORIDE 250 MILLILITER(S): 9 INJECTION, SOLUTION INTRAVENOUS at 05:31

## 2020-05-04 RX ADMIN — Medication 50 MILLIEQUIVALENT(S): at 16:58

## 2020-05-04 RX ADMIN — Medication 2 MILLIGRAM(S): at 09:26

## 2020-05-04 RX ADMIN — HYDROMORPHONE HYDROCHLORIDE 1 MILLIGRAM(S): 2 INJECTION INTRAMUSCULAR; INTRAVENOUS; SUBCUTANEOUS at 19:04

## 2020-05-04 RX ADMIN — ONDANSETRON 4 MILLIGRAM(S): 8 TABLET, FILM COATED ORAL at 04:24

## 2020-05-04 RX ADMIN — Medication 1 MILLIGRAM(S): at 11:44

## 2020-05-04 RX ADMIN — HEPARIN SODIUM 5000 UNIT(S): 5000 INJECTION INTRAVENOUS; SUBCUTANEOUS at 21:27

## 2020-05-04 RX ADMIN — HEPARIN SODIUM 5000 UNIT(S): 5000 INJECTION INTRAVENOUS; SUBCUTANEOUS at 05:31

## 2020-05-04 RX ADMIN — Medication 25 GRAM(S): at 18:58

## 2020-05-04 RX ADMIN — Medication 50 MILLIEQUIVALENT(S): at 09:19

## 2020-05-04 RX ADMIN — ONDANSETRON 4 MILLIGRAM(S): 8 TABLET, FILM COATED ORAL at 21:26

## 2020-05-04 RX ADMIN — HYDROMORPHONE HYDROCHLORIDE 2 MILLIGRAM(S): 2 INJECTION INTRAMUSCULAR; INTRAVENOUS; SUBCUTANEOUS at 10:38

## 2020-05-04 RX ADMIN — Medication 2 MILLIGRAM(S): at 14:37

## 2020-05-04 RX ADMIN — PANTOPRAZOLE SODIUM 40 MILLIGRAM(S): 20 TABLET, DELAYED RELEASE ORAL at 11:45

## 2020-05-04 RX ADMIN — Medication 100 MILLIGRAM(S): at 11:44

## 2020-05-04 RX ADMIN — Medication 25 GRAM(S): at 13:45

## 2020-05-04 RX ADMIN — SODIUM CHLORIDE 250 MILLILITER(S): 9 INJECTION, SOLUTION INTRAVENOUS at 00:35

## 2020-05-04 NOTE — CHART NOTE - NSCHARTNOTEFT_GEN_A_CORE
Patient seen and evaluated:    CIWA:  Nausea/vomiting (0)  Tremor (+1)  Paroxysmal sweats (+1)  Anxiety (+3)  Agitation (+1)  Tactile disturbances (0)  Auditory disturbances (0)  Visual disturbances (0)   Headache (0)  Orientation (0)    CIWA score 6 at 05/04/2020 12:10 AM.    Will d/c standing orders of lorazepam (pt was due for 2 mg at this time and has a CIWA of 6 as aforementioned).  Would have used chlordiazepoxide but patient has compromised liver function (w/ elevated LFT's).  Will use PRN dosing of lorazepam: 2 mg IV q4hrly PRN to be given for CIWA > 7.  CIWA monitoring q4hrly.     Patient reports continuing abdominal discomfort (achy in a band like pattern around the upper abdomen, radiating to the back).  Hydromorphone is being administered at this time (PRN order).    Case and plan of care discussed w/ nursing staff and with the patient at bedside.

## 2020-05-04 NOTE — PROGRESS NOTE ADULT - SUBJECTIVE AND OBJECTIVE BOX
Patient is a 44y old  Female who presents with a chief complaint of Epigastric pain (04 May 2020 11:01)    Patient was seen and examined.  C/o epigastric pain. Denies nausea, vomitting  Denies any other complaints.  All systems reviewed .    PAST MEDICAL & SURGICAL HISTORY:  AA (alcohol abuse)  Chronic pancreatitis  Pancreatitis  S/P arthroscopy: meniscal repair    Allergies  Compazine (Unknown)    MEDICATIONS  (STANDING):  folic acid 1 milliGRAM(s) Oral daily  heparin   Injectable 5000 Unit(s) SubCutaneous every 8 hours  lactated ringers. 1000 milliLiter(s) (250 mL/Hr) IV Continuous <Continuous>  magnesium sulfate  IVPB 2 Gram(s) IV Intermittent every 4 hours  pantoprazole  Injectable 40 milliGRAM(s) IV Push daily  potassium chloride  20 mEq/100 mL IVPB 20 milliEquivalent(s) IV Intermittent every 2 hours  thiamine 100 milliGRAM(s) Oral daily    MEDICATIONS  (PRN):  HYDROmorphone  Injectable 2 milliGRAM(s) IV Push every 6 hours PRN Severe Pain (7 - 10)  LORazepam   Injectable 2 milliGRAM(s) IV Push every 4 hours PRN Agitation  ondansetron Injectable 4 milliGRAM(s) IV Push three times a day PRN Nausea and/or Vomiting    Vital Signs Last 24 Hrs  T(C): 36.6  T(F): 97.9  HR: 101  BP: 124/80  BP(mean): --  RR: 18  SpO2: 99%    O/E:  Awake, alert, not in distress.  HEENT: atraumatic, EOMI.  Chest: clear.  CVS: SIS2 +, no murmur.  P/A: Soft, BS+, epigastric tenderness+  CNS: non focal.  Ext: no edema feet.  Skin: no rash, no ulcers.  All systems reviewed positive findings as above.                            11.3<L>  4.42<L> )-----------( 188      ( 04 May 2020 05:48 )             33.3<L>                        14.2   10.61 )-----------( 260      ( 03 May 2020 07:35 )             39.6     05-04    139  |  102  |  9<L>  ----------------------------<  100<H>  3.2<L>   |  25  |  0.5<L>  05-03    137  |  94<L>  |  12  ----------------------------<  154<H>  3.7   |  19  |  0.7    Ca    7.6<L>      04 May 2020 05:48  Ca    8.9      03 May 2020 07:35  Mg     1.0     05-04    TPro  6.0  /  Alb  3.5  /  TBili  1.0  /  DBili  0.4<H>  /  AST  221<H>  /  ALT  81<H>  /  AlkPhos  122<H>  05-04  TPro  8.1<H>  /  Alb  4.7  /  TBili  0.7  /  DBili  x   /  AST  398<H>  /  ALT  146<H>  /  AlkPhos  180<H>  05-03      CARDIAC MARKERS ( 03 May 2020 07:35 )  x     / <0.01 ng/mL / x     / x     / x          PT/INR - ( 03 May 2020 07:35 )   PT: 12.00 sec;   INR: 1.04 ratio         PTT - ( 03 May 2020 07:35 )  PTT:24.7 sec

## 2020-05-04 NOTE — PROGRESS NOTE ADULT - SUBJECTIVE AND OBJECTIVE BOX
LAURA BARAJAS 44y Female  MRN#: [id number]   CODE STATUS: FULL     SUBJECTIVE  Patient is a 44y old Female who presents with a chief complaint of Epigastric pain (03 May 2020 13:14)  Currently admitted to medicine with the primary diagnosis of Pancreatitis     Today is hospital day 1d. This morning she complains of generalized abdominal pain, nausea and anxiety. No events overnight.     OBJECTIVE  PAST MEDICAL & SURGICAL HISTORY  AA (alcohol abuse)  Chronic pancreatitis  Pancreatitis  S/P arthroscopy: meniscal repair    ALLERGIES:  Compazine (Unknown)    MEDICATIONS:  STANDING MEDICATIONS  folic acid 1 milliGRAM(s) Oral daily  heparin   Injectable 5000 Unit(s) SubCutaneous every 8 hours  lactated ringers. 1000 milliLiter(s) IV Continuous <Continuous>  pantoprazole  Injectable 40 milliGRAM(s) IV Push daily  thiamine 100 milliGRAM(s) Oral daily    PRN MEDICATIONS  HYDROmorphone  Injectable 2 milliGRAM(s) IV Push every 6 hours PRN  LORazepam   Injectable 2 milliGRAM(s) IV Push every 4 hours PRN  ondansetron Injectable 4 milliGRAM(s) IV Push three times a day PRN      VITAL SIGNS: Last 24 Hours  T(C): 36.6 (04 May 2020 06:32), Max: 36.8 (03 May 2020 11:30)  T(F): 97.9 (04 May 2020 06:32), Max: 98.2 (03 May 2020 11:30)  HR: 101 (04 May 2020 06:32) (70 - 101)  BP: 124/80 (04 May 2020 06:32) (110/54 - 124/80)  BP(mean): --  RR: 18 (04 May 2020 06:32) (18 - 19)  SpO2: 99% (04 May 2020 01:30) (99% - 99%)    LABS:                        11.3   4.42  )-----------( 188      ( 04 May 2020 05:48 )             33.3     05-04    139  |  102  |  9<L>  ----------------------------<  100<H>  3.2<L>   |  25  |  0.5<L>    Ca    7.6<L>      04 May 2020 05:48  Mg     1.0     05-04    TPro  6.0  /  Alb  3.5  /  TBili  1.0  /  DBili  0.4<H>  /  AST  221<H>  /  ALT  81<H>  /  AlkPhos  122<H>  05-04    PT/INR - ( 03 May 2020 07:35 )   PT: 12.00 sec;   INR: 1.04 ratio         PTT - ( 03 May 2020 07:35 )  PTT:24.7 sec    CARDIAC MARKERS ( 03 May 2020 07:35 )  x     / <0.01 ng/mL / x     / x     / x          RADIOLOGY:  < from: CT Abdomen and Pelvis w/ IV Cont (05.03.20 @ 10:24) >    IMPRESSION:  Acute on chronic pancreatitis with a 2.8 x 3.1 x 4.0 rim-enhancing fluid collection in the region of the pancreatic tail which borders the posterior gastric wall.   Collection may represent pseudocyst. Abscess is not excluded.  Heterogeneous low attenuation with the body of the pancreas and early necrosis is not excluded.  < end of copied text >    PHYSICAL EXAM:  GENERAL: NAD, well-developed, AAOx3  HEENT:  Atraumatic, Normocephalic.   PULMONARY: Clear to auscultation bilaterally; No wheeze  CARDIOVASCULAR: Regular rate and rhythm; No murmurs, rubs, or gallops  GASTROINTESTINAL: soft, tender to palpation diffusely, nondistended   MUSCULOSKELETAL:  2+ Peripheral Pulses, No edema  NEUROLOGY: non-focal, no tremors   SKIN: No rashes     ADMISSION SUMMARY  Patient is a 44y old Female who presents with a chief complaint of Epigastric pain (03 May 2020 13:14)  Currently admitted to medicine with the primary diagnosis of Pancreatitis    ASSESSMENT & PLAN  45 y/o female with past medical history of chronic pancreatitis, alcohol abuse, alcoholic hepatic steatosis admitted for acute on chronic pancreatitis w/ possible pseudocyst v abscess.    #Acute on chronic pancreatitis secondary to alcohol abuse   - Elevated lipase with consistent CT findings- also shows possible necrosis and possible pseudocyst vs abscess   - Hemodynamically stable without evidence of any end organ damage   - known chronic pancreatitis secondary to alcohol abuse  - Lr at 250cc/hour for now  - F/U TG levels, No evidence of cholelithiasis   - No evidence of alcoholic hepatitis or acute liver failure- normal bilirubin, normal INR. Though Patient does have underlying alcoholic steatosis   - Monitor BUN and Hematocrit and liver enzymes   - NPO for now    - Pain control by PRN Dilaudid   - Ondansetron for nausea/vomiting   - GI consulted     #Transaminitis secondary to alcoholic hepatitis   -liver enzymes downtrending     #Hypomagnesemia/hypokalemia  -replete and follow up     #Suspected Folic acid and Thiamine deficiency   Continue supplementation      #Alcohol abuse  -continue folic acid & thiamine supplementation  -continue on CIWA protocol and monitor for signs of alcohol withdrawal       DVT prophylaxis   GI prophylaxis LAURA BARAJAS 44y Female  MRN#: [id number]   CODE STATUS: FULL     SUBJECTIVE  Patient is a 44y old Female who presents with a chief complaint of Epigastric pain (03 May 2020 13:14)  Currently admitted to medicine with the primary diagnosis of Pancreatitis     Today is hospital day 1d. This morning she complains of generalized abdominal pain, nausea and anxiety. No events overnight.     OBJECTIVE  PAST MEDICAL & SURGICAL HISTORY  AA (alcohol abuse)  Chronic pancreatitis  Pancreatitis  S/P arthroscopy: meniscal repair    ALLERGIES:  Compazine (Unknown)    MEDICATIONS:  STANDING MEDICATIONS  folic acid 1 milliGRAM(s) Oral daily  heparin   Injectable 5000 Unit(s) SubCutaneous every 8 hours  lactated ringers. 1000 milliLiter(s) IV Continuous <Continuous>  pantoprazole  Injectable 40 milliGRAM(s) IV Push daily  thiamine 100 milliGRAM(s) Oral daily    PRN MEDICATIONS  HYDROmorphone  Injectable 2 milliGRAM(s) IV Push every 6 hours PRN  LORazepam   Injectable 2 milliGRAM(s) IV Push every 4 hours PRN  ondansetron Injectable 4 milliGRAM(s) IV Push three times a day PRN      VITAL SIGNS: Last 24 Hours  T(C): 36.6 (04 May 2020 06:32), Max: 36.8 (03 May 2020 11:30)  T(F): 97.9 (04 May 2020 06:32), Max: 98.2 (03 May 2020 11:30)  HR: 101 (04 May 2020 06:32) (70 - 101)  BP: 124/80 (04 May 2020 06:32) (110/54 - 124/80)  BP(mean): --  RR: 18 (04 May 2020 06:32) (18 - 19)  SpO2: 99% (04 May 2020 01:30) (99% - 99%)    LABS:                        11.3   4.42  )-----------( 188      ( 04 May 2020 05:48 )             33.3     05-04    139  |  102  |  9<L>  ----------------------------<  100<H>  3.2<L>   |  25  |  0.5<L>    Ca    7.6<L>      04 May 2020 05:48  Mg     1.0     05-04    TPro  6.0  /  Alb  3.5  /  TBili  1.0  /  DBili  0.4<H>  /  AST  221<H>  /  ALT  81<H>  /  AlkPhos  122<H>  05-04    PT/INR - ( 03 May 2020 07:35 )   PT: 12.00 sec;   INR: 1.04 ratio         PTT - ( 03 May 2020 07:35 )  PTT:24.7 sec    CARDIAC MARKERS ( 03 May 2020 07:35 )  x     / <0.01 ng/mL / x     / x     / x          RADIOLOGY:  < from: CT Abdomen and Pelvis w/ IV Cont (05.03.20 @ 10:24) >    IMPRESSION:  Acute on chronic pancreatitis with a 2.8 x 3.1 x 4.0 rim-enhancing fluid collection in the region of the pancreatic tail which borders the posterior gastric wall.   Collection may represent pseudocyst. Abscess is not excluded.  Heterogeneous low attenuation with the body of the pancreas and early necrosis is not excluded.  < end of copied text >    PHYSICAL EXAM:  GENERAL: NAD, well-developed, AAOx3  HEENT:  Atraumatic, Normocephalic.   PULMONARY: Clear to auscultation bilaterally; No wheeze  CARDIOVASCULAR: Regular rate and rhythm; No murmurs, rubs, or gallops  GASTROINTESTINAL: soft, tender to palpation diffusely, nondistended   MUSCULOSKELETAL:  2+ Peripheral Pulses, No edema  NEUROLOGY: non-focal, no tremors   SKIN: No rashes     ADMISSION SUMMARY  Patient is a 44y old Female who presents with a chief complaint of Epigastric pain (03 May 2020 13:14)  Currently admitted to medicine with the primary diagnosis of Pancreatitis    ASSESSMENT & PLAN  45 y/o female with past medical history of chronic pancreatitis, alcohol abuse, alcoholic hepatic steatosis admitted for acute on chronic pancreatitis w/ possible pseudocyst v abscess.    #Acute on chronic pancreatitis secondary to alcohol abuse   - Elevated lipase >600 with consistent CT findings- also shows possible necrosis and possible pseudocyst vs abscess   - Hemodynamically stable without evidence of any end organ damage   - Triglycerides WNL, No evidence of cholelithiasis. Ultrasound   - No evidence of alcoholic hepatitis or acute liver failure- normal bilirubin, normal INR. Though Patient does have underlying alcoholic steatosis   - Monitor BUN and hematocrit and liver enzymes   - NPO for now    - Pain control by PRN Dilaudid - patient has been asking for Dilaudid   - zofran for nausea/vomiting, QTc 482     #Transaminitis secondary to alcoholic hepatitis   -liver enzymes downtrending     #Hypomagnesemia/hypokalemia  -replete and follow up     #Suspected folic acid and thiamine deficiency   -Continue supplementation      #Alcohol abuse  -continue folic acid & thiamine supplementation  -continue on CIWA protocol and monitor for signs of alcohol withdrawal     DVT prophylaxis   GI prophylaxis LAURA BARAJAS 44y Female  MRN#: [id number]   CODE STATUS: FULL     SUBJECTIVE  Patient is a 44y old Female who presents with a chief complaint of Epigastric pain (03 May 2020 13:14)  Currently admitted to medicine with the primary diagnosis of Pancreatitis     Today is hospital day 1d. This morning she complains of generalized abdominal pain, nausea and anxiety. No events overnight.     OBJECTIVE  PAST MEDICAL & SURGICAL HISTORY  AA (alcohol abuse)  Chronic pancreatitis  Pancreatitis  S/P arthroscopy: meniscal repair    ALLERGIES:  Compazine (Unknown)    MEDICATIONS:  STANDING MEDICATIONS  folic acid 1 milliGRAM(s) Oral daily  heparin   Injectable 5000 Unit(s) SubCutaneous every 8 hours  lactated ringers. 1000 milliLiter(s) IV Continuous <Continuous>  pantoprazole  Injectable 40 milliGRAM(s) IV Push daily  thiamine 100 milliGRAM(s) Oral daily    PRN MEDICATIONS  HYDROmorphone  Injectable 2 milliGRAM(s) IV Push every 6 hours PRN  LORazepam   Injectable 2 milliGRAM(s) IV Push every 4 hours PRN  ondansetron Injectable 4 milliGRAM(s) IV Push three times a day PRN      VITAL SIGNS: Last 24 Hours  T(C): 36.6 (04 May 2020 06:32), Max: 36.8 (03 May 2020 11:30)  T(F): 97.9 (04 May 2020 06:32), Max: 98.2 (03 May 2020 11:30)  HR: 101 (04 May 2020 06:32) (70 - 101)  BP: 124/80 (04 May 2020 06:32) (110/54 - 124/80)  BP(mean): --  RR: 18 (04 May 2020 06:32) (18 - 19)  SpO2: 99% (04 May 2020 01:30) (99% - 99%)    LABS:                        11.3   4.42  )-----------( 188      ( 04 May 2020 05:48 )             33.3     05-04    139  |  102  |  9<L>  ----------------------------<  100<H>  3.2<L>   |  25  |  0.5<L>    Ca    7.6<L>      04 May 2020 05:48  Mg     1.0     05-04    TPro  6.0  /  Alb  3.5  /  TBili  1.0  /  DBili  0.4<H>  /  AST  221<H>  /  ALT  81<H>  /  AlkPhos  122<H>  05-04    PT/INR - ( 03 May 2020 07:35 )   PT: 12.00 sec;   INR: 1.04 ratio         PTT - ( 03 May 2020 07:35 )  PTT:24.7 sec    CARDIAC MARKERS ( 03 May 2020 07:35 )  x     / <0.01 ng/mL / x     / x     / x          RADIOLOGY:  < from: CT Abdomen and Pelvis w/ IV Cont (05.03.20 @ 10:24) >    IMPRESSION:  Acute on chronic pancreatitis with a 2.8 x 3.1 x 4.0 rim-enhancing fluid collection in the region of the pancreatic tail which borders the posterior gastric wall.   Collection may represent pseudocyst. Abscess is not excluded.  Heterogeneous low attenuation with the body of the pancreas and early necrosis is not excluded.  < end of copied text >    PHYSICAL EXAM:  GENERAL: NAD, well-developed, AAOx3  HEENT:  Atraumatic, Normocephalic.   PULMONARY: Clear to auscultation bilaterally; No wheeze  CARDIOVASCULAR: regular rate & rhythm   GASTROINTESTINAL: soft, tender to palpation diffusely, nondistended   MUSCULOSKELETAL:  2+ Peripheral Pulses, No edema  NEUROLOGY: non-focal, no tremors   SKIN: No rashes     ADMISSION SUMMARY  Patient is a 44y old Female who presents with a chief complaint of Epigastric pain (03 May 2020 13:14)  Currently admitted to medicine with the primary diagnosis of Pancreatitis    ASSESSMENT & PLAN  43 y/o female with past medical history of chronic pancreatitis, alcohol abuse, alcoholic hepatic steatosis admitted for acute on chronic pancreatitis w/ possible pseudocyst v abscess.    #Acute on chronic pancreatitis secondary to alcohol abuse   - Elevated lipase >600 with consistent CT findings- also shows possible necrosis and possible pseudocyst vs abscess   - Hemodynamically stable without evidence of any end organ damage   - Triglycerides WNL, No evidence of cholelithiasis. Ultrasound significant   - No evidence of alcoholic hepatitis or acute liver failure- normal bilirubin, normal INR. Though Patient does have underlying alcoholic steatosis   - Monitor BUN and hematocrit and liver enzymes   - NPO for now, will likely advance to clears tonight or tomorrow - also decrease fluids when advancing diet   - Pain control by PRN Dilaudid - patient has been asking for Dilaudid   - zofran for nausea/vomiting, QTc 482     #Transaminitis secondary to alcoholic hepatitis   -liver enzymes downtrending     #Hypomagnesemia/hypokalemia  -replete and follow up     #Suspected folic acid and thiamine deficiency   -Continue supplementation      #Alcohol abuse  -continue folic acid & thiamine supplementation  -continue on CIWA protocol and monitor for signs of alcohol withdrawal     #Diet - NPO   #DVT prophylaxis- heparin subq  #GI prophylaxis- protonix LAURA BARAJAS 44y Female  MRN#: [id number]   CODE STATUS: FULL     SUBJECTIVE  Patient is a 44y old Female who presents with a chief complaint of Epigastric pain (03 May 2020 13:14)  Currently admitted to medicine with the primary diagnosis of Pancreatitis     Today is hospital day 1d. This morning she complains of generalized abdominal pain, nausea and anxiety. No events overnight.     OBJECTIVE  PAST MEDICAL & SURGICAL HISTORY  AA (alcohol abuse)  Chronic pancreatitis  Pancreatitis  S/P arthroscopy: meniscal repair    ALLERGIES:  Compazine (Unknown)    MEDICATIONS:  STANDING MEDICATIONS  folic acid 1 milliGRAM(s) Oral daily  heparin   Injectable 5000 Unit(s) SubCutaneous every 8 hours  lactated ringers. 1000 milliLiter(s) IV Continuous <Continuous>  pantoprazole  Injectable 40 milliGRAM(s) IV Push daily  thiamine 100 milliGRAM(s) Oral daily    PRN MEDICATIONS  HYDROmorphone  Injectable 2 milliGRAM(s) IV Push every 6 hours PRN  LORazepam   Injectable 2 milliGRAM(s) IV Push every 4 hours PRN  ondansetron Injectable 4 milliGRAM(s) IV Push three times a day PRN      VITAL SIGNS: Last 24 Hours  T(C): 36.6 (04 May 2020 06:32), Max: 36.8 (03 May 2020 11:30)  T(F): 97.9 (04 May 2020 06:32), Max: 98.2 (03 May 2020 11:30)  HR: 101 (04 May 2020 06:32) (70 - 101)  BP: 124/80 (04 May 2020 06:32) (110/54 - 124/80)  BP(mean): --  RR: 18 (04 May 2020 06:32) (18 - 19)  SpO2: 99% (04 May 2020 01:30) (99% - 99%)    LABS:                        11.3   4.42  )-----------( 188      ( 04 May 2020 05:48 )             33.3     05-04    139  |  102  |  9<L>  ----------------------------<  100<H>  3.2<L>   |  25  |  0.5<L>    Ca    7.6<L>      04 May 2020 05:48  Mg     1.0     05-04    TPro  6.0  /  Alb  3.5  /  TBili  1.0  /  DBili  0.4<H>  /  AST  221<H>  /  ALT  81<H>  /  AlkPhos  122<H>  05-04    PT/INR - ( 03 May 2020 07:35 )   PT: 12.00 sec;   INR: 1.04 ratio         PTT - ( 03 May 2020 07:35 )  PTT:24.7 sec    CARDIAC MARKERS ( 03 May 2020 07:35 )  x     / <0.01 ng/mL / x     / x     / x          RADIOLOGY:  < from: CT Abdomen and Pelvis w/ IV Cont (05.03.20 @ 10:24) >    IMPRESSION:  Acute on chronic pancreatitis with a 2.8 x 3.1 x 4.0 rim-enhancing fluid collection in the region of the pancreatic tail which borders the posterior gastric wall.   Collection may represent pseudocyst. Abscess is not excluded.  Heterogeneous low attenuation with the body of the pancreas and early necrosis is not excluded.  < end of copied text >    PHYSICAL EXAM:  GENERAL: NAD, well-developed, AAOx3  HEENT:  Atraumatic, Normocephalic.   PULMONARY: Clear to auscultation bilaterally; No wheeze  CARDIOVASCULAR: regular rate & rhythm   GASTROINTESTINAL: soft, tender to palpation diffusely, nondistended   MUSCULOSKELETAL:  2+ Peripheral Pulses, No edema  NEUROLOGY: non-focal, no tremors   SKIN: No rashes     ADMISSION SUMMARY  Patient is a 44y old Female who presents with a chief complaint of Epigastric pain (03 May 2020 13:14)  Currently admitted to medicine with the primary diagnosis of Pancreatitis    ASSESSMENT & PLAN  43 y/o female with past medical history of chronic pancreatitis, alcohol abuse, alcoholic hepatic steatosis admitted for acute on chronic pancreatitis w/ possible pseudocyst v abscess.    #Acute on chronic pancreatitis secondary to alcohol abuse   - Elevated lipase >600 with consistent CT findings- also shows possible necrosis and possible pseudocyst vs abscess   - Hemodynamically stable without evidence of any end organ damage   - Triglycerides WNL, IgG negative. Ultrasound significant for hepatic steatosis. No evidence of cholelithiasis.   - No evidence of alcoholic hepatitis or acute liver failure- normal bilirubin, normal INR. Though patient does have underlying alcoholic steatosis   - Monitor BUN and hematocrit and liver enzymes   - NPO for now, will likely advance to clears tonight or tomorrow - also decrease fluids when advancing diet   - Pain control by PRN Dilaudid - patient has been asking for Dilaudid   - zofran for nausea/vomiting, QTc 482     #Transaminitis secondary to alcoholic hepatitis   -liver enzymes downtrending     #Hypomagnesemia/hypokalemia  -replete and follow up     #Suspected folic acid and thiamine deficiency   -Continue supplementation      #Alcohol abuse  -continue folic acid & thiamine supplementation  -continue on CIWA protocol and monitor for signs of alcohol withdrawal     #Diet - NPO   #DVT prophylaxis- heparin subq  #GI prophylaxis- protonix

## 2020-05-04 NOTE — PROGRESS NOTE ADULT - ASSESSMENT
Patient is a 45 y/o female with past medical history of Chronic pancreatitis, alcohol abuse, alcoholic hepatic steatosis. Patient notes Epigastric pain. Pain is sharp, is 10/10 in intensity, radiating to back, associated with non bilious and non bloody vomiting. Precipitant is found to be excessive ETOH use and abuse despite being educated on ETOH avoidance. Patient remains on CIWA protocol. Patient still with pain and cannot advance diet.  Labs positive for Lipase > 600. CT showing acute on chronic pancreatitis with a 2.8 x 3.1 x 4.0 rim-enhancing fluid collection in the region of the pancreatic tail. Heterogeneous low attenuation with the body of the pancreas and early necrosis is not excluded. US : no cholelithiasis , CBD 6 mm , hepatic steatosis. LFTs with alcoholic hepatitis pattern.       Recurrent acute on chronic pancreatitis / pancreatic tail pseudocyst ( less likely abscess in absence of fever or leucocytosis)   - Likely secondary to alcohol intake , no cholelithiases on US   - Triglyceride normal IgG Subsets   - Normal IGG 4 level last admission   - Continue Aggressive IV hydration   - Maddrey score <32 --no indication for steroid  - Alcohol abstinence must be stressed  - No plan for Endoscopic evaluation at this point
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
## 2020-05-04 NOTE — PROGRESS NOTE ADULT - SUBJECTIVE AND OBJECTIVE BOX
Patient is a 45 y/o female with past medical history of Chronic pancreatitis, alcohol abuse, alcoholic hepatic steatosis. Patient notes Epigastric pain. Pain is sharp, is 10/10 in intensity, radiating to back, associated with non bilious and non bloody vomiting. Precipitant is found to be excessive ETOH use and abuse despite being educated on ETOH avoidance. Patient remains on CIWA protocol. Patient still with pain and cannot advance diet.         PAST MEDICAL & SURGICAL HISTORY:  AA (alcohol abuse)  Chronic pancreatitis  Pancreatitis  S/P arthroscopy: meniscal repair      FAMILY HISTORY:  Family history of cholecystectomy: many female members in the family  FH: pancreatic cancer: cousin  Family history of hypertension: both father and mother      Social History:  Alcohol: alcohol abuse   Drugs: denies     Home Medications:  Multiple Vitamins with Minerals oral tablet: 1 tab(s) orally once a day (16 Feb 2020 13:00)    MEDICATIONS  (STANDING):  folic acid 1 milliGRAM(s) Oral daily  heparin   Injectable 5000 Unit(s) SubCutaneous every 8 hours  lactated ringers. 1000 milliLiter(s) (250 mL/Hr) IV Continuous <Continuous>  LORazepam   Injectable   IV Push   LORazepam   Injectable 2 milliGRAM(s) IV Push every 4 hours  pantoprazole  Injectable 40 milliGRAM(s) IV Push daily  potassium chloride  20 mEq/100 mL IVPB 20 milliEquivalent(s) IV Intermittent once  thiamine 100 milliGRAM(s) Oral daily    MEDICATIONS  (PRN):  HYDROmorphone  Injectable 2 milliGRAM(s) IV Push four times a day PRN Moderate Pain (4 - 6)  ondansetron Injectable 4 milliGRAM(s) IV Push three times a day PRN Nausea and/or Vomiting      Allergies  Compazine (Unknown)      Review of Systems:   Constitutional:  No Fever, No Chills  ENT/Mouth:  No Hearing Changes,  No Difficulty Swallowing  Eyes:  No Eye Pain, No Vision Changes  Cardiovascular:  No Chest Pain, No Palpitations  Respiratory:  No Cough, No Dyspnea  Gastrointestinal:  As described in HPI  Musculoskeletal:  No Joint Swelling, No Back Pain  Skin:  No Skin Lesions, No Jaundice  Neuro:  No Syncope, No Dizziness  Heme/Lymph:  No Bruising, No Bleeding.      Vital Signs  T(F): 97.9 (04 May 2020 06:32), Max: 98.2 (03 May 2020 11:30)  HR: 101 (04 May 2020 06:32) (70 - 101)  BP: 124/80 (04 May 2020 06:32) (110/54 - 124/80)  RR: 18 (04 May 2020 06:32) (18 - 19)  SpO2: 99% (04 May 2020 01:30) (99% - 99%)  Physical Exam  Gen: NAD  HEENT: NC/AT, Mucosal Membranes Moist  Cardio: S1/S2   Resp: CTA B/L  Abdomen: TTP diffuse  Neuro: AAOx3  Extremities: FROM x 4  Integum: No jaundice                               11.3   4.42  )-----------( 188      ( 04 May 2020 05:48 )             33.3     05-04    139  |  102  |  9<L>  ----------------------------<  100<H>  3.2<L>   |  25  |  0.5<L>    Ca    7.6<L>      04 May 2020 05:48  Mg     1.0     05-04    TPro  6.0  /  Alb  3.5  /  TBili  1.0  /  DBili  0.4<H>  /  AST  221<H>  /  ALT  81<H>  /  AlkPhos  122<H>  05-04        CT Abdomen and Pelvis w/ IV Cont 05.03.20   IMPRESSION:    Acute on chronic pancreatitis with a 2.8 x 3.1 x 4.0 rim-enhancing fluid collection in the region of the pancreatic tail which borders the posterior gastric wall.     Collection may represent pseudocyst. Abscess is not excluded.    Heterogeneous low attenuation with the body of the pancreas and early necrosis is not excluded.

## 2020-05-05 LAB
ALBUMIN SERPL ELPH-MCNC: 3.4 G/DL — LOW (ref 3.5–5.2)
ALP SERPL-CCNC: 170 U/L — HIGH (ref 30–115)
ALT FLD-CCNC: 61 U/L — HIGH (ref 0–41)
ANION GAP SERPL CALC-SCNC: 12 MMOL/L — SIGNIFICANT CHANGE UP (ref 7–14)
AST SERPL-CCNC: 177 U/L — HIGH (ref 0–41)
BILIRUB SERPL-MCNC: 1 MG/DL — SIGNIFICANT CHANGE UP (ref 0.2–1.2)
BUN SERPL-MCNC: 3 MG/DL — LOW (ref 10–20)
CALCIUM SERPL-MCNC: 8.3 MG/DL — LOW (ref 8.5–10.1)
CHLORIDE SERPL-SCNC: 100 MMOL/L — SIGNIFICANT CHANGE UP (ref 98–110)
CO2 SERPL-SCNC: 25 MMOL/L — SIGNIFICANT CHANGE UP (ref 17–32)
CREAT SERPL-MCNC: 0.5 MG/DL — LOW (ref 0.7–1.5)
GLUCOSE SERPL-MCNC: 80 MG/DL — SIGNIFICANT CHANGE UP (ref 70–99)
HCT VFR BLD CALC: 30.6 % — LOW (ref 37–47)
HGB BLD-MCNC: 10.9 G/DL — LOW (ref 12–16)
MAGNESIUM SERPL-MCNC: 1.8 MG/DL — SIGNIFICANT CHANGE UP (ref 1.8–2.4)
MCHC RBC-ENTMCNC: 34.7 PG — HIGH (ref 27–31)
MCHC RBC-ENTMCNC: 35.6 G/DL — SIGNIFICANT CHANGE UP (ref 32–37)
MCV RBC AUTO: 97.5 FL — SIGNIFICANT CHANGE UP (ref 81–99)
NRBC # BLD: 0 /100 WBCS — SIGNIFICANT CHANGE UP (ref 0–0)
PHOSPHATE SERPL-MCNC: 3.1 MG/DL — SIGNIFICANT CHANGE UP (ref 2.1–4.9)
PLATELET # BLD AUTO: 169 K/UL — SIGNIFICANT CHANGE UP (ref 130–400)
POTASSIUM SERPL-MCNC: 3.3 MMOL/L — LOW (ref 3.5–5)
POTASSIUM SERPL-SCNC: 3.3 MMOL/L — LOW (ref 3.5–5)
PROT SERPL-MCNC: 6 G/DL — SIGNIFICANT CHANGE UP (ref 6–8)
RBC # BLD: 3.14 M/UL — LOW (ref 4.2–5.4)
RBC # FLD: 11.8 % — SIGNIFICANT CHANGE UP (ref 11.5–14.5)
SODIUM SERPL-SCNC: 137 MMOL/L — SIGNIFICANT CHANGE UP (ref 135–146)
WBC # BLD: 3.61 K/UL — LOW (ref 4.8–10.8)
WBC # FLD AUTO: 3.61 K/UL — LOW (ref 4.8–10.8)

## 2020-05-05 PROCEDURE — 99233 SBSQ HOSP IP/OBS HIGH 50: CPT

## 2020-05-05 RX ORDER — HYDROMORPHONE HYDROCHLORIDE 2 MG/ML
1 INJECTION INTRAMUSCULAR; INTRAVENOUS; SUBCUTANEOUS EVERY 4 HOURS
Refills: 0 | Status: DISCONTINUED | OUTPATIENT
Start: 2020-05-05 | End: 2020-05-09

## 2020-05-05 RX ORDER — MORPHINE SULFATE 50 MG/1
2 CAPSULE, EXTENDED RELEASE ORAL ONCE
Refills: 0 | Status: DISCONTINUED | OUTPATIENT
Start: 2020-05-05 | End: 2020-05-05

## 2020-05-05 RX ORDER — POTASSIUM CHLORIDE 20 MEQ
20 PACKET (EA) ORAL
Refills: 0 | Status: DISCONTINUED | OUTPATIENT
Start: 2020-05-05 | End: 2020-05-05

## 2020-05-05 RX ORDER — POTASSIUM CHLORIDE 20 MEQ
40 PACKET (EA) ORAL
Refills: 0 | Status: COMPLETED | OUTPATIENT
Start: 2020-05-05 | End: 2020-05-06

## 2020-05-05 RX ORDER — MORPHINE SULFATE 50 MG/1
2 CAPSULE, EXTENDED RELEASE ORAL EVERY 4 HOURS
Refills: 0 | Status: DISCONTINUED | OUTPATIENT
Start: 2020-05-05 | End: 2020-05-05

## 2020-05-05 RX ORDER — PANTOPRAZOLE SODIUM 20 MG/1
40 TABLET, DELAYED RELEASE ORAL
Refills: 0 | Status: DISCONTINUED | OUTPATIENT
Start: 2020-05-06 | End: 2020-05-11

## 2020-05-05 RX ORDER — OXYCODONE HYDROCHLORIDE 5 MG/1
5 TABLET ORAL EVERY 6 HOURS
Refills: 0 | Status: DISCONTINUED | OUTPATIENT
Start: 2020-05-05 | End: 2020-05-12

## 2020-05-05 RX ADMIN — ONDANSETRON 4 MILLIGRAM(S): 8 TABLET, FILM COATED ORAL at 12:54

## 2020-05-05 RX ADMIN — Medication 2 MILLIGRAM(S): at 09:29

## 2020-05-05 RX ADMIN — SODIUM CHLORIDE 250 MILLILITER(S): 9 INJECTION, SOLUTION INTRAVENOUS at 21:07

## 2020-05-05 RX ADMIN — SODIUM CHLORIDE 250 MILLILITER(S): 9 INJECTION, SOLUTION INTRAVENOUS at 02:58

## 2020-05-05 RX ADMIN — HEPARIN SODIUM 5000 UNIT(S): 5000 INJECTION INTRAVENOUS; SUBCUTANEOUS at 23:57

## 2020-05-05 RX ADMIN — Medication 100 MILLIGRAM(S): at 11:30

## 2020-05-05 RX ADMIN — Medication 50 MILLIEQUIVALENT(S): at 09:30

## 2020-05-05 RX ADMIN — HYDROMORPHONE HYDROCHLORIDE 1 MILLIGRAM(S): 2 INJECTION INTRAMUSCULAR; INTRAVENOUS; SUBCUTANEOUS at 05:36

## 2020-05-05 RX ADMIN — HYDROMORPHONE HYDROCHLORIDE 1 MILLIGRAM(S): 2 INJECTION INTRAMUSCULAR; INTRAVENOUS; SUBCUTANEOUS at 21:07

## 2020-05-05 RX ADMIN — Medication 40 MILLIEQUIVALENT(S): at 20:25

## 2020-05-05 RX ADMIN — Medication 40 MILLIEQUIVALENT(S): at 11:29

## 2020-05-05 RX ADMIN — MORPHINE SULFATE 2 MILLIGRAM(S): 50 CAPSULE, EXTENDED RELEASE ORAL at 12:54

## 2020-05-05 RX ADMIN — HYDROMORPHONE HYDROCHLORIDE 1 MILLIGRAM(S): 2 INJECTION INTRAMUSCULAR; INTRAVENOUS; SUBCUTANEOUS at 15:51

## 2020-05-05 RX ADMIN — Medication 1 MILLIGRAM(S): at 11:30

## 2020-05-05 RX ADMIN — HEPARIN SODIUM 5000 UNIT(S): 5000 INJECTION INTRAVENOUS; SUBCUTANEOUS at 15:48

## 2020-05-05 RX ADMIN — SODIUM CHLORIDE 250 MILLILITER(S): 9 INJECTION, SOLUTION INTRAVENOUS at 15:48

## 2020-05-05 RX ADMIN — HEPARIN SODIUM 5000 UNIT(S): 5000 INJECTION INTRAVENOUS; SUBCUTANEOUS at 05:35

## 2020-05-05 NOTE — PROGRESS NOTE ADULT - SUBJECTIVE AND OBJECTIVE BOX
Patient is a 44y old  Female who presents with a chief complaint of Epigastric pain (04 May 2020 11:01)    Patient was seen and examined.  C/o epigastric pain. Denies nausea, vomitting  Denies any other complaints.  All systems reviewed .    PAST MEDICAL & SURGICAL HISTORY:  AA (alcohol abuse)  Chronic pancreatitis  Pancreatitis  S/P arthroscopy: meniscal repair    Allergies  Compazine (Unknown)    MEDICATIONS  (STANDING):  folic acid 1 milliGRAM(s) Oral daily  heparin   Injectable 5000 Unit(s) SubCutaneous every 8 hours  lactated ringers. 1000 milliLiter(s) (250 mL/Hr) IV Continuous <Continuous>  potassium chloride   Powder 40 milliEquivalent(s) Oral two times a day  thiamine 100 milliGRAM(s) Oral daily    MEDICATIONS  (PRN):  LORazepam   Injectable 2 milliGRAM(s) IV Push every 4 hours PRN Agitation  morphine  - Injectable 2 milliGRAM(s) IV Push every 4 hours PRN Severe Pain (7 - 10)  ondansetron Injectable 4 milliGRAM(s) IV Push three times a day PRN Nausea and/or Vomiting    Vital Signs Last 24 Hrs  T(C): 37.1  T(F): 98.7  HR: 110  BP: 146/88  BP(mean): --  RR: 18  SpO2: 93%    O/E:  Awake, alert, not in distress.  HEENT: atraumatic, EOMI.  Chest: clear.  CVS: SIS2 +, no murmur.  P/A: Soft, BS+, epigastric tenderness+  CNS: non focal.  Ext: no edema feet.  Skin: no rash, no ulcers.  All systems reviewed positive findings as above.                          10.9<L>  3.61<L> )-----------( 169      ( 05 May 2020 06:17 )             30.6<L>                        11.3<L>  4.42<L> )-----------( 188      ( 04 May 2020 05:48 )             33.3<L>  05-05    137  |  100  |  3<L>  ----------------------------<  80  3.3<L>   |  25  |  0.5<L>  05-04    135  |  98  |  5<L>  ----------------------------<  94  3.1<L>   |  25  |  0.5<L>    Ca    8.3<L>      05 May 2020 06:17  Ca    7.8<L>      04 May 2020 17:41  Ca    7.6<L>      04 May 2020 05:48  Phos  3.1     05-05  Mg     1.8     05-05    TPro  6.0  /  Alb  3.4<L>  /  TBili  1.0  /  DBili  x   /  AST  177<H>  /  ALT  61<H>  /  AlkPhos  170<H>  05-05  TPro  6.0  /  Alb  3.5  /  TBili  1.1  /  DBili  x   /  AST  197<H>  /  ALT  72<H>  /  AlkPhos  132<H>  05-04  TPro  6.0  /  Alb  3.5  /  TBili  1.0  /  DBili  0.4<H>  /  AST  221<H>  /  ALT  81<H>  /  AlkPhos  122<H>  05-04
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
## 2020-05-05 NOTE — CHART NOTE - NSCHARTNOTEFT_GEN_A_CORE
Addiction consult was placed for patient Jen Mason to evaluate the patient for ETOH withdrawal and detox management. Interview was attempted, but patient declined to be seen. She stated that she was in too much pain due to pancreatitis and did not believe she was in withdrawal.    Please re-consult Addiction/Psychiatry as needed.

## 2020-05-05 NOTE — PROGRESS NOTE ADULT - ASSESSMENT
Patient is a 43 y/o female with past medical history of Chronic pancreatitis, alcohol abuse, alcoholic hepatic steatosis. Patient notes Epigastric pain. Pain is sharp, is 10/10 in intensity, radiating to back, associated with non bilious and non bloody vomiting. Precipitant is found to be excessive ETOH use and abuse despite being educated on ETOH avoidance. Patient remains on CIWA protocol. Patient still with pain and cannot advance diet.  Labs positive for Lipase > 600. CT showing acute on chronic pancreatitis with a 2.8 x 3.1 x 4.0 rim-enhancing fluid collection in the region of the pancreatic tail. Heterogeneous low attenuation with the body of the pancreas and early necrosis is not excluded. US : no cholelithiasis , CBD 6 mm , hepatic steatosis. LFTs with alcoholic hepatitis pattern. Continue to treat ETOH w/d. Continue IV hydration, would not advance diet right now.       Recurrent acute on chronic pancreatitis / pancreatic tail pseudocyst ( less likely abscess in absence of fever or leucocytosis)   - Likely secondary to alcohol intake , no cholelithiases on US   - Triglyceride normal IgG Subsets   - Normal IGG 4 level last admission   - Continue Aggressive IV hydration   - Maddrey score <32 --no indication for steroid  - Alcohol abstinence must be stressed  - Cannot advance diet further for now, still with pain   - No plan for Endoscopic evaluation at this point Patient is a 43 y/o female with past medical history of Chronic pancreatitis, alcohol abuse, alcoholic hepatic steatosis. Patient notes Epigastric pain. Pain is sharp, is 10/10 in intensity, radiating to back, associated with non bilious and non bloody vomiting. Precipitant is found to be excessive ETOH use and abuse despite being educated on ETOH avoidance. Patient remains on CIWA protocol. Patient still with pain and cannot advance diet.  Labs positive for Lipase > 600. CT showing acute on chronic pancreatitis with a 2.8 x 3.1 x 4.0 rim-enhancing fluid collection in the region of the pancreatic tail. Heterogeneous low attenuation with the body of the pancreas and early necrosis is not excluded. US : no cholelithiasis , CBD 6 mm , hepatic steatosis. LFTs with alcoholic hepatitis pattern. Continue to treat ETOH w/d. Continue IV hydration, would not advance diet right now.       Recurrent acute on chronic pancreatitis / pancreatic tail pseudocyst ( less likely abscess in absence of fever or leucocytosis)   - Likely secondary to alcohol intake , no cholelithiases on US   - Triglyceride normal IgG Subsets   - Normal IGG 4 level last admission   - Continue Aggressive IV hydration   - Maddrey score <32 --no indication for steroid  - Alcohol abstinence must be stressed  - Please address pain control  - Cannot advance diet further for now, still with pain   - No plan for Endoscopic evaluation at this point Patient is a 43 y/o female with past medical history of Chronic pancreatitis, alcohol abuse, alcoholic hepatic steatosis. Patient notes Epigastric pain. Pain is sharp, is 10/10 in intensity, radiating to back, associated with non bilious and non bloody vomiting. Precipitant is found to be excessive ETOH use and abuse despite being educated on ETOH avoidance. Patient remains on CIWA protocol. Patient still with pain and cannot advance diet.  Labs positive for Lipase > 600. CT showing acute on chronic pancreatitis with a 2.8 x 3.1 x 4.0 rim-enhancing fluid collection in the region of the pancreatic tail. Heterogeneous low attenuation with the body of the pancreas and early necrosis is not excluded. US : no cholelithiasis , CBD 6 mm , hepatic steatosis. LFTs with alcoholic hepatitis pattern. Continue to treat ETOH w/d. Continue IV hydration, would not advance diet right now.       Recurrent acute on chronic pancreatitis / pancreatic tail pseudocyst ( less likely abscess in absence of fever or leucocytosis)   - Likely secondary to alcohol intake , no cholelithiasis on US   - Triglyceride normal IgG Subsets   - Normal IGG 4 level last admission   - Continue Aggressive IV hydration   - Maddrey score <32 --no indication for steroid  - Alcohol abstinence must be stressed  - Please address pain control  - Cannot advance diet further for now, still with pain   - No plan for Endoscopic evaluation at this point

## 2020-05-05 NOTE — PROGRESS NOTE ADULT - SUBJECTIVE AND OBJECTIVE BOX
LAURA BARAJAS 44y Female  MRN#: [id number]   CODE STATUS: FULL     SUBJECTIVE  Patient is a 44y old Female who presents with a chief complaint of Epigastric pain (04 May 2020 13:27)  Currently admitted to medicine with the primary diagnosis of Pancreatitis    Today is hospital day 2d. Patient was tachycardic overnight. Complains of severe pain - received a one time dose of 2 of Dilaudid.       OBJECTIVE  PAST MEDICAL & SURGICAL HISTORY  AA (alcohol abuse)  Chronic pancreatitis  Pancreatitis  S/P arthroscopy: meniscal repair    ALLERGIES:  Compazine (Unknown)    MEDICATIONS:  STANDING MEDICATIONS  folic acid 1 milliGRAM(s) Oral daily  heparin   Injectable 5000 Unit(s) SubCutaneous every 8 hours  lactated ringers. 1000 milliLiter(s) IV Continuous <Continuous>  pantoprazole  Injectable 40 milliGRAM(s) IV Push daily  potassium chloride  20 mEq/100 mL IVPB 20 milliEquivalent(s) IV Intermittent every 2 hours  potassium chloride  20 mEq/100 mL IVPB 20 milliEquivalent(s) IV Intermittent every 2 hours  thiamine 100 milliGRAM(s) Oral daily    PRN MEDICATIONS  LORazepam   Injectable 2 milliGRAM(s) IV Push every 4 hours PRN  morphine  - Injectable 2 milliGRAM(s) IV Push every 4 hours PRN  ondansetron Injectable 4 milliGRAM(s) IV Push three times a day PRN      VITAL SIGNS: Last 24 Hours  T(C): 37.1 (05 May 2020 06:14), Max: 37.1 (04 May 2020 21:00)  T(F): 98.7 (05 May 2020 06:14), Max: 98.8 (04 May 2020 21:00)  HR: 110 (05 May 2020 06:14) (102 - 131)  BP: 146/88 (05 May 2020 06:14) (134/83 - 146/88)  BP(mean): --  RR: 18 (05 May 2020 06:14) (18 - 18)  SpO2: 93% (04 May 2020 13:29) (93% - 93%)    LABS:                        10.9   3.61  )-----------( 169      ( 05 May 2020 06:17 )             30.6     05-05    137  |  100  |  3<L>  ----------------------------<  80  3.3<L>   |  25  |  0.5<L>    Ca    8.3<L>      05 May 2020 06:17  Phos  3.1     05-05  Mg     1.8     05-05    TPro  6.0  /  Alb  3.4<L>  /  TBili  1.0  /  DBili  x   /  AST  177<H>  /  ALT  61<H>  /  AlkPhos  170<H>  05-05    Culture - Blood (collected 03 May 2020 20:00)  Source: .Blood Blood-Venous  Preliminary Report (05 May 2020 06:01):    No growth to date.    RADIOLOGY:  < from: Xray Chest 1 View- PORTABLE-Routine (05.04.20 @ 08:07) >  Impression:    Low lung volumes. Bibasilar opacities, likely atelectasis -can be assessed on follow-up radiographs.  < end of copied text >    PHYSICAL EXAM:  GENERAL: tearful   HEENT:  Atraumatic, Normocephalic.   PULMONARY: Clear to auscultation bilaterally; No wheeze  CARDIOVASCULAR: regular rate & rhythm   GASTROINTESTINAL: soft, tender to palpation diffusely, nondistended   MUSCULOSKELETAL:  2+ Peripheral Pulses, No edema  NEUROLOGY: non-focal, no tremors   SKIN: No rashes     ADMISSION SUMMARY  Patient is a 44y old Female who presents with a chief complaint of Epigastric pain (04 May 2020 13:27)  Currently admitted to medicine with the primary diagnosis of Pancreatitis    ASSESSMENT & PLAN  43 y/o female with past medical history of chronic pancreatitis, alcohol abuse, alcoholic hepatic steatosis admitted for acute on chronic pancreatitis w/ possible pseudocyst v abscess.    #Acute on chronic pancreatitis secondary to alcohol abuse   - Elevated lipase >600 with consistent CT findings- also shows possible necrosis and possible pseudocyst vs abscess   - Hemodynamically stable without evidence of any end organ damage   - Triglycerides WNL, IgG negative. Ultrasound significant for hepatic steatosis. No evidence of cholelithiasis.   - No evidence of alcoholic hepatitis or acute liver failure- normal bilirubin, normal INR. Though patient does have underlying alcoholic steatosis   - Monitor BUN and hematocrit and liver enzymes   - will advance diet to clear liquid - then decrease fluids if tolerating diet   - blood cultures negative   - Pain control with PRN morphine    - zofran for nausea/vomiting, QTc 482     #Tachycardic overnight   -could be 2/2 pain v withdrawal     #Transaminitis secondary to alcoholic hepatitis   - liver enzymes downtrending   - alkaline phosphatase up-trending     #Hypomagnesemia/hypokalemia  -replete and follow up     #Suspected folic acid and thiamine deficiency   -Continue supplementation      #Alcohol abuse  -continue folic acid & thiamine supplementation  -continue on CIWA protocol and monitor for signs of alcohol withdrawal     #Diet - clear liquid   #DVT prophylaxis- heparin subq.   #GI prophylaxis- protonix

## 2020-05-05 NOTE — PROGRESS NOTE ADULT - SUBJECTIVE AND OBJECTIVE BOX
Patient is a 45 y/o female with past medical history of Chronic pancreatitis, alcohol abuse, alcoholic hepatic steatosis. Patient notes Epigastric pain. Pain is sharp, is 10/10 in intensity, radiating to back, associated with non bilious and non bloody vomiting. Precipitant is found to be excessive ETOH use and abuse despite being educated on ETOH avoidance. Patient remains on CIWA protocol. Patient still with pain and cannot advance diet. She is a bit emotional today. Could be secondary to continued ETOH w/d as was also Tachycardic. Pain is not worse.        PAST MEDICAL & SURGICAL HISTORY:  AA (alcohol abuse)  Chronic pancreatitis  Pancreatitis  S/P arthroscopy: meniscal repair      FAMILY HISTORY:  Family history of cholecystectomy: many female members in the family  FH: pancreatic cancer: cousin  Family history of hypertension: both father and mother      Social History:  Alcohol: alcohol abuse   Drugs: denies     Home Medications:  Multiple Vitamins with Minerals oral tablet: 1 tab(s) orally once a day (16 Feb 2020 13:00)    MEDICATIONS  (STANDING):  folic acid 1 milliGRAM(s) Oral daily  heparin   Injectable 5000 Unit(s) SubCutaneous every 8 hours  lactated ringers. 1000 milliLiter(s) (250 mL/Hr) IV Continuous <Continuous>  LORazepam   Injectable   IV Push   LORazepam   Injectable 2 milliGRAM(s) IV Push every 4 hours  pantoprazole  Injectable 40 milliGRAM(s) IV Push daily  potassium chloride  20 mEq/100 mL IVPB 20 milliEquivalent(s) IV Intermittent once  thiamine 100 milliGRAM(s) Oral daily    MEDICATIONS  (PRN):  HYDROmorphone  Injectable 2 milliGRAM(s) IV Push four times a day PRN Moderate Pain (4 - 6)  ondansetron Injectable 4 milliGRAM(s) IV Push three times a day PRN Nausea and/or Vomiting      Allergies  Compazine (Unknown)      Review of Systems:   Constitutional:  No Fever, No Chills  ENT/Mouth:  No Hearing Changes,  No Difficulty Swallowing  Eyes:  No Eye Pain, No Vision Changes  Cardiovascular:  No Chest Pain, No Palpitations  Respiratory:  No Cough, No Dyspnea  Gastrointestinal:  As described in HPI  Musculoskeletal:  No Joint Swelling, No Back Pain  Skin:  No Skin Lesions, No Jaundice  Neuro:  No Syncope, No Dizziness  Heme/Lymph:  No Bruising, No Bleeding.      Vital Signs   T(F): 98.7 (05 May 2020 06:14), Max: 98.8 (04 May 2020 21:00)  HR: 110 (05 May 2020 06:14) (102 - 131)  BP: 146/88 (05 May 2020 06:14) (134/83 - 146/88)  RR: 18 (05 May 2020 06:14) (18 - 18)  SpO2: 93% (04 May 2020 13:29) (93% - 93%)  Physical Exam  Gen: NAD  HEENT: NC/AT, Mucosal Membranes Moist  Cardio: S1/S2   Resp: CTA B/L  Abdomen: TTP diffuse  Neuro: AAOx3  Extremities: FROM x 4  Integum: No jaundice                               10.9   3.61  )-----------( 169      ( 05 May 2020 06:17 )             30.6     05-05    137  |  100  |  3<L>  ----------------------------<  80  3.3<L>   |  25  |  0.5<L>    Ca    8.3<L>      05 May 2020 06:17  Phos  3.1     05-05  Mg     1.8     05-05    TPro  6.0  /  Alb  3.4<L>  /  TBili  1.0  /  DBili  x   /  AST  177<H>  /  ALT  61<H>  /  AlkPhos  170<H>  05-05        CT Abdomen and Pelvis w/ IV Cont 05.03.20   IMPRESSION:    Acute on chronic pancreatitis with a 2.8 x 3.1 x 4.0 rim-enhancing fluid collection in the region of the pancreatic tail which borders the posterior gastric wall.     Collection may represent pseudocyst. Abscess is not excluded.    Heterogeneous low attenuation with the body of the pancreas and early necrosis is not excluded.

## 2020-05-06 LAB
ALBUMIN SERPL ELPH-MCNC: 3.5 G/DL — SIGNIFICANT CHANGE UP (ref 3.5–5.2)
ALP SERPL-CCNC: 217 U/L — HIGH (ref 30–115)
ALT FLD-CCNC: 56 U/L — HIGH (ref 0–41)
ANION GAP SERPL CALC-SCNC: 14 MMOL/L — SIGNIFICANT CHANGE UP (ref 7–14)
AST SERPL-CCNC: 165 U/L — HIGH (ref 0–41)
BILIRUB SERPL-MCNC: 1 MG/DL — SIGNIFICANT CHANGE UP (ref 0.2–1.2)
BUN SERPL-MCNC: <3 MG/DL — LOW (ref 10–20)
CALCIUM SERPL-MCNC: 8.9 MG/DL — SIGNIFICANT CHANGE UP (ref 8.5–10.1)
CHLORIDE SERPL-SCNC: 98 MMOL/L — SIGNIFICANT CHANGE UP (ref 98–110)
CO2 SERPL-SCNC: 27 MMOL/L — SIGNIFICANT CHANGE UP (ref 17–32)
CREAT SERPL-MCNC: 0.5 MG/DL — LOW (ref 0.7–1.5)
GLUCOSE SERPL-MCNC: 101 MG/DL — HIGH (ref 70–99)
HCT VFR BLD CALC: 30.8 % — LOW (ref 37–47)
HGB BLD-MCNC: 10.7 G/DL — LOW (ref 12–16)
MCHC RBC-ENTMCNC: 33.2 PG — HIGH (ref 27–31)
MCHC RBC-ENTMCNC: 34.7 G/DL — SIGNIFICANT CHANGE UP (ref 32–37)
MCV RBC AUTO: 95.7 FL — SIGNIFICANT CHANGE UP (ref 81–99)
NRBC # BLD: 0 /100 WBCS — SIGNIFICANT CHANGE UP (ref 0–0)
PLATELET # BLD AUTO: 201 K/UL — SIGNIFICANT CHANGE UP (ref 130–400)
POTASSIUM SERPL-MCNC: 4.3 MMOL/L — SIGNIFICANT CHANGE UP (ref 3.5–5)
POTASSIUM SERPL-SCNC: 4.3 MMOL/L — SIGNIFICANT CHANGE UP (ref 3.5–5)
PROT SERPL-MCNC: 6.2 G/DL — SIGNIFICANT CHANGE UP (ref 6–8)
RBC # BLD: 3.22 M/UL — LOW (ref 4.2–5.4)
RBC # FLD: 11.9 % — SIGNIFICANT CHANGE UP (ref 11.5–14.5)
SODIUM SERPL-SCNC: 139 MMOL/L — SIGNIFICANT CHANGE UP (ref 135–146)
WBC # BLD: 3.56 K/UL — LOW (ref 4.8–10.8)
WBC # FLD AUTO: 3.56 K/UL — LOW (ref 4.8–10.8)

## 2020-05-06 PROCEDURE — 99233 SBSQ HOSP IP/OBS HIGH 50: CPT

## 2020-05-06 RX ORDER — ENOXAPARIN SODIUM 100 MG/ML
40 INJECTION SUBCUTANEOUS DAILY
Refills: 0 | Status: DISCONTINUED | OUTPATIENT
Start: 2020-05-06 | End: 2020-05-13

## 2020-05-06 RX ADMIN — OXYCODONE HYDROCHLORIDE 5 MILLIGRAM(S): 5 TABLET ORAL at 00:57

## 2020-05-06 RX ADMIN — Medication 1 MILLIGRAM(S): at 12:11

## 2020-05-06 RX ADMIN — Medication 40 MILLIEQUIVALENT(S): at 06:35

## 2020-05-06 RX ADMIN — Medication 2 MILLIGRAM(S): at 13:48

## 2020-05-06 RX ADMIN — HEPARIN SODIUM 5000 UNIT(S): 5000 INJECTION INTRAVENOUS; SUBCUTANEOUS at 06:01

## 2020-05-06 RX ADMIN — HYDROMORPHONE HYDROCHLORIDE 1 MILLIGRAM(S): 2 INJECTION INTRAMUSCULAR; INTRAVENOUS; SUBCUTANEOUS at 10:38

## 2020-05-06 RX ADMIN — HYDROMORPHONE HYDROCHLORIDE 1 MILLIGRAM(S): 2 INJECTION INTRAMUSCULAR; INTRAVENOUS; SUBCUTANEOUS at 01:18

## 2020-05-06 RX ADMIN — SODIUM CHLORIDE 250 MILLILITER(S): 9 INJECTION, SOLUTION INTRAVENOUS at 12:11

## 2020-05-06 RX ADMIN — HYDROMORPHONE HYDROCHLORIDE 1 MILLIGRAM(S): 2 INJECTION INTRAMUSCULAR; INTRAVENOUS; SUBCUTANEOUS at 17:24

## 2020-05-06 RX ADMIN — Medication 100 MILLIGRAM(S): at 12:11

## 2020-05-06 RX ADMIN — ONDANSETRON 4 MILLIGRAM(S): 8 TABLET, FILM COATED ORAL at 06:36

## 2020-05-06 RX ADMIN — PANTOPRAZOLE SODIUM 40 MILLIGRAM(S): 20 TABLET, DELAYED RELEASE ORAL at 06:02

## 2020-05-06 RX ADMIN — SODIUM CHLORIDE 250 MILLILITER(S): 9 INJECTION, SOLUTION INTRAVENOUS at 04:32

## 2020-05-06 RX ADMIN — HYDROMORPHONE HYDROCHLORIDE 1 MILLIGRAM(S): 2 INJECTION INTRAMUSCULAR; INTRAVENOUS; SUBCUTANEOUS at 21:30

## 2020-05-06 RX ADMIN — HYDROMORPHONE HYDROCHLORIDE 1 MILLIGRAM(S): 2 INJECTION INTRAMUSCULAR; INTRAVENOUS; SUBCUTANEOUS at 06:36

## 2020-05-06 RX ADMIN — ENOXAPARIN SODIUM 40 MILLIGRAM(S): 100 INJECTION SUBCUTANEOUS at 12:11

## 2020-05-06 NOTE — PROGRESS NOTE ADULT - SUBJECTIVE AND OBJECTIVE BOX
LAURA BARAJAS 44y Female  MRN#: [id number]   CODE STATUS: FULL       SUBJECTIVE  Patient is a 44y old Female who presents with a chief complaint of Epigastric pain (06 May 2020 09:35)  Currently admitted to medicine with the primary diagnosis of Pancreatitis    Today is hospital day 3d. Patient reports improvement of pain on new medication regimen. Is tolerating some clear liquids.     OBJECTIVE  PAST MEDICAL & SURGICAL HISTORY  AA (alcohol abuse)  Chronic pancreatitis  Pancreatitis  S/P arthroscopy: meniscal repair    ALLERGIES:  Compazine (Unknown)    MEDICATIONS:  STANDING MEDICATIONS  enoxaparin Injectable 40 milliGRAM(s) SubCutaneous daily  folic acid 1 milliGRAM(s) Oral daily  lactated ringers. 1000 milliLiter(s) IV Continuous <Continuous>  pantoprazole    Tablet 40 milliGRAM(s) Oral before breakfast  thiamine 100 milliGRAM(s) Oral daily    PRN MEDICATIONS  HYDROmorphone  Injectable 1 milliGRAM(s) IV Push every 4 hours PRN  LORazepam   Injectable 2 milliGRAM(s) IV Push every 4 hours PRN  ondansetron Injectable 4 milliGRAM(s) IV Push three times a day PRN  oxyCODONE    IR 5 milliGRAM(s) Oral every 6 hours PRN      VITAL SIGNS: Last 24 Hours  T(C): 36.9 (06 May 2020 05:44), Max: 37.1 (05 May 2020 13:27)  T(F): 98.4 (06 May 2020 05:44), Max: 98.8 (05 May 2020 13:27)  HR: 98 (06 May 2020 05:44) (98 - 114)  BP: 139/99 (06 May 2020 05:44) (139/99 - 176/103)  BP(mean): --  RR: 18 (06 May 2020 05:44) (18 - 18)  SpO2: --    LABS:                        10.7   3.56  )-----------( 201      ( 06 May 2020 08:11 )             30.8     05-06    139  |  98  |  <3<L>  ----------------------------<  101<H>  4.3   |  27  |  0.5<L>    Ca    8.9      06 May 2020 08:11  Phos  3.1     05-05  Mg     1.8     05-05    TPro  6.2  /  Alb  3.5  /  TBili  1.0  /  DBili  x   /  AST  165<H>  /  ALT  56<H>  /  AlkPhos  217<H>  05-06    Culture - Blood (collected 03 May 2020 20:00)  Source: .Blood Blood-Venous  Preliminary Report (05 May 2020 06:01):    No growth to date.    RADIOLOGY:  < from: Xray Chest 1 View- PORTABLE-Routine (05.04.20 @ 08:07) >  Impression:      Low lung volumes. Bibasilar opacities, likely atelectasis -can be assessed on follow-up radiographs.  < end of copied text >      PHYSICAL EXAM:  GENERAL: NAD   HEENT:  Atraumatic, Normocephalic.   PULMONARY: Clear to auscultation bilaterally; No wheeze  CARDIOVASCULAR: regular rate & rhythm   GASTROINTESTINAL: soft, tender to palpation diffusely, nondistended   MUSCULOSKELETAL:  2+ Peripheral Pulses, No edema  NEUROLOGY: non-focal, no tremors   SKIN: No rashes     ADMISSION SUMMARY  Patient is a 44y old Female who presents with a chief complaint of Epigastric pain (06 May 2020 09:35)  Currently admitted to medicine with the primary diagnosis of Pancreatitis    ASSESSMENT & PLAN  45 y/o female with past medical history of chronic pancreatitis, alcohol abuse, alcoholic hepatic steatosis admitted for acute on chronic pancreatitis w/ possible pseudocyst v abscess.    #Acute on chronic pancreatitis secondary to alcohol abuse   - Elevated lipase >600 with consistent CT findings- also shows possible necrosis and possible pseudocyst vs abscess   - Hemodynamically stable without evidence of any end organ damage   - Triglycerides WNL, IgG negative. Ultrasound significant for hepatic steatosis. No evidence of cholelithiasis.   - No evidence of alcoholic hepatitis or acute liver failure- normal bilirubin, normal INR. Though patient does have underlying alcoholic steatosis   - Monitor BUN and hematocrit and liver enzymes   - will advance diet to clear liquid - then decrease fluids if tolerating diet   - blood cultures negative   - Pain control with PRN morphine    - zofran for nausea/vomiting, QTc 482   - on clear liquid diet for now     #Tachycardic overnight   -could be 2/2 pain v withdrawal     #Transaminitis secondary to alcoholic hepatitis   - liver enzymes downtrending   - alkaline phosphatase up-trending     #Hypomagnesemia/hypokalemia  -replete and follow up     #Suspected folic acid and thiamine deficiency   -Continue supplementation      #Alcohol abuse  -continue folic acid & thiamine supplementation  -continue on CIWA protocol and monitor for signs of alcohol withdrawal   -patient refused detox consult     #Diet - clear liquid   #DVT prophylaxis- heparin subq.   #GI prophylaxis- protonix
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
## 2020-05-06 NOTE — PROGRESS NOTE ADULT - SUBJECTIVE AND OBJECTIVE BOX
Patient is a 44y old  Female who presents with a chief complaint of Epigastric pain (04 May 2020 11:01)    Patient was seen and examined.  Continues to have epigastric pain- controlled with pain meds. Denies nausea, vomiting  Denies any other complaints.  All systems reviewed .    PAST MEDICAL & SURGICAL HISTORY:  AA (alcohol abuse)  Chronic pancreatitis  Pancreatitis  S/P arthroscopy: meniscal repair    Allergies  Compazine (Unknown)    MEDICATIONS  (STANDING):  enoxaparin Injectable 40 milliGRAM(s) SubCutaneous daily  folic acid 1 milliGRAM(s) Oral daily  lactated ringers. 1000 milliLiter(s) (250 mL/Hr) IV Continuous <Continuous>  pantoprazole    Tablet 40 milliGRAM(s) Oral before breakfast  thiamine 100 milliGRAM(s) Oral daily    MEDICATIONS  (PRN):  HYDROmorphone  Injectable 1 milliGRAM(s) IV Push every 4 hours PRN Severe Pain (7 - 10)  LORazepam   Injectable 2 milliGRAM(s) IV Push every 4 hours PRN Agitation  ondansetron Injectable 4 milliGRAM(s) IV Push three times a day PRN Nausea and/or Vomiting  oxyCODONE    IR 5 milliGRAM(s) Oral every 6 hours PRN Moderate Pain (4 - 6)    Vital Signs Last 24 Hrs  T(C): 36.9 (06 May 2020 05:44), Max: 37.1 (05 May 2020 13:27)  T(F): 98.4 (06 May 2020 05:44), Max: 98.8 (05 May 2020 13:27)  HR: 98 (06 May 2020 05:44) (98 - 114)  BP: 139/99 (06 May 2020 05:44) (139/99 - 176/103)  RR: 18 (06 May 2020 05:44) (18 - 18)  SpO2: --    O/E:  Awake, alert, not in distress.  HEENT: atraumatic, EOMI.  Chest: clear.  CVS: SIS2 +, no murmur.  P/A: Soft, BS+, epigastric tenderness+  CNS: non focal.  Ext: no edema feet.  Skin: no rash, no ulcers.  All systems reviewed positive findings as above.                                   10.7<L>  3.56<L> )-----------( 201      ( 06 May 2020 08:11 )             30.8<L>                        10.9<L>  3.61<L> )-----------( 169      ( 05 May 2020 06:17 )             30.6<L>  05-06    139  |  98  |  <3<L>  ----------------------------<  101<H>  4.3   |  27  |  0.5<L>  05-05    137  |  100  |  3<L>  ----------------------------<  80  3.3<L>   |  25  |  0.5<L>    Ca    8.9      06 May 2020 08:11  Ca    8.3<L>      05 May 2020 06:17  Ca    7.8<L>      04 May 2020 17:41  Phos  3.1     05-05  Mg     1.8     05-05    TPro  6.2  /  Alb  3.5  /  TBili  1.0  /  DBili  x   /  AST  165<H>  /  ALT  56<H>  /  AlkPhos  217<H>  05-06  TPro  6.0  /  Alb  3.4<L>  /  TBili  1.0  /  DBili  x   /  AST  177<H>  /  ALT  61<H>  /  AlkPhos  170<H>  05-05  TPro  6.0  /  Alb  3.5  /  TBili  1.1  /  DBili  x   /  AST  197<H>  /  ALT  72<H>  /  AlkPhos  132<H>  05-04

## 2020-05-07 LAB
ALBUMIN SERPL ELPH-MCNC: 3.4 G/DL — LOW (ref 3.5–5.2)
ALP SERPL-CCNC: 196 U/L — HIGH (ref 30–115)
ALT FLD-CCNC: 53 U/L — HIGH (ref 0–41)
ANION GAP SERPL CALC-SCNC: 17 MMOL/L — HIGH (ref 7–14)
AST SERPL-CCNC: 153 U/L — HIGH (ref 0–41)
BILIRUB SERPL-MCNC: 0.9 MG/DL — SIGNIFICANT CHANGE UP (ref 0.2–1.2)
BUN SERPL-MCNC: 3 MG/DL — LOW (ref 10–20)
CALCIUM SERPL-MCNC: 9.1 MG/DL — SIGNIFICANT CHANGE UP (ref 8.5–10.1)
CHLORIDE SERPL-SCNC: 97 MMOL/L — LOW (ref 98–110)
CO2 SERPL-SCNC: 25 MMOL/L — SIGNIFICANT CHANGE UP (ref 17–32)
CREAT SERPL-MCNC: 0.6 MG/DL — LOW (ref 0.7–1.5)
GLUCOSE SERPL-MCNC: 94 MG/DL — SIGNIFICANT CHANGE UP (ref 70–99)
HCT VFR BLD CALC: 30.3 % — LOW (ref 37–47)
HGB BLD-MCNC: 10.7 G/DL — LOW (ref 12–16)
LIDOCAIN IGE QN: 85 U/L — HIGH (ref 7–60)
MAGNESIUM SERPL-MCNC: 1.4 MG/DL — LOW (ref 1.8–2.4)
MCHC RBC-ENTMCNC: 34.3 PG — HIGH (ref 27–31)
MCHC RBC-ENTMCNC: 35.3 G/DL — SIGNIFICANT CHANGE UP (ref 32–37)
MCV RBC AUTO: 97.1 FL — SIGNIFICANT CHANGE UP (ref 81–99)
NRBC # BLD: 0 /100 WBCS — SIGNIFICANT CHANGE UP (ref 0–0)
PLATELET # BLD AUTO: 160 K/UL — SIGNIFICANT CHANGE UP (ref 130–400)
POTASSIUM SERPL-MCNC: 3.9 MMOL/L — SIGNIFICANT CHANGE UP (ref 3.5–5)
POTASSIUM SERPL-SCNC: 3.9 MMOL/L — SIGNIFICANT CHANGE UP (ref 3.5–5)
PROT SERPL-MCNC: 6.3 G/DL — SIGNIFICANT CHANGE UP (ref 6–8)
RBC # BLD: 3.12 M/UL — LOW (ref 4.2–5.4)
RBC # FLD: 11.9 % — SIGNIFICANT CHANGE UP (ref 11.5–14.5)
SODIUM SERPL-SCNC: 139 MMOL/L — SIGNIFICANT CHANGE UP (ref 135–146)
WBC # BLD: 2.93 K/UL — LOW (ref 4.8–10.8)
WBC # FLD AUTO: 2.93 K/UL — LOW (ref 4.8–10.8)

## 2020-05-07 PROCEDURE — 99233 SBSQ HOSP IP/OBS HIGH 50: CPT

## 2020-05-07 PROCEDURE — 99232 SBSQ HOSP IP/OBS MODERATE 35: CPT

## 2020-05-07 RX ORDER — LIPASE/PROTEASE/AMYLASE 16-48-48K
3 CAPSULE,DELAYED RELEASE (ENTERIC COATED) ORAL
Refills: 0 | Status: DISCONTINUED | OUTPATIENT
Start: 2020-05-07 | End: 2020-05-13

## 2020-05-07 RX ORDER — MAGNESIUM SULFATE 500 MG/ML
2 VIAL (ML) INJECTION ONCE
Refills: 0 | Status: COMPLETED | OUTPATIENT
Start: 2020-05-07 | End: 2020-05-07

## 2020-05-07 RX ORDER — MULTIVIT-MIN/FERROUS GLUCONATE 9 MG/15 ML
1 LIQUID (ML) ORAL DAILY
Refills: 0 | Status: DISCONTINUED | OUTPATIENT
Start: 2020-05-07 | End: 2020-05-13

## 2020-05-07 RX ADMIN — OXYCODONE HYDROCHLORIDE 5 MILLIGRAM(S): 5 TABLET ORAL at 05:13

## 2020-05-07 RX ADMIN — OXYCODONE HYDROCHLORIDE 5 MILLIGRAM(S): 5 TABLET ORAL at 12:22

## 2020-05-07 RX ADMIN — SODIUM CHLORIDE 150 MILLILITER(S): 9 INJECTION, SOLUTION INTRAVENOUS at 23:14

## 2020-05-07 RX ADMIN — HYDROMORPHONE HYDROCHLORIDE 1 MILLIGRAM(S): 2 INJECTION INTRAMUSCULAR; INTRAVENOUS; SUBCUTANEOUS at 02:01

## 2020-05-07 RX ADMIN — HYDROMORPHONE HYDROCHLORIDE 1 MILLIGRAM(S): 2 INJECTION INTRAMUSCULAR; INTRAVENOUS; SUBCUTANEOUS at 09:27

## 2020-05-07 RX ADMIN — Medication 100 MILLIGRAM(S): at 12:16

## 2020-05-07 RX ADMIN — Medication 25 GRAM(S): at 10:44

## 2020-05-07 RX ADMIN — SODIUM CHLORIDE 150 MILLILITER(S): 9 INJECTION, SOLUTION INTRAVENOUS at 12:16

## 2020-05-07 RX ADMIN — HYDROMORPHONE HYDROCHLORIDE 1 MILLIGRAM(S): 2 INJECTION INTRAMUSCULAR; INTRAVENOUS; SUBCUTANEOUS at 22:06

## 2020-05-07 RX ADMIN — OXYCODONE HYDROCHLORIDE 5 MILLIGRAM(S): 5 TABLET ORAL at 17:58

## 2020-05-07 RX ADMIN — ENOXAPARIN SODIUM 40 MILLIGRAM(S): 100 INJECTION SUBCUTANEOUS at 12:16

## 2020-05-07 RX ADMIN — Medication 1 MILLIGRAM(S): at 12:16

## 2020-05-07 RX ADMIN — Medication 1 TABLET(S): at 14:21

## 2020-05-07 RX ADMIN — ONDANSETRON 4 MILLIGRAM(S): 8 TABLET, FILM COATED ORAL at 14:21

## 2020-05-07 RX ADMIN — Medication 3 CAPSULE(S): at 17:51

## 2020-05-07 RX ADMIN — HYDROMORPHONE HYDROCHLORIDE 1 MILLIGRAM(S): 2 INJECTION INTRAMUSCULAR; INTRAVENOUS; SUBCUTANEOUS at 14:14

## 2020-05-07 RX ADMIN — SODIUM CHLORIDE 150 MILLILITER(S): 9 INJECTION, SOLUTION INTRAVENOUS at 09:29

## 2020-05-07 RX ADMIN — PANTOPRAZOLE SODIUM 40 MILLIGRAM(S): 20 TABLET, DELAYED RELEASE ORAL at 05:12

## 2020-05-07 NOTE — DIETITIAN INITIAL EVALUATION ADULT. - ADD RECOMMEND
1. Add ensure clear BID. 2. Consider advancing diet order to low fat when medically feasible. 1. Add ensure clear TID. 2. Consider advancing diet order to low fat when medically feasible.

## 2020-05-07 NOTE — PROGRESS NOTE ADULT - SUBJECTIVE AND OBJECTIVE BOX
Patient is a 44y old  Female who presents with a chief complaint of Epigastric pain (04 May 2020 11:01)    Patient was seen and examined.  Continues to have epigastric pain- controlled with pain meds. Denies nausea, vomiting  Pt wants to try oatmeal/ cream of wheat today  Denies any other complaints.  All systems reviewed .    PAST MEDICAL & SURGICAL HISTORY:  AA (alcohol abuse)  Chronic pancreatitis  Pancreatitis  S/P arthroscopy: meniscal repair    Allergies  Compazine (Unknown)    MEDICATIONS  (STANDING):  enoxaparin Injectable 40 milliGRAM(s) SubCutaneous daily  folic acid 1 milliGRAM(s) Oral daily  lactated ringers. 1000 milliLiter(s) (150 mL/Hr) IV Continuous <Continuous>  magnesium sulfate  IVPB 2 Gram(s) IV Intermittent once  multivitamin/minerals 1 Tablet(s) Oral daily  pancrelipase  (CREON 12,000 Lipase Units) 3 Capsule(s) Oral three times a day with meals  pantoprazole    Tablet 40 milliGRAM(s) Oral before breakfast  thiamine 100 milliGRAM(s) Oral daily    MEDICATIONS  (PRN):  HYDROmorphone  Injectable 1 milliGRAM(s) IV Push every 4 hours PRN Severe Pain (7 - 10)  LORazepam   Injectable 2 milliGRAM(s) IV Push every 4 hours PRN Agitation  ondansetron Injectable 4 milliGRAM(s) IV Push three times a day PRN Nausea and/or Vomiting  oxyCODONE    IR 5 milliGRAM(s) Oral every 6 hours PRN Moderate Pain (4 - 6)    O/E:  Awake, alert, not in distress.  HEENT: atraumatic, EOMI.  Chest: clear.  CVS: SIS2 +, no murmur.  P/A: Soft, BS+, epigastric tenderness+  CNS: non focal.  Ext: no edema feet.  Skin: no rash, no ulcers.  All systems reviewed positive findings as above.                          10.7<L>  2.93<L> )-----------( 160      ( 07 May 2020 06:33 )             30.3<L>                        10.7<L>  3.56<L> )-----------( 201      ( 06 May 2020 08:11 )             30.8<L>  05-07    139  |  97<L>  |  3<L>  ----------------------------<  94  3.9   |  25  |  0.6<L>  05-06    139  |  98  |  <3<L>  ----------------------------<  101<H>  4.3   |  27  |  0.5<L>    Ca    9.1      07 May 2020 06:33  Ca    8.9      06 May 2020 08:11  Mg     1.4     05-07    TPro  6.3  /  Alb  3.4<L>  /  TBili  0.9  /  DBili  x   /  AST  153<H>  /  ALT  53<H>  /  AlkPhos  196<H>  05-07  TPro  6.2  /  Alb  3.5  /  TBili  1.0  /  DBili  x   /  AST  165<H>  /  ALT  56<H>  /  AlkPhos  217<H>  05-06
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
## 2020-05-07 NOTE — PROGRESS NOTE ADULT - SUBJECTIVE AND OBJECTIVE BOX
LAURA BARAJAS 44y Female  MRN#: [id number]   CODE STATUS: FULL    SUBJECTIVE  Patient is a 44y old Female who presents with a chief complaint of Epigastric pain (06 May 2020 11:16)  Currently admitted to medicine with the primary diagnosis of Pancreatitis    Today is hospital day 4d.     OBJECTIVE  PAST MEDICAL & SURGICAL HISTORY  AA (alcohol abuse)  Chronic pancreatitis  Pancreatitis  S/P arthroscopy: meniscal repair    ALLERGIES:  Compazine (Unknown)    MEDICATIONS:  STANDING MEDICATIONS  enoxaparin Injectable 40 milliGRAM(s) SubCutaneous daily  folic acid 1 milliGRAM(s) Oral daily  lactated ringers. 1000 milliLiter(s) IV Continuous <Continuous>  pantoprazole    Tablet 40 milliGRAM(s) Oral before breakfast  thiamine 100 milliGRAM(s) Oral daily    PRN MEDICATIONS  HYDROmorphone  Injectable 1 milliGRAM(s) IV Push every 4 hours PRN  LORazepam   Injectable 2 milliGRAM(s) IV Push every 4 hours PRN  ondansetron Injectable 4 milliGRAM(s) IV Push three times a day PRN  oxyCODONE    IR 5 milliGRAM(s) Oral every 6 hours PRN      VITAL SIGNS: Last 24 Hours  T(C): 36.6 (07 May 2020 05:14), Max: 36.7 (06 May 2020 13:20)  T(F): 97.8 (07 May 2020 05:14), Max: 98 (06 May 2020 13:20)  HR: 81 (07 May 2020 05:14) (81 - 87)  BP: 149/85 (07 May 2020 05:14) (145/72 - 160/95)  BP(mean): --  RR: 19 (07 May 2020 05:14) (18 - 19)  SpO2: --    LABS:                        10.7   3.56  )-----------( 201      ( 06 May 2020 08:11 )             30.8     05-06    139  |  98  |  <3<L>  ----------------------------<  101<H>  4.3   |  27  |  0.5<L>    Ca    8.9      06 May 2020 08:11    TPro  6.2  /  Alb  3.5  /  TBili  1.0  /  DBili  x   /  AST  165<H>  /  ALT  56<H>  /  AlkPhos  217<H>  05-06    RADIOLOGY:      PHYSICAL EXAM:        ADMISSION SUMMARY  Patient is a 44y old Female who presents with a chief complaint of Epigastric pain (06 May 2020 11:16)  Currently admitted to medicine with the primary diagnosis of Pancreatitis    ASSESSMENT & PLAN LAURA BARAJAS 44y Female  MRN#: [id number]   CODE STATUS: FULL    SUBJECTIVE  Patient is a 44y old Female who presents with a chief complaint of Epigastric pain (06 May 2020 11:16)  Currently admitted to medicine with the primary diagnosis of Pancreatitis    Today is hospital day 4d. Patient     OBJECTIVE  PAST MEDICAL & SURGICAL HISTORY  AA (alcohol abuse)  Chronic pancreatitis  Pancreatitis  S/P arthroscopy: meniscal repair    ALLERGIES:  Compazine (Unknown)    MEDICATIONS:  STANDING MEDICATIONS  enoxaparin Injectable 40 milliGRAM(s) SubCutaneous daily  folic acid 1 milliGRAM(s) Oral daily  lactated ringers. 1000 milliLiter(s) IV Continuous <Continuous>  pantoprazole    Tablet 40 milliGRAM(s) Oral before breakfast  thiamine 100 milliGRAM(s) Oral daily    PRN MEDICATIONS  HYDROmorphone  Injectable 1 milliGRAM(s) IV Push every 4 hours PRN  LORazepam   Injectable 2 milliGRAM(s) IV Push every 4 hours PRN  ondansetron Injectable 4 milliGRAM(s) IV Push three times a day PRN  oxyCODONE    IR 5 milliGRAM(s) Oral every 6 hours PRN      VITAL SIGNS: Last 24 Hours  T(C): 36.6 (07 May 2020 05:14), Max: 36.7 (06 May 2020 13:20)  T(F): 97.8 (07 May 2020 05:14), Max: 98 (06 May 2020 13:20)  HR: 81 (07 May 2020 05:14) (81 - 87)  BP: 149/85 (07 May 2020 05:14) (145/72 - 160/95)  BP(mean): --  RR: 19 (07 May 2020 05:14) (18 - 19)  SpO2: --    LABS:                        10.7   3.56  )-----------( 201      ( 06 May 2020 08:11 )             30.8     05-06    139  |  98  |  <3<L>  ----------------------------<  101<H>  4.3   |  27  |  0.5<L>    Ca    8.9      06 May 2020 08:11    TPro  6.2  /  Alb  3.5  /  TBili  1.0  /  DBili  x   /  AST  165<H>  /  ALT  56<H>  /  AlkPhos  217<H>  05-06    RADIOLOGY:      PHYSICAL EXAM:  GENERAL: NAD   HEENT:  Atraumatic, Normocephalic.   PULMONARY: Clear to auscultation bilaterally; No wheeze  CARDIOVASCULAR: regular rate & rhythm   GASTROINTESTINAL: soft, tender to palpation mostly in upper quadrants, nondistended   MUSCULOSKELETAL:  2+ Peripheral Pulses, No edema  NEUROLOGY: non-focal, no tremors   SKIN: No rashes     ADMISSION SUMMARY  Patient is a 44y old Female who presents with a chief complaint of Epigastric pain (06 May 2020 11:16)  Currently admitted to medicine with the primary diagnosis of Pancreatitis    ASSESSMENT & PLAN  43 y/o female with past medical history of chronic pancreatitis, alcohol abuse, alcoholic hepatic steatosis admitted for acute on chronic pancreatitis w/ possible pseudocyst v abscess.    #Acute on chronic pancreatitis secondary to alcohol abuse   - Elevated lipase >600 with consistent CT findings- also shows possible necrosis and possible pseudocyst vs abscess   - Hemodynamically stable without evidence of any end organ damage   - Triglycerides WNL, IgG negative. Ultrasound significant for hepatic steatosis. No evidence of cholelithiasis.   - No evidence of alcoholic hepatitis or acute liver failure- normal bilirubin, normal INR. Though patient does have underlying alcoholic steatosis.   - Monitor BUN and hematocrit and liver enzymes   - diet- clear liquid w/ cream of wheat - patient says she tolerates that moreso than clear liquids   - decreased fluids today to 150cc/hr   - restart creon    - blood cultures negative   - Pain control with PRN Dilaudid & oxycodone  - patient has been asking for every dose   - zofran for nausea/vomiting, QTc 482   - on clear liquid diet for now - can attempt to     #Tachycardic, resolved   -could be 2/2 pain v withdrawal     #Transaminitis secondary to alcoholic hepatitis   - liver enzymes downtrending   - alkaline phosphatase now decreasing    #Hypomagnesemia/hypokalemia  -replete and follow up     #Suspected folic acid and thiamine deficiency   -Continue supplementation      #Alcohol abuse  -continue folic acid & thiamine supplementation  -continue on CIWA protocol and monitor for signs of alcohol withdrawal   -patient refused detox consult     #Diet - clear liquid, ok to eat cream of wheat   #DVT prophylaxis- lovenox   #GI prophylaxis- protonix LAURA BARAJAS 44y Female  MRN#: [id number]   CODE STATUS: FULL    SUBJECTIVE  Patient is a 44y old Female who presents with a chief complaint of Epigastric pain (06 May 2020 11:16)  Currently admitted to medicine with the primary diagnosis of Pancreatitis    Today is hospital day 4d. Patient complains of pain that is relieved with current pain regimen.     OBJECTIVE  PAST MEDICAL & SURGICAL HISTORY  AA (alcohol abuse)  Chronic pancreatitis  Pancreatitis  S/P arthroscopy: meniscal repair    ALLERGIES:  Compazine (Unknown)    MEDICATIONS:  STANDING MEDICATIONS  enoxaparin Injectable 40 milliGRAM(s) SubCutaneous daily  folic acid 1 milliGRAM(s) Oral daily  lactated ringers. 1000 milliLiter(s) IV Continuous <Continuous>  pantoprazole    Tablet 40 milliGRAM(s) Oral before breakfast  thiamine 100 milliGRAM(s) Oral daily    PRN MEDICATIONS  HYDROmorphone  Injectable 1 milliGRAM(s) IV Push every 4 hours PRN  LORazepam   Injectable 2 milliGRAM(s) IV Push every 4 hours PRN  ondansetron Injectable 4 milliGRAM(s) IV Push three times a day PRN  oxyCODONE    IR 5 milliGRAM(s) Oral every 6 hours PRN      VITAL SIGNS: Last 24 Hours  T(C): 36.6 (07 May 2020 05:14), Max: 36.7 (06 May 2020 13:20)  T(F): 97.8 (07 May 2020 05:14), Max: 98 (06 May 2020 13:20)  HR: 81 (07 May 2020 05:14) (81 - 87)  BP: 149/85 (07 May 2020 05:14) (145/72 - 160/95)  BP(mean): --  RR: 19 (07 May 2020 05:14) (18 - 19)  SpO2: --    LABS:                        10.7   3.56  )-----------( 201      ( 06 May 2020 08:11 )             30.8     05-06    139  |  98  |  <3<L>  ----------------------------<  101<H>  4.3   |  27  |  0.5<L>    Ca    8.9      06 May 2020 08:11    TPro  6.2  /  Alb  3.5  /  TBili  1.0  /  DBili  x   /  AST  165<H>  /  ALT  56<H>  /  AlkPhos  217<H>  05-06    RADIOLOGY:      PHYSICAL EXAM:  GENERAL: NAD   HEENT:  Atraumatic, Normocephalic.   PULMONARY: Clear to auscultation bilaterally; No wheeze  CARDIOVASCULAR: regular rate & rhythm   GASTROINTESTINAL: soft, tender to palpation mostly in upper quadrants, nondistended   MUSCULOSKELETAL:  2+ Peripheral Pulses, No edema  NEUROLOGY: non-focal, no tremors   SKIN: No rashes     ADMISSION SUMMARY  Patient is a 44y old Female who presents with a chief complaint of Epigastric pain (06 May 2020 11:16)  Currently admitted to medicine with the primary diagnosis of Pancreatitis    ASSESSMENT & PLAN  45 y/o female with past medical history of chronic pancreatitis, alcohol abuse, alcoholic hepatic steatosis admitted for acute on chronic pancreatitis w/ possible pseudocyst v abscess.    #Acute on chronic pancreatitis secondary to alcohol abuse   - Elevated lipase >600 with consistent CT findings- also shows possible necrosis and possible pseudocyst vs abscess   - Hemodynamically stable without evidence of any end organ damage   - Triglycerides WNL, IgG negative. Ultrasound significant for hepatic steatosis. No evidence of cholelithiasis.   - No evidence of alcoholic hepatitis or acute liver failure- normal bilirubin, normal INR. Though patient does have underlying alcoholic steatosis.   - Monitor BUN and hematocrit and liver enzymes   - diet- clear liquid w/ cream of wheat - patient says she tolerates that moreso than clear liquids   - decreased fluids today to 150cc/hr   - restart creon    - blood cultures negative   - Pain control with PRN Dilaudid & oxycodone  - patient has been asking for every dose   - zofran for nausea/vomiting, QTc 482   - on clear liquid diet for now - can attempt to     #Tachycardic, resolved   -could be 2/2 pain v withdrawal     #Transaminitis secondary to alcoholic hepatitis   - liver enzymes downtrending   - alkaline phosphatase now decreasing    #Hypomagnesemia/hypokalemia  -replete and follow up     #Suspected folic acid and thiamine deficiency   -Continue supplementation      #Alcohol abuse  -continue folic acid & thiamine supplementation  -continue on CIWA protocol and monitor for signs of alcohol withdrawal   -patient refused detox consult     #Diet - clear liquid, ok to eat cream of wheat   #DVT prophylaxis- lovenox   #GI prophylaxis- protonix

## 2020-05-07 NOTE — DIETITIAN INITIAL EVALUATION ADULT. - ENERGY NEEDS
Calories: 2829-9199 kcal/day (MSJ x 1.2-1.3 AF)  Protein: 59-71 g/day (1-1.2 g/day)  Fluids: 1 ml/kcal or per LIP

## 2020-05-07 NOTE — DIETITIAN INITIAL EVALUATION ADULT. - RD TO REMAIN AVAILABLE
nterventions: meals and snacks, medical food supplement. Monitor/Evaluate: RD to monitor energy intake, diet order, body comp. NFPF,/yes yes/Interventions: meals and snacks, medical food supplement. Monitor/Evaluate: RD to monitor energy intake, diet order, body comp. NFPF,

## 2020-05-07 NOTE — DIETITIAN INITIAL EVALUATION ADULT. - OTHER INFO
Pertinent Medical Information: Acute on chronic pancreatitis secondary to alcohol abuse; CT shows possible necrosis and possible pseudocyst vs abscess; hemodynamically stable without evidence of any end organ damage. US significant for hepatic steatosis. No evidence of cholelithiasis. No evidence of alcoholic hepatitis or acute liver failure. Clear liquid diet; advance diet as tolerated. Tachycardic, resolved. Transaminitis 2/2 alcoholic hepatitis- alkaline phosphatase now decreasing. Hypomagnesemia/hypokalemia; replete and f/u. Suspected folic acid and thiamine deficiency- on supp. ETOH abuse- on folic and thiamine supp.     Pertinent Subjective Information: Unable to interview pt or conduct NFPE d/t limited contact restrictions r/t medical condition and isolation precautions. RD called pt's cell phone, no answer. RD called emergency contact listed in EMR pt's friend who reports pt had good appetite PTA but could not provide further nutrition hx since pt and friend do not leave together. Pt was previously NPO. Diet advanced to clear liquid; tolerating well per RN. RD requested RN to obtain CBW since no CBW doc in EMR.

## 2020-05-07 NOTE — PROGRESS NOTE ADULT - SUBJECTIVE AND OBJECTIVE BOX
Patient is a 43 y/o female with past medical history of Chronic pancreatitis, alcohol abuse, alcoholic hepatic steatosis. Patient notes Epigastric pain. Pain is sharp, is 10/10 in intensity, radiating to back, associated with non bilious and non bloody vomiting. Precipitant is found to be excessive ETOH use and abuse despite being educated on ETOH avoidance. Patient slowly improves daily. She has an appetite and is willing to try soft foods. No overnight events.       PAST MEDICAL & SURGICAL HISTORY:  AA (alcohol abuse)  Chronic pancreatitis  Pancreatitis  S/P arthroscopy: meniscal repair      FAMILY HISTORY:  Family history of cholecystectomy: many female members in the family  FH: pancreatic cancer: cousin  Family history of hypertension: both father and mother      Social History:  Alcohol: alcohol abuse   Drugs: denies     Home Medications:  Multiple Vitamins with Minerals oral tablet: 1 tab(s) orally once a day (16 Feb 2020 13:00)    MEDICATIONS  (STANDING):  folic acid 1 milliGRAM(s) Oral daily  heparin   Injectable 5000 Unit(s) SubCutaneous every 8 hours  lactated ringers. 1000 milliLiter(s) (250 mL/Hr) IV Continuous <Continuous>  LORazepam   Injectable   IV Push   LORazepam   Injectable 2 milliGRAM(s) IV Push every 4 hours  pantoprazole  Injectable 40 milliGRAM(s) IV Push daily  potassium chloride  20 mEq/100 mL IVPB 20 milliEquivalent(s) IV Intermittent once  thiamine 100 milliGRAM(s) Oral daily    MEDICATIONS  (PRN):  HYDROmorphone  Injectable 2 milliGRAM(s) IV Push four times a day PRN Moderate Pain (4 - 6)  ondansetron Injectable 4 milliGRAM(s) IV Push three times a day PRN Nausea and/or Vomiting      Allergies  Compazine (Unknown)      Review of Systems:   Constitutional:  No Fever, No Chills  ENT/Mouth:  No Hearing Changes,  No Difficulty Swallowing  Eyes:  No Eye Pain, No Vision Changes  Cardiovascular:  No Chest Pain, No Palpitations  Respiratory:  No Cough, No Dyspnea  Gastrointestinal:  As described in HPI  Musculoskeletal:  No Joint Swelling, No Back Pain  Skin:  No Skin Lesions, No Jaundice  Neuro:  No Syncope, No Dizziness  Heme/Lymph:  No Bruising, No Bleeding.      Vital Signs   T(F): 97.8 (07 May 2020 05:14), Max: 98 (06 May 2020 13:20)  HR: 81 (07 May 2020 05:14) (81 - 87)  BP: 149/85 (07 May 2020 05:14) (145/72 - 160/95)  RR: 19 (07 May 2020 05:14) (18 - 19)  Physical Exam  Gen: NAD  HEENT: NC/AT, Mucosal Membranes Moist  Cardio: S1/S2   Resp: CTA B/L  Abdomen: TTP diffuse  Neuro: AAOx3  Extremities: FROM x 4  Integum: No jaundice                           10.7   2.93  )-----------( 160      ( 07 May 2020 06:33 )             30.3     05-07    139  |  97<L>  |  3<L>  ----------------------------<  94  3.9   |  25  |  0.6<L>    Ca    9.1      07 May 2020 06:33  Mg     1.4     05-07    TPro  6.3  /  Alb  3.4<L>  /  TBili  0.9  /  DBili  x   /  AST  153<H>  /  ALT  53<H>  /  AlkPhos  196<H>  05-07

## 2020-05-07 NOTE — DIETITIAN INITIAL EVALUATION ADULT. - PHYSICAL APPEARANCE
BMI 20.6 (using ht of 64" & wt of 59.3 kg obtained from previous admit 2/13); AAOx4; no edema noted; no chewing/swallowing difficulties reported by RN; no GI issues noted, LBM- unknown; skin- WDL./well nourished

## 2020-05-07 NOTE — PROGRESS NOTE ADULT - ASSESSMENT
Patient is a 45 y/o female with past medical history of Chronic pancreatitis, alcohol abuse, alcoholic hepatic steatosis. Patient notes Epigastric pain. Pain is sharp, is 10/10 in intensity, radiating to back, associated with non bilious and non bloody vomiting. Precipitant is found to be excessive ETOH use and abuse despite being educated on ETOH avoidance. Patient remains on CIWA protocol.  Labs positive for Lipase > 600. CT showing acute on chronic pancreatitis with a 2.8 x 3.1 x 4.0 rim-enhancing fluid collection in the region of the pancreatic tail. Heterogeneous low attenuation with the body of the pancreas and early necrosis is not excluded. US : no cholelithiasis , CBD 6 mm , hepatic steatosis. LFTs with alcoholic hepatitis pattern. Continue to treat ETOH w/d. Continue IV hydration, slowly advanced diet. Patient over all slowly improving.       Recurrent acute on chronic pancreatitis / pancreatic tail pseudocyst ( less likely abscess in absence of fever or leucocytosis)   - Likely secondary to alcohol intake , no cholelithiasis on US   - Triglyceride normal IgG Subsets   - Normal IGG 4 level last admission   - Continue Aggressive IV hydration   - Maddrey score <32 --no indication for steroid  - Alcohol abstinence must be stressed  - Pain control  - Advance diet as tolerated Patient is a 45 y/o female with past medical history of Chronic pancreatitis, alcohol abuse, alcoholic hepatic steatosis. Patient notes Epigastric pain. Pain is sharp, is 10/10 in intensity, radiating to back, associated with non bilious and non bloody vomiting. Precipitant is found to be excessive ETOH use and abuse despite being educated on ETOH avoidance. Patient remains on CIWA protocol.  Labs positive for Lipase > 600. CT showing acute on chronic pancreatitis with a 2.8 x 3.1 x 4.0 rim-enhancing fluid collection in the region of the pancreatic tail. Heterogeneous low attenuation with the body of the pancreas and early necrosis is not excluded. US : no cholelithiasis , CBD 6 mm , hepatic steatosis. LFTs with alcoholic hepatitis pattern. Continue to treat ETOH w/d. Continue IV hydration, slowly advanced diet. Patient over all slowly improving.       Recurrent acute on chronic pancreatitis / pancreatic tail pseudocyst ( less likely abscess in absence of fever or leukocytosis)   - Likely secondary to alcohol intake , no cholelithiasis on US   - Triglyceride normal IgG Subsets   - Normal IGG 4 level last admission   - Continue Aggressive IV hydration   - Maddrey score <32 --no indication for steroid  - Alcohol abstinence must be stressed  - Pain control  - Advance diet as tolerated

## 2020-05-08 LAB
ALBUMIN SERPL ELPH-MCNC: 3.9 G/DL — SIGNIFICANT CHANGE UP (ref 3.5–5.2)
ALP SERPL-CCNC: 202 U/L — HIGH (ref 30–115)
ALT FLD-CCNC: 58 U/L — HIGH (ref 0–41)
ANION GAP SERPL CALC-SCNC: 18 MMOL/L — HIGH (ref 7–14)
AST SERPL-CCNC: 169 U/L — HIGH (ref 0–41)
BILIRUB SERPL-MCNC: 0.7 MG/DL — SIGNIFICANT CHANGE UP (ref 0.2–1.2)
BUN SERPL-MCNC: <3 MG/DL — LOW (ref 10–20)
CALCIUM SERPL-MCNC: 9.2 MG/DL — SIGNIFICANT CHANGE UP (ref 8.5–10.1)
CHLORIDE SERPL-SCNC: 99 MMOL/L — SIGNIFICANT CHANGE UP (ref 98–110)
CO2 SERPL-SCNC: 26 MMOL/L — SIGNIFICANT CHANGE UP (ref 17–32)
CREAT SERPL-MCNC: 0.6 MG/DL — LOW (ref 0.7–1.5)
GLUCOSE SERPL-MCNC: 100 MG/DL — HIGH (ref 70–99)
HCT VFR BLD CALC: 32.2 % — LOW (ref 37–47)
HGB BLD-MCNC: 11.2 G/DL — LOW (ref 12–16)
MAGNESIUM SERPL-MCNC: 1.8 MG/DL — SIGNIFICANT CHANGE UP (ref 1.8–2.4)
MCHC RBC-ENTMCNC: 34 PG — HIGH (ref 27–31)
MCHC RBC-ENTMCNC: 34.8 G/DL — SIGNIFICANT CHANGE UP (ref 32–37)
MCV RBC AUTO: 97.9 FL — SIGNIFICANT CHANGE UP (ref 81–99)
NRBC # BLD: 0 /100 WBCS — SIGNIFICANT CHANGE UP (ref 0–0)
PLATELET # BLD AUTO: 226 K/UL — SIGNIFICANT CHANGE UP (ref 130–400)
POTASSIUM SERPL-MCNC: 3.5 MMOL/L — SIGNIFICANT CHANGE UP (ref 3.5–5)
POTASSIUM SERPL-SCNC: 3.5 MMOL/L — SIGNIFICANT CHANGE UP (ref 3.5–5)
PROT SERPL-MCNC: 6.8 G/DL — SIGNIFICANT CHANGE UP (ref 6–8)
RBC # BLD: 3.29 M/UL — LOW (ref 4.2–5.4)
RBC # FLD: 11.7 % — SIGNIFICANT CHANGE UP (ref 11.5–14.5)
SODIUM SERPL-SCNC: 143 MMOL/L — SIGNIFICANT CHANGE UP (ref 135–146)
WBC # BLD: 3.2 K/UL — LOW (ref 4.8–10.8)
WBC # FLD AUTO: 3.2 K/UL — LOW (ref 4.8–10.8)

## 2020-05-08 PROCEDURE — 99232 SBSQ HOSP IP/OBS MODERATE 35: CPT

## 2020-05-08 RX ADMIN — OXYCODONE HYDROCHLORIDE 5 MILLIGRAM(S): 5 TABLET ORAL at 18:34

## 2020-05-08 RX ADMIN — PANTOPRAZOLE SODIUM 40 MILLIGRAM(S): 20 TABLET, DELAYED RELEASE ORAL at 08:05

## 2020-05-08 RX ADMIN — HYDROMORPHONE HYDROCHLORIDE 1 MILLIGRAM(S): 2 INJECTION INTRAMUSCULAR; INTRAVENOUS; SUBCUTANEOUS at 16:42

## 2020-05-08 RX ADMIN — Medication 1 TABLET(S): at 12:18

## 2020-05-08 RX ADMIN — SODIUM CHLORIDE 150 MILLILITER(S): 9 INJECTION, SOLUTION INTRAVENOUS at 16:58

## 2020-05-08 RX ADMIN — ONDANSETRON 4 MILLIGRAM(S): 8 TABLET, FILM COATED ORAL at 07:54

## 2020-05-08 RX ADMIN — Medication 3 CAPSULE(S): at 17:14

## 2020-05-08 RX ADMIN — HYDROMORPHONE HYDROCHLORIDE 1 MILLIGRAM(S): 2 INJECTION INTRAMUSCULAR; INTRAVENOUS; SUBCUTANEOUS at 12:23

## 2020-05-08 RX ADMIN — HYDROMORPHONE HYDROCHLORIDE 1 MILLIGRAM(S): 2 INJECTION INTRAMUSCULAR; INTRAVENOUS; SUBCUTANEOUS at 02:28

## 2020-05-08 RX ADMIN — SODIUM CHLORIDE 150 MILLILITER(S): 9 INJECTION, SOLUTION INTRAVENOUS at 21:15

## 2020-05-08 RX ADMIN — ENOXAPARIN SODIUM 40 MILLIGRAM(S): 100 INJECTION SUBCUTANEOUS at 12:18

## 2020-05-08 RX ADMIN — HYDROMORPHONE HYDROCHLORIDE 1 MILLIGRAM(S): 2 INJECTION INTRAMUSCULAR; INTRAVENOUS; SUBCUTANEOUS at 20:50

## 2020-05-08 RX ADMIN — HYDROMORPHONE HYDROCHLORIDE 1 MILLIGRAM(S): 2 INJECTION INTRAMUSCULAR; INTRAVENOUS; SUBCUTANEOUS at 07:54

## 2020-05-08 RX ADMIN — Medication 1 MILLIGRAM(S): at 14:35

## 2020-05-08 RX ADMIN — SODIUM CHLORIDE 150 MILLILITER(S): 9 INJECTION, SOLUTION INTRAVENOUS at 20:50

## 2020-05-08 RX ADMIN — Medication 3 CAPSULE(S): at 12:18

## 2020-05-08 RX ADMIN — Medication 3 CAPSULE(S): at 10:31

## 2020-05-08 RX ADMIN — Medication 100 MILLIGRAM(S): at 14:35

## 2020-05-08 NOTE — PROGRESS NOTE ADULT - SUBJECTIVE AND OBJECTIVE BOX
LAURA BARAJAS 44y Female  MRN#: [id number]   CODE STATUS: FULL     SUBJECTIVE  Patient is a 44y old Female who presents with a chief complaint of Epigastric pain (07 May 2020 12:10)  Currently admitted to medicine with the primary diagnosis of Pancreatitis    Today is hospital day 5d. She had an episode of vomitus yesterday after lunch and said her pain has worsened since then. Will attempt to eat cream of wheat today.     OBJECTIVE  PAST MEDICAL & SURGICAL HISTORY  AA (alcohol abuse)  Chronic pancreatitis  Pancreatitis  S/P arthroscopy: meniscal repair    ALLERGIES:  Compazine (Unknown)    MEDICATIONS:  STANDING MEDICATIONS  enoxaparin Injectable 40 milliGRAM(s) SubCutaneous daily  folic acid 1 milliGRAM(s) Oral daily  lactated ringers. 1000 milliLiter(s) IV Continuous <Continuous>  multivitamin/minerals 1 Tablet(s) Oral daily  pancrelipase  (CREON 12,000 Lipase Units) 3 Capsule(s) Oral three times a day with meals  pantoprazole    Tablet 40 milliGRAM(s) Oral before breakfast  thiamine 100 milliGRAM(s) Oral daily    PRN MEDICATIONS  HYDROmorphone  Injectable 1 milliGRAM(s) IV Push every 4 hours PRN  LORazepam   Injectable 2 milliGRAM(s) IV Push every 4 hours PRN  ondansetron Injectable 4 milliGRAM(s) IV Push three times a day PRN  oxyCODONE    IR 5 milliGRAM(s) Oral every 6 hours PRN      VITAL SIGNS: Last 24 Hours  T(C): 36.5 (08 May 2020 05:37), Max: 37.1 (07 May 2020 13:38)  T(F): 97.7 (08 May 2020 05:37), Max: 98.8 (07 May 2020 13:38)  HR: 73 (08 May 2020 05:37) (73 - 91)  BP: 141/93 (08 May 2020 05:37) (133/94 - 141/93)  BP(mean): --  RR: 18 (07 May 2020 21:58) (18 - 18)  SpO2: 99% (08 May 2020 02:44) (99% - 99%)    LABS:                        11.2   3.20  )-----------( 226      ( 08 May 2020 04:45 )             32.2     05-08    143  |  99  |  <3<L>  ----------------------------<  100<H>  3.5   |  26  |  0.6<L>    Ca    9.2      08 May 2020 04:45  Mg     1.8     05-08    TPro  6.8  /  Alb  3.9  /  TBili  0.7  /  DBili  x   /  AST  169<H>  /  ALT  58<H>  /  AlkPhos  202<H>  05-08    RADIOLOGY:  none today     PHYSICAL EXAM:  GENERAL: NAD   HEENT:  Atraumatic, Normocephalic.   PULMONARY: Clear to auscultation bilaterally; No wheeze  CARDIOVASCULAR: regular rate & rhythm   GASTROINTESTINAL: soft, tender to palpation mostly in upper quadrants, nondistended   MUSCULOSKELETAL:  2+ Peripheral Pulses, No edema  NEUROLOGY: non-focal, no tremors   SKIN: No rashes     ADMISSION SUMMARY  Patient is a 44y old Female who presents with a chief complaint of Epigastric pain (07 May 2020 12:10)  Currently admitted to medicine with the primary diagnosis of Pancreatitis    ASSESSMENT & PLAN  45 y/o female with past medical history of chronic pancreatitis, alcohol abuse, alcoholic hepatic steatosis admitted for acute on chronic pancreatitis w/ possible pseudocyst v abscess.    #Acute on chronic pancreatitis secondary to alcohol abuse   - Elevated lipase >600 with consistent CT findings- also shows possible necrosis and possible pseudocyst vs abscess   - Hemodynamically stable without evidence of any end organ damage   - Triglycerides WNL, IgG negative. Ultrasound significant for hepatic steatosis. No evidence of cholelithiasis.   - No evidence of alcoholic hepatitis or acute liver failure- normal bilirubin, normal INR. Though patient does have underlying alcoholic steatosis.   - Monitor BUN and hematocrit and liver enzymes   - diet- clear liquid w/ cream of wheat - patient says she tolerates that moreso than clear liquids   - decreased fluids today to 150cc/hr   - restart creon   - repeat lipase 85 (from >600)   - blood cultures negative   - Pain control with PRN Dilaudid & oxycodone  - patient has been asking for every dose   - zofran for nausea/vomiting, QTc 482   - on low fat diet now     #Tachycardia, resolved   -could be 2/2 pain v withdrawal     #Transaminitis secondary to alcoholic hepatitis   - liver enzymes downtrending   - alkaline phosphatase now decreasing    #Hypomagnesemia/hypokalemia, resolved   -replete and follow up     #Suspected folic acid and thiamine deficiency   -Continue supplementation      #Alcohol abuse  -continue folic acid & thiamine supplementation  -continue on CIWA protocol and monitor for signs of alcohol withdrawal   -patient refused detox consult     #Diet - clear liquid, ok to eat cream of wheat   #DVT prophylaxis- lovenox   #GI prophylaxis- protonix LAURA BARAJAS 44y Female  MRN#: [id number]   CODE STATUS: FULL     SUBJECTIVE  Patient is a 44y old Female who presents with a chief complaint of Epigastric pain (07 May 2020 12:10)  Currently admitted to medicine with the primary diagnosis of Pancreatitis    Today is hospital day 5d. She had an episode of vomitus yesterday after lunch and said her pain has worsened since then. Will attempt to eat cream of wheat today.     OBJECTIVE  PAST MEDICAL & SURGICAL HISTORY  AA (alcohol abuse)  Chronic pancreatitis  Pancreatitis  S/P arthroscopy: meniscal repair    ALLERGIES:  Compazine (Unknown)    MEDICATIONS:  STANDING MEDICATIONS  enoxaparin Injectable 40 milliGRAM(s) SubCutaneous daily  folic acid 1 milliGRAM(s) Oral daily  lactated ringers. 1000 milliLiter(s) IV Continuous <Continuous>  multivitamin/minerals 1 Tablet(s) Oral daily  pancrelipase  (CREON 12,000 Lipase Units) 3 Capsule(s) Oral three times a day with meals  pantoprazole    Tablet 40 milliGRAM(s) Oral before breakfast  thiamine 100 milliGRAM(s) Oral daily    PRN MEDICATIONS  HYDROmorphone  Injectable 1 milliGRAM(s) IV Push every 4 hours PRN  LORazepam   Injectable 2 milliGRAM(s) IV Push every 4 hours PRN  ondansetron Injectable 4 milliGRAM(s) IV Push three times a day PRN  oxyCODONE    IR 5 milliGRAM(s) Oral every 6 hours PRN      VITAL SIGNS: Last 24 Hours  T(C): 36.5 (08 May 2020 05:37), Max: 37.1 (07 May 2020 13:38)  T(F): 97.7 (08 May 2020 05:37), Max: 98.8 (07 May 2020 13:38)  HR: 73 (08 May 2020 05:37) (73 - 91)  BP: 141/93 (08 May 2020 05:37) (133/94 - 141/93)  BP(mean): --  RR: 18 (07 May 2020 21:58) (18 - 18)  SpO2: 99% (08 May 2020 02:44) (99% - 99%)    LABS:                        11.2   3.20  )-----------( 226      ( 08 May 2020 04:45 )             32.2     05-08    143  |  99  |  <3<L>  ----------------------------<  100<H>  3.5   |  26  |  0.6<L>    Ca    9.2      08 May 2020 04:45  Mg     1.8     05-08    TPro  6.8  /  Alb  3.9  /  TBili  0.7  /  DBili  x   /  AST  169<H>  /  ALT  58<H>  /  AlkPhos  202<H>  05-08    RADIOLOGY:  none today     PHYSICAL EXAM:  GENERAL: NAD   HEENT:  Atraumatic, Normocephalic.   PULMONARY: Clear to auscultation bilaterally; No wheeze  CARDIOVASCULAR: regular rate & rhythm   GASTROINTESTINAL: soft, tender to palpation mostly in upper quadrants, nondistended   MUSCULOSKELETAL:  2+ Peripheral Pulses, No edema  NEUROLOGY: non-focal, no tremors   SKIN: No rashes     ADMISSION SUMMARY  Patient is a 44y old Female who presents with a chief complaint of Epigastric pain (07 May 2020 12:10)  Currently admitted to medicine with the primary diagnosis of Pancreatitis    ASSESSMENT & PLAN  45 y/o female with past medical history of chronic pancreatitis, alcohol abuse, alcoholic hepatic steatosis admitted for acute on chronic pancreatitis w/ possible pseudocyst v abscess.    #Acute on chronic pancreatitis secondary to alcohol abuse   - Elevated lipase >600 with consistent CT findings- also shows possible necrosis and possible pseudocyst vs abscess   - Hemodynamically stable without evidence of any end organ damage   - Triglycerides WNL, IgG negative. Ultrasound significant for hepatic steatosis. No evidence of cholelithiasis.   - No evidence of alcoholic hepatitis or acute liver failure- normal bilirubin, normal INR. Though patient does have underlying alcoholic steatosis.   - Monitor BUN and hematocrit and liver enzymes   - diet- lowfat diet today, fluids at 150cc/hr   - continue creon   - repeat lipase 85 (from >600)   - blood cultures negative   - Pain control with PRN Dilaudid & oxycodone  - patient has been asking for every dose   - zofran for nausea/vomiting, QTc 482     #Tachycardia, resolved   -could be 2/2 pain v withdrawal     #Transaminitis secondary to alcoholic hepatitis - now increasing    #Hypomagnesemia/hypokalemia, resolved   -replete and follow up     #Suspected folic acid and thiamine deficiency   -Continue supplementation      #Alcohol abuse  -continue folic acid & thiamine supplementation  -continue on CIWA protocol and monitor for signs of alcohol withdrawal   -patient refused detox consult     #Diet - will trial lowfat diet again today   #DVT prophylaxis- lovenox   #GI prophylaxis- protonix

## 2020-05-08 NOTE — PROGRESS NOTE ADULT - ASSESSMENT
Patient is a 45 y/o female with past medical history of Chronic pancreatitis, alcohol abuse, alcoholic hepatic steatosis. Patient notes Epigastric pain. Pain is sharp, is 10/10 in intensity, radiating to back, associated with non bilious and non bloody vomiting. Precipitant is found to be excessive ETOH use and abuse despite being educated on ETOH avoidance. Patient remains on CIWA protocol.  Labs positive for Lipase > 600. CT showing acute on chronic pancreatitis with a 2.8 x 3.1 x 4.0 rim-enhancing fluid collection in the region of the pancreatic tail. Heterogeneous low attenuation with the body of the pancreas and early necrosis is not excluded. US : no cholelithiasis , CBD 6 mm , hepatic steatosis. LFTs with alcoholic hepatitis pattern. Continue to treat ETOH w/d. Continue IV hydration, slowly advanced diet. Patient over all slowly improving. [place] to [id number] with each meal. I suggested to her Lyrica but she had past swelling from this medicine.       Recurrent acute on chronic pancreatitis / pancreatic tail pseudocyst ( less likely abscess in absence of fever or leukocytosis)   - Likely secondary to alcohol intake , no cholelithiasis on US   - Triglyceride normal IgG Subsets   - Normal IGG 4 level last admission   - Continue Aggressive IV hydration   - Maddrey score <32 --no indication for steroid  - Alcohol abstinence must be stressed  - Pain control  - Creon can increase to [id number] TID  - If remains admitted consider Repeating CT scan of Abdomen with and without contrast but wouldn't do sooner than Monday ( may be developing a Pseudocyst versus Necrosis)   - Will follow

## 2020-05-08 NOTE — PROGRESS NOTE ADULT - SUBJECTIVE AND OBJECTIVE BOX
Patient is a 44y old  Female who presents with a chief complaint of Epigastric pain (04 May 2020 11:01)    Patient was seen and examined.  Continues to have epigastric pain- controlled with pain meds.  Had an episode of vomiting yesterday  Pt wants to try soft diet today  Denies any other complaints.  All systems reviewed .    PAST MEDICAL & SURGICAL HISTORY:  AA (alcohol abuse)  Chronic pancreatitis  Pancreatitis  S/P arthroscopy: meniscal repair    Allergies  Compazine (Unknown)    MEDICATIONS  (STANDING):  enoxaparin Injectable 40 milliGRAM(s) SubCutaneous daily  folic acid 1 milliGRAM(s) Oral daily  lactated ringers. 1000 milliLiter(s) (150 mL/Hr) IV Continuous <Continuous>  multivitamin/minerals 1 Tablet(s) Oral daily  pancrelipase  (CREON 12,000 Lipase Units) 3 Capsule(s) Oral three times a day with meals  pantoprazole    Tablet 40 milliGRAM(s) Oral before breakfast  thiamine 100 milliGRAM(s) Oral daily    MEDICATIONS  (PRN):  HYDROmorphone  Injectable 1 milliGRAM(s) IV Push every 4 hours PRN Severe Pain (7 - 10)  LORazepam   Injectable 2 milliGRAM(s) IV Push every 4 hours PRN Agitation  ondansetron Injectable 4 milliGRAM(s) IV Push three times a day PRN Nausea and/or Vomiting  oxyCODONE    IR 5 milliGRAM(s) Oral every 6 hours PRN Moderate Pain (4 - 6)    T(C): 36.5 (05-08-20 @ 05:37), Max: 37.1 (05-07-20 @ 13:38)  HR: 73 (05-08-20 @ 05:37) (73 - 91)  BP: 141/93 (05-08-20 @ 05:37) (133/94 - 141/93)  RR: 18 (05-07-20 @ 21:58) (18 - 18)  SpO2: 99% (05-08-20 @ 02:44) (99% - 99%)    O/E:  Awake, alert, not in distress.  HEENT: atraumatic, EOMI.  Chest: clear.  CVS: SIS2 +, no murmur.  P/A: Soft, BS+, epigastric tenderness+  CNS: non focal.  Ext: no edema feet.  Skin: no rash, no ulcers.  All systems reviewed positive findings as above.                                   11.2<L>  3.20<L> )-----------( 226      ( 08 May 2020 04:45 )             32.2<L>                        10.7<L>  2.93<L> )-----------( 160      ( 07 May 2020 06:33 )             30.3<L>  05-08    143  |  99  |  <3<L>  ----------------------------<  100<H>  3.5   |  26  |  0.6<L>  05-07    139  |  97<L>  |  3<L>  ----------------------------<  94  3.9   |  25  |  0.6<L>    Ca    9.2      08 May 2020 04:45  Ca    9.1      07 May 2020 06:33  Mg     1.8     05-08    TPro  6.8  /  Alb  3.9  /  TBili  0.7  /  DBili  x   /  AST  169<H>  /  ALT  58<H>  /  AlkPhos  202<H>  05-08  TPro  6.3  /  Alb  3.4<L>  /  TBili  0.9  /  DBili  x   /  AST  153<H>  /  ALT  53<H>  /  AlkPhos  196<H>  05-07
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
## 2020-05-08 NOTE — PROGRESS NOTE ADULT - SUBJECTIVE AND OBJECTIVE BOX
Patient is a 45 y/o female with past medical history of Chronic pancreatitis, alcohol abuse, alcoholic hepatic steatosis. Patient notes Epigastric pain. Pain is sharp, is 10/10 in intensity, radiating to back, associated with non bilious and non bloody vomiting. Precipitant is found to be excessive ETOH use and abuse despite being educated on ETOH avoidance. Patient tried light foods yesterday but had some pain. No overnight events noted.       PAST MEDICAL & SURGICAL HISTORY:  AA (alcohol abuse)  Chronic pancreatitis  Pancreatitis  S/P arthroscopy: meniscal repair      FAMILY HISTORY:  Family history of cholecystectomy: many female members in the family  FH: pancreatic cancer: cousin  Family history of hypertension: both father and mother      Social History:  Alcohol: alcohol abuse   Drugs: denies     Home Medications:  Multiple Vitamins with Minerals oral tablet: 1 tab(s) orally once a day (16 Feb 2020 13:00)    MEDICATIONS  (STANDING):  folic acid 1 milliGRAM(s) Oral daily  heparin   Injectable 5000 Unit(s) SubCutaneous every 8 hours  lactated ringers. 1000 milliLiter(s) (250 mL/Hr) IV Continuous <Continuous>  LORazepam   Injectable   IV Push   LORazepam   Injectable 2 milliGRAM(s) IV Push every 4 hours  pantoprazole  Injectable 40 milliGRAM(s) IV Push daily  potassium chloride  20 mEq/100 mL IVPB 20 milliEquivalent(s) IV Intermittent once  thiamine 100 milliGRAM(s) Oral daily    MEDICATIONS  (PRN):  HYDROmorphone  Injectable 2 milliGRAM(s) IV Push four times a day PRN Moderate Pain (4 - 6)  ondansetron Injectable 4 milliGRAM(s) IV Push three times a day PRN Nausea and/or Vomiting      Allergies  Compazine (Unknown)      Review of Systems:   Constitutional:  No Fever, No Chills  ENT/Mouth:  No Hearing Changes,  No Difficulty Swallowing  Eyes:  No Eye Pain, No Vision Changes  Cardiovascular:  No Chest Pain, No Palpitations  Respiratory:  No Cough, No Dyspnea  Gastrointestinal:  As described in HPI  Musculoskeletal:  No Joint Swelling, No Back Pain  Skin:  No Skin Lesions, No Jaundice  Neuro:  No Syncope, No Dizziness  Heme/Lymph:  No Bruising, No Bleeding.      Vital Signs  T(F): 97.7 (08 May 2020 05:37), Max: 98.8 (07 May 2020 13:38)  HR: 73 (08 May 2020 05:37) (73 - 91)  BP: 141/93 (08 May 2020 05:37) (133/94 - 141/93)  RR: 18 (07 May 2020 21:58) (18 - 18)  SpO2: 99% (08 May 2020 02:44) (99% - 99%)  Physical Exam  Gen: NAD  HEENT: NC/AT, Mucosal Membranes Moist  Cardio: S1/S2   Resp: CTA B/L  Abdomen: TTP diffuse  Neuro: AAOx3  Extremities: FROM x 4  Integum: No jaundice                               11.2   3.20  )-----------( 226      ( 08 May 2020 04:45 )             32.2     05-08    143  |  99  |  <3<L>  ----------------------------<  100<H>  3.5   |  26  |  0.6<L>    Ca    9.2      08 May 2020 04:45  Mg     1.8     05-08    TPro  6.8  /  Alb  3.9  /  TBili  0.7  /  DBili  x   /  AST  169<H>  /  ALT  58<H>  /  AlkPhos  202<H>  05-08

## 2020-05-09 LAB
ALBUMIN SERPL ELPH-MCNC: 3.8 G/DL — SIGNIFICANT CHANGE UP (ref 3.5–5.2)
ALP SERPL-CCNC: 177 U/L — HIGH (ref 30–115)
ALT FLD-CCNC: 58 U/L — HIGH (ref 0–41)
ANION GAP SERPL CALC-SCNC: 16 MMOL/L — HIGH (ref 7–14)
AST SERPL-CCNC: 161 U/L — HIGH (ref 0–41)
BILIRUB SERPL-MCNC: 0.6 MG/DL — SIGNIFICANT CHANGE UP (ref 0.2–1.2)
BUN SERPL-MCNC: <3 MG/DL — LOW (ref 10–20)
CALCIUM SERPL-MCNC: 9.6 MG/DL — SIGNIFICANT CHANGE UP (ref 8.5–10.1)
CHLORIDE SERPL-SCNC: 97 MMOL/L — LOW (ref 98–110)
CO2 SERPL-SCNC: 27 MMOL/L — SIGNIFICANT CHANGE UP (ref 17–32)
CREAT SERPL-MCNC: 0.6 MG/DL — LOW (ref 0.7–1.5)
CULTURE RESULTS: SIGNIFICANT CHANGE UP
GLUCOSE SERPL-MCNC: 96 MG/DL — SIGNIFICANT CHANGE UP (ref 70–99)
HCT VFR BLD CALC: 32.3 % — LOW (ref 37–47)
HGB BLD-MCNC: 11.2 G/DL — LOW (ref 12–16)
MAGNESIUM SERPL-MCNC: 1.7 MG/DL — LOW (ref 1.8–2.4)
MCHC RBC-ENTMCNC: 34.1 PG — HIGH (ref 27–31)
MCHC RBC-ENTMCNC: 34.7 G/DL — SIGNIFICANT CHANGE UP (ref 32–37)
MCV RBC AUTO: 98.5 FL — SIGNIFICANT CHANGE UP (ref 81–99)
NRBC # BLD: 0 /100 WBCS — SIGNIFICANT CHANGE UP (ref 0–0)
PLATELET # BLD AUTO: 242 K/UL — SIGNIFICANT CHANGE UP (ref 130–400)
POTASSIUM SERPL-MCNC: 3.6 MMOL/L — SIGNIFICANT CHANGE UP (ref 3.5–5)
POTASSIUM SERPL-SCNC: 3.6 MMOL/L — SIGNIFICANT CHANGE UP (ref 3.5–5)
PROT SERPL-MCNC: 6.8 G/DL — SIGNIFICANT CHANGE UP (ref 6–8)
RBC # BLD: 3.28 M/UL — LOW (ref 4.2–5.4)
RBC # FLD: 11.8 % — SIGNIFICANT CHANGE UP (ref 11.5–14.5)
SODIUM SERPL-SCNC: 140 MMOL/L — SIGNIFICANT CHANGE UP (ref 135–146)
SPECIMEN SOURCE: SIGNIFICANT CHANGE UP
WBC # BLD: 2.78 K/UL — LOW (ref 4.8–10.8)
WBC # FLD AUTO: 2.78 K/UL — LOW (ref 4.8–10.8)

## 2020-05-09 PROCEDURE — 99232 SBSQ HOSP IP/OBS MODERATE 35: CPT

## 2020-05-09 RX ORDER — SODIUM CHLORIDE 9 MG/ML
1000 INJECTION, SOLUTION INTRAVENOUS
Refills: 0 | Status: DISCONTINUED | OUTPATIENT
Start: 2020-05-09 | End: 2020-05-13

## 2020-05-09 RX ORDER — HYDROMORPHONE HYDROCHLORIDE 2 MG/ML
1 INJECTION INTRAMUSCULAR; INTRAVENOUS; SUBCUTANEOUS EVERY 4 HOURS
Refills: 0 | Status: DISCONTINUED | OUTPATIENT
Start: 2020-05-09 | End: 2020-05-11

## 2020-05-09 RX ORDER — HYDROMORPHONE HYDROCHLORIDE 2 MG/ML
1 INJECTION INTRAMUSCULAR; INTRAVENOUS; SUBCUTANEOUS EVERY 8 HOURS
Refills: 0 | Status: DISCONTINUED | OUTPATIENT
Start: 2020-05-09 | End: 2020-05-09

## 2020-05-09 RX ORDER — MAGNESIUM SULFATE 500 MG/ML
2 VIAL (ML) INJECTION ONCE
Refills: 0 | Status: COMPLETED | OUTPATIENT
Start: 2020-05-09 | End: 2020-05-09

## 2020-05-09 RX ADMIN — HYDROMORPHONE HYDROCHLORIDE 1 MILLIGRAM(S): 2 INJECTION INTRAMUSCULAR; INTRAVENOUS; SUBCUTANEOUS at 14:51

## 2020-05-09 RX ADMIN — HYDROMORPHONE HYDROCHLORIDE 1 MILLIGRAM(S): 2 INJECTION INTRAMUSCULAR; INTRAVENOUS; SUBCUTANEOUS at 20:51

## 2020-05-09 RX ADMIN — Medication 3 CAPSULE(S): at 16:57

## 2020-05-09 RX ADMIN — ENOXAPARIN SODIUM 40 MILLIGRAM(S): 100 INJECTION SUBCUTANEOUS at 11:31

## 2020-05-09 RX ADMIN — Medication 50 GRAM(S): at 09:15

## 2020-05-09 RX ADMIN — Medication 3 CAPSULE(S): at 11:30

## 2020-05-09 RX ADMIN — SODIUM CHLORIDE 150 MILLILITER(S): 9 INJECTION, SOLUTION INTRAVENOUS at 11:31

## 2020-05-09 RX ADMIN — SODIUM CHLORIDE 100 MILLILITER(S): 9 INJECTION, SOLUTION INTRAVENOUS at 20:51

## 2020-05-09 RX ADMIN — OXYCODONE HYDROCHLORIDE 5 MILLIGRAM(S): 5 TABLET ORAL at 13:47

## 2020-05-09 RX ADMIN — HYDROMORPHONE HYDROCHLORIDE 1 MILLIGRAM(S): 2 INJECTION INTRAMUSCULAR; INTRAVENOUS; SUBCUTANEOUS at 09:53

## 2020-05-09 RX ADMIN — PANTOPRAZOLE SODIUM 40 MILLIGRAM(S): 20 TABLET, DELAYED RELEASE ORAL at 05:47

## 2020-05-09 RX ADMIN — Medication 1 MILLIGRAM(S): at 11:30

## 2020-05-09 RX ADMIN — HYDROMORPHONE HYDROCHLORIDE 1 MILLIGRAM(S): 2 INJECTION INTRAMUSCULAR; INTRAVENOUS; SUBCUTANEOUS at 01:09

## 2020-05-09 RX ADMIN — ONDANSETRON 4 MILLIGRAM(S): 8 TABLET, FILM COATED ORAL at 05:45

## 2020-05-09 RX ADMIN — Medication 100 MILLIGRAM(S): at 11:30

## 2020-05-09 RX ADMIN — Medication 3 CAPSULE(S): at 07:53

## 2020-05-09 RX ADMIN — SODIUM CHLORIDE 100 MILLILITER(S): 9 INJECTION, SOLUTION INTRAVENOUS at 16:38

## 2020-05-09 RX ADMIN — HYDROMORPHONE HYDROCHLORIDE 1 MILLIGRAM(S): 2 INJECTION INTRAMUSCULAR; INTRAVENOUS; SUBCUTANEOUS at 05:44

## 2020-05-09 RX ADMIN — Medication 1 TABLET(S): at 11:30

## 2020-05-09 NOTE — PROGRESS NOTE ADULT - SUBJECTIVE AND OBJECTIVE BOX
Patient is a 44y old  Female who presents with a chief complaint of Epigastric pain (04 May 2020 11:01)    Patient was seen and examined.  Epigastric pain slightly better  appetite slowly improving  Denies any other complaints.  All systems reviewed .    PAST MEDICAL & SURGICAL HISTORY:  AA (alcohol abuse)  Chronic pancreatitis  Pancreatitis  S/P arthroscopy: meniscal repair    Allergies  Compazine (Unknown)    MEDICATIONS  (STANDING):  enoxaparin Injectable 40 milliGRAM(s) SubCutaneous daily  folic acid 1 milliGRAM(s) Oral daily  lactated ringers. 1000 milliLiter(s) (150 mL/Hr) IV Continuous <Continuous>  multivitamin/minerals 1 Tablet(s) Oral daily  pancrelipase  (CREON 12,000 Lipase Units) 3 Capsule(s) Oral three times a day with meals  pantoprazole    Tablet 40 milliGRAM(s) Oral before breakfast  thiamine 100 milliGRAM(s) Oral daily    MEDICATIONS  (PRN):  HYDROmorphone  Injectable 1 milliGRAM(s) IV Push every 8 hours PRN Severe Pain (7 - 10)  LORazepam   Injectable 2 milliGRAM(s) IV Push every 4 hours PRN Agitation  ondansetron Injectable 4 milliGRAM(s) IV Push three times a day PRN Nausea and/or Vomiting  oxyCODONE    IR 5 milliGRAM(s) Oral every 6 hours PRN Moderate Pain (4 - 6)    T(C): 36.3 (05-09-20 @ 05:35), Max: 37.1 (05-08-20 @ 13:59)  HR: 79 (05-09-20 @ 05:35) (79 - 85)  BP: 175/96 (05-09-20 @ 05:35) (150/70 - 175/96)  RR: 18 (05-09-20 @ 05:35) (18 - 18)  SpO2: --    O/E:  Awake, alert, not in distress.  HEENT: atraumatic, EOMI.  Chest: clear.  CVS: SIS2 +, no murmur.  P/A: Soft, BS+, epigastric tenderness+  CNS: non focal.  Ext: no edema feet.  Skin: no rash, no ulcers.  All systems reviewed positive findings as above.                               11.2<L>  2.78<L> )-----------( 242      ( 09 May 2020 06:19 )             32.3<L>                        11.2<L>  3.20<L> )-----------( 226      ( 08 May 2020 04:45 )             32.2<L>  05-09    140  |  97<L>  |  <3<L>  ----------------------------<  96  3.6   |  27  |  0.6<L>  05-08    143  |  99  |  <3<L>  ----------------------------<  100<H>  3.5   |  26  |  0.6<L>    Ca    9.6      09 May 2020 06:19  Ca    9.2      08 May 2020 04:45  Mg     1.7     05-09    TPro  6.8  /  Alb  3.8  /  TBili  0.6  /  DBili  x   /  AST  161<H>  /  ALT  58<H>  /  AlkPhos  177<H>  05-09  TPro  6.8  /  Alb  3.9  /  TBili  0.7  /  DBili  x   /  AST  169<H>  /  ALT  58<H>  /  AlkPhos  202<H>  05-08

## 2020-05-10 LAB
ALBUMIN SERPL ELPH-MCNC: 3.9 G/DL — SIGNIFICANT CHANGE UP (ref 3.5–5.2)
ALP SERPL-CCNC: 161 U/L — HIGH (ref 30–115)
ALT FLD-CCNC: 61 U/L — HIGH (ref 0–41)
ANION GAP SERPL CALC-SCNC: 15 MMOL/L — HIGH (ref 7–14)
AST SERPL-CCNC: 166 U/L — HIGH (ref 0–41)
BILIRUB SERPL-MCNC: 0.5 MG/DL — SIGNIFICANT CHANGE UP (ref 0.2–1.2)
BUN SERPL-MCNC: <3 MG/DL — LOW (ref 10–20)
CALCIUM SERPL-MCNC: 9.5 MG/DL — SIGNIFICANT CHANGE UP (ref 8.5–10.1)
CHLORIDE SERPL-SCNC: 99 MMOL/L — SIGNIFICANT CHANGE UP (ref 98–110)
CO2 SERPL-SCNC: 26 MMOL/L — SIGNIFICANT CHANGE UP (ref 17–32)
CREAT SERPL-MCNC: 0.6 MG/DL — LOW (ref 0.7–1.5)
GLUCOSE SERPL-MCNC: 99 MG/DL — SIGNIFICANT CHANGE UP (ref 70–99)
HCT VFR BLD CALC: 34.8 % — LOW (ref 37–47)
HGB BLD-MCNC: 11.6 G/DL — LOW (ref 12–16)
MAGNESIUM SERPL-MCNC: 2.6 MG/DL — HIGH (ref 1.8–2.4)
MCHC RBC-ENTMCNC: 32.9 PG — HIGH (ref 27–31)
MCHC RBC-ENTMCNC: 33.3 G/DL — SIGNIFICANT CHANGE UP (ref 32–37)
MCV RBC AUTO: 98.6 FL — SIGNIFICANT CHANGE UP (ref 81–99)
NRBC # BLD: 0 /100 WBCS — SIGNIFICANT CHANGE UP (ref 0–0)
PLATELET # BLD AUTO: 274 K/UL — SIGNIFICANT CHANGE UP (ref 130–400)
POTASSIUM SERPL-MCNC: 3.7 MMOL/L — SIGNIFICANT CHANGE UP (ref 3.5–5)
POTASSIUM SERPL-SCNC: 3.7 MMOL/L — SIGNIFICANT CHANGE UP (ref 3.5–5)
PROT SERPL-MCNC: 6.9 G/DL — SIGNIFICANT CHANGE UP (ref 6–8)
RBC # BLD: 3.53 M/UL — LOW (ref 4.2–5.4)
RBC # FLD: 11.8 % — SIGNIFICANT CHANGE UP (ref 11.5–14.5)
SODIUM SERPL-SCNC: 140 MMOL/L — SIGNIFICANT CHANGE UP (ref 135–146)
WBC # BLD: 3.9 K/UL — LOW (ref 4.8–10.8)
WBC # FLD AUTO: 3.9 K/UL — LOW (ref 4.8–10.8)

## 2020-05-10 PROCEDURE — 99232 SBSQ HOSP IP/OBS MODERATE 35: CPT

## 2020-05-10 RX ORDER — SENNA PLUS 8.6 MG/1
2 TABLET ORAL DAILY
Refills: 0 | Status: DISCONTINUED | OUTPATIENT
Start: 2020-05-10 | End: 2020-05-13

## 2020-05-10 RX ADMIN — SENNA PLUS 2 TABLET(S): 8.6 TABLET ORAL at 11:34

## 2020-05-10 RX ADMIN — HYDROMORPHONE HYDROCHLORIDE 1 MILLIGRAM(S): 2 INJECTION INTRAMUSCULAR; INTRAVENOUS; SUBCUTANEOUS at 14:32

## 2020-05-10 RX ADMIN — Medication 3 CAPSULE(S): at 17:09

## 2020-05-10 RX ADMIN — HYDROMORPHONE HYDROCHLORIDE 1 MILLIGRAM(S): 2 INJECTION INTRAMUSCULAR; INTRAVENOUS; SUBCUTANEOUS at 10:18

## 2020-05-10 RX ADMIN — Medication 3 CAPSULE(S): at 11:34

## 2020-05-10 RX ADMIN — ENOXAPARIN SODIUM 40 MILLIGRAM(S): 100 INJECTION SUBCUTANEOUS at 11:34

## 2020-05-10 RX ADMIN — PANTOPRAZOLE SODIUM 40 MILLIGRAM(S): 20 TABLET, DELAYED RELEASE ORAL at 05:46

## 2020-05-10 RX ADMIN — OXYCODONE HYDROCHLORIDE 5 MILLIGRAM(S): 5 TABLET ORAL at 11:35

## 2020-05-10 RX ADMIN — HYDROMORPHONE HYDROCHLORIDE 1 MILLIGRAM(S): 2 INJECTION INTRAMUSCULAR; INTRAVENOUS; SUBCUTANEOUS at 01:24

## 2020-05-10 RX ADMIN — HYDROMORPHONE HYDROCHLORIDE 1 MILLIGRAM(S): 2 INJECTION INTRAMUSCULAR; INTRAVENOUS; SUBCUTANEOUS at 23:25

## 2020-05-10 RX ADMIN — Medication 3 CAPSULE(S): at 07:48

## 2020-05-10 RX ADMIN — Medication 100 MILLIGRAM(S): at 11:33

## 2020-05-10 RX ADMIN — Medication 1 TABLET(S): at 11:33

## 2020-05-10 RX ADMIN — HYDROMORPHONE HYDROCHLORIDE 1 MILLIGRAM(S): 2 INJECTION INTRAMUSCULAR; INTRAVENOUS; SUBCUTANEOUS at 18:38

## 2020-05-10 RX ADMIN — Medication 1 MILLIGRAM(S): at 11:33

## 2020-05-10 RX ADMIN — HYDROMORPHONE HYDROCHLORIDE 1 MILLIGRAM(S): 2 INJECTION INTRAMUSCULAR; INTRAVENOUS; SUBCUTANEOUS at 05:45

## 2020-05-10 RX ADMIN — SODIUM CHLORIDE 100 MILLILITER(S): 9 INJECTION, SOLUTION INTRAVENOUS at 17:09

## 2020-05-10 NOTE — PROGRESS NOTE ADULT - SUBJECTIVE AND OBJECTIVE BOX
LAURA BARAJAS 44y Female  MRN#: [id number]   CODE STATUS: FULL     SUBJECTIVE  Patient is a 44y old Female who presents with a chief complaint of Epigastric pain (09 May 2020 11:28)  Currently admitted to medicine with the primary diagnosis of Pancreatitis    Today is hospital day 7d.     OBJECTIVE  PAST MEDICAL & SURGICAL HISTORY  AA (alcohol abuse)  Chronic pancreatitis  Pancreatitis  S/P arthroscopy: meniscal repair    ALLERGIES:  Compazine (Unknown)    MEDICATIONS:  STANDING MEDICATIONS  enoxaparin Injectable 40 milliGRAM(s) SubCutaneous daily  folic acid 1 milliGRAM(s) Oral daily  lactated ringers. 1000 milliLiter(s) IV Continuous <Continuous>  multivitamin/minerals 1 Tablet(s) Oral daily  pancrelipase  (CREON 12,000 Lipase Units) 3 Capsule(s) Oral three times a day with meals  pantoprazole    Tablet 40 milliGRAM(s) Oral before breakfast  thiamine 100 milliGRAM(s) Oral daily    PRN MEDICATIONS  HYDROmorphone  Injectable 1 milliGRAM(s) IV Push every 4 hours PRN  LORazepam   Injectable 2 milliGRAM(s) IV Push every 4 hours PRN  ondansetron Injectable 4 milliGRAM(s) IV Push three times a day PRN  oxyCODONE    IR 5 milliGRAM(s) Oral every 6 hours PRN      VITAL SIGNS: Last 24 Hours  T(C): 36.4 (10 May 2020 05:23), Max: 37.3 (09 May 2020 12:35)  T(F): 97.5 (10 May 2020 05:23), Max: 99.1 (09 May 2020 12:35)  HR: 70 (10 May 2020 05:23) (70 - 99)  BP: 140/83 (10 May 2020 05:23) (138/74 - 158/96)  BP(mean): --  RR: 19 (10 May 2020 05:23) (19 - 20)  SpO2: 93% (10 May 2020 05:23) (93% - 94%)    LABS:                        11.6   3.90  )-----------( 274      ( 10 May 2020 07:20 )             34.8     05-09    140  |  97<L>  |  <3<L>  ----------------------------<  96  3.6   |  27  |  0.6<L>    Ca    9.6      09 May 2020 06:19  Mg     1.7     05-09    TPro  6.8  /  Alb  3.8  /  TBili  0.6  /  DBili  x   /  AST  161<H>  /  ALT  58<H>  /  AlkPhos  177<H>  05-09      RADIOLOGY:  none today     PHYSICAL EXAM:  GENERAL: NAD   HEENT:  Atraumatic, Normocephalic.   PULMONARY: Clear to auscultation bilaterally; No wheeze  CARDIOVASCULAR: regular rate & rhythm   GASTROINTESTINAL: soft, tender to palpation mostly in upper quadrants, nondistended   MUSCULOSKELETAL:  2+ Peripheral Pulses, No edema  NEUROLOGY: non-focal, no tremors   SKIN: No rashes     ADMISSION SUMMARY  Patient is a 44y old Female who presents with a chief complaint of Epigastric pain (09 May 2020 11:28)  Currently admitted to medicine with the primary diagnosis of Pancreatitis    ASSESSMENT & PLAN  45 y/o female with past medical history of chronic pancreatitis, alcohol abuse, alcoholic hepatic steatosis admitted for acute on chronic pancreatitis w/ possible pseudocyst v abscess.    #Acute on chronic pancreatitis secondary to alcohol abuse   - Elevated lipase >600 with consistent CT findings- also shows possible necrosis and possible pseudocyst vs abscess   - Hemodynamically stable without evidence of any end organ damage   - Triglycerides WNL, IgG negative. Ultrasound significant for hepatic steatosis. No evidence of cholelithiasis.   - No evidence of alcoholic hepatitis or acute liver failure- normal bilirubin, normal INR. Though patient does have underlying alcoholic steatosis.   - Monitor BUN and hematocrit and liver enzymes   - diet- lowfat diet today, fluids at 150cc/hr   - continue creon   - repeat lipase 85 (from >600)   - blood cultures negative   - Pain control with PRN Dilaudid & oxycodone - patient has been asking for every dose   - zofran for nausea/vomiting, QTc 482     #Tachycardia, resolved   -could be 2/2 pain v withdrawal     #Transaminitis secondary to alcoholic hepatitis -     #Hypomagnesemia/hypokalemia, resolved   -replete and follow up     #Suspected folic acid and thiamine deficiency   -Continue supplementation      #Alcohol abuse  -continue folic acid & thiamine supplementation  -continue on CIWA protocol and monitor for signs of alcohol withdrawal   -patient refused detox consult     #Diet - Regular - lowfat   #DVT prophylaxis- lovenox   #GI prophylaxis- protonix LAURA BARAJAS 44y Female  MRN#: [id number]   CODE STATUS: FULL     SUBJECTIVE  Patient is a 44y old Female who presents with a chief complaint of Epigastric pain (09 May 2020 11:28)  Currently admitted to medicine with the primary diagnosis of Pancreatitis    Today is hospital day 7d. Patient wants to try a regular diet today. Says her pain is slowly improving from before, but stable from yesterday.     OBJECTIVE  PAST MEDICAL & SURGICAL HISTORY  AA (alcohol abuse)  Chronic pancreatitis  Pancreatitis  S/P arthroscopy: meniscal repair    ALLERGIES:  Compazine (Unknown)    MEDICATIONS:  STANDING MEDICATIONS  enoxaparin Injectable 40 milliGRAM(s) SubCutaneous daily  folic acid 1 milliGRAM(s) Oral daily  lactated ringers. 1000 milliLiter(s) IV Continuous <Continuous>  multivitamin/minerals 1 Tablet(s) Oral daily  pancrelipase  (CREON 12,000 Lipase Units) 3 Capsule(s) Oral three times a day with meals  pantoprazole    Tablet 40 milliGRAM(s) Oral before breakfast  thiamine 100 milliGRAM(s) Oral daily    PRN MEDICATIONS  HYDROmorphone  Injectable 1 milliGRAM(s) IV Push every 4 hours PRN  LORazepam   Injectable 2 milliGRAM(s) IV Push every 4 hours PRN  ondansetron Injectable 4 milliGRAM(s) IV Push three times a day PRN  oxyCODONE    IR 5 milliGRAM(s) Oral every 6 hours PRN      VITAL SIGNS: Last 24 Hours  T(C): 36.4 (10 May 2020 05:23), Max: 37.3 (09 May 2020 12:35)  T(F): 97.5 (10 May 2020 05:23), Max: 99.1 (09 May 2020 12:35)  HR: 70 (10 May 2020 05:23) (70 - 99)  BP: 140/83 (10 May 2020 05:23) (138/74 - 158/96)  BP(mean): --  RR: 19 (10 May 2020 05:23) (19 - 20)  SpO2: 93% (10 May 2020 05:23) (93% - 94%)    LABS:                        11.6   3.90  )-----------( 274      ( 10 May 2020 07:20 )             34.8     05-09    140  |  97<L>  |  <3<L>  ----------------------------<  96  3.6   |  27  |  0.6<L>    Ca    9.6      09 May 2020 06:19  Mg     1.7     05-09    TPro  6.8  /  Alb  3.8  /  TBili  0.6  /  DBili  x   /  AST  161<H>  /  ALT  58<H>  /  AlkPhos  177<H>  05-09      RADIOLOGY:  none today     PHYSICAL EXAM:  GENERAL: NAD   HEENT:  Atraumatic, Normocephalic.   PULMONARY: Clear to auscultation bilaterally; No wheeze  CARDIOVASCULAR: regular rate & rhythm   GASTROINTESTINAL: soft, tender to palpation mostly in upper quadrants, nondistended   MUSCULOSKELETAL:  2+ Peripheral Pulses, No edema  NEUROLOGY: non-focal, no tremors   SKIN: No rashes     ADMISSION SUMMARY  Patient is a 44y old Female who presents with a chief complaint of Epigastric pain (09 May 2020 11:28)  Currently admitted to medicine with the primary diagnosis of Pancreatitis    ASSESSMENT & PLAN  43 y/o female with past medical history of chronic pancreatitis, alcohol abuse, alcoholic hepatic steatosis admitted for acute on chronic pancreatitis w/ possible pseudocyst v abscess.    #Acute on chronic pancreatitis secondary to alcohol abuse   - Elevated lipase >600 with consistent CT findings- also shows possible necrosis and possible pseudocyst vs abscess   - Hemodynamically stable without evidence of any end organ damage   - Triglycerides WNL, IgG negative. Ultrasound significant for hepatic steatosis. No evidence of cholelithiasis.   - No evidence of alcoholic hepatitis or acute liver failure- normal bilirubin, normal INR. Though patient does have underlying alcoholic steatosis.   - Monitor BUN and hematocrit and liver enzymes   - diet- lowfat diet today, fluids at 100cc/hr   - continue creon   - repeat lipase 85 (from >600)   - blood cultures negative   - Pain control with PRN Dilaudid & oxycodone - patient has been asking for every dose   - zofran for nausea/vomiting, QTc 482     #Tachycardia, resolved   -could be 2/2 pain v withdrawal     #Transaminitis secondary to alcoholic hepatitis- stable     #Hypomagnesemia/hypokalemia, resolved   -replete and follow up     #Suspected folic acid and thiamine deficiency   -Continue supplementation      #Alcohol abuse  -continue folic acid & thiamine supplementation  -continue on [facility] protocol and monitor for signs of alcohol withdrawal   -patient refused detox consult     #Diet - DASH/TLC, lowfat diet   #DVT prophylaxis- lovenox   #GI prophylaxis- protonix
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
## 2020-05-10 NOTE — PROGRESS NOTE ADULT - SUBJECTIVE AND OBJECTIVE BOX
Patient is a 44y old  Female who presents with a chief complaint of Epigastric pain (04 May 2020 11:01)    Patient was seen and examined.  Epigastric pain slightly better  appetite slowly improving  Denies any other complaints.  All systems reviewed .    PAST MEDICAL & SURGICAL HISTORY:  AA (alcohol abuse)  Chronic pancreatitis  Pancreatitis  S/P arthroscopy: meniscal repair    Allergies  Compazine (Unknown)    MEDICATIONS  (STANDING):  enoxaparin Injectable 40 milliGRAM(s) SubCutaneous daily  folic acid 1 milliGRAM(s) Oral daily  lactated ringers. 1000 milliLiter(s) (100 mL/Hr) IV Continuous <Continuous>  multivitamin/minerals 1 Tablet(s) Oral daily  pancrelipase  (CREON 12,000 Lipase Units) 3 Capsule(s) Oral three times a day with meals  pantoprazole    Tablet 40 milliGRAM(s) Oral before breakfast  senna 2 Tablet(s) Oral daily  thiamine 100 milliGRAM(s) Oral daily    MEDICATIONS  (PRN):  HYDROmorphone  Injectable 1 milliGRAM(s) IV Push every 4 hours PRN Severe Pain (7 - 10)  LORazepam   Injectable 2 milliGRAM(s) IV Push every 4 hours PRN Agitation  ondansetron Injectable 4 milliGRAM(s) IV Push three times a day PRN Nausea and/or Vomiting  oxyCODONE    IR 5 milliGRAM(s) Oral every 6 hours PRN Moderate Pain (4 - 6)    Vital Signs Last 24 Hrs  T(C): 36.4 (10 May 2020 05:23), Max: 37.3 (09 May 2020 12:35)  T(F): 97.5 (10 May 2020 05:23), Max: 99.1 (09 May 2020 12:35)  HR: 70 (10 May 2020 05:23) (70 - 99)  BP: 140/83 (10 May 2020 05:23) (138/74 - 158/96)  BP(mean): --  RR: 19 (10 May 2020 05:23) (19 - 20)  SpO2: 93% (10 May 2020 05:23) (93% - 94%)    O/E:  Awake, alert, not in distress.  HEENT: atraumatic, EOMI.  Chest: clear.  CVS: SIS2 +, no murmur.  P/A: Soft, BS+, epigastric tenderness+  CNS: non focal.  Ext: no edema feet.  Skin: no rash, no ulcers.  All systems reviewed positive findings as above.                                     11.6<L>  3.90<L> )-----------( 274      ( 10 May 2020 07:20 )             34.8<L>                        11.2<L>  2.78<L> )-----------( 242      ( 09 May 2020 06:19 )             32.3<L>  05-10    140  |  99  |  <3<L>  ----------------------------<  99  3.7   |  26  |  0.6<L>  05-09    140  |  97<L>  |  <3<L>  ----------------------------<  96  3.6   |  27  |  0.6<L>    Ca    9.5      10 May 2020 07:20  Ca    9.6      09 May 2020 06:19  Mg     2.6     05-10    TPro  6.9  /  Alb  3.9  /  TBili  0.5  /  DBili  x   /  AST  166<H>  /  ALT  61<H>  /  AlkPhos  161<H>  05-10  TPro  6.8  /  Alb  3.8  /  TBili  0.6  /  DBili  x   /  AST  161<H>  /  ALT  58<H>  /  AlkPhos  177<H>  05-09

## 2020-05-11 LAB
ALBUMIN SERPL ELPH-MCNC: 4.1 G/DL — SIGNIFICANT CHANGE UP (ref 3.5–5.2)
ALP SERPL-CCNC: 149 U/L — HIGH (ref 30–115)
ALT FLD-CCNC: 62 U/L — HIGH (ref 0–41)
ANION GAP SERPL CALC-SCNC: 13 MMOL/L — SIGNIFICANT CHANGE UP (ref 7–14)
AST SERPL-CCNC: 162 U/L — HIGH (ref 0–41)
BILIRUB SERPL-MCNC: 0.5 MG/DL — SIGNIFICANT CHANGE UP (ref 0.2–1.2)
BUN SERPL-MCNC: <3 MG/DL — LOW (ref 10–20)
CALCIUM SERPL-MCNC: 9.5 MG/DL — SIGNIFICANT CHANGE UP (ref 8.5–10.1)
CHLORIDE SERPL-SCNC: 102 MMOL/L — SIGNIFICANT CHANGE UP (ref 98–110)
CO2 SERPL-SCNC: 25 MMOL/L — SIGNIFICANT CHANGE UP (ref 17–32)
CREAT SERPL-MCNC: 0.6 MG/DL — LOW (ref 0.7–1.5)
GLUCOSE SERPL-MCNC: 110 MG/DL — HIGH (ref 70–99)
HCT VFR BLD CALC: 34.1 % — LOW (ref 37–47)
HGB BLD-MCNC: 11.4 G/DL — LOW (ref 12–16)
MCHC RBC-ENTMCNC: 32.7 PG — HIGH (ref 27–31)
MCHC RBC-ENTMCNC: 33.4 G/DL — SIGNIFICANT CHANGE UP (ref 32–37)
MCV RBC AUTO: 97.7 FL — SIGNIFICANT CHANGE UP (ref 81–99)
NRBC # BLD: 0 /100 WBCS — SIGNIFICANT CHANGE UP (ref 0–0)
PLATELET # BLD AUTO: 298 K/UL — SIGNIFICANT CHANGE UP (ref 130–400)
POTASSIUM SERPL-MCNC: 3.9 MMOL/L — SIGNIFICANT CHANGE UP (ref 3.5–5)
POTASSIUM SERPL-SCNC: 3.9 MMOL/L — SIGNIFICANT CHANGE UP (ref 3.5–5)
PROT SERPL-MCNC: 7.1 G/DL — SIGNIFICANT CHANGE UP (ref 6–8)
RBC # BLD: 3.49 M/UL — LOW (ref 4.2–5.4)
RBC # FLD: 11.9 % — SIGNIFICANT CHANGE UP (ref 11.5–14.5)
SODIUM SERPL-SCNC: 140 MMOL/L — SIGNIFICANT CHANGE UP (ref 135–146)
WBC # BLD: 4.16 K/UL — LOW (ref 4.8–10.8)
WBC # FLD AUTO: 4.16 K/UL — LOW (ref 4.8–10.8)

## 2020-05-11 PROCEDURE — 99232 SBSQ HOSP IP/OBS MODERATE 35: CPT

## 2020-05-11 PROCEDURE — 74177 CT ABD & PELVIS W/CONTRAST: CPT | Mod: 26

## 2020-05-11 RX ORDER — HYDROMORPHONE HYDROCHLORIDE 2 MG/ML
1 INJECTION INTRAMUSCULAR; INTRAVENOUS; SUBCUTANEOUS EVERY 6 HOURS
Refills: 0 | Status: DISCONTINUED | OUTPATIENT
Start: 2020-05-11 | End: 2020-05-12

## 2020-05-11 RX ORDER — PANTOPRAZOLE SODIUM 20 MG/1
40 TABLET, DELAYED RELEASE ORAL
Refills: 0 | Status: DISCONTINUED | OUTPATIENT
Start: 2020-05-11 | End: 2020-05-13

## 2020-05-11 RX ORDER — IOHEXOL 300 MG/ML
30 INJECTION, SOLUTION INTRAVENOUS ONCE
Refills: 0 | Status: COMPLETED | OUTPATIENT
Start: 2020-05-11 | End: 2020-05-11

## 2020-05-11 RX ADMIN — SODIUM CHLORIDE 100 MILLILITER(S): 9 INJECTION, SOLUTION INTRAVENOUS at 15:31

## 2020-05-11 RX ADMIN — PANTOPRAZOLE SODIUM 40 MILLIGRAM(S): 20 TABLET, DELAYED RELEASE ORAL at 17:35

## 2020-05-11 RX ADMIN — HYDROMORPHONE HYDROCHLORIDE 1 MILLIGRAM(S): 2 INJECTION INTRAMUSCULAR; INTRAVENOUS; SUBCUTANEOUS at 05:26

## 2020-05-11 RX ADMIN — Medication 3 CAPSULE(S): at 11:11

## 2020-05-11 RX ADMIN — Medication 3 CAPSULE(S): at 07:52

## 2020-05-11 RX ADMIN — IOHEXOL 30 MILLILITER(S): 300 INJECTION, SOLUTION INTRAVENOUS at 13:23

## 2020-05-11 RX ADMIN — PANTOPRAZOLE SODIUM 40 MILLIGRAM(S): 20 TABLET, DELAYED RELEASE ORAL at 05:28

## 2020-05-11 RX ADMIN — OXYCODONE HYDROCHLORIDE 5 MILLIGRAM(S): 5 TABLET ORAL at 21:32

## 2020-05-11 RX ADMIN — Medication 3 CAPSULE(S): at 17:35

## 2020-05-11 RX ADMIN — HYDROMORPHONE HYDROCHLORIDE 1 MILLIGRAM(S): 2 INJECTION INTRAMUSCULAR; INTRAVENOUS; SUBCUTANEOUS at 23:58

## 2020-05-11 RX ADMIN — SODIUM CHLORIDE 100 MILLILITER(S): 9 INJECTION, SOLUTION INTRAVENOUS at 17:35

## 2020-05-11 RX ADMIN — Medication 1 MILLIGRAM(S): at 11:11

## 2020-05-11 RX ADMIN — HYDROMORPHONE HYDROCHLORIDE 1 MILLIGRAM(S): 2 INJECTION INTRAMUSCULAR; INTRAVENOUS; SUBCUTANEOUS at 17:34

## 2020-05-11 RX ADMIN — SENNA PLUS 2 TABLET(S): 8.6 TABLET ORAL at 11:11

## 2020-05-11 RX ADMIN — ENOXAPARIN SODIUM 40 MILLIGRAM(S): 100 INJECTION SUBCUTANEOUS at 11:10

## 2020-05-11 RX ADMIN — Medication 100 MILLIGRAM(S): at 11:11

## 2020-05-11 RX ADMIN — OXYCODONE HYDROCHLORIDE 5 MILLIGRAM(S): 5 TABLET ORAL at 15:30

## 2020-05-11 RX ADMIN — Medication 1 TABLET(S): at 11:11

## 2020-05-11 RX ADMIN — HYDROMORPHONE HYDROCHLORIDE 1 MILLIGRAM(S): 2 INJECTION INTRAMUSCULAR; INTRAVENOUS; SUBCUTANEOUS at 11:01

## 2020-05-11 RX ADMIN — OXYCODONE HYDROCHLORIDE 5 MILLIGRAM(S): 5 TABLET ORAL at 07:52

## 2020-05-11 RX ADMIN — ONDANSETRON 4 MILLIGRAM(S): 8 TABLET, FILM COATED ORAL at 11:01

## 2020-05-11 RX ADMIN — SODIUM CHLORIDE 100 MILLILITER(S): 9 INJECTION, SOLUTION INTRAVENOUS at 02:05

## 2020-05-11 NOTE — PROGRESS NOTE ADULT - SUBJECTIVE AND OBJECTIVE BOX
LAURA BARAJAS 44y Female  MRN#: [id number]   CODE STATUS: FULL     SUBJECTIVE  Patient is a 44y old Female who presents with a chief complaint of Epigastric pain (10 May 2020 11:18)  Currently admitted to medicine with the primary diagnosis of Pancreatitis    Today is hospital day 8d.       OBJECTIVE  PAST MEDICAL & SURGICAL HISTORY  AA (alcohol abuse)  Chronic pancreatitis  Pancreatitis  S/P arthroscopy: meniscal repair    ALLERGIES:  Compazine (Unknown)    MEDICATIONS:  STANDING MEDICATIONS  enoxaparin Injectable 40 milliGRAM(s) SubCutaneous daily  folic acid 1 milliGRAM(s) Oral daily  lactated ringers. 1000 milliLiter(s) IV Continuous <Continuous>  multivitamin/minerals 1 Tablet(s) Oral daily  pancrelipase  (CREON 12,000 Lipase Units) 3 Capsule(s) Oral three times a day with meals  pantoprazole    Tablet 40 milliGRAM(s) Oral before breakfast  senna 2 Tablet(s) Oral daily  thiamine 100 milliGRAM(s) Oral daily    PRN MEDICATIONS  HYDROmorphone  Injectable 1 milliGRAM(s) IV Push every 4 hours PRN  LORazepam   Injectable 2 milliGRAM(s) IV Push every 4 hours PRN  ondansetron Injectable 4 milliGRAM(s) IV Push three times a day PRN  oxyCODONE    IR 5 milliGRAM(s) Oral every 6 hours PRN      VITAL SIGNS: Last 24 Hours  T(C): 36.6 (11 May 2020 05:41), Max: 36.8 (10 May 2020 21:02)  T(F): 97.9 (11 May 2020 05:41), Max: 98.2 (10 May 2020 21:02)  HR: 86 (11 May 2020 05:41) (86 - 87)  BP: 135/92 (11 May 2020 05:41) (128/82 - 146/75)  BP(mean): --  RR: 18 (11 May 2020 05:41) (18 - 19)  SpO2: 96% (10 May 2020 13:00) (96% - 96%)    LABS:                        11.4   4.16  )-----------( 298      ( 11 May 2020 06:28 )             34.1     05-11    140  |  102  |  <3<L>  ----------------------------<  110<H>  3.9   |  25  |  0.6<L>    Ca    9.5      11 May 2020 06:28  Mg     2.6     05-10    TPro  7.1  /  Alb  4.1  /  TBili  0.5  /  DBili  x   /  AST  162<H>  /  ALT  62<H>  /  AlkPhos  149<H>  05-11      RADIOLOGY:      PHYSICAL EXAM:  GENERAL: NAD, well-developed, AAOx3  HEENT:  Atraumatic, Normocephalic. EOMI, PERRLA, conjunctiva and sclera clear, No JVD  PULMONARY: Clear to auscultation bilaterally; No wheeze  CARDIOVASCULAR: Regular rate and rhythm; No murmurs, rubs, or gallops  GASTROINTESTINAL: Soft, Nontender, Nondistended; Bowel sounds present  MUSCULOSKELETAL:  2+ Peripheral Pulses, No clubbing, cyanosis, or edema  NEUROLOGY: non-focal  SKIN: No rashes or lesions      ADMISSION SUMMARY  Patient is a 44y old Female who presents with a chief complaint of Epigastric pain (10 May 2020 11:18)  Currently admitted to medicine with the primary diagnosis of Pancreatitis    ASSESSMENT & PLAN LAURA BARAJAS 44y Female  MRN#: [id number]   CODE STATUS: FULL     SUBJECTIVE  Patient is a 44y old Female who presents with a chief complaint of Epigastric pain (10 May 2020 11:18)  Currently admitted to medicine with the primary diagnosis of Pancreatitis    Today is hospital day 8d. Patient notes pain and appetite is improving - but slowly. Still unable to tolerate a full meal - only eats a couple of spoonfuls of food a day.     OBJECTIVE  PAST MEDICAL & SURGICAL HISTORY  AA (alcohol abuse)  Chronic pancreatitis  Pancreatitis  S/P arthroscopy: meniscal repair    ALLERGIES:  Compazine (Unknown)    MEDICATIONS:  STANDING MEDICATIONS  enoxaparin Injectable 40 milliGRAM(s) SubCutaneous daily  folic acid 1 milliGRAM(s) Oral daily  lactated ringers. 1000 milliLiter(s) IV Continuous <Continuous>  multivitamin/minerals 1 Tablet(s) Oral daily  pancrelipase  (CREON 12,000 Lipase Units) 3 Capsule(s) Oral three times a day with meals  pantoprazole    Tablet 40 milliGRAM(s) Oral before breakfast  senna 2 Tablet(s) Oral daily  thiamine 100 milliGRAM(s) Oral daily    PRN MEDICATIONS  HYDROmorphone  Injectable 1 milliGRAM(s) IV Push every 4 hours PRN  LORazepam   Injectable 2 milliGRAM(s) IV Push every 4 hours PRN  ondansetron Injectable 4 milliGRAM(s) IV Push three times a day PRN  oxyCODONE    IR 5 milliGRAM(s) Oral every 6 hours PRN      VITAL SIGNS: Last 24 Hours  T(C): 36.6 (11 May 2020 05:41), Max: 36.8 (10 May 2020 21:02)  T(F): 97.9 (11 May 2020 05:41), Max: 98.2 (10 May 2020 21:02)  HR: 86 (11 May 2020 05:41) (86 - 87)  BP: 135/92 (11 May 2020 05:41) (128/82 - 146/75)  BP(mean): --  RR: 18 (11 May 2020 05:41) (18 - 19)  SpO2: 96% (10 May 2020 13:00) (96% - 96%)    LABS:                        11.4   4.16  )-----------( 298      ( 11 May 2020 06:28 )             34.1     05-11    140  |  102  |  <3<L>  ----------------------------<  110<H>  3.9   |  25  |  0.6<L>    Ca    9.5      11 May 2020 06:28  Mg     2.6     05-10    TPro  7.1  /  Alb  4.1  /  TBili  0.5  /  DBili  x   /  AST  162<H>  /  ALT  62<H>  /  AlkPhos  149<H>  05-11    RADIOLOGY:  f/u CT abdomen today     PHYSICAL EXAM:  GENERAL: NAD   HEENT:  Atraumatic, Normocephalic.   PULMONARY: Clear to auscultation bilaterally; No wheeze  CARDIOVASCULAR: regular rate & rhythm   GASTROINTESTINAL: soft, tender to palpation mostly in R upper quadrant, nondistended   MUSCULOSKELETAL:  2+ Peripheral Pulses, No edema  NEUROLOGY: non-focal, no tremors   SKIN: No rashes     ADMISSION SUMMARY  Patient is a 44y old Female who presents with a chief complaint of Epigastric pain (10 May 2020 11:18)  Currently admitted to medicine with the primary diagnosis of Pancreatitis    ASSESSMENT & PLAN  45 y/o female with past medical history of chronic pancreatitis, alcohol abuse, alcoholic hepatic steatosis admitted for acute on chronic pancreatitis w/ possible pseudocyst v abscess.    #Acute on chronic pancreatitis secondary to alcohol abuse   - Elevated lipase >600 with consistent CT findings- also shows possible necrosis and possible pseudocyst vs abscess   - Hemodynamically stable without evidence of any end organ damage   - Triglycerides WNL, IgG negative. Ultrasound significant for hepatic steatosis. No evidence of cholelithiasis.   - No evidence of alcoholic hepatitis or acute liver failure- normal bilirubin, normal INR. Though patient does have underlying alcoholic steatosis.   - Monitor BUN and hematocrit and liver enzymes   - diet- lowfat diet today, fluids at 100cc/hr, continue creon   - repeat lipase 85 (from >600)   - blood cultures negative   - Pain control with PRN Dilaudid & oxycodone - decreasing Dilaudid dose today   - zofran for nausea/vomiting, QTc 482   - patient complaining of reflux-like symptoms - increased protonix to BID   -CT abdomen/pelvis ordered for today     #Tachycardia, resolved     #Transaminitis secondary to alcoholic hepatitis- stable     #Hypomagnesemia/hypokalemia, resolved     #Suspected folic acid and thiamine deficiency   -Continue supplementation      #Alcohol abuse  -continue folic acid & thiamine supplementation  -continue on CIWA protocol and monitor for signs of alcohol withdrawal   -patient refused detox consult     #Diet - DASH/TLC, lowfat diet   #DVT prophylaxis- lovenox   #GI prophylaxis- protonix   Pending: clinical improvement - tolerate PO diet, off IV fluids, CT abd/pelvis results

## 2020-05-11 NOTE — PROGRESS NOTE ADULT - SUBJECTIVE AND OBJECTIVE BOX
Patient is a 44y old  Female who presents with a chief complaint of Epigastric pain (04 May 2020 11:01)    Patient was seen and examined.  Epigastric pain  better  appetite improving  Denies any other complaints.  All systems reviewed .    PAST MEDICAL & SURGICAL HISTORY:  AA (alcohol abuse)  Chronic pancreatitis  Pancreatitis  S/P arthroscopy: meniscal repair    Allergies  Compazine (Unknown)    MEDICATIONS  (STANDING):  enoxaparin Injectable 40 milliGRAM(s) SubCutaneous daily  folic acid 1 milliGRAM(s) Oral daily  iohexol 300 mG (iodine)/mL Oral Solution 30 milliLiter(s) Oral once  lactated ringers. 1000 milliLiter(s) (100 mL/Hr) IV Continuous <Continuous>  multivitamin/minerals 1 Tablet(s) Oral daily  pancrelipase  (CREON 12,000 Lipase Units) 3 Capsule(s) Oral three times a day with meals  pantoprazole    Tablet 40 milliGRAM(s) Oral two times a day  senna 2 Tablet(s) Oral daily  thiamine 100 milliGRAM(s) Oral daily    MEDICATIONS  (PRN):  HYDROmorphone  Injectable 1 milliGRAM(s) IV Push every 6 hours PRN Severe Pain (7 - 10)  ondansetron Injectable 4 milliGRAM(s) IV Push three times a day PRN Nausea and/or Vomiting  oxyCODONE    IR 5 milliGRAM(s) Oral every 6 hours PRN Moderate Pain (4 - 6)    T(C): 36.6 (05-11-20 @ 05:41), Max: 36.8 (05-10-20 @ 21:02)  HR: 86 (05-11-20 @ 05:41) (86 - 87)  BP: 135/92 (05-11-20 @ 05:41) (128/82 - 146/75)  RR: 18 (05-11-20 @ 05:41) (18 - 19)  SpO2: 96% (05-10-20 @ 13:00) (96% - 96%)    O/E:  Awake, alert, not in distress.  HEENT: atraumatic, EOMI.  Chest: clear.  CVS: SIS2 +, no murmur.  P/A: Soft, BS+, epigastric tenderness+  CNS: non focal.  Ext: no edema feet.  Skin: no rash, no ulcers.  All systems reviewed positive findings as above.                                  11.4<L>  4.16<L> )-----------( 298      ( 11 May 2020 06:28 )             34.1<L>                        11.6<L>  3.90<L> )-----------( 274      ( 10 May 2020 07:20 )             34.8<L>  05-11    140  |  102  |  <3<L>  ----------------------------<  110<H>  3.9   |  25  |  0.6<L>  05-10    140  |  99  |  <3<L>  ----------------------------<  99  3.7   |  26  |  0.6<L>    Ca    9.5      11 May 2020 06:28  Ca    9.5      10 May 2020 07:20  Mg     2.6     05-10    TPro  7.1  /  Alb  4.1  /  TBili  0.5  /  DBili  x   /  AST  162<H>  /  ALT  62<H>  /  AlkPhos  149<H>  05-11  TPro  6.9  /  Alb  3.9  /  TBili  0.5  /  DBili  x   /  AST  166<H>  /  ALT  61<H>  /  AlkPhos  161<H>  05-10
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
## 2020-05-11 NOTE — PROGRESS NOTE ADULT - SUBJECTIVE AND OBJECTIVE BOX
Patient is a 45 y/o female with past medical history of Chronic pancreatitis, alcohol abuse, alcoholic hepatic steatosis. Patient notes Epigastric pain. Pain is sharp, is 10/10 in intensity, radiating to back, associated with non bilious and non bloody vomiting. Precipitant is found to be excessive ETOH use and abuse despite being educated on ETOH avoidance. Patient notes pain is better but still present. Patient also notes not able to tolerate diet as normal.       PAST MEDICAL & SURGICAL HISTORY:  AA (alcohol abuse)  Chronic pancreatitis  Pancreatitis  S/P arthroscopy: meniscal repair      FAMILY HISTORY:  Family history of cholecystectomy: many female members in the family  FH: pancreatic cancer: cousin  Family history of hypertension: both father and mother      Social History:  Alcohol: alcohol abuse   Drugs: denies     Home Medications:  Multiple Vitamins with Minerals oral tablet: 1 tab(s) orally once a day (16 Feb 2020 13:00)    MEDICATIONS  (STANDING):  folic acid 1 milliGRAM(s) Oral daily  heparin   Injectable 5000 Unit(s) SubCutaneous every 8 hours  lactated ringers. 1000 milliLiter(s) (250 mL/Hr) IV Continuous <Continuous>  LORazepam   Injectable   IV Push   LORazepam   Injectable 2 milliGRAM(s) IV Push every 4 hours  pantoprazole  Injectable 40 milliGRAM(s) IV Push daily  potassium chloride  20 mEq/100 mL IVPB 20 milliEquivalent(s) IV Intermittent once  thiamine 100 milliGRAM(s) Oral daily    MEDICATIONS  (PRN):  HYDROmorphone  Injectable 2 milliGRAM(s) IV Push four times a day PRN Moderate Pain (4 - 6)  ondansetron Injectable 4 milliGRAM(s) IV Push three times a day PRN Nausea and/or Vomiting      Allergies  Compazine (Unknown)      Review of Systems:   Constitutional:  No Fever, No Chills  ENT/Mouth:  No Hearing Changes,  No Difficulty Swallowing  Eyes:  No Eye Pain, No Vision Changes  Cardiovascular:  No Chest Pain, No Palpitations  Respiratory:  No Cough, No Dyspnea  Gastrointestinal:  As described in HPI  Musculoskeletal:  No Joint Swelling, No Back Pain  Skin:  No Skin Lesions, No Jaundice  Neuro:  No Syncope, No Dizziness  Heme/Lymph:  No Bruising, No Bleeding.      Vital Signs  T(F): 97.9 (11 May 2020 05:41), Max: 98.2 (10 May 2020 21:02)  HR: 86 (11 May 2020 05:41) (86 - 87)  BP: 135/92 (11 May 2020 05:41) (128/82 - 146/75)  RR: 18 (11 May 2020 05:41) (18 - 19)  SpO2: 96% (10 May 2020 13:00) (96% - 96%)  Physical Exam  Gen: NAD  HEENT: NC/AT, Mucosal Membranes Moist  Cardio: S1/S2   Resp: CTA B/L  Abdomen: TTP diffuse  Neuro: AAOx3  Extremities: FROM x 4  Integum: No jaundice                             11.4   4.16  )-----------( 298      ( 11 May 2020 06:28 )             34.1     05-11    140  |  102  |  <3<L>  ----------------------------<  110<H>  3.9   |  25  |  0.6<L>    Ca    9.5      11 May 2020 06:28  Mg     2.6     05-10    TPro  7.1  /  Alb  4.1  /  TBili  0.5  /  DBili  x   /  AST  162<H>  /  ALT  62<H>  /  AlkPhos  149<H>  05-11

## 2020-05-11 NOTE — PROGRESS NOTE ADULT - ASSESSMENT
Patient is a 43 y/o female with past medical history of Chronic pancreatitis, alcohol abuse, alcoholic hepatic steatosis. Patient notes Epigastric pain. Pain is sharp, is 10/10 in intensity, radiating to back, associated with non bilious and non bloody vomiting. Precipitant is found to be excessive ETOH use and abuse despite being educated on ETOH avoidance. Patient remains on CIWA protocol.  Labs positive for Lipase > 600. CT showing acute on chronic pancreatitis with a 2.8 x 3.1 x 4.0 rim-enhancing fluid collection in the region of the pancreatic tail. Heterogeneous low attenuation with the body of the pancreas and early necrosis is not excluded. US : no cholelithiasis , CBD 6 mm , hepatic steatosis. LFTs with alcoholic hepatitis pattern. Continue to treat ETOH w/d. Continue IV hydration, slowly advanced diet. Patient over all slowly improving. [place] to [id number] with each meal. I suggested to her Lyrica but she had past swelling from this medicine.       Recurrent acute on chronic pancreatitis / pancreatic tail pseudocyst ( less likely abscess in absence of fever or leukocytosis)   - Likely secondary to alcohol intake , no cholelithiasis on US   - Triglyceride normal IgG Subsets   - Normal IGG 4 level last admission   - Continue Aggressive IV hydration   - Maddrey score <32 --no indication for steroid  - Alcohol abstinence must be stressed  - Pain control  - Creon can increase to [id number] TID  - Agree with repeating CT scan   - Will follow

## 2020-05-11 NOTE — CHART NOTE - NSCHARTNOTEFT_GEN_A_CORE
Registered Dietitian Follow-Up     Patient Profile Reviewed                           Yes [x]   No []     Nutrition History Previously Obtained        Yes []  No [x]       Pertinent Subjective Information: Pt was sleeping during RD visit. Pt's diet was advanced to below yesterday. Pt was previously on clear liquid diet. Per RN, pt is tolerating current diet order very well; consuming ~50% of meals at this time. RD requested new wt since no CBW available.     Pertinent Medical Interventions: Acute on chronic pancreatitis secondary to alcohol abuse; CT abdomen/pelvis ordered for today. Tachycardia resolved. Transaminitis 2/2 alcoholic hepatitis- stable. Hypomagnesemia/hypokalemia, resolved. Suspected folic acid and thiamine deficiency on supp. ETOH abuse on supp.     Diet order: DASH/TLC, low fat      Anthropometrics:  - Ht. 162.56 cm  - Wt. 59.3 kg (previous admit wt 2/13)- no new wts  - %wt change  - BMI 20.6  - IBW 55 kg      Pertinent Lab Data: 5/11: RBC- 3.49, H/H- 11.4/34.1, BUN-<3, cr-0.6, glucose serum- 110     Pertinent Meds: Lovenox, lactated ringers, protonix, senna, pancrelipase, folic acid, zofran, thiamine     Physical Findings:  - Appearance: AAOx4; no edema noted  - GI function: no v/d or constipation but pt has nausea per EMR; LBM- 5/10 (per RN)  - Tubes: none  - Oral/Mouth cavity: none per RN  - Skin: WDL     Nutrition Requirements (from initial RD note 5/7)  Weight Used: 59.3 kg (previous admit wt 2/13)     Estimated Energy Needs    Continue [x]  Adjust []  3699-1617 kcal/day (MSJ x 1.2-1.3 AF)     Estimated Protein Needs    Continue [x]  Adjust []  59-71 g/day (1-1.2 g/day)        Estimated Fluid Needs        Continue [x]  Adjust []  1 ml/kcal or per LIP     Nutrient Intake: not meeting needs         [x] Previous Nutrition Diagnosis: Inadequate Energy Intake.               [x] Ongoing          [] Resolved    Nutrition Intervention meals and snacks, medical food supplement      Goal/Expected Outcome: Pt to consume >75% of meals and supplement in 3 days.     Indicator/Monitoring: RD to monitor energy intake, diet order, body comp. NFPF.    Recommendations: Continue current diet order as rec. Add ensure enlive BID to current diet order.

## 2020-05-12 LAB
ALBUMIN SERPL ELPH-MCNC: 4.1 G/DL — SIGNIFICANT CHANGE UP (ref 3.5–5.2)
ALP SERPL-CCNC: 132 U/L — HIGH (ref 30–115)
ALT FLD-CCNC: 62 U/L — HIGH (ref 0–41)
ANION GAP SERPL CALC-SCNC: 13 MMOL/L — SIGNIFICANT CHANGE UP (ref 7–14)
AST SERPL-CCNC: 151 U/L — HIGH (ref 0–41)
BILIRUB SERPL-MCNC: 0.4 MG/DL — SIGNIFICANT CHANGE UP (ref 0.2–1.2)
BUN SERPL-MCNC: 4 MG/DL — LOW (ref 10–20)
CALCIUM SERPL-MCNC: 9.7 MG/DL — SIGNIFICANT CHANGE UP (ref 8.5–10.1)
CHLORIDE SERPL-SCNC: 102 MMOL/L — SIGNIFICANT CHANGE UP (ref 98–110)
CO2 SERPL-SCNC: 23 MMOL/L — SIGNIFICANT CHANGE UP (ref 17–32)
CREAT SERPL-MCNC: 0.7 MG/DL — SIGNIFICANT CHANGE UP (ref 0.7–1.5)
GLUCOSE SERPL-MCNC: 100 MG/DL — HIGH (ref 70–99)
HCT VFR BLD CALC: 34.6 % — LOW (ref 37–47)
HGB BLD-MCNC: 11.7 G/DL — LOW (ref 12–16)
MCHC RBC-ENTMCNC: 33.1 PG — HIGH (ref 27–31)
MCHC RBC-ENTMCNC: 33.8 G/DL — SIGNIFICANT CHANGE UP (ref 32–37)
MCV RBC AUTO: 98 FL — SIGNIFICANT CHANGE UP (ref 81–99)
NRBC # BLD: 0 /100 WBCS — SIGNIFICANT CHANGE UP (ref 0–0)
PLATELET # BLD AUTO: 304 K/UL — SIGNIFICANT CHANGE UP (ref 130–400)
POTASSIUM SERPL-MCNC: 4.1 MMOL/L — SIGNIFICANT CHANGE UP (ref 3.5–5)
POTASSIUM SERPL-SCNC: 4.1 MMOL/L — SIGNIFICANT CHANGE UP (ref 3.5–5)
PROT SERPL-MCNC: 6.9 G/DL — SIGNIFICANT CHANGE UP (ref 6–8)
RBC # BLD: 3.53 M/UL — LOW (ref 4.2–5.4)
RBC # FLD: 11.7 % — SIGNIFICANT CHANGE UP (ref 11.5–14.5)
SODIUM SERPL-SCNC: 138 MMOL/L — SIGNIFICANT CHANGE UP (ref 135–146)
WBC # BLD: 3.33 K/UL — LOW (ref 4.8–10.8)
WBC # FLD AUTO: 3.33 K/UL — LOW (ref 4.8–10.8)

## 2020-05-12 PROCEDURE — 99232 SBSQ HOSP IP/OBS MODERATE 35: CPT

## 2020-05-12 RX ORDER — OXYCODONE HYDROCHLORIDE 5 MG/1
5 TABLET ORAL EVERY 6 HOURS
Refills: 0 | Status: DISCONTINUED | OUTPATIENT
Start: 2020-05-13 | End: 2020-05-13

## 2020-05-12 RX ORDER — KETOROLAC TROMETHAMINE 30 MG/ML
15 SYRINGE (ML) INJECTION DAILY
Refills: 0 | Status: DISCONTINUED | OUTPATIENT
Start: 2020-05-12 | End: 2020-05-13

## 2020-05-12 RX ADMIN — HYDROMORPHONE HYDROCHLORIDE 1 MILLIGRAM(S): 2 INJECTION INTRAMUSCULAR; INTRAVENOUS; SUBCUTANEOUS at 06:19

## 2020-05-12 RX ADMIN — PANTOPRAZOLE SODIUM 40 MILLIGRAM(S): 20 TABLET, DELAYED RELEASE ORAL at 05:42

## 2020-05-12 RX ADMIN — Medication 100 MILLIGRAM(S): at 11:12

## 2020-05-12 RX ADMIN — Medication 1 TABLET(S): at 11:12

## 2020-05-12 RX ADMIN — SENNA PLUS 2 TABLET(S): 8.6 TABLET ORAL at 11:12

## 2020-05-12 RX ADMIN — OXYCODONE HYDROCHLORIDE 5 MILLIGRAM(S): 5 TABLET ORAL at 10:01

## 2020-05-12 RX ADMIN — Medication 3 CAPSULE(S): at 17:10

## 2020-05-12 RX ADMIN — Medication 15 MILLIGRAM(S): at 15:01

## 2020-05-12 RX ADMIN — ENOXAPARIN SODIUM 40 MILLIGRAM(S): 100 INJECTION SUBCUTANEOUS at 11:12

## 2020-05-12 RX ADMIN — ONDANSETRON 4 MILLIGRAM(S): 8 TABLET, FILM COATED ORAL at 10:00

## 2020-05-12 RX ADMIN — Medication 1 MILLIGRAM(S): at 11:12

## 2020-05-12 RX ADMIN — Medication 3 CAPSULE(S): at 08:02

## 2020-05-12 RX ADMIN — OXYCODONE HYDROCHLORIDE 5 MILLIGRAM(S): 5 TABLET ORAL at 23:37

## 2020-05-12 RX ADMIN — PANTOPRAZOLE SODIUM 40 MILLIGRAM(S): 20 TABLET, DELAYED RELEASE ORAL at 17:10

## 2020-05-12 RX ADMIN — OXYCODONE HYDROCHLORIDE 5 MILLIGRAM(S): 5 TABLET ORAL at 17:26

## 2020-05-12 RX ADMIN — Medication 3 CAPSULE(S): at 11:13

## 2020-05-12 NOTE — PROGRESS NOTE ADULT - ASSESSMENT
Patient is a 43 y/o female with past medical history of Chronic pancreatitis, alcohol abuse, alcoholic hepatic steatosis. Patient notes Epigastric pain. Pain is sharp, is 10/10 in intensity, radiating to back, associated with non bilious and non bloody vomiting. Precipitant is found to be excessive ETOH use and abuse despite being educated on ETOH avoidance. Patient remains on CIWA protocol.  Labs positive for Lipase > 600. CT showing acute on chronic pancreatitis with a 2.8 x 3.1 x 4.0 rim-enhancing fluid collection in the region of the pancreatic tail. Heterogeneous low attenuation with the body of the pancreas and early necrosis is not excluded. US : no cholelithiasis , CBD 6 mm , hepatic steatosis. LFTs with alcoholic hepatitis pattern. Continue to treat ETOH w/d. Continue IV hydration, slowly advanced diet. Patient over all slowly improving. CT scan was reassuring that overall improvement noted of Pancreas and no formed collection. No lower GI symptoms to be concerned with enteritis.       Recurrent acute on chronic pancreatitis / pancreatic tail pseudocyst ( less likely abscess in absence of fever or leukocytosis)   - Likely secondary to alcohol intake , no cholelithiasis on US   - Triglyceride normal IgG Subsets   - Normal IGG 4 level last admission   - Continue Aggressive IV hydration   - Maddrey score <32 --no indication for steroid  - Alcohol abstinence must be stressed  - Pain control  - Continue Creon  - CT scan reviewed and was reassuring   - Anticipate discharge soon  - Follow up [facility] , non-urgent

## 2020-05-12 NOTE — PROGRESS NOTE ADULT - SUBJECTIVE AND OBJECTIVE BOX
Patient is a 44y old  Female who presents with a chief complaint of Epigastric pain (04 May 2020 11:01)    Patient was seen and examined.  Epigastric pain  better  appetite improving  Denies any other complaints.  All systems reviewed .    PAST MEDICAL & SURGICAL HISTORY:  AA (alcohol abuse)  Chronic pancreatitis  Pancreatitis  S/P arthroscopy: meniscal repair    Allergies  Compazine (Unknown)    MEDICATIONS  (STANDING):  enoxaparin Injectable 40 milliGRAM(s) SubCutaneous daily  folic acid 1 milliGRAM(s) Oral daily  lactated ringers. 1000 milliLiter(s) (100 mL/Hr) IV Continuous <Continuous>  multivitamin/minerals 1 Tablet(s) Oral daily  pancrelipase  (CREON 12,000 Lipase Units) 3 Capsule(s) Oral three times a day with meals  pantoprazole    Tablet 40 milliGRAM(s) Oral two times a day  senna 2 Tablet(s) Oral daily  thiamine 100 milliGRAM(s) Oral daily    MEDICATIONS  (PRN):  ondansetron Injectable 4 milliGRAM(s) IV Push three times a day PRN Nausea and/or Vomiting  oxyCODONE    IR 5 milliGRAM(s) Oral every 6 hours PRN Moderate Pain (4 - 6)    T(C): 36.1 (05-12-20 @ 03:25), Max: 37.1 (05-11-20 @ 14:44)  HR: 77 (05-12-20 @ 03:25) (76 - 83)  BP: 140/91 (05-12-20 @ 03:25) (134/74 - 140/93)  RR: 18 (05-12-20 @ 03:25) (18 - 18)  SpO2: --    O/E:  Awake, alert, not in distress.  HEENT: atraumatic, EOMI.  Chest: clear.  CVS: SIS2 +, no murmur.  P/A: Soft, BS+, epigastric tenderness+  CNS: non focal.  Ext: no edema feet.  Skin: no rash, no ulcers.  All systems reviewed positive findings as above.                                                 11.7<L>  3.33<L> )-----------( 304      ( 12 May 2020 07:03 )             34.6<L>                        11.4<L>  4.16<L> )-----------( 298      ( 11 May 2020 06:28 )             34.1<L>  05-12    138  |  102  |  4<L>  ----------------------------<  100<H>  4.1   |  23  |  0.7  05-11    140  |  102  |  <3<L>  ----------------------------<  110<H>  3.9   |  25  |  0.6<L>    Ca    9.7      12 May 2020 07:03  Ca    9.5      11 May 2020 06:28    TPro  6.9  /  Alb  4.1  /  TBili  0.4  /  DBili  x   /  AST  151<H>  /  ALT  62<H>  /  AlkPhos  132<H>  05-12  TPro  7.1  /  Alb  4.1  /  TBili  0.5  /  DBili  x   /  AST  162<H>  /  ALT  62<H>  /  AlkPhos  149<H>  05-11

## 2020-05-12 NOTE — PROGRESS NOTE ADULT - SUBJECTIVE AND OBJECTIVE BOX
LAURA BARAJAS 44y Female  MRN#: [id number]   CODE STATUS: FULL       SUBJECTIVE  Patient is a 44y old Female who presents with a chief complaint of Epigastric pain (12 May 2020 11:30)  Currently admitted to medicine with the primary diagnosis of Pancreatitis    Today is hospital day 9d. Patient says she feels "terrible" feels like she plateaued over the  past few days. CT abdomen/pelvis unremarkable     OBJECTIVE  PAST MEDICAL & SURGICAL HISTORY  AA (alcohol abuse)  Chronic pancreatitis  Pancreatitis  S/P arthroscopy: meniscal repair    ALLERGIES:  Compazine (Unknown)    MEDICATIONS:  STANDING MEDICATIONS  enoxaparin Injectable 40 milliGRAM(s) SubCutaneous daily  folic acid 1 milliGRAM(s) Oral daily  lactated ringers. 1000 milliLiter(s) IV Continuous <Continuous>  multivitamin/minerals 1 Tablet(s) Oral daily  pancrelipase  (CREON 12,000 Lipase Units) 3 Capsule(s) Oral three times a day with meals  pantoprazole    Tablet 40 milliGRAM(s) Oral two times a day  senna 2 Tablet(s) Oral daily  thiamine 100 milliGRAM(s) Oral daily    PRN MEDICATIONS  ondansetron Injectable 4 milliGRAM(s) IV Push three times a day PRN  oxyCODONE    IR 5 milliGRAM(s) Oral every 6 hours PRN      VITAL SIGNS: Last 24 Hours  T(C): 36.7 (12 May 2020 13:29), Max: 37.1 (11 May 2020 14:44)  T(F): 98 (12 May 2020 13:29), Max: 98.8 (11 May 2020 14:44)  HR: 91 (12 May 2020 13:29) (76 - 91)  BP: 126/85 (12 May 2020 13:29) (126/85 - 140/93)  BP(mean): --  RR: 18 (12 May 2020 13:29) (18 - 18)  SpO2: --    LABS:                        11.7   3.33  )-----------( 304      ( 12 May 2020 07:03 )             34.6     05-12    138  |  102  |  4<L>  ----------------------------<  100<H>  4.1   |  23  |  0.7    Ca    9.7      12 May 2020 07:03    TPro  6.9  /  Alb  4.1  /  TBili  0.4  /  DBili  x   /  AST  151<H>  /  ALT  62<H>  /  AlkPhos  132<H>  05-12    RADIOLOGY:  < from: CT Abdomen and Pelvis w/ Oral Cont and w/ IV Cont (05.11.20 @ 15:51) >    IMPRESSION:     1. Pseudocyst in the pancreatic tail, slightly decreased.  2. Improvement in the acute on chronic pancreatitis with decreased inflammation surrounding the pancreas. No evidence for pancreatic necrosis.  3. Thickening of the descending colonic wall consistent with colitis.    < end of copied text >      PHYSICAL EXAM:  GENERAL: NAD   HEENT:  Atraumatic, Normocephalic.   PULMONARY: Clear to auscultation bilaterally; No wheeze  CARDIOVASCULAR: regular rate & rhythm   GASTROINTESTINAL: soft, tender to palpation mostly in R upper quadrant, nondistended   MUSCULOSKELETAL:  2+ Peripheral Pulses, No edema  NEUROLOGY: non-focal, no tremors   SKIN: No rashes     ADMISSION SUMMARY  Patient is a 44y old Female who presents with a chief complaint of Epigastric pain (12 May 2020 11:30)  Currently admitted to medicine with the primary diagnosis of Pancreatitis    ASSESSMENT & PLAN  45 y/o female with past medical history of chronic pancreatitis, alcohol abuse, alcoholic hepatic steatosis admitted for acute on chronic pancreatitis w/ possible pseudocyst v abscess.    #Acute on chronic pancreatitis secondary to alcohol abuse   - Elevated lipase >600 with consistent CT findings- also shows possible necrosis and possible pseudocyst vs abscess   - Hemodynamically stable without evidence of any end organ damage   - Triglycerides WNL, IgG negative. Ultrasound significant for hepatic steatosis. No evidence of cholelithiasis.   - No evidence of alcoholic hepatitis or acute liver failure- normal bilirubin, normal INR. Though patient does have underlying alcoholic steatosis.   - Monitor BUN and hematocrit and liver enzymes   - diet- lowfat diet today, fluids at 100cc/hr, continue creon   - repeat lipase 85 (from >600)   - blood cultures negative   - Pain control with PRN Dilaudid & oxycodone - decreasing Dilaudid dose today   - zofran for nausea/vomiting, QTc 482   - patient complaining of reflux-like symptoms - increased protonix to BID   -CT abdomen/pelvis - 1. Pseudocyst in the pancreatic tail, slightly decreased.  2. Improvement in the acute on chronic pancreatitis with decreased inflammation surrounding the pancreas. No evidence for pancreatic necrosis.  3. Thickening of the descending colonic wall consistent with colitis.    #Tachycardia, resolved     #Transaminitis secondary to alcoholic hepatitis- stable     #Hypomagnesemia/hypokalemia, resolved     #Suspected folic acid and thiamine deficiency   -Continue supplementation      #Alcohol abuse  -continue folic acid & thiamine supplementation  -continue on CIWA protocol and monitor for signs of alcohol withdrawal   -patient refused detox consult     #Diet - DASH/TLC, lowfat diet   #DVT prophylaxis- lovenox   #GI prophylaxis- protonix   Pending: clinical improvement - tolerate PO diet, off IV fluids, possible discharge tomorrow
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
## 2020-05-12 NOTE — PROGRESS NOTE ADULT - SUBJECTIVE AND OBJECTIVE BOX
Patient is a 43 y/o female with past medical history of Chronic pancreatitis, alcohol abuse, alcoholic hepatic steatosis. Patient notes Epigastric pain. Pain is sharp, is 10/10 in intensity, radiating to back, associated with non bilious and non bloody vomiting. Precipitant is found to be excessive ETOH use and abuse despite being educated on ETOH avoidance. Patient notes pain is better but still present. Patient had CT scan 5/11. No overnight events.       PAST MEDICAL & SURGICAL HISTORY:  AA (alcohol abuse)  Chronic pancreatitis  Pancreatitis  S/P arthroscopy: meniscal repair      FAMILY HISTORY:  Family history of cholecystectomy: many female members in the family  FH: pancreatic cancer: cousin  Family history of hypertension: both father and mother      Social History:  Alcohol: alcohol abuse   Drugs: denies     Home Medications:  Multiple Vitamins with Minerals oral tablet: 1 tab(s) orally once a day (16 Feb 2020 13:00)    MEDICATIONS  (STANDING):  folic acid 1 milliGRAM(s) Oral daily  heparin   Injectable 5000 Unit(s) SubCutaneous every 8 hours  lactated ringers. 1000 milliLiter(s) (250 mL/Hr) IV Continuous <Continuous>  LORazepam   Injectable   IV Push   LORazepam   Injectable 2 milliGRAM(s) IV Push every 4 hours  pantoprazole  Injectable 40 milliGRAM(s) IV Push daily  potassium chloride  20 mEq/100 mL IVPB 20 milliEquivalent(s) IV Intermittent once  thiamine 100 milliGRAM(s) Oral daily    MEDICATIONS  (PRN):  HYDROmorphone  Injectable 2 milliGRAM(s) IV Push four times a day PRN Moderate Pain (4 - 6)  ondansetron Injectable 4 milliGRAM(s) IV Push three times a day PRN Nausea and/or Vomiting      Allergies  Compazine (Unknown)      Review of Systems:   Constitutional:  No Fever, No Chills  ENT/Mouth:  No Hearing Changes,  No Difficulty Swallowing  Eyes:  No Eye Pain, No Vision Changes  Cardiovascular:  No Chest Pain, No Palpitations  Respiratory:  No Cough, No Dyspnea  Gastrointestinal:  As described in HPI  Musculoskeletal:  No Joint Swelling, No Back Pain  Skin:  No Skin Lesions, No Jaundice  Neuro:  No Syncope, No Dizziness  Heme/Lymph:  No Bruising, No Bleeding.      Vital Signs  T(F): 96.9 (12 May 2020 03:25), Max: 98.8 (11 May 2020 14:44)  HR: 77 (12 May 2020 03:25) (76 - 83)  BP: 140/91 (12 May 2020 03:25) (134/74 - 140/93)  RR: 18 (12 May 2020 03:25) (18 - 18)  Physical Exam  Gen: NAD  HEENT: NC/AT, Mucosal Membranes Moist  Cardio: S1/S2   Resp: CTA B/L  Abdomen: TTP diffuse  Neuro: AAOx3  Extremities: FROM x 4  Integum: No jaundice                                        11.7   3.33  )-----------( 304      ( 12 May 2020 07:03 )             34.6     05-12    138  |  102  |  4<L>  ----------------------------<  100<H>  4.1   |  23  |  0.7    Ca    9.7      12 May 2020 07:03    TPro  6.9  /  Alb  4.1  /  TBili  0.4  /  DBili  x   /  AST  151<H>  /  ALT  62<H>  /  AlkPhos  132<H>  05-12      CT Abdomen and Pelvis w/ Oral Cont and w/ IV Cont 5.11.20   IMPRESSION:     1. Pseudocyst in the pancreatic tail, slightly decreased.    2. Improvement in the acute on chronic pancreatitis with decreased inflammation surrounding the pancreas. No evidence for pancreatic necrosis.    3. Thickening of the descending colonic wall consistent with colitis.

## 2020-05-13 ENCOUNTER — TRANSCRIPTION ENCOUNTER (OUTPATIENT)
Age: 45
End: 2020-05-13

## 2020-05-13 VITALS
TEMPERATURE: 99 F | RESPIRATION RATE: 18 BRPM | SYSTOLIC BLOOD PRESSURE: 135 MMHG | DIASTOLIC BLOOD PRESSURE: 91 MMHG | HEART RATE: 79 BPM

## 2020-05-13 LAB
ALBUMIN SERPL ELPH-MCNC: 4.1 G/DL — SIGNIFICANT CHANGE UP (ref 3.5–5.2)
ALP SERPL-CCNC: 126 U/L — HIGH (ref 30–115)
ALT FLD-CCNC: 65 U/L — HIGH (ref 0–41)
ANION GAP SERPL CALC-SCNC: 13 MMOL/L — SIGNIFICANT CHANGE UP (ref 7–14)
AST SERPL-CCNC: 157 U/L — HIGH (ref 0–41)
BILIRUB SERPL-MCNC: 0.4 MG/DL — SIGNIFICANT CHANGE UP (ref 0.2–1.2)
BUN SERPL-MCNC: 4 MG/DL — LOW (ref 10–20)
CALCIUM SERPL-MCNC: 9.8 MG/DL — SIGNIFICANT CHANGE UP (ref 8.5–10.1)
CHLORIDE SERPL-SCNC: 103 MMOL/L — SIGNIFICANT CHANGE UP (ref 98–110)
CO2 SERPL-SCNC: 23 MMOL/L — SIGNIFICANT CHANGE UP (ref 17–32)
CREAT SERPL-MCNC: 0.7 MG/DL — SIGNIFICANT CHANGE UP (ref 0.7–1.5)
GLUCOSE SERPL-MCNC: 102 MG/DL — HIGH (ref 70–99)
HCT VFR BLD CALC: 38.7 % — SIGNIFICANT CHANGE UP (ref 37–47)
HGB BLD-MCNC: 13.3 G/DL — SIGNIFICANT CHANGE UP (ref 12–16)
MCHC RBC-ENTMCNC: 33.7 PG — HIGH (ref 27–31)
MCHC RBC-ENTMCNC: 34.4 G/DL — SIGNIFICANT CHANGE UP (ref 32–37)
MCV RBC AUTO: 98 FL — SIGNIFICANT CHANGE UP (ref 81–99)
NRBC # BLD: 0 /100 WBCS — SIGNIFICANT CHANGE UP (ref 0–0)
PLATELET # BLD AUTO: 196 K/UL — SIGNIFICANT CHANGE UP (ref 130–400)
POTASSIUM SERPL-MCNC: 4.7 MMOL/L — SIGNIFICANT CHANGE UP (ref 3.5–5)
POTASSIUM SERPL-SCNC: 4.7 MMOL/L — SIGNIFICANT CHANGE UP (ref 3.5–5)
PROT SERPL-MCNC: 7.4 G/DL — SIGNIFICANT CHANGE UP (ref 6–8)
RBC # BLD: 3.95 M/UL — LOW (ref 4.2–5.4)
RBC # FLD: 11.5 % — SIGNIFICANT CHANGE UP (ref 11.5–14.5)
SODIUM SERPL-SCNC: 139 MMOL/L — SIGNIFICANT CHANGE UP (ref 135–146)
WBC # BLD: 3.78 K/UL — LOW (ref 4.8–10.8)
WBC # FLD AUTO: 3.78 K/UL — LOW (ref 4.8–10.8)

## 2020-05-13 PROCEDURE — 99232 SBSQ HOSP IP/OBS MODERATE 35: CPT

## 2020-05-13 RX ORDER — THIAMINE MONONITRATE (VIT B1) 100 MG
1 TABLET ORAL
Qty: 30 | Refills: 0
Start: 2020-05-13 | End: 2020-06-11

## 2020-05-13 RX ORDER — PANTOPRAZOLE SODIUM 20 MG/1
1 TABLET, DELAYED RELEASE ORAL
Qty: 30 | Refills: 0
Start: 2020-05-13 | End: 2020-06-11

## 2020-05-13 RX ORDER — LIPASE/PROTEASE/AMYLASE 16-48-48K
1 CAPSULE,DELAYED RELEASE (ENTERIC COATED) ORAL
Qty: 90 | Refills: 0
Start: 2020-05-13 | End: 2020-06-11

## 2020-05-13 RX ORDER — FOLIC ACID 0.8 MG
1 TABLET ORAL
Qty: 30 | Refills: 0
Start: 2020-05-13 | End: 2020-06-11

## 2020-05-13 RX ORDER — HYDROXYZINE HCL 10 MG
25 TABLET ORAL
Refills: 0 | Status: DISCONTINUED | OUTPATIENT
Start: 2020-05-13 | End: 2020-05-13

## 2020-05-13 RX ORDER — ONDANSETRON 8 MG/1
1 TABLET, FILM COATED ORAL
Qty: 6 | Refills: 0
Start: 2020-05-13 | End: 2020-05-14

## 2020-05-13 RX ORDER — HYDROMORPHONE HYDROCHLORIDE 2 MG/ML
2 INJECTION INTRAMUSCULAR; INTRAVENOUS; SUBCUTANEOUS EVERY 4 HOURS
Refills: 0 | Status: DISCONTINUED | OUTPATIENT
Start: 2020-05-13 | End: 2020-05-13

## 2020-05-13 RX ADMIN — Medication 1 TABLET(S): at 11:53

## 2020-05-13 RX ADMIN — OXYCODONE HYDROCHLORIDE 5 MILLIGRAM(S): 5 TABLET ORAL at 05:55

## 2020-05-13 RX ADMIN — Medication 1 MILLIGRAM(S): at 11:53

## 2020-05-13 RX ADMIN — PANTOPRAZOLE SODIUM 40 MILLIGRAM(S): 20 TABLET, DELAYED RELEASE ORAL at 05:56

## 2020-05-13 RX ADMIN — ENOXAPARIN SODIUM 40 MILLIGRAM(S): 100 INJECTION SUBCUTANEOUS at 11:53

## 2020-05-13 RX ADMIN — HYDROMORPHONE HYDROCHLORIDE 2 MILLIGRAM(S): 2 INJECTION INTRAMUSCULAR; INTRAVENOUS; SUBCUTANEOUS at 15:12

## 2020-05-13 RX ADMIN — OXYCODONE HYDROCHLORIDE 5 MILLIGRAM(S): 5 TABLET ORAL at 11:58

## 2020-05-13 RX ADMIN — SODIUM CHLORIDE 100 MILLILITER(S): 9 INJECTION, SOLUTION INTRAVENOUS at 14:54

## 2020-05-13 RX ADMIN — Medication 3 CAPSULE(S): at 17:25

## 2020-05-13 RX ADMIN — Medication 100 MILLIGRAM(S): at 11:54

## 2020-05-13 RX ADMIN — Medication 3 CAPSULE(S): at 10:30

## 2020-05-13 RX ADMIN — SENNA PLUS 2 TABLET(S): 8.6 TABLET ORAL at 11:54

## 2020-05-13 RX ADMIN — SODIUM CHLORIDE 100 MILLILITER(S): 9 INJECTION, SOLUTION INTRAVENOUS at 17:25

## 2020-05-13 RX ADMIN — Medication 25 MILLIGRAM(S): at 10:48

## 2020-05-13 RX ADMIN — PANTOPRAZOLE SODIUM 40 MILLIGRAM(S): 20 TABLET, DELAYED RELEASE ORAL at 17:25

## 2020-05-13 NOTE — DISCHARGE NOTE NURSING/CASE MANAGEMENT/SOCIAL WORK - NSPROEXTENSIONSOFSELF_GEN_A_NUR
Problem: [place] (,NICU)  Goal: Signs and Symptoms of Listed Potential Problems Will be Absent, Minimized or Managed ([place])  Signs and symptoms of listed potential problems will be absent, minimized or managed by discharge/transition of care (reference [place] ([place],[facility]) [facility]).   Outcome: Adequate for Discharge Date Met: 18  .VSS.  Working on breastfeeding and age appropriate voids and stools. On pathway, Continue to monitor and notify MD as needed. Plans to DC tonight with parents.        none

## 2020-05-13 NOTE — PROGRESS NOTE ADULT - NSHPATTENDINGPLANDISCUSS_GEN_ALL_CORE
Housestaff
residents, nursing, , patient

## 2020-05-13 NOTE — DISCHARGE NOTE PROVIDER - NSDCMRMEDTOKEN_GEN_ALL_CORE_FT
folic acid 1 mg oral tablet: 1 tab(s) orally once a day  Multiple Vitamins with Minerals oral tablet: 1 tab(s) orally once a day  pancrelipase 12,000 units-38,000 units-60,000 units oral delayed release capsule: 1 cap(s) orally 3 times a day   thiamine 100 mg oral tablet: 1 tab(s) orally once a day folic acid 1 mg oral tablet: 1 tab(s) orally once a day  Multiple Vitamins with Minerals oral tablet: 1 tab(s) orally once a day  ondansetron 4 mg oral disintegrating strip: 1 each orally every 8 hours, As Needed -for anxiety - for nausea   pancrelipase 12,000 units-38,000 units-60,000 units oral delayed release capsule: 1 cap(s) orally 3 times a day   pantoprazole 40 mg oral delayed release tablet: 1 tab(s) orally once a day   thiamine 100 mg oral tablet: 1 tab(s) orally once a day

## 2020-05-13 NOTE — PROGRESS NOTE ADULT - ATTENDING COMMENTS
Agree with above, too soon for EUS or any Endoscopic evaluation, treat Pancreatitis. Follow up will be needed on discharge at GI [facility]
Agree with the above.
I agree with the above, re-image to see if collection forming or formed
Pt with EtOH pancreatitis, still has pain with food intake, though overall improving and CT shows improvement of inflammation. Counseled pt to not push herself with eating too much too quickly. If pt still unable to tolerate oral intake, may need NJ tube placement and tube feeds for a few days. Cont pain management.
Agree with resident's note, HPI, PE, assessment and plan.  Pt was seen and examined independently.     pt still c/o abdominal pain and nausea, no vomiting, she states she still did not tolerate Po diet.    # Acute on chronic pancreatitis   Will give Po Dilaudid, cont IVF, advance diet as tolerated.  Alcohol cessation and diet discussed     # Anxiety - can start Atarax here, pt advised to establish care with psych as OP.    DVT ppx    I was informed by resident pt is willing to leave AMA, the outcome of this decision discussed with the pt, she still is willing to leave.
Pt with EtOH pancreatitis, no e/o cholelithiasis or choledocholithiasis. Please continue aggressive IV fluid hydration and pain control. Advance diet when pain is better controlled and pt is feeling hungry. Would start with clear liquids when pt is ready.

## 2020-05-13 NOTE — DISCHARGE NOTE PROVIDER - CARE PROVIDERS DIRECT ADDRESSES
[email or web address]

## 2020-05-13 NOTE — DISCHARGE NOTE PROVIDER - NSDCFUSCHEDAPPT_GEN_ALL_CORE_FT
LAURA BARAJAS ; 06/05/2020 ; RAMU SAVAGE [address] [place] [address] LAURA BARAJAS ; 06/05/2020 ; RAMU SAVAGE [address] [place] [address] LAURA BARAJSA ; 06/05/2020 ; RAMU SAVAGE [address] [place] [address]

## 2020-05-13 NOTE — DISCHARGE NOTE PROVIDER - HOSPITAL COURSE
43 y/o female with past medical history of chronic pancreatitis, alcohol abuse, alcoholic hepatic steatosis admitted for acute on chronic pancreatitis w/ possible pseudocyst v abscess. 43 y/o female with past medical history of chronic pancreatitis, alcohol abuse, alcoholic hepatic steatosis admitted for acute on chronic pancreatitis w/ pseudocyst, lipase >600. Treated with IV fluids and pain meds. Patient improved but still on LR at 100 cc/hr. Will sign out AMA.

## 2020-05-13 NOTE — PROGRESS NOTE ADULT - SUBJECTIVE AND OBJECTIVE BOX
LAURA BARAJAS 44y Female  MRN#: [id number]   CODE STATUS: FULL     SUBJECTIVE  Patient is a 44y old Female who presents with a chief complaint of Epigastric pain (12 May 2020 13:34)  Currently admitted to medicine with the primary diagnosis of Pancreatitis     Today is hospital day 10d, and this morning she reports extreme pain overnight since the Dilaudid was d/c'd. While examining neighboring patient, patient was heard to be laughing and joking. There seems to be an element of drug-seeking behavior - and exaggeration of abdominal pain during exam. Patient is refusing [place]/PPN and wants to sign out AMA if she does not get dilaudid - says no other medication helps.     OBJECTIVE  PAST MEDICAL & SURGICAL HISTORY  AA (alcohol abuse)  Chronic pancreatitis  Pancreatitis  S/P arthroscopy: meniscal repair    ALLERGIES:  Compazine (Unknown)    MEDICATIONS:  STANDING MEDICATIONS  enoxaparin Injectable 40 milliGRAM(s) SubCutaneous daily  folic acid 1 milliGRAM(s) Oral daily  ketorolac   Injectable 15 milliGRAM(s) IV Push daily  lactated ringers. 1000 milliLiter(s) IV Continuous <Continuous>  multivitamin/minerals 1 Tablet(s) Oral daily  pancrelipase  (CREON 12,000 Lipase Units) 3 Capsule(s) Oral three times a day with meals  pantoprazole    Tablet 40 milliGRAM(s) Oral two times a day  senna 2 Tablet(s) Oral daily  thiamine 100 milliGRAM(s) Oral daily    PRN MEDICATIONS  ondansetron Injectable 4 milliGRAM(s) IV Push three times a day PRN  oxyCODONE    IR 5 milliGRAM(s) Oral every 6 hours PRN      VITAL SIGNS: Last 24 Hours  T(C): 36.2 (13 May 2020 06:05), Max: 36.8 (12 May 2020 20:30)  T(F): 97.1 (13 May 2020 06:05), Max: 98.3 (12 May 2020 20:30)  HR: 87 (13 May 2020 06:05) (69 - 91)  BP: 156/97 (13 May 2020 06:05) (126/85 - 156/97)  BP(mean): --  RR: 18 (13 May 2020 06:05) (18 - 18)  SpO2: 98% (13 May 2020 06:05) (98% - 98%)    LABS:                        11.7   3.33  )-----------( 304      ( 12 May 2020 07:03 )             34.6     05-12    138  |  102  |  4<L>  ----------------------------<  100<H>  4.1   |  23  |  0.7    Ca    9.7      12 May 2020 07:03    TPro  6.9  /  Alb  4.1  /  TBili  0.4  /  DBili  x   /  AST  151<H>  /  ALT  62<H>  /  AlkPhos  132<H>  05-12    RADIOLOGY:  < from: CT Abdomen and Pelvis w/ Oral Cont and w/ IV Cont (05.11.20 @ 15:51) >    IMPRESSION:     1. Pseudocyst in the pancreatic tail, slightly decreased.    2. Improvement in the acute on chronic pancreatitis with decreased inflammation surrounding the pancreas. No evidence for pancreatic necrosis.    3. Thickening of the descending colonic wall consistent with colitis.    < end of copied text >      PHYSICAL EXAM:        ADMISSION SUMMARY  Patient is a 44y old Female who presents with a chief complaint of Epigastric pain (12 May 2020 13:34)  Currently admitted to medicine with the primary diagnosis of Pancreatitis    ASSESSMENT & PLAN LAURA BARAJAS 44y Female  MRN#: [id number]   CODE STATUS: FULL     SUBJECTIVE  Patient is a 44y old Female who presents with a chief complaint of Epigastric pain (12 May 2020 13:34)  Currently admitted to medicine with the primary diagnosis of Pancreatitis     Today is hospital day 10d, and this morning she reports extreme pain overnight since the Dilaudid was d/c'd. While examining neighboring patient, patient was heard to be laughing and joking. There seems to be an element of drug-seeking behavior - and exaggeration of abdominal pain during exam. Patient is refusing [place]/PPN and wants to sign out AMA if she does not get dilaudid - says no other medication helps.     OBJECTIVE  PAST MEDICAL & SURGICAL HISTORY  AA (alcohol abuse)  Chronic pancreatitis  Pancreatitis  S/P arthroscopy: meniscal repair    ALLERGIES:  Compazine (Unknown)    MEDICATIONS:  STANDING MEDICATIONS  enoxaparin Injectable 40 milliGRAM(s) SubCutaneous daily  folic acid 1 milliGRAM(s) Oral daily  ketorolac   Injectable 15 milliGRAM(s) IV Push daily  lactated ringers. 1000 milliLiter(s) IV Continuous <Continuous>  multivitamin/minerals 1 Tablet(s) Oral daily  pancrelipase  (CREON 12,000 Lipase Units) 3 Capsule(s) Oral three times a day with meals  pantoprazole    Tablet 40 milliGRAM(s) Oral two times a day  senna 2 Tablet(s) Oral daily  thiamine 100 milliGRAM(s) Oral daily    PRN MEDICATIONS  ondansetron Injectable 4 milliGRAM(s) IV Push three times a day PRN  oxyCODONE    IR 5 milliGRAM(s) Oral every 6 hours PRN      VITAL SIGNS: Last 24 Hours  T(C): 36.2 (13 May 2020 06:05), Max: 36.8 (12 May 2020 20:30)  T(F): 97.1 (13 May 2020 06:05), Max: 98.3 (12 May 2020 20:30)  HR: 87 (13 May 2020 06:05) (69 - 91)  BP: 156/97 (13 May 2020 06:05) (126/85 - 156/97)  BP(mean): --  RR: 18 (13 May 2020 06:05) (18 - 18)  SpO2: 98% (13 May 2020 06:05) (98% - 98%)    LABS:                        11.7   3.33  )-----------( 304      ( 12 May 2020 07:03 )             34.6     05-12    138  |  102  |  4<L>  ----------------------------<  100<H>  4.1   |  23  |  0.7    Ca    9.7      12 May 2020 07:03    TPro  6.9  /  Alb  4.1  /  TBili  0.4  /  DBili  x   /  AST  151<H>  /  ALT  62<H>  /  AlkPhos  132<H>  05-12    RADIOLOGY:  < from: CT Abdomen and Pelvis w/ Oral Cont and w/ IV Cont (05.11.20 @ 15:51) >    IMPRESSION:     1. Pseudocyst in the pancreatic tail, slightly decreased.  2. Improvement in the acute on chronic pancreatitis with decreased inflammation surrounding the pancreas. No evidence for pancreatic necrosis.  3. Thickening of the descending colonic wall consistent with colitis.    < end of copied text >      PHYSICAL EXAM:  GENERAL: NAD   HEENT:  Atraumatic, Normocephalic.   PULMONARY: Clear to auscultation bilaterally; No wheeze  CARDIOVASCULAR: regular rate & rhythm   GASTROINTESTINAL: soft, tender to palpation diffusely, mostly in R upper quadrant, nondistended   MUSCULOSKELETAL:  2+ Peripheral Pulses, No edema  NEUROLOGY: non-focal, no tremors   SKIN: No rashes, ecchymosis of abdomen from subq DVT ppx, bruising of R arm from blood draw     ADMISSION SUMMARY  Patient is a 44y old Female who presents with a chief complaint of Epigastric pain (12 May 2020 13:34)  Currently admitted to medicine with the primary diagnosis of Pancreatitis    ASSESSMENT & PLAN  43 y/o female with past medical history of chronic pancreatitis, alcohol abuse, alcoholic hepatic steatosis admitted for acute on chronic pancreatitis w/ possible pseudocyst v abscess.    #Acute on chronic pancreatitis secondary to alcohol abuse   - Elevated lipase >600 with consistent CT findings- also shows possible necrosis and possible pseudocyst vs abscess   - Hemodynamically stable without evidence of any end organ damage   - Triglycerides WNL, IgG negative. Ultrasound significant for hepatic steatosis. No evidence of cholelithiasis.   - No evidence of alcoholic hepatitis or acute liver failure- normal bilirubin, normal INR. Though patient does have underlying alcoholic steatosis.   - Monitor BUN and hematocrit and liver enzymes   - Diet- lowfat diet today, fluids at 100cc/hr, continue creon   - repeat lipase 85 (from >600)   - blood cultures negative   - zofran for nausea/vomiting, QTc 482   - patient complaining of reflux-like symptoms - increased protonix to BID   -CT abdomen/pelvis 5/11 - 1. Pseudocyst in the pancreatic tail, slightly decreased.  2. Improvement in the acute on chronic pancreatitis with decreased inflammation surrounding the pancreas. No evidence for pancreatic necrosis.  3. Thickening of the descending colonic wall consistent with colitis.  -poor PO intake - f/u calorie count - patient refusing artifical nutrition as per GI recommendations     #Tachycardia, resolved     #Transaminitis secondary to alcoholic hepatitis- stable     #Hypomagnesemia/hypokalemia, resolved     #Suspected folic acid and thiamine deficiency   -Continue supplementation      #Alcohol abuse  -continue folic acid & thiamine supplementation  -continue on CIWA protocol and monitor for signs of alcohol withdrawal   -patient refused detox consult     #Diet - DASH/TLC, lowfat diet   #DVT prophylaxis- lovenox   #GI prophylaxis- protonix   #Dispo: patient is refusing [place] tube/PPN for artificial nutrition, just wants to get Dilaudid and no other pain medications so she can eat. threatening to leave AMA

## 2020-05-13 NOTE — DISCHARGE NOTE PROVIDER - PROVIDER TOKENS
PROVIDER:[TOKEN:[[id number]],FOLLOWUP:[1 month]] PROVIDER:[TOKEN:[[id number]],FOLLOWUP:[1 month]],PROVIDER:[TOKEN:[[id number]],FOLLOWUP:[2 weeks]]

## 2020-05-13 NOTE — DISCHARGE NOTE PROVIDER - NSDCCPCAREPLAN_GEN_ALL_CORE_FT
PRINCIPAL DISCHARGE DIAGNOSIS  Diagnosis: Pancreatitis  Assessment and Plan of Treatment: You were admitted for pancreatitis.      SECONDARY DISCHARGE DIAGNOSES  Diagnosis: Alcohol abuse  Assessment and Plan of Treatment: It is very important for you to abstain from alcohol. PRINCIPAL DISCHARGE DIAGNOSIS  Diagnosis: Pancreatitis  Assessment and Plan of Treatment: You were admitted for pancreatitis, started on IV fluids and given medication to control your pain. You were able to tolerate some of your diet and your pain improved.   Your acute abdominal pain was from your recurrent pancreatitis. It is likely that your pancreatisis returns due to your admitted history of alcohol use. It is very important that you try not to consume alcohol in the future to further prevent recurrent pancreatitis. Please follow up with your primary care doctor within one week and make an appointment with a GI doctor. If your symptoms worsen, please return to the ED.

## 2020-05-13 NOTE — DISCHARGE NOTE PROVIDER - CARE PROVIDER_API CALL
Milvia Cottrell)  Gastroenterology; Internal Medicine  [address] [place] [address]  Phone: [phone]  Fax: [phone]  Follow Up Time: 1 month Milvia Cottrell)  Gastroenterology; Internal Medicine  [address] [place] [address]  Phone: [phone]  Fax: [phone]  Follow Up Time: 1 month    Haley Cobos  Internal Medicine  [address] [place] [address]  Phone: [phone]  Fax: [phone]  Follow Up Time: 2 weeks

## 2020-05-13 NOTE — PROGRESS NOTE ADULT - REASON FOR ADMISSION
Epigastric pain

## 2020-05-13 NOTE — DISCHARGE NOTE NURSING/CASE MANAGEMENT/SOCIAL WORK - PATIENT PORTAL LINK FT
You can access the FollowMyHealth Patient Portal offered by [facility] by registering at the following website: [email or web address]. By joining [employer]’s FollowMyHealth portal, you will also be able to view your health information using other applications (apps) compatible with our system.

## 2020-05-15 DIAGNOSIS — K86.0 ALCOHOL-INDUCED CHRONIC PANCREATITIS: ICD-10-CM

## 2020-05-15 DIAGNOSIS — E51.9 THIAMINE DEFICIENCY, UNSPECIFIED: ICD-10-CM

## 2020-05-15 DIAGNOSIS — R74.0 NONSPECIFIC ELEVATION OF LEVELS OF TRANSAMINASE AND LACTIC ACID DEHYDROGENASE [LDH]: ICD-10-CM

## 2020-05-15 DIAGNOSIS — R18.8 OTHER ASCITES: ICD-10-CM

## 2020-05-15 DIAGNOSIS — K85.20 ALCOHOL INDUCED ACUTE PANCREATITIS WITHOUT NECROSIS OR INFECTION: ICD-10-CM

## 2020-05-15 DIAGNOSIS — E87.6 HYPOKALEMIA: ICD-10-CM

## 2020-05-15 DIAGNOSIS — Z76.5 MALINGERER [CONSCIOUS SIMULATION]: ICD-10-CM

## 2020-05-15 DIAGNOSIS — F10.10 ALCOHOL ABUSE, UNCOMPLICATED: ICD-10-CM

## 2020-05-15 DIAGNOSIS — R10.13 EPIGASTRIC PAIN: ICD-10-CM

## 2020-05-15 DIAGNOSIS — K52.9 NONINFECTIVE GASTROENTERITIS AND COLITIS, UNSPECIFIED: ICD-10-CM

## 2020-05-15 DIAGNOSIS — F41.9 ANXIETY DISORDER, UNSPECIFIED: ICD-10-CM

## 2020-05-15 DIAGNOSIS — K70.0 ALCOHOLIC FATTY LIVER: ICD-10-CM

## 2020-05-15 DIAGNOSIS — E53.8 DEFICIENCY OF OTHER SPECIFIED B GROUP VITAMINS: ICD-10-CM

## 2020-05-15 DIAGNOSIS — K86.2 CYST OF PANCREAS: ICD-10-CM

## 2020-05-15 DIAGNOSIS — E83.42 HYPOMAGNESEMIA: ICD-10-CM

## 2020-06-05 ENCOUNTER — APPOINTMENT (OUTPATIENT)
Dept: OBGYN | Facility: CLINIC | Age: 45
End: 2020-06-05

## 2020-07-01 PROCEDURE — G9005: CPT

## 2020-07-03 NOTE — H&P ADULT - NSHPPOADEEPVENOUSTHROMB_GEN_A_CORE
Final Anesthesia Post-op Assessment    Patient: Rogelio De Luna  Procedure(s) Performed: CIRCUMCISION  Anesthesia type: General    Vitals Value Taken Time   Temp 36.3 °C (97.3 °F) 7/3/2020  5:39 PM   Pulse 135 7/3/2020  5:39 PM   Resp 24 7/3/2020  5:39 PM   SpO2 100 % 7/3/2020  5:39 PM   BP 91/51 7/3/2020  5:39 PM       Last 24 I/O:     Intake/Output Summary (Last 24 hours) at 7/3/2020 1742  Last data filed at 7/3/2020 1701  Gross per 24 hour   Intake 500 ml   Output 2 ml   Net 498 ml         Patient Location: PACU Phase 1  Post-op Vital Signs:stable  Level of Consciousness: awake and alert  Respiratory Status: spontaneous ventilation  Cardiovascular stable  Hydration: euvolemic  Pain Management: adequately controlled  Handoff: Handoff to receiving nurse was performed and questions were answered  Nausea: None  Airway Patency:patent  Post-op Assessment: no complications, patient tolerated procedure well with no complications, dentition within defined limits and No Corneal Abrasion      
no

## 2020-07-10 ENCOUNTER — APPOINTMENT (OUTPATIENT)
Dept: OBGYN | Facility: CLINIC | Age: 45
End: 2020-07-10

## 2020-07-30 ENCOUNTER — INPATIENT (INPATIENT)
Facility: HOSPITAL | Age: 45
LOS: 4 days | Discharge: HOME | End: 2020-08-04
Attending: INTERNAL MEDICINE | Admitting: INTERNAL MEDICINE
Payer: MEDICAID

## 2020-07-30 VITALS
SYSTOLIC BLOOD PRESSURE: 170 MMHG | RESPIRATION RATE: 20 BRPM | DIASTOLIC BLOOD PRESSURE: 101 MMHG | TEMPERATURE: 99 F | HEIGHT: 64 IN | HEART RATE: 146 BPM | WEIGHT: 130.07 LBS | OXYGEN SATURATION: 99 %

## 2020-07-30 DIAGNOSIS — Z98.890 OTHER SPECIFIED POSTPROCEDURAL STATES: Chronic | ICD-10-CM

## 2020-07-30 PROCEDURE — 93010 ELECTROCARDIOGRAM REPORT: CPT

## 2020-07-30 PROCEDURE — 99285 EMERGENCY DEPT VISIT HI MDM: CPT
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
## 2020-07-31 LAB
ALBUMIN SERPL ELPH-MCNC: 4.3 G/DL — SIGNIFICANT CHANGE UP (ref 3.5–5.2)
ALBUMIN SERPL ELPH-MCNC: 5.2 G/DL — SIGNIFICANT CHANGE UP (ref 3.5–5.2)
ALP SERPL-CCNC: 132 U/L — HIGH (ref 30–115)
ALP SERPL-CCNC: 171 U/L — HIGH (ref 30–115)
ALT FLD-CCNC: 110 U/L — HIGH (ref 0–41)
ALT FLD-CCNC: 72 U/L — HIGH (ref 0–41)
ANION GAP SERPL CALC-SCNC: 18 MMOL/L — HIGH (ref 7–14)
ANION GAP SERPL CALC-SCNC: 23 MMOL/L — HIGH (ref 7–14)
APAP SERPL-MCNC: <5 UG/ML — LOW (ref 10–30)
AST SERPL-CCNC: 339 U/L — HIGH (ref 0–41)
AST SERPL-CCNC: 603 U/L — HIGH (ref 0–41)
BASE EXCESS BLDV CALC-SCNC: 4.4 MMOL/L — HIGH (ref -2–2)
BASOPHILS # BLD AUTO: 0.03 K/UL — SIGNIFICANT CHANGE UP (ref 0–0.2)
BASOPHILS # BLD AUTO: 0.05 K/UL — SIGNIFICANT CHANGE UP (ref 0–0.2)
BASOPHILS NFR BLD AUTO: 0.9 % — SIGNIFICANT CHANGE UP (ref 0–1)
BASOPHILS NFR BLD AUTO: 0.9 % — SIGNIFICANT CHANGE UP (ref 0–1)
BILIRUB DIRECT SERPL-MCNC: 0.4 MG/DL — HIGH (ref 0–0.2)
BILIRUB DIRECT SERPL-MCNC: 0.4 MG/DL — HIGH (ref 0–0.2)
BILIRUB INDIRECT FLD-MCNC: 0.7 MG/DL — SIGNIFICANT CHANGE UP (ref 0.2–1.2)
BILIRUB INDIRECT FLD-MCNC: 0.8 MG/DL — SIGNIFICANT CHANGE UP (ref 0.2–1.2)
BILIRUB SERPL-MCNC: 1.1 MG/DL — SIGNIFICANT CHANGE UP (ref 0.2–1.2)
BILIRUB SERPL-MCNC: 1.1 MG/DL — SIGNIFICANT CHANGE UP (ref 0.2–1.2)
BILIRUB SERPL-MCNC: 1.2 MG/DL — SIGNIFICANT CHANGE UP (ref 0.2–1.2)
BUN SERPL-MCNC: 6 MG/DL — LOW (ref 10–20)
BUN SERPL-MCNC: 9 MG/DL — LOW (ref 10–20)
CA-I SERPL-SCNC: 1.12 MMOL/L — SIGNIFICANT CHANGE UP (ref 1.12–1.3)
CALCIUM SERPL-MCNC: 10.3 MG/DL — HIGH (ref 8.5–10.1)
CALCIUM SERPL-MCNC: 9 MG/DL — SIGNIFICANT CHANGE UP (ref 8.5–10.1)
CHLORIDE SERPL-SCNC: 92 MMOL/L — LOW (ref 98–110)
CHLORIDE SERPL-SCNC: 96 MMOL/L — LOW (ref 98–110)
CO2 SERPL-SCNC: 25 MMOL/L — SIGNIFICANT CHANGE UP (ref 17–32)
CO2 SERPL-SCNC: 26 MMOL/L — SIGNIFICANT CHANGE UP (ref 17–32)
CREAT SERPL-MCNC: 0.6 MG/DL — LOW (ref 0.7–1.5)
CREAT SERPL-MCNC: 0.7 MG/DL — SIGNIFICANT CHANGE UP (ref 0.7–1.5)
EOSINOPHIL # BLD AUTO: 0 K/UL — SIGNIFICANT CHANGE UP (ref 0–0.7)
EOSINOPHIL # BLD AUTO: 0.02 K/UL — SIGNIFICANT CHANGE UP (ref 0–0.7)
EOSINOPHIL NFR BLD AUTO: 0 % — SIGNIFICANT CHANGE UP (ref 0–8)
EOSINOPHIL NFR BLD AUTO: 0.6 % — SIGNIFICANT CHANGE UP (ref 0–8)
ETHANOL SERPL-MCNC: <10 MG/DL — SIGNIFICANT CHANGE UP
GAS PNL BLDV: 141 MMOL/L — SIGNIFICANT CHANGE UP (ref 136–145)
GAS PNL BLDV: SIGNIFICANT CHANGE UP
GAS PNL BLDV: SIGNIFICANT CHANGE UP
GLUCOSE SERPL-MCNC: 109 MG/DL — HIGH (ref 70–99)
GLUCOSE SERPL-MCNC: 90 MG/DL — SIGNIFICANT CHANGE UP (ref 70–99)
HCG SERPL QL: NEGATIVE — SIGNIFICANT CHANGE UP
HCO3 BLDV-SCNC: 28 MMOL/L — SIGNIFICANT CHANGE UP (ref 22–29)
HCT VFR BLD CALC: 31.1 % — LOW (ref 37–47)
HCT VFR BLD CALC: 36.7 % — LOW (ref 37–47)
HCT VFR BLDA CALC: 39.7 % — SIGNIFICANT CHANGE UP (ref 34–44)
HGB BLD CALC-MCNC: 13 G/DL — LOW (ref 14–18)
HGB BLD-MCNC: 10.5 G/DL — LOW (ref 12–16)
HGB BLD-MCNC: 12.8 G/DL — SIGNIFICANT CHANGE UP (ref 12–16)
IMM GRANULOCYTES NFR BLD AUTO: 0.3 % — SIGNIFICANT CHANGE UP (ref 0.1–0.3)
IMM GRANULOCYTES NFR BLD AUTO: 0.4 % — HIGH (ref 0.1–0.3)
LACTATE BLDV-MCNC: 2.2 MMOL/L — HIGH (ref 0.5–1.6)
LIDOCAIN IGE QN: 115 U/L — HIGH (ref 7–60)
LYMPHOCYTES # BLD AUTO: 0.47 K/UL — LOW (ref 1.2–3.4)
LYMPHOCYTES # BLD AUTO: 0.79 K/UL — LOW (ref 1.2–3.4)
LYMPHOCYTES # BLD AUTO: 23 % — SIGNIFICANT CHANGE UP (ref 20.5–51.1)
LYMPHOCYTES # BLD AUTO: 8.9 % — LOW (ref 20.5–51.1)
MAGNESIUM SERPL-MCNC: 1.2 MG/DL — LOW (ref 1.8–2.4)
MAGNESIUM SERPL-MCNC: 1.7 MG/DL — LOW (ref 1.8–2.4)
MCHC RBC-ENTMCNC: 33.2 PG — HIGH (ref 27–31)
MCHC RBC-ENTMCNC: 33.8 G/DL — SIGNIFICANT CHANGE UP (ref 32–37)
MCHC RBC-ENTMCNC: 34 PG — HIGH (ref 27–31)
MCHC RBC-ENTMCNC: 34.9 G/DL — SIGNIFICANT CHANGE UP (ref 32–37)
MCV RBC AUTO: 97.3 FL — SIGNIFICANT CHANGE UP (ref 81–99)
MCV RBC AUTO: 98.4 FL — SIGNIFICANT CHANGE UP (ref 81–99)
MONOCYTES # BLD AUTO: 0.25 K/UL — SIGNIFICANT CHANGE UP (ref 0.1–0.6)
MONOCYTES # BLD AUTO: 0.25 K/UL — SIGNIFICANT CHANGE UP (ref 0.1–0.6)
MONOCYTES NFR BLD AUTO: 4.7 % — SIGNIFICANT CHANGE UP (ref 1.7–9.3)
MONOCYTES NFR BLD AUTO: 7.3 % — SIGNIFICANT CHANGE UP (ref 1.7–9.3)
NEUTROPHILS # BLD AUTO: 2.33 K/UL — SIGNIFICANT CHANGE UP (ref 1.4–6.5)
NEUTROPHILS # BLD AUTO: 4.5 K/UL — SIGNIFICANT CHANGE UP (ref 1.4–6.5)
NEUTROPHILS NFR BLD AUTO: 67.9 % — SIGNIFICANT CHANGE UP (ref 42.2–75.2)
NEUTROPHILS NFR BLD AUTO: 85.1 % — HIGH (ref 42.2–75.2)
NRBC # BLD: 0 /100 WBCS — SIGNIFICANT CHANGE UP (ref 0–0)
NRBC # BLD: 0 /100 WBCS — SIGNIFICANT CHANGE UP (ref 0–0)
PCO2 BLDV: 39 MMHG — LOW (ref 41–51)
PH BLDV: 7.47 — HIGH (ref 7.26–7.43)
PLATELET # BLD AUTO: 137 K/UL — SIGNIFICANT CHANGE UP (ref 130–400)
PLATELET # BLD AUTO: 176 K/UL — SIGNIFICANT CHANGE UP (ref 130–400)
PO2 BLDV: 25 MMHG — SIGNIFICANT CHANGE UP (ref 20–40)
POTASSIUM BLDV-SCNC: 3.1 MMOL/L — LOW (ref 3.3–5.6)
POTASSIUM SERPL-MCNC: 3.4 MMOL/L — LOW (ref 3.5–5)
POTASSIUM SERPL-MCNC: 3.5 MMOL/L — SIGNIFICANT CHANGE UP (ref 3.5–5)
POTASSIUM SERPL-SCNC: 3.4 MMOL/L — LOW (ref 3.5–5)
POTASSIUM SERPL-SCNC: 3.5 MMOL/L — SIGNIFICANT CHANGE UP (ref 3.5–5)
PROT SERPL-MCNC: 6.8 G/DL — SIGNIFICANT CHANGE UP (ref 6–8)
PROT SERPL-MCNC: 8.6 G/DL — HIGH (ref 6–8)
RBC # BLD: 3.16 M/UL — LOW (ref 4.2–5.4)
RBC # BLD: 3.77 M/UL — LOW (ref 4.2–5.4)
RBC # FLD: 13.2 % — SIGNIFICANT CHANGE UP (ref 11.5–14.5)
RBC # FLD: 13.3 % — SIGNIFICANT CHANGE UP (ref 11.5–14.5)
SALICYLATES SERPL-MCNC: <0.3 MG/DL — LOW (ref 4–30)
SAO2 % BLDV: 44 % — SIGNIFICANT CHANGE UP
SARS-COV-2 RNA SPEC QL NAA+PROBE: SIGNIFICANT CHANGE UP
SODIUM SERPL-SCNC: 140 MMOL/L — SIGNIFICANT CHANGE UP (ref 135–146)
SODIUM SERPL-SCNC: 140 MMOL/L — SIGNIFICANT CHANGE UP (ref 135–146)
TROPONIN T SERPL-MCNC: <0.01 NG/ML — SIGNIFICANT CHANGE UP
WBC # BLD: 3.43 K/UL — LOW (ref 4.8–10.8)
WBC # BLD: 5.29 K/UL — SIGNIFICANT CHANGE UP (ref 4.8–10.8)
WBC # FLD AUTO: 3.43 K/UL — LOW (ref 4.8–10.8)
WBC # FLD AUTO: 5.29 K/UL — SIGNIFICANT CHANGE UP (ref 4.8–10.8)

## 2020-07-31 PROCEDURE — 99223 1ST HOSP IP/OBS HIGH 75: CPT

## 2020-07-31 PROCEDURE — 72190 X-RAY EXAM OF PELVIS: CPT | Mod: 26

## 2020-07-31 PROCEDURE — 70450 CT HEAD/BRAIN W/O DYE: CPT | Mod: 26

## 2020-07-31 PROCEDURE — 71045 X-RAY EXAM CHEST 1 VIEW: CPT | Mod: 26

## 2020-07-31 PROCEDURE — 74177 CT ABD & PELVIS W/CONTRAST: CPT | Mod: 26

## 2020-07-31 RX ORDER — MORPHINE SULFATE 50 MG/1
4 CAPSULE, EXTENDED RELEASE ORAL ONCE
Refills: 0 | Status: DISCONTINUED | OUTPATIENT
Start: 2020-07-31 | End: 2020-07-31

## 2020-07-31 RX ORDER — MAGNESIUM SULFATE 500 MG/ML
2 VIAL (ML) INJECTION ONCE
Refills: 0 | Status: COMPLETED | OUTPATIENT
Start: 2020-07-31 | End: 2020-07-31

## 2020-07-31 RX ORDER — SODIUM CHLORIDE 9 MG/ML
1000 INJECTION, SOLUTION INTRAVENOUS
Refills: 0 | Status: DISCONTINUED | OUTPATIENT
Start: 2020-07-31 | End: 2020-08-03

## 2020-07-31 RX ORDER — SODIUM CHLORIDE 9 MG/ML
2000 INJECTION, SOLUTION INTRAVENOUS ONCE
Refills: 0 | Status: COMPLETED | OUTPATIENT
Start: 2020-07-31 | End: 2020-07-31

## 2020-07-31 RX ORDER — FOLIC ACID 0.8 MG
1 TABLET ORAL DAILY
Refills: 0 | Status: DISCONTINUED | OUTPATIENT
Start: 2020-07-31 | End: 2020-08-04

## 2020-07-31 RX ORDER — MORPHINE SULFATE 50 MG/1
4 CAPSULE, EXTENDED RELEASE ORAL EVERY 4 HOURS
Refills: 0 | Status: DISCONTINUED | OUTPATIENT
Start: 2020-07-31 | End: 2020-08-02

## 2020-07-31 RX ORDER — SODIUM CHLORIDE 9 MG/ML
1000 INJECTION, SOLUTION INTRAVENOUS
Refills: 0 | Status: DISCONTINUED | OUTPATIENT
Start: 2020-07-31 | End: 2020-07-31

## 2020-07-31 RX ORDER — POTASSIUM CHLORIDE 20 MEQ
20 PACKET (EA) ORAL
Refills: 0 | Status: COMPLETED | OUTPATIENT
Start: 2020-07-31 | End: 2020-07-31

## 2020-07-31 RX ORDER — ONDANSETRON 8 MG/1
8 TABLET, FILM COATED ORAL ONCE
Refills: 0 | Status: COMPLETED | OUTPATIENT
Start: 2020-07-31 | End: 2020-07-31

## 2020-07-31 RX ORDER — FAMOTIDINE 10 MG/ML
20 INJECTION INTRAVENOUS ONCE
Refills: 0 | Status: COMPLETED | OUTPATIENT
Start: 2020-07-31 | End: 2020-07-31

## 2020-07-31 RX ORDER — MULTIVIT-MIN/FERROUS GLUCONATE 9 MG/15 ML
1 LIQUID (ML) ORAL DAILY
Refills: 0 | Status: DISCONTINUED | OUTPATIENT
Start: 2020-07-31 | End: 2020-08-04

## 2020-07-31 RX ORDER — LIPASE/PROTEASE/AMYLASE 16-48-48K
1 CAPSULE,DELAYED RELEASE (ENTERIC COATED) ORAL
Refills: 0 | Status: DISCONTINUED | OUTPATIENT
Start: 2020-07-31 | End: 2020-08-01

## 2020-07-31 RX ORDER — ONDANSETRON 8 MG/1
4 TABLET, FILM COATED ORAL EVERY 4 HOURS
Refills: 0 | Status: DISCONTINUED | OUTPATIENT
Start: 2020-07-31 | End: 2020-08-04

## 2020-07-31 RX ORDER — MORPHINE SULFATE 50 MG/1
2 CAPSULE, EXTENDED RELEASE ORAL EVERY 4 HOURS
Refills: 0 | Status: DISCONTINUED | OUTPATIENT
Start: 2020-07-31 | End: 2020-08-02

## 2020-07-31 RX ORDER — CHLORHEXIDINE GLUCONATE 213 G/1000ML
1 SOLUTION TOPICAL
Refills: 0 | Status: DISCONTINUED | OUTPATIENT
Start: 2020-07-31 | End: 2020-08-04

## 2020-07-31 RX ORDER — SODIUM CHLORIDE 9 MG/ML
2000 INJECTION INTRAMUSCULAR; INTRAVENOUS; SUBCUTANEOUS ONCE
Refills: 0 | Status: DISCONTINUED | OUTPATIENT
Start: 2020-07-31 | End: 2020-07-31

## 2020-07-31 RX ORDER — POTASSIUM CHLORIDE 20 MEQ
20 PACKET (EA) ORAL
Refills: 0 | Status: DISCONTINUED | OUTPATIENT
Start: 2020-07-31 | End: 2020-07-31

## 2020-07-31 RX ORDER — ENOXAPARIN SODIUM 100 MG/ML
40 INJECTION SUBCUTANEOUS DAILY
Refills: 0 | Status: DISCONTINUED | OUTPATIENT
Start: 2020-07-31 | End: 2020-08-04

## 2020-07-31 RX ORDER — PANTOPRAZOLE SODIUM 20 MG/1
40 TABLET, DELAYED RELEASE ORAL DAILY
Refills: 0 | Status: DISCONTINUED | OUTPATIENT
Start: 2020-07-31 | End: 2020-08-03

## 2020-07-31 RX ORDER — THIAMINE MONONITRATE (VIT B1) 100 MG
100 TABLET ORAL DAILY
Refills: 0 | Status: DISCONTINUED | OUTPATIENT
Start: 2020-07-31 | End: 2020-08-04

## 2020-07-31 RX ADMIN — PANTOPRAZOLE SODIUM 40 MILLIGRAM(S): 20 TABLET, DELAYED RELEASE ORAL at 06:51

## 2020-07-31 RX ADMIN — Medication 20 MILLIEQUIVALENT(S): at 20:00

## 2020-07-31 RX ADMIN — Medication 1 MILLIGRAM(S): at 11:52

## 2020-07-31 RX ADMIN — SODIUM CHLORIDE 250 MILLILITER(S): 9 INJECTION, SOLUTION INTRAVENOUS at 05:00

## 2020-07-31 RX ADMIN — ENOXAPARIN SODIUM 40 MILLIGRAM(S): 100 INJECTION SUBCUTANEOUS at 11:51

## 2020-07-31 RX ADMIN — MORPHINE SULFATE 4 MILLIGRAM(S): 50 CAPSULE, EXTENDED RELEASE ORAL at 13:06

## 2020-07-31 RX ADMIN — Medication 25 GRAM(S): at 23:30

## 2020-07-31 RX ADMIN — MORPHINE SULFATE 2 MILLIGRAM(S): 50 CAPSULE, EXTENDED RELEASE ORAL at 19:59

## 2020-07-31 RX ADMIN — Medication 1 CAPSULE(S): at 11:52

## 2020-07-31 RX ADMIN — ONDANSETRON 4 MILLIGRAM(S): 8 TABLET, FILM COATED ORAL at 05:00

## 2020-07-31 RX ADMIN — Medication 25 GRAM(S): at 17:59

## 2020-07-31 RX ADMIN — MORPHINE SULFATE 2 MILLIGRAM(S): 50 CAPSULE, EXTENDED RELEASE ORAL at 20:20

## 2020-07-31 RX ADMIN — FAMOTIDINE 104 MILLIGRAM(S): 10 INJECTION INTRAVENOUS at 01:28

## 2020-07-31 RX ADMIN — ONDANSETRON 4 MILLIGRAM(S): 8 TABLET, FILM COATED ORAL at 17:13

## 2020-07-31 RX ADMIN — Medication 1 TABLET(S): at 11:52

## 2020-07-31 RX ADMIN — Medication 20 MILLIEQUIVALENT(S): at 21:31

## 2020-07-31 RX ADMIN — MORPHINE SULFATE 4 MILLIGRAM(S): 50 CAPSULE, EXTENDED RELEASE ORAL at 17:14

## 2020-07-31 RX ADMIN — Medication 50 GRAM(S): at 05:00

## 2020-07-31 RX ADMIN — ONDANSETRON 8 MILLIGRAM(S): 8 TABLET, FILM COATED ORAL at 01:26

## 2020-07-31 RX ADMIN — MORPHINE SULFATE 4 MILLIGRAM(S): 50 CAPSULE, EXTENDED RELEASE ORAL at 08:01

## 2020-07-31 RX ADMIN — Medication 1 CAPSULE(S): at 17:13

## 2020-07-31 RX ADMIN — MORPHINE SULFATE 4 MILLIGRAM(S): 50 CAPSULE, EXTENDED RELEASE ORAL at 05:29

## 2020-07-31 RX ADMIN — MORPHINE SULFATE 4 MILLIGRAM(S): 50 CAPSULE, EXTENDED RELEASE ORAL at 21:31

## 2020-07-31 RX ADMIN — MORPHINE SULFATE 2 MILLIGRAM(S): 50 CAPSULE, EXTENDED RELEASE ORAL at 23:59

## 2020-07-31 RX ADMIN — Medication 1 CAPSULE(S): at 08:01

## 2020-07-31 RX ADMIN — MORPHINE SULFATE 4 MILLIGRAM(S): 50 CAPSULE, EXTENDED RELEASE ORAL at 01:26

## 2020-07-31 RX ADMIN — MORPHINE SULFATE 4 MILLIGRAM(S): 50 CAPSULE, EXTENDED RELEASE ORAL at 17:37

## 2020-07-31 RX ADMIN — MORPHINE SULFATE 4 MILLIGRAM(S): 50 CAPSULE, EXTENDED RELEASE ORAL at 05:00

## 2020-07-31 RX ADMIN — SODIUM CHLORIDE 2000 MILLILITER(S): 9 INJECTION, SOLUTION INTRAVENOUS at 01:27

## 2020-07-31 RX ADMIN — MORPHINE SULFATE 4 MILLIGRAM(S): 50 CAPSULE, EXTENDED RELEASE ORAL at 21:54

## 2020-07-31 RX ADMIN — Medication 100 MILLIGRAM(S): at 11:52

## 2020-07-31 RX ADMIN — MORPHINE SULFATE 4 MILLIGRAM(S): 50 CAPSULE, EXTENDED RELEASE ORAL at 07:01

## 2020-07-31 RX ADMIN — Medication 20 MILLIEQUIVALENT(S): at 17:59
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
## 2020-07-31 NOTE — ED PROVIDER NOTE - NS ED ROS FT
Constitutional: no fever, chills, no recent weight loss, change in appetite or malaise  Eyes: no redness/discharge/pain/vision changes  ENT: no rhinorrhea/ear pain/sore throat  Cardiac: No chest pain, SOB or edema.  Respiratory: No cough or respiratory distress  GI: see HPI.  : No dysuria, frequency, urgency or hematuria  MS: see HPI, no loss of ROM, no weakness  Neuro: see HPI. No LOC.  Skin: No skin rash.  Endocrine: No history of thyroid disease or diabetes.
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
## 2020-07-31 NOTE — ED PROVIDER NOTE - PHYSICAL EXAMINATION
CONSTITUTIONAL: Well-appearing; well-nourished; in no apparent distress.   EYES: PERRL; EOM intact.   CARDIOVASCULAR: Normal S1, S2; no murmurs, rubs, or gallops.   RESPIRATORY: Normal chest excursion with respiration; breath sounds clear and equal bilaterally; no wheezes, rhonchi, or rales.  GI/: + epigastric/LUQ tenderness. no rebound and guarding.   MS: No midline tenderness to neck/back.   SKIN: Normal for age and race; warm; dry; good turgor; no apparent lesions or exudate.   NEURO/PSYCH: A & O x 4; CN II-XII grossly unremarkable. no drifting. strength equal to b/l upper and lower extremities. speaking coherently. nml cerebellum test. ambulate with nml and steady gait
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
## 2020-07-31 NOTE — ED PROVIDER NOTE - PROGRESS NOTE DETAILS
patient does not drink alcohol daily. no hx of alcohol withdrawal. no concern for acute withdrawal. stable for med/surg floor

## 2020-07-31 NOTE — ED PROVIDER NOTE - ATTENDING CONTRIBUTION TO CARE
I personally evaluated the patient. I reviewed the Resident’s or Physician Assistant’s note (as assigned above), and agree with the findings and plan except as documented in my note.  45 F with hx of alcohol abuse, pancreatitis with complaints of 1 day of epigastric abd pain with radiating to the left upper quadrant and the back. Associated with nausea and nbnb vomiting. Pt denies CP, SOB, coughing, no urinary complaints. Last drink 24 hours pta. VS reviewed, pt non-toxic appearing but uncomfortable due to pain, NAD. Head ncat, MMM, neck supple, normal ROM, normal s1s2 without any murmurs, Lungs CTAB with normal work of breathing. abd +BS, s/nd, + epigastric ttp w/o guarding or rebound, no CVAt b/l, extremities wnl, neuro exam grossly normal. No acute skin rashes. Plan is labs, abd imaging, pain control, antiemetic and dispo accordingly.

## 2020-07-31 NOTE — H&P ADULT - NSHPREVIEWOFSYSTEMS_GEN_ALL_CORE
CONSTITUTIONAL: No weakness, fevers or chills; (+) right-sided headache  EYES: No visual changes, eye pain, or discharge  ENT: No vertigo; No ear pain or change in hearing; No sore throat or difficulty swallowing  NECK: (+) generalized neck pain  RESPIRATORY: No cough, wheezing, or hemoptysis; No shortness of breath  CARDIOVASCULAR: No chest pain or palpitations  GASTROINTESTINAL: (+) abdominal pain most prominent in the basil-umbilical region and left abdomen that radiates to the back; (+) nausea and vomiting, no hematemesis; No diarrhea or constipation; No melena or hematochezia  GENITOURINARY: No dysuria, frequency or hematuria  MUSCULOSKELETAL: (+) head pain, (+) neck pain, (+) lumbosacral pain at the tailbone  NEUROLOGICAL: No numbness or weakness  SKIN: No itching or rashes

## 2020-07-31 NOTE — ED ADULT NURSE NOTE - NSFALLRSKHARMRISK_ED_ALL_ED
24 MONTH WELL CHILD EXAM   [facility] - [address]     24 MONTH WELL CHILD EXAM    Morenita is a 4  y.o. 1  m.o.female     History given by Mother    CONCERNS/QUESTIONS: No    IMMUNIZATION: delayed      NUTRITION, ELIMINATION, SLEEP, SOCIAL      5210 Nutrition Screening:  Eats good variety of foods  Milk 3 x 8 oz per day   Water, limited juice  Additional Nutrition Questions:  Meats? Yes  Vegetarian or Vegan? No    MULTIVITAMIN: Yes    ELIMINATION:   Has ample wet diapers per day and BM is soft.     SLEEP PATTERN:   Sleeps through the night? Yes   Sleeps in bed? Yes  Sleeps with parent? No     SOCIAL HISTORY:   The patient lives at home with mother, father, and does not attend day care. Has 4 siblings.  Is the child exposed to smoke? No    HISTORY   Patient's medications, allergies, past medical, surgical, social and family histories were reviewed and updated as appropriate.    Past Medical History:   Diagnosis Date   • Strep pharyngitis      Patient Active Problem List    Diagnosis Date Noted   • Dry skin dermatitis 02/27/2019     No past surgical history on file.  Family History   Problem Relation Age of Onset   • No Known Problems Mother    • No Known Problems Father    • Asthma Sister    • Hypertension Paternal Grandmother      No current outpatient medications on file.     No current facility-administered medications for this visit.      No Known Allergies    REVIEW OF SYSTEMS     Constitutional: Afebrile, good appetite, alert.  HENT: No abnormal head shape, no congestion, no nasal drainage.   Eyes: Negative for any discharge in eyes, appears to focus, no crossed eyes.   Respiratory: Negative for any difficulty breathing or noisy breathing.   Cardiovascular: Negative for changes in color/activity.   Gastrointestinal: Negative for any vomiting or excessive spitting up, constipation or blood in stool.  Genitourinary: Ample amount of wet diapers.   Musculoskeletal: Negative for any sign  "of arm pain or leg pain with movement.   Skin: Negative for rash or skin infection.  Neurological: Negative for any weakness or decrease in strength.     Psychiatric/Behavioral: Appropriate for age.     SCREENINGS   Structured Developmental Screen:  ASQ- Above cutoff in all domains: not completed by caregiver     MCHAT: not completed by caregiver    LEAD ASSESSMENT: Have placed lab order    SENSORY SCREENING:   Hearing: Risk Assessment Negative  Vision: Risk Assessment Negative    LEAD RISK ASSESSMENT:    Does your child live in or visit a home or  facility with an identified  lead hazard or a home built before 1960 that is in poor repair or was  renovated in the past 6 months? No    ORAL HEALTH:   Primary water source is deficient in fluoride? Yes  Oral Fluoride Supplementation recommended? Yes   Cleaning teeth twice a day, daily oral fluoride? Yes  Established dental home? Yes    SELECTIVE SCREENINGS INDICATED WITH SPECIFIC RISK CONDITIONS:   Blood pressure indicated: No  Dyslipidemia indicated Labs Indicated: No  (Family Hx, pt has diabetes, HTN, BMI >95%ile.    TB RISK ASSESMENT:   Has child been diagnosed with AIDS? No  Has family member had a positive TB test? No  Travel to high risk country? No      OBJECTIVE   PHYSICAL EXAM:   Reviewed vital signs and growth parameters in EMR.     Pulse 114   Temp 36.6 °C (97.8 °F) (Temporal)   Resp 26   Ht 0.865 m (2' 10.06\")   Wt 11.2 kg (24 lb 11.1 oz)   HC 48.8 cm (19.21\")   SpO2 99%   BMI 14.97 kg/m²     Height - 57 %ile (Z= 0.19) based on CDC (Girls, 2-20 Years) Stature-for-age data based on Stature recorded on 2/14/2020.  Weight - 20 %ile (Z= -0.83) based on CDC (Girls, 2-20 Years) weight-for-age data using vitals from 2/14/2020.  BMI - 14 %ile (Z= -1.10) based on CDC (Girls, 2-20 Years) BMI-for-age based on BMI available as of 2/14/2020.    GENERAL: This is an alert, active child in no distress.   HEAD: Normocephalic, atraumatic.   EYES: PERRL, " positive red reflex bilaterally. No conjunctival infection or discharge.   EARS: TM’s are transparent with good landmarks. Canals are patent.  NOSE: Nares are patent and free of congestion.  THROAT: Oropharynx has no lesions, moist mucus membranes. Pharynx without erythema, tonsils normal.   NECK: Supple, no lymphadenopathy or masses.   HEART: Regular rate and rhythm without murmur. Pulses are 2+ and equal.   LUNGS: Clear bilaterally to auscultation, no wheezes or rhonchi. No retractions, nasal flaring, or distress noted.  ABDOMEN: Normal bowel sounds, soft and non-tender without hepatomegaly or splenomegaly or masses.   GENITALIA: Normal female genitalia. normal external genitalia, no erythema, no discharge.  MUSCULOSKELETAL: Spine is straight. Extremities are without abnormalities. Moves all extremities well and symmetrically with normal tone.    NEURO: Active, alert, oriented per age.    SKIN: Intact without significant rash or birthmarks. Skin is warm, dry, and pink.     ASSESSMENT AND PLAN     1. Well Child Exam:  Healthy 4  y.o. 1  m.o. old with good growth and development.     1. Anticipatory guidance was reviewed and age appropriate Bright Futures handout provided.  2. Return to clinic for 5 year well child exam or as needed.  3. Immunizations given today: DtaP, Hep A and Influenza.  4. Vaccine Information statements given for each vaccine if administered.  Discussed benefits and side effects of each vaccine with patient and family.  Answered all patient /family questions.  5. Multivitamin with 400iu of Vitamin D po qd.  6. See Dentist yearly.   no

## 2020-07-31 NOTE — ED PROVIDER NOTE - CLINICAL SUMMARY MEDICAL DECISION MAKING FREE TEXT BOX
Pt presented with complaints of abd pain. Required labs, imaging, pain control. Was found to have pancreatitis. Last drink > 24 hours, no withdrawal symtpoms. Will admit for further management.

## 2020-07-31 NOTE — H&P ADULT - NSHPPHYSICALEXAM_GEN_ALL_CORE
CONSTITUTIONAL: No acute distress, well-developed, well-groomed, AAOx3  HEAD: Atraumatic, normocephalic, ecchymosis noted on right side of head, no tenderness to palpation  EYES: EOM intact, PERRLA, conjunctiva and sclera clear  ENT: Supple, no masses, no thyromegaly, no bruits, no JVD; moist mucous membranes  PULMONARY: Clear to auscultation bilaterally; no wheezes, rales, or rhonchi  CARDIOVASCULAR: Regular rate and rhythm; no murmurs, rubs, or gallops  GASTROINTESTINAL: Soft, diffuse tenderness to light palpation, non-distended; bowel sounds present  MUSCULOSKELETAL: 2+ peripheral pulses; no clubbing, no cyanosis, no edema; tenderness to palpation of caudad aspect of sacrum  NEUROLOGY: non-focal  SKIN: No rashes or lesions; warm and dry
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
## 2020-07-31 NOTE — H&P ADULT - NSHPLABSRESULTS_GEN_ALL_CORE
12.8   5.29  )-----------( 176      ( 31 Jul 2020 00:50 )             36.7     07-31    140  |  92<L>  |  9<L>  ----------------------------<  109<H>  3.5   |  25  |  0.7    Ca    10.3<H>      31 Jul 2020 00:50  Mg     1.2     07-31    TPro  8.6<H>  /  Alb  5.2  /  TBili  1.1  /  DBili  0.4<H>  /  AST  603<H>  /  ALT  110<H>  /  AlkPhos  171<H>  07-31    Troponin T, Serum: <0.01 ng/mL (07-31-20 @ 00:50)    CARDIAC MARKERS ( 31 Jul 2020 00:50 )  x     / <0.01 ng/mL / x     / x     / x        < from: CT Abdomen and Pelvis w/ IV Cont (07.31.20 @ 03:14) >  FINDINGS:    LOWER CHEST: Bibasilar dependent atelectasis.  HEPATOBILIARY: Hepatic steatosis. The splenic and portal veins are patent.  SPLEEN: Unremarkable.  PANCREAS: Pseudocyst within the tail of pancreas measuring 5.9 x 5.4 x 4.5 cm (series 5, image 5). Cyst abuts the posterior wall the stomach and contains peripheral calcifications. Additional calcifications in the pancreatic head. Areas of mesenteric fat stranding surrounding the pancreatic pseudocyst. The pancreas enhances homogeneously.  ADRENAL GLANDS: Unremarkable.  KIDNEYS: Symmetric enhancement bilaterally. No evidence of hydronephrosis.  ABDOMINOPELVIC NODES: Unremarkable.  PELVIC ORGANS: Unremarkable.  PERITONEUM/MESENTERY/BOWEL: No bowel obstruction, ascites or intraperitoneal free air. Normal caliber appendix.  BONES/SOFT TISSUES: Degenerative changes of L5-S1.  VASCULAR: Aorta is normal in caliber.    IMPRESSION:  Since CT abdomen and pelvis performed on May 11, 2020;  Interval enlargement of pancreatic tail pseudocyst, now measuring 5.9 x 5.4 x 4.5 cm (previously 3 x 2.3 x 3.3 cm).  Pancreatic body and tail inflammatory changes, consistent with acute on chronic pancreatitis. No CT evidence of necrotizing pancreatitis.  < end of copied text >
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
## 2020-08-01 LAB
ALBUMIN SERPL ELPH-MCNC: 4 G/DL — SIGNIFICANT CHANGE UP (ref 3.5–5.2)
ALP SERPL-CCNC: 111 U/L — SIGNIFICANT CHANGE UP (ref 30–115)
ALT FLD-CCNC: 64 U/L — HIGH (ref 0–41)
ANION GAP SERPL CALC-SCNC: 16 MMOL/L — HIGH (ref 7–14)
APPEARANCE UR: CLEAR — SIGNIFICANT CHANGE UP
AST SERPL-CCNC: 355 U/L — HIGH (ref 0–41)
BASOPHILS # BLD AUTO: 0.02 K/UL — SIGNIFICANT CHANGE UP (ref 0–0.2)
BASOPHILS NFR BLD AUTO: 0.7 % — SIGNIFICANT CHANGE UP (ref 0–1)
BILIRUB DIRECT SERPL-MCNC: 0.5 MG/DL — HIGH (ref 0–0.2)
BILIRUB INDIRECT FLD-MCNC: 0.8 MG/DL — SIGNIFICANT CHANGE UP (ref 0.2–1.2)
BILIRUB SERPL-MCNC: 1.3 MG/DL — HIGH (ref 0.2–1.2)
BILIRUB UR-MCNC: NEGATIVE — SIGNIFICANT CHANGE UP
BLD GP AB SCN SERPL QL: SIGNIFICANT CHANGE UP
BUN SERPL-MCNC: <3 MG/DL — LOW (ref 10–20)
CALCIUM SERPL-MCNC: 8.5 MG/DL — SIGNIFICANT CHANGE UP (ref 8.5–10.1)
CHLORIDE SERPL-SCNC: 92 MMOL/L — LOW (ref 98–110)
CO2 SERPL-SCNC: 27 MMOL/L — SIGNIFICANT CHANGE UP (ref 17–32)
COLOR SPEC: SIGNIFICANT CHANGE UP
CREAT SERPL-MCNC: 0.6 MG/DL — LOW (ref 0.7–1.5)
DIFF PNL FLD: NEGATIVE — SIGNIFICANT CHANGE UP
EOSINOPHIL # BLD AUTO: 0.06 K/UL — SIGNIFICANT CHANGE UP (ref 0–0.7)
EOSINOPHIL NFR BLD AUTO: 2.1 % — SIGNIFICANT CHANGE UP (ref 0–8)
FOLATE SERPL-MCNC: 11.9 NG/ML — SIGNIFICANT CHANGE UP
GLUCOSE SERPL-MCNC: 84 MG/DL — SIGNIFICANT CHANGE UP (ref 70–99)
GLUCOSE UR QL: NEGATIVE — SIGNIFICANT CHANGE UP
HCT VFR BLD CALC: 29.6 % — LOW (ref 37–47)
HGB BLD-MCNC: 10 G/DL — LOW (ref 12–16)
IMM GRANULOCYTES NFR BLD AUTO: 0.4 % — HIGH (ref 0.1–0.3)
KETONES UR-MCNC: ABNORMAL
LEUKOCYTE ESTERASE UR-ACNC: NEGATIVE — SIGNIFICANT CHANGE UP
LYMPHOCYTES # BLD AUTO: 0.69 K/UL — LOW (ref 1.2–3.4)
LYMPHOCYTES # BLD AUTO: 24.6 % — SIGNIFICANT CHANGE UP (ref 20.5–51.1)
MAGNESIUM SERPL-MCNC: 2 MG/DL — SIGNIFICANT CHANGE UP (ref 1.8–2.4)
MCHC RBC-ENTMCNC: 33.1 PG — HIGH (ref 27–31)
MCHC RBC-ENTMCNC: 33.8 G/DL — SIGNIFICANT CHANGE UP (ref 32–37)
MCV RBC AUTO: 98 FL — SIGNIFICANT CHANGE UP (ref 81–99)
MONOCYTES # BLD AUTO: 0.19 K/UL — SIGNIFICANT CHANGE UP (ref 0.1–0.6)
MONOCYTES NFR BLD AUTO: 6.8 % — SIGNIFICANT CHANGE UP (ref 1.7–9.3)
NEUTROPHILS # BLD AUTO: 1.83 K/UL — SIGNIFICANT CHANGE UP (ref 1.4–6.5)
NEUTROPHILS NFR BLD AUTO: 65.4 % — SIGNIFICANT CHANGE UP (ref 42.2–75.2)
NITRITE UR-MCNC: NEGATIVE — SIGNIFICANT CHANGE UP
NRBC # BLD: 0 /100 WBCS — SIGNIFICANT CHANGE UP (ref 0–0)
PH UR: 7.5 — SIGNIFICANT CHANGE UP (ref 5–8)
PLATELET # BLD AUTO: 118 K/UL — LOW (ref 130–400)
POTASSIUM SERPL-MCNC: 3.8 MMOL/L — SIGNIFICANT CHANGE UP (ref 3.5–5)
POTASSIUM SERPL-SCNC: 3.8 MMOL/L — SIGNIFICANT CHANGE UP (ref 3.5–5)
PROT SERPL-MCNC: 6.5 G/DL — SIGNIFICANT CHANGE UP (ref 6–8)
PROT UR-MCNC: NEGATIVE — SIGNIFICANT CHANGE UP
RBC # BLD: 3.02 M/UL — LOW (ref 4.2–5.4)
RBC # FLD: 12.9 % — SIGNIFICANT CHANGE UP (ref 11.5–14.5)
SODIUM SERPL-SCNC: 135 MMOL/L — SIGNIFICANT CHANGE UP (ref 135–146)
SP GR SPEC: 1.01 — LOW (ref 1.01–1.02)
UROBILINOGEN FLD QL: SIGNIFICANT CHANGE UP
VIT B12 SERPL-MCNC: 550 PG/ML — SIGNIFICANT CHANGE UP (ref 232–1245)
WBC # BLD: 2.8 K/UL — LOW (ref 4.8–10.8)
WBC # FLD AUTO: 2.8 K/UL — LOW (ref 4.8–10.8)

## 2020-08-01 PROCEDURE — 99233 SBSQ HOSP IP/OBS HIGH 50: CPT

## 2020-08-01 RX ORDER — CALCIUM CARBONATE 500(1250)
1 TABLET ORAL ONCE
Refills: 0 | Status: COMPLETED | OUTPATIENT
Start: 2020-08-01 | End: 2020-08-01

## 2020-08-01 RX ORDER — LIPASE/PROTEASE/AMYLASE 16-48-48K
3 CAPSULE,DELAYED RELEASE (ENTERIC COATED) ORAL
Refills: 0 | Status: DISCONTINUED | OUTPATIENT
Start: 2020-08-01 | End: 2020-08-04

## 2020-08-01 RX ORDER — LANOLIN ALCOHOL/MO/W.PET/CERES
5 CREAM (GRAM) TOPICAL AT BEDTIME
Refills: 0 | Status: DISCONTINUED | OUTPATIENT
Start: 2020-08-01 | End: 2020-08-04

## 2020-08-01 RX ORDER — CALCIUM CARBONATE 500(1250)
1 TABLET ORAL EVERY 6 HOURS
Refills: 0 | Status: DISCONTINUED | OUTPATIENT
Start: 2020-08-01 | End: 2020-08-04

## 2020-08-01 RX ADMIN — MORPHINE SULFATE 2 MILLIGRAM(S): 50 CAPSULE, EXTENDED RELEASE ORAL at 04:13

## 2020-08-01 RX ADMIN — MORPHINE SULFATE 4 MILLIGRAM(S): 50 CAPSULE, EXTENDED RELEASE ORAL at 01:57

## 2020-08-01 RX ADMIN — Medication 1 MILLIGRAM(S): at 11:10

## 2020-08-01 RX ADMIN — MORPHINE SULFATE 2 MILLIGRAM(S): 50 CAPSULE, EXTENDED RELEASE ORAL at 04:30

## 2020-08-01 RX ADMIN — MORPHINE SULFATE 4 MILLIGRAM(S): 50 CAPSULE, EXTENDED RELEASE ORAL at 18:50

## 2020-08-01 RX ADMIN — MORPHINE SULFATE 2 MILLIGRAM(S): 50 CAPSULE, EXTENDED RELEASE ORAL at 21:25

## 2020-08-01 RX ADMIN — MORPHINE SULFATE 4 MILLIGRAM(S): 50 CAPSULE, EXTENDED RELEASE ORAL at 02:17

## 2020-08-01 RX ADMIN — ONDANSETRON 4 MILLIGRAM(S): 8 TABLET, FILM COATED ORAL at 14:45

## 2020-08-01 RX ADMIN — MORPHINE SULFATE 4 MILLIGRAM(S): 50 CAPSULE, EXTENDED RELEASE ORAL at 06:20

## 2020-08-01 RX ADMIN — MORPHINE SULFATE 4 MILLIGRAM(S): 50 CAPSULE, EXTENDED RELEASE ORAL at 05:57

## 2020-08-01 RX ADMIN — MORPHINE SULFATE 4 MILLIGRAM(S): 50 CAPSULE, EXTENDED RELEASE ORAL at 14:50

## 2020-08-01 RX ADMIN — SODIUM CHLORIDE 250 MILLILITER(S): 9 INJECTION, SOLUTION INTRAVENOUS at 11:11

## 2020-08-01 RX ADMIN — PANTOPRAZOLE SODIUM 40 MILLIGRAM(S): 20 TABLET, DELAYED RELEASE ORAL at 11:10

## 2020-08-01 RX ADMIN — MORPHINE SULFATE 4 MILLIGRAM(S): 50 CAPSULE, EXTENDED RELEASE ORAL at 16:57

## 2020-08-01 RX ADMIN — SODIUM CHLORIDE 250 MILLILITER(S): 9 INJECTION, SOLUTION INTRAVENOUS at 00:34

## 2020-08-01 RX ADMIN — MORPHINE SULFATE 2 MILLIGRAM(S): 50 CAPSULE, EXTENDED RELEASE ORAL at 00:32

## 2020-08-01 RX ADMIN — MORPHINE SULFATE 4 MILLIGRAM(S): 50 CAPSULE, EXTENDED RELEASE ORAL at 10:37

## 2020-08-01 RX ADMIN — MORPHINE SULFATE 2 MILLIGRAM(S): 50 CAPSULE, EXTENDED RELEASE ORAL at 10:11

## 2020-08-01 RX ADMIN — Medication 1 CAPSULE(S): at 07:26

## 2020-08-01 RX ADMIN — SODIUM CHLORIDE 250 MILLILITER(S): 9 INJECTION, SOLUTION INTRAVENOUS at 18:54

## 2020-08-01 RX ADMIN — Medication 1 CAPSULE(S): at 11:30

## 2020-08-01 RX ADMIN — MORPHINE SULFATE 4 MILLIGRAM(S): 50 CAPSULE, EXTENDED RELEASE ORAL at 11:30

## 2020-08-01 RX ADMIN — MORPHINE SULFATE 4 MILLIGRAM(S): 50 CAPSULE, EXTENDED RELEASE ORAL at 23:41

## 2020-08-01 RX ADMIN — ONDANSETRON 4 MILLIGRAM(S): 8 TABLET, FILM COATED ORAL at 04:13

## 2020-08-01 RX ADMIN — MORPHINE SULFATE 2 MILLIGRAM(S): 50 CAPSULE, EXTENDED RELEASE ORAL at 16:53

## 2020-08-01 RX ADMIN — MORPHINE SULFATE 2 MILLIGRAM(S): 50 CAPSULE, EXTENDED RELEASE ORAL at 09:01

## 2020-08-01 RX ADMIN — Medication 5 MILLIGRAM(S): at 21:19

## 2020-08-01 RX ADMIN — Medication 3 CAPSULE(S): at 16:52

## 2020-08-01 RX ADMIN — MORPHINE SULFATE 2 MILLIGRAM(S): 50 CAPSULE, EXTENDED RELEASE ORAL at 20:57

## 2020-08-01 RX ADMIN — Medication 1 TABLET(S): at 06:11

## 2020-08-01 RX ADMIN — ENOXAPARIN SODIUM 40 MILLIGRAM(S): 100 INJECTION SUBCUTANEOUS at 11:10

## 2020-08-01 RX ADMIN — Medication 3 CAPSULE(S): at 21:19

## 2020-08-01 RX ADMIN — MORPHINE SULFATE 2 MILLIGRAM(S): 50 CAPSULE, EXTENDED RELEASE ORAL at 18:20
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
## 2020-08-02 LAB
ALBUMIN SERPL ELPH-MCNC: 4 G/DL — SIGNIFICANT CHANGE UP (ref 3.5–5.2)
ALP SERPL-CCNC: 120 U/L — HIGH (ref 30–115)
ALT FLD-CCNC: 64 U/L — HIGH (ref 0–41)
ANION GAP SERPL CALC-SCNC: 17 MMOL/L — HIGH (ref 7–14)
AST SERPL-CCNC: 329 U/L — HIGH (ref 0–41)
BASOPHILS # BLD AUTO: 0.02 K/UL — SIGNIFICANT CHANGE UP (ref 0–0.2)
BASOPHILS NFR BLD AUTO: 0.8 % — SIGNIFICANT CHANGE UP (ref 0–1)
BILIRUB SERPL-MCNC: 1 MG/DL — SIGNIFICANT CHANGE UP (ref 0.2–1.2)
BUN SERPL-MCNC: <3 MG/DL — LOW (ref 10–20)
CALCIUM SERPL-MCNC: 8.8 MG/DL — SIGNIFICANT CHANGE UP (ref 8.5–10.1)
CHLORIDE SERPL-SCNC: 95 MMOL/L — LOW (ref 98–110)
CO2 SERPL-SCNC: 29 MMOL/L — SIGNIFICANT CHANGE UP (ref 17–32)
CREAT SERPL-MCNC: 0.5 MG/DL — LOW (ref 0.7–1.5)
EOSINOPHIL # BLD AUTO: 0.1 K/UL — SIGNIFICANT CHANGE UP (ref 0–0.7)
EOSINOPHIL NFR BLD AUTO: 3.9 % — SIGNIFICANT CHANGE UP (ref 0–8)
GLUCOSE SERPL-MCNC: 88 MG/DL — SIGNIFICANT CHANGE UP (ref 70–99)
HCT VFR BLD CALC: 30.4 % — LOW (ref 37–47)
HGB BLD-MCNC: 10.4 G/DL — LOW (ref 12–16)
IMM GRANULOCYTES NFR BLD AUTO: 0.4 % — HIGH (ref 0.1–0.3)
LIDOCAIN IGE QN: 73 U/L — HIGH (ref 7–60)
LYMPHOCYTES # BLD AUTO: 0.6 K/UL — LOW (ref 1.2–3.4)
LYMPHOCYTES # BLD AUTO: 23.4 % — SIGNIFICANT CHANGE UP (ref 20.5–51.1)
MAGNESIUM SERPL-MCNC: 1.4 MG/DL — LOW (ref 1.8–2.4)
MCHC RBC-ENTMCNC: 33.3 PG — HIGH (ref 27–31)
MCHC RBC-ENTMCNC: 34.2 G/DL — SIGNIFICANT CHANGE UP (ref 32–37)
MCV RBC AUTO: 97.4 FL — SIGNIFICANT CHANGE UP (ref 81–99)
MONOCYTES # BLD AUTO: 0.17 K/UL — SIGNIFICANT CHANGE UP (ref 0.1–0.6)
MONOCYTES NFR BLD AUTO: 6.6 % — SIGNIFICANT CHANGE UP (ref 1.7–9.3)
NEUTROPHILS # BLD AUTO: 1.66 K/UL — SIGNIFICANT CHANGE UP (ref 1.4–6.5)
NEUTROPHILS NFR BLD AUTO: 64.9 % — SIGNIFICANT CHANGE UP (ref 42.2–75.2)
NRBC # BLD: 0 /100 WBCS — SIGNIFICANT CHANGE UP (ref 0–0)
PLATELET # BLD AUTO: 109 K/UL — LOW (ref 130–400)
POTASSIUM SERPL-MCNC: 3.2 MMOL/L — LOW (ref 3.5–5)
POTASSIUM SERPL-SCNC: 3.2 MMOL/L — LOW (ref 3.5–5)
PROT SERPL-MCNC: 6.6 G/DL — SIGNIFICANT CHANGE UP (ref 6–8)
RBC # BLD: 3.12 M/UL — LOW (ref 4.2–5.4)
RBC # FLD: 12.8 % — SIGNIFICANT CHANGE UP (ref 11.5–14.5)
SODIUM SERPL-SCNC: 141 MMOL/L — SIGNIFICANT CHANGE UP (ref 135–146)
WBC # BLD: 2.56 K/UL — LOW (ref 4.8–10.8)
WBC # FLD AUTO: 2.56 K/UL — LOW (ref 4.8–10.8)

## 2020-08-02 PROCEDURE — 99233 SBSQ HOSP IP/OBS HIGH 50: CPT

## 2020-08-02 RX ORDER — OXYCODONE AND ACETAMINOPHEN 5; 325 MG/1; MG/1
1 TABLET ORAL EVERY 4 HOURS
Refills: 0 | Status: DISCONTINUED | OUTPATIENT
Start: 2020-08-02 | End: 2020-08-04

## 2020-08-02 RX ORDER — POTASSIUM CHLORIDE 20 MEQ
40 PACKET (EA) ORAL ONCE
Refills: 0 | Status: COMPLETED | OUTPATIENT
Start: 2020-08-02 | End: 2020-08-02

## 2020-08-02 RX ORDER — MAGNESIUM SULFATE 500 MG/ML
2 VIAL (ML) INJECTION ONCE
Refills: 0 | Status: COMPLETED | OUTPATIENT
Start: 2020-08-02 | End: 2020-08-02

## 2020-08-02 RX ORDER — POTASSIUM CHLORIDE 20 MEQ
20 PACKET (EA) ORAL
Refills: 0 | Status: DISCONTINUED | OUTPATIENT
Start: 2020-08-02 | End: 2020-08-02

## 2020-08-02 RX ADMIN — Medication 3 CAPSULE(S): at 09:15

## 2020-08-02 RX ADMIN — ONDANSETRON 4 MILLIGRAM(S): 8 TABLET, FILM COATED ORAL at 22:34

## 2020-08-02 RX ADMIN — MORPHINE SULFATE 4 MILLIGRAM(S): 50 CAPSULE, EXTENDED RELEASE ORAL at 04:30

## 2020-08-02 RX ADMIN — Medication 3 CAPSULE(S): at 21:08

## 2020-08-02 RX ADMIN — Medication 100 MILLIGRAM(S): at 11:31

## 2020-08-02 RX ADMIN — MORPHINE SULFATE 2 MILLIGRAM(S): 50 CAPSULE, EXTENDED RELEASE ORAL at 07:00

## 2020-08-02 RX ADMIN — MORPHINE SULFATE 4 MILLIGRAM(S): 50 CAPSULE, EXTENDED RELEASE ORAL at 08:19

## 2020-08-02 RX ADMIN — ENOXAPARIN SODIUM 40 MILLIGRAM(S): 100 INJECTION SUBCUTANEOUS at 11:22

## 2020-08-02 RX ADMIN — MORPHINE SULFATE 4 MILLIGRAM(S): 50 CAPSULE, EXTENDED RELEASE ORAL at 00:40

## 2020-08-02 RX ADMIN — OXYCODONE AND ACETAMINOPHEN 1 TABLET(S): 5; 325 TABLET ORAL at 15:40

## 2020-08-02 RX ADMIN — MORPHINE SULFATE 4 MILLIGRAM(S): 50 CAPSULE, EXTENDED RELEASE ORAL at 08:40

## 2020-08-02 RX ADMIN — SODIUM CHLORIDE 250 MILLILITER(S): 9 INJECTION, SOLUTION INTRAVENOUS at 08:18

## 2020-08-02 RX ADMIN — Medication 3 CAPSULE(S): at 17:02

## 2020-08-02 RX ADMIN — PANTOPRAZOLE SODIUM 40 MILLIGRAM(S): 20 TABLET, DELAYED RELEASE ORAL at 11:25

## 2020-08-02 RX ADMIN — MORPHINE SULFATE 4 MILLIGRAM(S): 50 CAPSULE, EXTENDED RELEASE ORAL at 03:58

## 2020-08-02 RX ADMIN — SODIUM CHLORIDE 250 MILLILITER(S): 9 INJECTION, SOLUTION INTRAVENOUS at 13:32

## 2020-08-02 RX ADMIN — OXYCODONE AND ACETAMINOPHEN 1 TABLET(S): 5; 325 TABLET ORAL at 15:17

## 2020-08-02 RX ADMIN — Medication 50 GRAM(S): at 11:46

## 2020-08-02 RX ADMIN — MORPHINE SULFATE 2 MILLIGRAM(S): 50 CAPSULE, EXTENDED RELEASE ORAL at 11:26

## 2020-08-02 RX ADMIN — MORPHINE SULFATE 2 MILLIGRAM(S): 50 CAPSULE, EXTENDED RELEASE ORAL at 11:40

## 2020-08-02 RX ADMIN — MORPHINE SULFATE 2 MILLIGRAM(S): 50 CAPSULE, EXTENDED RELEASE ORAL at 02:30

## 2020-08-02 RX ADMIN — Medication 1 TABLET(S): at 11:22

## 2020-08-02 RX ADMIN — Medication 3 CAPSULE(S): at 11:44

## 2020-08-02 RX ADMIN — Medication 5 MILLIGRAM(S): at 21:09

## 2020-08-02 RX ADMIN — ONDANSETRON 4 MILLIGRAM(S): 8 TABLET, FILM COATED ORAL at 06:30

## 2020-08-02 RX ADMIN — Medication 1 MILLIGRAM(S): at 11:22

## 2020-08-02 RX ADMIN — SODIUM CHLORIDE 250 MILLILITER(S): 9 INJECTION, SOLUTION INTRAVENOUS at 03:59

## 2020-08-02 RX ADMIN — MORPHINE SULFATE 2 MILLIGRAM(S): 50 CAPSULE, EXTENDED RELEASE ORAL at 06:31

## 2020-08-02 RX ADMIN — SODIUM CHLORIDE 250 MILLILITER(S): 9 INJECTION, SOLUTION INTRAVENOUS at 00:02

## 2020-08-02 RX ADMIN — OXYCODONE AND ACETAMINOPHEN 1 TABLET(S): 5; 325 TABLET ORAL at 21:09

## 2020-08-02 RX ADMIN — Medication 40 MILLIEQUIVALENT(S): at 11:32

## 2020-08-02 RX ADMIN — MORPHINE SULFATE 2 MILLIGRAM(S): 50 CAPSULE, EXTENDED RELEASE ORAL at 01:56

## 2020-08-02 NOTE — CHART NOTE - NSCHARTNOTEFT_GEN_A_CORE
Spoke to Johnathon from pain management. Wanted patient off morphine in the setting of most likely chronic pancreatitis and alcohol use. Started patient on oxycodone 5 q4 PRN
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
## 2020-08-03 LAB
ALBUMIN SERPL ELPH-MCNC: 3.9 G/DL — SIGNIFICANT CHANGE UP (ref 3.5–5.2)
ALP SERPL-CCNC: 140 U/L — HIGH (ref 30–115)
ALT FLD-CCNC: 76 U/L — HIGH (ref 0–41)
ANION GAP SERPL CALC-SCNC: 12 MMOL/L — SIGNIFICANT CHANGE UP (ref 7–14)
AST SERPL-CCNC: 363 U/L — HIGH (ref 0–41)
BASOPHILS # BLD AUTO: 0.02 K/UL — SIGNIFICANT CHANGE UP (ref 0–0.2)
BASOPHILS NFR BLD AUTO: 1 % — SIGNIFICANT CHANGE UP (ref 0–1)
BILIRUB SERPL-MCNC: 1 MG/DL — SIGNIFICANT CHANGE UP (ref 0.2–1.2)
BUN SERPL-MCNC: <3 MG/DL — LOW (ref 10–20)
CALCIUM SERPL-MCNC: 9.1 MG/DL — SIGNIFICANT CHANGE UP (ref 8.5–10.1)
CHLORIDE SERPL-SCNC: 99 MMOL/L — SIGNIFICANT CHANGE UP (ref 98–110)
CO2 SERPL-SCNC: 30 MMOL/L — SIGNIFICANT CHANGE UP (ref 17–32)
CREAT SERPL-MCNC: 0.5 MG/DL — LOW (ref 0.7–1.5)
EOSINOPHIL # BLD AUTO: 0.07 K/UL — SIGNIFICANT CHANGE UP (ref 0–0.7)
EOSINOPHIL NFR BLD AUTO: 3.5 % — SIGNIFICANT CHANGE UP (ref 0–8)
GLUCOSE SERPL-MCNC: 101 MG/DL — HIGH (ref 70–99)
HCT VFR BLD CALC: 30.2 % — LOW (ref 37–47)
HGB BLD-MCNC: 10.5 G/DL — LOW (ref 12–16)
IMM GRANULOCYTES NFR BLD AUTO: 0.5 % — HIGH (ref 0.1–0.3)
LYMPHOCYTES # BLD AUTO: 0.66 K/UL — LOW (ref 1.2–3.4)
LYMPHOCYTES # BLD AUTO: 33 % — SIGNIFICANT CHANGE UP (ref 20.5–51.1)
MAGNESIUM SERPL-MCNC: 1.6 MG/DL — LOW (ref 1.8–2.4)
MCHC RBC-ENTMCNC: 34.3 PG — HIGH (ref 27–31)
MCHC RBC-ENTMCNC: 34.8 G/DL — SIGNIFICANT CHANGE UP (ref 32–37)
MCV RBC AUTO: 98.7 FL — SIGNIFICANT CHANGE UP (ref 81–99)
MONOCYTES # BLD AUTO: 0.23 K/UL — SIGNIFICANT CHANGE UP (ref 0.1–0.6)
MONOCYTES NFR BLD AUTO: 11.5 % — HIGH (ref 1.7–9.3)
NEUTROPHILS # BLD AUTO: 1.01 K/UL — LOW (ref 1.4–6.5)
NEUTROPHILS NFR BLD AUTO: 50.5 % — SIGNIFICANT CHANGE UP (ref 42.2–75.2)
NRBC # BLD: 0 /100 WBCS — SIGNIFICANT CHANGE UP (ref 0–0)
PLATELET # BLD AUTO: 124 K/UL — LOW (ref 130–400)
POTASSIUM SERPL-MCNC: 4.1 MMOL/L — SIGNIFICANT CHANGE UP (ref 3.5–5)
POTASSIUM SERPL-SCNC: 4.1 MMOL/L — SIGNIFICANT CHANGE UP (ref 3.5–5)
PROT SERPL-MCNC: 6.2 G/DL — SIGNIFICANT CHANGE UP (ref 6–8)
RBC # BLD: 3.06 M/UL — LOW (ref 4.2–5.4)
RBC # FLD: 12.9 % — SIGNIFICANT CHANGE UP (ref 11.5–14.5)
SODIUM SERPL-SCNC: 141 MMOL/L — SIGNIFICANT CHANGE UP (ref 135–146)
WBC # BLD: 2 K/UL — LOW (ref 4.8–10.8)
WBC # FLD AUTO: 2 K/UL — LOW (ref 4.8–10.8)

## 2020-08-03 PROCEDURE — 99233 SBSQ HOSP IP/OBS HIGH 50: CPT

## 2020-08-03 RX ORDER — PANTOPRAZOLE SODIUM 20 MG/1
40 TABLET, DELAYED RELEASE ORAL
Refills: 0 | Status: DISCONTINUED | OUTPATIENT
Start: 2020-08-03 | End: 2020-08-04

## 2020-08-03 RX ORDER — SODIUM CHLORIDE 9 MG/ML
1000 INJECTION INTRAMUSCULAR; INTRAVENOUS; SUBCUTANEOUS
Refills: 0 | Status: DISCONTINUED | OUTPATIENT
Start: 2020-08-03 | End: 2020-08-04

## 2020-08-03 RX ORDER — MAGNESIUM OXIDE 400 MG ORAL TABLET 241.3 MG
400 TABLET ORAL ONCE
Refills: 0 | Status: COMPLETED | OUTPATIENT
Start: 2020-08-03 | End: 2020-08-03

## 2020-08-03 RX ADMIN — OXYCODONE AND ACETAMINOPHEN 1 TABLET(S): 5; 325 TABLET ORAL at 01:34

## 2020-08-03 RX ADMIN — PANTOPRAZOLE SODIUM 40 MILLIGRAM(S): 20 TABLET, DELAYED RELEASE ORAL at 09:07

## 2020-08-03 RX ADMIN — OXYCODONE AND ACETAMINOPHEN 1 TABLET(S): 5; 325 TABLET ORAL at 21:04

## 2020-08-03 RX ADMIN — OXYCODONE AND ACETAMINOPHEN 1 TABLET(S): 5; 325 TABLET ORAL at 09:37

## 2020-08-03 RX ADMIN — Medication 3 CAPSULE(S): at 09:06

## 2020-08-03 RX ADMIN — Medication 100 MILLIGRAM(S): at 11:18

## 2020-08-03 RX ADMIN — ENOXAPARIN SODIUM 40 MILLIGRAM(S): 100 INJECTION SUBCUTANEOUS at 11:17

## 2020-08-03 RX ADMIN — Medication 1 TABLET(S): at 11:18

## 2020-08-03 RX ADMIN — Medication 3 CAPSULE(S): at 20:51

## 2020-08-03 RX ADMIN — Medication 3 CAPSULE(S): at 14:10

## 2020-08-03 RX ADMIN — CHLORHEXIDINE GLUCONATE 1 APPLICATION(S): 213 SOLUTION TOPICAL at 05:15

## 2020-08-03 RX ADMIN — Medication 1 MILLIGRAM(S): at 11:18

## 2020-08-03 RX ADMIN — SODIUM CHLORIDE 250 MILLILITER(S): 9 INJECTION, SOLUTION INTRAVENOUS at 01:34

## 2020-08-03 RX ADMIN — SODIUM CHLORIDE 100 MILLILITER(S): 9 INJECTION INTRAMUSCULAR; INTRAVENOUS; SUBCUTANEOUS at 09:56

## 2020-08-03 RX ADMIN — Medication 3 CAPSULE(S): at 17:19

## 2020-08-03 RX ADMIN — MAGNESIUM OXIDE 400 MG ORAL TABLET 400 MILLIGRAM(S): 241.3 TABLET ORAL at 11:17

## 2020-08-03 RX ADMIN — ONDANSETRON 4 MILLIGRAM(S): 8 TABLET, FILM COATED ORAL at 14:28

## 2020-08-03 RX ADMIN — OXYCODONE AND ACETAMINOPHEN 1 TABLET(S): 5; 325 TABLET ORAL at 09:11

## 2020-08-03 RX ADMIN — SODIUM CHLORIDE 250 MILLILITER(S): 9 INJECTION INTRAMUSCULAR; INTRAVENOUS; SUBCUTANEOUS at 05:42

## 2020-08-03 RX ADMIN — Medication 5 MILLIGRAM(S): at 20:51

## 2020-08-03 NOTE — PROGRESS NOTE ADULT - SUBJECTIVE AND OBJECTIVE BOX
Hepatology/GI follow up note: Pt seen and examined at bedside.     For questions and inquiries please page [phone].  For urgent matters or after 5pm and on weekends please page the fellow on call through the GI paging system.    45y Female seen for: Acute ETOH pancreatitis    Subjective/Interval events:  Complaining of abdominal pain  Fullness  Heart burn  Tolerating better clears    Review of system  General:  (-) weight loss, (-) fevers  Eyes:  (-) visual changes  CV:  (-) chest pain  Resp: (-) SOB, (-) wheezing  GI: (-) abdominal pain,  (-) nausea, (-) vomiting, (-) dysphagia, (-) diarrhea, (-) constipation, (-) rectal bleeding, (-) melena, (-) hematemesis.  Neuro: (-) confusion, (-) weakness  Psych:  (-) Hallucinations  Heme:  (-) easy bruisability    Past medical/surgical Hx:  PAST MEDICAL & SURGICAL HISTORY:  AA (alcohol abuse)  Chronic pancreatitis  Pancreatitis  S/P arthroscopy: meniscal repair    Home Medications:  Last Order Reconciliation Date: 07-31-20 @ 04:54 (Admission Reconciliation)  folic acid 1 mg oral tablet: 1 tab(s) orally once a day  Multiple Vitamins with Minerals oral tablet: 1 tab(s) orally once a day  ondansetron 4 mg oral disintegrating strip: 1 each orally every 8 hours, As Needed -for anxiety - for nausea   pancrelipase 12,000 units-38,000 units-60,000 units oral delayed release capsule: 1 cap(s) orally 3 times a day   pantoprazole 40 mg oral delayed release tablet: 1 tab(s) orally once a day   thiamine 100 mg oral tablet: 1 tab(s) orally once a day      Allergies:  Compazine (Unknown)      Current Medications:   calcium carbonate    500 mG (Tums) Chewable 1 Tablet(s) Chew every 6 hours PRN  chlorhexidine 4% Liquid 1 Application(s) Topical <User Schedule>  enoxaparin Injectable 40 milliGRAM(s) SubCutaneous daily  folic acid 1 milliGRAM(s) Oral daily  LORazepam   Injectable 2 milliGRAM(s) IV Push every 1 hour PRN  melatonin 5 milliGRAM(s) Oral at bedtime  multivitamin/minerals 1 Tablet(s) Oral daily  ondansetron Injectable 4 milliGRAM(s) IV Push every 4 hours PRN  oxycodone    5 mG/acetaminophen 325 mG 1 Tablet(s) Oral every 4 hours PRN  pancrelipase  (CREON 12,000 Lipase Units) 3 Capsule(s) Oral four times a day with meals  pantoprazole    Tablet 40 milliGRAM(s) Oral before breakfast  sodium chloride 0.9%. 1000 milliLiter(s) IV Continuous <Continuous>  thiamine 100 milliGRAM(s) Oral daily        Physical exam:  T(C): 36.7 (08-03-20 @ 07:55), Max: 36.7 (08-03-20 @ 07:55)  HR: 91 (08-03-20 @ 07:55) (85 - 91)  BP: 153/110 (08-03-20 @ 07:55) (145/95 - 153/110)  RR: 19 (08-03-20 @ 07:55) (19 - 20)    GENERAL: NAD  HEAD:  Atraumatic, Normocephalic  EYES: Sclera:NL  NECK: Supple, no JVD or thyromegaly  CHEST/LUNG: Good bilateral air entry  HEART: normal S1, S2. Regular  ABDOMEN: (-) distended, (-) tender, (-) rebound, (+) BS, (-)HSM  EXTREMITIES: (-) edema  NEUROLOGY: (-) asterixis  SKIN: (-) jaundice      Data:                        10.5   2.00  )-----------( 124      ( 03 Aug 2020 05:59 )             30.2     MCV 98.7 (08-03-20)    RDW 12.9 (08-03-20)    HGB trend:  10.5  08-03-20 @ 05:59  10.4  08-02-20 @ 05:36  10.0  08-01-20 @ 06:30        WBC trend:  2.00  08-03-20 @ 05:59  2.56  08-02-20 @ 05:36  2.80  08-01-20 @ 06:30    08-03    141  |  99  |  <3<L>  ----------------------------<  101<H>  4.1   |  30  |  0.5<L>    Ca    9.1      03 Aug 2020 05:59  Mg     1.6     08-03    TPro  6.2  /  Alb  3.9  /  TBili  1.0  /  DBili  x   /  AST  363<H>  /  ALT  76<H>  /  AlkPhos  140<H>  08-03    Liver panel trend:  TBili 1.0   /      /   ALT 76   /   AlkP 140   /   Tptn 6.2   /   Alb 3.9    /   DBili --      08-03  TBili 1.0   /      /   ALT 64   /   AlkP 120   /   Tptn 6.6   /   Alb 4.0    /   DBili --      08-02  TBili 1.3   /      /   ALT 64   /   AlkP 111   /   Tptn 6.5   /   Alb 4.0    /   DBili 0.5      08-01  TBili 1.2   /      /   ALT 72   /   AlkP 132   /   Tptn 6.8   /   Alb 4.3    /   DBili 0.4      07-31  TBili 1.1   /      /      /   AlkP 171   /   Tptn 8.6   /   Alb 5.2    /   DBili 0.4      07-31    < from: CT Abdomen and Pelvis w/ IV Cont (07.31.20 @ 03:14) >  FINDINGS:    LOWER CHEST: Bibasilar dependent atelectasis.    HEPATOBILIARY: Hepatic steatosis. The splenic and portal veins are patent.    SPLEEN: Unremarkable.    PANCREAS: Pseudocyst within the tail of pancreas measuring 5.9 x 5.4 x 4.5 cm (series 5, image 5). Cyst abuts the posterior wall the stomach and contains peripheral calcifications. Additional calcifications in the pancreatic head. Areas of mesenteric fat stranding surrounding the pancreatic pseudocyst. The pancreas enhances homogeneously.    ADRENAL GLANDS: Unremarkable.    KIDNEYS: Symmetric enhancement bilaterally. No evidence of hydronephrosis.    ABDOMINOPELVIC NODES: Unremarkable.    PELVIC ORGANS: Unremarkable.    PERITONEUM/MESENTERY/BOWEL: No bowel obstruction, ascites or intraperitoneal free air. Normal caliber appendix.    BONES/SOFT TISSUES: Degenerative changes of L5-S1.    VASCULAR: Aorta is normal in caliber.      IMPRESSION:    Since CT abdomen and pelvis performed on May 11, 2020;    Interval enlargement of pancreatic tail pseudocyst, now measuring 5.9 x 5.4 x 4.5 cm (previously 3 x 2.3 x 3.3 cm).    Pancreatic body and tail inflammatory changes, consistent with acute on chronic pancreatitis. No CT evidence of necrotizing pancreatitis.    < end of copied text >
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
## 2020-08-03 NOTE — CHART NOTE - NSCHARTNOTEFT_GEN_A_CORE
NS started at the same rate as LR, LR unable to be given due to lack of availability. LR should be restarted once in stock.
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
## 2020-08-04 ENCOUNTER — TRANSCRIPTION ENCOUNTER (OUTPATIENT)
Age: 45
End: 2020-08-04

## 2020-08-04 VITALS
DIASTOLIC BLOOD PRESSURE: 97 MMHG | RESPIRATION RATE: 18 BRPM | TEMPERATURE: 98 F | HEART RATE: 74 BPM | SYSTOLIC BLOOD PRESSURE: 150 MMHG

## 2020-08-04 LAB
ALBUMIN SERPL ELPH-MCNC: 4.3 G/DL — SIGNIFICANT CHANGE UP (ref 3.5–5.2)
ALP SERPL-CCNC: 133 U/L — HIGH (ref 30–115)
ALT FLD-CCNC: 66 U/L — HIGH (ref 0–41)
ANION GAP SERPL CALC-SCNC: 14 MMOL/L — SIGNIFICANT CHANGE UP (ref 7–14)
AST SERPL-CCNC: 262 U/L — HIGH (ref 0–41)
BASOPHILS # BLD AUTO: 0.02 K/UL — SIGNIFICANT CHANGE UP (ref 0–0.2)
BASOPHILS NFR BLD AUTO: 0.7 % — SIGNIFICANT CHANGE UP (ref 0–1)
BILIRUB SERPL-MCNC: 0.8 MG/DL — SIGNIFICANT CHANGE UP (ref 0.2–1.2)
BUN SERPL-MCNC: 4 MG/DL — LOW (ref 10–20)
CALCIUM SERPL-MCNC: 9.2 MG/DL — SIGNIFICANT CHANGE UP (ref 8.5–10.1)
CHLORIDE SERPL-SCNC: 99 MMOL/L — SIGNIFICANT CHANGE UP (ref 98–110)
CO2 SERPL-SCNC: 28 MMOL/L — SIGNIFICANT CHANGE UP (ref 17–32)
CREAT SERPL-MCNC: 0.6 MG/DL — LOW (ref 0.7–1.5)
EOSINOPHIL # BLD AUTO: 0.05 K/UL — SIGNIFICANT CHANGE UP (ref 0–0.7)
EOSINOPHIL NFR BLD AUTO: 1.8 % — SIGNIFICANT CHANGE UP (ref 0–8)
GLUCOSE SERPL-MCNC: 107 MG/DL — HIGH (ref 70–99)
HCT VFR BLD CALC: 31.1 % — LOW (ref 37–47)
HGB BLD-MCNC: 10.7 G/DL — LOW (ref 12–16)
IMM GRANULOCYTES NFR BLD AUTO: 0.4 % — HIGH (ref 0.1–0.3)
LYMPHOCYTES # BLD AUTO: 0.76 K/UL — LOW (ref 1.2–3.4)
LYMPHOCYTES # BLD AUTO: 27 % — SIGNIFICANT CHANGE UP (ref 20.5–51.1)
MAGNESIUM SERPL-MCNC: 1.7 MG/DL — LOW (ref 1.8–2.4)
MCHC RBC-ENTMCNC: 34 PG — HIGH (ref 27–31)
MCHC RBC-ENTMCNC: 34.4 G/DL — SIGNIFICANT CHANGE UP (ref 32–37)
MCV RBC AUTO: 98.7 FL — SIGNIFICANT CHANGE UP (ref 81–99)
MONOCYTES # BLD AUTO: 0.32 K/UL — SIGNIFICANT CHANGE UP (ref 0.1–0.6)
MONOCYTES NFR BLD AUTO: 11.4 % — HIGH (ref 1.7–9.3)
NEUTROPHILS # BLD AUTO: 1.65 K/UL — SIGNIFICANT CHANGE UP (ref 1.4–6.5)
NEUTROPHILS NFR BLD AUTO: 58.7 % — SIGNIFICANT CHANGE UP (ref 42.2–75.2)
NRBC # BLD: 0 /100 WBCS — SIGNIFICANT CHANGE UP (ref 0–0)
PLATELET # BLD AUTO: 139 K/UL — SIGNIFICANT CHANGE UP (ref 130–400)
POTASSIUM SERPL-MCNC: 3.2 MMOL/L — LOW (ref 3.5–5)
POTASSIUM SERPL-SCNC: 3.2 MMOL/L — LOW (ref 3.5–5)
PROT SERPL-MCNC: 6.9 G/DL — SIGNIFICANT CHANGE UP (ref 6–8)
RBC # BLD: 3.15 M/UL — LOW (ref 4.2–5.4)
RBC # FLD: 13.2 % — SIGNIFICANT CHANGE UP (ref 11.5–14.5)
SODIUM SERPL-SCNC: 141 MMOL/L — SIGNIFICANT CHANGE UP (ref 135–146)
WBC # BLD: 2.81 K/UL — LOW (ref 4.8–10.8)
WBC # FLD AUTO: 2.81 K/UL — LOW (ref 4.8–10.8)

## 2020-08-04 PROCEDURE — 99239 HOSP IP/OBS DSCHRG MGMT >30: CPT

## 2020-08-04 RX ORDER — IBUPROFEN 200 MG
1 TABLET ORAL
Qty: 45 | Refills: 0
Start: 2020-08-04 | End: 2020-08-18

## 2020-08-04 RX ORDER — METOPROLOL TARTRATE 50 MG
12.5 TABLET ORAL ONCE
Refills: 0 | Status: DISCONTINUED | OUTPATIENT
Start: 2020-08-04 | End: 2020-08-04

## 2020-08-04 RX ORDER — LANOLIN ALCOHOL/MO/W.PET/CERES
1 CREAM (GRAM) TOPICAL
Qty: 0 | Refills: 0 | DISCHARGE
Start: 2020-08-04

## 2020-08-04 RX ORDER — CALCIUM CARBONATE 500(1250)
1 TABLET ORAL
Qty: 0 | Refills: 0 | DISCHARGE
Start: 2020-08-04

## 2020-08-04 RX ORDER — LIPASE/PROTEASE/AMYLASE 16-48-48K
3 CAPSULE,DELAYED RELEASE (ENTERIC COATED) ORAL
Qty: 360 | Refills: 0
Start: 2020-08-04 | End: 2020-09-02

## 2020-08-04 RX ORDER — METOPROLOL TARTRATE 50 MG
10 TABLET ORAL ONCE
Refills: 0 | Status: DISCONTINUED | OUTPATIENT
Start: 2020-08-04 | End: 2020-08-04

## 2020-08-04 RX ORDER — ACETAMINOPHEN 500 MG
650 TABLET ORAL ONCE
Refills: 0 | Status: COMPLETED | OUTPATIENT
Start: 2020-08-04 | End: 2020-08-04

## 2020-08-04 RX ORDER — PANTOPRAZOLE SODIUM 20 MG/1
40 TABLET, DELAYED RELEASE ORAL
Refills: 0 | Status: DISCONTINUED | OUTPATIENT
Start: 2020-08-04 | End: 2020-08-04

## 2020-08-04 RX ORDER — MAGNESIUM OXIDE 400 MG ORAL TABLET 241.3 MG
400 TABLET ORAL ONCE
Refills: 0 | Status: DISCONTINUED | OUTPATIENT
Start: 2020-08-04 | End: 2020-08-04

## 2020-08-04 RX ADMIN — SODIUM CHLORIDE 100 MILLILITER(S): 9 INJECTION INTRAMUSCULAR; INTRAVENOUS; SUBCUTANEOUS at 05:12

## 2020-08-04 RX ADMIN — CHLORHEXIDINE GLUCONATE 1 APPLICATION(S): 213 SOLUTION TOPICAL at 05:12

## 2020-08-04 RX ADMIN — Medication 3 CAPSULE(S): at 07:57

## 2020-08-04 RX ADMIN — OXYCODONE AND ACETAMINOPHEN 1 TABLET(S): 5; 325 TABLET ORAL at 02:00

## 2020-08-04 RX ADMIN — Medication 650 MILLIGRAM(S): at 05:14

## 2020-08-04 RX ADMIN — OXYCODONE AND ACETAMINOPHEN 1 TABLET(S): 5; 325 TABLET ORAL at 08:01

## 2020-08-04 RX ADMIN — OXYCODONE AND ACETAMINOPHEN 1 TABLET(S): 5; 325 TABLET ORAL at 07:56

## 2020-08-04 NOTE — DISCHARGE NOTE PROVIDER - CARE PROVIDER_API CALL
Petey Elizondo  GASTROENTEROLOGY  [address] [place] [address]  Phone: [phone]  Fax: [phone]  Follow Up Time: 2 weeks    Haley Cobos  INTERNAL MEDICINE  [address] [place] [address]  Phone: [phone]  Fax: [phone]  Follow Up Time: 1 week

## 2020-08-04 NOTE — DISCHARGE NOTE PROVIDER - NSDCCPCAREPLAN_GEN_ALL_CORE_FT
PRINCIPAL DISCHARGE DIAGNOSIS  Diagnosis: Alcoholic pancreatitis  Assessment and Plan of Treatment: You came in with abdominal pain which was found to be due to pancreatitis. A common cause of pancreatitis is alcohol intake; even small amounts can lead to pancreatitis. Due to your recurrent episodes of pancreatitis we recommend the cessation of alcoholic beverages. Please maintain hydration and oral food intake. Please follow with GI Dr. Elizondo in 2 weeks for further evaluation.      SECONDARY DISCHARGE DIAGNOSES  Diagnosis: Pseudocyst of pancreas  Assessment and Plan of Treatment:
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
## 2020-08-04 NOTE — DISCHARGE NOTE PROVIDER - CARE PROVIDERS DIRECT ADDRESSES
,DirectAddress_Unknown,[email or web address]

## 2020-08-04 NOTE — PATIENT PROFILE ADULT - NSPRONUTRITIONRISK_GEN_A_NUR
Pre-Operative Evaluation    Diagnosis: Ovarian Mass    Procedure: Bilateral robot assisted salpingo-oopherectomy    Date of Procedure: 8/10/2020    Surgeon: Dr. Kathleen Emanuel    normal. There is no distension.      Palpations: Abdomen is soft. There is mass.      Tenderness: There is abdominal tenderness (LLQ, left pelvis). There is no right CVA tenderness, left CVA tenderness, guarding or rebound.      Hernia: No hernia is present.   Musculoskeletal:      Right lower leg: No edema.      Left lower leg: No edema.   Lymphadenopathy:      Cervical: No cervical adenopathy.   Skin:     General: Skin is warm and dry.   Neurological:      General: No focal deficit present.      Mental Status: She is alert and oriented to person, place, and time.      Cranial Nerves: No cranial nerve deficit.      Sensory: No sensory deficit.      Motor: No weakness.      Coordination: Coordination normal.      Gait: Gait normal.      Deep Tendon Reflexes: Reflexes normal.   Psychiatric:         Mood and Affect: Mood normal.         Thought Content: Thought content normal.         Assessment/Plan:  Problem List Items Addressed This Visit     Pelvic mass      Other Visit Diagnoses     Pre-operative examination    -  Primary    Relevant Orders    ELECTROCARDIOGRAM 12-LEAD (Completed)           Patient is medically optimized for surgery. Labs reviewed and stable. EKG done in office and reveals normal sinus rhythm, normal axis, normal intervals, no evidence of ischemia. She is of appropriate risk for moderate risk procedure. Patient's medications reviewed and plan discussed for taking prior to and day of surgery. This was re-emphasized with repeat back and  to ensure understanding with language barrier. Medications as normal from now through Sunday prior to procedure then hold all medications the morning of surgery. Written instructions also given to patient, but only available in english. Nothing to eat after 11pm on the night prior to surgery. Avoid ASA/NSAIDs for a week prior. Tylenol prn for pain is ok. Copy of labs, EKG and note to be sent to surgeon.    Yohan Perez DO   No indicators present

## 2020-08-04 NOTE — CHART NOTE - NSCHARTNOTEFT_GEN_A_CORE
<<<RESIDENT DISCHARGE NOTE>>>     LAURA BARAJAS  MRN-[id number]    VITAL SIGNS:  T(F): 98.2 (08-04-20 @ 07:40), Max: 98.2 (08-04-20 @ 07:40)  HR: 74 (08-04-20 @ 07:40)  BP: 150/97 (08-04-20 @ 07:40)  SpO2: --      PHYSICAL EXAMINATION:  GENERAL: Supine, NAD, well-developed, AAOx3  HEENT:  Atraumatic, Normocephalic. EOMI, PERRLA, conjunctiva and sclera clear, No JVD  PULMONARY: Clear to auscultation bilaterally; No wheeze  CARDIOVASCULAR: Regular rate and rhythm; No murmurs, rubs, or gallops  GASTROINTESTINAL: Soft, Tenderness to light palpation in epigastrium. Nondistended; Bowel sounds present  MUSCULOSKELETAL:  2+ Peripheral Pulses, No clubbing, cyanosis, or edema  NEUROLOGY: non-focal  SKIN: No rashes or lesions    TEST RESULTS:                        10.7   2.81  )-----------( 139      ( 04 Aug 2020 06:55 )             31.1       08-04    141  |  99  |  4<L>  ----------------------------<  107<H>  3.2<L>   |  28  |  0.6<L>    Ca    9.2      04 Aug 2020 06:55  Mg     1.7     08-04    TPro  6.9  /  Alb  4.3  /  TBili  0.8  /  DBili  x   /  AST  262<H>  /  ALT  66<H>  /  AlkPhos  133<H>  08-04      FINAL DISCHARGE INTERVIEW:  Resident(s) Present: (Name:Dr. Deshpande)    DISCHARGE MEDICATION RECONCILIATION  reviewed with Attending (Name:Dr. Celestin)    DISPOSITION:   [  ] Home,    [  ] Home with Visiting Nursing Services,   [    ]  SNF/ NH,    [   ] Acute Rehab (4A),   [ X  ] Other

## 2020-08-04 NOTE — PROGRESS NOTE ADULT - REASON FOR ADMISSION
acute on chronic pancreatitis

## 2020-08-04 NOTE — DISCHARGE NOTE NURSING/CASE MANAGEMENT/SOCIAL WORK - PATIENT PORTAL LINK FT
You can access the FollowMyHealth Patient Portal offered by [facility] by registering at the following website: [email or web address]. By joining [employer]’s FollowMyHealth portal, you will also be able to view your health information using other applications (apps) compatible with our system.

## 2020-08-04 NOTE — DISCHARGE NOTE PROVIDER - PROVIDER TOKENS
PROVIDER:[TOKEN:[[id number]],FOLLOWUP:[2 weeks]],PROVIDER:[TOKEN:[[id number]],FOLLOWUP:[1 week]]

## 2020-08-04 NOTE — DISCHARGE NOTE PROVIDER - NSDCMRMEDTOKEN_GEN_ALL_CORE_FT
calcium carbonate 500 mg (200 mg elemental calcium) oral tablet, chewable: 1 tab(s) orally every 6 hours, As needed, Upset Stomach  folic acid 1 mg oral tablet: 1 tab(s) orally once a day  ibuprofen 800 mg oral tablet: 1 tab(s) orally every 8 hours   melatonin 5 mg oral tablet: 1 tab(s) orally once a day (at bedtime)  Multiple Vitamins with Minerals oral tablet: 1 tab(s) orally once a day  ondansetron 4 mg oral disintegrating strip: 1 each orally every 8 hours, As Needed -for anxiety - for nausea   pancrelipase 12,000 units-38,000 units-60,000 units oral delayed release capsule: 1 cap(s) orally 3 times a day   pantoprazole 40 mg oral delayed release tablet: 1 tab(s) orally once a day   thiamine 100 mg oral tablet: 1 tab(s) orally once a day

## 2020-08-06 DIAGNOSIS — Z63.0 PROBLEMS IN RELATIONSHIP WITH SPOUSE OR PARTNER: ICD-10-CM
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
## 2020-08-06 DIAGNOSIS — E51.9 THIAMINE DEFICIENCY, UNSPECIFIED: ICD-10-CM

## 2020-08-06 DIAGNOSIS — F10.10 ALCOHOL ABUSE, UNCOMPLICATED: ICD-10-CM

## 2020-08-06 DIAGNOSIS — K86.3 PSEUDOCYST OF PANCREAS: ICD-10-CM

## 2020-08-06 DIAGNOSIS — R10.9 UNSPECIFIED ABDOMINAL PAIN: ICD-10-CM

## 2020-08-06 DIAGNOSIS — E53.8 DEFICIENCY OF OTHER SPECIFIED B GROUP VITAMINS: ICD-10-CM

## 2020-08-06 DIAGNOSIS — Z91.14 PATIENT'S OTHER NONCOMPLIANCE WITH MEDICATION REGIMEN: ICD-10-CM

## 2020-08-06 DIAGNOSIS — E61.2 MAGNESIUM DEFICIENCY: ICD-10-CM

## 2020-08-06 DIAGNOSIS — K86.0 ALCOHOL-INDUCED CHRONIC PANCREATITIS: ICD-10-CM

## 2020-08-06 DIAGNOSIS — K85.20 ALCOHOL INDUCED ACUTE PANCREATITIS WITHOUT NECROSIS OR INFECTION: ICD-10-CM

## 2020-08-06 DIAGNOSIS — E87.2 ACIDOSIS: ICD-10-CM

## 2020-08-06 DIAGNOSIS — R74.0 NONSPECIFIC ELEVATION OF LEVELS OF TRANSAMINASE AND LACTIC ACID DEHYDROGENASE [LDH]: ICD-10-CM

## 2020-08-06 DIAGNOSIS — K70.0 ALCOHOLIC FATTY LIVER: ICD-10-CM

## 2020-08-06 SDOH — SOCIAL STABILITY - SOCIAL INSECURITY: PROBLEMS IN RELATIONSHIP WITH SPOUSE OR PARTNER: Z63.0
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
## 2020-09-08 ENCOUNTER — INPATIENT (INPATIENT)
Facility: HOSPITAL | Age: 45
LOS: 7 days | Discharge: HOME | End: 2020-09-16
Attending: HOSPITALIST | Admitting: HOSPITALIST
Payer: MEDICAID

## 2020-09-08 VITALS
SYSTOLIC BLOOD PRESSURE: 145 MMHG | OXYGEN SATURATION: 99 % | TEMPERATURE: 99 F | HEART RATE: 145 BPM | WEIGHT: 134.92 LBS | DIASTOLIC BLOOD PRESSURE: 81 MMHG | RESPIRATION RATE: 18 BRPM

## 2020-09-08 DIAGNOSIS — Z98.890 OTHER SPECIFIED POSTPROCEDURAL STATES: Chronic | ICD-10-CM

## 2020-09-08 DIAGNOSIS — K21.0 GASTRO-ESOPHAGEAL REFLUX DISEASE WITH ESOPHAGITIS: ICD-10-CM

## 2020-09-08 PROCEDURE — 99285 EMERGENCY DEPT VISIT HI MDM: CPT

## 2020-09-08 PROCEDURE — 93010 ELECTROCARDIOGRAM REPORT: CPT

## 2020-09-08 RX ORDER — ONDANSETRON 8 MG/1
4 TABLET, FILM COATED ORAL ONCE
Refills: 0 | Status: COMPLETED | OUTPATIENT
Start: 2020-09-08 | End: 2020-09-08

## 2020-09-08 RX ORDER — MORPHINE SULFATE 50 MG/1
6 CAPSULE, EXTENDED RELEASE ORAL ONCE
Refills: 0 | Status: DISCONTINUED | OUTPATIENT
Start: 2020-09-08 | End: 2020-09-08

## 2020-09-08 RX ORDER — SODIUM CHLORIDE 9 MG/ML
1000 INJECTION, SOLUTION INTRAVENOUS ONCE
Refills: 0 | Status: COMPLETED | OUTPATIENT
Start: 2020-09-08 | End: 2020-09-08

## 2020-09-08 RX ADMIN — MORPHINE SULFATE 6 MILLIGRAM(S): 50 CAPSULE, EXTENDED RELEASE ORAL at 22:40

## 2020-09-08 RX ADMIN — ONDANSETRON 4 MILLIGRAM(S): 8 TABLET, FILM COATED ORAL at 21:47

## 2020-09-08 RX ADMIN — SODIUM CHLORIDE 1000 MILLILITER(S): 9 INJECTION, SOLUTION INTRAVENOUS at 23:56

## 2020-09-08 RX ADMIN — SODIUM CHLORIDE 1000 MILLILITER(S): 9 INJECTION, SOLUTION INTRAVENOUS at 21:47

## 2020-09-09 DIAGNOSIS — Z87.2 PERSONAL HISTORY OF DISEASES OF THE SKIN AND SUBCUTANEOUS TISSUE: Chronic | ICD-10-CM

## 2020-09-09 LAB
ALBUMIN SERPL ELPH-MCNC: 4.1 G/DL — SIGNIFICANT CHANGE UP (ref 3.5–5.2)
ALBUMIN SERPL ELPH-MCNC: 4.6 G/DL — SIGNIFICANT CHANGE UP (ref 3.5–5.2)
ALBUMIN SERPL ELPH-MCNC: 6.9 G/DL — HIGH (ref 3.5–5.2)
ALP SERPL-CCNC: 104 U/L — SIGNIFICANT CHANGE UP (ref 30–115)
ALP SERPL-CCNC: 106 U/L — SIGNIFICANT CHANGE UP (ref 30–115)
ALP SERPL-CCNC: 163 U/L — HIGH (ref 30–115)
ALT FLD-CCNC: 35 U/L — SIGNIFICANT CHANGE UP (ref 0–41)
ALT FLD-CCNC: 41 U/L — SIGNIFICANT CHANGE UP (ref 0–41)
ALT FLD-CCNC: 49 U/L — HIGH (ref 0–41)
ANION GAP SERPL CALC-SCNC: 14 MMOL/L — SIGNIFICANT CHANGE UP (ref 7–14)
ANION GAP SERPL CALC-SCNC: 16 MMOL/L — HIGH (ref 7–14)
ANION GAP SERPL CALC-SCNC: 27 MMOL/L — HIGH (ref 7–14)
APPEARANCE UR: ABNORMAL
AST SERPL-CCNC: 269 U/L — HIGH (ref 0–41)
AST SERPL-CCNC: 306 U/L — HIGH (ref 0–41)
AST SERPL-CCNC: 318 U/L — HIGH (ref 0–41)
BACTERIA # UR AUTO: ABNORMAL
BASOPHILS # BLD AUTO: 0.03 K/UL — SIGNIFICANT CHANGE UP (ref 0–0.2)
BASOPHILS # BLD AUTO: 0.05 K/UL — SIGNIFICANT CHANGE UP (ref 0–0.2)
BASOPHILS NFR BLD AUTO: 0.8 % — SIGNIFICANT CHANGE UP (ref 0–1)
BASOPHILS NFR BLD AUTO: 1 % — SIGNIFICANT CHANGE UP (ref 0–1)
BILIRUB DIRECT SERPL-MCNC: 0.6 MG/DL — HIGH (ref 0–0.2)
BILIRUB DIRECT SERPL-MCNC: 0.7 MG/DL — HIGH (ref 0–0.2)
BILIRUB INDIRECT FLD-MCNC: 0.9 MG/DL — SIGNIFICANT CHANGE UP (ref 0.2–1.2)
BILIRUB INDIRECT FLD-MCNC: 1.1 MG/DL — SIGNIFICANT CHANGE UP (ref 0.2–1.2)
BILIRUB SERPL-MCNC: 1.6 MG/DL — HIGH (ref 0.2–1.2)
BILIRUB SERPL-MCNC: 1.7 MG/DL — HIGH (ref 0.2–1.2)
BILIRUB SERPL-MCNC: 1.9 MG/DL — HIGH (ref 0.2–1.2)
BILIRUB UR-MCNC: NEGATIVE — SIGNIFICANT CHANGE UP
BUN SERPL-MCNC: 11 MG/DL — SIGNIFICANT CHANGE UP (ref 10–20)
BUN SERPL-MCNC: 5 MG/DL — LOW (ref 10–20)
BUN SERPL-MCNC: 8 MG/DL — LOW (ref 10–20)
CALCIUM SERPL-MCNC: 10.1 MG/DL — SIGNIFICANT CHANGE UP (ref 8.5–10.1)
CALCIUM SERPL-MCNC: 9.1 MG/DL — SIGNIFICANT CHANGE UP (ref 8.5–10.1)
CALCIUM SERPL-MCNC: 9.3 MG/DL — SIGNIFICANT CHANGE UP (ref 8.5–10.1)
CHLORIDE SERPL-SCNC: 92 MMOL/L — LOW (ref 98–110)
CHLORIDE SERPL-SCNC: 96 MMOL/L — LOW (ref 98–110)
CHLORIDE SERPL-SCNC: 98 MMOL/L — SIGNIFICANT CHANGE UP (ref 98–110)
CO2 SERPL-SCNC: 15 MMOL/L — LOW (ref 17–32)
CO2 SERPL-SCNC: 22 MMOL/L — SIGNIFICANT CHANGE UP (ref 17–32)
CO2 SERPL-SCNC: 23 MMOL/L — SIGNIFICANT CHANGE UP (ref 17–32)
COLOR SPEC: YELLOW — SIGNIFICANT CHANGE UP
CREAT ?TM UR-MCNC: 44 MG/DL — SIGNIFICANT CHANGE UP
CREAT SERPL-MCNC: 0.7 MG/DL — SIGNIFICANT CHANGE UP (ref 0.7–1.5)
CREAT SERPL-MCNC: 0.7 MG/DL — SIGNIFICANT CHANGE UP (ref 0.7–1.5)
CREAT SERPL-MCNC: 1.1 MG/DL — SIGNIFICANT CHANGE UP (ref 0.7–1.5)
DIFF PNL FLD: NEGATIVE — SIGNIFICANT CHANGE UP
EOSINOPHIL # BLD AUTO: 0 K/UL — SIGNIFICANT CHANGE UP (ref 0–0.7)
EOSINOPHIL # BLD AUTO: 0.03 K/UL — SIGNIFICANT CHANGE UP (ref 0–0.7)
EOSINOPHIL NFR BLD AUTO: 0 % — SIGNIFICANT CHANGE UP (ref 0–8)
EOSINOPHIL NFR BLD AUTO: 0.8 % — SIGNIFICANT CHANGE UP (ref 0–8)
EPI CELLS # UR: >27 /HPF — HIGH (ref 0–5)
ETHANOL SERPL-MCNC: <10 MG/DL — SIGNIFICANT CHANGE UP
GLUCOSE SERPL-MCNC: 126 MG/DL — HIGH (ref 70–99)
GLUCOSE SERPL-MCNC: 85 MG/DL — SIGNIFICANT CHANGE UP (ref 70–99)
GLUCOSE SERPL-MCNC: 91 MG/DL — SIGNIFICANT CHANGE UP (ref 70–99)
GLUCOSE UR QL: NEGATIVE — SIGNIFICANT CHANGE UP
HCG SERPL QL: NEGATIVE — SIGNIFICANT CHANGE UP
HCT VFR BLD CALC: 32.8 % — LOW (ref 37–47)
HCT VFR BLD CALC: 41.6 % — SIGNIFICANT CHANGE UP (ref 37–47)
HGB BLD-MCNC: 11.1 G/DL — LOW (ref 12–16)
HGB BLD-MCNC: 14 G/DL — SIGNIFICANT CHANGE UP (ref 12–16)
HYALINE CASTS # UR AUTO: 4 /LPF — SIGNIFICANT CHANGE UP (ref 0–7)
IMM GRANULOCYTES NFR BLD AUTO: 0.2 % — SIGNIFICANT CHANGE UP (ref 0.1–0.3)
IMM GRANULOCYTES NFR BLD AUTO: 0.5 % — HIGH (ref 0.1–0.3)
KETONES UR-MCNC: ABNORMAL
LACTATE SERPL-SCNC: 1.9 MMOL/L — SIGNIFICANT CHANGE UP (ref 0.7–2)
LEUKOCYTE ESTERASE UR-ACNC: NEGATIVE — SIGNIFICANT CHANGE UP
LIDOCAIN IGE QN: 140 U/L — HIGH (ref 7–60)
LYMPHOCYTES # BLD AUTO: 0.48 K/UL — LOW (ref 1.2–3.4)
LYMPHOCYTES # BLD AUTO: 0.48 K/UL — LOW (ref 1.2–3.4)
LYMPHOCYTES # BLD AUTO: 13.1 % — LOW (ref 20.5–51.1)
LYMPHOCYTES # BLD AUTO: 9.3 % — LOW (ref 20.5–51.1)
MAGNESIUM SERPL-MCNC: 1.3 MG/DL — LOW (ref 1.8–2.4)
MAGNESIUM SERPL-MCNC: 3 MG/DL — HIGH (ref 1.8–2.4)
MCHC RBC-ENTMCNC: 33.7 G/DL — SIGNIFICANT CHANGE UP (ref 32–37)
MCHC RBC-ENTMCNC: 33.8 G/DL — SIGNIFICANT CHANGE UP (ref 32–37)
MCHC RBC-ENTMCNC: 34.7 PG — HIGH (ref 27–31)
MCHC RBC-ENTMCNC: 35.2 PG — HIGH (ref 27–31)
MCV RBC AUTO: 103 FL — HIGH (ref 81–99)
MCV RBC AUTO: 104.1 FL — HIGH (ref 81–99)
MONOCYTES # BLD AUTO: 0.25 K/UL — SIGNIFICANT CHANGE UP (ref 0.1–0.6)
MONOCYTES # BLD AUTO: 0.3 K/UL — SIGNIFICANT CHANGE UP (ref 0.1–0.6)
MONOCYTES NFR BLD AUTO: 5.8 % — SIGNIFICANT CHANGE UP (ref 1.7–9.3)
MONOCYTES NFR BLD AUTO: 6.8 % — SIGNIFICANT CHANGE UP (ref 1.7–9.3)
NEUTROPHILS # BLD AUTO: 2.86 K/UL — SIGNIFICANT CHANGE UP (ref 1.4–6.5)
NEUTROPHILS # BLD AUTO: 4.33 K/UL — SIGNIFICANT CHANGE UP (ref 1.4–6.5)
NEUTROPHILS NFR BLD AUTO: 78 % — HIGH (ref 42.2–75.2)
NEUTROPHILS NFR BLD AUTO: 83.7 % — HIGH (ref 42.2–75.2)
NITRITE UR-MCNC: NEGATIVE — SIGNIFICANT CHANGE UP
NRBC # BLD: 0 /100 WBCS — SIGNIFICANT CHANGE UP (ref 0–0)
NRBC # BLD: 0 /100 WBCS — SIGNIFICANT CHANGE UP (ref 0–0)
PH UR: 6.5 — SIGNIFICANT CHANGE UP (ref 5–8)
PHOSPHATE SERPL-MCNC: 1.2 MG/DL — LOW (ref 2.1–4.9)
PLATELET # BLD AUTO: 121 K/UL — LOW (ref 130–400)
PLATELET # BLD AUTO: 186 K/UL — SIGNIFICANT CHANGE UP (ref 130–400)
POTASSIUM SERPL-MCNC: 3.3 MMOL/L — LOW (ref 3.5–5)
POTASSIUM SERPL-MCNC: 3.4 MMOL/L — LOW (ref 3.5–5)
POTASSIUM SERPL-MCNC: 3.6 MMOL/L — SIGNIFICANT CHANGE UP (ref 3.5–5)
POTASSIUM SERPL-SCNC: 3.3 MMOL/L — LOW (ref 3.5–5)
POTASSIUM SERPL-SCNC: 3.4 MMOL/L — LOW (ref 3.5–5)
POTASSIUM SERPL-SCNC: 3.6 MMOL/L — SIGNIFICANT CHANGE UP (ref 3.5–5)
PROT SERPL-MCNC: 7 G/DL — SIGNIFICANT CHANGE UP (ref 6–8)
PROT SERPL-MCNC: 7.3 G/DL — SIGNIFICANT CHANGE UP (ref 6–8)
PROT SERPL-MCNC: 9.9 G/DL — HIGH (ref 6–8)
PROT UR-MCNC: SIGNIFICANT CHANGE UP
RBC # BLD: 3.15 M/UL — LOW (ref 4.2–5.4)
RBC # BLD: 4.04 M/UL — LOW (ref 4.2–5.4)
RBC # FLD: 12.1 % — SIGNIFICANT CHANGE UP (ref 11.5–14.5)
RBC # FLD: 12.1 % — SIGNIFICANT CHANGE UP (ref 11.5–14.5)
RBC CASTS # UR COMP ASSIST: 3 /HPF — SIGNIFICANT CHANGE UP (ref 0–4)
SARS-COV-2 RNA SPEC QL NAA+PROBE: SIGNIFICANT CHANGE UP
SODIUM SERPL-SCNC: 134 MMOL/L — LOW (ref 135–146)
SODIUM SERPL-SCNC: 134 MMOL/L — LOW (ref 135–146)
SODIUM SERPL-SCNC: 135 MMOL/L — SIGNIFICANT CHANGE UP (ref 135–146)
SODIUM UR-SCNC: 128 MMOL/L — SIGNIFICANT CHANGE UP
SP GR SPEC: 1.02 — SIGNIFICANT CHANGE UP (ref 1.01–1.02)
TRIGL SERPL-MCNC: 172 MG/DL — HIGH (ref 10–149)
UROBILINOGEN FLD QL: SIGNIFICANT CHANGE UP
WBC # BLD: 3.67 K/UL — LOW (ref 4.8–10.8)
WBC # BLD: 5.17 K/UL — SIGNIFICANT CHANGE UP (ref 4.8–10.8)
WBC # FLD AUTO: 3.67 K/UL — LOW (ref 4.8–10.8)
WBC # FLD AUTO: 5.17 K/UL — SIGNIFICANT CHANGE UP (ref 4.8–10.8)
WBC UR QL: 7 /HPF — HIGH (ref 0–5)

## 2020-09-09 PROCEDURE — 99223 1ST HOSP IP/OBS HIGH 75: CPT | Mod: 25

## 2020-09-09 PROCEDURE — 99222 1ST HOSP IP/OBS MODERATE 55: CPT

## 2020-09-09 PROCEDURE — 74177 CT ABD & PELVIS W/CONTRAST: CPT | Mod: 26

## 2020-09-09 PROCEDURE — 76705 ECHO EXAM OF ABDOMEN: CPT | Mod: 26

## 2020-09-09 PROCEDURE — 99408 AUDIT/DAST 15-30 MIN: CPT

## 2020-09-09 RX ORDER — FOLIC ACID 0.8 MG
1 TABLET ORAL DAILY
Refills: 0 | Status: DISCONTINUED | OUTPATIENT
Start: 2020-09-09 | End: 2020-09-09

## 2020-09-09 RX ORDER — MORPHINE SULFATE 50 MG/1
2 CAPSULE, EXTENDED RELEASE ORAL EVERY 4 HOURS
Refills: 0 | Status: DISCONTINUED | OUTPATIENT
Start: 2020-09-09 | End: 2020-09-09

## 2020-09-09 RX ORDER — MORPHINE SULFATE 50 MG/1
4 CAPSULE, EXTENDED RELEASE ORAL ONCE
Refills: 0 | Status: DISCONTINUED | OUTPATIENT
Start: 2020-09-09 | End: 2020-09-09

## 2020-09-09 RX ORDER — HYDROXYZINE HCL 10 MG
50 TABLET ORAL EVERY 6 HOURS
Refills: 0 | Status: DISCONTINUED | OUTPATIENT
Start: 2020-09-09 | End: 2020-09-16

## 2020-09-09 RX ORDER — ONDANSETRON 8 MG/1
4 TABLET, FILM COATED ORAL EVERY 12 HOURS
Refills: 0 | Status: DISCONTINUED | OUTPATIENT
Start: 2020-09-09 | End: 2020-09-10

## 2020-09-09 RX ORDER — THIAMINE MONONITRATE (VIT B1) 100 MG
100 TABLET ORAL DAILY
Refills: 0 | Status: DISCONTINUED | OUTPATIENT
Start: 2020-09-09 | End: 2020-09-16

## 2020-09-09 RX ORDER — ONDANSETRON 8 MG/1
4 TABLET, FILM COATED ORAL ONCE
Refills: 0 | Status: COMPLETED | OUTPATIENT
Start: 2020-09-09 | End: 2020-09-09

## 2020-09-09 RX ORDER — MORPHINE SULFATE 50 MG/1
4 CAPSULE, EXTENDED RELEASE ORAL EVERY 4 HOURS
Refills: 0 | Status: DISCONTINUED | OUTPATIENT
Start: 2020-09-09 | End: 2020-09-09

## 2020-09-09 RX ORDER — LANOLIN ALCOHOL/MO/W.PET/CERES
1 CREAM (GRAM) TOPICAL AT BEDTIME
Refills: 0 | Status: DISCONTINUED | OUTPATIENT
Start: 2020-09-09 | End: 2020-09-16

## 2020-09-09 RX ORDER — INFLUENZA VIRUS VACCINE 15; 15; 15; 15 UG/.5ML; UG/.5ML; UG/.5ML; UG/.5ML
0.5 SUSPENSION INTRAMUSCULAR ONCE
Refills: 0 | Status: COMPLETED | OUTPATIENT
Start: 2020-09-09 | End: 2020-09-10

## 2020-09-09 RX ORDER — MORPHINE SULFATE 50 MG/1
4 CAPSULE, EXTENDED RELEASE ORAL EVERY 4 HOURS
Refills: 0 | Status: DISCONTINUED | OUTPATIENT
Start: 2020-09-09 | End: 2020-09-12

## 2020-09-09 RX ORDER — MAGNESIUM SULFATE 500 MG/ML
2 VIAL (ML) INJECTION EVERY 6 HOURS
Refills: 0 | Status: COMPLETED | OUTPATIENT
Start: 2020-09-09 | End: 2020-09-09

## 2020-09-09 RX ORDER — PANTOPRAZOLE SODIUM 20 MG/1
40 TABLET, DELAYED RELEASE ORAL ONCE
Refills: 0 | Status: COMPLETED | OUTPATIENT
Start: 2020-09-09 | End: 2020-09-09

## 2020-09-09 RX ORDER — HEPARIN SODIUM 5000 [USP'U]/ML
5000 INJECTION INTRAVENOUS; SUBCUTANEOUS EVERY 8 HOURS
Refills: 0 | Status: DISCONTINUED | OUTPATIENT
Start: 2020-09-09 | End: 2020-09-16

## 2020-09-09 RX ORDER — THIAMINE MONONITRATE (VIT B1) 100 MG
100 TABLET ORAL DAILY
Refills: 0 | Status: DISCONTINUED | OUTPATIENT
Start: 2020-09-09 | End: 2020-09-09

## 2020-09-09 RX ORDER — PANTOPRAZOLE SODIUM 20 MG/1
40 TABLET, DELAYED RELEASE ORAL
Refills: 0 | Status: DISCONTINUED | OUTPATIENT
Start: 2020-09-09 | End: 2020-09-09

## 2020-09-09 RX ORDER — PANTOPRAZOLE SODIUM 20 MG/1
40 TABLET, DELAYED RELEASE ORAL DAILY
Refills: 0 | Status: DISCONTINUED | OUTPATIENT
Start: 2020-09-09 | End: 2020-09-09

## 2020-09-09 RX ORDER — MORPHINE SULFATE 50 MG/1
2 CAPSULE, EXTENDED RELEASE ORAL EVERY 6 HOURS
Refills: 0 | Status: DISCONTINUED | OUTPATIENT
Start: 2020-09-09 | End: 2020-09-09

## 2020-09-09 RX ORDER — SODIUM CHLORIDE 9 MG/ML
1000 INJECTION, SOLUTION INTRAVENOUS
Refills: 0 | Status: DISCONTINUED | OUTPATIENT
Start: 2020-09-09 | End: 2020-09-12

## 2020-09-09 RX ORDER — ONDANSETRON 8 MG/1
4 TABLET, FILM COATED ORAL EVERY 12 HOURS
Refills: 0 | Status: DISCONTINUED | OUTPATIENT
Start: 2020-09-09 | End: 2020-09-09

## 2020-09-09 RX ORDER — POTASSIUM CHLORIDE 20 MEQ
20 PACKET (EA) ORAL
Refills: 0 | Status: COMPLETED | OUTPATIENT
Start: 2020-09-09 | End: 2020-09-09

## 2020-09-09 RX ORDER — SODIUM CHLORIDE 9 MG/ML
1000 INJECTION, SOLUTION INTRAVENOUS ONCE
Refills: 0 | Status: COMPLETED | OUTPATIENT
Start: 2020-09-09 | End: 2020-09-09

## 2020-09-09 RX ORDER — CHLORHEXIDINE GLUCONATE 213 G/1000ML
1 SOLUTION TOPICAL
Refills: 0 | Status: DISCONTINUED | OUTPATIENT
Start: 2020-09-09 | End: 2020-09-16

## 2020-09-09 RX ORDER — PANTOPRAZOLE SODIUM 20 MG/1
40 TABLET, DELAYED RELEASE ORAL DAILY
Refills: 0 | Status: DISCONTINUED | OUTPATIENT
Start: 2020-09-09 | End: 2020-09-10

## 2020-09-09 RX ORDER — HYDROMORPHONE HYDROCHLORIDE 2 MG/ML
1 INJECTION INTRAMUSCULAR; INTRAVENOUS; SUBCUTANEOUS ONCE
Refills: 0 | Status: DISCONTINUED | OUTPATIENT
Start: 2020-09-09 | End: 2020-09-09

## 2020-09-09 RX ADMIN — PANTOPRAZOLE SODIUM 40 MILLIGRAM(S): 20 TABLET, DELAYED RELEASE ORAL at 12:22

## 2020-09-09 RX ADMIN — SODIUM CHLORIDE 200 MILLILITER(S): 9 INJECTION, SOLUTION INTRAVENOUS at 13:44

## 2020-09-09 RX ADMIN — MORPHINE SULFATE 6 MILLIGRAM(S): 50 CAPSULE, EXTENDED RELEASE ORAL at 04:06

## 2020-09-09 RX ADMIN — ONDANSETRON 4 MILLIGRAM(S): 8 TABLET, FILM COATED ORAL at 23:45

## 2020-09-09 RX ADMIN — MORPHINE SULFATE 4 MILLIGRAM(S): 50 CAPSULE, EXTENDED RELEASE ORAL at 04:06

## 2020-09-09 RX ADMIN — Medication 25 GRAM(S): at 13:45

## 2020-09-09 RX ADMIN — Medication 50 MILLIEQUIVALENT(S): at 20:33

## 2020-09-09 RX ADMIN — MORPHINE SULFATE 2 MILLIGRAM(S): 50 CAPSULE, EXTENDED RELEASE ORAL at 15:45

## 2020-09-09 RX ADMIN — SODIUM CHLORIDE 200 MILLILITER(S): 9 INJECTION, SOLUTION INTRAVENOUS at 07:38

## 2020-09-09 RX ADMIN — HEPARIN SODIUM 5000 UNIT(S): 5000 INJECTION INTRAVENOUS; SUBCUTANEOUS at 13:45

## 2020-09-09 RX ADMIN — HYDROMORPHONE HYDROCHLORIDE 1 MILLIGRAM(S): 2 INJECTION INTRAMUSCULAR; INTRAVENOUS; SUBCUTANEOUS at 20:45

## 2020-09-09 RX ADMIN — Medication 50 MILLIEQUIVALENT(S): at 13:45

## 2020-09-09 RX ADMIN — MORPHINE SULFATE 2 MILLIGRAM(S): 50 CAPSULE, EXTENDED RELEASE ORAL at 09:45

## 2020-09-09 RX ADMIN — MORPHINE SULFATE 2 MILLIGRAM(S): 50 CAPSULE, EXTENDED RELEASE ORAL at 15:29

## 2020-09-09 RX ADMIN — MORPHINE SULFATE 4 MILLIGRAM(S): 50 CAPSULE, EXTENDED RELEASE ORAL at 04:55

## 2020-09-09 RX ADMIN — ONDANSETRON 4 MILLIGRAM(S): 8 TABLET, FILM COATED ORAL at 02:06

## 2020-09-09 RX ADMIN — MORPHINE SULFATE 4 MILLIGRAM(S): 50 CAPSULE, EXTENDED RELEASE ORAL at 23:47

## 2020-09-09 RX ADMIN — Medication 25 GRAM(S): at 17:10

## 2020-09-09 RX ADMIN — PANTOPRAZOLE SODIUM 40 MILLIGRAM(S): 20 TABLET, DELAYED RELEASE ORAL at 05:07

## 2020-09-09 RX ADMIN — MORPHINE SULFATE 4 MILLIGRAM(S): 50 CAPSULE, EXTENDED RELEASE ORAL at 14:00

## 2020-09-09 RX ADMIN — MORPHINE SULFATE 4 MILLIGRAM(S): 50 CAPSULE, EXTENDED RELEASE ORAL at 02:06

## 2020-09-09 RX ADMIN — HEPARIN SODIUM 5000 UNIT(S): 5000 INJECTION INTRAVENOUS; SUBCUTANEOUS at 21:17

## 2020-09-09 RX ADMIN — ONDANSETRON 4 MILLIGRAM(S): 8 TABLET, FILM COATED ORAL at 11:31

## 2020-09-09 RX ADMIN — MORPHINE SULFATE 2 MILLIGRAM(S): 50 CAPSULE, EXTENDED RELEASE ORAL at 09:31

## 2020-09-09 RX ADMIN — MORPHINE SULFATE 4 MILLIGRAM(S): 50 CAPSULE, EXTENDED RELEASE ORAL at 14:14

## 2020-09-09 RX ADMIN — SODIUM CHLORIDE 1000 MILLILITER(S): 9 INJECTION, SOLUTION INTRAVENOUS at 04:07

## 2020-09-09 RX ADMIN — MORPHINE SULFATE 4 MILLIGRAM(S): 50 CAPSULE, EXTENDED RELEASE ORAL at 04:07

## 2020-09-09 NOTE — CONSULT NOTE ADULT - ASSESSMENT
Ms Mason is a 45 year old woman, with a reported history of PTSD who was admitted to the medical floor for the management of pancreatitis. Addiction medicine consult was called for continued alcohol use.   Patient appears to be in pain from the pancreatis . She does not have symptoms of alcohol withdrawals at this time, and she doses not believe that she is dependent on alcohol. The cause of her pancreatis is unclear if she does not have a significant alcohol use disorder problem. it is also possible that she  is minimizing her alcohol use .   Patient does not appear acutely psychotic , cheikh or depressed and denies suicidal ideations, intent or plan. Patient appears to have some symptoms that are suggestive of PTSD however she does not seem to meet full criteria .   At this time, patient is not considered an imminent danger to herself or others and does not need inpatient psychiatric hospitalization. Patient does not have acute symptoms of alcohol withdrawals and denies being a chronic daily user of alcohol and does not need substance abuse treatment. Since patient does not appear amenable to alcohol use disorder treatment there is no indication for the CATCH team 's services. Patient will however benefit from outpatient psychiatry follow up upon discharge for psychotherapy and possibly medication management of PTSD.               Plan:   Recommend Atarax 50mg p.o Q 6hrs PRN for anxiety and insomnia   Upon discharge, please refer patient to the [facility] psychiatry OPD, [address] [place] [address], [place] [address], phone number- [phone]

## 2020-09-09 NOTE — H&P ADULT - NSHPPHYSICALEXAM_GEN_ALL_CORE
PHYSICAL EXAM:  GENERAL: NAD, lying in bed comfortably  HEAD:  Atraumatic, Normocephalic  EYES: EOMI, conjunctiva and sclera clear  ENT: Moist mucous membranes  NECK: Supple, No JVD  CHEST/LUNG: Clear to auscultation bilaterally; No rales, rhonchi, wheezing, or rubs. Unlabored respirations  HEART: Regular rate and rhythm; No murmurs, rubs, or gallops  ABDOMEN: Bowel sounds present; Soft, tenderness in epigastrium and RUQ, Nondistended. No hepatomegaly  EXTREMITIES:  2+ Peripheral Pulses, brisk capillary refill. No clubbing, cyanosis, or edema  NERVOUS SYSTEM:  Alert & Oriented X3, speech clear. No deficits

## 2020-09-09 NOTE — SWALLOW BEDSIDE ASSESSMENT ADULT - ORAL PHASE
Follow up ct chest in 3 months for resolution of a pneumonia     Mr. Lopez,     It was a privilege taking care of you and being a part of the healthcare team during your hospitalization. I wish you all the best for your future health. Thank you for choosing [facility]  [facility]  Board Certified, Internal Medicine       Within functional limits

## 2020-09-09 NOTE — ED PROVIDER NOTE - ATTENDING CONTRIBUTION TO CARE
I personally evaluated the patient. I reviewed the Resident’s or Physician Assistant’s note (as assigned above), and agree with the findings and plan except as documented in my note.    44 yo female, pmh of recurrent pancreatitis 2/2 etoh use, presents to ed for abd pain.     CONSTITUTIONAL: NAD  SKIN: skin exam is warm and dry, no acute rash.  HEAD: Normocephalic; atraumatic.  EYES: PERRL, 3 mm bilateral, no nystagmus, EOM intact; conjunctiva and sclera clear.  ENT: No nasal discharge; airway clear.  NECK: Supple; non tender.+ full passive ROM in all directions. No JVD  CARD: S1, S2 normal; no murmurs, gallops, or rubs. Regular rate and rhythm. + Symmetric Strong Pulses  RESP: No wheezes, rales or rhonchi. Good air movement bilaterally  ABD: soft; ttp in epigastric area    Plan- labs, pain control, ivf, CT abd/pelvis, reassess
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
## 2020-09-09 NOTE — H&P ADULT - NSHPLABSRESULTS_GEN_ALL_CORE
Patient is a 72y old  Male who presents with a chief complaint of bilateral Venous ulcers in ankle (26 Oct 2018 10:07)    Patient was seen and examined, no complaints, pending MRI b/l feet.    INTERVAL HPI/OVERNIGHT EVENTS:  T(C): 36.7 (10-26-18 @ 05:18), Max: 36.7 (10-26-18 @ 05:18)  HR: 77 (10-26-18 @ 05:18) (53 - 77)  BP: 141/76 (10-26-18 @ 05:18) (134/66 - 149/75)  RR: 17 (10-26-18 @ 05:18) (16 - 18)  SpO2: 100% (10-26-18 @ 05:18) (100% - 100%)    MEDICATIONS  (STANDING):  collagenase Ointment 1 Application(s) Topical daily  heparin  Injectable 5000 Unit(s) SubCutaneous every 8 hours  influenza   Vaccine 0.5 milliLiter(s) IntraMuscular once  meropenem  IVPB 1000 milliGRAM(s) IV Intermittent every 8 hours  vancomycin  IVPB 1000 milliGRAM(s) IV Intermittent every 12 hours    PHYSICAL EXAM:  GENERAL: NAD, well-groomed, well-developed  HEAD:  Atraumatic, Normocephalic  EYES: EOMI, PERRLA, conjunctiva and sclera clear  ENMT: No tonsillar erythema, exudates, or enlargement; Moist mucous membranes, Good dentition, No lesions  NECK: Supple, No JVD, Normal thyroid  NERVOUS SYSTEM:  Alert & Oriented X3, Good concentration; Motor Strength 5/5 B/L upper and lower extremities; DTRs 2+ intact and symmetric  CHEST/LUNG: Clear to percussion bilaterally; No rales, rhonchi, wheezing, or rubs  HEART: Regular rate and rhythm; No murmurs, rubs, or gallops  ABDOMEN: Soft, Nontender, Nondistended; Bowel sounds present  EXTREMITIES:  2+ Peripheral Pulses, No clubbing, cyanosis, or edema  LYMPH: No lymphadenopathy noted  SKIN: b/l LE cellulitis and ulcerations      LABS:                        9.7    4.1   )-----------( 274      ( 26 Oct 2018 05:46 )             30.3     10-26    141  |  108  |  11  ----------------------------<  84  4.3   |  28  |  0.69    Ca    8.6      26 Oct 2018 05:46  Phos  2.9     10-26  Mg     2.0     10-26    TPro  9.0<H>  /  Alb  3.0<L>  /  TBili  0.3  /  DBili  x   /  AST  16  /  ALT  12  /  AlkPhos  87  10-25 14.0   5.17  )-----------( 186      ( 08 Sep 2020 23:40 )             41.6       09-08    134<L>  |  92<L>  |  11  ----------------------------<  126<H>  3.6   |  15<L>  |  1.1    Ca    10.1      08 Sep 2020 23:40    TPro  9.9<H>  /  Alb  6.9<H>  /  TBili  1.7<H>  /  DBili  0.6<H>  /  AST  306<H>  /  ALT  49<H>  /  AlkPhos  163<H>  09-08    < from: 12 Lead ECG (09.08.20 @ 20:48) >  Ventricular Rate 134 BPM  Atrial Rate 134 BPM  P-R Interval 150 ms  QRS Duration 72 ms  Q-T Interval 272 ms  QTC Calculation(Bezet) 406 ms  P Axis 76 degrees  R Axis 79 degrees  T Axis 71 degrees  Diagnosis Line Sinus tachycardia  Nonspecific ST and T wave abnormality  Abnormal ECG    < from: US Abdomen Limited (09.09.20 @ 02:31) >  IMPRESSION:  Unremarkable right upper quadrant ultrasound.  Additional Findings/Recommendations After Attending Radiologist Review:  Diffusely echogenic liver consistent with hepatic steatosis. The known distal pancreatic pseudocyst is not delineated on this examination. CBD 7 mm which is consider dilated, previously 6 mm.    From CT Abdomen and Pelvis:  IMPRESSION:  Peripancreatic inflammatory fat stranding compatible with acute/recurrent pancreatitis.  Interval increase in size of pancreatic tail pseudocyst now measuring 6.0 x 6 4 cm (previously 5.1 x 5.6 cm).

## 2020-09-09 NOTE — ED PROVIDER NOTE - OBJECTIVE STATEMENT
44 yo female, pmh of recurrent pancreatitis 2/2 etoh use, presents to ed for abd pain, two days ago, epigastric, mild, aching, no radiation. denies fever, chills, cp, sob, le swelling, nvd, dysruia.

## 2020-09-09 NOTE — SWALLOW BEDSIDE ASSESSMENT ADULT - SWALLOW EVAL: DIAGNOSIS
Pt consumed <1 oz regular consistency with no s/s aspiration/penetration however reports globus sensation. Pt refused thin liquids, reports globus sensation at home however no s/s pharyngeal dysphagia
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
## 2020-09-09 NOTE — PHARMACOTHERAPY INTERVENTION NOTE - COMMENTS
Prescriber was contacted with recommendation to change Protonix 40mg iv qd prn to PO due to no indication for iv and should not be PRN.. Zofran was recommended to use as PRN.
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
## 2020-09-10 LAB
A1C WITH ESTIMATED AVERAGE GLUCOSE RESULT: 4.9 % — SIGNIFICANT CHANGE UP (ref 4–5.6)
ALBUMIN SERPL ELPH-MCNC: 4.2 G/DL — SIGNIFICANT CHANGE UP (ref 3.5–5.2)
ALP SERPL-CCNC: 95 U/L — SIGNIFICANT CHANGE UP (ref 30–115)
ALT FLD-CCNC: 41 U/L — SIGNIFICANT CHANGE UP (ref 0–41)
ANION GAP SERPL CALC-SCNC: 14 MMOL/L — SIGNIFICANT CHANGE UP (ref 7–14)
ANION GAP SERPL CALC-SCNC: 21 MMOL/L — HIGH (ref 7–14)
AST SERPL-CCNC: 318 U/L — HIGH (ref 0–41)
BASOPHILS # BLD AUTO: 0.03 K/UL — SIGNIFICANT CHANGE UP (ref 0–0.2)
BASOPHILS NFR BLD AUTO: 1.2 % — HIGH (ref 0–1)
BILIRUB SERPL-MCNC: 1.3 MG/DL — HIGH (ref 0.2–1.2)
BUN SERPL-MCNC: 4 MG/DL — LOW (ref 10–20)
BUN SERPL-MCNC: <3 MG/DL — LOW (ref 10–20)
CALCIUM SERPL-MCNC: 8.7 MG/DL — SIGNIFICANT CHANGE UP (ref 8.5–10.1)
CALCIUM SERPL-MCNC: 8.8 MG/DL — SIGNIFICANT CHANGE UP (ref 8.5–10.1)
CHLORIDE SERPL-SCNC: 97 MMOL/L — LOW (ref 98–110)
CHLORIDE SERPL-SCNC: 98 MMOL/L — SIGNIFICANT CHANGE UP (ref 98–110)
CO2 SERPL-SCNC: 20 MMOL/L — SIGNIFICANT CHANGE UP (ref 17–32)
CO2 SERPL-SCNC: 26 MMOL/L — SIGNIFICANT CHANGE UP (ref 17–32)
CREAT SERPL-MCNC: 0.5 MG/DL — LOW (ref 0.7–1.5)
CREAT SERPL-MCNC: 0.5 MG/DL — LOW (ref 0.7–1.5)
EOSINOPHIL # BLD AUTO: 0.04 K/UL — SIGNIFICANT CHANGE UP (ref 0–0.7)
EOSINOPHIL NFR BLD AUTO: 1.6 % — SIGNIFICANT CHANGE UP (ref 0–8)
ESTIMATED AVERAGE GLUCOSE: 94 MG/DL — SIGNIFICANT CHANGE UP (ref 68–114)
FOLATE SERPL-MCNC: 10.9 NG/ML — SIGNIFICANT CHANGE UP
GLUCOSE SERPL-MCNC: 82 MG/DL — SIGNIFICANT CHANGE UP (ref 70–99)
GLUCOSE SERPL-MCNC: 85 MG/DL — SIGNIFICANT CHANGE UP (ref 70–99)
HAV IGM SER-ACNC: SIGNIFICANT CHANGE UP
HBV CORE IGM SER-ACNC: SIGNIFICANT CHANGE UP
HBV SURFACE AG SER-ACNC: SIGNIFICANT CHANGE UP
HCT VFR BLD CALC: 30.3 % — LOW (ref 37–47)
HCV AB S/CO SERPL IA: 0.15 S/CO — SIGNIFICANT CHANGE UP (ref 0–0.99)
HCV AB SERPL-IMP: SIGNIFICANT CHANGE UP
HGB BLD-MCNC: 10.4 G/DL — LOW (ref 12–16)
IMM GRANULOCYTES NFR BLD AUTO: 0 % — LOW (ref 0.1–0.3)
LYMPHOCYTES # BLD AUTO: 0.47 K/UL — LOW (ref 1.2–3.4)
LYMPHOCYTES # BLD AUTO: 18.3 % — LOW (ref 20.5–51.1)
MAGNESIUM SERPL-MCNC: 1.7 MG/DL — LOW (ref 1.8–2.4)
MAGNESIUM SERPL-MCNC: 1.9 MG/DL — SIGNIFICANT CHANGE UP (ref 1.8–2.4)
MCHC RBC-ENTMCNC: 34.3 G/DL — SIGNIFICANT CHANGE UP (ref 32–37)
MCHC RBC-ENTMCNC: 34.9 PG — HIGH (ref 27–31)
MCV RBC AUTO: 101.7 FL — HIGH (ref 81–99)
MONOCYTES # BLD AUTO: 0.15 K/UL — SIGNIFICANT CHANGE UP (ref 0.1–0.6)
MONOCYTES NFR BLD AUTO: 5.8 % — SIGNIFICANT CHANGE UP (ref 1.7–9.3)
NEUTROPHILS # BLD AUTO: 1.88 K/UL — SIGNIFICANT CHANGE UP (ref 1.4–6.5)
NEUTROPHILS NFR BLD AUTO: 73.1 % — SIGNIFICANT CHANGE UP (ref 42.2–75.2)
NRBC # BLD: 0 /100 WBCS — SIGNIFICANT CHANGE UP (ref 0–0)
PHOSPHATE SERPL-MCNC: 3.4 MG/DL — SIGNIFICANT CHANGE UP (ref 2.1–4.9)
PLATELET # BLD AUTO: 109 K/UL — LOW (ref 130–400)
POTASSIUM SERPL-MCNC: 3.4 MMOL/L — LOW (ref 3.5–5)
POTASSIUM SERPL-MCNC: 4.2 MMOL/L — SIGNIFICANT CHANGE UP (ref 3.5–5)
POTASSIUM SERPL-SCNC: 3.4 MMOL/L — LOW (ref 3.5–5)
POTASSIUM SERPL-SCNC: 4.2 MMOL/L — SIGNIFICANT CHANGE UP (ref 3.5–5)
PROT SERPL-MCNC: 6.8 G/DL — SIGNIFICANT CHANGE UP (ref 6–8)
RBC # BLD: 2.98 M/UL — LOW (ref 4.2–5.4)
RBC # FLD: 11.7 % — SIGNIFICANT CHANGE UP (ref 11.5–14.5)
SARS-COV-2 IGG SERPL QL IA: NEGATIVE — SIGNIFICANT CHANGE UP
SARS-COV-2 IGM SERPL IA-ACNC: <0.1 INDEX — SIGNIFICANT CHANGE UP
SODIUM SERPL-SCNC: 138 MMOL/L — SIGNIFICANT CHANGE UP (ref 135–146)
SODIUM SERPL-SCNC: 138 MMOL/L — SIGNIFICANT CHANGE UP (ref 135–146)
VIT B12 SERPL-MCNC: 571 PG/ML — SIGNIFICANT CHANGE UP (ref 232–1245)
WBC # BLD: 2.57 K/UL — LOW (ref 4.8–10.8)
WBC # FLD AUTO: 2.57 K/UL — LOW (ref 4.8–10.8)

## 2020-09-10 PROCEDURE — 99233 SBSQ HOSP IP/OBS HIGH 50: CPT

## 2020-09-10 PROCEDURE — 99223 1ST HOSP IP/OBS HIGH 75: CPT

## 2020-09-10 RX ORDER — PANTOPRAZOLE SODIUM 20 MG/1
40 TABLET, DELAYED RELEASE ORAL
Refills: 0 | Status: DISCONTINUED | OUTPATIENT
Start: 2020-09-10 | End: 2020-09-13

## 2020-09-10 RX ORDER — HYDROMORPHONE HYDROCHLORIDE 2 MG/ML
1 INJECTION INTRAMUSCULAR; INTRAVENOUS; SUBCUTANEOUS ONCE
Refills: 0 | Status: DISCONTINUED | OUTPATIENT
Start: 2020-09-10 | End: 2020-09-10

## 2020-09-10 RX ORDER — ONDANSETRON 8 MG/1
4 TABLET, FILM COATED ORAL EVERY 6 HOURS
Refills: 0 | Status: DISCONTINUED | OUTPATIENT
Start: 2020-09-10 | End: 2020-09-16

## 2020-09-10 RX ORDER — MAGNESIUM SULFATE 500 MG/ML
2 VIAL (ML) INJECTION ONCE
Refills: 0 | Status: COMPLETED | OUTPATIENT
Start: 2020-09-10 | End: 2020-09-10

## 2020-09-10 RX ORDER — POTASSIUM PHOSPHATE, MONOBASIC POTASSIUM PHOSPHATE, DIBASIC 236; 224 MG/ML; MG/ML
30 INJECTION, SOLUTION INTRAVENOUS ONCE
Refills: 0 | Status: COMPLETED | OUTPATIENT
Start: 2020-09-10 | End: 2020-09-10

## 2020-09-10 RX ORDER — LIPASE/PROTEASE/AMYLASE 16-48-48K
1 CAPSULE,DELAYED RELEASE (ENTERIC COATED) ORAL
Refills: 0 | Status: DISCONTINUED | OUTPATIENT
Start: 2020-09-10 | End: 2020-09-10

## 2020-09-10 RX ORDER — LIPASE/PROTEASE/AMYLASE 16-48-48K
3 CAPSULE,DELAYED RELEASE (ENTERIC COATED) ORAL
Refills: 0 | Status: DISCONTINUED | OUTPATIENT
Start: 2020-09-10 | End: 2020-09-16

## 2020-09-10 RX ADMIN — POTASSIUM PHOSPHATE, MONOBASIC POTASSIUM PHOSPHATE, DIBASIC 83.33 MILLIMOLE(S): 236; 224 INJECTION, SOLUTION INTRAVENOUS at 14:38

## 2020-09-10 RX ADMIN — Medication 2 MILLIGRAM(S): at 20:42

## 2020-09-10 RX ADMIN — SODIUM CHLORIDE 200 MILLILITER(S): 9 INJECTION, SOLUTION INTRAVENOUS at 11:47

## 2020-09-10 RX ADMIN — HYDROMORPHONE HYDROCHLORIDE 1 MILLIGRAM(S): 2 INJECTION INTRAMUSCULAR; INTRAVENOUS; SUBCUTANEOUS at 04:00

## 2020-09-10 RX ADMIN — MORPHINE SULFATE 4 MILLIGRAM(S): 50 CAPSULE, EXTENDED RELEASE ORAL at 06:33

## 2020-09-10 RX ADMIN — MORPHINE SULFATE 4 MILLIGRAM(S): 50 CAPSULE, EXTENDED RELEASE ORAL at 23:58

## 2020-09-10 RX ADMIN — SODIUM CHLORIDE 200 MILLILITER(S): 9 INJECTION, SOLUTION INTRAVENOUS at 17:20

## 2020-09-10 RX ADMIN — MORPHINE SULFATE 4 MILLIGRAM(S): 50 CAPSULE, EXTENDED RELEASE ORAL at 11:48

## 2020-09-10 RX ADMIN — MORPHINE SULFATE 4 MILLIGRAM(S): 50 CAPSULE, EXTENDED RELEASE ORAL at 23:59

## 2020-09-10 RX ADMIN — PANTOPRAZOLE SODIUM 40 MILLIGRAM(S): 20 TABLET, DELAYED RELEASE ORAL at 17:21

## 2020-09-10 RX ADMIN — ONDANSETRON 4 MILLIGRAM(S): 8 TABLET, FILM COATED ORAL at 11:57

## 2020-09-10 RX ADMIN — HYDROMORPHONE HYDROCHLORIDE 1 MILLIGRAM(S): 2 INJECTION INTRAMUSCULAR; INTRAVENOUS; SUBCUTANEOUS at 02:58

## 2020-09-10 RX ADMIN — HEPARIN SODIUM 5000 UNIT(S): 5000 INJECTION INTRAVENOUS; SUBCUTANEOUS at 06:18

## 2020-09-10 RX ADMIN — MORPHINE SULFATE 4 MILLIGRAM(S): 50 CAPSULE, EXTENDED RELEASE ORAL at 10:41

## 2020-09-10 RX ADMIN — HEPARIN SODIUM 5000 UNIT(S): 5000 INJECTION INTRAVENOUS; SUBCUTANEOUS at 21:08

## 2020-09-10 RX ADMIN — INFLUENZA VIRUS VACCINE 0.5 MILLILITER(S): 15; 15; 15; 15 SUSPENSION INTRAMUSCULAR at 12:19

## 2020-09-10 RX ADMIN — MORPHINE SULFATE 4 MILLIGRAM(S): 50 CAPSULE, EXTENDED RELEASE ORAL at 15:04

## 2020-09-10 RX ADMIN — ONDANSETRON 4 MILLIGRAM(S): 8 TABLET, FILM COATED ORAL at 18:23

## 2020-09-10 RX ADMIN — MORPHINE SULFATE 4 MILLIGRAM(S): 50 CAPSULE, EXTENDED RELEASE ORAL at 17:13

## 2020-09-10 RX ADMIN — HEPARIN SODIUM 5000 UNIT(S): 5000 INJECTION INTRAVENOUS; SUBCUTANEOUS at 14:38

## 2020-09-10 RX ADMIN — Medication 25 GRAM(S): at 12:19

## 2020-09-10 RX ADMIN — Medication 2 MILLIGRAM(S): at 10:24

## 2020-09-10 RX ADMIN — MORPHINE SULFATE 4 MILLIGRAM(S): 50 CAPSULE, EXTENDED RELEASE ORAL at 19:31
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
## 2020-09-10 NOTE — CONSULT NOTE ADULT - SUBJECTIVE AND OBJECTIVE BOX
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 intrahepatic biliary ductal dilatation. The common bile duct measures 7 mm, which is normal.    PANCREAS: Visualized proximal portion has unremarkable sonographic appearance.  KIDNEY:  Right kidney measures 10.0 cm in length. No hydronephrosis, calculi or solid mass.    AORTA/IVC:  Visualized proximal portions unremarkable.  ASCITES:  None.    IMPRESSION:    Unremarkable right upper quadrant ultrasound.  Additional Findings/Recommendations After Attending Radiologist Review:  Diffusely echogenic liver consistent with hepatic steatosis. The known distal pancreatic pseudocyst is not delineated on this examination. CBD 7 mm which is consider dilated, previously 6 mm.

## 2020-09-11 LAB
ALBUMIN SERPL ELPH-MCNC: 3.8 G/DL — SIGNIFICANT CHANGE UP (ref 3.5–5.2)
ALP SERPL-CCNC: 112 U/L — SIGNIFICANT CHANGE UP (ref 30–115)
ALT FLD-CCNC: 34 U/L — SIGNIFICANT CHANGE UP (ref 0–41)
ANION GAP SERPL CALC-SCNC: 12 MMOL/L — SIGNIFICANT CHANGE UP (ref 7–14)
ANION GAP SERPL CALC-SCNC: 16 MMOL/L — HIGH (ref 7–14)
AST SERPL-CCNC: 207 U/L — HIGH (ref 0–41)
BASOPHILS # BLD AUTO: 0.01 K/UL — SIGNIFICANT CHANGE UP (ref 0–0.2)
BASOPHILS NFR BLD AUTO: 0.4 % — SIGNIFICANT CHANGE UP (ref 0–1)
BILIRUB SERPL-MCNC: 0.9 MG/DL — SIGNIFICANT CHANGE UP (ref 0.2–1.2)
BUN SERPL-MCNC: <3 MG/DL — LOW (ref 10–20)
BUN SERPL-MCNC: <3 MG/DL — LOW (ref 10–20)
CALCIUM SERPL-MCNC: 8.9 MG/DL — SIGNIFICANT CHANGE UP (ref 8.5–10.1)
CALCIUM SERPL-MCNC: 9.4 MG/DL — SIGNIFICANT CHANGE UP (ref 8.5–10.1)
CHLORIDE SERPL-SCNC: 93 MMOL/L — LOW (ref 98–110)
CHLORIDE SERPL-SCNC: 97 MMOL/L — LOW (ref 98–110)
CO2 SERPL-SCNC: 28 MMOL/L — SIGNIFICANT CHANGE UP (ref 17–32)
CO2 SERPL-SCNC: 29 MMOL/L — SIGNIFICANT CHANGE UP (ref 17–32)
CREAT SERPL-MCNC: 0.5 MG/DL — LOW (ref 0.7–1.5)
CREAT SERPL-MCNC: 0.5 MG/DL — LOW (ref 0.7–1.5)
EOSINOPHIL # BLD AUTO: 0.04 K/UL — SIGNIFICANT CHANGE UP (ref 0–0.7)
EOSINOPHIL NFR BLD AUTO: 1.7 % — SIGNIFICANT CHANGE UP (ref 0–8)
GLUCOSE SERPL-MCNC: 108 MG/DL — HIGH (ref 70–99)
GLUCOSE SERPL-MCNC: 109 MG/DL — HIGH (ref 70–99)
HCT VFR BLD CALC: 28 % — LOW (ref 37–47)
HGB BLD-MCNC: 9.8 G/DL — LOW (ref 12–16)
IMM GRANULOCYTES NFR BLD AUTO: 0 % — LOW (ref 0.1–0.3)
LYMPHOCYTES # BLD AUTO: 0.43 K/UL — LOW (ref 1.2–3.4)
LYMPHOCYTES # BLD AUTO: 18.7 % — LOW (ref 20.5–51.1)
MAGNESIUM SERPL-MCNC: 1.5 MG/DL — LOW (ref 1.8–2.4)
MAGNESIUM SERPL-MCNC: 2.8 MG/DL — HIGH (ref 1.8–2.4)
MCHC RBC-ENTMCNC: 35 G/DL — SIGNIFICANT CHANGE UP (ref 32–37)
MCHC RBC-ENTMCNC: 35.4 PG — HIGH (ref 27–31)
MCV RBC AUTO: 101.1 FL — HIGH (ref 81–99)
MONOCYTES # BLD AUTO: 0.19 K/UL — SIGNIFICANT CHANGE UP (ref 0.1–0.6)
MONOCYTES NFR BLD AUTO: 8.3 % — SIGNIFICANT CHANGE UP (ref 1.7–9.3)
NEUTROPHILS # BLD AUTO: 1.63 K/UL — SIGNIFICANT CHANGE UP (ref 1.4–6.5)
NEUTROPHILS NFR BLD AUTO: 70.9 % — SIGNIFICANT CHANGE UP (ref 42.2–75.2)
NRBC # BLD: 0 /100 WBCS — SIGNIFICANT CHANGE UP (ref 0–0)
PHOSPHATE SERPL-MCNC: 3.1 MG/DL — SIGNIFICANT CHANGE UP (ref 2.1–4.9)
PHOSPHATE SERPL-MCNC: 3.5 MG/DL — SIGNIFICANT CHANGE UP (ref 2.1–4.9)
PLATELET # BLD AUTO: 105 K/UL — LOW (ref 130–400)
POTASSIUM SERPL-MCNC: 3.1 MMOL/L — LOW (ref 3.5–5)
POTASSIUM SERPL-MCNC: 3.3 MMOL/L — LOW (ref 3.5–5)
POTASSIUM SERPL-SCNC: 3.1 MMOL/L — LOW (ref 3.5–5)
POTASSIUM SERPL-SCNC: 3.3 MMOL/L — LOW (ref 3.5–5)
PROT SERPL-MCNC: 6.4 G/DL — SIGNIFICANT CHANGE UP (ref 6–8)
RBC # BLD: 2.77 M/UL — LOW (ref 4.2–5.4)
RBC # FLD: 11.5 % — SIGNIFICANT CHANGE UP (ref 11.5–14.5)
SODIUM SERPL-SCNC: 137 MMOL/L — SIGNIFICANT CHANGE UP (ref 135–146)
SODIUM SERPL-SCNC: 138 MMOL/L — SIGNIFICANT CHANGE UP (ref 135–146)
WBC # BLD: 2.3 K/UL — LOW (ref 4.8–10.8)
WBC # FLD AUTO: 2.3 K/UL — LOW (ref 4.8–10.8)

## 2020-09-11 PROCEDURE — 99233 SBSQ HOSP IP/OBS HIGH 50: CPT

## 2020-09-11 RX ORDER — POTASSIUM CHLORIDE 20 MEQ
20 PACKET (EA) ORAL ONCE
Refills: 0 | Status: COMPLETED | OUTPATIENT
Start: 2020-09-11 | End: 2020-09-11

## 2020-09-11 RX ORDER — MAGNESIUM SULFATE 500 MG/ML
2 VIAL (ML) INJECTION
Refills: 0 | Status: COMPLETED | OUTPATIENT
Start: 2020-09-11 | End: 2020-09-11

## 2020-09-11 RX ORDER — POTASSIUM CHLORIDE 20 MEQ
40 PACKET (EA) ORAL ONCE
Refills: 0 | Status: DISCONTINUED | OUTPATIENT
Start: 2020-09-11 | End: 2020-09-11

## 2020-09-11 RX ORDER — POTASSIUM CHLORIDE 20 MEQ
40 PACKET (EA) ORAL ONCE
Refills: 0 | Status: COMPLETED | OUTPATIENT
Start: 2020-09-11 | End: 2020-09-11

## 2020-09-11 RX ADMIN — PANTOPRAZOLE SODIUM 40 MILLIGRAM(S): 20 TABLET, DELAYED RELEASE ORAL at 05:22

## 2020-09-11 RX ADMIN — ONDANSETRON 4 MILLIGRAM(S): 8 TABLET, FILM COATED ORAL at 01:18

## 2020-09-11 RX ADMIN — Medication 25 GRAM(S): at 11:25

## 2020-09-11 RX ADMIN — MORPHINE SULFATE 4 MILLIGRAM(S): 50 CAPSULE, EXTENDED RELEASE ORAL at 11:00

## 2020-09-11 RX ADMIN — MORPHINE SULFATE 4 MILLIGRAM(S): 50 CAPSULE, EXTENDED RELEASE ORAL at 16:25

## 2020-09-11 RX ADMIN — MORPHINE SULFATE 4 MILLIGRAM(S): 50 CAPSULE, EXTENDED RELEASE ORAL at 09:54

## 2020-09-11 RX ADMIN — Medication 50 MILLIEQUIVALENT(S): at 20:36

## 2020-09-11 RX ADMIN — MORPHINE SULFATE 4 MILLIGRAM(S): 50 CAPSULE, EXTENDED RELEASE ORAL at 17:00

## 2020-09-11 RX ADMIN — HEPARIN SODIUM 5000 UNIT(S): 5000 INJECTION INTRAVENOUS; SUBCUTANEOUS at 21:13

## 2020-09-11 RX ADMIN — SODIUM CHLORIDE 200 MILLILITER(S): 9 INJECTION, SOLUTION INTRAVENOUS at 05:21

## 2020-09-11 RX ADMIN — Medication 2 MILLIGRAM(S): at 11:25

## 2020-09-11 RX ADMIN — Medication 2 MILLIGRAM(S): at 21:13

## 2020-09-11 RX ADMIN — CHLORHEXIDINE GLUCONATE 1 APPLICATION(S): 213 SOLUTION TOPICAL at 06:41

## 2020-09-11 RX ADMIN — Medication 25 GRAM(S): at 14:33

## 2020-09-11 RX ADMIN — Medication 3 CAPSULE(S): at 16:25

## 2020-09-11 RX ADMIN — PANTOPRAZOLE SODIUM 40 MILLIGRAM(S): 20 TABLET, DELAYED RELEASE ORAL at 18:56

## 2020-09-11 RX ADMIN — Medication 2 MILLIGRAM(S): at 01:24

## 2020-09-11 RX ADMIN — SODIUM CHLORIDE 200 MILLILITER(S): 9 INJECTION, SOLUTION INTRAVENOUS at 11:29

## 2020-09-11 RX ADMIN — Medication 100 MILLIGRAM(S): at 11:26

## 2020-09-11 RX ADMIN — HEPARIN SODIUM 5000 UNIT(S): 5000 INJECTION INTRAVENOUS; SUBCUTANEOUS at 14:34

## 2020-09-11 RX ADMIN — MORPHINE SULFATE 4 MILLIGRAM(S): 50 CAPSULE, EXTENDED RELEASE ORAL at 05:34

## 2020-09-11 RX ADMIN — ONDANSETRON 4 MILLIGRAM(S): 8 TABLET, FILM COATED ORAL at 20:28

## 2020-09-11 RX ADMIN — Medication 1 TABLET(S): at 11:26

## 2020-09-11 RX ADMIN — HEPARIN SODIUM 5000 UNIT(S): 5000 INJECTION INTRAVENOUS; SUBCUTANEOUS at 05:22

## 2020-09-11 RX ADMIN — MORPHINE SULFATE 4 MILLIGRAM(S): 50 CAPSULE, EXTENDED RELEASE ORAL at 20:29

## 2020-09-11 RX ADMIN — MORPHINE SULFATE 4 MILLIGRAM(S): 50 CAPSULE, EXTENDED RELEASE ORAL at 05:23

## 2020-09-11 NOTE — PHARMACOTHERAPY INTERVENTION NOTE - COMMENTS
Prescriber was contacted with recommendation to change KCL 40 meq tabs to powder due ddi with Atarax.
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
## 2020-09-11 NOTE — PROGRESS NOTE ADULT - ASSESSMENT
1)Acute on chronic pancreatitis with pancreatic tail pseudocyst (6.0 x 6.4 cm, previously 5.1 x5.6 cm) -less likely abscess given no fever and leukocytosis  2)Hepatic steatosis, likely alcoholic  3)Globus sensation with heartburn/GERD -likely esophagitis (ETOH +/- peptic) vs esophageal motility disorder; cannot r/o mechanical obstruction/malignancy  -Findings likely due to chronic alcohol use; No offending medications; No evidence of cholelithiasis; TG level 172, IgG 4 subset normal in 2019  -T bili: 0.9; AST/ALT: 207/34 -trending down; ALP normal; Lipase 140  -CT A/P: Peripancreatic inflammatory fat stranding compatible with acute/recurrent pancreatitis.  Interval increase in size of pancreatic tail pseudocyst now measuring 6.0 x 6 4 cm (previously 5.1 x 5.6 cm).  -US Abdomen: No evidence of cholelithiasis. Diffusely echogenic liver consistent with hepatic steatosis. CBD 7 mm which is considered dilated, previously 6 mm.  -SLP: No evidence of pharyngeal dysphagia  -Pt still in pain adn unable to tolerate water/ice chips with occasional gagging with water and sensation of kahn/liquids getting stuck.     Recommendations:    -Continue aggressive IV hydration with LR @200cc/Hr  -NPO for now; When pt is pain-free, advance diet as tolerated to clears and then low fat  -Pain control and antiemetics as needed  -Strict alcohol abstinence  -6cm pancreatic pseudocyst: no acute intervention at this time; Outpatient GI follow up for reevaluation with repeat CT in 4-6 weeks; If pt continues to be symptomatic, might need to drain it at that stage  -Start Creon 20879 four times a day for chronic pancreatic insufficiency  -Check coagulation panel.   -C/w PPI BID   -Pt will need EGD outpatient to evaluate the cause of dysphagia.   -Avoid NSAIDs  -Thiamine and folate supplementation  -Will continue to follow. 1)Acute on chronic pancreatitis with pancreatic tail pseudocyst (6.0 x 6.4 cm, previously 5.1 x5.6 cm) -less likely abscess given no fever and leukocytosis  2)Hepatic steatosis, likely alcoholic  3)Globus sensation with gagging when attempting to have water/ice chips with recent heartburn/GERD -likely esophagitis (ETOH +/- peptic) vs esophageal motility disorder; cannot r/o mechanical obstruction/malignancy  -Findings likely due to chronic alcohol use; No offending medications; No evidence of cholelithiasis; TG level 172, IgG 4 subset normal in 2019  -T bili: 0.9; AST/ALT: 207/34 -trending down; ALP normal; Lipase 140  -CT A/P: Peripancreatic inflammatory fat stranding compatible with acute/recurrent pancreatitis.  Interval increase in size of pancreatic tail pseudocyst now measuring 6.0 x 6 4 cm (previously 5.1 x 5.6 cm).  -US Abdomen: No evidence of cholelithiasis. Diffusely echogenic liver consistent with hepatic steatosis. CBD 7 mm which is considered dilated, previously 6 mm.  -SLP: No evidence of pharyngeal dysphagia  -Pt still in pain and unable to tolerate water/ice chips with occasional gagging with water and sensation of solid/liquids getting stuck.     Recommendations:    -Continue aggressive IV hydration with LR @200cc/Hr  -NPO for now; When pt is pain-free, advance diet as tolerated to clears and then low fat  -Pain control and antiemetics as needed  -Strict alcohol abstinence  -6cm pancreatic pseudocyst: no acute intervention at this time; Outpatient GI follow up for reevaluation with repeat CT in 4-6 weeks; If pt continues to be symptomatic, might need to drain it at that stage  -C/w Creon 09369 four times a day for chronic pancreatic insufficiency  -Check coagulation panel.   -EGD on Monday 9/14/2020 to evaluate the cause of dysphagia. Please keep NPO after midnight and check COVID-19 nasal swab on Sunday 9/13.   -C/w PPI BID; Avoid NSAIDs  -Thiamine and folate supplementation  -Will continue to follow.
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
## 2020-09-11 NOTE — PROGRESS NOTE ADULT - SUBJECTIVE AND OBJECTIVE BOX
Chief Complaint: Patient is a 45y old  Female who presents with a chief complaint of Abdominal pain (10 Sep 2020 09:04)    We are following the patient for acute on chronic pancreatitis and dysphagia.     Interval History: Pt seen and examined at bedside. Pt still complains of epigastric pain radiating to the back with intermittent relief with narcotics. Pt has improvement in nausea and has not vomited since admission. Pt still complains of water/ice chips getting stuck in the throat with occasional cough and gagging sensation. Pt did not allow SLP team to evaluate her with different food consistencies.  SLP: Pt consumed <1 oz regular consistency with no s/s aspiration/penetration however reports globus sensation. Pt refused thin liquids, reports globus sensation at home however no s/s pharyngeal dysphagia.     Pt's symptoms today are stable, not improving.     REVIEW OF SYSTEMS:  Constitutional: No fever, weight loss or fatigue  Cardiovascular: No chest pain, palpitations, dizziness or leg swelling  Gastrointestinal: + epigastric pain. +  nausea, no vomiting or hematemesis; No diarrhea or constipation. No melena or hematochezia.  Skin: No itching, burning, rashes or lesions     Allergies  Compazine (Unknown)    MEDICATIONS:  MEDICATIONS  (STANDING):  chlorhexidine 4% Liquid 1 Application(s) Topical <User Schedule>  heparin   Injectable 5000 Unit(s) SubCutaneous every 8 hours  lactated ringers. 1000 milliLiter(s) (200 mL/Hr) IV Continuous <Continuous>  magnesium sulfate  IVPB 2 Gram(s) IV Intermittent every 2 hours  melatonin 1 milliGRAM(s) Oral at bedtime  multivitamin 1 Tablet(s) Oral daily  pancrelipase  (CREON 12,000 Lipase Units) 3 Capsule(s) Oral four times a day with meals  pantoprazole  Injectable 40 milliGRAM(s) IV Push two times a day  potassium chloride   Powder 40 milliEquivalent(s) Oral once  thiamine 100 milliGRAM(s) Oral daily    MEDICATIONS  (PRN):  hydrOXYzine hydrochloride 50 milliGRAM(s) Oral every 6 hours PRN Anxiety  LORazepam     Tablet 2 milliGRAM(s) Oral every 4 hours PRN CIWA-Ar score 8 or greater  morphine  - Injectable 4 milliGRAM(s) IV Push every 4 hours PRN Severe Pain (7 - 10)  ondansetron Injectable 4 milliGRAM(s) IV Push every 6 hours PRN Nausea and/or Vomiting      Vital Signs Last 24 Hrs  T(C): 36.3 (11 Sep 2020 06:01), Max: 37 (10 Sep 2020 21:07)  T(F): 97.4 (11 Sep 2020 06:01), Max: 98.6 (10 Sep 2020 21:07)  HR: 112 (11 Sep 2020 06:01) (102 - 119)  BP: 145/92 (11 Sep 2020 06:01) (132/82 - 145/92)  RR: 18 (11 Sep 2020 06:01) (18 - 18)    09-10 @ 07:01  -   @ 07:00  --------------------------------------------------------  IN: 2000 mL / OUT: 3 mL / NET: 1997 mL        PHYSICAL EXAM:    General: Well developed; well nourished; in no acute distress  HEENT: MMM, conjunctiva and sclera clear  Gastrointestinal: Soft, diffusely tender, non-distended; Normal bowel sounds; No hepatosplenomegaly. No rebound or guarding  Skin: Warm and dry. No obvious rash    LABS:  CBC Full  -  ( 11 Sep 2020 06:51 )  WBC Count : 2.30 K/uL  RBC Count : 2.77 M/uL  Hemoglobin : 9.8 g/dL  Hematocrit : 28.0 %  Platelet Count - Automated : 105 K/uL  Mean Cell Volume : 101.1 fL  Mean Cell Hemoglobin : 35.4 pg  Mean Cell Hemoglobin Concentration : 35.0 g/dL  Auto Neutrophil # : 1.63 K/uL  Auto Lymphocyte # : 0.43 K/uL  Auto Monocyte # : 0.19 K/uL  Auto Eosinophil # : 0.04 K/uL  Auto Basophil # : 0.01 K/uL  Auto Neutrophil % : 70.9 %  Auto Lymphocyte % : 18.7 %  Auto Monocyte % : 8.3 %  Auto Eosinophil % : 1.7 %  Auto Basophil % : 0.4 %        137  |  97<L>  |  <3<L>  ----------------------------<  109<H>  3.3<L>   |  28  |  0.5<L>    Ca    8.9      11 Sep 2020 06:51  Phos  3.1       Mg     1.5         TPro  6.4  /  Alb  3.8  /  TBili  0.9  /  DBili  x   /  AST  207<H>  /  ALT  34  /  AlkPhos  112      LFTs TREND:   AST:  306 (9/8)--> 269-->318 (9/9)--> 318 (9/10) -->207; Prev admission 262  ALT: 49 (9/8)-->35-->41 (9/9)-->41 (9/10) -->34; Prev admission: 66  ALP: Normal  T Bili: 1.7 (9/8)--> 1.9 --> 1.6 (9/9)-->1.3 (9/10) -->0.9  Indirect bili: 0.9 ()    Lipase, Serum: 140 U/L (20 @ 23:40)    Urinalysis Basic - ( 09 Sep 2020 18:00 )  Color: Yellow / Appearance: Slightly Turbid / S.016 / pH: x  Gluc: x / Ketone: Moderate  / Bili: Negative / Urobili: <2 mg/dL   Blood: x / Protein: Trace / Nitrite: Negative   Leuk Esterase: Negative / RBC: 3 /HPF / WBC 7 /HPF   Sq Epi: x / Non Sq Epi: >27 /HPF / Bacteria: Few      RADIOLOGY & ADDITIONAL STUDIES:    US Abdomen Limited (20 @ 02:31) >  IMPRESSION:    Unremarkable right upper quadrant ultrasound.    Additional Findings/Recommendations After Attending Radiologist Review:  Diffusely echogenic liver consistent with hepatic steatosis. The known distal pancreatic pseudocyst is not delineated on this examination. CBD 7 mm which is consider dilated, previously 6 mm.    CT Abdomen and Pelvis w/ IV Cont (20 @ 01:29) >  IMPRESSION:    Peripancreatic inflammatory fat stranding compatible with acute/recurrent pancreatitis.    Interval increase in size of pancreatic tail pseudocyst now measuring 6.0 x 6 4 cm (previously 5.1 x 5.6 cm).

## 2020-09-12 LAB
ALBUMIN SERPL ELPH-MCNC: 3.6 G/DL — SIGNIFICANT CHANGE UP (ref 3.5–5.2)
ALP SERPL-CCNC: 107 U/L — SIGNIFICANT CHANGE UP (ref 30–115)
ALT FLD-CCNC: 29 U/L — SIGNIFICANT CHANGE UP (ref 0–41)
ANION GAP SERPL CALC-SCNC: 11 MMOL/L — SIGNIFICANT CHANGE UP (ref 7–14)
ANION GAP SERPL CALC-SCNC: 12 MMOL/L — SIGNIFICANT CHANGE UP (ref 7–14)
APTT BLD: 38.5 SEC — SIGNIFICANT CHANGE UP (ref 27–39.2)
AST SERPL-CCNC: 163 U/L — HIGH (ref 0–41)
BASOPHILS # BLD AUTO: 0.03 K/UL — SIGNIFICANT CHANGE UP (ref 0–0.2)
BASOPHILS NFR BLD AUTO: 1.4 % — HIGH (ref 0–1)
BILIRUB SERPL-MCNC: 0.8 MG/DL — SIGNIFICANT CHANGE UP (ref 0.2–1.2)
BUN SERPL-MCNC: <3 MG/DL — LOW (ref 10–20)
BUN SERPL-MCNC: <3 MG/DL — LOW (ref 10–20)
CALCIUM SERPL-MCNC: 8.8 MG/DL — SIGNIFICANT CHANGE UP (ref 8.5–10.1)
CALCIUM SERPL-MCNC: 9.1 MG/DL — SIGNIFICANT CHANGE UP (ref 8.5–10.1)
CHLORIDE SERPL-SCNC: 97 MMOL/L — LOW (ref 98–110)
CHLORIDE SERPL-SCNC: 99 MMOL/L — SIGNIFICANT CHANGE UP (ref 98–110)
CO2 SERPL-SCNC: 30 MMOL/L — SIGNIFICANT CHANGE UP (ref 17–32)
CO2 SERPL-SCNC: 30 MMOL/L — SIGNIFICANT CHANGE UP (ref 17–32)
CREAT SERPL-MCNC: 0.5 MG/DL — LOW (ref 0.7–1.5)
CREAT SERPL-MCNC: <0.5 MG/DL — LOW (ref 0.7–1.5)
EOSINOPHIL # BLD AUTO: 0.07 K/UL — SIGNIFICANT CHANGE UP (ref 0–0.7)
EOSINOPHIL NFR BLD AUTO: 3.2 % — SIGNIFICANT CHANGE UP (ref 0–8)
GLUCOSE SERPL-MCNC: 118 MG/DL — HIGH (ref 70–99)
GLUCOSE SERPL-MCNC: 99 MG/DL — SIGNIFICANT CHANGE UP (ref 70–99)
HCT VFR BLD CALC: 26 % — LOW (ref 37–47)
HCYS SERPL-MCNC: 11.4 UMOL/L — SIGNIFICANT CHANGE UP
HGB BLD-MCNC: 9.1 G/DL — LOW (ref 12–16)
IMM GRANULOCYTES NFR BLD AUTO: 0.5 % — HIGH (ref 0.1–0.3)
INR BLD: 0.99 RATIO — SIGNIFICANT CHANGE UP (ref 0.65–1.3)
LYMPHOCYTES # BLD AUTO: 0.62 K/UL — LOW (ref 1.2–3.4)
LYMPHOCYTES # BLD AUTO: 28.3 % — SIGNIFICANT CHANGE UP (ref 20.5–51.1)
MAGNESIUM SERPL-MCNC: 1.9 MG/DL — SIGNIFICANT CHANGE UP (ref 1.8–2.4)
MCHC RBC-ENTMCNC: 34.7 PG — HIGH (ref 27–31)
MCHC RBC-ENTMCNC: 35 G/DL — SIGNIFICANT CHANGE UP (ref 32–37)
MCV RBC AUTO: 99.2 FL — HIGH (ref 81–99)
MONOCYTES # BLD AUTO: 0.29 K/UL — SIGNIFICANT CHANGE UP (ref 0.1–0.6)
MONOCYTES NFR BLD AUTO: 13.2 % — HIGH (ref 1.7–9.3)
NEUTROPHILS # BLD AUTO: 1.17 K/UL — LOW (ref 1.4–6.5)
NEUTROPHILS NFR BLD AUTO: 53.4 % — SIGNIFICANT CHANGE UP (ref 42.2–75.2)
NRBC # BLD: 0 /100 WBCS — SIGNIFICANT CHANGE UP (ref 0–0)
PHOSPHATE SERPL-MCNC: 3 MG/DL — SIGNIFICANT CHANGE UP (ref 2.1–4.9)
PLATELET # BLD AUTO: 103 K/UL — LOW (ref 130–400)
POTASSIUM SERPL-MCNC: 2.8 MMOL/L — LOW (ref 3.5–5)
POTASSIUM SERPL-MCNC: 3.1 MMOL/L — LOW (ref 3.5–5)
POTASSIUM SERPL-SCNC: 2.8 MMOL/L — LOW (ref 3.5–5)
POTASSIUM SERPL-SCNC: 3.1 MMOL/L — LOW (ref 3.5–5)
PROT SERPL-MCNC: 6.2 G/DL — SIGNIFICANT CHANGE UP (ref 6–8)
PROTHROM AB SERPL-ACNC: 11.4 SEC — SIGNIFICANT CHANGE UP (ref 9.95–12.87)
RBC # BLD: 2.62 M/UL — LOW (ref 4.2–5.4)
RBC # FLD: 11.7 % — SIGNIFICANT CHANGE UP (ref 11.5–14.5)
SODIUM SERPL-SCNC: 138 MMOL/L — SIGNIFICANT CHANGE UP (ref 135–146)
SODIUM SERPL-SCNC: 141 MMOL/L — SIGNIFICANT CHANGE UP (ref 135–146)
WBC # BLD: 2.19 K/UL — LOW (ref 4.8–10.8)
WBC # FLD AUTO: 2.19 K/UL — LOW (ref 4.8–10.8)

## 2020-09-12 PROCEDURE — 99233 SBSQ HOSP IP/OBS HIGH 50: CPT

## 2020-09-12 RX ORDER — POTASSIUM CHLORIDE 20 MEQ
20 PACKET (EA) ORAL EVERY 4 HOURS
Refills: 0 | Status: COMPLETED | OUTPATIENT
Start: 2020-09-12 | End: 2020-09-12

## 2020-09-12 RX ORDER — TRAMADOL HYDROCHLORIDE 50 MG/1
50 TABLET ORAL EVERY 6 HOURS
Refills: 0 | Status: DISCONTINUED | OUTPATIENT
Start: 2020-09-12 | End: 2020-09-16

## 2020-09-12 RX ORDER — KETOROLAC TROMETHAMINE 30 MG/ML
30 SYRINGE (ML) INJECTION EVERY 6 HOURS
Refills: 0 | Status: DISCONTINUED | OUTPATIENT
Start: 2020-09-12 | End: 2020-09-16

## 2020-09-12 RX ORDER — ACETAMINOPHEN 500 MG
500 TABLET ORAL EVERY 6 HOURS
Refills: 0 | Status: DISCONTINUED | OUTPATIENT
Start: 2020-09-12 | End: 2020-09-16

## 2020-09-12 RX ORDER — SUCRALFATE 1 G
1 TABLET ORAL
Refills: 0 | Status: DISCONTINUED | OUTPATIENT
Start: 2020-09-12 | End: 2020-09-16

## 2020-09-12 RX ORDER — SODIUM CHLORIDE 9 MG/ML
1000 INJECTION, SOLUTION INTRAVENOUS
Refills: 0 | Status: DISCONTINUED | OUTPATIENT
Start: 2020-09-12 | End: 2020-09-13

## 2020-09-12 RX ADMIN — HEPARIN SODIUM 5000 UNIT(S): 5000 INJECTION INTRAVENOUS; SUBCUTANEOUS at 12:50

## 2020-09-12 RX ADMIN — Medication 30 MILLIGRAM(S): at 17:53

## 2020-09-12 RX ADMIN — Medication 100 MILLIGRAM(S): at 12:50

## 2020-09-12 RX ADMIN — Medication 1 GRAM(S): at 12:44

## 2020-09-12 RX ADMIN — SODIUM CHLORIDE 100 MILLILITER(S): 9 INJECTION, SOLUTION INTRAVENOUS at 12:45

## 2020-09-12 RX ADMIN — MORPHINE SULFATE 4 MILLIGRAM(S): 50 CAPSULE, EXTENDED RELEASE ORAL at 09:42

## 2020-09-12 RX ADMIN — ONDANSETRON 4 MILLIGRAM(S): 8 TABLET, FILM COATED ORAL at 09:05

## 2020-09-12 RX ADMIN — Medication 3 CAPSULE(S): at 22:45

## 2020-09-12 RX ADMIN — MORPHINE SULFATE 4 MILLIGRAM(S): 50 CAPSULE, EXTENDED RELEASE ORAL at 09:12

## 2020-09-12 RX ADMIN — Medication 30 MILLIGRAM(S): at 17:23

## 2020-09-12 RX ADMIN — SODIUM CHLORIDE 100 MILLILITER(S): 9 INJECTION, SOLUTION INTRAVENOUS at 17:24

## 2020-09-12 RX ADMIN — Medication 3 CAPSULE(S): at 17:22

## 2020-09-12 RX ADMIN — Medication 3 CAPSULE(S): at 10:52

## 2020-09-12 RX ADMIN — HEPARIN SODIUM 5000 UNIT(S): 5000 INJECTION INTRAVENOUS; SUBCUTANEOUS at 10:44

## 2020-09-12 RX ADMIN — CHLORHEXIDINE GLUCONATE 1 APPLICATION(S): 213 SOLUTION TOPICAL at 06:00

## 2020-09-12 RX ADMIN — TRAMADOL HYDROCHLORIDE 50 MILLIGRAM(S): 50 TABLET ORAL at 22:44

## 2020-09-12 RX ADMIN — Medication 2 MILLIGRAM(S): at 13:11

## 2020-09-12 RX ADMIN — TRAMADOL HYDROCHLORIDE 50 MILLIGRAM(S): 50 TABLET ORAL at 22:51

## 2020-09-12 RX ADMIN — Medication 3 CAPSULE(S): at 12:42

## 2020-09-12 RX ADMIN — PANTOPRAZOLE SODIUM 40 MILLIGRAM(S): 20 TABLET, DELAYED RELEASE ORAL at 10:45

## 2020-09-12 RX ADMIN — Medication 50 MILLIGRAM(S): at 22:50

## 2020-09-12 RX ADMIN — Medication 50 MILLIEQUIVALENT(S): at 12:43

## 2020-09-12 RX ADMIN — MORPHINE SULFATE 4 MILLIGRAM(S): 50 CAPSULE, EXTENDED RELEASE ORAL at 05:05

## 2020-09-12 RX ADMIN — Medication 50 MILLIEQUIVALENT(S): at 17:22

## 2020-09-12 RX ADMIN — MORPHINE SULFATE 4 MILLIGRAM(S): 50 CAPSULE, EXTENDED RELEASE ORAL at 04:35

## 2020-09-12 RX ADMIN — HEPARIN SODIUM 5000 UNIT(S): 5000 INJECTION INTRAVENOUS; SUBCUTANEOUS at 22:45

## 2020-09-12 RX ADMIN — Medication 1 MILLIGRAM(S): at 22:45

## 2020-09-12 RX ADMIN — Medication 1 TABLET(S): at 12:42

## 2020-09-12 RX ADMIN — PANTOPRAZOLE SODIUM 40 MILLIGRAM(S): 20 TABLET, DELAYED RELEASE ORAL at 17:22
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
## 2020-09-12 NOTE — DIETITIAN INITIAL EVALUATION ADULT. - OTHER INFO
Dx: 44y/o female with pmhx noted above presented with abdominal pain. Admitted with acute pancreatitis, recurrent secondary to alcohol use disorder. Hospital course is complicated by pancreatic pseudocyst (asymptomatic), globus sensation most likely secondary to stress and anxiety, steatohepatitis likely secondary to alcohol use disorder (as per US) and TERESITA likely prerenal secondary to dehydration (resolved). S/p swallow evaluation, results show no s/s of pharyngeal dysphagia. Skin is intact (Uri Score-19).      Subjective Data: The patient reports following a regular diet at home; consumed two meals at 75%; denies MVI use. However, for the past seven day prior to admission, the patient reports they did not consume meals due to current medical condition.

## 2020-09-12 NOTE — DIETITIAN INITIAL EVALUATION ADULT. - FACTORS AFF FOOD INTAKE
The patient reports consuming some broth and >50% of hot tea with good tolerance. The patient reports they do not consume ice chips, water, jello, fruit juice and ice pop secondary to swallowing difficulty, upset stomach and abdominal pain. The patient reports liquid food items get stuck in her throat. C/o swallowing difficulty, nausea and no bowel movement x7 days. I encouraged the patient to consume hot liquid food items only since she tolerates them. The patient declined to receive ensure clears but is inclined to receive a protein modular.

## 2020-09-12 NOTE — DIETITIAN INITIAL EVALUATION ADULT. - REASON INDICATOR FOR ASSESSMENT
Detail Level: Detailed
Quality 431: Preventive Care And Screening: Unhealthy Alcohol Use - Screening: Patient screened for unhealthy alcohol use using a single question and scores less than 2 times per year
Quality 110: Preventive Care And Screening: Influenza Immunization: Influenza immunization was not ordered or administered, reason not given
Consult for Nutrition Services for Assessment, ordered (9/9), now it is late.

## 2020-09-12 NOTE — DIETITIAN INITIAL EVALUATION ADULT. - NAME AND PHONE
I contacted the patient's physician name Dr. Burgos ([facility] #[phone]) regarding my nutritional recommendations for the patient, which the physician is inclined to follow.

## 2020-09-12 NOTE — DIETITIAN INITIAL EVALUATION ADULT. - RD TO REMAIN AVAILABLE
yes/Intervention: 1.Meals and Snacks vs Nutrition Support Team 2.Medical Food Supplement 3.Vitamin Supplement 4.Coordination of Care   Monitor/Evaluate: Diet order, energy intake, nutrition focused physical findings, K, anemia profile
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
## 2020-09-12 NOTE — DIETITIAN INITIAL EVALUATION ADULT. - ADD RECOMMEND
1.Provide No Carb Prosource Q8hrs 2.Consider provide Folate Q24hrs 3.Consider continue to provide a MVI and Thiamine Q24hrs 4.If the patient continues to have intolerance to liquid food items, consider contact the nutrition support team

## 2020-09-12 NOTE — DIETITIAN INITIAL EVALUATION ADULT. - ENERGY NEEDS
Estimated Calorie Needs: [id number] x AF 1.2-1.5=2070-3510doyp/day   Estimated Protein Needs: 79-91grams/day (1.3-1.5grams/kg of admit weight) -Due to pancreatitis  Estimated Fluid Needs: 1517-1643mL/day (1mL/kcal)

## 2020-09-12 NOTE — DIETITIAN INITIAL EVALUATION ADULT. - PERTINENT LABORATORY DATA
(9/12) Na-138, K-2.8, CL-97, BUN<3, Cr<0.5, Glucose-99mg/dL, Ca-8.8, H/H-9.1/26, MCV-99.2, WBC-2.19, Lipase-140

## 2020-09-13 LAB
ALBUMIN SERPL ELPH-MCNC: 3.6 G/DL — SIGNIFICANT CHANGE UP (ref 3.5–5.2)
ALP SERPL-CCNC: 112 U/L — SIGNIFICANT CHANGE UP (ref 30–115)
ALT FLD-CCNC: 32 U/L — SIGNIFICANT CHANGE UP (ref 0–41)
ANION GAP SERPL CALC-SCNC: 10 MMOL/L — SIGNIFICANT CHANGE UP (ref 7–14)
ANION GAP SERPL CALC-SCNC: 8 MMOL/L — SIGNIFICANT CHANGE UP (ref 7–14)
AST SERPL-CCNC: 161 U/L — HIGH (ref 0–41)
BILIRUB SERPL-MCNC: 0.7 MG/DL — SIGNIFICANT CHANGE UP (ref 0.2–1.2)
BUN SERPL-MCNC: <3 MG/DL — LOW (ref 10–20)
BUN SERPL-MCNC: <3 MG/DL — LOW (ref 10–20)
CALCIUM SERPL-MCNC: 8.8 MG/DL — SIGNIFICANT CHANGE UP (ref 8.5–10.1)
CALCIUM SERPL-MCNC: 9.1 MG/DL — SIGNIFICANT CHANGE UP (ref 8.5–10.1)
CHLORIDE SERPL-SCNC: 100 MMOL/L — SIGNIFICANT CHANGE UP (ref 98–110)
CHLORIDE SERPL-SCNC: 99 MMOL/L — SIGNIFICANT CHANGE UP (ref 98–110)
CO2 SERPL-SCNC: 30 MMOL/L — SIGNIFICANT CHANGE UP (ref 17–32)
CO2 SERPL-SCNC: 31 MMOL/L — SIGNIFICANT CHANGE UP (ref 17–32)
CREAT SERPL-MCNC: 0.5 MG/DL — LOW (ref 0.7–1.5)
CREAT SERPL-MCNC: 0.6 MG/DL — LOW (ref 0.7–1.5)
GLUCOSE SERPL-MCNC: 101 MG/DL — HIGH (ref 70–99)
GLUCOSE SERPL-MCNC: 109 MG/DL — HIGH (ref 70–99)
MAGNESIUM SERPL-MCNC: 1.6 MG/DL — LOW (ref 1.8–2.4)
PHOSPHATE SERPL-MCNC: 3.5 MG/DL — SIGNIFICANT CHANGE UP (ref 2.1–4.9)
POTASSIUM SERPL-MCNC: 2.9 MMOL/L — LOW (ref 3.5–5)
POTASSIUM SERPL-MCNC: 3.1 MMOL/L — LOW (ref 3.5–5)
POTASSIUM SERPL-SCNC: 2.9 MMOL/L — LOW (ref 3.5–5)
POTASSIUM SERPL-SCNC: 3.1 MMOL/L — LOW (ref 3.5–5)
PROT SERPL-MCNC: 5.9 G/DL — LOW (ref 6–8)
SODIUM SERPL-SCNC: 139 MMOL/L — SIGNIFICANT CHANGE UP (ref 135–146)
SODIUM SERPL-SCNC: 139 MMOL/L — SIGNIFICANT CHANGE UP (ref 135–146)

## 2020-09-13 PROCEDURE — 99233 SBSQ HOSP IP/OBS HIGH 50: CPT

## 2020-09-13 RX ORDER — DEXTROSE MONOHYDRATE, SODIUM CHLORIDE, AND POTASSIUM CHLORIDE 50; .745; 4.5 G/1000ML; G/1000ML; G/1000ML
1000 INJECTION, SOLUTION INTRAVENOUS
Refills: 0 | Status: DISCONTINUED | OUTPATIENT
Start: 2020-09-13 | End: 2020-09-13

## 2020-09-13 RX ORDER — CALCIUM CARBONATE 500(1250)
2 TABLET ORAL EVERY 4 HOURS
Refills: 0 | Status: DISCONTINUED | OUTPATIENT
Start: 2020-09-13 | End: 2020-09-16

## 2020-09-13 RX ORDER — PANTOPRAZOLE SODIUM 20 MG/1
40 TABLET, DELAYED RELEASE ORAL
Refills: 0 | Status: DISCONTINUED | OUTPATIENT
Start: 2020-09-13 | End: 2020-09-16

## 2020-09-13 RX ORDER — DEXTROSE MONOHYDRATE, SODIUM CHLORIDE, AND POTASSIUM CHLORIDE 50; .745; 4.5 G/1000ML; G/1000ML; G/1000ML
1000 INJECTION, SOLUTION INTRAVENOUS
Refills: 0 | Status: DISCONTINUED | OUTPATIENT
Start: 2020-09-13 | End: 2020-09-15

## 2020-09-13 RX ORDER — POTASSIUM CHLORIDE 20 MEQ
20 PACKET (EA) ORAL
Refills: 0 | Status: COMPLETED | OUTPATIENT
Start: 2020-09-13 | End: 2020-09-13

## 2020-09-13 RX ORDER — MAGNESIUM SULFATE 500 MG/ML
2 VIAL (ML) INJECTION ONCE
Refills: 0 | Status: COMPLETED | OUTPATIENT
Start: 2020-09-13 | End: 2020-09-13

## 2020-09-13 RX ADMIN — HEPARIN SODIUM 5000 UNIT(S): 5000 INJECTION INTRAVENOUS; SUBCUTANEOUS at 12:10

## 2020-09-13 RX ADMIN — Medication 1 MILLIGRAM(S): at 21:40

## 2020-09-13 RX ADMIN — Medication 1 GRAM(S): at 01:16

## 2020-09-13 RX ADMIN — Medication 3 CAPSULE(S): at 21:40

## 2020-09-13 RX ADMIN — DEXTROSE MONOHYDRATE, SODIUM CHLORIDE, AND POTASSIUM CHLORIDE 100 MILLILITER(S): 50; .745; 4.5 INJECTION, SOLUTION INTRAVENOUS at 10:48

## 2020-09-13 RX ADMIN — Medication 30 MILLIGRAM(S): at 21:40

## 2020-09-13 RX ADMIN — Medication 3 CAPSULE(S): at 17:41

## 2020-09-13 RX ADMIN — Medication 3 CAPSULE(S): at 08:18

## 2020-09-13 RX ADMIN — Medication 50 MILLIGRAM(S): at 17:41

## 2020-09-13 RX ADMIN — Medication 3 CAPSULE(S): at 11:58

## 2020-09-13 RX ADMIN — Medication 50 MILLIEQUIVALENT(S): at 14:53

## 2020-09-13 RX ADMIN — PANTOPRAZOLE SODIUM 40 MILLIGRAM(S): 20 TABLET, DELAYED RELEASE ORAL at 17:41

## 2020-09-13 RX ADMIN — TRAMADOL HYDROCHLORIDE 50 MILLIGRAM(S): 50 TABLET ORAL at 08:18

## 2020-09-13 RX ADMIN — PANTOPRAZOLE SODIUM 40 MILLIGRAM(S): 20 TABLET, DELAYED RELEASE ORAL at 06:54

## 2020-09-13 RX ADMIN — HEPARIN SODIUM 5000 UNIT(S): 5000 INJECTION INTRAVENOUS; SUBCUTANEOUS at 21:40

## 2020-09-13 RX ADMIN — HEPARIN SODIUM 5000 UNIT(S): 5000 INJECTION INTRAVENOUS; SUBCUTANEOUS at 06:53

## 2020-09-13 RX ADMIN — Medication 50 GRAM(S): at 10:51

## 2020-09-13 RX ADMIN — Medication 50 MILLIEQUIVALENT(S): at 18:31

## 2020-09-13 RX ADMIN — TRAMADOL HYDROCHLORIDE 50 MILLIGRAM(S): 50 TABLET ORAL at 18:15

## 2020-09-13 RX ADMIN — TRAMADOL HYDROCHLORIDE 50 MILLIGRAM(S): 50 TABLET ORAL at 17:40

## 2020-09-13 RX ADMIN — Medication 50 MILLIGRAM(S): at 08:18

## 2020-09-13 RX ADMIN — TRAMADOL HYDROCHLORIDE 50 MILLIGRAM(S): 50 TABLET ORAL at 08:45
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
## 2020-09-14 ENCOUNTER — RESULT REVIEW (OUTPATIENT)
Age: 45
End: 2020-09-14

## 2020-09-14 ENCOUNTER — TRANSCRIPTION ENCOUNTER (OUTPATIENT)
Age: 45
End: 2020-09-14

## 2020-09-14 LAB
ALBUMIN SERPL ELPH-MCNC: 3.9 G/DL — SIGNIFICANT CHANGE UP (ref 3.5–5.2)
ALP SERPL-CCNC: 136 U/L — HIGH (ref 30–115)
ALT FLD-CCNC: 39 U/L — SIGNIFICANT CHANGE UP (ref 0–41)
ANION GAP SERPL CALC-SCNC: 16 MMOL/L — HIGH (ref 7–14)
AST SERPL-CCNC: 165 U/L — HIGH (ref 0–41)
AT III ACT/NOR PPP CHRO: 122 % — SIGNIFICANT CHANGE UP (ref 85–135)
B2 GLYCOPROT1 AB SER QL: POSITIVE
BILIRUB SERPL-MCNC: 0.7 MG/DL — SIGNIFICANT CHANGE UP (ref 0.2–1.2)
BUN SERPL-MCNC: <3 MG/DL — LOW (ref 10–20)
CALCIUM SERPL-MCNC: 9.2 MG/DL — SIGNIFICANT CHANGE UP (ref 8.5–10.1)
CHLORIDE SERPL-SCNC: 99 MMOL/L — SIGNIFICANT CHANGE UP (ref 98–110)
CO2 SERPL-SCNC: 25 MMOL/L — SIGNIFICANT CHANGE UP (ref 17–32)
CREAT SERPL-MCNC: 0.6 MG/DL — LOW (ref 0.7–1.5)
GLUCOSE SERPL-MCNC: 109 MG/DL — HIGH (ref 70–99)
MAGNESIUM SERPL-MCNC: 1.7 MG/DL — LOW (ref 1.8–2.4)
PHOSPHATE SERPL-MCNC: 3.2 MG/DL — SIGNIFICANT CHANGE UP (ref 2.1–4.9)
POTASSIUM SERPL-MCNC: 3.5 MMOL/L — SIGNIFICANT CHANGE UP (ref 3.5–5)
POTASSIUM SERPL-SCNC: 3.5 MMOL/L — SIGNIFICANT CHANGE UP (ref 3.5–5)
PROT SERPL-MCNC: 6.3 G/DL — SIGNIFICANT CHANGE UP (ref 6–8)
SARS-COV-2 RNA SPEC QL NAA+PROBE: SIGNIFICANT CHANGE UP
SODIUM SERPL-SCNC: 140 MMOL/L — SIGNIFICANT CHANGE UP (ref 135–146)

## 2020-09-14 PROCEDURE — 88312 SPECIAL STAINS GROUP 1: CPT | Mod: 26

## 2020-09-14 PROCEDURE — 88305 TISSUE EXAM BY PATHOLOGIST: CPT | Mod: 26

## 2020-09-14 PROCEDURE — 90792 PSYCH DIAG EVAL W/MED SRVCS: CPT | Mod: GC

## 2020-09-14 PROCEDURE — 99233 SBSQ HOSP IP/OBS HIGH 50: CPT

## 2020-09-14 PROCEDURE — 43239 EGD BIOPSY SINGLE/MULTIPLE: CPT

## 2020-09-14 PROCEDURE — 88313 SPECIAL STAINS GROUP 2: CPT | Mod: 26

## 2020-09-14 RX ORDER — MAGNESIUM SULFATE 500 MG/ML
2 VIAL (ML) INJECTION ONCE
Refills: 0 | Status: DISCONTINUED | OUTPATIENT
Start: 2020-09-14 | End: 2020-09-14

## 2020-09-14 RX ORDER — MAGNESIUM SULFATE 500 MG/ML
2 VIAL (ML) INJECTION ONCE
Refills: 0 | Status: COMPLETED | OUTPATIENT
Start: 2020-09-14 | End: 2020-09-14

## 2020-09-14 RX ORDER — FLUCONAZOLE 150 MG/1
400 TABLET ORAL ONCE
Refills: 0 | Status: COMPLETED | OUTPATIENT
Start: 2020-09-14 | End: 2020-09-14

## 2020-09-14 RX ORDER — NYSTATIN 500MM UNIT
500000 POWDER (EA) MISCELLANEOUS
Refills: 0 | Status: DISCONTINUED | OUTPATIENT
Start: 2020-09-14 | End: 2020-09-16

## 2020-09-14 RX ORDER — FLUCONAZOLE 150 MG/1
200 TABLET ORAL EVERY 24 HOURS
Refills: 0 | Status: DISCONTINUED | OUTPATIENT
Start: 2020-09-15 | End: 2020-09-16

## 2020-09-14 RX ADMIN — Medication 50 MILLIGRAM(S): at 21:43

## 2020-09-14 RX ADMIN — HEPARIN SODIUM 5000 UNIT(S): 5000 INJECTION INTRAVENOUS; SUBCUTANEOUS at 06:22

## 2020-09-14 RX ADMIN — TRAMADOL HYDROCHLORIDE 50 MILLIGRAM(S): 50 TABLET ORAL at 10:14

## 2020-09-14 RX ADMIN — Medication 25 GRAM(S): at 11:52

## 2020-09-14 RX ADMIN — Medication 3 CAPSULE(S): at 18:32

## 2020-09-14 RX ADMIN — Medication 1 GRAM(S): at 06:23

## 2020-09-14 RX ADMIN — PANTOPRAZOLE SODIUM 40 MILLIGRAM(S): 20 TABLET, DELAYED RELEASE ORAL at 18:32

## 2020-09-14 RX ADMIN — FLUCONAZOLE 100 MILLIGRAM(S): 150 TABLET ORAL at 18:54

## 2020-09-14 RX ADMIN — PANTOPRAZOLE SODIUM 40 MILLIGRAM(S): 20 TABLET, DELAYED RELEASE ORAL at 06:22

## 2020-09-14 RX ADMIN — TRAMADOL HYDROCHLORIDE 50 MILLIGRAM(S): 50 TABLET ORAL at 18:32

## 2020-09-14 RX ADMIN — DEXTROSE MONOHYDRATE, SODIUM CHLORIDE, AND POTASSIUM CHLORIDE 100 MILLILITER(S): 50; .745; 4.5 INJECTION, SOLUTION INTRAVENOUS at 06:23

## 2020-09-14 RX ADMIN — TRAMADOL HYDROCHLORIDE 50 MILLIGRAM(S): 50 TABLET ORAL at 11:30
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
## 2020-09-14 NOTE — CHART NOTE - NSCHARTNOTEFT_GEN_A_CORE
PACU ANESTHESIA ADMISSION NOTE      Procedure: EGD  Post op diagnosis: abdominal pain      ____  Intubated  TV:______       Rate: ______      FiO2: ______    _x___  Patent Airway    _x___  Full return of protective reflexes    _x___  Full recovery from anesthesia / back to baseline status    Vitals:  T(C):   HR: 100  BP: 127/80  RR: 16  SpO2: 98%    Mental Status:  _x___ Awake   _x____ Alert   _____ Drowsy   _____ Sedated    Nausea/Vomiting:  _x___  NO       ______Yes,   See Post - Op Orders         Pain Scale (0-10):  __0___    Treatment: _x___ None    ____ See Post - Op/PCA Orders    Post - Operative Fluids:   __x__ Oral   ____ See Post - Op Orders    Plan: Discharge:   _x___Home       _____Floor     _____Critical Care    _____  Other:_________________    Comments:  No anesthesia issues or complications noted.  Discharge when criteria met.

## 2020-09-15 LAB
ALBUMIN SERPL ELPH-MCNC: 4 G/DL — SIGNIFICANT CHANGE UP (ref 3.5–5.2)
ALP SERPL-CCNC: 125 U/L — HIGH (ref 30–115)
ALT FLD-CCNC: 36 U/L — SIGNIFICANT CHANGE UP (ref 0–41)
ANION GAP SERPL CALC-SCNC: 12 MMOL/L — SIGNIFICANT CHANGE UP (ref 7–14)
APCR PPP: 3.03 RATIO — SIGNIFICANT CHANGE UP
AST SERPL-CCNC: 152 U/L — HIGH (ref 0–41)
B2 GLYCOPROT1 IGA SER QL: <5 SAU — SIGNIFICANT CHANGE UP
B2 GLYCOPROT1 IGG SER-ACNC: <5 SGU — SIGNIFICANT CHANGE UP
B2 GLYCOPROT1 IGM SER-ACNC: 42.4 SMU — HIGH
BASOPHILS # BLD AUTO: 0.03 K/UL — SIGNIFICANT CHANGE UP (ref 0–0.2)
BASOPHILS NFR BLD AUTO: 0.9 % — SIGNIFICANT CHANGE UP (ref 0–1)
BILIRUB SERPL-MCNC: 1.1 MG/DL — SIGNIFICANT CHANGE UP (ref 0.2–1.2)
BUN SERPL-MCNC: <3 MG/DL — LOW (ref 10–20)
CALCIUM SERPL-MCNC: 8.8 MG/DL — SIGNIFICANT CHANGE UP (ref 8.5–10.1)
CARDIOLIPIN AB SER-ACNC: POSITIVE
CARDIOLIPIN IGM SER-MCNC: 31.7 MPL — HIGH (ref 0–12.5)
CARDIOLIPIN IGM SER-MCNC: <5 GPL — SIGNIFICANT CHANGE UP (ref 0–12.5)
CHLORIDE SERPL-SCNC: 103 MMOL/L — SIGNIFICANT CHANGE UP (ref 98–110)
CO2 SERPL-SCNC: 24 MMOL/L — SIGNIFICANT CHANGE UP (ref 17–32)
CREAT SERPL-MCNC: 0.6 MG/DL — LOW (ref 0.7–1.5)
DEPRECATED CARDIOLIPIN IGA SER: <5 APL — SIGNIFICANT CHANGE UP (ref 0–12.5)
EOSINOPHIL # BLD AUTO: 0.04 K/UL — SIGNIFICANT CHANGE UP (ref 0–0.7)
EOSINOPHIL NFR BLD AUTO: 1.2 % — SIGNIFICANT CHANGE UP (ref 0–8)
GLUCOSE SERPL-MCNC: 99 MG/DL — SIGNIFICANT CHANGE UP (ref 70–99)
HCT VFR BLD CALC: 31.7 % — LOW (ref 37–47)
HGB BLD-MCNC: 10.9 G/DL — LOW (ref 12–16)
IMM GRANULOCYTES NFR BLD AUTO: 0.3 % — SIGNIFICANT CHANGE UP (ref 0.1–0.3)
LYMPHOCYTES # BLD AUTO: 0.87 K/UL — LOW (ref 1.2–3.4)
LYMPHOCYTES # BLD AUTO: 25.9 % — SIGNIFICANT CHANGE UP (ref 20.5–51.1)
MAGNESIUM SERPL-MCNC: 2 MG/DL — SIGNIFICANT CHANGE UP (ref 1.8–2.4)
MCHC RBC-ENTMCNC: 34.4 G/DL — SIGNIFICANT CHANGE UP (ref 32–37)
MCHC RBC-ENTMCNC: 34.8 PG — HIGH (ref 27–31)
MCV RBC AUTO: 101.3 FL — HIGH (ref 81–99)
MONOCYTES # BLD AUTO: 0.56 K/UL — SIGNIFICANT CHANGE UP (ref 0.1–0.6)
MONOCYTES NFR BLD AUTO: 16.7 % — HIGH (ref 1.7–9.3)
NEUTROPHILS # BLD AUTO: 1.85 K/UL — SIGNIFICANT CHANGE UP (ref 1.4–6.5)
NEUTROPHILS NFR BLD AUTO: 55 % — SIGNIFICANT CHANGE UP (ref 42.2–75.2)
NRBC # BLD: 0 /100 WBCS — SIGNIFICANT CHANGE UP (ref 0–0)
PHOSPHATE SERPL-MCNC: 3.7 MG/DL — SIGNIFICANT CHANGE UP (ref 2.1–4.9)
PLATELET # BLD AUTO: 193 K/UL — SIGNIFICANT CHANGE UP (ref 130–400)
POTASSIUM SERPL-MCNC: 3.6 MMOL/L — SIGNIFICANT CHANGE UP (ref 3.5–5)
POTASSIUM SERPL-SCNC: 3.6 MMOL/L — SIGNIFICANT CHANGE UP (ref 3.5–5)
PROT SERPL-MCNC: 6.6 G/DL — SIGNIFICANT CHANGE UP (ref 6–8)
RBC # BLD: 3.13 M/UL — LOW (ref 4.2–5.4)
RBC # FLD: 11.7 % — SIGNIFICANT CHANGE UP (ref 11.5–14.5)
SODIUM SERPL-SCNC: 139 MMOL/L — SIGNIFICANT CHANGE UP (ref 135–146)
WBC # BLD: 3.36 K/UL — LOW (ref 4.8–10.8)
WBC # FLD AUTO: 3.36 K/UL — LOW (ref 4.8–10.8)

## 2020-09-15 PROCEDURE — 99233 SBSQ HOSP IP/OBS HIGH 50: CPT

## 2020-09-15 PROCEDURE — 99232 SBSQ HOSP IP/OBS MODERATE 35: CPT

## 2020-09-15 RX ADMIN — Medication 30 MILLIGRAM(S): at 18:26

## 2020-09-15 RX ADMIN — Medication 30 MILLIGRAM(S): at 12:18

## 2020-09-15 RX ADMIN — CHLORHEXIDINE GLUCONATE 1 APPLICATION(S): 213 SOLUTION TOPICAL at 05:29

## 2020-09-15 RX ADMIN — Medication 1 GRAM(S): at 12:10

## 2020-09-15 RX ADMIN — Medication 3 CAPSULE(S): at 21:32

## 2020-09-15 RX ADMIN — Medication 500000 UNIT(S): at 17:45

## 2020-09-15 RX ADMIN — Medication 3 CAPSULE(S): at 17:46

## 2020-09-15 RX ADMIN — PANTOPRAZOLE SODIUM 40 MILLIGRAM(S): 20 TABLET, DELAYED RELEASE ORAL at 05:40

## 2020-09-15 RX ADMIN — Medication 50 MILLIGRAM(S): at 05:56

## 2020-09-15 RX ADMIN — TRAMADOL HYDROCHLORIDE 50 MILLIGRAM(S): 50 TABLET ORAL at 00:47

## 2020-09-15 RX ADMIN — Medication 500000 UNIT(S): at 23:41

## 2020-09-15 RX ADMIN — Medication 500000 UNIT(S): at 12:10

## 2020-09-15 RX ADMIN — Medication 1 MILLIGRAM(S): at 21:31

## 2020-09-15 RX ADMIN — FLUCONAZOLE 100 MILLIGRAM(S): 150 TABLET ORAL at 13:13

## 2020-09-15 RX ADMIN — Medication 1 GRAM(S): at 17:46

## 2020-09-15 RX ADMIN — Medication 100 MILLIGRAM(S): at 12:08

## 2020-09-15 RX ADMIN — Medication 1 TABLET(S): at 12:13

## 2020-09-15 RX ADMIN — Medication 30 MILLIGRAM(S): at 12:50

## 2020-09-15 RX ADMIN — Medication 3 CAPSULE(S): at 12:10

## 2020-09-15 RX ADMIN — TRAMADOL HYDROCHLORIDE 50 MILLIGRAM(S): 50 TABLET ORAL at 06:33

## 2020-09-15 RX ADMIN — Medication 50 MILLIGRAM(S): at 13:50

## 2020-09-15 RX ADMIN — PANTOPRAZOLE SODIUM 40 MILLIGRAM(S): 20 TABLET, DELAYED RELEASE ORAL at 17:46

## 2020-09-15 RX ADMIN — DEXTROSE MONOHYDRATE, SODIUM CHLORIDE, AND POTASSIUM CHLORIDE 100 MILLILITER(S): 50; .745; 4.5 INJECTION, SOLUTION INTRAVENOUS at 05:39

## 2020-09-15 RX ADMIN — Medication 1 GRAM(S): at 23:42

## 2020-09-15 RX ADMIN — Medication 30 MILLIGRAM(S): at 23:57

## 2020-09-15 RX ADMIN — Medication 3 CAPSULE(S): at 08:25

## 2020-09-15 RX ADMIN — TRAMADOL HYDROCHLORIDE 50 MILLIGRAM(S): 50 TABLET ORAL at 21:31

## 2020-09-15 NOTE — CHART NOTE - NSCHARTNOTEFT_GEN_A_CORE
Registered Dietitian Follow-Up     Patient Profile Reviewed                           Yes [x]   No []     Nutrition History Previously Obtained        Yes [x]  No []       Pertinent Subjective Information: Pt reports minimal tolerance to clear liquid diet at this time. She attributes this to esophageal candidiasis. She reports that per MD, her diet order can be adjust to a low fat po diet as tolerated. This is reflected in current diet order. She reports that she is sick of the liquid diet and would like to try eating regular foods. She has an appetite. Will monitor tolerance to po diet. Pt reports that she understanding nutrition therapy for pancreatitis and has no questions about following a low fat diet upon d/c.      Pertinent Medical Interventions: Esophageal candidiasis with erosive gastritis shown on EGD; pathology pending.      Diet order: Soft; low fat     Anthropometrics:  - Ht. 65"  - Wt. dosing 60.5 kg/133 lbs  - %wt change no new wt recorded since previously assessed  - BMI 22.2  - IBW 56.8 kg/125 lbs     Pertinent Lab Data: (9/15) RBC 3.13, H/H 10.9/31.7, BUN <3, Cr 0.6, elevated LFTs; (9/10) A1c 4.9     Pertinent Meds: heparin, diflucan, D5 + NaCl 0.9% w/ KCl 40 mEq/L, TUMS, protonix, carafate, ultram, zofran, creon, melatonin, MVI, thiamine     Physical Findings:  - Appearance: well nourished; no edema noted  - GI function: last BM unknown; pt hasn't had BM since adm diet has been advanced to solids sp lunch; pt is not currently on a bowel regimen  - Tubes: NA  - Oral/Mouth cavity: no chewing/swallowing issues noted  - Skin: intact (9/15)     Nutrition Requirements  Weight Used: admission  60.5kg (continue from assessment on 9/12)     Estimated Energy Needs    Continue [x]  1517-1643kcal/day  (MSJ x 1.2-1.3 AF)     Estimated Protein Needs    Continue [x] 79-91grams/day (1.3-1.5grams/kg of admit weight) -Due to pancreatitis     Estimated Fluid Needs        Continue [x]  1 mL/kcal or per LIP     Nutrient Intake: not meeting needs at this time, pt diet was just advanced sp lunch        [x] Previous Nutrition Diagnosis: Predicted suboptimal energy intake            [x] Ongoing           Nutrition Intervention  meals and snacks, nutrition related medication management, coordination of care     Goal/Expected Outcome: Pt to maintain po intake >50% and to have at least 1 BM within the next 3 days     Indicator/Monitoring: RD to monitor diet order, energy intake, body composition, NFPF, renal profile    Recommendation: continue current diet order as tolerated, consider initiating a bowel regimen, encourage po intake, provide assistance with meals PRN. Will consider the need for oral nutrition supplements if po intake is poor upon f/u.
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
## 2020-09-15 NOTE — PROGRESS NOTE ADULT - PROVIDER SPECIALTY LIST ADULT
Gastroenterology
Gastroenterology
Hospitalist
Internal Medicine
Hospitalist
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
## 2020-09-16 ENCOUNTER — TRANSCRIPTION ENCOUNTER (OUTPATIENT)
Age: 45
End: 2020-09-16

## 2020-09-16 VITALS
SYSTOLIC BLOOD PRESSURE: 151 MMHG | RESPIRATION RATE: 18 BRPM | DIASTOLIC BLOOD PRESSURE: 83 MMHG | TEMPERATURE: 96 F | HEART RATE: 84 BPM

## 2020-09-16 LAB
ANION GAP SERPL CALC-SCNC: 11 MMOL/L — SIGNIFICANT CHANGE UP (ref 7–14)
BASOPHILS # BLD AUTO: 0.03 K/UL — SIGNIFICANT CHANGE UP (ref 0–0.2)
BASOPHILS NFR BLD AUTO: 0.9 % — SIGNIFICANT CHANGE UP (ref 0–1)
BUN SERPL-MCNC: 4 MG/DL — LOW (ref 10–20)
CALCIUM SERPL-MCNC: 9.6 MG/DL — SIGNIFICANT CHANGE UP (ref 8.5–10.1)
CHLORIDE SERPL-SCNC: 103 MMOL/L — SIGNIFICANT CHANGE UP (ref 98–110)
CO2 SERPL-SCNC: 26 MMOL/L — SIGNIFICANT CHANGE UP (ref 17–32)
CREAT SERPL-MCNC: 0.6 MG/DL — LOW (ref 0.7–1.5)
EOSINOPHIL # BLD AUTO: 0.08 K/UL — SIGNIFICANT CHANGE UP (ref 0–0.7)
EOSINOPHIL NFR BLD AUTO: 2.3 % — SIGNIFICANT CHANGE UP (ref 0–8)
GLUCOSE SERPL-MCNC: 99 MG/DL — SIGNIFICANT CHANGE UP (ref 70–99)
HCT VFR BLD CALC: 33.1 % — LOW (ref 37–47)
HGB BLD-MCNC: 11.3 G/DL — LOW (ref 12–16)
IMM GRANULOCYTES NFR BLD AUTO: 0.3 % — SIGNIFICANT CHANGE UP (ref 0.1–0.3)
LYMPHOCYTES # BLD AUTO: 0.96 K/UL — LOW (ref 1.2–3.4)
LYMPHOCYTES # BLD AUTO: 27.9 % — SIGNIFICANT CHANGE UP (ref 20.5–51.1)
MAGNESIUM SERPL-MCNC: 1.8 MG/DL — SIGNIFICANT CHANGE UP (ref 1.8–2.4)
MCHC RBC-ENTMCNC: 34.1 G/DL — SIGNIFICANT CHANGE UP (ref 32–37)
MCHC RBC-ENTMCNC: 34.5 PG — HIGH (ref 27–31)
MCV RBC AUTO: 100.9 FL — HIGH (ref 81–99)
MONOCYTES # BLD AUTO: 0.58 K/UL — SIGNIFICANT CHANGE UP (ref 0.1–0.6)
MONOCYTES NFR BLD AUTO: 16.9 % — HIGH (ref 1.7–9.3)
NEUTROPHILS # BLD AUTO: 1.78 K/UL — SIGNIFICANT CHANGE UP (ref 1.4–6.5)
NEUTROPHILS NFR BLD AUTO: 51.7 % — SIGNIFICANT CHANGE UP (ref 42.2–75.2)
NRBC # BLD: 0 /100 WBCS — SIGNIFICANT CHANGE UP (ref 0–0)
PHOSPHATE SERPL-MCNC: 5 MG/DL — HIGH (ref 2.1–4.9)
PLATELET # BLD AUTO: 261 K/UL — SIGNIFICANT CHANGE UP (ref 130–400)
POTASSIUM SERPL-MCNC: 4 MMOL/L — SIGNIFICANT CHANGE UP (ref 3.5–5)
POTASSIUM SERPL-SCNC: 4 MMOL/L — SIGNIFICANT CHANGE UP (ref 3.5–5)
PROT S FREE PPP-ACNC: 105 % NORMAL — SIGNIFICANT CHANGE UP (ref 60–140)
RBC # BLD: 3.28 M/UL — LOW (ref 4.2–5.4)
RBC # FLD: 11.6 % — SIGNIFICANT CHANGE UP (ref 11.5–14.5)
SODIUM SERPL-SCNC: 140 MMOL/L — SIGNIFICANT CHANGE UP (ref 135–146)
WBC # BLD: 3.44 K/UL — LOW (ref 4.8–10.8)
WBC # FLD AUTO: 3.44 K/UL — LOW (ref 4.8–10.8)

## 2020-09-16 PROCEDURE — 99239 HOSP IP/OBS DSCHRG MGMT >30: CPT

## 2020-09-16 RX ORDER — THIAMINE MONONITRATE (VIT B1) 100 MG
1 TABLET ORAL
Qty: 30 | Refills: 0
Start: 2020-09-16 | End: 2020-10-15

## 2020-09-16 RX ORDER — HYDROXYZINE HCL 10 MG
1 TABLET ORAL
Qty: 12 | Refills: 0
Start: 2020-09-16 | End: 2020-09-19

## 2020-09-16 RX ORDER — LIPASE/PROTEASE/AMYLASE 16-48-48K
3 CAPSULE,DELAYED RELEASE (ENTERIC COATED) ORAL
Qty: 360 | Refills: 0
Start: 2020-09-16 | End: 2020-10-15

## 2020-09-16 RX ORDER — NYSTATIN 500MM UNIT
5 POWDER (EA) MISCELLANEOUS
Qty: 140 | Refills: 0
Start: 2020-09-16 | End: 2020-09-22

## 2020-09-16 RX ORDER — PANTOPRAZOLE SODIUM 20 MG/1
1 TABLET, DELAYED RELEASE ORAL
Qty: 60 | Refills: 0
Start: 2020-09-16 | End: 2020-10-15

## 2020-09-16 RX ORDER — LANOLIN ALCOHOL/MO/W.PET/CERES
1 CREAM (GRAM) TOPICAL
Qty: 30 | Refills: 0
Start: 2020-09-16 | End: 2020-10-15

## 2020-09-16 RX ORDER — LIPASE/PROTEASE/AMYLASE 16-48-48K
3 CAPSULE,DELAYED RELEASE (ENTERIC COATED) ORAL
Qty: 360 | Refills: 0
Start: 2020-09-16 | End: 2020-12-15

## 2020-09-16 RX ORDER — CALCIUM CARBONATE 500(1250)
2 TABLET ORAL
Qty: 360 | Refills: 0
Start: 2020-09-16 | End: 2020-10-15

## 2020-09-16 RX ORDER — HYDROXYZINE HCL 10 MG
1 TABLET ORAL
Qty: 16 | Refills: 0
Start: 2020-09-16 | End: 2020-09-19

## 2020-09-16 RX ORDER — SUCRALFATE 1 G
10 TABLET ORAL
Qty: 1200 | Refills: 0
Start: 2020-09-16 | End: 2020-10-15

## 2020-09-16 RX ORDER — SUCRALFATE 1 G
1 TABLET ORAL
Qty: 120 | Refills: 0
Start: 2020-09-16 | End: 2020-10-15

## 2020-09-16 RX ORDER — FLUCONAZOLE 150 MG/1
1 TABLET ORAL
Qty: 12 | Refills: 0
Start: 2020-09-16 | End: 2020-09-27

## 2020-09-16 RX ORDER — TRAMADOL HYDROCHLORIDE 50 MG/1
1 TABLET ORAL
Qty: 15 | Refills: 0
Start: 2020-09-16 | End: 2020-09-20

## 2020-09-16 RX ADMIN — Medication 50 MILLIGRAM(S): at 05:30

## 2020-09-16 RX ADMIN — Medication 3 CAPSULE(S): at 08:10

## 2020-09-16 RX ADMIN — Medication 30 MILLIGRAM(S): at 05:57

## 2020-09-16 RX ADMIN — Medication 1 TABLET(S): at 11:26

## 2020-09-16 RX ADMIN — Medication 30 MILLIGRAM(S): at 04:42

## 2020-09-16 RX ADMIN — Medication 30 MILLIGRAM(S): at 11:18

## 2020-09-16 RX ADMIN — Medication 100 MILLIGRAM(S): at 11:13

## 2020-09-16 RX ADMIN — TRAMADOL HYDROCHLORIDE 50 MILLIGRAM(S): 50 TABLET ORAL at 04:42

## 2020-09-16 RX ADMIN — Medication 50 MILLIGRAM(S): at 11:17

## 2020-09-16 RX ADMIN — CHLORHEXIDINE GLUCONATE 1 APPLICATION(S): 213 SOLUTION TOPICAL at 05:08

## 2020-09-16 RX ADMIN — HEPARIN SODIUM 5000 UNIT(S): 5000 INJECTION INTRAVENOUS; SUBCUTANEOUS at 11:16

## 2020-09-16 RX ADMIN — Medication 1 GRAM(S): at 05:27

## 2020-09-16 RX ADMIN — Medication 1 GRAM(S): at 11:15

## 2020-09-16 RX ADMIN — Medication 30 MILLIGRAM(S): at 10:08

## 2020-09-16 RX ADMIN — Medication 3 CAPSULE(S): at 11:26

## 2020-09-16 NOTE — DISCHARGE NOTE PROVIDER - NSDCCPCAREPLAN_GEN_ALL_CORE_FT
PRINCIPAL DISCHARGE DIAGNOSIS  Diagnosis: Pancreatitis  Assessment and Plan of Treatment: You came with abdominal pain because your pancreas was inflamed. This is called pancreatitis and can be serious. Please follow diet recommendations, take medications as indicated and monitor for symptoms like increased abdominal pain, vomiting, nausea, constipation, fever and come back to the ED if you have them. Please avoid alcohol intake and don't self medicate since this could cause you to have another pancreatitis crisis. Follow up with your primary care physician as indicated below.        SECONDARY DISCHARGE DIAGNOSES  Diagnosis: Alcohol abuse  Assessment and Plan of Treatment: You were seen by addicition medicine for alcohol abuse. You were counseled on the need to stop drinking alcohol safely, as evidenced by your recurrent pancreatitis, which can be fatal. Do not try to stop on your own without the help of a medical professional, as it could be extremely dangerous. Please follow up with [facility] psychiatry [facility], [address] [place] [address], phone number- [phone]      Diagnosis: Anxiety  Assessment and Plan of Treatment: You were given a medication called atarax for anxiety. Take as needed. Follow up with outpatient psychiatrist.    Diagnosis: Pancreatic pseudocyst  Assessment and Plan of Treatment: You have a pancreatic pseudocyst as a result of recurrent pancreatitis. You will need to follow up with advanced GI Dr. Elizondo as an outpatient.

## 2020-09-16 NOTE — CHART NOTE - NSCHARTNOTEFT_GEN_A_CORE
<<<RESIDENT DISCHARGE NOTE>>>     LAURA BARAJAS  MRN-[id number]    VITAL SIGNS:  T(F): 97.5 (09-16-20 @ 05:24), Max: 97.5 (09-16-20 @ 05:24)  HR: 81 (09-16-20 @ 05:24)  BP: 149/86 (09-16-20 @ 05:24)  SpO2: --      PHYSICAL EXAMINATION:  O/E:  Awake, alert, not in distress.  HEENT: atraumatic, EOMI.  Chest: clear.  CVS: SIS2 +, no murmur.  P/A: Soft, BS+  CNS: non focal.  Ext: no edema feet.  Skin: no rash, no ulcers.  All systems reviewed positive findings as above.      TEST RESULTS:                        11.3   3.44  )-----------( 261      ( 16 Sep 2020 05:19 )             33.1       09-16    140  |  103  |  4<L>  ----------------------------<  99  4.0   |  26  |  0.6<L>    Ca    9.6      16 Sep 2020 05:19  Phos  5.0     09-16  Mg     1.8     09-16    TPro  6.6  /  Alb  4.0  /  TBili  1.1  /  DBili  x   /  AST  152<H>  /  ALT  36  /  AlkPhos  125<H>  09-15      FINAL DISCHARGE INTERVIEW:  Resident(s) Present: Dr. Lawrence, RN Present: (Name:  ___________)    DISCHARGE MEDICATION RECONCILIATION  reviewed with Attending Dr. Dc    DISPOSITION:   [  x] Home,    [  ] Home with Visiting Nursing Services,   [    ]  SNF/ NH,    [   ] Acute Rehab (4A),   [   ] Other (Specify:_________)
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
## 2020-09-16 NOTE — DISCHARGE NOTE NURSING/CASE MANAGEMENT/SOCIAL WORK - NSDCFUADDAPPT_GEN_ALL_CORE_FT
You have a primary care doctor appointment at the [facility] ([address] [place] [address]) on 9/25/20 at 1:30 PM.

## 2020-09-16 NOTE — DISCHARGE NOTE PROVIDER - NSDCFUADDINST_GEN_ALL_CORE_FT
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

## 2020-09-16 NOTE — DISCHARGE NOTE PROVIDER - CARE PROVIDERS DIRECT ADDRESSES
[email or web address]

## 2020-09-16 NOTE — DISCHARGE NOTE PROVIDER - NSDCMRMEDTOKEN_GEN_ALL_CORE_FT
calcium carbonate 500 mg (200 mg elemental calcium) oral tablet, chewable: 2 tab(s) orally every 4 hours, As needed, Heartburn  Diflucan 200 mg oral tablet: 1 tab(s) orally once a day   hydrOXYzine hydrochloride 50 mg oral tablet: 1 tab(s) orally 3 times a day, As Needed -Anxiety - for anxiety   melatonin 1 mg oral tablet: 1 tab(s) orally once a day (at bedtime)  Multiple Vitamins oral tablet: 1 tab(s) orally once a day  nystatin 100,000 units/mL oral suspension: 5 milliliter(s) orally 4 times a day  pancrelipase 12,000 units-38,000 units-60,000 units oral delayed release capsule: 3 cap(s) orally 4 times a day (with meals and at bedtime)  pantoprazole 40 mg oral delayed release tablet: 1 tab(s) orally 2 times a day  sucralfate 1 g/10 mL oral suspension: 10 milliliter(s) orally 4 times a day  thiamine 100 mg oral tablet: 1 tab(s) orally once a day  traMADol 50 mg oral tablet: 1 tab(s) orally every 8 hours MDD:3 tablets

## 2020-09-16 NOTE — DISCHARGE NOTE PROVIDER - NSDCFUADDAPPT_GEN_ALL_CORE_FT
You have a primary care doctor appointment at the [facility] ([address] [place] [address]) on 9/25/20 at 1:30 PM.

## 2020-09-16 NOTE — DISCHARGE NOTE NURSING/CASE MANAGEMENT/SOCIAL WORK - PATIENT PORTAL LINK FT
You can access the FollowMyHealth Patient Portal offered by [facility] by registering at the following website: [email or web address]. By joining [employer]’s FollowMyHealth portal, you will also be able to view your health information using other applications (apps) compatible with our system.

## 2020-09-16 NOTE — DISCHARGE NOTE PROVIDER - CARE PROVIDER_API CALL
Petey Elizondo)  Gastroenterology; Internal Medicine  [address] [place] [address]  Phone: [phone]  Fax: [phone]  Follow Up Time: 1 week

## 2020-09-16 NOTE — DISCHARGE NOTE NURSING/CASE MANAGEMENT/SOCIAL WORK - NSDCVIVACCINE_GEN_ALL_CORE_FT
Influenza , 2019/10/12 14:23 , Celsa Martins (RN)  Influenza , 2020/9/10 12:19 , Shani Fleming (RN)  Tdap , 2014/9/1 17:06 , Cheryl Cotto (RN)  Tdap , 2014/9/1 17:09 , Cheryl Cotto (RN)

## 2020-09-16 NOTE — DISCHARGE NOTE PROVIDER - NSFOLLOWUPCLINICS_GEN_ALL_ED_FT
[facility]  Medicine  [address], [place]   Phone: [phone]  Fax:     [facility]  OP Mental Health  [address] [place] [address]  Phone: [phone]  Fax:   Follow Up Time: 1 week

## 2020-09-17 LAB
DRVVT SCREEN TO CONFIRM RATIO: SIGNIFICANT CHANGE UP
LA NT DPL PPP QL: SIGNIFICANT CHANGE UP
NORMALIZED SCT PPP-RTO: 1 RATIO — SIGNIFICANT CHANGE UP (ref 0–1.16)
NORMALIZED SCT PPP-RTO: SIGNIFICANT CHANGE UP
PROT C ACT/NOR PPP: 120 % — SIGNIFICANT CHANGE UP (ref 65–129)

## 2020-09-18 DIAGNOSIS — F45.8 OTHER SOMATOFORM DISORDERS: ICD-10-CM

## 2020-09-18 DIAGNOSIS — R03.0 ELEVATED BLOOD-PRESSURE READING, WITHOUT DIAGNOSIS OF HYPERTENSION: ICD-10-CM

## 2020-09-18 DIAGNOSIS — E87.2 ACIDOSIS: ICD-10-CM

## 2020-09-18 DIAGNOSIS — F10.10 ALCOHOL ABUSE, UNCOMPLICATED: ICD-10-CM

## 2020-09-18 DIAGNOSIS — E87.6 HYPOKALEMIA: ICD-10-CM

## 2020-09-18 DIAGNOSIS — K70.10 ALCOHOLIC HEPATITIS WITHOUT ASCITES: ICD-10-CM

## 2020-09-18 DIAGNOSIS — K29.70 GASTRITIS, UNSPECIFIED, WITHOUT BLEEDING: ICD-10-CM

## 2020-09-18 DIAGNOSIS — T74.11XA ADULT PHYSICAL ABUSE, CONFIRMED, INITIAL ENCOUNTER: ICD-10-CM

## 2020-09-18 DIAGNOSIS — K21.0 GASTRO-ESOPHAGEAL REFLUX DISEASE WITH ESOPHAGITIS: ICD-10-CM

## 2020-09-18 DIAGNOSIS — E83.42 HYPOMAGNESEMIA: ICD-10-CM

## 2020-09-18 DIAGNOSIS — K85.20 ALCOHOL INDUCED ACUTE PANCREATITIS WITHOUT NECROSIS OR INFECTION: ICD-10-CM

## 2020-09-18 DIAGNOSIS — D69.59 OTHER SECONDARY THROMBOCYTOPENIA: ICD-10-CM

## 2020-09-18 DIAGNOSIS — F43.10 POST-TRAUMATIC STRESS DISORDER, UNSPECIFIED: ICD-10-CM

## 2020-09-18 DIAGNOSIS — E53.8 DEFICIENCY OF OTHER SPECIFIED B GROUP VITAMINS: ICD-10-CM

## 2020-09-18 DIAGNOSIS — R10.9 UNSPECIFIED ABDOMINAL PAIN: ICD-10-CM

## 2020-09-18 DIAGNOSIS — F41.9 ANXIETY DISORDER, UNSPECIFIED: ICD-10-CM

## 2020-09-18 DIAGNOSIS — N17.9 ACUTE KIDNEY FAILURE, UNSPECIFIED: ICD-10-CM
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
## 2020-09-18 DIAGNOSIS — K76.0 FATTY (CHANGE OF) LIVER, NOT ELSEWHERE CLASSIFIED: ICD-10-CM

## 2020-09-18 DIAGNOSIS — K83.8 OTHER SPECIFIED DISEASES OF BILIARY TRACT: ICD-10-CM

## 2020-09-18 DIAGNOSIS — E51.9 THIAMINE DEFICIENCY, UNSPECIFIED: ICD-10-CM

## 2020-09-18 DIAGNOSIS — B37.81 CANDIDAL ESOPHAGITIS: ICD-10-CM

## 2020-09-18 DIAGNOSIS — K86.0 ALCOHOL-INDUCED CHRONIC PANCREATITIS: ICD-10-CM

## 2020-09-18 DIAGNOSIS — R00.2 PALPITATIONS: ICD-10-CM

## 2020-09-18 DIAGNOSIS — K86.3 PSEUDOCYST OF PANCREAS: ICD-10-CM

## 2020-09-18 PROBLEM — K85.90 ACUTE PANCREATITIS WITHOUT NECROSIS OR INFECTION, UNSPECIFIED: Chronic | Status: ACTIVE | Noted: 2020-09-09

## 2020-09-18 PROBLEM — T74.91XA UNSPECIFIED ADULT MALTREATMENT, CONFIRMED, INITIAL ENCOUNTER: Chronic | Status: ACTIVE | Noted: 2020-09-09

## 2020-09-18 PROBLEM — Z87.898 PERSONAL HISTORY OF OTHER SPECIFIED CONDITIONS: Chronic | Status: ACTIVE | Noted: 2020-09-09

## 2020-09-24 NOTE — DIETITIAN INITIAL EVALUATION ADULT. - NUTRITIONGOAL OUTCOME1
HDL is low but since the remaining lipid panel is good not really worrisome.  Can increase exercise and lose wt and avoid \"partially hydrogenated vegetable oils\"    Chloride is 108, and normal is  (not likely significant)  Fasting glucose is 132, slightly higher than in past.   He was not as strict with his diet recently but will get back on track. No further questions.    Pt to adv diet and consume >50% of meal tray in 4 days

## 2020-09-25 ENCOUNTER — APPOINTMENT (OUTPATIENT)
Dept: INTERNAL MEDICINE | Facility: CLINIC | Age: 45
End: 2020-09-25

## 2020-09-29 NOTE — PHYSICAL THERAPY INITIAL EVALUATION ADULT - LEVEL OF INDEPENDENCE: BED TO CHAIR, REHAB EVAL
contact guard Home Suture Removal Text: Patient was provided a home suture removal kit and will remove their sutures at home.  If they have any questions or difficulties they will call the office.
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
## 2020-11-12 ENCOUNTER — INPATIENT (INPATIENT)
Facility: HOSPITAL | Age: 45
LOS: 3 days | Discharge: HOME | End: 2020-11-16
Attending: INTERNAL MEDICINE | Admitting: INTERNAL MEDICINE
Payer: MEDICAID

## 2020-11-12 VITALS
RESPIRATION RATE: 20 BRPM | TEMPERATURE: 98 F | OXYGEN SATURATION: 99 % | SYSTOLIC BLOOD PRESSURE: 144 MMHG | DIASTOLIC BLOOD PRESSURE: 99 MMHG | HEART RATE: 140 BPM | HEIGHT: 65 IN

## 2020-11-12 DIAGNOSIS — Z98.890 OTHER SPECIFIED POSTPROCEDURAL STATES: Chronic | ICD-10-CM

## 2020-11-12 DIAGNOSIS — Z87.2 PERSONAL HISTORY OF DISEASES OF THE SKIN AND SUBCUTANEOUS TISSUE: Chronic | ICD-10-CM

## 2020-11-12 LAB
ALBUMIN SERPL ELPH-MCNC: 5 G/DL — SIGNIFICANT CHANGE UP (ref 3.5–5.2)
ALP SERPL-CCNC: 131 U/L — HIGH (ref 30–115)
ALT FLD-CCNC: 68 U/L — HIGH (ref 0–41)
ANION GAP SERPL CALC-SCNC: 15 MMOL/L — HIGH (ref 7–14)
ANION GAP SERPL CALC-SCNC: 15 MMOL/L — HIGH (ref 7–14)
ANION GAP SERPL CALC-SCNC: 31 MMOL/L — HIGH (ref 7–14)
APPEARANCE UR: CLEAR — SIGNIFICANT CHANGE UP
AST SERPL-CCNC: 317 U/L — HIGH (ref 0–41)
BASE EXCESS BLDV CALC-SCNC: -7.6 MMOL/L — LOW (ref -2–2)
BASOPHILS # BLD AUTO: 0.06 K/UL — SIGNIFICANT CHANGE UP (ref 0–0.2)
BASOPHILS NFR BLD AUTO: 1.2 % — HIGH (ref 0–1)
BILIRUB SERPL-MCNC: 1.5 MG/DL — HIGH (ref 0.2–1.2)
BILIRUB UR-MCNC: ABNORMAL
BUN SERPL-MCNC: 11 MG/DL — SIGNIFICANT CHANGE UP (ref 10–20)
BUN SERPL-MCNC: 11 MG/DL — SIGNIFICANT CHANGE UP (ref 10–20)
BUN SERPL-MCNC: 9 MG/DL — LOW (ref 10–20)
CA-I SERPL-SCNC: 1.17 MMOL/L — SIGNIFICANT CHANGE UP (ref 1.12–1.3)
CALCIUM SERPL-MCNC: 9 MG/DL — SIGNIFICANT CHANGE UP (ref 8.5–10.1)
CALCIUM SERPL-MCNC: 9.3 MG/DL — SIGNIFICANT CHANGE UP (ref 8.5–10.1)
CALCIUM SERPL-MCNC: 9.3 MG/DL — SIGNIFICANT CHANGE UP (ref 8.5–10.1)
CHLORIDE SERPL-SCNC: 93 MMOL/L — LOW (ref 98–110)
CHLORIDE SERPL-SCNC: 97 MMOL/L — LOW (ref 98–110)
CHLORIDE SERPL-SCNC: 99 MMOL/L — SIGNIFICANT CHANGE UP (ref 98–110)
CO2 SERPL-SCNC: 10 MMOL/L — LOW (ref 17–32)
CO2 SERPL-SCNC: 21 MMOL/L — SIGNIFICANT CHANGE UP (ref 17–32)
CO2 SERPL-SCNC: 22 MMOL/L — SIGNIFICANT CHANGE UP (ref 17–32)
COLOR SPEC: YELLOW — SIGNIFICANT CHANGE UP
CREAT SERPL-MCNC: 0.7 MG/DL — SIGNIFICANT CHANGE UP (ref 0.7–1.5)
CREAT SERPL-MCNC: 0.7 MG/DL — SIGNIFICANT CHANGE UP (ref 0.7–1.5)
CREAT SERPL-MCNC: 0.8 MG/DL — SIGNIFICANT CHANGE UP (ref 0.7–1.5)
D DIMER BLD IA.RAPID-MCNC: 145 NG/ML DDU — SIGNIFICANT CHANGE UP (ref 0–230)
DIFF PNL FLD: NEGATIVE — SIGNIFICANT CHANGE UP
EOSINOPHIL # BLD AUTO: 0 K/UL — SIGNIFICANT CHANGE UP (ref 0–0.7)
EOSINOPHIL NFR BLD AUTO: 0 % — SIGNIFICANT CHANGE UP (ref 0–8)
GAS PNL BLDV: 135 MMOL/L — LOW (ref 136–145)
GAS PNL BLDV: SIGNIFICANT CHANGE UP
GLUCOSE SERPL-MCNC: 111 MG/DL — HIGH (ref 70–99)
GLUCOSE SERPL-MCNC: 89 MG/DL — SIGNIFICANT CHANGE UP (ref 70–99)
GLUCOSE SERPL-MCNC: 98 MG/DL — SIGNIFICANT CHANGE UP (ref 70–99)
GLUCOSE UR QL: NEGATIVE — SIGNIFICANT CHANGE UP
HCG UR QL: NEGATIVE — SIGNIFICANT CHANGE UP
HCO3 BLDV-SCNC: 18 MMOL/L — LOW (ref 22–29)
HCT VFR BLD CALC: 36.1 % — LOW (ref 37–47)
HCT VFR BLDA CALC: 42.3 % — SIGNIFICANT CHANGE UP (ref 34–44)
HGB BLD CALC-MCNC: 13.8 G/DL — LOW (ref 14–18)
HGB BLD-MCNC: 12 G/DL — SIGNIFICANT CHANGE UP (ref 12–16)
IMM GRANULOCYTES NFR BLD AUTO: 0.4 % — HIGH (ref 0.1–0.3)
KETONES UR-MCNC: ABNORMAL
LACTATE BLDV-MCNC: 2.3 MMOL/L — HIGH (ref 0.5–1.6)
LEUKOCYTE ESTERASE UR-ACNC: NEGATIVE — SIGNIFICANT CHANGE UP
LIDOCAIN IGE QN: 225 U/L — HIGH (ref 7–60)
LYMPHOCYTES # BLD AUTO: 0.55 K/UL — LOW (ref 1.2–3.4)
LYMPHOCYTES # BLD AUTO: 10.7 % — LOW (ref 20.5–51.1)
MAGNESIUM SERPL-MCNC: 1.5 MG/DL — LOW (ref 1.8–2.4)
MCHC RBC-ENTMCNC: 33.2 G/DL — SIGNIFICANT CHANGE UP (ref 32–37)
MCHC RBC-ENTMCNC: 33.3 PG — HIGH (ref 27–31)
MCV RBC AUTO: 100.3 FL — HIGH (ref 81–99)
MONOCYTES # BLD AUTO: 0.38 K/UL — SIGNIFICANT CHANGE UP (ref 0.1–0.6)
MONOCYTES NFR BLD AUTO: 7.4 % — SIGNIFICANT CHANGE UP (ref 1.7–9.3)
NEUTROPHILS # BLD AUTO: 4.11 K/UL — SIGNIFICANT CHANGE UP (ref 1.4–6.5)
NEUTROPHILS NFR BLD AUTO: 80.3 % — HIGH (ref 42.2–75.2)
NITRITE UR-MCNC: NEGATIVE — SIGNIFICANT CHANGE UP
NRBC # BLD: 0 /100 WBCS — SIGNIFICANT CHANGE UP (ref 0–0)
PCO2 BLDV: 35 MMHG — LOW (ref 41–51)
PH BLDV: 7.31 — SIGNIFICANT CHANGE UP (ref 7.26–7.43)
PH UR: 6 — SIGNIFICANT CHANGE UP (ref 5–8)
PHOSPHATE SERPL-MCNC: 2.3 MG/DL — SIGNIFICANT CHANGE UP (ref 2.1–4.9)
PLATELET # BLD AUTO: 210 K/UL — SIGNIFICANT CHANGE UP (ref 130–400)
PO2 BLDV: 26 MMHG — SIGNIFICANT CHANGE UP (ref 20–40)
POTASSIUM BLDV-SCNC: 4.2 MMOL/L — SIGNIFICANT CHANGE UP (ref 3.3–5.6)
POTASSIUM SERPL-MCNC: 3.6 MMOL/L — SIGNIFICANT CHANGE UP (ref 3.5–5)
POTASSIUM SERPL-MCNC: 4.2 MMOL/L — SIGNIFICANT CHANGE UP (ref 3.5–5)
POTASSIUM SERPL-MCNC: 4.3 MMOL/L — SIGNIFICANT CHANGE UP (ref 3.5–5)
POTASSIUM SERPL-SCNC: 3.6 MMOL/L — SIGNIFICANT CHANGE UP (ref 3.5–5)
POTASSIUM SERPL-SCNC: 4.2 MMOL/L — SIGNIFICANT CHANGE UP (ref 3.5–5)
POTASSIUM SERPL-SCNC: 4.3 MMOL/L — SIGNIFICANT CHANGE UP (ref 3.5–5)
PROT SERPL-MCNC: 8.2 G/DL — HIGH (ref 6–8)
PROT UR-MCNC: SIGNIFICANT CHANGE UP
RBC # BLD: 3.6 M/UL — LOW (ref 4.2–5.4)
RBC # FLD: 13.4 % — SIGNIFICANT CHANGE UP (ref 11.5–14.5)
SAO2 % BLDV: 42 % — SIGNIFICANT CHANGE UP
SARS-COV-2 RNA SPEC QL NAA+PROBE: SIGNIFICANT CHANGE UP
SODIUM SERPL-SCNC: 133 MMOL/L — LOW (ref 135–146)
SODIUM SERPL-SCNC: 134 MMOL/L — LOW (ref 135–146)
SODIUM SERPL-SCNC: 136 MMOL/L — SIGNIFICANT CHANGE UP (ref 135–146)
SP GR SPEC: 1.02 — SIGNIFICANT CHANGE UP (ref 1.01–1.03)
TROPONIN T SERPL-MCNC: <0.01 NG/ML — SIGNIFICANT CHANGE UP
TROPONIN T SERPL-MCNC: <0.01 NG/ML — SIGNIFICANT CHANGE UP
UROBILINOGEN FLD QL: SIGNIFICANT CHANGE UP
WBC # BLD: 5.12 K/UL — SIGNIFICANT CHANGE UP (ref 4.8–10.8)
WBC # FLD AUTO: 5.12 K/UL — SIGNIFICANT CHANGE UP (ref 4.8–10.8)

## 2020-11-12 PROCEDURE — 99223 1ST HOSP IP/OBS HIGH 75: CPT

## 2020-11-12 PROCEDURE — 93010 ELECTROCARDIOGRAM REPORT: CPT

## 2020-11-12 PROCEDURE — 71046 X-RAY EXAM CHEST 2 VIEWS: CPT | Mod: 26

## 2020-11-12 PROCEDURE — 74177 CT ABD & PELVIS W/CONTRAST: CPT | Mod: 26

## 2020-11-12 PROCEDURE — 99285 EMERGENCY DEPT VISIT HI MDM: CPT

## 2020-11-12 RX ORDER — PANTOPRAZOLE SODIUM 20 MG/1
40 TABLET, DELAYED RELEASE ORAL
Refills: 0 | Status: DISCONTINUED | OUTPATIENT
Start: 2020-11-12 | End: 2020-11-12

## 2020-11-12 RX ORDER — ONDANSETRON 8 MG/1
4 TABLET, FILM COATED ORAL ONCE
Refills: 0 | Status: COMPLETED | OUTPATIENT
Start: 2020-11-12 | End: 2020-11-12

## 2020-11-12 RX ORDER — SODIUM CHLORIDE 9 MG/ML
1000 INJECTION, SOLUTION INTRAVENOUS ONCE
Refills: 0 | Status: COMPLETED | OUTPATIENT
Start: 2020-11-12 | End: 2020-11-12

## 2020-11-12 RX ORDER — LIPASE/PROTEASE/AMYLASE 16-48-48K
3 CAPSULE,DELAYED RELEASE (ENTERIC COATED) ORAL
Refills: 0 | Status: DISCONTINUED | OUTPATIENT
Start: 2020-11-12 | End: 2020-11-16

## 2020-11-12 RX ORDER — SODIUM CHLORIDE 9 MG/ML
1000 INJECTION, SOLUTION INTRAVENOUS
Refills: 0 | Status: DISCONTINUED | OUTPATIENT
Start: 2020-11-12 | End: 2020-11-16

## 2020-11-12 RX ORDER — IOHEXOL 300 MG/ML
30 INJECTION, SOLUTION INTRAVENOUS ONCE
Refills: 0 | Status: COMPLETED | OUTPATIENT
Start: 2020-11-12 | End: 2020-11-12

## 2020-11-12 RX ORDER — PANTOPRAZOLE SODIUM 20 MG/1
40 TABLET, DELAYED RELEASE ORAL DAILY
Refills: 0 | Status: DISCONTINUED | OUTPATIENT
Start: 2020-11-12 | End: 2020-11-16

## 2020-11-12 RX ORDER — ACETAMINOPHEN 500 MG
975 TABLET ORAL ONCE
Refills: 0 | Status: COMPLETED | OUTPATIENT
Start: 2020-11-12 | End: 2020-11-12

## 2020-11-12 RX ORDER — ENOXAPARIN SODIUM 100 MG/ML
40 INJECTION SUBCUTANEOUS DAILY
Refills: 0 | Status: DISCONTINUED | OUTPATIENT
Start: 2020-11-12 | End: 2020-11-16

## 2020-11-12 RX ORDER — SUCRALFATE 1 G
1 TABLET ORAL
Refills: 0 | Status: DISCONTINUED | OUTPATIENT
Start: 2020-11-12 | End: 2020-11-16

## 2020-11-12 RX ORDER — KETOROLAC TROMETHAMINE 30 MG/ML
15 SYRINGE (ML) INJECTION ONCE
Refills: 0 | Status: DISCONTINUED | OUTPATIENT
Start: 2020-11-12 | End: 2020-11-12

## 2020-11-12 RX ORDER — MAGNESIUM SULFATE 500 MG/ML
2 VIAL (ML) INJECTION ONCE
Refills: 0 | Status: COMPLETED | OUTPATIENT
Start: 2020-11-12 | End: 2020-11-12

## 2020-11-12 RX ORDER — MAGNESIUM SULFATE 500 MG/ML
2 VIAL (ML) INJECTION
Refills: 0 | Status: COMPLETED | OUTPATIENT
Start: 2020-11-12 | End: 2020-11-12

## 2020-11-12 RX ORDER — FAMOTIDINE 10 MG/ML
20 INJECTION INTRAVENOUS ONCE
Refills: 0 | Status: COMPLETED | OUTPATIENT
Start: 2020-11-12 | End: 2020-11-12

## 2020-11-12 RX ORDER — THIAMINE MONONITRATE (VIT B1) 100 MG
100 TABLET ORAL DAILY
Refills: 0 | Status: DISCONTINUED | OUTPATIENT
Start: 2020-11-12 | End: 2020-11-16

## 2020-11-12 RX ORDER — MORPHINE SULFATE 50 MG/1
4 CAPSULE, EXTENDED RELEASE ORAL
Refills: 0 | Status: DISCONTINUED | OUTPATIENT
Start: 2020-11-12 | End: 2020-11-14

## 2020-11-12 RX ORDER — CHLORHEXIDINE GLUCONATE 213 G/1000ML
1 SOLUTION TOPICAL
Refills: 0 | Status: DISCONTINUED | OUTPATIENT
Start: 2020-11-12 | End: 2020-11-16

## 2020-11-12 RX ORDER — LANOLIN ALCOHOL/MO/W.PET/CERES
1 CREAM (GRAM) TOPICAL AT BEDTIME
Refills: 0 | Status: DISCONTINUED | OUTPATIENT
Start: 2020-11-12 | End: 2020-11-16

## 2020-11-12 RX ORDER — SODIUM CHLORIDE 9 MG/ML
1000 INJECTION, SOLUTION INTRAVENOUS
Refills: 0 | Status: DISCONTINUED | OUTPATIENT
Start: 2020-11-12 | End: 2020-11-12

## 2020-11-12 RX ORDER — MORPHINE SULFATE 50 MG/1
2 CAPSULE, EXTENDED RELEASE ORAL EVERY 4 HOURS
Refills: 0 | Status: DISCONTINUED | OUTPATIENT
Start: 2020-11-12 | End: 2020-11-12

## 2020-11-12 RX ADMIN — SODIUM CHLORIDE 1000 MILLILITER(S): 9 INJECTION, SOLUTION INTRAVENOUS at 10:36

## 2020-11-12 RX ADMIN — SODIUM CHLORIDE 1000 MILLILITER(S): 9 INJECTION, SOLUTION INTRAVENOUS at 03:06

## 2020-11-12 RX ADMIN — Medication 25 GRAM(S): at 19:33

## 2020-11-12 RX ADMIN — MORPHINE SULFATE 4 MILLIGRAM(S): 50 CAPSULE, EXTENDED RELEASE ORAL at 20:45

## 2020-11-12 RX ADMIN — MORPHINE SULFATE 2 MILLIGRAM(S): 50 CAPSULE, EXTENDED RELEASE ORAL at 14:05

## 2020-11-12 RX ADMIN — SODIUM CHLORIDE 150 MILLILITER(S): 9 INJECTION, SOLUTION INTRAVENOUS at 16:50

## 2020-11-12 RX ADMIN — MORPHINE SULFATE 4 MILLIGRAM(S): 50 CAPSULE, EXTENDED RELEASE ORAL at 17:52

## 2020-11-12 RX ADMIN — MORPHINE SULFATE 2 MILLIGRAM(S): 50 CAPSULE, EXTENDED RELEASE ORAL at 11:34

## 2020-11-12 RX ADMIN — PANTOPRAZOLE SODIUM 40 MILLIGRAM(S): 20 TABLET, DELAYED RELEASE ORAL at 11:25

## 2020-11-12 RX ADMIN — FAMOTIDINE 104 MILLIGRAM(S): 10 INJECTION INTRAVENOUS at 03:06

## 2020-11-12 RX ADMIN — Medication 25 GRAM(S): at 21:24

## 2020-11-12 RX ADMIN — SODIUM CHLORIDE 1000 MILLILITER(S): 9 INJECTION, SOLUTION INTRAVENOUS at 08:28

## 2020-11-12 RX ADMIN — IOHEXOL 30 MILLILITER(S): 300 INJECTION, SOLUTION INTRAVENOUS at 18:55

## 2020-11-12 RX ADMIN — ONDANSETRON 4 MILLIGRAM(S): 8 TABLET, FILM COATED ORAL at 03:06

## 2020-11-12 RX ADMIN — SODIUM CHLORIDE 200 MILLILITER(S): 9 INJECTION, SOLUTION INTRAVENOUS at 11:30

## 2020-11-12 RX ADMIN — MORPHINE SULFATE 4 MILLIGRAM(S): 50 CAPSULE, EXTENDED RELEASE ORAL at 20:30

## 2020-11-12 RX ADMIN — Medication 15 MILLIGRAM(S): at 05:31

## 2020-11-12 RX ADMIN — MORPHINE SULFATE 4 MILLIGRAM(S): 50 CAPSULE, EXTENDED RELEASE ORAL at 16:23

## 2020-11-12 RX ADMIN — Medication 50 GRAM(S): at 17:53

## 2020-11-12 NOTE — ED ADULT NURSE NOTE - NSIMPLEMENTINTERV_GEN_ALL_ED
Implemented All Universal Safety Interventions:  [place] to call system. Call bell, personal items and telephone within reach. Instruct patient to call for assistance. Room bathroom lighting operational. Non-slip footwear when patient is off stretcher. Physically safe environment: no spills, clutter or unnecessary equipment. Stretcher in lowest position, wheels locked, appropriate side rails in place.

## 2020-11-12 NOTE — H&P ADULT - NSHPLABSRESULTS_GEN_ALL_CORE
12.0   5.12  )-----------( 210      ( 12 Nov 2020 04:16 )             36.1   11-12    134<L>  |  93<L>  |  11  ----------------------------<  89  4.3   |  10<L>  |  0.8    Ca    9.3      12 Nov 2020 04:16    TPro  8.2<H>  /  Alb  5.0  /  TBili  1.5<H>  /  DBili  x   /  AST  317<H>  /  ALT  68<H>  /  AlkPhos  131<H>  11-12    Lipase, Serum: 225 U/L (11.12.20 @ 04:16) < from: 12 Lead ECG (11.12.20 @ 02:19) >    Ventricular Rate 123 BPM    Atrial Rate 123 BPM    P-R Interval 112 ms    QRS Duration 76 ms    Q-T Interval 414 ms    QTC Calculation(Bazett) 592 ms    R Axis 77 degrees    T Axis 70 degrees    Diagnosis Line Sinus tachycardia  Otherwise normal ECG    Confirmed by Shankar Kaur ([id number]) on 11/12/2020 8:50:00 AM    < end of copied text >

## 2020-11-12 NOTE — H&P ADULT - ATTENDING COMMENTS
Patient seen and examined independently. I agree with the resident's note, physical exam, and plan except as below.  Vital Signs Last 24 Hrs  T(C): 36.7 (12 Nov 2020 13:42), Max: 37.2 (12 Nov 2020 07:40)  T(F): 98 (12 Nov 2020 13:42), Max: 98.9 (12 Nov 2020 07:40)  HR: 86 (12 Nov 2020 13:42) (86 - 140)  BP: 127/91 (12 Nov 2020 13:42) (127/91 - 147/98)  BP(mean): --  RR: 18 (12 Nov 2020 13:42) (18 - 20)  SpO2: 99% (12 Nov 2020 07:40) (99% - 99%)  PE  nad  aaox3  p3a4cho  ctabl  softnd+bs+epigastric tenderness neg rebound  no cce    #acute on chronic pancreatitis - exacerbated by ETOH use   npo, IVFs, iv morphine - increase to 4mg  monitor lytes, renal function   any hemodynamic instability - call for icu upgrade    #chest pain - likely non cardiac - no ischemic ekg changes - check one more set of cardiac enzymes - if negative can dc tele   likely GI related to vomiting - / epigastric pain from pancreatitis - IV ppi    #nagma - resolved with ivfs    #ETOH use , dependence - monitor ciwa - ativan prn withdrawal symptoms     #magnesium deficiency - replete and recheck     #suspected thiamine and folate def - replete    discussed with pt and team   follow up with CT abd ordered   covid negative
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
## 2020-11-12 NOTE — H&P ADULT - NSHPPHYSICALEXAM_GEN_ALL_CORE
Vital Signs Last 24 Hrs  T(C): 37.2 (12 Nov 2020 07:40), Max: 37.2 (12 Nov 2020 07:40)  T(F): 98.9 (12 Nov 2020 07:40), Max: 98.9 (12 Nov 2020 07:40)  HR: 94 (12 Nov 2020 07:40) (93 - 140)  BP: 147/98 (12 Nov 2020 07:40) (144/99 - 147/98)  BP(mean): --  RR: 20 (12 Nov 2020 07:40) (20 - 20)  SpO2: 99% (12 Nov 2020 07:40) (99% - 99%)    PHYSICAL EXAM:  GENERAL: The patient is a well-developed, well-nourished in no apparent distress. AOX3.  HEENT: NCAT   NECK: Supple. No lymphadenopathy or thyromegaly.  LUNGS: No respiratory distress or accessory muscle use. CTAB  HEART: RRR, S1 S2 Audible  ABDOMEN: Soft, ttp epigastric region, and nondistended.  No gaurding or rigidity.  No hepatosplenomegaly was noted.  EXTREMITIES: Without any cyanosis, clubbing, rash, lesions or edema.

## 2020-11-12 NOTE — ED ADULT TRIAGE NOTE - CHIEF COMPLAINT QUOTE
I've been vomiting for the last 5 days, I have chronic pancreatitis and now I'm having palpitations and SOB - patient

## 2020-11-12 NOTE — SBIRT NOTE ADULT - NSSBIRTDRGPOSREINDET_GEN_A_CORE
Positive reinforcement was given to pt regarding her choice not to use drugs other than those required for medical reasons.
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
## 2020-11-12 NOTE — ED PROVIDER NOTE - NS ED ROS FT
Review of Systems:  CONSTITUTIONAL: No fever, + diaphoresis  SKIN: No rash  EYES: No eye pain, No blurred vision  ENT: No sore throat, No neck pain, No rhinorrhea  RESPIRATORY: + shortness of breath, No cough  CARDIAC: + chest pain, + palpitations  GI: + abdominal pain, + nausea, + vomiting, No diarrhea, No constipation  MUSCULOSKELETAL: No joint paint, No swelling, + back pain  NEUROLOGIC: No numbness, No focal weakness, No headache, No dizziness  All other systems negative, unless specified in HPI

## 2020-11-12 NOTE — ED PROVIDER NOTE - PHYSICAL EXAMINATION
Pt inf   CONSTITUTIONAL: Well-developed; well-nourished; anxious  SKIN: Warm, dry  HEAD: Normocephalic; atraumatic  EYES: No conjunctival injection. EOMI  CARD:  Tachycardic rate and regular rhythm; S1, S2 normal; no murmurs, gallops, or rubs  RESP: CTAB; No wheezes, crackles, rales or rhonchi  ABD: Soft non distended; BS+ in all 4 quadrants; No guarding or rebound tenderness; moderate TTP of epigastric region, mild RUQ TTP; no CVA tenderness, no flank pain  EXT: Normal ROM.  No clubbing or cyanosis.  No LE edema. No back tenderness  NEURO: A&O x3, grossly unremarkable, no focal deficits  PSYCH: Cooperative, anxious

## 2020-11-12 NOTE — ED PROVIDER NOTE - PROGRESS NOTE DETAILS
AH - pt does not PERC out; Well's score for PE 4.5 AH - d-dimer neg, trop neg; Lipase 225, , gap of 31;  pt reassessed, still feeling weak and in pain; would also like to stay AH - d-dimer neg, trop neg; Lipase 225, , gap of 31; Obtaining vbg; pt reassessed, still feeling weak and in pain; pt agrees with plan to stay for further management; signed out to MAR, accepted admission
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
## 2020-11-13 LAB
ALBUMIN SERPL ELPH-MCNC: 3.9 G/DL — SIGNIFICANT CHANGE UP (ref 3.5–5.2)
ALP SERPL-CCNC: 92 U/L — SIGNIFICANT CHANGE UP (ref 30–115)
ALT FLD-CCNC: 58 U/L — HIGH (ref 0–41)
ANION GAP SERPL CALC-SCNC: 19 MMOL/L — HIGH (ref 7–14)
AST SERPL-CCNC: 292 U/L — HIGH (ref 0–41)
BASOPHILS # BLD AUTO: 0.02 K/UL — SIGNIFICANT CHANGE UP (ref 0–0.2)
BASOPHILS NFR BLD AUTO: 0.6 % — SIGNIFICANT CHANGE UP (ref 0–1)
BILIRUB DIRECT SERPL-MCNC: 0.2 MG/DL — SIGNIFICANT CHANGE UP (ref 0–0.2)
BILIRUB INDIRECT FLD-MCNC: 0.8 MG/DL — SIGNIFICANT CHANGE UP (ref 0.2–1.2)
BILIRUB SERPL-MCNC: 1 MG/DL — SIGNIFICANT CHANGE UP (ref 0.2–1.2)
BUN SERPL-MCNC: 5 MG/DL — LOW (ref 10–20)
CALCIUM SERPL-MCNC: 8.6 MG/DL — SIGNIFICANT CHANGE UP (ref 8.5–10.1)
CHLORIDE SERPL-SCNC: 98 MMOL/L — SIGNIFICANT CHANGE UP (ref 98–110)
CO2 SERPL-SCNC: 18 MMOL/L — SIGNIFICANT CHANGE UP (ref 17–32)
CREAT SERPL-MCNC: 0.6 MG/DL — LOW (ref 0.7–1.5)
EOSINOPHIL # BLD AUTO: 0.05 K/UL — SIGNIFICANT CHANGE UP (ref 0–0.7)
EOSINOPHIL NFR BLD AUTO: 1.4 % — SIGNIFICANT CHANGE UP (ref 0–8)
GLUCOSE SERPL-MCNC: 108 MG/DL — HIGH (ref 70–99)
HCT VFR BLD CALC: 34.7 % — LOW (ref 37–47)
HGB BLD-MCNC: 11.7 G/DL — LOW (ref 12–16)
IMM GRANULOCYTES NFR BLD AUTO: 0.3 % — SIGNIFICANT CHANGE UP (ref 0.1–0.3)
LACTATE SERPL-SCNC: 1.1 MMOL/L — SIGNIFICANT CHANGE UP (ref 0.7–2)
LYMPHOCYTES # BLD AUTO: 0.47 K/UL — LOW (ref 1.2–3.4)
LYMPHOCYTES # BLD AUTO: 13.3 % — LOW (ref 20.5–51.1)
MAGNESIUM SERPL-MCNC: 2.3 MG/DL — SIGNIFICANT CHANGE UP (ref 1.8–2.4)
MCHC RBC-ENTMCNC: 33.7 G/DL — SIGNIFICANT CHANGE UP (ref 32–37)
MCHC RBC-ENTMCNC: 33.7 PG — HIGH (ref 27–31)
MCV RBC AUTO: 100 FL — HIGH (ref 81–99)
MONOCYTES # BLD AUTO: 0.17 K/UL — SIGNIFICANT CHANGE UP (ref 0.1–0.6)
MONOCYTES NFR BLD AUTO: 4.8 % — SIGNIFICANT CHANGE UP (ref 1.7–9.3)
NEUTROPHILS # BLD AUTO: 2.82 K/UL — SIGNIFICANT CHANGE UP (ref 1.4–6.5)
NEUTROPHILS NFR BLD AUTO: 79.6 % — HIGH (ref 42.2–75.2)
NRBC # BLD: 0 /100 WBCS — SIGNIFICANT CHANGE UP (ref 0–0)
PHOSPHATE SERPL-MCNC: 2.5 MG/DL — SIGNIFICANT CHANGE UP (ref 2.1–4.9)
PLATELET # BLD AUTO: 145 K/UL — SIGNIFICANT CHANGE UP (ref 130–400)
POTASSIUM SERPL-MCNC: 4.5 MMOL/L — SIGNIFICANT CHANGE UP (ref 3.5–5)
POTASSIUM SERPL-SCNC: 4.5 MMOL/L — SIGNIFICANT CHANGE UP (ref 3.5–5)
PROT SERPL-MCNC: 6.5 G/DL — SIGNIFICANT CHANGE UP (ref 6–8)
RBC # BLD: 3.47 M/UL — LOW (ref 4.2–5.4)
RBC # FLD: 13.4 % — SIGNIFICANT CHANGE UP (ref 11.5–14.5)
SARS-COV-2 IGG SERPL QL IA: NEGATIVE — SIGNIFICANT CHANGE UP
SARS-COV-2 IGM SERPL IA-ACNC: <0.1 INDEX — SIGNIFICANT CHANGE UP
SODIUM SERPL-SCNC: 135 MMOL/L — SIGNIFICANT CHANGE UP (ref 135–146)
WBC # BLD: 3.54 K/UL — LOW (ref 4.8–10.8)
WBC # FLD AUTO: 3.54 K/UL — LOW (ref 4.8–10.8)

## 2020-11-13 PROCEDURE — 99233 SBSQ HOSP IP/OBS HIGH 50: CPT

## 2020-11-13 PROCEDURE — 93010 ELECTROCARDIOGRAM REPORT: CPT | Mod: 76

## 2020-11-13 RX ORDER — FAMOTIDINE 10 MG/ML
20 INJECTION INTRAVENOUS ONCE
Refills: 0 | Status: DISCONTINUED | OUTPATIENT
Start: 2020-11-13 | End: 2020-11-13

## 2020-11-13 RX ORDER — FAMOTIDINE 10 MG/ML
20 INJECTION INTRAVENOUS ONCE
Refills: 0 | Status: COMPLETED | OUTPATIENT
Start: 2020-11-13 | End: 2020-11-13

## 2020-11-13 RX ORDER — DEXAMETHASONE 0.5 MG/5ML
8 ELIXIR ORAL ONCE
Refills: 0 | Status: COMPLETED | OUTPATIENT
Start: 2020-11-13 | End: 2020-11-13

## 2020-11-13 RX ORDER — DEXAMETHASONE 0.5 MG/5ML
8 ELIXIR ORAL ONCE
Refills: 0 | Status: DISCONTINUED | OUTPATIENT
Start: 2020-11-13 | End: 2020-11-13

## 2020-11-13 RX ADMIN — MORPHINE SULFATE 4 MILLIGRAM(S): 50 CAPSULE, EXTENDED RELEASE ORAL at 15:00

## 2020-11-13 RX ADMIN — MORPHINE SULFATE 4 MILLIGRAM(S): 50 CAPSULE, EXTENDED RELEASE ORAL at 20:20

## 2020-11-13 RX ADMIN — MORPHINE SULFATE 4 MILLIGRAM(S): 50 CAPSULE, EXTENDED RELEASE ORAL at 15:01

## 2020-11-13 RX ADMIN — MORPHINE SULFATE 4 MILLIGRAM(S): 50 CAPSULE, EXTENDED RELEASE ORAL at 07:54

## 2020-11-13 RX ADMIN — PANTOPRAZOLE SODIUM 40 MILLIGRAM(S): 20 TABLET, DELAYED RELEASE ORAL at 11:56

## 2020-11-13 RX ADMIN — Medication 1 MILLIGRAM(S): at 21:19

## 2020-11-13 RX ADMIN — FAMOTIDINE 20 MILLIGRAM(S): 10 INJECTION INTRAVENOUS at 21:17

## 2020-11-13 RX ADMIN — MORPHINE SULFATE 4 MILLIGRAM(S): 50 CAPSULE, EXTENDED RELEASE ORAL at 20:01

## 2020-11-13 RX ADMIN — Medication 3 CAPSULE(S): at 21:18

## 2020-11-13 RX ADMIN — MORPHINE SULFATE 4 MILLIGRAM(S): 50 CAPSULE, EXTENDED RELEASE ORAL at 15:54

## 2020-11-13 RX ADMIN — MORPHINE SULFATE 4 MILLIGRAM(S): 50 CAPSULE, EXTENDED RELEASE ORAL at 05:15

## 2020-11-13 RX ADMIN — MORPHINE SULFATE 4 MILLIGRAM(S): 50 CAPSULE, EXTENDED RELEASE ORAL at 00:22

## 2020-11-13 RX ADMIN — MORPHINE SULFATE 4 MILLIGRAM(S): 50 CAPSULE, EXTENDED RELEASE ORAL at 00:37

## 2020-11-13 RX ADMIN — SODIUM CHLORIDE 150 MILLILITER(S): 9 INJECTION, SOLUTION INTRAVENOUS at 18:17

## 2020-11-13 RX ADMIN — MORPHINE SULFATE 4 MILLIGRAM(S): 50 CAPSULE, EXTENDED RELEASE ORAL at 23:47

## 2020-11-13 RX ADMIN — MORPHINE SULFATE 4 MILLIGRAM(S): 50 CAPSULE, EXTENDED RELEASE ORAL at 04:57

## 2020-11-13 RX ADMIN — Medication 8 MILLIGRAM(S): at 07:29

## 2020-11-13 RX ADMIN — Medication 1 MILLIGRAM(S): at 00:19

## 2020-11-13 RX ADMIN — MORPHINE SULFATE 4 MILLIGRAM(S): 50 CAPSULE, EXTENDED RELEASE ORAL at 18:17

## 2020-11-13 RX ADMIN — MORPHINE SULFATE 4 MILLIGRAM(S): 50 CAPSULE, EXTENDED RELEASE ORAL at 12:01

## 2020-11-13 NOTE — PROGRESS NOTE ADULT - SUBJECTIVE AND OBJECTIVE BOX
LAURA BARAJAS  45y  Female      Patient is a 45y old  Female who presents with a chief complaint of abdominal pain and chest tightness (2020 09:54)      INTERVAL HPI/OVERNIGHT EVENTS:  Still with abdominal pain, she wants to try ice chips and liquid diet.   Vital Signs Last 24 Hrs  T(C): 36.8 (2020 04:41), Max: 36.8 (2020 04:41)  T(F): 98.3 (2020 04:41), Max: 98.3 (2020 04:41)  HR: 109 (2020 04:41) (81 - 109)  BP: 137/104 (2020 04:41) (127/91 - 137/104)  BP(mean): --  RR: 18 (2020 04:41) (18 - 18)  SpO2: --      20 @ 07:01  -  20 @ 07:00  --------------------------------------------------------  IN: 0 mL / OUT: 650 mL / NET: -650 mL            Consultant(s) Notes Reviewed:  [x ] YES  [ ] NO          MEDICATIONS  (STANDING):  chlorhexidine 4% Liquid 1 Application(s) Topical <User Schedule>  enoxaparin Injectable 40 milliGRAM(s) SubCutaneous daily  lactated ringers. 1000 milliLiter(s) (150 mL/Hr) IV Continuous <Continuous>  melatonin 1 milliGRAM(s) Oral at bedtime  multivitamin 1 Tablet(s) Oral daily  pancrelipase  (CREON 12,000 Lipase Units) 3 Capsule(s) Oral four times a day with meals  pantoprazole  Injectable 40 milliGRAM(s) IV Push daily  sucralfate 1 Gram(s) Oral four times a day  thiamine 100 milliGRAM(s) Oral daily    MEDICATIONS  (PRN):  morphine  - Injectable 4 milliGRAM(s) IV Push every 3 hours PRN Severe Pain (7 - 10)      LABS                          11.7   3.54  )-----------( 145      ( 2020 05:43 )             34.7     11-13    135  |  98  |  5<L>  ----------------------------<  108<H>  4.5   |  18  |  0.6<L>    Ca    8.6      2020 07:28  Phos  2.5       Mg     2.3         TPro  6.5  /  Alb  3.9  /  TBili  1.0  /  DBili  0.2  /  AST  292<H>  /  ALT  58<H>  /  AlkPhos  92        Urinalysis Basic - ( 2020 15:30 )    Color: Yellow / Appearance: Clear / S.018 / pH: x  Gluc: x / Ketone: Large  / Bili: Small / Urobili: <2 mg/dL   Blood: x / Protein: Trace / Nitrite: Negative   Leuk Esterase: Negative / RBC: x / WBC x   Sq Epi: x / Non Sq Epi: x / Bacteria: x        Lactate Trend    CARDIAC MARKERS ( 2020 21:45 )  x     / <0.01 ng/mL / x     / x     / x      CARDIAC MARKERS ( 2020 04:16 )  x     / <0.01 ng/mL / x     / x     / x          CAPILLARY BLOOD GLUCOSE            RADIOLOGY & ADDITIONAL TESTS:    Imaging Personally Reviewed:  [ ] YES  [ ] NO    HEALTH ISSUES - PROBLEM Dx:          PHYSICAL EXAM:  GENERAL: NAD, well-developed.  HEAD:  Atraumatic, Normocephalic.  EYES: EOMI, PERRLA, conjunctiva and sclera clear.  NECK: Supple, No JVD.  CHEST/LUNG: Clear to auscultation bilaterally; No wheeze.  HEART: Regular rate and rhythm; S1 S2.   ABDOMEN: Soft, epigastric tenderness down to the mid abdomen, mild guarding, no rebound or rigidity.   EXTREMITIES:  2+ Peripheral Pulses, No clubbing, cyanosis, or edema.  PSYCH: AAOx3.  NEUROLOGY: non-focal.  SKIN: No rashes or lesions. [Normal Growth] : growth [Normal Development] : development [Parental Well-Being] : parental well-being [Family Adjustment] : family adjustment [Feeding Routines] : feeding routines [Safety] : safety [Infant Adjustment] : infant adjustment [] : The components of the vaccine(s) to be administered today are listed in the plan of care. The disease(s) for which the vaccine(s) are intended to prevent and the risks have been discussed with the caretaker.  The risks are also included in the appropriate vaccination information statements which have been provided to the patient's caregiver.  The caregiver has given consent to vaccinate. [FreeTextEntry1] : Long discussion about vaccine
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
## 2020-11-13 NOTE — PROGRESS NOTE ADULT - SUBJECTIVE AND OBJECTIVE BOX
Patient is a 45y old  Female who presents with a chief complaint of abdominal pain and chest tightness (2020 10:33)      OVERNIGHT EVENTS: remains in pain, not able to tolerate liquid yet     SUBJECTIVE / INTERVAL HPI: Patient seen and examined at bedside.     VITAL SIGNS:  Vital Signs Last 24 Hrs  T(C): 36.8 (2020 04:41), Max: 36.8 (2020 04:41)  T(F): 98.3 (2020 04:41), Max: 98.3 (2020 04:41)  HR: 109 (2020 04:41) (81 - 109)  BP: 137/104 (2020 04:41) (127/91 - 137/104)  BP(mean): --  RR: 18 (2020 04:41) (18 - 18)  SpO2: --    PHYSICAL EXAM:    General: WDWN  HEENT: NC/AT; PERRL, clear conjunctiva  Neck: supple  Cardiovascular: +S1/S2; RRR  Respiratory: CTA b/l; no W/R/R  Gastrointestinal: moderate tenderness at upper abd, no rebound or guarding, NT/ND; +BSx4  Extremities: WWP; 2+ peripheral pulses; no edema   Neurological: AAOx3; no focal deficits    MEDICATIONS:  MEDICATIONS  (STANDING):  chlorhexidine 4% Liquid 1 Application(s) Topical <User Schedule>  enoxaparin Injectable 40 milliGRAM(s) SubCutaneous daily  lactated ringers. 1000 milliLiter(s) (150 mL/Hr) IV Continuous <Continuous>  melatonin 1 milliGRAM(s) Oral at bedtime  multivitamin 1 Tablet(s) Oral daily  pancrelipase  (CREON 12,000 Lipase Units) 3 Capsule(s) Oral four times a day with meals  pantoprazole  Injectable 40 milliGRAM(s) IV Push daily  sucralfate 1 Gram(s) Oral four times a day  thiamine 100 milliGRAM(s) Oral daily    MEDICATIONS  (PRN):  morphine  - Injectable 4 milliGRAM(s) IV Push every 3 hours PRN Severe Pain (7 - 10)      ALLERGIES:  Allergies    Compazine (Unknown)    Intolerances        LABS:                        11.7   3.54  )-----------( 145      ( 2020 05:43 )             34.7     11-13    135  |  98  |  5<L>  ----------------------------<  108<H>  4.5   |  18  |  0.6<L>    Ca    8.6      2020 07:28  Phos  2.5       Mg     2.3         TPro  6.5  /  Alb  3.9  /  TBili  1.0  /  DBili  0.2  /  AST  292<H>  /  ALT  58<H>  /  AlkPhos  92        Urinalysis Basic - ( 2020 15:30 )    Color: Yellow / Appearance: Clear / S.018 / pH: x  Gluc: x / Ketone: Large  / Bili: Small / Urobili: <2 mg/dL   Blood: x / Protein: Trace / Nitrite: Negative   Leuk Esterase: Negative / RBC: x / WBC x   Sq Epi: x / Non Sq Epi: x / Bacteria: x    < from: CT Abdomen and Pelvis w/ Oral Cont and w/ IV Cont (20 @ 23:58) >  Acute on chronic pancreatitis.  Interval decrease in size of a pancreatic tail pseudocyst, now measuring 1.9 x 1.9 cm (previously 6.6 x 5.8 cm).  Nonew loculated peripancreatic collections.  Marked attenuation of the splenic vein at the pancreatic body, however remains patent.    < end of copied text >  < from: CT Abdomen and Pelvis w/ Oral Cont and w/ IV Cont (20 @ 23:58) >  There is a new apparent 9 mm hyperenha< from: CT Abdomen and Pelvis w/ Oral Cont and w/ IV Cont (20 @ 23:58) >  Apparent new 0.9 cm hyperenhancing focus along the outer wall in the region of the gastroduodenal junction. While this may reflect an adjacent lymph node, differential includes ulcer disease or possible pseudoaneurysm. Follow-up CTA examination is recommended.    < end of copied text >  ncing focus along the greater curvature of the gastroduodenal junction (    < end of copied text >
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
## 2020-11-14 LAB
ALBUMIN SERPL ELPH-MCNC: 4 G/DL — SIGNIFICANT CHANGE UP (ref 3.5–5.2)
ALP SERPL-CCNC: 86 U/L — SIGNIFICANT CHANGE UP (ref 30–115)
ALT FLD-CCNC: 59 U/L — HIGH (ref 0–41)
ANION GAP SERPL CALC-SCNC: 17 MMOL/L — HIGH (ref 7–14)
APTT BLD: 25 SEC — LOW (ref 27–39.2)
AST SERPL-CCNC: 264 U/L — HIGH (ref 0–41)
BASOPHILS # BLD AUTO: 0.01 K/UL — SIGNIFICANT CHANGE UP (ref 0–0.2)
BASOPHILS NFR BLD AUTO: 0.2 % — SIGNIFICANT CHANGE UP (ref 0–1)
BILIRUB DIRECT SERPL-MCNC: 0.2 MG/DL — SIGNIFICANT CHANGE UP (ref 0–0.2)
BILIRUB INDIRECT FLD-MCNC: 0.7 MG/DL — SIGNIFICANT CHANGE UP (ref 0.2–1.2)
BILIRUB SERPL-MCNC: 0.9 MG/DL — SIGNIFICANT CHANGE UP (ref 0.2–1.2)
BILIRUB SERPL-MCNC: 0.9 MG/DL — SIGNIFICANT CHANGE UP (ref 0.2–1.2)
BUN SERPL-MCNC: 4 MG/DL — LOW (ref 10–20)
CALCIUM SERPL-MCNC: 9.3 MG/DL — SIGNIFICANT CHANGE UP (ref 8.5–10.1)
CHLORIDE SERPL-SCNC: 97 MMOL/L — LOW (ref 98–110)
CO2 SERPL-SCNC: 23 MMOL/L — SIGNIFICANT CHANGE UP (ref 17–32)
CREAT SERPL-MCNC: 0.5 MG/DL — LOW (ref 0.7–1.5)
EOSINOPHIL # BLD AUTO: 0.01 K/UL — SIGNIFICANT CHANGE UP (ref 0–0.7)
EOSINOPHIL NFR BLD AUTO: 0.2 % — SIGNIFICANT CHANGE UP (ref 0–8)
GLUCOSE SERPL-MCNC: 105 MG/DL — HIGH (ref 70–99)
HCT VFR BLD CALC: 34.4 % — LOW (ref 37–47)
HGB BLD-MCNC: 11.7 G/DL — LOW (ref 12–16)
IMM GRANULOCYTES NFR BLD AUTO: 0.2 % — SIGNIFICANT CHANGE UP (ref 0.1–0.3)
INR BLD: 0.98 RATIO — SIGNIFICANT CHANGE UP (ref 0.65–1.3)
LACTATE SERPL-SCNC: 1.4 MMOL/L — SIGNIFICANT CHANGE UP (ref 0.7–2)
LACTATE SERPL-SCNC: 2.3 MMOL/L — HIGH (ref 0.7–2)
LYMPHOCYTES # BLD AUTO: 0.68 K/UL — LOW (ref 1.2–3.4)
LYMPHOCYTES # BLD AUTO: 13.8 % — LOW (ref 20.5–51.1)
MAGNESIUM SERPL-MCNC: 1.4 MG/DL — LOW (ref 1.8–2.4)
MCHC RBC-ENTMCNC: 33.6 PG — HIGH (ref 27–31)
MCHC RBC-ENTMCNC: 34 G/DL — SIGNIFICANT CHANGE UP (ref 32–37)
MCV RBC AUTO: 98.9 FL — SIGNIFICANT CHANGE UP (ref 81–99)
MONOCYTES # BLD AUTO: 0.28 K/UL — SIGNIFICANT CHANGE UP (ref 0.1–0.6)
MONOCYTES NFR BLD AUTO: 5.7 % — SIGNIFICANT CHANGE UP (ref 1.7–9.3)
NEUTROPHILS # BLD AUTO: 3.92 K/UL — SIGNIFICANT CHANGE UP (ref 1.4–6.5)
NEUTROPHILS NFR BLD AUTO: 79.9 % — HIGH (ref 42.2–75.2)
NRBC # BLD: 0 /100 WBCS — SIGNIFICANT CHANGE UP (ref 0–0)
PLATELET # BLD AUTO: 147 K/UL — SIGNIFICANT CHANGE UP (ref 130–400)
POTASSIUM SERPL-MCNC: 3.4 MMOL/L — LOW (ref 3.5–5)
POTASSIUM SERPL-SCNC: 3.4 MMOL/L — LOW (ref 3.5–5)
PROT SERPL-MCNC: 6.6 G/DL — SIGNIFICANT CHANGE UP (ref 6–8)
PROTHROM AB SERPL-ACNC: 11.3 SEC — SIGNIFICANT CHANGE UP (ref 9.95–12.87)
RBC # BLD: 3.48 M/UL — LOW (ref 4.2–5.4)
RBC # FLD: 13.4 % — SIGNIFICANT CHANGE UP (ref 11.5–14.5)
SODIUM SERPL-SCNC: 137 MMOL/L — SIGNIFICANT CHANGE UP (ref 135–146)
WBC # BLD: 4.91 K/UL — SIGNIFICANT CHANGE UP (ref 4.8–10.8)
WBC # FLD AUTO: 4.91 K/UL — SIGNIFICANT CHANGE UP (ref 4.8–10.8)

## 2020-11-14 PROCEDURE — 99233 SBSQ HOSP IP/OBS HIGH 50: CPT

## 2020-11-14 RX ORDER — FAMOTIDINE 10 MG/ML
20 INJECTION INTRAVENOUS ONCE
Refills: 0 | Status: COMPLETED | OUTPATIENT
Start: 2020-11-14 | End: 2020-11-14

## 2020-11-14 RX ORDER — POTASSIUM CHLORIDE 20 MEQ
20 PACKET (EA) ORAL
Refills: 0 | Status: COMPLETED | OUTPATIENT
Start: 2020-11-14 | End: 2020-11-14

## 2020-11-14 RX ORDER — MORPHINE SULFATE 50 MG/1
5 CAPSULE, EXTENDED RELEASE ORAL
Refills: 0 | Status: DISCONTINUED | OUTPATIENT
Start: 2020-11-14 | End: 2020-11-15

## 2020-11-14 RX ORDER — MAGNESIUM SULFATE 500 MG/ML
2 VIAL (ML) INJECTION ONCE
Refills: 0 | Status: COMPLETED | OUTPATIENT
Start: 2020-11-14 | End: 2020-11-14

## 2020-11-14 RX ADMIN — Medication 50 MILLIEQUIVALENT(S): at 12:37

## 2020-11-14 RX ADMIN — Medication 1 MILLIGRAM(S): at 22:04

## 2020-11-14 RX ADMIN — MORPHINE SULFATE 4 MILLIGRAM(S): 50 CAPSULE, EXTENDED RELEASE ORAL at 05:40

## 2020-11-14 RX ADMIN — Medication 25 GRAM(S): at 11:56

## 2020-11-14 RX ADMIN — MORPHINE SULFATE 4 MILLIGRAM(S): 50 CAPSULE, EXTENDED RELEASE ORAL at 05:32

## 2020-11-14 RX ADMIN — Medication 3 CAPSULE(S): at 17:36

## 2020-11-14 RX ADMIN — FAMOTIDINE 20 MILLIGRAM(S): 10 INJECTION INTRAVENOUS at 05:39

## 2020-11-14 RX ADMIN — MORPHINE SULFATE 4 MILLIGRAM(S): 50 CAPSULE, EXTENDED RELEASE ORAL at 09:59

## 2020-11-14 RX ADMIN — MORPHINE SULFATE 4 MILLIGRAM(S): 50 CAPSULE, EXTENDED RELEASE ORAL at 16:50

## 2020-11-14 RX ADMIN — MORPHINE SULFATE 5 MILLIGRAM(S): 50 CAPSULE, EXTENDED RELEASE ORAL at 20:00

## 2020-11-14 RX ADMIN — Medication 1 GRAM(S): at 23:29

## 2020-11-14 RX ADMIN — PANTOPRAZOLE SODIUM 40 MILLIGRAM(S): 20 TABLET, DELAYED RELEASE ORAL at 11:57

## 2020-11-14 RX ADMIN — Medication 1 GRAM(S): at 17:38

## 2020-11-14 RX ADMIN — MORPHINE SULFATE 5 MILLIGRAM(S): 50 CAPSULE, EXTENDED RELEASE ORAL at 23:30

## 2020-11-14 RX ADMIN — Medication 3 CAPSULE(S): at 07:55

## 2020-11-14 RX ADMIN — Medication 3 CAPSULE(S): at 11:55

## 2020-11-14 RX ADMIN — MORPHINE SULFATE 5 MILLIGRAM(S): 50 CAPSULE, EXTENDED RELEASE ORAL at 19:30

## 2020-11-14 RX ADMIN — Medication 50 MILLIEQUIVALENT(S): at 16:44

## 2020-11-14 RX ADMIN — SODIUM CHLORIDE 150 MILLILITER(S): 9 INJECTION, SOLUTION INTRAVENOUS at 13:18

## 2020-11-14 RX ADMIN — Medication 3 CAPSULE(S): at 22:05

## 2020-11-14 RX ADMIN — MORPHINE SULFATE 5 MILLIGRAM(S): 50 CAPSULE, EXTENDED RELEASE ORAL at 15:04

## 2020-11-14 RX ADMIN — ENOXAPARIN SODIUM 40 MILLIGRAM(S): 100 INJECTION SUBCUTANEOUS at 11:57
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
## 2020-11-14 NOTE — PROGRESS NOTE ADULT - SUBJECTIVE AND OBJECTIVE BOX
LAURA BARAJAS  45y  Female      Patient is a 45y old  Female who presents with a chief complaint of abdominal pain and chest tightness (2020 11:48)      INTERVAL HPI/OVERNIGHT EVENTS:  She is still with epigastric pain, didn't tolerate liquid yesterday, she wants to try today, no fever. No diarrhea.   Vital Signs Last 24 Hrs  T(C): 36.7 (2020 05:30), Max: 37 (2020 13:57)  T(F): 98 (2020 05:30), Max: 98.6 (2020 13:57)  HR: 71 (2020 05:30) (71 - 84)  BP: 116/73 (2020 05:30) (114/74 - 135/82)  BP(mean): --  RR: 18 (2020 05:30) (18 - 18)  SpO2: --            Consultant(s) Notes Reviewed:  [x ] YES  [ ] NO          MEDICATIONS  (STANDING):  chlorhexidine 4% Liquid 1 Application(s) Topical <User Schedule>  enoxaparin Injectable 40 milliGRAM(s) SubCutaneous daily  lactated ringers. 1000 milliLiter(s) (150 mL/Hr) IV Continuous <Continuous>  melatonin 1 milliGRAM(s) Oral at bedtime  multivitamin 1 Tablet(s) Oral daily  pancrelipase  (CREON 12,000 Lipase Units) 3 Capsule(s) Oral four times a day with meals  pantoprazole  Injectable 40 milliGRAM(s) IV Push daily  potassium chloride  20 mEq/100 mL IVPB 20 milliEquivalent(s) IV Intermittent every 2 hours  sucralfate 1 Gram(s) Oral four times a day  thiamine 100 milliGRAM(s) Oral daily    MEDICATIONS  (PRN):  morphine  - Injectable 5 milliGRAM(s) IV Push every 3 hours PRN Severe Pain (7 - 10)      LABS                          11.7   4.91  )-----------( 147      ( 2020 06:42 )             34.4     11-14    137  |  97<L>  |  4<L>  ----------------------------<  105<H>  3.4<L>   |  23  |  0.5<L>    Ca    9.3      2020 06:42  Phos  2.5     11-13  Mg     1.4     14    TPro  6.6  /  Alb  4.0  /  TBili  0.9  /  DBili  0.2  /  AST  264<H>  /  ALT  59<H>  /  AlkPhos  86  -14      Urinalysis Basic - ( 2020 15:30 )    Color: Yellow / Appearance: Clear / S.018 / pH: x  Gluc: x / Ketone: Large  / Bili: Small / Urobili: <2 mg/dL   Blood: x / Protein: Trace / Nitrite: Negative   Leuk Esterase: Negative / RBC: x / WBC x   Sq Epi: x / Non Sq Epi: x / Bacteria: x      PT/INR - ( 2020 06:42 )   PT: 11.30 sec;   INR: 0.98 ratio         PTT - ( 2020 06:42 )  PTT:25.0 sec  Lactate Trend   @ 06:42 Lactate:2.3   -13 @ 11:00 Lactate:1.1     CARDIAC MARKERS ( 2020 21:45 )  x     / <0.01 ng/mL / x     / x     / x          CAPILLARY BLOOD GLUCOSE            RADIOLOGY & ADDITIONAL TESTS:    Imaging Personally Reviewed:  [ ] YES  [ ] NO    HEALTH ISSUES - PROBLEM Dx:          PHYSICAL EXAM:  GENERAL: NAD, well-developed.  HEAD:  Atraumatic, Normocephalic.  EYES: EOMI, PERRLA, conjunctiva and sclera clear.  NECK: Supple, No JVD.  CHEST/LUNG: Clear to auscultation bilaterally; No wheeze.  HEART: Regular rate and rhythm; S1 S2.   ABDOMEN: Soft, epigastric tenderness down to the mid abdomen, mild guarding, no rebound or rigidity.   EXTREMITIES:  2+ Peripheral Pulses, No clubbing, cyanosis, or edema.  PSYCH: AAOx3.  NEUROLOGY: non-focal.  SKIN: No rashes or lesions.
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
## 2020-11-15 PROCEDURE — 99232 SBSQ HOSP IP/OBS MODERATE 35: CPT

## 2020-11-15 PROCEDURE — 93010 ELECTROCARDIOGRAM REPORT: CPT

## 2020-11-15 RX ORDER — ONDANSETRON 8 MG/1
4 TABLET, FILM COATED ORAL ONCE
Refills: 0 | Status: COMPLETED | OUTPATIENT
Start: 2020-11-15 | End: 2020-11-15

## 2020-11-15 RX ORDER — MORPHINE SULFATE 50 MG/1
2 CAPSULE, EXTENDED RELEASE ORAL EVERY 4 HOURS
Refills: 0 | Status: DISCONTINUED | OUTPATIENT
Start: 2020-11-15 | End: 2020-11-16

## 2020-11-15 RX ORDER — ACETAMINOPHEN 500 MG
650 TABLET ORAL EVERY 6 HOURS
Refills: 0 | Status: DISCONTINUED | OUTPATIENT
Start: 2020-11-15 | End: 2020-11-16

## 2020-11-15 RX ADMIN — MORPHINE SULFATE 5 MILLIGRAM(S): 50 CAPSULE, EXTENDED RELEASE ORAL at 03:31

## 2020-11-15 RX ADMIN — MORPHINE SULFATE 5 MILLIGRAM(S): 50 CAPSULE, EXTENDED RELEASE ORAL at 02:14

## 2020-11-15 RX ADMIN — MORPHINE SULFATE 5 MILLIGRAM(S): 50 CAPSULE, EXTENDED RELEASE ORAL at 04:14

## 2020-11-15 RX ADMIN — Medication 3 CAPSULE(S): at 08:18

## 2020-11-15 RX ADMIN — ENOXAPARIN SODIUM 40 MILLIGRAM(S): 100 INJECTION SUBCUTANEOUS at 11:34

## 2020-11-15 RX ADMIN — MORPHINE SULFATE 5 MILLIGRAM(S): 50 CAPSULE, EXTENDED RELEASE ORAL at 06:30

## 2020-11-15 RX ADMIN — Medication 3 CAPSULE(S): at 11:33

## 2020-11-15 RX ADMIN — Medication 3 CAPSULE(S): at 16:40

## 2020-11-15 RX ADMIN — MORPHINE SULFATE 5 MILLIGRAM(S): 50 CAPSULE, EXTENDED RELEASE ORAL at 10:08

## 2020-11-15 RX ADMIN — MORPHINE SULFATE 5 MILLIGRAM(S): 50 CAPSULE, EXTENDED RELEASE ORAL at 07:00

## 2020-11-15 RX ADMIN — Medication 1 MILLIGRAM(S): at 21:17

## 2020-11-15 RX ADMIN — MORPHINE SULFATE 2 MILLIGRAM(S): 50 CAPSULE, EXTENDED RELEASE ORAL at 16:41

## 2020-11-15 RX ADMIN — ONDANSETRON 4 MILLIGRAM(S): 8 TABLET, FILM COATED ORAL at 11:34

## 2020-11-15 RX ADMIN — MORPHINE SULFATE 5 MILLIGRAM(S): 50 CAPSULE, EXTENDED RELEASE ORAL at 09:38

## 2020-11-15 RX ADMIN — MORPHINE SULFATE 2 MILLIGRAM(S): 50 CAPSULE, EXTENDED RELEASE ORAL at 21:12

## 2020-11-15 RX ADMIN — PANTOPRAZOLE SODIUM 40 MILLIGRAM(S): 20 TABLET, DELAYED RELEASE ORAL at 11:34

## 2020-11-15 RX ADMIN — SODIUM CHLORIDE 150 MILLILITER(S): 9 INJECTION, SOLUTION INTRAVENOUS at 11:30

## 2020-11-15 RX ADMIN — MORPHINE SULFATE 2 MILLIGRAM(S): 50 CAPSULE, EXTENDED RELEASE ORAL at 21:44

## 2020-11-15 RX ADMIN — Medication 1 GRAM(S): at 23:19

## 2020-11-15 RX ADMIN — MORPHINE SULFATE 2 MILLIGRAM(S): 50 CAPSULE, EXTENDED RELEASE ORAL at 17:21
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
## 2020-11-15 NOTE — PROGRESS NOTE ADULT - SUBJECTIVE AND OBJECTIVE BOX
LAURA BARAJAS  45y  Female      Patient is a 45y old  Female who presents with a chief complaint of abdominal pain and chest tightness (15 Nov 2020 09:07)      INTERVAL HPI/OVERNIGHT EVENTS:  Still with abdominal pain, refusing to decrease the dose of the Morphine, seems to tolerate the liquid diet.   Vital Signs Last 24 Hrs  T(C): 36.5 (15 Nov 2020 12:19), Max: 36.8 (14 Nov 2020 20:28)  T(F): 97.7 (15 Nov 2020 12:19), Max: 98.2 (14 Nov 2020 20:28)  HR: 103 (15 Nov 2020 12:19) (71 - 103)  BP: 145/67 (15 Nov 2020 12:19) (130/67 - 145/67)  BP(mean): --  RR: 18 (15 Nov 2020 12:19) (18 - 18)  SpO2: 96% (15 Nov 2020 13:27) (96% - 96%)            Consultant(s) Notes Reviewed:  [x ] YES  [ ] NO          MEDICATIONS  (STANDING):  chlorhexidine 4% Liquid 1 Application(s) Topical <User Schedule>  enoxaparin Injectable 40 milliGRAM(s) SubCutaneous daily  lactated ringers. 1000 milliLiter(s) (150 mL/Hr) IV Continuous <Continuous>  melatonin 1 milliGRAM(s) Oral at bedtime  multivitamin 1 Tablet(s) Oral daily  pancrelipase  (CREON 12,000 Lipase Units) 3 Capsule(s) Oral four times a day with meals  pantoprazole  Injectable 40 milliGRAM(s) IV Push daily  sucralfate 1 Gram(s) Oral four times a day  thiamine 100 milliGRAM(s) Oral daily    MEDICATIONS  (PRN):  acetaminophen   Tablet .. 650 milliGRAM(s) Oral every 6 hours PRN Mild Pain (1 - 3), Moderate Pain (4 - 6)  aluminum hydroxide/magnesium hydroxide/simethicone Suspension 30 milliLiter(s) Oral every 6 hours PRN Dyspepsia  morphine  - Injectable 2 milliGRAM(s) IV Push every 4 hours PRN Severe Pain (7 - 10)      LABS                          11.7   4.91  )-----------( 147      ( 14 Nov 2020 06:42 )             34.4     11-14    137  |  97<L>  |  4<L>  ----------------------------<  105<H>  3.4<L>   |  23  |  0.5<L>    Ca    9.3      14 Nov 2020 06:42  Mg     1.4     11-14    TPro  6.6  /  Alb  4.0  /  TBili  0.9  /  DBili  0.2  /  AST  264<H>  /  ALT  59<H>  /  AlkPhos  86  11-14        PT/INR - ( 14 Nov 2020 06:42 )   PT: 11.30 sec;   INR: 0.98 ratio         PTT - ( 14 Nov 2020 06:42 )  PTT:25.0 sec  Lactate Trend  11-14 @ 17:19 Lactate:1.4   11-14 @ 06:42 Lactate:2.3   11-13 @ 11:00 Lactate:1.1         CAPILLARY BLOOD GLUCOSE            RADIOLOGY & ADDITIONAL TESTS:    Imaging Personally Reviewed:  [ ] YES  [ ] NO    HEALTH ISSUES - PROBLEM Dx:          PHYSICAL EXAM:  GENERAL: NAD, well-developed.  HEAD:  Atraumatic, Normocephalic.  EYES: EOMI, PERRLA, conjunctiva and sclera clear.  NECK: Supple, No JVD.  CHEST/LUNG: Clear to auscultation bilaterally; No wheeze.  HEART: Regular rate and rhythm; S1 S2.   ABDOMEN: Soft, epigastric tenderness, no rebound or rigidity, bowel sounds active.  EXTREMITIES:  2+ Peripheral Pulses, No clubbing, cyanosis, or edema.  PSYCH: AAOx3.  NEUROLOGY: non-focal.  SKIN: No rashes or lesions.
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
## 2020-11-16 ENCOUNTER — TRANSCRIPTION ENCOUNTER (OUTPATIENT)
Age: 45
End: 2020-11-16

## 2020-11-16 VITALS
RESPIRATION RATE: 18 BRPM | HEART RATE: 88 BPM | TEMPERATURE: 99 F | SYSTOLIC BLOOD PRESSURE: 121 MMHG | DIASTOLIC BLOOD PRESSURE: 79 MMHG

## 2020-11-16 LAB
ALBUMIN SERPL ELPH-MCNC: 3.6 G/DL — SIGNIFICANT CHANGE UP (ref 3.5–5.2)
ALP SERPL-CCNC: 83 U/L — SIGNIFICANT CHANGE UP (ref 30–115)
ALT FLD-CCNC: 80 U/L — HIGH (ref 0–41)
ANION GAP SERPL CALC-SCNC: 14 MMOL/L — SIGNIFICANT CHANGE UP (ref 7–14)
AST SERPL-CCNC: 302 U/L — HIGH (ref 0–41)
BASOPHILS # BLD AUTO: 0.02 K/UL — SIGNIFICANT CHANGE UP (ref 0–0.2)
BASOPHILS NFR BLD AUTO: 0.7 % — SIGNIFICANT CHANGE UP (ref 0–1)
BILIRUB SERPL-MCNC: 0.6 MG/DL — SIGNIFICANT CHANGE UP (ref 0.2–1.2)
BUN SERPL-MCNC: <3 MG/DL — LOW (ref 10–20)
CALCIUM SERPL-MCNC: 9 MG/DL — SIGNIFICANT CHANGE UP (ref 8.5–10.1)
CHLORIDE SERPL-SCNC: 101 MMOL/L — SIGNIFICANT CHANGE UP (ref 98–110)
CO2 SERPL-SCNC: 28 MMOL/L — SIGNIFICANT CHANGE UP (ref 17–32)
CREAT SERPL-MCNC: 0.5 MG/DL — LOW (ref 0.7–1.5)
EOSINOPHIL # BLD AUTO: 0.08 K/UL — SIGNIFICANT CHANGE UP (ref 0–0.7)
EOSINOPHIL NFR BLD AUTO: 2.9 % — SIGNIFICANT CHANGE UP (ref 0–8)
GLUCOSE SERPL-MCNC: 100 MG/DL — HIGH (ref 70–99)
HCT VFR BLD CALC: 31.4 % — LOW (ref 37–47)
HGB BLD-MCNC: 10.7 G/DL — LOW (ref 12–16)
IMM GRANULOCYTES NFR BLD AUTO: 0 % — LOW (ref 0.1–0.3)
LYMPHOCYTES # BLD AUTO: 0.81 K/UL — LOW (ref 1.2–3.4)
LYMPHOCYTES # BLD AUTO: 29.7 % — SIGNIFICANT CHANGE UP (ref 20.5–51.1)
MAGNESIUM SERPL-MCNC: 1.4 MG/DL — LOW (ref 1.8–2.4)
MCHC RBC-ENTMCNC: 33.1 PG — HIGH (ref 27–31)
MCHC RBC-ENTMCNC: 34.1 G/DL — SIGNIFICANT CHANGE UP (ref 32–37)
MCV RBC AUTO: 97.2 FL — SIGNIFICANT CHANGE UP (ref 81–99)
MONOCYTES # BLD AUTO: 0.36 K/UL — SIGNIFICANT CHANGE UP (ref 0.1–0.6)
MONOCYTES NFR BLD AUTO: 13.2 % — HIGH (ref 1.7–9.3)
NEUTROPHILS # BLD AUTO: 1.46 K/UL — SIGNIFICANT CHANGE UP (ref 1.4–6.5)
NEUTROPHILS NFR BLD AUTO: 53.5 % — SIGNIFICANT CHANGE UP (ref 42.2–75.2)
NRBC # BLD: 0 /100 WBCS — SIGNIFICANT CHANGE UP (ref 0–0)
PLATELET # BLD AUTO: 153 K/UL — SIGNIFICANT CHANGE UP (ref 130–400)
POTASSIUM SERPL-MCNC: 3.2 MMOL/L — LOW (ref 3.5–5)
POTASSIUM SERPL-SCNC: 3.2 MMOL/L — LOW (ref 3.5–5)
PROT SERPL-MCNC: 5.9 G/DL — LOW (ref 6–8)
RBC # BLD: 3.23 M/UL — LOW (ref 4.2–5.4)
RBC # FLD: 13.3 % — SIGNIFICANT CHANGE UP (ref 11.5–14.5)
SODIUM SERPL-SCNC: 143 MMOL/L — SIGNIFICANT CHANGE UP (ref 135–146)
WBC # BLD: 2.73 K/UL — LOW (ref 4.8–10.8)
WBC # FLD AUTO: 2.73 K/UL — LOW (ref 4.8–10.8)

## 2020-11-16 PROCEDURE — 99239 HOSP IP/OBS DSCHRG MGMT >30: CPT

## 2020-11-16 RX ORDER — POTASSIUM CHLORIDE 20 MEQ
40 PACKET (EA) ORAL EVERY 4 HOURS
Refills: 0 | Status: DISCONTINUED | OUTPATIENT
Start: 2020-11-16 | End: 2020-11-16

## 2020-11-16 RX ORDER — MAGNESIUM OXIDE 400 MG ORAL TABLET 241.3 MG
400 TABLET ORAL
Refills: 0 | Status: DISCONTINUED | OUTPATIENT
Start: 2020-11-16 | End: 2020-11-16

## 2020-11-16 RX ORDER — MAGNESIUM SULFATE 500 MG/ML
2 VIAL (ML) INJECTION ONCE
Refills: 0 | Status: COMPLETED | OUTPATIENT
Start: 2020-11-16 | End: 2020-11-16

## 2020-11-16 RX ORDER — POTASSIUM CHLORIDE 20 MEQ
40 PACKET (EA) ORAL EVERY 4 HOURS
Refills: 0 | Status: COMPLETED | OUTPATIENT
Start: 2020-11-16 | End: 2020-11-16

## 2020-11-16 RX ORDER — MAGNESIUM SULFATE 500 MG/ML
1 VIAL (ML) INJECTION ONCE
Refills: 0 | Status: COMPLETED | OUTPATIENT
Start: 2020-11-16 | End: 2020-11-16

## 2020-11-16 RX ADMIN — MORPHINE SULFATE 2 MILLIGRAM(S): 50 CAPSULE, EXTENDED RELEASE ORAL at 01:13

## 2020-11-16 RX ADMIN — MORPHINE SULFATE 2 MILLIGRAM(S): 50 CAPSULE, EXTENDED RELEASE ORAL at 01:52

## 2020-11-16 RX ADMIN — MORPHINE SULFATE 2 MILLIGRAM(S): 50 CAPSULE, EXTENDED RELEASE ORAL at 06:07

## 2020-11-16 RX ADMIN — MAGNESIUM OXIDE 400 MG ORAL TABLET 400 MILLIGRAM(S): 241.3 TABLET ORAL at 12:44

## 2020-11-16 RX ADMIN — Medication 40 MILLIEQUIVALENT(S): at 13:25

## 2020-11-16 RX ADMIN — Medication 100 GRAM(S): at 12:30

## 2020-11-16 RX ADMIN — Medication 50 GRAM(S): at 09:22

## 2020-11-16 RX ADMIN — PANTOPRAZOLE SODIUM 40 MILLIGRAM(S): 20 TABLET, DELAYED RELEASE ORAL at 11:28

## 2020-11-16 RX ADMIN — MORPHINE SULFATE 2 MILLIGRAM(S): 50 CAPSULE, EXTENDED RELEASE ORAL at 06:31

## 2020-11-16 RX ADMIN — Medication 40 MILLIEQUIVALENT(S): at 09:35

## 2020-11-16 RX ADMIN — ENOXAPARIN SODIUM 40 MILLIGRAM(S): 100 INJECTION SUBCUTANEOUS at 11:28

## 2020-11-16 RX ADMIN — Medication 100 MILLIGRAM(S): at 11:27

## 2020-11-16 RX ADMIN — SODIUM CHLORIDE 150 MILLILITER(S): 9 INJECTION, SOLUTION INTRAVENOUS at 01:58

## 2020-11-16 RX ADMIN — Medication 1 GRAM(S): at 11:31

## 2020-11-16 RX ADMIN — Medication 3 CAPSULE(S): at 06:43

## 2020-11-16 RX ADMIN — Medication 3 CAPSULE(S): at 11:32

## 2020-11-16 RX ADMIN — Medication 1 GRAM(S): at 08:06

## 2020-11-16 RX ADMIN — CHLORHEXIDINE GLUCONATE 1 APPLICATION(S): 213 SOLUTION TOPICAL at 06:07

## 2020-11-16 RX ADMIN — MORPHINE SULFATE 2 MILLIGRAM(S): 50 CAPSULE, EXTENDED RELEASE ORAL at 10:03

## 2020-11-16 RX ADMIN — MORPHINE SULFATE 2 MILLIGRAM(S): 50 CAPSULE, EXTENDED RELEASE ORAL at 10:20

## 2020-11-16 NOTE — DISCHARGE NOTE NURSING/CASE MANAGEMENT/SOCIAL WORK - PATIENT PORTAL LINK FT
You can access the FollowMyHealth Patient Portal offered by [facility] by registering at the following website: [email or web address]. By joining [employer] FollowMyHealth portal, you will also be able to view your health information using other applications (apps) compatible with our system.

## 2020-11-16 NOTE — DISCHARGE NOTE PROVIDER - NSDCCPCAREPLAN_GEN_ALL_CORE_FT
PRINCIPAL DISCHARGE DIAGNOSIS  Diagnosis: Recurrent pancreatitis  Assessment and Plan of Treatment: Secondary to alcohol abuse. Imaging showed interval decrease in size of a pseudocyst situated between the stomach and pancreatic tail measuring 1.9 x 1.9 cm (previously approximately 6.6 x 5.8 cm). There is marked attenuation of the splenic vein in the region of the pancreatic body which appears to remain patent. Receiving LR at 150 cc/hr. Tolerating full liquid diet. Pain adequately controlled. Please follow up with GI (Dr. Elizondo) outpatient for continued management of pancreatitis.      SECONDARY DISCHARGE DIAGNOSES  Diagnosis: Alcoholism  Assessment and Plan of Treatment: Last drink was last Sunday 11/8  -LFTs still elevated: AST//59 on 11/14 > 308/80 11/16

## 2020-11-16 NOTE — DISCHARGE NOTE PROVIDER - CARE PROVIDER_API CALL
Petey Eliozndo)  Gastroenterology; Internal Medicine  [address] [place] [address]  Phone: [phone]  Fax: [phone]  Established Patient  Follow Up Time: 1 week   Petey Elizondo)  Gastroenterology; Internal Medicine  [address] [place] [address]  Phone: [phone]  Fax: [phone]  Established Patient  Follow Up Time: 1 week    Haley Cobos  INTERNAL MEDICINE  [address] [facility] [address]  [place] [address]  Phone: [phone]  Fax: [phone]  Scheduled Appointment: 12/14/2020 02:30 PM
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
## 2020-11-16 NOTE — DISCHARGE NOTE PROVIDER - PROVIDER TOKENS
PROVIDER:[TOKEN:[[id number]],FOLLOWUP:[1 week],ESTABLISHEDPATIENT:[T]] PROVIDER:[TOKEN:[[id number]],FOLLOWUP:[1 week],ESTABLISHEDPATIENT:[T]],PROVIDER:[TOKEN:[[id number]],SCHEDULEDAPPT:[12/14/2020],SCHEDULEDAPPTTIME:[02:30 PM]]

## 2020-11-16 NOTE — DISCHARGE NOTE NURSING/CASE MANAGEMENT/SOCIAL WORK - NSDCFUADDAPPT_GEN_ALL_CORE_FT
[facility] at [address]  [phone]  Dr. Cobos  Date: 12/14/2020  Time: 2:30pm

## 2020-11-16 NOTE — DISCHARGE NOTE PROVIDER - NSDCMRMEDTOKEN_GEN_ALL_CORE_FT
melatonin 1 mg oral tablet: 1 tab(s) orally once a day (at bedtime)  Multiple Vitamins oral tablet: 1 tab(s) orally once a day  pancrelipase 12,000 units-38,000 units-60,000 units oral delayed release capsule: 3 cap(s) orally 4 times a day (with meals and at bedtime)  pantoprazole 40 mg oral delayed release tablet: 1 tab(s) orally 2 times a day  thiamine 100 mg oral tablet: 1 tab(s) orally once a day
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
## 2020-11-16 NOTE — DISCHARGE NOTE PROVIDER - NSDCFUADDAPPT_GEN_ALL_CORE_FT
[facility] Primary [facility] at [address]  [phone]  Dr. Cobos  Date: 12/14/2020  Time: 2:30pm

## 2020-11-16 NOTE — PROGRESS NOTE ADULT - SUBJECTIVE AND OBJECTIVE BOX
LAURA BARAJAS  45y  Female      Patient is a 45y old  Female who presents with a chief complaint of pancreatitis (16 Nov 2020 11:48)      INTERVAL HPI/OVERNIGHT EVENTS:  She feels better, still with mild pain.   Vital Signs Last 24 Hrs  T(C): 37.2 (16 Nov 2020 13:30), Max: 37.2 (16 Nov 2020 13:30)  T(F): 98.9 (16 Nov 2020 13:30), Max: 98.9 (16 Nov 2020 13:30)  HR: 88 (16 Nov 2020 13:30) (80 - 88)  BP: 121/79 (16 Nov 2020 13:30) (121/78 - 147/82)  BP(mean): --  RR: 18 (16 Nov 2020 13:30) (18 - 18)  SpO2: 98% (16 Nov 2020 08:04) (98% - 98%)      11-15-20 @ 07:01  -  11-16-20 @ 07:00  --------------------------------------------------------  IN: 3370 mL / OUT: 0 mL / NET: 3370 mL    11-16-20 @ 07:01  -  11-16-20 @ 15:14  --------------------------------------------------------  IN: 600 mL / OUT: 0 mL / NET: 600 mL            Consultant(s) Notes Reviewed:  [x ] YES  [ ] NO          MEDICATIONS  (STANDING):  chlorhexidine 4% Liquid 1 Application(s) Topical <User Schedule>  enoxaparin Injectable 40 milliGRAM(s) SubCutaneous daily  lactated ringers. 1000 milliLiter(s) (150 mL/Hr) IV Continuous <Continuous>  magnesium oxide 400 milliGRAM(s) Oral three times a day with meals  melatonin 1 milliGRAM(s) Oral at bedtime  multivitamin 1 Tablet(s) Oral daily  pancrelipase  (CREON 12,000 Lipase Units) 3 Capsule(s) Oral four times a day with meals  pantoprazole  Injectable 40 milliGRAM(s) IV Push daily  sucralfate 1 Gram(s) Oral four times a day  thiamine 100 milliGRAM(s) Oral daily    MEDICATIONS  (PRN):  acetaminophen   Tablet .. 650 milliGRAM(s) Oral every 6 hours PRN Mild Pain (1 - 3), Moderate Pain (4 - 6)  aluminum hydroxide/magnesium hydroxide/simethicone Suspension 30 milliLiter(s) Oral every 6 hours PRN Dyspepsia  morphine  - Injectable 2 milliGRAM(s) IV Push every 4 hours PRN Severe Pain (7 - 10)      LABS                          10.7   2.73  )-----------( 153      ( 16 Nov 2020 06:41 )             31.4     11-16    143  |  101  |  <3<L>  ----------------------------<  100<H>  3.2<L>   |  28  |  0.5<L>    Ca    9.0      16 Nov 2020 06:41  Mg     1.4     11-16    TPro  5.9<L>  /  Alb  3.6  /  TBili  0.6  /  DBili  x   /  AST  302<H>  /  ALT  80<H>  /  AlkPhos  83  11-16          Lactate Trend  11-14 @ 17:19 Lactate:1.4   11-14 @ 06:42 Lactate:2.3   11-13 @ 11:00 Lactate:1.1         CAPILLARY BLOOD GLUCOSE            RADIOLOGY & ADDITIONAL TESTS:    Imaging Personally Reviewed:  [ ] YES  [ ] NO    HEALTH ISSUES - PROBLEM Dx:          PHYSICAL EXAM:  GENERAL: NAD, well-developed.  HEAD:  Atraumatic, Normocephalic.  EYES: EOMI, PERRLA, conjunctiva and sclera clear.  NECK: Supple, No JVD.  CHEST/LUNG: Clear to auscultation bilaterally; No wheeze.  HEART: Regular rate and rhythm; S1 S2.   ABDOMEN: Soft, improving epigastric tenderness, no rebound or rigidity, bowel sounds active.  EXTREMITIES:  2+ Peripheral Pulses, No clubbing, cyanosis, or edema.  PSYCH: AAOx3.  NEUROLOGY: non-focal.  SKIN: No rashes or lesions.

## 2020-11-16 NOTE — DISCHARGE NOTE PROVIDER - CARE PROVIDERS DIRECT ADDRESSES
[email or web address]

## 2020-11-19 DIAGNOSIS — F10.20 ALCOHOL DEPENDENCE, UNCOMPLICATED: ICD-10-CM

## 2020-11-19 DIAGNOSIS — Z88.8 ALLERGY STATUS TO OTHER DRUGS, MEDICAMENTS AND BIOLOGICAL SUBSTANCES: ICD-10-CM

## 2020-11-19 DIAGNOSIS — E87.2 ACIDOSIS: ICD-10-CM

## 2020-11-19 DIAGNOSIS — K86.3 PSEUDOCYST OF PANCREAS: ICD-10-CM

## 2020-11-19 DIAGNOSIS — T74.91XA UNSPECIFIED ADULT MALTREATMENT, CONFIRMED, INITIAL ENCOUNTER: ICD-10-CM

## 2020-11-19 DIAGNOSIS — R00.2 PALPITATIONS: ICD-10-CM
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
## 2020-11-19 DIAGNOSIS — Z82.49 FAMILY HISTORY OF ISCHEMIC HEART DISEASE AND OTHER DISEASES OF THE CIRCULATORY SYSTEM: ICD-10-CM

## 2020-11-19 DIAGNOSIS — F41.1 GENERALIZED ANXIETY DISORDER: ICD-10-CM

## 2020-11-19 DIAGNOSIS — K85.20 ALCOHOL INDUCED ACUTE PANCREATITIS WITHOUT NECROSIS OR INFECTION: ICD-10-CM

## 2020-11-19 DIAGNOSIS — E53.8 DEFICIENCY OF OTHER SPECIFIED B GROUP VITAMINS: ICD-10-CM

## 2020-11-19 DIAGNOSIS — R07.89 OTHER CHEST PAIN: ICD-10-CM

## 2020-11-19 DIAGNOSIS — Z79.891 LONG TERM (CURRENT) USE OF OPIATE ANALGESIC: ICD-10-CM

## 2020-11-19 DIAGNOSIS — K70.10 ALCOHOLIC HEPATITIS WITHOUT ASCITES: ICD-10-CM

## 2020-11-19 DIAGNOSIS — R74.01 ELEVATION OF LEVELS OF LIVER TRANSAMINASE LEVELS: ICD-10-CM

## 2020-11-19 DIAGNOSIS — Z80.0 FAMILY HISTORY OF MALIGNANT NEOPLASM OF DIGESTIVE ORGANS: ICD-10-CM

## 2020-11-19 DIAGNOSIS — E61.2 MAGNESIUM DEFICIENCY: ICD-10-CM

## 2020-11-19 DIAGNOSIS — E51.9 THIAMINE DEFICIENCY, UNSPECIFIED: ICD-10-CM

## 2020-12-12 NOTE — ED ADULT NURSE NOTE - NS ED NOTE  TALK SOMEONE YN
Speech Language Pathology  [facility]  Speech Language Pathology    Date: 12/12/2020  Patient Name: Elise Newman  YOB: 1946   AGE: 69 y.o. MRN: [id number]        Patient Not Available for Speech Therapy     Due to:  [] Testing  [] Hemodialysis  [x] Cancelled by RN  [] Surgery   [] Intubation/Sedation/Pain Medication  [] Medical instability  [] Other: 9425 - Attempted to see Pt. for dysphagia therapy, however, deferred by RN. RN stating, \"Let's let today be a rest day for her\" and \"she can't keep anything down right now\". ST to continue to follow and attempt to see when Pt. is appropriate. Next scheduled treatment:   Completed by:  Woodrow Benson M.A., CCC-SLP No

## 2020-12-14 ENCOUNTER — APPOINTMENT (OUTPATIENT)
Dept: INTERNAL MEDICINE | Facility: CLINIC | Age: 45
End: 2020-12-14

## 2020-12-21 NOTE — ED ADULT NURSE REASSESSMENT NOTE - NSFALLRSKASSESSDT_ED_ALL_ED
Patient called in to speak to nurse regarding his high blood pressure. Patient stated it has been consistently 180/110. Patient is wondering if an adjustment is needing to be made to his medication or what he could do to control this. Patient is okay with messages through the [employer].    13-Feb-2020 01:42

## 2021-01-21 ENCOUNTER — INPATIENT (INPATIENT)
Facility: HOSPITAL | Age: 46
LOS: 2 days | Discharge: HOME | End: 2021-01-24
Attending: INTERNAL MEDICINE | Admitting: INTERNAL MEDICINE
Payer: MEDICAID

## 2021-01-21 VITALS
RESPIRATION RATE: 19 BRPM | SYSTOLIC BLOOD PRESSURE: 136 MMHG | HEART RATE: 83 BPM | TEMPERATURE: 99 F | DIASTOLIC BLOOD PRESSURE: 84 MMHG

## 2021-01-21 DIAGNOSIS — Z98.890 OTHER SPECIFIED POSTPROCEDURAL STATES: Chronic | ICD-10-CM

## 2021-01-21 DIAGNOSIS — Z87.2 PERSONAL HISTORY OF DISEASES OF THE SKIN AND SUBCUTANEOUS TISSUE: Chronic | ICD-10-CM

## 2021-01-21 LAB
ALBUMIN SERPL ELPH-MCNC: 4.8 G/DL — SIGNIFICANT CHANGE UP (ref 3.5–5.2)
ALP SERPL-CCNC: 197 U/L — HIGH (ref 30–115)
ALT FLD-CCNC: 89 U/L — HIGH (ref 0–41)
ANION GAP SERPL CALC-SCNC: 19 MMOL/L — HIGH (ref 7–14)
APPEARANCE UR: CLEAR — SIGNIFICANT CHANGE UP
AST SERPL-CCNC: 447 U/L — HIGH (ref 0–41)
BASOPHILS # BLD AUTO: 0.05 K/UL — SIGNIFICANT CHANGE UP (ref 0–0.2)
BASOPHILS NFR BLD AUTO: 0.9 % — SIGNIFICANT CHANGE UP (ref 0–1)
BILIRUB DIRECT SERPL-MCNC: 0.3 MG/DL — HIGH (ref 0–0.2)
BILIRUB INDIRECT FLD-MCNC: 1.2 MG/DL — SIGNIFICANT CHANGE UP (ref 0.2–1.2)
BILIRUB SERPL-MCNC: 1.5 MG/DL — HIGH (ref 0.2–1.2)
BILIRUB UR-MCNC: NEGATIVE — SIGNIFICANT CHANGE UP
BUN SERPL-MCNC: 7 MG/DL — LOW (ref 10–20)
CALCIUM SERPL-MCNC: 9.9 MG/DL — SIGNIFICANT CHANGE UP (ref 8.5–10.1)
CHLORIDE SERPL-SCNC: 94 MMOL/L — LOW (ref 98–110)
CO2 SERPL-SCNC: 24 MMOL/L — SIGNIFICANT CHANGE UP (ref 17–32)
COLOR SPEC: SIGNIFICANT CHANGE UP
CREAT SERPL-MCNC: 0.6 MG/DL — LOW (ref 0.7–1.5)
DIFF PNL FLD: NEGATIVE — SIGNIFICANT CHANGE UP
EOSINOPHIL # BLD AUTO: 0 K/UL — SIGNIFICANT CHANGE UP (ref 0–0.7)
EOSINOPHIL NFR BLD AUTO: 0 % — SIGNIFICANT CHANGE UP (ref 0–8)
GLUCOSE SERPL-MCNC: 103 MG/DL — HIGH (ref 70–99)
GLUCOSE UR QL: NEGATIVE — SIGNIFICANT CHANGE UP
HCG SERPL QL: NEGATIVE — SIGNIFICANT CHANGE UP
HCT VFR BLD CALC: 39.5 % — SIGNIFICANT CHANGE UP (ref 37–47)
HGB BLD-MCNC: 14.1 G/DL — SIGNIFICANT CHANGE UP (ref 12–16)
IMM GRANULOCYTES NFR BLD AUTO: 0.3 % — SIGNIFICANT CHANGE UP (ref 0.1–0.3)
KETONES UR-MCNC: ABNORMAL
LACTATE SERPL-SCNC: 1.6 MMOL/L — SIGNIFICANT CHANGE UP (ref 0.7–2)
LACTATE SERPL-SCNC: 2.7 MMOL/L — HIGH (ref 0.7–2)
LEUKOCYTE ESTERASE UR-ACNC: NEGATIVE — SIGNIFICANT CHANGE UP
LIDOCAIN IGE QN: 150 U/L — HIGH (ref 7–60)
LYMPHOCYTES # BLD AUTO: 0.7 K/UL — LOW (ref 1.2–3.4)
LYMPHOCYTES # BLD AUTO: 12 % — LOW (ref 20.5–51.1)
MCHC RBC-ENTMCNC: 34.7 PG — HIGH (ref 27–31)
MCHC RBC-ENTMCNC: 35.7 G/DL — SIGNIFICANT CHANGE UP (ref 32–37)
MCV RBC AUTO: 97.3 FL — SIGNIFICANT CHANGE UP (ref 81–99)
MONOCYTES # BLD AUTO: 0.4 K/UL — SIGNIFICANT CHANGE UP (ref 0.1–0.6)
MONOCYTES NFR BLD AUTO: 6.9 % — SIGNIFICANT CHANGE UP (ref 1.7–9.3)
NEUTROPHILS # BLD AUTO: 4.66 K/UL — SIGNIFICANT CHANGE UP (ref 1.4–6.5)
NEUTROPHILS NFR BLD AUTO: 79.9 % — HIGH (ref 42.2–75.2)
NITRITE UR-MCNC: NEGATIVE — SIGNIFICANT CHANGE UP
NRBC # BLD: 0 /100 WBCS — SIGNIFICANT CHANGE UP (ref 0–0)
PH UR: 7 — SIGNIFICANT CHANGE UP (ref 5–8)
PLATELET # BLD AUTO: 185 K/UL — SIGNIFICANT CHANGE UP (ref 130–400)
POTASSIUM SERPL-MCNC: 5.4 MMOL/L — HIGH (ref 3.5–5)
POTASSIUM SERPL-SCNC: 5.4 MMOL/L — HIGH (ref 3.5–5)
PROT SERPL-MCNC: 8.1 G/DL — HIGH (ref 6–8)
PROT UR-MCNC: SIGNIFICANT CHANGE UP
RBC # BLD: 4.06 M/UL — LOW (ref 4.2–5.4)
RBC # FLD: 11.1 % — LOW (ref 11.5–14.5)
SARS-COV-2 RNA SPEC QL NAA+PROBE: SIGNIFICANT CHANGE UP
SODIUM SERPL-SCNC: 137 MMOL/L — SIGNIFICANT CHANGE UP (ref 135–146)
SP GR SPEC: >1.05 (ref 1.01–1.03)
TROPONIN T SERPL-MCNC: <0.01 NG/ML — SIGNIFICANT CHANGE UP
UROBILINOGEN FLD QL: SIGNIFICANT CHANGE UP
WBC # BLD: 5.83 K/UL — SIGNIFICANT CHANGE UP (ref 4.8–10.8)
WBC # FLD AUTO: 5.83 K/UL — SIGNIFICANT CHANGE UP (ref 4.8–10.8)

## 2021-01-21 PROCEDURE — 99285 EMERGENCY DEPT VISIT HI MDM: CPT

## 2021-01-21 PROCEDURE — 70450 CT HEAD/BRAIN W/O DYE: CPT | Mod: 26

## 2021-01-21 PROCEDURE — 93010 ELECTROCARDIOGRAM REPORT: CPT

## 2021-01-21 PROCEDURE — 74177 CT ABD & PELVIS W/CONTRAST: CPT | Mod: 26

## 2021-01-21 PROCEDURE — 71046 X-RAY EXAM CHEST 2 VIEWS: CPT | Mod: 26

## 2021-01-21 RX ORDER — METOCLOPRAMIDE HCL 10 MG
10 TABLET ORAL ONCE
Refills: 0 | Status: COMPLETED | OUTPATIENT
Start: 2021-01-21 | End: 2021-01-21

## 2021-01-21 RX ORDER — ACETAMINOPHEN 500 MG
650 TABLET ORAL ONCE
Refills: 0 | Status: COMPLETED | OUTPATIENT
Start: 2021-01-21 | End: 2021-01-21

## 2021-01-21 RX ORDER — FAMOTIDINE 10 MG/ML
20 INJECTION INTRAVENOUS ONCE
Refills: 0 | Status: COMPLETED | OUTPATIENT
Start: 2021-01-21 | End: 2021-01-21

## 2021-01-21 RX ORDER — LIPASE/PROTEASE/AMYLASE 16-48-48K
3 CAPSULE,DELAYED RELEASE (ENTERIC COATED) ORAL
Refills: 0 | Status: DISCONTINUED | OUTPATIENT
Start: 2021-01-21 | End: 2021-01-24

## 2021-01-21 RX ORDER — SODIUM CHLORIDE 9 MG/ML
1000 INJECTION INTRAMUSCULAR; INTRAVENOUS; SUBCUTANEOUS
Refills: 0 | Status: DISCONTINUED | OUTPATIENT
Start: 2021-01-21 | End: 2021-01-23

## 2021-01-21 RX ORDER — ENOXAPARIN SODIUM 100 MG/ML
40 INJECTION SUBCUTANEOUS AT BEDTIME
Refills: 0 | Status: DISCONTINUED | OUTPATIENT
Start: 2021-01-21 | End: 2021-01-24

## 2021-01-21 RX ORDER — OXYCODONE AND ACETAMINOPHEN 5; 325 MG/1; MG/1
1 TABLET ORAL EVERY 8 HOURS
Refills: 0 | Status: DISCONTINUED | OUTPATIENT
Start: 2021-01-21 | End: 2021-01-22

## 2021-01-21 RX ORDER — KETOROLAC TROMETHAMINE 30 MG/ML
15 SYRINGE (ML) INJECTION ONCE
Refills: 0 | Status: DISCONTINUED | OUTPATIENT
Start: 2021-01-21 | End: 2021-01-21

## 2021-01-21 RX ORDER — ONDANSETRON 8 MG/1
4 TABLET, FILM COATED ORAL EVERY 8 HOURS
Refills: 0 | Status: DISCONTINUED | OUTPATIENT
Start: 2021-01-21 | End: 2021-01-24

## 2021-01-21 RX ORDER — OXYCODONE HYDROCHLORIDE 5 MG/1
10 TABLET ORAL EVERY 6 HOURS
Refills: 0 | Status: DISCONTINUED | OUTPATIENT
Start: 2021-01-21 | End: 2021-01-22

## 2021-01-21 RX ORDER — SODIUM CHLORIDE 9 MG/ML
1850 INJECTION, SOLUTION INTRAVENOUS ONCE
Refills: 0 | Status: COMPLETED | OUTPATIENT
Start: 2021-01-21 | End: 2021-01-21

## 2021-01-21 RX ORDER — MORPHINE SULFATE 50 MG/1
4 CAPSULE, EXTENDED RELEASE ORAL ONCE
Refills: 0 | Status: DISCONTINUED | OUTPATIENT
Start: 2021-01-21 | End: 2021-01-21

## 2021-01-21 RX ADMIN — Medication 650 MILLIGRAM(S): at 19:14

## 2021-01-21 RX ADMIN — MORPHINE SULFATE 4 MILLIGRAM(S): 50 CAPSULE, EXTENDED RELEASE ORAL at 21:39

## 2021-01-21 RX ADMIN — SODIUM CHLORIDE 1850 MILLILITER(S): 9 INJECTION, SOLUTION INTRAVENOUS at 19:16

## 2021-01-21 RX ADMIN — FAMOTIDINE 20 MILLIGRAM(S): 10 INJECTION INTRAVENOUS at 19:15

## 2021-01-21 RX ADMIN — Medication 10 MILLIGRAM(S): at 19:15

## 2021-01-21 RX ADMIN — Medication 15 MILLIGRAM(S): at 19:15

## 2021-01-21 NOTE — H&P ADULT - NSHPLABSRESULTS_GEN_ALL_CORE
14.1   5.83  )-----------( 185      ( 21 Jan 2021 18:57 )             39.5       01-21    137  |  94<L>  |  7<L>  ----------------------------<  103<H>  5.4<H>   |  24  |  0.6<L>    Ca    9.9      21 Jan 2021 18:57    TPro  8.1<H>  /  Alb  4.8  /  TBili  1.5<H>  /  DBili  0.3<H>  /  AST  447<H>  /  ALT  89<H>  /  AlkPhos  197<H>  01-21
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
## 2021-01-21 NOTE — H&P ADULT - NSHPPHYSICALEXAM_GEN_ALL_CORE
Physical Exam:  General: NAD,   Neurology: A&Ox3, nonfocal, cn 2-12 intact   Eyes: PERRLA/ EOMI  ENT/Neck: Neck supple, trachea midline, No JVD  Respiratory: B/L basilar rales, No wheezing, rhonchi  Cardiovascular: Normal rate regular rhythm, S1S2, no murmurs, rubs or gallops  Abdominal: Soft, epigastric tenderness to palpation   Extremities: no pedal edema, + peripheral pulses  Musculoskeletal: 5/5 BUE and BLE muscle strength  Skin: No Rashes, Hematoma, Ecchymosis
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
## 2021-01-21 NOTE — ED PROVIDER NOTE - CARE PLAN
Principal Discharge DX:	Epigastric abdominal pain  Secondary Diagnosis:	Concussion  Secondary Diagnosis:	Pancreatitis, chronic
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
## 2021-01-22 LAB
ALBUMIN SERPL ELPH-MCNC: 3.9 G/DL — SIGNIFICANT CHANGE UP (ref 3.5–5.2)
ALP SERPL-CCNC: 152 U/L — HIGH (ref 30–115)
ALT FLD-CCNC: 62 U/L — HIGH (ref 0–41)
ANION GAP SERPL CALC-SCNC: 14 MMOL/L — SIGNIFICANT CHANGE UP (ref 7–14)
APTT BLD: 31.5 SEC — SIGNIFICANT CHANGE UP (ref 27–39.2)
AST SERPL-CCNC: 269 U/L — HIGH (ref 0–41)
BASOPHILS # BLD AUTO: 0.03 K/UL — SIGNIFICANT CHANGE UP (ref 0–0.2)
BASOPHILS NFR BLD AUTO: 0.8 % — SIGNIFICANT CHANGE UP (ref 0–1)
BILIRUB SERPL-MCNC: 1 MG/DL — SIGNIFICANT CHANGE UP (ref 0.2–1.2)
BUN SERPL-MCNC: 7 MG/DL — LOW (ref 10–20)
CALCIUM SERPL-MCNC: 8.5 MG/DL — SIGNIFICANT CHANGE UP (ref 8.5–10.1)
CHLORIDE SERPL-SCNC: 98 MMOL/L — SIGNIFICANT CHANGE UP (ref 98–110)
CO2 SERPL-SCNC: 25 MMOL/L — SIGNIFICANT CHANGE UP (ref 17–32)
CREAT SERPL-MCNC: 0.5 MG/DL — LOW (ref 0.7–1.5)
EOSINOPHIL # BLD AUTO: 0.02 K/UL — SIGNIFICANT CHANGE UP (ref 0–0.7)
EOSINOPHIL NFR BLD AUTO: 0.5 % — SIGNIFICANT CHANGE UP (ref 0–8)
GLUCOSE SERPL-MCNC: 73 MG/DL — SIGNIFICANT CHANGE UP (ref 70–99)
HCT VFR BLD CALC: 33.6 % — LOW (ref 37–47)
HGB BLD-MCNC: 11.9 G/DL — LOW (ref 12–16)
IMM GRANULOCYTES NFR BLD AUTO: 0.3 % — SIGNIFICANT CHANGE UP (ref 0.1–0.3)
INR BLD: 1.05 RATIO — SIGNIFICANT CHANGE UP (ref 0.65–1.3)
LYMPHOCYTES # BLD AUTO: 0.9 K/UL — LOW (ref 1.2–3.4)
LYMPHOCYTES # BLD AUTO: 22.6 % — SIGNIFICANT CHANGE UP (ref 20.5–51.1)
MAGNESIUM SERPL-MCNC: 1.3 MG/DL — LOW (ref 1.8–2.4)
MAGNESIUM SERPL-MCNC: 2.5 MG/DL — HIGH (ref 1.8–2.4)
MCHC RBC-ENTMCNC: 34.9 PG — HIGH (ref 27–31)
MCHC RBC-ENTMCNC: 35.4 G/DL — SIGNIFICANT CHANGE UP (ref 32–37)
MCV RBC AUTO: 98.5 FL — SIGNIFICANT CHANGE UP (ref 81–99)
MONOCYTES # BLD AUTO: 0.34 K/UL — SIGNIFICANT CHANGE UP (ref 0.1–0.6)
MONOCYTES NFR BLD AUTO: 8.5 % — SIGNIFICANT CHANGE UP (ref 1.7–9.3)
NEUTROPHILS # BLD AUTO: 2.69 K/UL — SIGNIFICANT CHANGE UP (ref 1.4–6.5)
NEUTROPHILS NFR BLD AUTO: 67.3 % — SIGNIFICANT CHANGE UP (ref 42.2–75.2)
NRBC # BLD: 0 /100 WBCS — SIGNIFICANT CHANGE UP (ref 0–0)
PLATELET # BLD AUTO: 164 K/UL — SIGNIFICANT CHANGE UP (ref 130–400)
POTASSIUM SERPL-MCNC: 3.4 MMOL/L — LOW (ref 3.5–5)
POTASSIUM SERPL-SCNC: 3.4 MMOL/L — LOW (ref 3.5–5)
PROT SERPL-MCNC: 6.6 G/DL — SIGNIFICANT CHANGE UP (ref 6–8)
PROTHROM AB SERPL-ACNC: 12.1 SEC — SIGNIFICANT CHANGE UP (ref 9.95–12.87)
RBC # BLD: 3.41 M/UL — LOW (ref 4.2–5.4)
RBC # FLD: 11.1 % — LOW (ref 11.5–14.5)
SODIUM SERPL-SCNC: 137 MMOL/L — SIGNIFICANT CHANGE UP (ref 135–146)
WBC # BLD: 3.99 K/UL — LOW (ref 4.8–10.8)
WBC # FLD AUTO: 3.99 K/UL — LOW (ref 4.8–10.8)

## 2021-01-22 PROCEDURE — 99223 1ST HOSP IP/OBS HIGH 75: CPT

## 2021-01-22 PROCEDURE — 74183 MRI ABD W/O CNTR FLWD CNTR: CPT | Mod: 26

## 2021-01-22 RX ORDER — MAGNESIUM SULFATE 500 MG/ML
2 VIAL (ML) INJECTION ONCE
Refills: 0 | Status: COMPLETED | OUTPATIENT
Start: 2021-01-22 | End: 2021-01-22

## 2021-01-22 RX ORDER — KETOROLAC TROMETHAMINE 30 MG/ML
15 SYRINGE (ML) INJECTION ONCE
Refills: 0 | Status: DISCONTINUED | OUTPATIENT
Start: 2021-01-22 | End: 2021-01-22

## 2021-01-22 RX ORDER — MORPHINE SULFATE 50 MG/1
2 CAPSULE, EXTENDED RELEASE ORAL EVERY 4 HOURS
Refills: 0 | Status: DISCONTINUED | OUTPATIENT
Start: 2021-01-22 | End: 2021-01-23

## 2021-01-22 RX ORDER — MORPHINE SULFATE 50 MG/1
2 CAPSULE, EXTENDED RELEASE ORAL ONCE
Refills: 0 | Status: DISCONTINUED | OUTPATIENT
Start: 2021-01-22 | End: 2021-01-22

## 2021-01-22 RX ORDER — KETOROLAC TROMETHAMINE 30 MG/ML
30 SYRINGE (ML) INJECTION EVERY 6 HOURS
Refills: 0 | Status: DISCONTINUED | OUTPATIENT
Start: 2021-01-22 | End: 2021-01-23

## 2021-01-22 RX ORDER — HYDROXYZINE HCL 10 MG
50 TABLET ORAL ONCE
Refills: 0 | Status: COMPLETED | OUTPATIENT
Start: 2021-01-22 | End: 2021-01-22

## 2021-01-22 RX ORDER — MAGNESIUM SULFATE 500 MG/ML
2 VIAL (ML) INJECTION ONCE
Refills: 0 | Status: DISCONTINUED | OUTPATIENT
Start: 2021-01-22 | End: 2021-01-22

## 2021-01-22 RX ADMIN — ENOXAPARIN SODIUM 40 MILLIGRAM(S): 100 INJECTION SUBCUTANEOUS at 21:08

## 2021-01-22 RX ADMIN — MORPHINE SULFATE 2 MILLIGRAM(S): 50 CAPSULE, EXTENDED RELEASE ORAL at 02:11

## 2021-01-22 RX ADMIN — Medication 30 MILLIGRAM(S): at 21:12

## 2021-01-22 RX ADMIN — Medication 30 MILLIGRAM(S): at 09:33

## 2021-01-22 RX ADMIN — Medication 25 GRAM(S): at 15:16

## 2021-01-22 RX ADMIN — Medication 50 MILLIGRAM(S): at 11:45

## 2021-01-22 RX ADMIN — ONDANSETRON 4 MILLIGRAM(S): 8 TABLET, FILM COATED ORAL at 01:50

## 2021-01-22 RX ADMIN — MORPHINE SULFATE 2 MILLIGRAM(S): 50 CAPSULE, EXTENDED RELEASE ORAL at 15:44

## 2021-01-22 RX ADMIN — Medication 25 GRAM(S): at 21:08

## 2021-01-22 RX ADMIN — MORPHINE SULFATE 2 MILLIGRAM(S): 50 CAPSULE, EXTENDED RELEASE ORAL at 19:51

## 2021-01-22 RX ADMIN — MORPHINE SULFATE 2 MILLIGRAM(S): 50 CAPSULE, EXTENDED RELEASE ORAL at 23:53

## 2021-01-22 RX ADMIN — ONDANSETRON 4 MILLIGRAM(S): 8 TABLET, FILM COATED ORAL at 09:34

## 2021-01-22 RX ADMIN — Medication 15 MILLIGRAM(S): at 06:39
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
## 2021-01-23 ENCOUNTER — TRANSCRIPTION ENCOUNTER (OUTPATIENT)
Age: 46
End: 2021-01-23

## 2021-01-23 LAB
ALBUMIN SERPL ELPH-MCNC: 4.1 G/DL — SIGNIFICANT CHANGE UP (ref 3.5–5.2)
ALP SERPL-CCNC: 161 U/L — HIGH (ref 30–115)
ALT FLD-CCNC: 61 U/L — HIGH (ref 0–41)
ANION GAP SERPL CALC-SCNC: 17 MMOL/L — HIGH (ref 7–14)
AST SERPL-CCNC: 326 U/L — HIGH (ref 0–41)
BILIRUB SERPL-MCNC: 1.1 MG/DL — SIGNIFICANT CHANGE UP (ref 0.2–1.2)
BUN SERPL-MCNC: 6 MG/DL — LOW (ref 10–20)
CALCIUM SERPL-MCNC: 8.4 MG/DL — LOW (ref 8.5–10.1)
CHLORIDE SERPL-SCNC: 97 MMOL/L — LOW (ref 98–110)
CO2 SERPL-SCNC: 22 MMOL/L — SIGNIFICANT CHANGE UP (ref 17–32)
CREAT SERPL-MCNC: <0.5 MG/DL — LOW (ref 0.7–1.5)
GLUCOSE SERPL-MCNC: 63 MG/DL — LOW (ref 70–99)
HCT VFR BLD CALC: 36.9 % — LOW (ref 37–47)
HGB BLD-MCNC: 12.8 G/DL — SIGNIFICANT CHANGE UP (ref 12–16)
MAGNESIUM SERPL-MCNC: 2.3 MG/DL — SIGNIFICANT CHANGE UP (ref 1.8–2.4)
MCHC RBC-ENTMCNC: 34.1 PG — HIGH (ref 27–31)
MCHC RBC-ENTMCNC: 34.7 G/DL — SIGNIFICANT CHANGE UP (ref 32–37)
MCV RBC AUTO: 98.4 FL — SIGNIFICANT CHANGE UP (ref 81–99)
NRBC # BLD: 0 /100 WBCS — SIGNIFICANT CHANGE UP (ref 0–0)
PLATELET # BLD AUTO: 161 K/UL — SIGNIFICANT CHANGE UP (ref 130–400)
POTASSIUM SERPL-MCNC: 3.4 MMOL/L — LOW (ref 3.5–5)
POTASSIUM SERPL-SCNC: 3.4 MMOL/L — LOW (ref 3.5–5)
PROT SERPL-MCNC: 6.9 G/DL — SIGNIFICANT CHANGE UP (ref 6–8)
RBC # BLD: 3.75 M/UL — LOW (ref 4.2–5.4)
RBC # FLD: 11 % — LOW (ref 11.5–14.5)
SODIUM SERPL-SCNC: 136 MMOL/L — SIGNIFICANT CHANGE UP (ref 135–146)
WBC # BLD: 4.21 K/UL — LOW (ref 4.8–10.8)
WBC # FLD AUTO: 4.21 K/UL — LOW (ref 4.8–10.8)

## 2021-01-23 PROCEDURE — 99233 SBSQ HOSP IP/OBS HIGH 50: CPT

## 2021-01-23 RX ORDER — THIAMINE MONONITRATE (VIT B1) 100 MG
100 TABLET ORAL DAILY
Refills: 0 | Status: DISCONTINUED | OUTPATIENT
Start: 2021-01-23 | End: 2021-01-24

## 2021-01-23 RX ORDER — SODIUM CHLORIDE 9 MG/ML
1000 INJECTION, SOLUTION INTRAVENOUS
Refills: 0 | Status: DISCONTINUED | OUTPATIENT
Start: 2021-01-23 | End: 2021-01-24

## 2021-01-23 RX ORDER — GABAPENTIN 400 MG/1
300 CAPSULE ORAL DAILY
Refills: 0 | Status: CANCELLED | OUTPATIENT
Start: 2021-01-23 | End: 2021-01-24

## 2021-01-23 RX ORDER — FOLIC ACID 0.8 MG
1 TABLET ORAL DAILY
Refills: 0 | Status: DISCONTINUED | OUTPATIENT
Start: 2021-01-23 | End: 2021-01-24

## 2021-01-23 RX ADMIN — Medication 3 CAPSULE(S): at 21:46

## 2021-01-23 RX ADMIN — Medication 30 MILLIGRAM(S): at 08:50

## 2021-01-23 RX ADMIN — Medication 3 CAPSULE(S): at 17:21

## 2021-01-23 RX ADMIN — MORPHINE SULFATE 2 MILLIGRAM(S): 50 CAPSULE, EXTENDED RELEASE ORAL at 05:55

## 2021-01-23 RX ADMIN — ENOXAPARIN SODIUM 40 MILLIGRAM(S): 100 INJECTION SUBCUTANEOUS at 21:47

## 2021-01-23 NOTE — DISCHARGE NOTE PROVIDER - NSDCMRMEDTOKEN_GEN_ALL_CORE_FT
pancrelipase 12,000 units-38,000 units-60,000 units oral delayed release capsule: 3 cap(s) orally 4 times a day (with meals and at bedtime)   folic acid 1 mg oral tablet: 1 tab(s) orally once a day  pancrelipase 12,000 units-38,000 units-60,000 units oral delayed release capsule: 3 cap(s) orally 4 times a day (with meals and at bedtime)  thiamine 100 mg oral tablet: 1 tab(s) orally once a day

## 2021-01-23 NOTE — DISCHARGE NOTE PROVIDER - NSDCCPCAREPLAN_GEN_ALL_CORE_FT
PRINCIPAL DISCHARGE DIAGNOSIS  Diagnosis: Epigastric abdominal pain  Assessment and Plan of Treatment:       SECONDARY DISCHARGE DIAGNOSES  Diagnosis: Pancreatitis, chronic  Assessment and Plan of Treatment:     Diagnosis: Concussion  Assessment and Plan of Treatment:      PRINCIPAL DISCHARGE DIAGNOSIS  Diagnosis: Epigastric abdominal pain  Assessment and Plan of Treatment: You came with abdominal pain because your pancreas was inflamed. This is called pancreatitis and can be serious. Please follow diet recommendations, take medications as indicated and monitor for symptoms like increased abdominal pain, vomiting, nausea, constipation, fever and come back to the ED if you have them. Please avoid alcohol intake and don't self medicate since this could cause you to have another pancreatitis crisis. Follow up with your primary care physician as indicated below.      SECONDARY DISCHARGE DIAGNOSES  Diagnosis: Pancreatitis, chronic  Assessment and Plan of Treatment: You came with abdominal pain because your pancreas was inflamed. This is called pancreatitis and can be serious. Please follow diet recommendations, take medications as indicated and monitor for symptoms like increased abdominal pain, vomiting, nausea, constipation, fever and come back to the ED if you have them. Please avoid alcohol intake and don't self medicate since this could cause you to have another pancreatitis crisis. Follow up with your primary care physician as indicated below.    Diagnosis: Concussion  Assessment and Plan of Treatment: A concussion is a brain injury from a direct hit (blow) to your head or body.  You may have imaging tests and neuropsychological tests to diagnose a concussion.  Treatment for this condition includes physical and mental rest and careful observation.  Ask your health care provider when you can return to your normal activities, such as school, work, athletics, and driving. Follow safety instructions as told by your health care provider.  This information is not intended to replace advice given to you by your health care provider. Make sure you discuss any questions you have with your health care provider.       PRINCIPAL DISCHARGE DIAGNOSIS  Diagnosis: Acute on chronic pancreatitis  Assessment and Plan of Treatment: Continue low fat diet for 2 weeks with transition to regular diet. Please be aware that alcohol can make pancreatitis worse, alcohol abstinence advised.  Follow up with GI in 2-4 weeks, you will need outpatient endoscopy for evaluation of possible fistula.      SECONDARY DISCHARGE DIAGNOSES  Diagnosis: Pancreatitis, chronic  Assessment and Plan of Treatment: You came with abdominal pain because your pancreas was inflamed. This is called pancreatitis and can be serious. Please follow diet recommendations, take medications as indicated and monitor for symptoms like increased abdominal pain, vomiting, nausea, constipation, fever and come back to the ED if you have them. Please avoid alcohol intake and don't self medicate since this could cause you to have another pancreatitis crisis. Follow up with your primary care physician as indicated below.    Diagnosis: Concussion  Assessment and Plan of Treatment: A concussion is a brain injury from a direct hit (blow) to your head or body.  You may have imaging tests and neuropsychological tests to diagnose a concussion.  Treatment for this condition includes physical and mental rest and careful observation.  Ask your health care provider when you can return to your normal activities, such as school, work, athletics, and driving. Follow safety instructions as told by your health care provider.  This information is not intended to replace advice given to you by your health care provider. Make sure you discuss any questions you have with your health care provider.

## 2021-01-23 NOTE — DISCHARGE NOTE PROVIDER - CARE PROVIDER_API CALL
Petey Elizondo)  Gastroenterology; Internal Medicine  [address] [place] [address]  Phone: [phone]  Fax: [phone]  Follow Up Time: 2 weeks    Haley Cobos)  Internal Medicine  [address] [facility], [facility] [address]  [place] [address]  Phone: [phone]  Fax: [phone]  Follow Up Time:
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
## 2021-01-23 NOTE — PROGRESS NOTE ADULT - SUBJECTIVE AND OBJECTIVE BOX
LAURA BARAJAS    Patient is a 45y old  Female who presents with a chief complaint of epigastric pain (23 Jan 2021 09:22)    INTERVAL HPI/OVERNIGHT EVENTS: kobi overnight events, no new complaints. Pt still complaints of abdominal pain and states Toradol combined with Morphine was helping her a lot. No nausea, no vomiting, pt has been NPO.     ROS: All ROS negative except as documented above     PHYSICAL EXAM:  T(C): 36, Max: 37.1 (01-23-21 @ 08:10)  HR: 83 (78 - 84)  BP: 117/86 (117/86 - 143/87)  RR: 18 (18 - 18)  SpO2: --    GENERAL: NAD  NECK: Supple, No JVD  PULMONARY/CHEST: No rales, rhonchi, wheezing  CARDIOVASC: Regular rate and rhythm; No murmurs  GI/ABDOMEN: Soft, voluntary guarding, nondistended; Bowel sounds present  EXTREMITIES: no edema, no deformity. No calf tenderness b/l.  NERVOUS SYSTEM:  Alert & Oriented X3, no focal deficit     LABS:                        12.8   4.21  )-----------( 161      ( 23 Jan 2021 06:02 )             36.9     01-23    136  |  97<L>  |  6<L>  ----------------------------<  63<L>  3.4<L>   |  22  |  <0.5<L>    Ca    8.4<L>      23 Jan 2021 06:02  Mg     2.3     01-23    TPro  6.9  /  Alb  4.1  /  TBili  1.1  /  DBili  x   /  AST  326<H>  /  ALT  61<H>  /  AlkPhos  161<H>  01-23    PT/INR - ( 22 Jan 2021 05:58 )   PT: 12.10 sec;   INR: 1.05 ratio      PTT - ( 22 Jan 2021 05:58 )  PTT:31.5 sec      Urinalysis Basic - ( 21 Jan 2021 18:57 )    Color: Light Yellow / Appearance: Clear / SG: >1.050 / pH: x  Gluc: x / Ketone: Moderate  / Bili: Negative / Urobili: <2 mg/dL   Blood: x / Protein: Trace / Nitrite: Negative   Leuk Esterase: Negative / RBC: x / WBC x   Sq Epi: x / Non Sq Epi: x / Bacteria: x      RADIOLOGY & ADDITIONAL TESTS:  < from: MR Abdomen w/wo IV Cont (01.22.21 @ 14:43) >  FINDINGS:    HEPATOBILIARY: Hepatic steatosis is noted. Right hepatic 6 cm cyst (series 5, image 36).    SPLEEN: Unremarkable.    PANCREAS: There is atrophy of the pancreatic body. There may be variant pancreatic ductal anatomy in the pancreatic head, conventional configuration is not definitively confirmed. Associated with the pancreatic tail there is a 2.2 x 2.1 x 1.8 cm fluid collection extending cephalad toward the gastric wall. The collection may be partially within the gastric wall. Cannot definitively confirm mucosal extension.    ADRENAL GLANDS: Unremarkable.    KIDNEYS: Unremarkable.    ABDOMINAL NODES: Unremarkable.    BONES/SOFT TISSUES:Unremarkable.    OTHER:  Unremarkable.    IMPRESSION:    Associated with the pancreatic tail there is a 2.2 x 2.1 x 1.8 cm fluid collection extending cephalad toward the gastric wall. The collection may be partially within the gastric wall. Cannot definitively confirm mucosal/luminal extension on MRI.    < end of copied text >        MEDICATIONS  (STANDING):  enoxaparin Injectable 40 milliGRAM(s) SubCutaneous at bedtime  lactated ringers. 1000 milliLiter(s) (150 mL/Hr) IV Continuous <Continuous>  pancrelipase  (CREON 12,000 Lipase Units) 3 Capsule(s) Oral four times a day with meals    MEDICATIONS  (PRN):  ondansetron Injectable 4 milliGRAM(s) IV Push every 8 hours PRN Nausea
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
## 2021-01-23 NOTE — DISCHARGE NOTE PROVIDER - CARE PROVIDERS DIRECT ADDRESSES
[email or web address]
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
## 2021-01-24 ENCOUNTER — TRANSCRIPTION ENCOUNTER (OUTPATIENT)
Age: 46
End: 2021-01-24

## 2021-01-24 VITALS
HEART RATE: 89 BPM | SYSTOLIC BLOOD PRESSURE: 137 MMHG | TEMPERATURE: 98 F | RESPIRATION RATE: 18 BRPM | DIASTOLIC BLOOD PRESSURE: 90 MMHG

## 2021-01-24 LAB
LIDOCAIN IGE QN: 48 U/L — SIGNIFICANT CHANGE UP (ref 7–60)
PHOSPHATE SERPL-MCNC: 3 MG/DL — SIGNIFICANT CHANGE UP (ref 2.1–4.9)

## 2021-01-24 PROCEDURE — 99239 HOSP IP/OBS DSCHRG MGMT >30: CPT

## 2021-01-24 RX ORDER — LIPASE/PROTEASE/AMYLASE 16-48-48K
3 CAPSULE,DELAYED RELEASE (ENTERIC COATED) ORAL
Qty: 168 | Refills: 0
Start: 2021-01-24 | End: 2021-02-06

## 2021-01-24 RX ORDER — THIAMINE MONONITRATE (VIT B1) 100 MG
1 TABLET ORAL
Qty: 21 | Refills: 0
Start: 2021-01-24 | End: 2021-08-11

## 2021-01-24 RX ORDER — THIAMINE MONONITRATE (VIT B1) 100 MG
1 TABLET ORAL
Qty: 21 | Refills: 0
Start: 2021-01-24 | End: 2021-02-13

## 2021-01-24 RX ORDER — MORPHINE SULFATE 50 MG/1
2 CAPSULE, EXTENDED RELEASE ORAL ONCE
Refills: 0 | Status: DISCONTINUED | OUTPATIENT
Start: 2021-01-24 | End: 2021-01-24

## 2021-01-24 RX ORDER — FOLIC ACID 0.8 MG
1 TABLET ORAL
Qty: 21 | Refills: 0
Start: 2021-01-24 | End: 2021-08-11

## 2021-01-24 RX ORDER — FOLIC ACID 0.8 MG
1 TABLET ORAL
Qty: 21 | Refills: 0
Start: 2021-01-24 | End: 2021-02-13

## 2021-01-24 RX ORDER — LIPASE/PROTEASE/AMYLASE 16-48-48K
3 CAPSULE,DELAYED RELEASE (ENTERIC COATED) ORAL
Qty: 168 | Refills: 0
Start: 2021-01-24 | End: 2021-08-04

## 2021-01-24 RX ADMIN — Medication 3 CAPSULE(S): at 17:07

## 2021-01-24 RX ADMIN — Medication 3 CAPSULE(S): at 11:51

## 2021-01-24 RX ADMIN — Medication 3 CAPSULE(S): at 08:04

## 2021-01-24 RX ADMIN — Medication 100 MILLIGRAM(S): at 11:51

## 2021-01-24 RX ADMIN — MORPHINE SULFATE 2 MILLIGRAM(S): 50 CAPSULE, EXTENDED RELEASE ORAL at 00:48

## 2021-01-24 RX ADMIN — Medication 1 MILLIGRAM(S): at 11:51
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
## 2021-01-24 NOTE — DISCHARGE NOTE NURSING/CASE MANAGEMENT/SOCIAL WORK - PATIENT PORTAL LINK FT
You can access the FollowMyHealth Patient Portal offered by [facility] by registering at the following website: [email or web address]. By joining [employer]’s FollowMyHealth portal, you will also be able to view your health information using other applications (apps) compatible with our system.

## 2021-01-24 NOTE — PROGRESS NOTE ADULT - SUBJECTIVE AND OBJECTIVE BOX
LAURA BARAJAS    Patient is a 45y old  Female who presents with a chief complaint of epigastric pain (24 Jan 2021 12:41)    INTERVAL HPI/OVERNIGHT EVENTS: no events overnight, abdominal pain resolved. no new complaints.     ROS: All ROS negative     PHYSICAL EXAM:  T(C): 36.6, Max: 36.7 (01-24-21 @ 00:17)  HR: 84 (81 - 84)  BP: 154/86 (117/86 - 154/86)  RR: 18 (18 - 18)  SpO2: --    GENERAL: NAD  PULMONARY/CHEST: No rales, rhonchi, wheezing  CARDIOVASC: Regular rate and rhythm; No murmurs  GI/ABDOMEN: Soft, Nontender, Nondistended; Bowel sounds present  EXTREMITIES:  2+ Peripheral Pulses, No clubbing, cyanosis, or edema, no deformity. No calf tenderness b/l.  NERVOUS SYSTEM:  Alert & Oriented X3, no focal deficit     Consultant(s) Notes Reviewed by me.     LABS: no new labs today             RADIOLOGY & ADDITIONAL TESTS:  < from: MR Abdomen w/wo IV Cont (01.22.21 @ 14:43) >  IMPRESSION:    Associated with the pancreatic tail there is a 2.2 x 2.1 x 1.8 cm fluid collection extending cephalad toward the gastric wall. The collection may be partially within the gastric wall. Cannot definitively confirm mucosal/luminal extension on MRI.    < end of copied text >      MEDICATIONS  (STANDING):  enoxaparin Injectable 40 milliGRAM(s) SubCutaneous at bedtime  folic acid 1 milliGRAM(s) Oral daily  pancrelipase  (CREON 12,000 Lipase Units) 3 Capsule(s) Oral four times a day with meals  thiamine 100 milliGRAM(s) Oral daily    MEDICATIONS  (PRN):  ondansetron Injectable 4 milliGRAM(s) IV Push every 8 hours PRN Nausea

## 2021-01-29 DIAGNOSIS — R74.01 ELEVATION OF LEVELS OF LIVER TRANSAMINASE LEVELS: ICD-10-CM

## 2021-01-29 DIAGNOSIS — R10.9 UNSPECIFIED ABDOMINAL PAIN: ICD-10-CM

## 2021-01-29 DIAGNOSIS — Z91.410 PERSONAL HISTORY OF ADULT PHYSICAL AND SEXUAL ABUSE: ICD-10-CM
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
## 2021-01-29 DIAGNOSIS — F41.9 ANXIETY DISORDER, UNSPECIFIED: ICD-10-CM
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
## 2021-01-29 DIAGNOSIS — E53.8 DEFICIENCY OF OTHER SPECIFIED B GROUP VITAMINS: ICD-10-CM

## 2021-01-29 DIAGNOSIS — E87.6 HYPOKALEMIA: ICD-10-CM
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
## 2021-01-29 DIAGNOSIS — F10.20 ALCOHOL DEPENDENCE, UNCOMPLICATED: ICD-10-CM

## 2021-01-29 DIAGNOSIS — E83.42 HYPOMAGNESEMIA: ICD-10-CM

## 2021-01-29 DIAGNOSIS — K86.3 PSEUDOCYST OF PANCREAS: ICD-10-CM

## 2021-01-29 DIAGNOSIS — K86.89 OTHER SPECIFIED DISEASES OF PANCREAS: ICD-10-CM

## 2021-01-29 DIAGNOSIS — E51.9 THIAMINE DEFICIENCY, UNSPECIFIED: ICD-10-CM

## 2021-01-29 DIAGNOSIS — K86.0 ALCOHOL-INDUCED CHRONIC PANCREATITIS: ICD-10-CM

## 2021-01-29 DIAGNOSIS — K85.20 ALCOHOL INDUCED ACUTE PANCREATITIS WITHOUT NECROSIS OR INFECTION: ICD-10-CM

## 2021-01-29 DIAGNOSIS — F17.210 NICOTINE DEPENDENCE, CIGARETTES, UNCOMPLICATED: ICD-10-CM

## 2021-01-29 DIAGNOSIS — S06.0X0A CONCUSSION WITHOUT LOSS OF CONSCIOUSNESS, INITIAL ENCOUNTER: ICD-10-CM

## 2021-04-28 NOTE — PHYSICAL THERAPY INITIAL EVALUATION ADULT - RANGE OF MOTION EXAMINATION, REHAB EVAL
Follow up in 1 year for annual     Cholesterol and Your Health   AMBULATORY CARE:   Cholesterol  is a waxy, fat-like substance  Your body uses cholesterol to make hormones and new cells, and to protect nerves  Cholesterol is made by your body  It also comes from certain foods you eat, such as meat and dairy products  Your healthcare provider can help you set goals for your cholesterol levels  He or she can help you create a plan to meet your goals  Cholesterol level goals: Your cholesterol level goals depend on your risk for heart disease, your age, and your other health conditions  The following are general guidelines:  · Total cholesterol  includes low-density lipoprotein (LDL), high-density lipoprotein (HDL), and triglyceride levels  The total cholesterol level should be lower than 200 mg/dL and is best at about 150 mg/dL  · LDL cholesterol  is called bad cholesterol  because it forms plaque in your arteries  As plaque builds up, your arteries become narrow, and less blood flows through  When plaque decreases blood flow to your heart, you may have chest pain  If plaque completely blocks an artery that brings blood to your heart, you may have a heart attack  Plaque can break off and form blood clots  Blood clots may block arteries in your brain and cause a stroke  The level should be less than 130 mg/dL and is best at about 100 mg/dL  · HDL cholesterol  is called good cholesterol  because it helps remove LDL cholesterol from your arteries  It does this by attaching to LDL cholesterol and carrying it to your liver  Your liver breaks down LDL cholesterol so your body can get rid of it  High levels of HDL cholesterol can help prevent a heart attack and stroke  Low levels of HDL cholesterol can increase your risk for heart disease, heart attack, and stroke  The level should be 60 mg/dL or higher  · Triglycerides  are a type of fat that store energy from foods you eat   High levels of triglycerides also cause plaque buildup  This can increase your risk for a heart attack or stroke  If your triglyceride level is high, your LDL cholesterol level may also be high  The level should be less than 150 mg/dL  What increases your risk for high cholesterol:   · Smoking cigarettes    · Being overweight or obese, or not getting enough exercise    · Drinking large amounts of alcohol    · A medical condition such as hypertension (high blood pressure) or diabetes    · Certain genes passed from your parents to you    · Age older than 65 years    What you need to know about having your cholesterol levels checked: Adults 21to 39years of age should have their cholesterol levels checked every 4 to 6 years  Adults 45 years or older should have their cholesterol checked every 1 to 2 years  You may need your cholesterol checked more often, or at a younger age, if you have risk factors for heart disease  You may also need to have your cholesterol checked more often if you have other health conditions, such as diabetes  Blood tests are used to check cholesterol levels  Blood tests measure your levels of triglycerides, LDL cholesterol, and HDL cholesterol  How healthy fats affect your cholesterol levels:  Healthy fats, also called unsaturated fats, help lower LDL cholesterol and triglyceride levels  Healthy fats include the following:  · Monounsaturated fats  are found in foods such as olive oil, canola oil, avocado, nuts, and olives  · Polyunsaturated fats,  such as omega 3 fats, are found in fish, such as salmon, trout, and tuna  They can also be found in plant foods such as flaxseed, walnuts, and soybeans  How unhealthy fats affect your cholesterol levels:  Unhealthy fats increase LDL cholesterol and triglyceride levels  They are found in foods high in cholesterol, saturated fat, and trans fat:  · Cholesterol  is found in eggs, dairy, and meat      · Saturated fat  is found in butter, cheese, ice cream, whole milk, and coconut oil  Saturated fat is also found in meat, such as sausage, hot dogs, and bologna  · Trans fat  is found in liquid oils and is used in fried and baked foods  Foods that contain trans fats include chips, crackers, muffins, sweet rolls, microwave popcorn, and cookies  Treatment  for high cholesterol will also decrease your risk of heart disease, heart attack, and stroke  Treatment may include any of the following:  · Lifestyle changes  may include food, exercise, weight loss, and quitting smoking  You may also need to decrease the amount of alcohol you drink  Your healthcare provider will want you to start with lifestyle changes  Other treatment may be added if lifestyle changes are not enough  · Medicines  may be given to lower your LDL cholesterol, triglyceride levels, or total cholesterol level  You may need medicines to lower your cholesterol if any of the following is true:     ? You have a history of stroke, TIA, unstable angina, or a heart attack  ? Your LDL cholesterol level is 190 mg/dL or higher  ? You are age 36 to 76 years, have diabetes or heart disease risk factors, and your LDL cholesterol is 70 mg/dL or higher  · Supplements  include fish oil, red yeast rice, and garlic  Fish oil may help lower your triglyceride and LDL cholesterol levels  It may also increase your HDL cholesterol level  Red yeast rice may help decrease your total cholesterol level and LDL cholesterol level  Garlic may help lower your total cholesterol level  Do not take these supplements without talking to your healthcare provider  Food changes you can make to lower your cholesterol levels:  A dietitian can help you create a healthy eating plan  He or she can show you how to read food labels and choose foods low in saturated fat, trans fats, and cholesterol  · Decrease the total amount of fat you eat  Choose lean meats, fat-free or 1% fat milk, and low-fat dairy products, such as yogurt and cheese  Try to limit or avoid red meats  Limit or do not eat fried foods or baked goods, such as cookies  · Replace unhealthy fats with healthy fats  Cook foods in olive oil or canola oil  Choose soft margarines that are low in saturated fat and trans fat  Seeds, nuts, and avocados are other examples of healthy fats  · Eat foods with omega-3 fats  Examples include salmon, tuna, mackerel, walnuts, and flaxseed  Eat fish 2 times per week  Pregnant women should not eat fish that have high levels of mercury, such as shark, swordfish, and estevan mackerel  · Increase the amount of high-fiber foods you eat  High-fiber foods can help lower your LDL cholesterol  Aim to get between 20 and 30 grams of fiber each day  Fruits and vegetables are high in fiber  Eat at least 5 servings each day  Other high-fiber foods are whole-grain or whole-wheat breads, pastas, or cereals, and brown rice  Eat 3 ounces of whole-grain foods each day  Increase fiber slowly  You may have abdominal discomfort, bloating, and gas if you add fiber to your diet too quickly  · Eat healthy protein foods  Examples include low-fat dairy products, skinless chicken and turkey, fish, and nuts  · Limit foods and drinks that are high in sugar  Your dietitian or healthcare provider can help you create daily limits for high-sugar foods and drinks  The limit may be lower if you have diabetes or another health condition  Limits can also help you lose weight if needed  Lifestyle changes you can make to lower your cholesterol levels:   · Maintain a healthy weight  Ask your healthcare provider what a healthy weight is for you  Ask him or her to help you create a weight loss plan if needed  Weight loss can decrease your total cholesterol and triglyceride levels  Weight loss may also help keep your blood pressure at a healthy level  · Exercise regularly  Exercise can help lower your total cholesterol level and maintain a healthy weight   Exercise can also help increase your HDL cholesterol level  Work with your healthcare provider to create an exercise program that is right for you  Get at least 30 to 40 minutes of moderate exercise most days of the week  Examples of exercise include brisk walking, swimming, or biking  · Do not smoke  Nicotine and other chemicals in cigarettes and cigars can raise your cholesterol levels  Ask your healthcare provider for information if you currently smoke and need help to quit  E-cigarettes or smokeless tobacco still contain nicotine  Talk to your healthcare provider before you use these products  · Limit or do not drink alcohol  Alcohol can increase your triglyceride levels  Ask your healthcare provider before you drink alcohol  Ask how much is okay for you to drink in 1 day or 1 week  © Copyright [address] Information is for End User's use only and may not be sold, redistributed or otherwise used for commercial purposes  All illustrations and images included in CareNotes® are the copyrighted property of [employer]  or [address] [place], [place] [address]sharla carmelita   The above information is an  only  It is not intended as medical advice for individual conditions or treatments  Talk to your doctor, nurse or pharmacist before following any medical regimen to see if it is safe and effective for you  no ROM deficits were identified
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
## 2021-07-15 ENCOUNTER — INPATIENT (INPATIENT)
Facility: HOSPITAL | Age: 46
LOS: 6 days | Discharge: HOME | End: 2021-07-22
Attending: HOSPITALIST | Admitting: HOSPITALIST
Payer: MEDICAID

## 2021-07-15 VITALS
OXYGEN SATURATION: 99 % | HEART RATE: 114 BPM | DIASTOLIC BLOOD PRESSURE: 81 MMHG | WEIGHT: 134.92 LBS | SYSTOLIC BLOOD PRESSURE: 138 MMHG | HEIGHT: 65 IN | TEMPERATURE: 99 F | RESPIRATION RATE: 18 BRPM

## 2021-07-15 DIAGNOSIS — K86.3 PSEUDOCYST OF PANCREAS: ICD-10-CM

## 2021-07-15 DIAGNOSIS — E53.8 DEFICIENCY OF OTHER SPECIFIED B GROUP VITAMINS: ICD-10-CM

## 2021-07-15 DIAGNOSIS — K85.20 ALCOHOL INDUCED ACUTE PANCREATITIS WITHOUT NECROSIS OR INFECTION: ICD-10-CM

## 2021-07-15 DIAGNOSIS — K86.0 ALCOHOL-INDUCED CHRONIC PANCREATITIS: ICD-10-CM

## 2021-07-15 DIAGNOSIS — E83.42 HYPOMAGNESEMIA: ICD-10-CM

## 2021-07-15 DIAGNOSIS — K70.0 ALCOHOLIC FATTY LIVER: ICD-10-CM
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
## 2021-07-15 DIAGNOSIS — K59.00 CONSTIPATION, UNSPECIFIED: ICD-10-CM
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
## 2021-07-15 DIAGNOSIS — Z98.890 OTHER SPECIFIED POSTPROCEDURAL STATES: Chronic | ICD-10-CM
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
## 2021-07-15 DIAGNOSIS — E51.9 THIAMINE DEFICIENCY, UNSPECIFIED: ICD-10-CM

## 2021-07-15 DIAGNOSIS — Z87.2 PERSONAL HISTORY OF DISEASES OF THE SKIN AND SUBCUTANEOUS TISSUE: Chronic | ICD-10-CM

## 2021-07-15 DIAGNOSIS — S80.02XA CONTUSION OF LEFT KNEE, INITIAL ENCOUNTER: ICD-10-CM

## 2021-07-15 DIAGNOSIS — F10.10 ALCOHOL ABUSE, UNCOMPLICATED: ICD-10-CM

## 2021-07-15 DIAGNOSIS — T76.11XA ADULT PHYSICAL ABUSE, SUSPECTED, INITIAL ENCOUNTER: ICD-10-CM

## 2021-07-15 DIAGNOSIS — D64.9 ANEMIA, UNSPECIFIED: ICD-10-CM

## 2021-07-15 DIAGNOSIS — E87.2 ACIDOSIS: ICD-10-CM

## 2021-07-15 DIAGNOSIS — R91.1 SOLITARY PULMONARY NODULE: ICD-10-CM
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
## 2021-07-15 DIAGNOSIS — Z87.891 PERSONAL HISTORY OF NICOTINE DEPENDENCE: ICD-10-CM
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
## 2021-07-15 DIAGNOSIS — S90.32XA CONTUSION OF LEFT FOOT, INITIAL ENCOUNTER: ICD-10-CM

## 2021-07-15 DIAGNOSIS — S90.02XA CONTUSION OF LEFT ANKLE, INITIAL ENCOUNTER: ICD-10-CM

## 2021-07-15 LAB
ALBUMIN SERPL ELPH-MCNC: 4.9 G/DL — SIGNIFICANT CHANGE UP (ref 3.5–5.2)
ALP SERPL-CCNC: 116 U/L — HIGH (ref 30–115)
ALT FLD-CCNC: 87 U/L — HIGH (ref 0–41)
ANION GAP SERPL CALC-SCNC: 22 MMOL/L — HIGH (ref 7–14)
APPEARANCE UR: ABNORMAL
AST SERPL-CCNC: 327 U/L — HIGH (ref 0–41)
BACTERIA # UR AUTO: NEGATIVE — SIGNIFICANT CHANGE UP
BASOPHILS # BLD AUTO: 0.03 K/UL — SIGNIFICANT CHANGE UP (ref 0–0.2)
BASOPHILS NFR BLD AUTO: 0.4 % — SIGNIFICANT CHANGE UP (ref 0–1)
BILIRUB SERPL-MCNC: 1.1 MG/DL — SIGNIFICANT CHANGE UP (ref 0.2–1.2)
BILIRUB UR-MCNC: NEGATIVE — SIGNIFICANT CHANGE UP
BUN SERPL-MCNC: 8 MG/DL — LOW (ref 10–20)
CALCIUM SERPL-MCNC: 10.4 MG/DL — HIGH (ref 8.5–10.1)
CHLORIDE SERPL-SCNC: 95 MMOL/L — LOW (ref 98–110)
CO2 SERPL-SCNC: 22 MMOL/L — SIGNIFICANT CHANGE UP (ref 17–32)
COLOR SPEC: YELLOW — SIGNIFICANT CHANGE UP
CREAT SERPL-MCNC: 0.7 MG/DL — SIGNIFICANT CHANGE UP (ref 0.7–1.5)
DIFF PNL FLD: NEGATIVE — SIGNIFICANT CHANGE UP
EOSINOPHIL # BLD AUTO: 0.01 K/UL — SIGNIFICANT CHANGE UP (ref 0–0.7)
EOSINOPHIL NFR BLD AUTO: 0.1 % — SIGNIFICANT CHANGE UP (ref 0–8)
EPI CELLS # UR: 7 /HPF — HIGH (ref 0–5)
GLUCOSE SERPL-MCNC: 146 MG/DL — HIGH (ref 70–99)
GLUCOSE UR QL: NEGATIVE — SIGNIFICANT CHANGE UP
HCG SERPL QL: NEGATIVE — SIGNIFICANT CHANGE UP
HCT VFR BLD CALC: 43.2 % — SIGNIFICANT CHANGE UP (ref 37–47)
HGB BLD-MCNC: 14.9 G/DL — SIGNIFICANT CHANGE UP (ref 12–16)
HYALINE CASTS # UR AUTO: 16 /LPF — HIGH (ref 0–7)
IMM GRANULOCYTES NFR BLD AUTO: 0.3 % — SIGNIFICANT CHANGE UP (ref 0.1–0.3)
KETONES UR-MCNC: ABNORMAL
LEUKOCYTE ESTERASE UR-ACNC: NEGATIVE — SIGNIFICANT CHANGE UP
LIDOCAIN IGE QN: 1656 U/L — HIGH (ref 7–60)
LYMPHOCYTES # BLD AUTO: 0.67 K/UL — LOW (ref 1.2–3.4)
LYMPHOCYTES # BLD AUTO: 8.5 % — LOW (ref 20.5–51.1)
MCHC RBC-ENTMCNC: 32.3 PG — HIGH (ref 27–31)
MCHC RBC-ENTMCNC: 34.5 G/DL — SIGNIFICANT CHANGE UP (ref 32–37)
MCV RBC AUTO: 93.7 FL — SIGNIFICANT CHANGE UP (ref 81–99)
MONOCYTES # BLD AUTO: 0.51 K/UL — SIGNIFICANT CHANGE UP (ref 0.1–0.6)
MONOCYTES NFR BLD AUTO: 6.5 % — SIGNIFICANT CHANGE UP (ref 1.7–9.3)
NEUTROPHILS # BLD AUTO: 6.62 K/UL — HIGH (ref 1.4–6.5)
NEUTROPHILS NFR BLD AUTO: 84.2 % — HIGH (ref 42.2–75.2)
NITRITE UR-MCNC: NEGATIVE — SIGNIFICANT CHANGE UP
NRBC # BLD: 0 /100 WBCS — SIGNIFICANT CHANGE UP (ref 0–0)
PH UR: 6.5 — SIGNIFICANT CHANGE UP (ref 5–8)
PLATELET # BLD AUTO: 300 K/UL — SIGNIFICANT CHANGE UP (ref 130–400)
POTASSIUM SERPL-MCNC: 4.1 MMOL/L — SIGNIFICANT CHANGE UP (ref 3.5–5)
POTASSIUM SERPL-SCNC: 4.1 MMOL/L — SIGNIFICANT CHANGE UP (ref 3.5–5)
PROT SERPL-MCNC: 8.4 G/DL — HIGH (ref 6–8)
PROT UR-MCNC: ABNORMAL
RBC # BLD: 4.61 M/UL — SIGNIFICANT CHANGE UP (ref 4.2–5.4)
RBC # FLD: 10.9 % — LOW (ref 11.5–14.5)
RBC CASTS # UR COMP ASSIST: 2 /HPF — SIGNIFICANT CHANGE UP (ref 0–4)
SARS-COV-2 RNA SPEC QL NAA+PROBE: SIGNIFICANT CHANGE UP
SODIUM SERPL-SCNC: 139 MMOL/L — SIGNIFICANT CHANGE UP (ref 135–146)
SP GR SPEC: 1.02 — SIGNIFICANT CHANGE UP (ref 1.01–1.03)
TRIGL SERPL-MCNC: 143 MG/DL — SIGNIFICANT CHANGE UP
TROPONIN T SERPL-MCNC: <0.01 NG/ML — SIGNIFICANT CHANGE UP
UROBILINOGEN FLD QL: SIGNIFICANT CHANGE UP
WBC # BLD: 7.86 K/UL — SIGNIFICANT CHANGE UP (ref 4.8–10.8)
WBC # FLD AUTO: 7.86 K/UL — SIGNIFICANT CHANGE UP (ref 4.8–10.8)
WBC UR QL: 3 /HPF — SIGNIFICANT CHANGE UP (ref 0–5)

## 2021-07-15 PROCEDURE — 93010 ELECTROCARDIOGRAM REPORT: CPT

## 2021-07-15 PROCEDURE — 99285 EMERGENCY DEPT VISIT HI MDM: CPT

## 2021-07-15 PROCEDURE — 71045 X-RAY EXAM CHEST 1 VIEW: CPT | Mod: 26

## 2021-07-15 PROCEDURE — 76705 ECHO EXAM OF ABDOMEN: CPT | Mod: 26

## 2021-07-15 PROCEDURE — 74177 CT ABD & PELVIS W/CONTRAST: CPT | Mod: 26,MA

## 2021-07-15 RX ORDER — SODIUM CHLORIDE 9 MG/ML
1000 INJECTION INTRAMUSCULAR; INTRAVENOUS; SUBCUTANEOUS
Refills: 0 | Status: DISCONTINUED | OUTPATIENT
Start: 2021-07-15 | End: 2021-07-16

## 2021-07-15 RX ORDER — ONDANSETRON 8 MG/1
4 TABLET, FILM COATED ORAL ONCE
Refills: 0 | Status: DISCONTINUED | OUTPATIENT
Start: 2021-07-15 | End: 2021-07-15

## 2021-07-15 RX ORDER — MORPHINE SULFATE 50 MG/1
6 CAPSULE, EXTENDED RELEASE ORAL ONCE
Refills: 0 | Status: DISCONTINUED | OUTPATIENT
Start: 2021-07-15 | End: 2021-07-15

## 2021-07-15 RX ORDER — SODIUM CHLORIDE 9 MG/ML
1000 INJECTION INTRAMUSCULAR; INTRAVENOUS; SUBCUTANEOUS ONCE
Refills: 0 | Status: COMPLETED | OUTPATIENT
Start: 2021-07-15 | End: 2021-07-15

## 2021-07-15 RX ORDER — MORPHINE SULFATE 50 MG/1
4 CAPSULE, EXTENDED RELEASE ORAL ONCE
Refills: 0 | Status: DISCONTINUED | OUTPATIENT
Start: 2021-07-15 | End: 2021-07-15

## 2021-07-15 RX ORDER — SODIUM CHLORIDE 9 MG/ML
1000 INJECTION, SOLUTION INTRAVENOUS ONCE
Refills: 0 | Status: COMPLETED | OUTPATIENT
Start: 2021-07-15 | End: 2021-07-15

## 2021-07-15 RX ORDER — CHLORHEXIDINE GLUCONATE 213 G/1000ML
1 SOLUTION TOPICAL
Refills: 0 | Status: DISCONTINUED | OUTPATIENT
Start: 2021-07-15 | End: 2021-07-22

## 2021-07-15 RX ORDER — ONDANSETRON 8 MG/1
4 TABLET, FILM COATED ORAL ONCE
Refills: 0 | Status: COMPLETED | OUTPATIENT
Start: 2021-07-15 | End: 2021-07-15

## 2021-07-15 RX ORDER — SODIUM CHLORIDE 9 MG/ML
1000 INJECTION, SOLUTION INTRAVENOUS
Refills: 0 | Status: DISCONTINUED | OUTPATIENT
Start: 2021-07-15 | End: 2021-07-15

## 2021-07-15 RX ORDER — THIAMINE MONONITRATE (VIT B1) 100 MG
100 TABLET ORAL DAILY
Refills: 0 | Status: DISCONTINUED | OUTPATIENT
Start: 2021-07-15 | End: 2021-07-16

## 2021-07-15 RX ORDER — FAMOTIDINE 10 MG/ML
20 INJECTION INTRAVENOUS ONCE
Refills: 0 | Status: COMPLETED | OUTPATIENT
Start: 2021-07-15 | End: 2021-07-15

## 2021-07-15 RX ADMIN — FAMOTIDINE 20 MILLIGRAM(S): 10 INJECTION INTRAVENOUS at 20:41

## 2021-07-15 RX ADMIN — SODIUM CHLORIDE 1000 MILLILITER(S): 9 INJECTION, SOLUTION INTRAVENOUS at 20:41

## 2021-07-15 RX ADMIN — MORPHINE SULFATE 6 MILLIGRAM(S): 50 CAPSULE, EXTENDED RELEASE ORAL at 20:41

## 2021-07-15 RX ADMIN — MORPHINE SULFATE 4 MILLIGRAM(S): 50 CAPSULE, EXTENDED RELEASE ORAL at 16:51

## 2021-07-15 RX ADMIN — SODIUM CHLORIDE 1000 MILLILITER(S): 9 INJECTION, SOLUTION INTRAVENOUS at 16:51

## 2021-07-15 RX ADMIN — ONDANSETRON 4 MILLIGRAM(S): 8 TABLET, FILM COATED ORAL at 16:51

## 2021-07-15 NOTE — ED ADULT NURSE REASSESSMENT NOTE - NS ED NURSE REASSESS COMMENT FT1
Pt assessed. Pt is awake and alert. Pt admitted to MED; awaiting bed. Pain managed with morphine and pepcid. Fluids running. Pt is not in any distress. Safety and comfort maintained.

## 2021-07-15 NOTE — ED ADULT NURSE NOTE - OBJECTIVE STATEMENT
patient c/o abdominal pain for 2 days. states she has pancreatitis and it feels the same but worse. also c/o nausea

## 2021-07-15 NOTE — H&P ADULT - NSHPLABSRESULTS_GEN_ALL_CORE
14.9   7.86  )-----------( 300      ( 15 Jul 2021 16:40 )             43.2       07-15    139  |  95<L>  |  8<L>  ----------------------------<  146<H>  4.1   |  22  |  0.7    Ca    10.4<H>      15 Jul 2021 16:40    TPro  8.4<H>  /  Alb  4.9  /  TBili  1.1  /  DBili  x   /  AST  327<H>  /  ALT  87<H>  /  AlkPhos  116<H>  07-15              Urinalysis Basic - ( 15 Jul 2021 18:21 )    Color: Yellow / Appearance: Slightly Turbid / S.022 / pH: x  Gluc: x / Ketone: Moderate  / Bili: Negative / Urobili: <2 mg/dL   Blood: x / Protein: 30 mg/dL / Nitrite: Negative   Leuk Esterase: Negative / RBC: 2 /HPF / WBC 3 /HPF   Sq Epi: x / Non Sq Epi: 7 /HPF / Bacteria: Negative            Lactate Trend      CARDIAC MARKERS ( 15 Jul 2021 16:40 )  x     / <0.01 ng/mL / x     / x     / x            CAPILLARY BLOOD GLUCOSE
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
## 2021-07-15 NOTE — ED ADULT TRIAGE NOTE - CHIEF COMPLAINT QUOTE
"My pancreatitis flared up last night and everything is hurting" pt also complaining of pain to Right lower extremity since Sunday.

## 2021-07-15 NOTE — ED PROVIDER NOTE - PHYSICAL EXAMINATION
CONSTITUTIONAL: Well-appearing; well-nourished; in no apparent distress.   CARDIOVASCULAR: Normal S1, S2; no murmurs, rubs, or gallops.   RESPIRATORY: Normal chest excursion with respiration; breath sounds clear and equal bilaterally; no wheezes, rhonchi, or rales.  GI/: diffuse ttp; Normal bowel sounds; non-distended; no palpable organomegaly.   SKIN: Normal for age and race; warm; dry; good turgor; no apparent lesions or exudate.   NEURO/PSYCH: A & O x 4; grossly unremarkable. mood and manner are appropriate.

## 2021-07-15 NOTE — H&P ADULT - ATTENDING COMMENTS
44 y/o F w/ PMH/o  ETOH abuse and alcoholic pancreatitis with pseudocyst presenting to the hospital w/ acute on chronic pancreatitis, alcohol induced.     #Acute on Chronic pancreatitis 2/2 alcohol abuse   #Alcoholic Ketoacidosis  #Alcoholic fatty liver dz  - Lipase elevated   - CT A/P: Findings consistent with acute on chronic pancreatitis. Acute interstitial edematous pancreatitis with no evidence of pancreatic necrosis.    Plan: NPO, IVF, parenteral analgesia     #Alcohol abuse   - drinks 4-5 glasses of wine at night   - WA protocol     #Suspected folate and thiamine deficiencies - c/w supplementation   #Magnesium deficiency - replete     dvt ppx

## 2021-07-15 NOTE — ED PROVIDER NOTE - CLINICAL SUMMARY MEDICAL DECISION MAKING FREE TEXT BOX
pt with history of etoh pancreatitis presenting with worsening abdominal pain. still drinking. +n/v. nbnb. no melena/brbpr. Uncomfortable appearing  non toxic. NCAT PERRLA EOMI neck supple non tender normal wob abdomen s diffuse ttp nd no rebound no guarding [id number] neuro non focal. labs imaging reviewed. +pancreatitis. unable to tolerate pain. will admit for further eval
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
## 2021-07-15 NOTE — PATIENT PROFILE ADULT - NSPROMEDSADMININFO_GEN_A_NUR
Goals of care discussed in light of patient's comorbidities and presenting symptoms, patient wishes to be full code.
no concerns

## 2021-07-16 LAB
ALBUMIN SERPL ELPH-MCNC: 3.6 G/DL — SIGNIFICANT CHANGE UP (ref 3.5–5.2)
ALP SERPL-CCNC: 79 U/L — SIGNIFICANT CHANGE UP (ref 30–115)
ALT FLD-CCNC: 55 U/L — HIGH (ref 0–41)
ANION GAP SERPL CALC-SCNC: 12 MMOL/L — SIGNIFICANT CHANGE UP (ref 7–14)
APTT BLD: 26 SEC — LOW (ref 27–39.2)
AST SERPL-CCNC: 184 U/L — HIGH (ref 0–41)
BASOPHILS # BLD AUTO: 0.02 K/UL — SIGNIFICANT CHANGE UP (ref 0–0.2)
BASOPHILS NFR BLD AUTO: 0.4 % — SIGNIFICANT CHANGE UP (ref 0–1)
BILIRUB SERPL-MCNC: 0.7 MG/DL — SIGNIFICANT CHANGE UP (ref 0.2–1.2)
BUN SERPL-MCNC: 6 MG/DL — LOW (ref 10–20)
CALCIUM SERPL-MCNC: 8.3 MG/DL — LOW (ref 8.5–10.1)
CHLORIDE SERPL-SCNC: 100 MMOL/L — SIGNIFICANT CHANGE UP (ref 98–110)
CO2 SERPL-SCNC: 26 MMOL/L — SIGNIFICANT CHANGE UP (ref 17–32)
COVID-19 SPIKE DOMAIN AB INTERP: NEGATIVE — SIGNIFICANT CHANGE UP
COVID-19 SPIKE DOMAIN ANTIBODY RESULT: 0.4 U/ML — SIGNIFICANT CHANGE UP
CREAT SERPL-MCNC: 0.5 MG/DL — LOW (ref 0.7–1.5)
EOSINOPHIL # BLD AUTO: 0.05 K/UL — SIGNIFICANT CHANGE UP (ref 0–0.7)
EOSINOPHIL NFR BLD AUTO: 1.1 % — SIGNIFICANT CHANGE UP (ref 0–8)
ETHANOL SERPL-MCNC: <10 MG/DL — SIGNIFICANT CHANGE UP
GLUCOSE SERPL-MCNC: 91 MG/DL — SIGNIFICANT CHANGE UP (ref 70–99)
HCT VFR BLD CALC: 35.5 % — LOW (ref 37–47)
HGB BLD-MCNC: 12.2 G/DL — SIGNIFICANT CHANGE UP (ref 12–16)
IMM GRANULOCYTES NFR BLD AUTO: 0.2 % — SIGNIFICANT CHANGE UP (ref 0.1–0.3)
INR BLD: 1.03 RATIO — SIGNIFICANT CHANGE UP (ref 0.65–1.3)
LYMPHOCYTES # BLD AUTO: 0.84 K/UL — LOW (ref 1.2–3.4)
LYMPHOCYTES # BLD AUTO: 17.8 % — LOW (ref 20.5–51.1)
MAGNESIUM SERPL-MCNC: 1.2 MG/DL — LOW (ref 1.8–2.4)
MCHC RBC-ENTMCNC: 33.5 PG — HIGH (ref 27–31)
MCHC RBC-ENTMCNC: 34.4 G/DL — SIGNIFICANT CHANGE UP (ref 32–37)
MCV RBC AUTO: 97.5 FL — SIGNIFICANT CHANGE UP (ref 81–99)
MONOCYTES # BLD AUTO: 0.33 K/UL — SIGNIFICANT CHANGE UP (ref 0.1–0.6)
MONOCYTES NFR BLD AUTO: 7 % — SIGNIFICANT CHANGE UP (ref 1.7–9.3)
NEUTROPHILS # BLD AUTO: 3.47 K/UL — SIGNIFICANT CHANGE UP (ref 1.4–6.5)
NEUTROPHILS NFR BLD AUTO: 73.5 % — SIGNIFICANT CHANGE UP (ref 42.2–75.2)
NRBC # BLD: 0 /100 WBCS — SIGNIFICANT CHANGE UP (ref 0–0)
OSMOLALITY SERPL: 274 MOS/KG — LOW (ref 275–300)
PLATELET # BLD AUTO: 161 K/UL — SIGNIFICANT CHANGE UP (ref 130–400)
POTASSIUM SERPL-MCNC: 3.5 MMOL/L — SIGNIFICANT CHANGE UP (ref 3.5–5)
POTASSIUM SERPL-SCNC: 3.5 MMOL/L — SIGNIFICANT CHANGE UP (ref 3.5–5)
PROT SERPL-MCNC: 6.1 G/DL — SIGNIFICANT CHANGE UP (ref 6–8)
PROTHROM AB SERPL-ACNC: 11.8 SEC — SIGNIFICANT CHANGE UP (ref 9.95–12.87)
RBC # BLD: 3.64 M/UL — LOW (ref 4.2–5.4)
RBC # FLD: 11.1 % — LOW (ref 11.5–14.5)
SARS-COV-2 IGG+IGM SERPL QL IA: 0.4 U/ML — SIGNIFICANT CHANGE UP
SARS-COV-2 IGG+IGM SERPL QL IA: NEGATIVE — SIGNIFICANT CHANGE UP
SODIUM SERPL-SCNC: 138 MMOL/L — SIGNIFICANT CHANGE UP (ref 135–146)
WBC # BLD: 4.72 K/UL — LOW (ref 4.8–10.8)
WBC # FLD AUTO: 4.72 K/UL — LOW (ref 4.8–10.8)

## 2021-07-16 PROCEDURE — 73560 X-RAY EXAM OF KNEE 1 OR 2: CPT | Mod: 26,RT

## 2021-07-16 PROCEDURE — 73620 X-RAY EXAM OF FOOT: CPT | Mod: 26,RT

## 2021-07-16 PROCEDURE — 73600 X-RAY EXAM OF ANKLE: CPT | Mod: 26,RT

## 2021-07-16 PROCEDURE — 99223 1ST HOSP IP/OBS HIGH 75: CPT

## 2021-07-16 RX ORDER — PANTOPRAZOLE SODIUM 20 MG/1
40 TABLET, DELAYED RELEASE ORAL DAILY
Refills: 0 | Status: DISCONTINUED | OUTPATIENT
Start: 2021-07-16 | End: 2021-07-16

## 2021-07-16 RX ORDER — FENTANYL CITRATE 50 UG/ML
30 INJECTION INTRAVENOUS EVERY 6 HOURS
Refills: 0 | Status: DISCONTINUED | OUTPATIENT
Start: 2021-07-16 | End: 2021-07-16

## 2021-07-16 RX ORDER — FOLIC ACID 0.8 MG
1 TABLET ORAL DAILY
Refills: 0 | Status: DISCONTINUED | OUTPATIENT
Start: 2021-07-16 | End: 2021-07-22

## 2021-07-16 RX ORDER — SODIUM CHLORIDE 9 MG/ML
1000 INJECTION, SOLUTION INTRAVENOUS
Refills: 0 | Status: DISCONTINUED | OUTPATIENT
Start: 2021-07-16 | End: 2021-07-18

## 2021-07-16 RX ORDER — FAMOTIDINE 10 MG/ML
20 INJECTION INTRAVENOUS DAILY
Refills: 0 | Status: DISCONTINUED | OUTPATIENT
Start: 2021-07-16 | End: 2021-07-17

## 2021-07-16 RX ORDER — ONDANSETRON 8 MG/1
4 TABLET, FILM COATED ORAL EVERY 6 HOURS
Refills: 0 | Status: DISCONTINUED | OUTPATIENT
Start: 2021-07-16 | End: 2021-07-22

## 2021-07-16 RX ORDER — ONDANSETRON 8 MG/1
4 TABLET, FILM COATED ORAL ONCE
Refills: 0 | Status: COMPLETED | OUTPATIENT
Start: 2021-07-16 | End: 2021-07-16

## 2021-07-16 RX ORDER — PANTOPRAZOLE SODIUM 20 MG/1
40 TABLET, DELAYED RELEASE ORAL
Refills: 0 | Status: DISCONTINUED | OUTPATIENT
Start: 2021-07-16 | End: 2021-07-16

## 2021-07-16 RX ORDER — THIAMINE MONONITRATE (VIT B1) 100 MG
100 TABLET ORAL DAILY
Refills: 0 | Status: DISCONTINUED | OUTPATIENT
Start: 2021-07-16 | End: 2021-07-22

## 2021-07-16 RX ORDER — ACETAMINOPHEN 500 MG
650 TABLET ORAL ONCE
Refills: 0 | Status: DISCONTINUED | OUTPATIENT
Start: 2021-07-16 | End: 2021-07-16

## 2021-07-16 RX ORDER — HYDROMORPHONE HYDROCHLORIDE 2 MG/ML
1 INJECTION INTRAMUSCULAR; INTRAVENOUS; SUBCUTANEOUS EVERY 4 HOURS
Refills: 0 | Status: DISCONTINUED | OUTPATIENT
Start: 2021-07-16 | End: 2021-07-21

## 2021-07-16 RX ORDER — MAGNESIUM SULFATE 500 MG/ML
2 VIAL (ML) INJECTION
Refills: 0 | Status: COMPLETED | OUTPATIENT
Start: 2021-07-16 | End: 2021-07-16

## 2021-07-16 RX ORDER — ENOXAPARIN SODIUM 100 MG/ML
40 INJECTION SUBCUTANEOUS DAILY
Refills: 0 | Status: DISCONTINUED | OUTPATIENT
Start: 2021-07-16 | End: 2021-07-22

## 2021-07-16 RX ADMIN — HYDROMORPHONE HYDROCHLORIDE 1 MILLIGRAM(S): 2 INJECTION INTRAMUSCULAR; INTRAVENOUS; SUBCUTANEOUS at 01:16

## 2021-07-16 RX ADMIN — HYDROMORPHONE HYDROCHLORIDE 1 MILLIGRAM(S): 2 INJECTION INTRAMUSCULAR; INTRAVENOUS; SUBCUTANEOUS at 21:24

## 2021-07-16 RX ADMIN — Medication 50 GRAM(S): at 22:43

## 2021-07-16 RX ADMIN — HYDROMORPHONE HYDROCHLORIDE 1 MILLIGRAM(S): 2 INJECTION INTRAMUSCULAR; INTRAVENOUS; SUBCUTANEOUS at 20:37

## 2021-07-16 RX ADMIN — ONDANSETRON 4 MILLIGRAM(S): 8 TABLET, FILM COATED ORAL at 10:57

## 2021-07-16 RX ADMIN — ONDANSETRON 4 MILLIGRAM(S): 8 TABLET, FILM COATED ORAL at 01:16

## 2021-07-16 RX ADMIN — HYDROMORPHONE HYDROCHLORIDE 1 MILLIGRAM(S): 2 INJECTION INTRAMUSCULAR; INTRAVENOUS; SUBCUTANEOUS at 14:55

## 2021-07-16 RX ADMIN — HYDROMORPHONE HYDROCHLORIDE 1 MILLIGRAM(S): 2 INJECTION INTRAMUSCULAR; INTRAVENOUS; SUBCUTANEOUS at 06:58

## 2021-07-16 RX ADMIN — ONDANSETRON 4 MILLIGRAM(S): 8 TABLET, FILM COATED ORAL at 20:38

## 2021-07-16 RX ADMIN — ENOXAPARIN SODIUM 40 MILLIGRAM(S): 100 INJECTION SUBCUTANEOUS at 14:40

## 2021-07-16 RX ADMIN — SODIUM CHLORIDE 200 MILLILITER(S): 9 INJECTION, SOLUTION INTRAVENOUS at 21:42

## 2021-07-16 RX ADMIN — HYDROMORPHONE HYDROCHLORIDE 1 MILLIGRAM(S): 2 INJECTION INTRAMUSCULAR; INTRAVENOUS; SUBCUTANEOUS at 10:57

## 2021-07-16 RX ADMIN — FAMOTIDINE 104 MILLIGRAM(S): 10 INJECTION INTRAVENOUS at 11:05
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
## 2021-07-16 NOTE — PROGRESS NOTE ADULT - SUBJECTIVE AND OBJECTIVE BOX
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 echogenicity of the liver which may be seen with hepatic steatosis.      EXAM:  XR ANKLE 2 VIEWS RT            PROCEDURE DATE:  2021            INTERPRETATION:  Clinical History / Reason for exam: Trauma    2 views of the right ankle.    Comparison: None    Findings/  impression: No acute displaced fracture or dislocation. Lateral malleolar soft tissue swelling. The ankle mortise is symmetric. No significant ankle joint effusion. Mild degenerative changes of the talonavicular joint.      EXAM:  XR FOOT 2 VIEWS RT            PROCEDURE DATE:  2021            INTERPRETATION:  Clinical History / Reason for exam: Trauma    2 views of the right foot.    Comparison: None    Findings/  impression: No acute displaced fracture or dislocation. Mild degenerative changes of the hallux MTP and interphalangeal joints. Dorsal foot soft tissue swelling.      XAM:  XR KNEE AP LAT 1-2V RT            PROCEDURE DATE:  2021            INTERPRETATION:  Clinical History / Reason for exam: Trauma    2 views of the right knee.    Comparison: 2014    Findings/  impression: No acute displaced fracture or dislocation. Chondrocalcinosis at the lateral compartment. Mild tricompartmental osteoarthritis, new since prior exam. No knee joint effusion.                                                --------------------------------------------------------------    PHYSICAL EXAM:  General: NAD  HEENT: Atraumatic, Normocephalic  LUNGS: Clear to auscultation bilaterally  HEART: S1/S2 appreciated, regular rate and rhythm  ABDOMEN: Tenderness in all four quadrants on light palpation, bowel sounds present  SKIN: Ecchymosis on LT foot and b/l ankles                                           --------------------------------------------------------------

## 2021-07-16 NOTE — CONSULT NOTE ADULT - ASSESSMENT
44 y/o F w/ PMH/o  ETOH abuse and alcoholic pancreatitis with pseudocyst presenting to the hospital w/ worsening abdominal pain for the past day admitted for acute on chronic pancreatitis.    #)Acute on Chronic Pancreatitis secondary to ETOH Abuse  with Known pseudocyst 3 cm size improved form last scan 2 cm size   MRI jan 2021 Associated with the pancreatic tail there is a 2.2 x 2.1 x 1.8 cm fluid collection extending cephalad toward the gastric wall. The collection may be partially within the gastric wall. Cannot definitively confirm mucosal/luminal extension on MRI.    Recommendations:   Keep NPO  RL at 250 cc/hr  Monitor daily HCT/BUN/CR/urine output.  Monitor pain scale everyday and give pain medications accordingly.  Avoid alcohol and pancreatitis causing medications.     #)Chronic pancreatitis  - d/c her on Pancreatic enzymes before each meals after resolvig acute pancreatitis   - Pain control, appears comfortable can even consider Lyrica for pain as works well in chronic Pancreatitis   - Again Advised ETOH avoidance as this will further insult her Pancreas which can cause significant complications both short and long term  - will need endoscopic workup once acute pancreatitis completely resolves , Outpatient follow up in GI Clinic in 2-4 weeks  for EGD to evaluate for GP fistula +/- PD stent
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
## 2021-07-17 LAB
ALBUMIN SERPL ELPH-MCNC: 3.7 G/DL — SIGNIFICANT CHANGE UP (ref 3.5–5.2)
ALP SERPL-CCNC: 83 U/L — SIGNIFICANT CHANGE UP (ref 30–115)
ALT FLD-CCNC: 52 U/L — HIGH (ref 0–41)
ANION GAP SERPL CALC-SCNC: 14 MMOL/L — SIGNIFICANT CHANGE UP (ref 7–14)
AST SERPL-CCNC: 182 U/L — HIGH (ref 0–41)
BASOPHILS # BLD AUTO: 0.03 K/UL — SIGNIFICANT CHANGE UP (ref 0–0.2)
BASOPHILS NFR BLD AUTO: 0.7 % — SIGNIFICANT CHANGE UP (ref 0–1)
BILIRUB SERPL-MCNC: 0.6 MG/DL — SIGNIFICANT CHANGE UP (ref 0.2–1.2)
BUN SERPL-MCNC: <3 MG/DL — LOW (ref 10–20)
CALCIUM SERPL-MCNC: 8.7 MG/DL — SIGNIFICANT CHANGE UP (ref 8.5–10.1)
CHLORIDE SERPL-SCNC: 99 MMOL/L — SIGNIFICANT CHANGE UP (ref 98–110)
CO2 SERPL-SCNC: 24 MMOL/L — SIGNIFICANT CHANGE UP (ref 17–32)
CREAT SERPL-MCNC: 0.5 MG/DL — LOW (ref 0.7–1.5)
EOSINOPHIL # BLD AUTO: 0.11 K/UL — SIGNIFICANT CHANGE UP (ref 0–0.7)
EOSINOPHIL NFR BLD AUTO: 2.7 % — SIGNIFICANT CHANGE UP (ref 0–8)
GLUCOSE SERPL-MCNC: 83 MG/DL — SIGNIFICANT CHANGE UP (ref 70–99)
HCT VFR BLD CALC: 34.7 % — LOW (ref 37–47)
HGB BLD-MCNC: 12 G/DL — SIGNIFICANT CHANGE UP (ref 12–16)
IMM GRANULOCYTES NFR BLD AUTO: 0 % — LOW (ref 0.1–0.3)
LYMPHOCYTES # BLD AUTO: 0.74 K/UL — LOW (ref 1.2–3.4)
LYMPHOCYTES # BLD AUTO: 18 % — LOW (ref 20.5–51.1)
MAGNESIUM SERPL-MCNC: 1.5 MG/DL — LOW (ref 1.8–2.4)
MCHC RBC-ENTMCNC: 32.7 PG — HIGH (ref 27–31)
MCHC RBC-ENTMCNC: 34.6 G/DL — SIGNIFICANT CHANGE UP (ref 32–37)
MCV RBC AUTO: 94.6 FL — SIGNIFICANT CHANGE UP (ref 81–99)
MONOCYTES # BLD AUTO: 0.29 K/UL — SIGNIFICANT CHANGE UP (ref 0.1–0.6)
MONOCYTES NFR BLD AUTO: 7.1 % — SIGNIFICANT CHANGE UP (ref 1.7–9.3)
NEUTROPHILS # BLD AUTO: 2.93 K/UL — SIGNIFICANT CHANGE UP (ref 1.4–6.5)
NEUTROPHILS NFR BLD AUTO: 71.5 % — SIGNIFICANT CHANGE UP (ref 42.2–75.2)
NRBC # BLD: 0 /100 WBCS — SIGNIFICANT CHANGE UP (ref 0–0)
PLATELET # BLD AUTO: 178 K/UL — SIGNIFICANT CHANGE UP (ref 130–400)
POTASSIUM SERPL-MCNC: 3.4 MMOL/L — LOW (ref 3.5–5)
POTASSIUM SERPL-SCNC: 3.4 MMOL/L — LOW (ref 3.5–5)
PROT SERPL-MCNC: 6.2 G/DL — SIGNIFICANT CHANGE UP (ref 6–8)
RBC # BLD: 3.67 M/UL — LOW (ref 4.2–5.4)
RBC # FLD: 10.8 % — LOW (ref 11.5–14.5)
SODIUM SERPL-SCNC: 137 MMOL/L — SIGNIFICANT CHANGE UP (ref 135–146)
WBC # BLD: 4.1 K/UL — LOW (ref 4.8–10.8)
WBC # FLD AUTO: 4.1 K/UL — LOW (ref 4.8–10.8)

## 2021-07-17 PROCEDURE — 99232 SBSQ HOSP IP/OBS MODERATE 35: CPT

## 2021-07-17 RX ORDER — MAGNESIUM SULFATE 500 MG/ML
2 VIAL (ML) INJECTION EVERY 4 HOURS
Refills: 0 | Status: COMPLETED | OUTPATIENT
Start: 2021-07-17 | End: 2021-07-17

## 2021-07-17 RX ORDER — FAMOTIDINE 10 MG/ML
20 INJECTION INTRAVENOUS DAILY
Refills: 0 | Status: DISCONTINUED | OUTPATIENT
Start: 2021-07-17 | End: 2021-07-22

## 2021-07-17 RX ORDER — SENNA PLUS 8.6 MG/1
2 TABLET ORAL AT BEDTIME
Refills: 0 | Status: DISCONTINUED | OUTPATIENT
Start: 2021-07-17 | End: 2021-07-22

## 2021-07-17 RX ORDER — POTASSIUM CHLORIDE 20 MEQ
20 PACKET (EA) ORAL ONCE
Refills: 0 | Status: COMPLETED | OUTPATIENT
Start: 2021-07-17 | End: 2021-07-17

## 2021-07-17 RX ADMIN — HYDROMORPHONE HYDROCHLORIDE 1 MILLIGRAM(S): 2 INJECTION INTRAMUSCULAR; INTRAVENOUS; SUBCUTANEOUS at 10:00

## 2021-07-17 RX ADMIN — Medication 25 GRAM(S): at 14:00

## 2021-07-17 RX ADMIN — ONDANSETRON 4 MILLIGRAM(S): 8 TABLET, FILM COATED ORAL at 11:15

## 2021-07-17 RX ADMIN — HYDROMORPHONE HYDROCHLORIDE 1 MILLIGRAM(S): 2 INJECTION INTRAMUSCULAR; INTRAVENOUS; SUBCUTANEOUS at 01:03

## 2021-07-17 RX ADMIN — HYDROMORPHONE HYDROCHLORIDE 1 MILLIGRAM(S): 2 INJECTION INTRAMUSCULAR; INTRAVENOUS; SUBCUTANEOUS at 22:40

## 2021-07-17 RX ADMIN — HYDROMORPHONE HYDROCHLORIDE 1 MILLIGRAM(S): 2 INJECTION INTRAMUSCULAR; INTRAVENOUS; SUBCUTANEOUS at 09:48

## 2021-07-17 RX ADMIN — HYDROMORPHONE HYDROCHLORIDE 1 MILLIGRAM(S): 2 INJECTION INTRAMUSCULAR; INTRAVENOUS; SUBCUTANEOUS at 06:12

## 2021-07-17 RX ADMIN — ONDANSETRON 4 MILLIGRAM(S): 8 TABLET, FILM COATED ORAL at 05:01

## 2021-07-17 RX ADMIN — HYDROMORPHONE HYDROCHLORIDE 1 MILLIGRAM(S): 2 INJECTION INTRAMUSCULAR; INTRAVENOUS; SUBCUTANEOUS at 05:39

## 2021-07-17 RX ADMIN — Medication 1 MILLIGRAM(S): at 11:14

## 2021-07-17 RX ADMIN — HYDROMORPHONE HYDROCHLORIDE 1 MILLIGRAM(S): 2 INJECTION INTRAMUSCULAR; INTRAVENOUS; SUBCUTANEOUS at 18:39

## 2021-07-17 RX ADMIN — ENOXAPARIN SODIUM 40 MILLIGRAM(S): 100 INJECTION SUBCUTANEOUS at 11:15

## 2021-07-17 RX ADMIN — Medication 25 GRAM(S): at 09:49

## 2021-07-17 RX ADMIN — CHLORHEXIDINE GLUCONATE 1 APPLICATION(S): 213 SOLUTION TOPICAL at 05:38

## 2021-07-17 RX ADMIN — FAMOTIDINE 104 MILLIGRAM(S): 10 INJECTION INTRAVENOUS at 11:14

## 2021-07-17 RX ADMIN — Medication 50 MILLIEQUIVALENT(S): at 09:50

## 2021-07-17 RX ADMIN — ONDANSETRON 4 MILLIGRAM(S): 8 TABLET, FILM COATED ORAL at 22:38

## 2021-07-17 RX ADMIN — HYDROMORPHONE HYDROCHLORIDE 1 MILLIGRAM(S): 2 INJECTION INTRAMUSCULAR; INTRAVENOUS; SUBCUTANEOUS at 13:58

## 2021-07-17 RX ADMIN — Medication 100 MILLIGRAM(S): at 11:14

## 2021-07-17 RX ADMIN — SENNA PLUS 2 TABLET(S): 8.6 TABLET ORAL at 21:07
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
## 2021-07-17 NOTE — PROGRESS NOTE ADULT - ASSESSMENT
46 y/o F w/ PMH/o  ETOH abuse and alcoholic pancreatitis with pseudocyst presenting to the hospital w/ worsening abdominal pain for the past day admitted for acute on chronic pancreatitis.    #)Acute on Chronic Pancreatitis secondary to ETOH Abuse  -pseudocyst and acute peripancreatic fluid collections. fluid collection might be in connection with gastric wall  -still in pain    Recommendations:   -c/w IV fluids LR at 250 cc/hr  -can try clear liquid diet  -pain control  -alcohol cessation   -Monitor daily HCT/BUN/CR/urine output.    #)Chronic pancreatitis  - when start to eat solid food start creon 500 units/kg/meal  - alcohol cessation   -OP follow up    *Elevated LFts  -AST> ALT  -US with hepatic steatosis    Rec  -daily LFts  -alcohol cessation    -for questions call [phone] til 5pm then call GI service at [phone] after 5pm and on weekends
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
## 2021-07-18 LAB
ALBUMIN SERPL ELPH-MCNC: 3.8 G/DL — SIGNIFICANT CHANGE UP (ref 3.5–5.2)
ALP SERPL-CCNC: 108 U/L — SIGNIFICANT CHANGE UP (ref 30–115)
ALT FLD-CCNC: 57 U/L — HIGH (ref 0–41)
ANION GAP SERPL CALC-SCNC: 12 MMOL/L — SIGNIFICANT CHANGE UP (ref 7–14)
AST SERPL-CCNC: 192 U/L — HIGH (ref 0–41)
BASOPHILS # BLD AUTO: 0.03 K/UL — SIGNIFICANT CHANGE UP (ref 0–0.2)
BASOPHILS NFR BLD AUTO: 0.6 % — SIGNIFICANT CHANGE UP (ref 0–1)
BILIRUB SERPL-MCNC: 0.5 MG/DL — SIGNIFICANT CHANGE UP (ref 0.2–1.2)
BUN SERPL-MCNC: <3 MG/DL — LOW (ref 10–20)
CALCIUM SERPL-MCNC: 8.6 MG/DL — SIGNIFICANT CHANGE UP (ref 8.5–10.1)
CHLORIDE SERPL-SCNC: 97 MMOL/L — LOW (ref 98–110)
CO2 SERPL-SCNC: 27 MMOL/L — SIGNIFICANT CHANGE UP (ref 17–32)
CREAT SERPL-MCNC: 0.5 MG/DL — LOW (ref 0.7–1.5)
CULTURE RESULTS: SIGNIFICANT CHANGE UP
EOSINOPHIL # BLD AUTO: 0.1 K/UL — SIGNIFICANT CHANGE UP (ref 0–0.7)
EOSINOPHIL NFR BLD AUTO: 2.1 % — SIGNIFICANT CHANGE UP (ref 0–8)
GLUCOSE SERPL-MCNC: 80 MG/DL — SIGNIFICANT CHANGE UP (ref 70–99)
HCT VFR BLD CALC: 31.7 % — LOW (ref 37–47)
HGB BLD-MCNC: 10.9 G/DL — LOW (ref 12–16)
IMM GRANULOCYTES NFR BLD AUTO: 0.2 % — SIGNIFICANT CHANGE UP (ref 0.1–0.3)
LYMPHOCYTES # BLD AUTO: 0.52 K/UL — LOW (ref 1.2–3.4)
LYMPHOCYTES # BLD AUTO: 10.8 % — LOW (ref 20.5–51.1)
MAGNESIUM SERPL-MCNC: 1.8 MG/DL — SIGNIFICANT CHANGE UP (ref 1.8–2.4)
MCHC RBC-ENTMCNC: 32.6 PG — HIGH (ref 27–31)
MCHC RBC-ENTMCNC: 34.4 G/DL — SIGNIFICANT CHANGE UP (ref 32–37)
MCV RBC AUTO: 94.9 FL — SIGNIFICANT CHANGE UP (ref 81–99)
MONOCYTES # BLD AUTO: 0.35 K/UL — SIGNIFICANT CHANGE UP (ref 0.1–0.6)
MONOCYTES NFR BLD AUTO: 7.3 % — SIGNIFICANT CHANGE UP (ref 1.7–9.3)
NEUTROPHILS # BLD AUTO: 3.8 K/UL — SIGNIFICANT CHANGE UP (ref 1.4–6.5)
NEUTROPHILS NFR BLD AUTO: 79 % — HIGH (ref 42.2–75.2)
NRBC # BLD: 0 /100 WBCS — SIGNIFICANT CHANGE UP (ref 0–0)
PHOSPHATE SERPL-MCNC: 3.8 MG/DL — SIGNIFICANT CHANGE UP (ref 2.1–4.9)
PLATELET # BLD AUTO: 182 K/UL — SIGNIFICANT CHANGE UP (ref 130–400)
POTASSIUM SERPL-MCNC: 3.6 MMOL/L — SIGNIFICANT CHANGE UP (ref 3.5–5)
POTASSIUM SERPL-SCNC: 3.6 MMOL/L — SIGNIFICANT CHANGE UP (ref 3.5–5)
PROT SERPL-MCNC: 6.1 G/DL — SIGNIFICANT CHANGE UP (ref 6–8)
RBC # BLD: 3.34 M/UL — LOW (ref 4.2–5.4)
RBC # FLD: 10.9 % — LOW (ref 11.5–14.5)
SODIUM SERPL-SCNC: 136 MMOL/L — SIGNIFICANT CHANGE UP (ref 135–146)
SPECIMEN SOURCE: SIGNIFICANT CHANGE UP
WBC # BLD: 4.81 K/UL — SIGNIFICANT CHANGE UP (ref 4.8–10.8)
WBC # FLD AUTO: 4.81 K/UL — SIGNIFICANT CHANGE UP (ref 4.8–10.8)

## 2021-07-18 PROCEDURE — 99232 SBSQ HOSP IP/OBS MODERATE 35: CPT

## 2021-07-18 RX ORDER — LIPASE/PROTEASE/AMYLASE 16-48-48K
3 CAPSULE,DELAYED RELEASE (ENTERIC COATED) ORAL
Refills: 0 | Status: DISCONTINUED | OUTPATIENT
Start: 2021-07-18 | End: 2021-07-22

## 2021-07-18 RX ORDER — LIPASE/PROTEASE/AMYLASE 16-48-48K
1 CAPSULE,DELAYED RELEASE (ENTERIC COATED) ORAL
Refills: 0 | Status: DISCONTINUED | OUTPATIENT
Start: 2021-07-18 | End: 2021-07-18

## 2021-07-18 RX ORDER — SODIUM CHLORIDE 9 MG/ML
1000 INJECTION, SOLUTION INTRAVENOUS
Refills: 0 | Status: DISCONTINUED | OUTPATIENT
Start: 2021-07-18 | End: 2021-07-21

## 2021-07-18 RX ORDER — POLYETHYLENE GLYCOL 3350 17 G/17G
17 POWDER, FOR SOLUTION ORAL DAILY
Refills: 0 | Status: DISCONTINUED | OUTPATIENT
Start: 2021-07-18 | End: 2021-07-22

## 2021-07-18 RX ORDER — POLYETHYLENE GLYCOL 3350 17 G/17G
17 POWDER, FOR SOLUTION ORAL DAILY
Refills: 0 | Status: DISCONTINUED | OUTPATIENT
Start: 2021-07-18 | End: 2021-07-18

## 2021-07-18 RX ADMIN — HYDROMORPHONE HYDROCHLORIDE 1 MILLIGRAM(S): 2 INJECTION INTRAMUSCULAR; INTRAVENOUS; SUBCUTANEOUS at 10:34

## 2021-07-18 RX ADMIN — Medication 3 CAPSULE(S): at 17:49

## 2021-07-18 RX ADMIN — HYDROMORPHONE HYDROCHLORIDE 1 MILLIGRAM(S): 2 INJECTION INTRAMUSCULAR; INTRAVENOUS; SUBCUTANEOUS at 18:42

## 2021-07-18 RX ADMIN — ONDANSETRON 4 MILLIGRAM(S): 8 TABLET, FILM COATED ORAL at 14:49

## 2021-07-18 RX ADMIN — ENOXAPARIN SODIUM 40 MILLIGRAM(S): 100 INJECTION SUBCUTANEOUS at 11:20

## 2021-07-18 RX ADMIN — HYDROMORPHONE HYDROCHLORIDE 1 MILLIGRAM(S): 2 INJECTION INTRAMUSCULAR; INTRAVENOUS; SUBCUTANEOUS at 23:12

## 2021-07-18 RX ADMIN — HYDROMORPHONE HYDROCHLORIDE 1 MILLIGRAM(S): 2 INJECTION INTRAMUSCULAR; INTRAVENOUS; SUBCUTANEOUS at 18:51

## 2021-07-18 RX ADMIN — Medication 100 MILLIGRAM(S): at 11:19

## 2021-07-18 RX ADMIN — Medication 1 MILLIGRAM(S): at 11:19

## 2021-07-18 RX ADMIN — FAMOTIDINE 20 MILLIGRAM(S): 10 INJECTION INTRAVENOUS at 11:19

## 2021-07-18 RX ADMIN — Medication 30 MILLILITER(S): at 06:50

## 2021-07-18 RX ADMIN — HYDROMORPHONE HYDROCHLORIDE 1 MILLIGRAM(S): 2 INJECTION INTRAMUSCULAR; INTRAVENOUS; SUBCUTANEOUS at 02:28

## 2021-07-18 RX ADMIN — HYDROMORPHONE HYDROCHLORIDE 1 MILLIGRAM(S): 2 INJECTION INTRAMUSCULAR; INTRAVENOUS; SUBCUTANEOUS at 06:34

## 2021-07-18 RX ADMIN — HYDROMORPHONE HYDROCHLORIDE 1 MILLIGRAM(S): 2 INJECTION INTRAMUSCULAR; INTRAVENOUS; SUBCUTANEOUS at 14:50

## 2021-07-18 RX ADMIN — POLYETHYLENE GLYCOL 3350 17 GRAM(S): 17 POWDER, FOR SOLUTION ORAL at 17:51

## 2021-07-18 RX ADMIN — HYDROMORPHONE HYDROCHLORIDE 1 MILLIGRAM(S): 2 INJECTION INTRAMUSCULAR; INTRAVENOUS; SUBCUTANEOUS at 11:20
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
## 2021-07-18 NOTE — PROGRESS NOTE ADULT - SUBJECTIVE AND OBJECTIVE BOX
Pt seen and examined at bedside. Tolerating water. Has abd pain.         VITAL SIGNS (Last 24 hrs):  T(C): 36.1 (07-18-21 @ 05:25), Max: 36.7 (07-17-21 @ 20:47)  HR: 96 (07-18-21 @ 05:25) (88 - 96)  BP: 140/89 (07-18-21 @ 05:25) (140/89 - 143/86)  RR: 16 (07-17-21 @ 20:47) (16 - 16)  SpO2: --  Wt(kg): --  Daily     Daily     I&O's Summary      PHYSICAL EXAM:  GENERAL: NAD, well-developed  HEAD:  Atraumatic, Normocephalic  EYES: EOMI, PERRLA, conjunctiva and sclera clear  NECK: Supple, No JVD  CHEST/LUNG: Clear to auscultation bilaterally; No wheeze  HEART: Regular rate and rhythm; No murmurs, rubs, or gallops  ABDOMEN: Soft, Nontender, Nondistended; Bowel sounds present  EXTREMITIES:  2+ Peripheral Pulses, No clubbing, cyanosis, or edema  PSYCH: AAOx3  NEUROLOGY: non-focal  SKIN: No rashes or lesions    Labs Reviewed  Spoke to patient in regards to abnormal labs.    CBC Full  -  ( 18 Jul 2021 06:26 )  WBC Count : 4.81 K/uL  Hemoglobin : 10.9 g/dL  Hematocrit : 31.7 %  Platelet Count - Automated : 182 K/uL  Mean Cell Volume : 94.9 fL  Mean Cell Hemoglobin : 32.6 pg  Mean Cell Hemoglobin Concentration : 34.4 g/dL  Auto Neutrophil # : 3.80 K/uL  Auto Lymphocyte # : 0.52 K/uL  Auto Monocyte # : 0.35 K/uL  Auto Eosinophil # : 0.10 K/uL  Auto Basophil # : 0.03 K/uL  Auto Neutrophil % : 79.0 %  Auto Lymphocyte % : 10.8 %  Auto Monocyte % : 7.3 %  Auto Eosinophil % : 2.1 %  Auto Basophil % : 0.6 %    BMP:    07-18 @ 06:26    Blood Urea Nitrogen - <3  Calcium - 8.6  Carbon Dioxide - 27  Chloride - 97  Creatinine - 0.5  Glucose - 80  Potassium - 3.6  Sodium - 136      Hemoglobin A1c -   PT/INR - ( 16 Jul 2021 06:54 )   PT: 11.80 sec;   INR: 1.03 ratio         PTT - ( 16 Jul 2021 06:54 )  PTT:26.0 sec  Urine Culture:  07-15 @ 18:21 Urine culture: --    Culture Results:   <10,000 CFU/mL Normal Urogenital Roseanna  Method Type: --  Organism: --  Organism Identification: --  Specimen Source: .Urine Clean Catch (Midstream)         MEDICATIONS  (STANDING):  chlorhexidine 4% Liquid 1 Application(s) Topical <User Schedule>  enoxaparin Injectable 40 milliGRAM(s) SubCutaneous daily  famotidine Injectable 20 milliGRAM(s) IV Push daily  folic acid 1 milliGRAM(s) Oral daily  lactated ringers. 1000 milliLiter(s) (100 mL/Hr) IV Continuous <Continuous>  magnesium sulfate  IVPB 2 Gram(s) IV Intermittent every 2 hours  pancrelipase  (CREON 36,000 Lipase Units) 1 Capsule(s) Oral three times a day with meals  senna 2 Tablet(s) Oral at bedtime  thiamine 100 milliGRAM(s) Oral daily    MEDICATIONS  (PRN):  HYDROmorphone  Injectable 1 milliGRAM(s) IV Push every 4 hours PRN Moderate Pain (4 - 6)  ondansetron Injectable 4 milliGRAM(s) IV Push every 6 hours PRN Nausea and/or Vomiting

## 2021-07-19 LAB
ALBUMIN SERPL ELPH-MCNC: 3.8 G/DL — SIGNIFICANT CHANGE UP (ref 3.5–5.2)
ALP SERPL-CCNC: 131 U/L — HIGH (ref 30–115)
ALT FLD-CCNC: 67 U/L — HIGH (ref 0–41)
ANION GAP SERPL CALC-SCNC: 11 MMOL/L — SIGNIFICANT CHANGE UP (ref 7–14)
AST SERPL-CCNC: 198 U/L — HIGH (ref 0–41)
BASOPHILS # BLD AUTO: 0.04 K/UL — SIGNIFICANT CHANGE UP (ref 0–0.2)
BASOPHILS NFR BLD AUTO: 1.1 % — HIGH (ref 0–1)
BILIRUB DIRECT SERPL-MCNC: 0.2 MG/DL — SIGNIFICANT CHANGE UP (ref 0–0.2)
BILIRUB INDIRECT FLD-MCNC: 0.3 MG/DL — SIGNIFICANT CHANGE UP (ref 0.2–1.2)
BILIRUB SERPL-MCNC: 0.5 MG/DL — SIGNIFICANT CHANGE UP (ref 0.2–1.2)
BUN SERPL-MCNC: <3 MG/DL — LOW (ref 10–20)
CALCIUM SERPL-MCNC: 9.1 MG/DL — SIGNIFICANT CHANGE UP (ref 8.5–10.1)
CHLORIDE SERPL-SCNC: 102 MMOL/L — SIGNIFICANT CHANGE UP (ref 98–110)
CO2 SERPL-SCNC: 28 MMOL/L — SIGNIFICANT CHANGE UP (ref 17–32)
CREAT SERPL-MCNC: 0.6 MG/DL — LOW (ref 0.7–1.5)
EOSINOPHIL # BLD AUTO: 0.16 K/UL — SIGNIFICANT CHANGE UP (ref 0–0.7)
EOSINOPHIL NFR BLD AUTO: 4.5 % — SIGNIFICANT CHANGE UP (ref 0–8)
GLUCOSE SERPL-MCNC: 93 MG/DL — SIGNIFICANT CHANGE UP (ref 70–99)
HCT VFR BLD CALC: 34.5 % — LOW (ref 37–47)
HGB BLD-MCNC: 11.9 G/DL — LOW (ref 12–16)
IMM GRANULOCYTES NFR BLD AUTO: 0 % — LOW (ref 0.1–0.3)
LYMPHOCYTES # BLD AUTO: 0.81 K/UL — LOW (ref 1.2–3.4)
LYMPHOCYTES # BLD AUTO: 22.8 % — SIGNIFICANT CHANGE UP (ref 20.5–51.1)
MAGNESIUM SERPL-MCNC: 1.7 MG/DL — LOW (ref 1.8–2.4)
MCHC RBC-ENTMCNC: 32.9 PG — HIGH (ref 27–31)
MCHC RBC-ENTMCNC: 34.5 G/DL — SIGNIFICANT CHANGE UP (ref 32–37)
MCV RBC AUTO: 95.3 FL — SIGNIFICANT CHANGE UP (ref 81–99)
MONOCYTES # BLD AUTO: 0.44 K/UL — SIGNIFICANT CHANGE UP (ref 0.1–0.6)
MONOCYTES NFR BLD AUTO: 12.4 % — HIGH (ref 1.7–9.3)
NEUTROPHILS # BLD AUTO: 2.1 K/UL — SIGNIFICANT CHANGE UP (ref 1.4–6.5)
NEUTROPHILS NFR BLD AUTO: 59.2 % — SIGNIFICANT CHANGE UP (ref 42.2–75.2)
NRBC # BLD: 0 /100 WBCS — SIGNIFICANT CHANGE UP (ref 0–0)
PHOSPHATE SERPL-MCNC: 4.1 MG/DL — SIGNIFICANT CHANGE UP (ref 2.1–4.9)
PLATELET # BLD AUTO: 214 K/UL — SIGNIFICANT CHANGE UP (ref 130–400)
POTASSIUM SERPL-MCNC: 3.9 MMOL/L — SIGNIFICANT CHANGE UP (ref 3.5–5)
POTASSIUM SERPL-SCNC: 3.9 MMOL/L — SIGNIFICANT CHANGE UP (ref 3.5–5)
PROT SERPL-MCNC: 6.5 G/DL — SIGNIFICANT CHANGE UP (ref 6–8)
RBC # BLD: 3.62 M/UL — LOW (ref 4.2–5.4)
RBC # FLD: 10.8 % — LOW (ref 11.5–14.5)
SODIUM SERPL-SCNC: 141 MMOL/L — SIGNIFICANT CHANGE UP (ref 135–146)
WBC # BLD: 3.55 K/UL — LOW (ref 4.8–10.8)
WBC # FLD AUTO: 3.55 K/UL — LOW (ref 4.8–10.8)

## 2021-07-19 PROCEDURE — 99232 SBSQ HOSP IP/OBS MODERATE 35: CPT

## 2021-07-19 RX ORDER — MAGNESIUM SULFATE 500 MG/ML
2 VIAL (ML) INJECTION ONCE
Refills: 0 | Status: COMPLETED | OUTPATIENT
Start: 2021-07-19 | End: 2021-07-19

## 2021-07-19 RX ADMIN — HYDROMORPHONE HYDROCHLORIDE 1 MILLIGRAM(S): 2 INJECTION INTRAMUSCULAR; INTRAVENOUS; SUBCUTANEOUS at 18:27

## 2021-07-19 RX ADMIN — Medication 3 CAPSULE(S): at 11:30

## 2021-07-19 RX ADMIN — Medication 3 CAPSULE(S): at 08:13

## 2021-07-19 RX ADMIN — Medication 50 GRAM(S): at 09:06

## 2021-07-19 RX ADMIN — Medication 100 MILLIGRAM(S): at 11:30

## 2021-07-19 RX ADMIN — HYDROMORPHONE HYDROCHLORIDE 1 MILLIGRAM(S): 2 INJECTION INTRAMUSCULAR; INTRAVENOUS; SUBCUTANEOUS at 09:15

## 2021-07-19 RX ADMIN — Medication 3 CAPSULE(S): at 17:24

## 2021-07-19 RX ADMIN — ONDANSETRON 4 MILLIGRAM(S): 8 TABLET, FILM COATED ORAL at 04:26

## 2021-07-19 RX ADMIN — CHLORHEXIDINE GLUCONATE 1 APPLICATION(S): 213 SOLUTION TOPICAL at 05:05

## 2021-07-19 RX ADMIN — HYDROMORPHONE HYDROCHLORIDE 1 MILLIGRAM(S): 2 INJECTION INTRAMUSCULAR; INTRAVENOUS; SUBCUTANEOUS at 22:18

## 2021-07-19 RX ADMIN — HYDROMORPHONE HYDROCHLORIDE 1 MILLIGRAM(S): 2 INJECTION INTRAMUSCULAR; INTRAVENOUS; SUBCUTANEOUS at 04:26

## 2021-07-19 RX ADMIN — ONDANSETRON 4 MILLIGRAM(S): 8 TABLET, FILM COATED ORAL at 17:59

## 2021-07-19 RX ADMIN — FAMOTIDINE 20 MILLIGRAM(S): 10 INJECTION INTRAVENOUS at 11:30

## 2021-07-19 RX ADMIN — ENOXAPARIN SODIUM 40 MILLIGRAM(S): 100 INJECTION SUBCUTANEOUS at 11:30

## 2021-07-19 RX ADMIN — HYDROMORPHONE HYDROCHLORIDE 1 MILLIGRAM(S): 2 INJECTION INTRAMUSCULAR; INTRAVENOUS; SUBCUTANEOUS at 13:35

## 2021-07-19 RX ADMIN — HYDROMORPHONE HYDROCHLORIDE 1 MILLIGRAM(S): 2 INJECTION INTRAMUSCULAR; INTRAVENOUS; SUBCUTANEOUS at 22:04

## 2021-07-19 RX ADMIN — SODIUM CHLORIDE 100 MILLILITER(S): 9 INJECTION, SOLUTION INTRAVENOUS at 09:30

## 2021-07-19 RX ADMIN — Medication 1 MILLIGRAM(S): at 11:30

## 2021-07-19 RX ADMIN — HYDROMORPHONE HYDROCHLORIDE 1 MILLIGRAM(S): 2 INJECTION INTRAMUSCULAR; INTRAVENOUS; SUBCUTANEOUS at 13:10

## 2021-07-19 RX ADMIN — HYDROMORPHONE HYDROCHLORIDE 1 MILLIGRAM(S): 2 INJECTION INTRAMUSCULAR; INTRAVENOUS; SUBCUTANEOUS at 00:54

## 2021-07-19 RX ADMIN — HYDROMORPHONE HYDROCHLORIDE 1 MILLIGRAM(S): 2 INJECTION INTRAMUSCULAR; INTRAVENOUS; SUBCUTANEOUS at 18:00

## 2021-07-19 RX ADMIN — SODIUM CHLORIDE 100 MILLILITER(S): 9 INJECTION, SOLUTION INTRAVENOUS at 05:06

## 2021-07-19 RX ADMIN — HYDROMORPHONE HYDROCHLORIDE 1 MILLIGRAM(S): 2 INJECTION INTRAMUSCULAR; INTRAVENOUS; SUBCUTANEOUS at 05:13

## 2021-07-19 RX ADMIN — HYDROMORPHONE HYDROCHLORIDE 1 MILLIGRAM(S): 2 INJECTION INTRAMUSCULAR; INTRAVENOUS; SUBCUTANEOUS at 08:55

## 2021-07-19 NOTE — PROGRESS NOTE ADULT - SUBJECTIVE AND OBJECTIVE BOX
INTERVAL HPI/OVERNIGHT EVENTS:    SUBJECTIVE: Patient seen and examined at bedside.     no cp, sob, fever  +abd pain, improving, diffuse, nonradiating, worsen with touch    OBJECTIVE:    VITAL SIGNS:  Vital Signs Last 24 Hrs  T(C): 36.1 (19 Jul 2021 13:12), Max: 36.8 (18 Jul 2021 21:32)  T(F): 97 (19 Jul 2021 13:12), Max: 98.3 (18 Jul 2021 21:32)  HR: 80 (19 Jul 2021 13:12) (80 - 87)  BP: 134/80 (19 Jul 2021 13:12) (118/80 - 139/89)  BP(mean): --  RR: 18 (19 Jul 2021 13:12) (17 - 18)  SpO2: 98% (18 Jul 2021 19:53) (98% - 98%)      PHYSICAL EXAM:    General: NAD  HEENT: NC/AT; PERRL, clear conjunctiva  Neck: supple  Respiratory: CTA b/l  Cardiovascular: +S1/S2; RRR  Abdomen: soft, NT/ND; +BS x4  Extremities: WWP, 2+ peripheral pulses b/l; no LE edema  Skin: normal color and turgor; no rash  Neurological:    MEDICATIONS:  MEDICATIONS  (STANDING):  chlorhexidine 4% Liquid 1 Application(s) Topical <User Schedule>  enoxaparin Injectable 40 milliGRAM(s) SubCutaneous daily  famotidine Injectable 20 milliGRAM(s) IV Push daily  folic acid 1 milliGRAM(s) Oral daily  lactated ringers. 1000 milliLiter(s) (100 mL/Hr) IV Continuous <Continuous>  magnesium sulfate  IVPB 2 Gram(s) IV Intermittent every 2 hours  pancrelipase  (CREON 12,000 Lipase Units) 3 Capsule(s) Oral three times a day with meals  senna 2 Tablet(s) Oral at bedtime  thiamine 100 milliGRAM(s) Oral daily    MEDICATIONS  (PRN):  HYDROmorphone  Injectable 1 milliGRAM(s) IV Push every 4 hours PRN Moderate Pain (4 - 6)  ondansetron Injectable 4 milliGRAM(s) IV Push every 6 hours PRN Nausea and/or Vomiting  polyethylene glycol 3350 17 Gram(s) Oral daily PRN Constipation      ALLERGIES:  Allergies    Compazine (Unknown)    Intolerances        LABS:                        11.9   3.55  )-----------( 214      ( 19 Jul 2021 05:50 )             34.5     Hemoglobin: 11.9 g/dL (07-19 @ 05:50)  Hemoglobin: 10.9 g/dL (07-18 @ 06:26)  Hemoglobin: 12.0 g/dL (07-17 @ 06:57)  Hemoglobin: 12.2 g/dL (07-16 @ 06:54)  Hemoglobin: 14.9 g/dL (07-15 @ 16:40)    CBC Full  -  ( 19 Jul 2021 05:50 )  WBC Count : 3.55 K/uL  RBC Count : 3.62 M/uL  Hemoglobin : 11.9 g/dL  Hematocrit : 34.5 %  Platelet Count - Automated : 214 K/uL  Mean Cell Volume : 95.3 fL  Mean Cell Hemoglobin : 32.9 pg  Mean Cell Hemoglobin Concentration : 34.5 g/dL  Auto Neutrophil # : 2.10 K/uL  Auto Lymphocyte # : 0.81 K/uL  Auto Monocyte # : 0.44 K/uL  Auto Eosinophil # : 0.16 K/uL  Auto Basophil # : 0.04 K/uL  Auto Neutrophil % : 59.2 %  Auto Lymphocyte % : 22.8 %  Auto Monocyte % : 12.4 %  Auto Eosinophil % : 4.5 %  Auto Basophil % : 1.1 %    07-19    141  |  102  |  <3<L>  ----------------------------<  93  3.9   |  28  |  0.6<L>    Ca    9.1      19 Jul 2021 05:50  Phos  4.1     07-19  Mg     1.7     07-19    TPro  6.5  /  Alb  3.8  /  TBili  0.5  /  DBili  0.2  /  AST  198<H>  /  ALT  67<H>  /  AlkPhos  131<H>  07-19    Creatinine Trend: 0.6<--, 0.5<--, 0.5<--, 0.5<--, 0.7<--  LIVER FUNCTIONS - ( 19 Jul 2021 05:50 )  Alb: 3.8 g/dL / Pro: 6.5 g/dL / ALK PHOS: 131 U/L / ALT: 67 U/L / AST: 198 U/L / GGT: x               hs Troponin:              CSF:                      EKG:   MICROBIOLOGY:    IMAGING:      Labs, imaging, EKG personally reviewed    RADIOLOGY & ADDITIONAL TESTS: Reviewed.
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
## 2021-07-19 NOTE — [facility] QUERY NOTE - NSCDIOTHERTXTBX_GEN_ALL_CORE_HH
Dr. Villasenor,    44 y/o female admitted with Alcohol induced pancreatitis.  Clinical Indicators:  Magnesium on 7/16/21 = 1.2  Magnesium on 7/17/21 = 1.5  Magnesium on 7/19/21 = 1.7    Treatment:  Magnesium Sulfate IVPB 7/16, 7/17, 7/19    Based on the clinical indicators above, please specify the significance of the diagnosis you   are treating and or monitoring:    -Hypomagnesemia  -Other (please specify)  -Clinically unable to determine  Thank you  Cecilia Dinero  [facility] Department Dr. Villasenor,    44 y/o female admitted with Alcohol induced pancreatitis.    H + P: Magnesium deficiency - replete     Clinical Indicators:  Magnesium on 7/16/21 = 1.2  Magnesium on 7/17/21 = 1.5  Magnesium on 7/19/21 = 1.7    Treatment:  Magnesium Sulfate IVPB 7/16, 7/17, 7/19    Based on the clinical indicators above, please specify the significance of the diagnosis you   are treating and or monitoring:    -Hypomagnesemia  -Other (please specify)  -Clinically unable to determine  Thank you  Cecilia Dinero  [facility] Department
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
## 2021-07-19 NOTE — PROGRESS NOTE ADULT - ASSESSMENT
45F PMHx alcohol abuse, chronic pancreatitis here with acute on chronic pancreatitis.    #Acute on chronic pancreatitis, alcoholic  lr 100 cc/hr  ct abd with fluid collection, no necrosis  diet as tolerated  pain control  creon  #Transamnitis, hepatocellular  due to hepatic steatosis  ruq noted  trend  #Alcohol abuse  no evidence of withdrawal  last drink prior to admission  monitor ciwa  #DVT ppx  lovenox    #Progress Note Handoff:  Pending (specify):  Consults_________, Tests________, Test Results_______, Other___improvement in abd pain______  Family discussion: d/w pt at bedside re: treatment plan, primary dx  Disposition: Home_x__/SNF___/Other________/Unknown at this time________

## 2021-07-20 ENCOUNTER — TRANSCRIPTION ENCOUNTER (OUTPATIENT)
Age: 46
End: 2021-07-20

## 2021-07-20 LAB
ALBUMIN SERPL ELPH-MCNC: 4 G/DL — SIGNIFICANT CHANGE UP (ref 3.5–5.2)
ALP SERPL-CCNC: 139 U/L — HIGH (ref 30–115)
ALT FLD-CCNC: 77 U/L — HIGH (ref 0–41)
ANION GAP SERPL CALC-SCNC: 12 MMOL/L — SIGNIFICANT CHANGE UP (ref 7–14)
AST SERPL-CCNC: 201 U/L — HIGH (ref 0–41)
BASOPHILS # BLD AUTO: 0.04 K/UL — SIGNIFICANT CHANGE UP (ref 0–0.2)
BASOPHILS NFR BLD AUTO: 1.2 % — HIGH (ref 0–1)
BILIRUB SERPL-MCNC: 0.5 MG/DL — SIGNIFICANT CHANGE UP (ref 0.2–1.2)
BUN SERPL-MCNC: <3 MG/DL — LOW (ref 10–20)
CALCIUM SERPL-MCNC: 9.4 MG/DL — SIGNIFICANT CHANGE UP (ref 8.5–10.1)
CHLORIDE SERPL-SCNC: 100 MMOL/L — SIGNIFICANT CHANGE UP (ref 98–110)
CO2 SERPL-SCNC: 28 MMOL/L — SIGNIFICANT CHANGE UP (ref 17–32)
CREAT SERPL-MCNC: 0.6 MG/DL — LOW (ref 0.7–1.5)
EOSINOPHIL # BLD AUTO: 0.16 K/UL — SIGNIFICANT CHANGE UP (ref 0–0.7)
EOSINOPHIL NFR BLD AUTO: 4.7 % — SIGNIFICANT CHANGE UP (ref 0–8)
GLUCOSE SERPL-MCNC: 92 MG/DL — SIGNIFICANT CHANGE UP (ref 70–99)
HCT VFR BLD CALC: 35.7 % — LOW (ref 37–47)
HGB BLD-MCNC: 12.2 G/DL — SIGNIFICANT CHANGE UP (ref 12–16)
IMM GRANULOCYTES NFR BLD AUTO: 0 % — LOW (ref 0.1–0.3)
LYMPHOCYTES # BLD AUTO: 0.85 K/UL — LOW (ref 1.2–3.4)
LYMPHOCYTES # BLD AUTO: 25.1 % — SIGNIFICANT CHANGE UP (ref 20.5–51.1)
MAGNESIUM SERPL-MCNC: 1.7 MG/DL — LOW (ref 1.8–2.4)
MCHC RBC-ENTMCNC: 32.4 PG — HIGH (ref 27–31)
MCHC RBC-ENTMCNC: 34.2 G/DL — SIGNIFICANT CHANGE UP (ref 32–37)
MCV RBC AUTO: 94.7 FL — SIGNIFICANT CHANGE UP (ref 81–99)
MONOCYTES # BLD AUTO: 0.5 K/UL — SIGNIFICANT CHANGE UP (ref 0.1–0.6)
MONOCYTES NFR BLD AUTO: 14.7 % — HIGH (ref 1.7–9.3)
NEUTROPHILS # BLD AUTO: 1.84 K/UL — SIGNIFICANT CHANGE UP (ref 1.4–6.5)
NEUTROPHILS NFR BLD AUTO: 54.3 % — SIGNIFICANT CHANGE UP (ref 42.2–75.2)
NRBC # BLD: 0 /100 WBCS — SIGNIFICANT CHANGE UP (ref 0–0)
PLATELET # BLD AUTO: 225 K/UL — SIGNIFICANT CHANGE UP (ref 130–400)
POTASSIUM SERPL-MCNC: 3.5 MMOL/L — SIGNIFICANT CHANGE UP (ref 3.5–5)
POTASSIUM SERPL-SCNC: 3.5 MMOL/L — SIGNIFICANT CHANGE UP (ref 3.5–5)
PROT SERPL-MCNC: 6.8 G/DL — SIGNIFICANT CHANGE UP (ref 6–8)
RBC # BLD: 3.77 M/UL — LOW (ref 4.2–5.4)
RBC # FLD: 11 % — LOW (ref 11.5–14.5)
SODIUM SERPL-SCNC: 140 MMOL/L — SIGNIFICANT CHANGE UP (ref 135–146)
WBC # BLD: 3.39 K/UL — LOW (ref 4.8–10.8)
WBC # FLD AUTO: 3.39 K/UL — LOW (ref 4.8–10.8)

## 2021-07-20 PROCEDURE — 99232 SBSQ HOSP IP/OBS MODERATE 35: CPT

## 2021-07-20 RX ORDER — MAGNESIUM SULFATE 500 MG/ML
2 VIAL (ML) INJECTION ONCE
Refills: 0 | Status: COMPLETED | OUTPATIENT
Start: 2021-07-20 | End: 2021-07-20

## 2021-07-20 RX ADMIN — POLYETHYLENE GLYCOL 3350 17 GRAM(S): 17 POWDER, FOR SOLUTION ORAL at 11:49

## 2021-07-20 RX ADMIN — HYDROMORPHONE HYDROCHLORIDE 1 MILLIGRAM(S): 2 INJECTION INTRAMUSCULAR; INTRAVENOUS; SUBCUTANEOUS at 02:06

## 2021-07-20 RX ADMIN — HYDROMORPHONE HYDROCHLORIDE 1 MILLIGRAM(S): 2 INJECTION INTRAMUSCULAR; INTRAVENOUS; SUBCUTANEOUS at 06:51

## 2021-07-20 RX ADMIN — SENNA PLUS 2 TABLET(S): 8.6 TABLET ORAL at 20:36

## 2021-07-20 RX ADMIN — ENOXAPARIN SODIUM 40 MILLIGRAM(S): 100 INJECTION SUBCUTANEOUS at 11:41

## 2021-07-20 RX ADMIN — HYDROMORPHONE HYDROCHLORIDE 1 MILLIGRAM(S): 2 INJECTION INTRAMUSCULAR; INTRAVENOUS; SUBCUTANEOUS at 11:05

## 2021-07-20 RX ADMIN — Medication 1 MILLIGRAM(S): at 11:41

## 2021-07-20 RX ADMIN — Medication 3 CAPSULE(S): at 11:40

## 2021-07-20 RX ADMIN — SODIUM CHLORIDE 100 MILLILITER(S): 9 INJECTION, SOLUTION INTRAVENOUS at 11:49

## 2021-07-20 RX ADMIN — HYDROMORPHONE HYDROCHLORIDE 1 MILLIGRAM(S): 2 INJECTION INTRAMUSCULAR; INTRAVENOUS; SUBCUTANEOUS at 20:35

## 2021-07-20 RX ADMIN — HYDROMORPHONE HYDROCHLORIDE 1 MILLIGRAM(S): 2 INJECTION INTRAMUSCULAR; INTRAVENOUS; SUBCUTANEOUS at 10:42

## 2021-07-20 RX ADMIN — FAMOTIDINE 20 MILLIGRAM(S): 10 INJECTION INTRAVENOUS at 11:40

## 2021-07-20 RX ADMIN — Medication 100 MILLIGRAM(S): at 11:40

## 2021-07-20 RX ADMIN — HYDROMORPHONE HYDROCHLORIDE 1 MILLIGRAM(S): 2 INJECTION INTRAMUSCULAR; INTRAVENOUS; SUBCUTANEOUS at 15:13

## 2021-07-20 RX ADMIN — HYDROMORPHONE HYDROCHLORIDE 1 MILLIGRAM(S): 2 INJECTION INTRAMUSCULAR; INTRAVENOUS; SUBCUTANEOUS at 02:16

## 2021-07-20 RX ADMIN — Medication 3 CAPSULE(S): at 17:15

## 2021-07-20 RX ADMIN — Medication 50 GRAM(S): at 11:41

## 2021-07-20 RX ADMIN — HYDROMORPHONE HYDROCHLORIDE 1 MILLIGRAM(S): 2 INJECTION INTRAMUSCULAR; INTRAVENOUS; SUBCUTANEOUS at 06:41

## 2021-07-20 RX ADMIN — Medication 3 CAPSULE(S): at 07:56

## 2021-07-20 RX ADMIN — HYDROMORPHONE HYDROCHLORIDE 1 MILLIGRAM(S): 2 INJECTION INTRAMUSCULAR; INTRAVENOUS; SUBCUTANEOUS at 15:31

## 2021-07-20 RX ADMIN — ONDANSETRON 4 MILLIGRAM(S): 8 TABLET, FILM COATED ORAL at 10:42
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
## 2021-07-20 NOTE — PROGRESS NOTE ADULT - SUBJECTIVE AND OBJECTIVE BOX
INTERVAL HPI/OVERNIGHT EVENTS:    SUBJECTIVE: Patient seen and examined at bedside.     no cp, sob, fever  abd pain post breakfast, no nausea, vomiting, diarrhea    OBJECTIVE:    VITAL SIGNS:  Vital Signs Last 24 Hrs  T(C): 35.8 (20 Jul 2021 06:03), Max: 36.2 (19 Jul 2021 21:00)  T(F): 96.5 (20 Jul 2021 06:03), Max: 97.1 (19 Jul 2021 21:00)  HR: 86 (20 Jul 2021 06:03) (80 - 86)  BP: 122/86 (20 Jul 2021 06:03) (122/86 - 134/88)  BP(mean): --  RR: 18 (20 Jul 2021 06:03) (18 - 18)  SpO2: --      PHYSICAL EXAM:    General: NAD  HEENT: NC/AT; PERRL, clear conjunctiva  Neck: supple  Respiratory: CTA b/l  Cardiovascular: +S1/S2; RRR  Abdomen: soft, NT/ND; +BS x4  Extremities: WWP, 2+ peripheral pulses b/l; no LE edema  Skin: normal color and turgor; no rash  Neurological:    MEDICATIONS:  MEDICATIONS  (STANDING):  chlorhexidine 4% Liquid 1 Application(s) Topical <User Schedule>  enoxaparin Injectable 40 milliGRAM(s) SubCutaneous daily  famotidine Injectable 20 milliGRAM(s) IV Push daily  folic acid 1 milliGRAM(s) Oral daily  lactated ringers. 1000 milliLiter(s) (100 mL/Hr) IV Continuous <Continuous>  magnesium sulfate  IVPB 2 Gram(s) IV Intermittent every 2 hours  magnesium sulfate  IVPB 2 Gram(s) IV Intermittent once  pancrelipase  (CREON 12,000 Lipase Units) 3 Capsule(s) Oral three times a day with meals  senna 2 Tablet(s) Oral at bedtime  thiamine 100 milliGRAM(s) Oral daily    MEDICATIONS  (PRN):  HYDROmorphone  Injectable 1 milliGRAM(s) IV Push every 4 hours PRN Moderate Pain (4 - 6)  ondansetron Injectable 4 milliGRAM(s) IV Push every 6 hours PRN Nausea and/or Vomiting  polyethylene glycol 3350 17 Gram(s) Oral daily PRN Constipation      ALLERGIES:  Allergies    Compazine (Unknown)    Intolerances        LABS:                        12.2   3.39  )-----------( 225      ( 20 Jul 2021 07:59 )             35.7     Hemoglobin: 12.2 g/dL (07-20 @ 07:59)  Hemoglobin: 11.9 g/dL (07-19 @ 05:50)  Hemoglobin: 10.9 g/dL (07-18 @ 06:26)  Hemoglobin: 12.0 g/dL (07-17 @ 06:57)  Hemoglobin: 12.2 g/dL (07-16 @ 06:54)    CBC Full  -  ( 20 Jul 2021 07:59 )  WBC Count : 3.39 K/uL  RBC Count : 3.77 M/uL  Hemoglobin : 12.2 g/dL  Hematocrit : 35.7 %  Platelet Count - Automated : 225 K/uL  Mean Cell Volume : 94.7 fL  Mean Cell Hemoglobin : 32.4 pg  Mean Cell Hemoglobin Concentration : 34.2 g/dL  Auto Neutrophil # : 1.84 K/uL  Auto Lymphocyte # : 0.85 K/uL  Auto Monocyte # : 0.50 K/uL  Auto Eosinophil # : 0.16 K/uL  Auto Basophil # : 0.04 K/uL  Auto Neutrophil % : 54.3 %  Auto Lymphocyte % : 25.1 %  Auto Monocyte % : 14.7 %  Auto Eosinophil % : 4.7 %  Auto Basophil % : 1.2 %    07-20    140  |  100  |  <3<L>  ----------------------------<  92  3.5   |  28  |  0.6<L>    Ca    9.4      20 Jul 2021 07:59  Phos  4.1     07-19  Mg     1.7     07-20    TPro  6.8  /  Alb  4.0  /  TBili  0.5  /  DBili  x   /  AST  201<H>  /  ALT  77<H>  /  AlkPhos  139<H>  07-20    Creatinine Trend: 0.6<--, 0.6<--, 0.5<--, 0.5<--, 0.5<--, 0.7<--  LIVER FUNCTIONS - ( 20 Jul 2021 07:59 )  Alb: 4.0 g/dL / Pro: 6.8 g/dL / ALK PHOS: 139 U/L / ALT: 77 U/L / AST: 201 U/L / GGT: x               hs Troponin:              CSF:                      EKG:   MICROBIOLOGY:    IMAGING:      Labs, imaging, EKG personally reviewed    RADIOLOGY & ADDITIONAL TESTS: Reviewed.

## 2021-07-20 NOTE — DISCHARGE NOTE PROVIDER - CARE PROVIDER_API CALL
Zachariah Mace)  Gastroenterology; Internal Medicine  [address] [place] [address]  Phone: [phone]  Fax: [phone]  Follow Up Time:     Haley Cobos)  Internal Medicine  [address] [facility], [address] [place] [address]  Phone: [phone]  Fax: [phone]  Follow Up Time:    Zachariah Mace)  Gastroenterology; Internal Medicine  [address] [place] [address]  Phone: [phone]  Fax: [phone]  Follow Up Time:     Haley Cobos)  Internal Medicine  [address] [facility], [address] [place] [address]  Phone: [phone]  Fax: [phone]  Follow Up Time:     Petey Elizondo)  Gastroenterology; Internal Medicine  [address] [place] [address]  Phone: [phone]  Fax: [phone]  Follow Up Time: 2 weeks    Franco Seo)  Anesthesiology; Pain Medicine  [address] [place] [address], [address]  [place] [address]  Phone: [phone]  Fax: [phone]  Follow Up Time: 2 weeks

## 2021-07-20 NOTE — DISCHARGE NOTE PROVIDER - PROVIDER TOKENS
PROVIDER:[TOKEN:[[id number]]],PROVIDER:[TOKEN:[[id number]]] PROVIDER:[TOKEN:[[id number]]],PROVIDER:[TOKEN:[[id number]]],PROVIDER:[TOKEN:[[id number]],FOLLOWUP:[2 weeks]],PROVIDER:[TOKEN:[[id number]],FOLLOWUP:[2 weeks]]

## 2021-07-20 NOTE — PROGRESS NOTE ADULT - ASSESSMENT
45F PMHx alcohol abuse, chronic pancreatitis here with acute on chronic pancreatitis.    #Acute on chronic pancreatitis, alcoholic  lr 100 cc/hr  ct abd with fluid collection, no necrosis; pseudocyst increased  will need outpt gi f/u  diet as tolerated  pain control  creon  #Transamnitis, hepatocellular  due to hepatic steatosis  ruq noted  trend  #Alcohol abuse  no evidence of withdrawal  last drink prior to admission  monitor ciwa  #DVT ppx  lovenox    #Progress Note Handoff:  Pending (specify):  Consults_________, Tests________, Test Results_______, Other___improvement in abd pain______  Family discussion: d/w pt at bedside re: treatment plan, primary dx  Disposition: Home_x__/SNF___/Other________/Unknown at this time________

## 2021-07-20 NOTE — DISCHARGE NOTE PROVIDER - CARE PROVIDERS DIRECT ADDRESSES
,DirectAddress_Unknown,[email or web address] ,DirectAddress_Unknown,[email or web address],DirectAddress_Unknown

## 2021-07-20 NOTE — DISCHARGE NOTE PROVIDER - NSDCCPCAREPLAN_GEN_ALL_CORE_FT
PRINCIPAL DISCHARGE DIAGNOSIS  Diagnosis: Pancreatitis  Assessment and Plan of Treatment: Pancreatitis  Pancreatitis is a condition in which the pancreas becomes irritated and swollen (inflammation). The pancreas is a gland that is located behind the stomach. It produces enzymes that help to digest food. Most acute attacks last a couple of days and can cause serious problems and even be life threatening. The most common causes are alcohol abuse and gallstones. Symptoms include pain in the upper abdomen that may radiate to the back, nausea, and vomiting. Diagnosis is made with a medical history and physical exam as well as additional diagnostic testing. At home, eat smaller, more frequent meals and avoid alcohol and fatty foods. Drink enough fluid to keep your urine clear or pale yellow.   SEEK IMMEDIATE MEDICAL CARE IF YOU HAVE ANY OF THE FOLLOWING SYMPTOMS: inability to keep fluids down, increasing pain, yellowing of your skin or eyes, or lightheadedness/dizziness.
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
## 2021-07-20 NOTE — DISCHARGE NOTE PROVIDER - NSDCMRMEDTOKEN_GEN_ALL_CORE_FT
folic acid 1 mg oral tablet: 1 tab(s) orally once a day  pancrelipase 12,000 units-38,000 units-60,000 units oral delayed release capsule: 3 cap(s) orally 4 times a day (with meals and at bedtime)  thiamine 100 mg oral tablet: 1 tab(s) orally once a day   folic acid 1 mg oral tablet: 1 tab(s) orally once a day  gabapentin 100 mg oral capsule: 1 cap(s) orally 3 times a day  ibuprofen 400 mg oral tablet: 1 tab(s) orally every 6 hours, As needed, Moderate Pain (4 - 6)  pancrelipase 12,000 units-38,000 units-60,000 units oral delayed release capsule: 3 cap(s) orally 4 times a day (with meals and at bedtime)  thiamine 100 mg oral tablet: 1 tab(s) orally once a day   acetaminophen 650 mg oral tablet, extended release: 1 tab(s) orally every 6 hours as needed for pain  folic acid 1 mg oral tablet: 1 tab(s) orally once a day  gabapentin 100 mg oral capsule: 1 cap(s) orally 3 times a day  pancrelipase 12,000 units-38,000 units-60,000 units oral delayed release capsule: 3 cap(s) orally 4 times a day (with meals and at bedtime)  Protonix 40 mg oral delayed release tablet: 1 tab(s) orally once a day   thiamine 100 mg oral tablet: 1 tab(s) orally once a day   acetaminophen 650 mg oral tablet, extended release: 1 tab(s) orally every 6 hours as needed for pain  gabapentin 100 mg oral capsule: 1 cap(s) orally 3 times a day  Protonix 40 mg oral delayed release tablet: 1 tab(s) orally once a day    acetaminophen 650 mg oral tablet, extended release: 1 tab(s) orally every 6 hours as needed for pain  Creon 12,000 units oral delayed release capsule: 3 cap(s) orally 3 times a day (with meals)  gabapentin 100 mg oral capsule: 1 cap(s) orally 3 times a day  Protonix 40 mg oral delayed release tablet: 1 tab(s) orally once a day

## 2021-07-21 LAB
ALBUMIN SERPL ELPH-MCNC: 4.2 G/DL — SIGNIFICANT CHANGE UP (ref 3.5–5.2)
ALP SERPL-CCNC: 134 U/L — HIGH (ref 30–115)
ALT FLD-CCNC: 84 U/L — HIGH (ref 0–41)
ANION GAP SERPL CALC-SCNC: 13 MMOL/L — SIGNIFICANT CHANGE UP (ref 7–14)
AST SERPL-CCNC: 211 U/L — HIGH (ref 0–41)
BASOPHILS # BLD AUTO: 0.03 K/UL — SIGNIFICANT CHANGE UP (ref 0–0.2)
BASOPHILS NFR BLD AUTO: 0.9 % — SIGNIFICANT CHANGE UP (ref 0–1)
BILIRUB SERPL-MCNC: 0.4 MG/DL — SIGNIFICANT CHANGE UP (ref 0.2–1.2)
BUN SERPL-MCNC: 3 MG/DL — LOW (ref 10–20)
CALCIUM SERPL-MCNC: 9.6 MG/DL — SIGNIFICANT CHANGE UP (ref 8.5–10.1)
CHLORIDE SERPL-SCNC: 101 MMOL/L — SIGNIFICANT CHANGE UP (ref 98–110)
CO2 SERPL-SCNC: 27 MMOL/L — SIGNIFICANT CHANGE UP (ref 17–32)
CREAT SERPL-MCNC: 0.6 MG/DL — LOW (ref 0.7–1.5)
EOSINOPHIL # BLD AUTO: 0.11 K/UL — SIGNIFICANT CHANGE UP (ref 0–0.7)
EOSINOPHIL NFR BLD AUTO: 3.4 % — SIGNIFICANT CHANGE UP (ref 0–8)
GLUCOSE SERPL-MCNC: 95 MG/DL — SIGNIFICANT CHANGE UP (ref 70–99)
HCT VFR BLD CALC: 34.7 % — LOW (ref 37–47)
HGB BLD-MCNC: 12 G/DL — SIGNIFICANT CHANGE UP (ref 12–16)
IMM GRANULOCYTES NFR BLD AUTO: 0 % — LOW (ref 0.1–0.3)
LYMPHOCYTES # BLD AUTO: 1.03 K/UL — LOW (ref 1.2–3.4)
LYMPHOCYTES # BLD AUTO: 31.5 % — SIGNIFICANT CHANGE UP (ref 20.5–51.1)
MAGNESIUM SERPL-MCNC: 1.8 MG/DL — SIGNIFICANT CHANGE UP (ref 1.8–2.4)
MCHC RBC-ENTMCNC: 32.5 PG — HIGH (ref 27–31)
MCHC RBC-ENTMCNC: 34.6 G/DL — SIGNIFICANT CHANGE UP (ref 32–37)
MCV RBC AUTO: 94 FL — SIGNIFICANT CHANGE UP (ref 81–99)
MONOCYTES # BLD AUTO: 0.36 K/UL — SIGNIFICANT CHANGE UP (ref 0.1–0.6)
MONOCYTES NFR BLD AUTO: 11 % — HIGH (ref 1.7–9.3)
NEUTROPHILS # BLD AUTO: 1.74 K/UL — SIGNIFICANT CHANGE UP (ref 1.4–6.5)
NEUTROPHILS NFR BLD AUTO: 53.2 % — SIGNIFICANT CHANGE UP (ref 42.2–75.2)
NRBC # BLD: 0 /100 WBCS — SIGNIFICANT CHANGE UP (ref 0–0)
PLATELET # BLD AUTO: 242 K/UL — SIGNIFICANT CHANGE UP (ref 130–400)
POTASSIUM SERPL-MCNC: 4 MMOL/L — SIGNIFICANT CHANGE UP (ref 3.5–5)
POTASSIUM SERPL-SCNC: 4 MMOL/L — SIGNIFICANT CHANGE UP (ref 3.5–5)
PROT SERPL-MCNC: 6.8 G/DL — SIGNIFICANT CHANGE UP (ref 6–8)
RBC # BLD: 3.69 M/UL — LOW (ref 4.2–5.4)
RBC # FLD: 10.9 % — LOW (ref 11.5–14.5)
SODIUM SERPL-SCNC: 141 MMOL/L — SIGNIFICANT CHANGE UP (ref 135–146)
WBC # BLD: 3.27 K/UL — LOW (ref 4.8–10.8)
WBC # FLD AUTO: 3.27 K/UL — LOW (ref 4.8–10.8)

## 2021-07-21 PROCEDURE — 99232 SBSQ HOSP IP/OBS MODERATE 35: CPT

## 2021-07-21 RX ORDER — IBUPROFEN 200 MG
400 TABLET ORAL EVERY 6 HOURS
Refills: 0 | Status: DISCONTINUED | OUTPATIENT
Start: 2021-07-21 | End: 2021-07-22

## 2021-07-21 RX ORDER — HYDROMORPHONE HYDROCHLORIDE 2 MG/ML
1 INJECTION INTRAMUSCULAR; INTRAVENOUS; SUBCUTANEOUS EVERY 4 HOURS
Refills: 0 | Status: DISCONTINUED | OUTPATIENT
Start: 2021-07-21 | End: 2021-07-22

## 2021-07-21 RX ORDER — GABAPENTIN 400 MG/1
1 CAPSULE ORAL
Qty: 0 | Refills: 0 | DISCHARGE
Start: 2021-07-21

## 2021-07-21 RX ORDER — GABAPENTIN 400 MG/1
100 CAPSULE ORAL THREE TIMES A DAY
Refills: 0 | Status: DISCONTINUED | OUTPATIENT
Start: 2021-07-21 | End: 2021-07-22

## 2021-07-21 RX ORDER — IBUPROFEN 200 MG
1 TABLET ORAL
Qty: 0 | Refills: 0 | DISCHARGE
Start: 2021-07-21

## 2021-07-21 RX ORDER — SODIUM CHLORIDE 9 MG/ML
1000 INJECTION, SOLUTION INTRAVENOUS
Refills: 0 | Status: DISCONTINUED | OUTPATIENT
Start: 2021-07-21 | End: 2021-07-22

## 2021-07-21 RX ADMIN — ENOXAPARIN SODIUM 40 MILLIGRAM(S): 100 INJECTION SUBCUTANEOUS at 12:27

## 2021-07-21 RX ADMIN — HYDROMORPHONE HYDROCHLORIDE 1 MILLIGRAM(S): 2 INJECTION INTRAMUSCULAR; INTRAVENOUS; SUBCUTANEOUS at 16:41

## 2021-07-21 RX ADMIN — Medication 3 CAPSULE(S): at 08:33

## 2021-07-21 RX ADMIN — HYDROMORPHONE HYDROCHLORIDE 1 MILLIGRAM(S): 2 INJECTION INTRAMUSCULAR; INTRAVENOUS; SUBCUTANEOUS at 18:28

## 2021-07-21 RX ADMIN — ONDANSETRON 4 MILLIGRAM(S): 8 TABLET, FILM COATED ORAL at 01:00

## 2021-07-21 RX ADMIN — ONDANSETRON 4 MILLIGRAM(S): 8 TABLET, FILM COATED ORAL at 09:32

## 2021-07-21 RX ADMIN — SODIUM CHLORIDE 100 MILLILITER(S): 9 INJECTION, SOLUTION INTRAVENOUS at 12:18

## 2021-07-21 RX ADMIN — HYDROMORPHONE HYDROCHLORIDE 1 MILLIGRAM(S): 2 INJECTION INTRAMUSCULAR; INTRAVENOUS; SUBCUTANEOUS at 05:20

## 2021-07-21 RX ADMIN — GABAPENTIN 100 MILLIGRAM(S): 400 CAPSULE ORAL at 13:14

## 2021-07-21 RX ADMIN — Medication 3 CAPSULE(S): at 16:42

## 2021-07-21 RX ADMIN — Medication 100 MILLIGRAM(S): at 12:27

## 2021-07-21 RX ADMIN — ONDANSETRON 4 MILLIGRAM(S): 8 TABLET, FILM COATED ORAL at 21:13

## 2021-07-21 RX ADMIN — HYDROMORPHONE HYDROCHLORIDE 1 MILLIGRAM(S): 2 INJECTION INTRAMUSCULAR; INTRAVENOUS; SUBCUTANEOUS at 21:14

## 2021-07-21 RX ADMIN — POLYETHYLENE GLYCOL 3350 17 GRAM(S): 17 POWDER, FOR SOLUTION ORAL at 15:38

## 2021-07-21 RX ADMIN — Medication 1 MILLIGRAM(S): at 12:27

## 2021-07-21 RX ADMIN — SODIUM CHLORIDE 100 MILLILITER(S): 9 INJECTION, SOLUTION INTRAVENOUS at 03:19

## 2021-07-21 RX ADMIN — SENNA PLUS 2 TABLET(S): 8.6 TABLET ORAL at 21:14

## 2021-07-21 RX ADMIN — HYDROMORPHONE HYDROCHLORIDE 1 MILLIGRAM(S): 2 INJECTION INTRAMUSCULAR; INTRAVENOUS; SUBCUTANEOUS at 01:00

## 2021-07-21 RX ADMIN — GABAPENTIN 100 MILLIGRAM(S): 400 CAPSULE ORAL at 21:14

## 2021-07-21 RX ADMIN — CHLORHEXIDINE GLUCONATE 1 APPLICATION(S): 213 SOLUTION TOPICAL at 05:20

## 2021-07-21 RX ADMIN — HYDROMORPHONE HYDROCHLORIDE 1 MILLIGRAM(S): 2 INJECTION INTRAMUSCULAR; INTRAVENOUS; SUBCUTANEOUS at 21:33

## 2021-07-21 RX ADMIN — FAMOTIDINE 20 MILLIGRAM(S): 10 INJECTION INTRAVENOUS at 12:18

## 2021-07-21 RX ADMIN — HYDROMORPHONE HYDROCHLORIDE 1 MILLIGRAM(S): 2 INJECTION INTRAMUSCULAR; INTRAVENOUS; SUBCUTANEOUS at 13:11

## 2021-07-21 RX ADMIN — Medication 3 CAPSULE(S): at 12:27

## 2021-07-21 RX ADMIN — HYDROMORPHONE HYDROCHLORIDE 1 MILLIGRAM(S): 2 INJECTION INTRAMUSCULAR; INTRAVENOUS; SUBCUTANEOUS at 12:14
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
## 2021-07-22 ENCOUNTER — TRANSCRIPTION ENCOUNTER (OUTPATIENT)
Age: 46
End: 2021-07-22

## 2021-07-22 VITALS
RESPIRATION RATE: 19 BRPM | SYSTOLIC BLOOD PRESSURE: 120 MMHG | DIASTOLIC BLOOD PRESSURE: 75 MMHG | TEMPERATURE: 98 F | HEART RATE: 77 BPM

## 2021-07-22 LAB
ALBUMIN SERPL ELPH-MCNC: 3.8 G/DL — SIGNIFICANT CHANGE UP (ref 3.5–5.2)
ALP SERPL-CCNC: 112 U/L — SIGNIFICANT CHANGE UP (ref 30–115)
ALT FLD-CCNC: 83 U/L — HIGH (ref 0–41)
AMYLASE P1 CFR SERPL: 74 U/L — SIGNIFICANT CHANGE UP (ref 25–115)
ANION GAP SERPL CALC-SCNC: 9 MMOL/L — SIGNIFICANT CHANGE UP (ref 7–14)
AST SERPL-CCNC: 203 U/L — HIGH (ref 0–41)
BASOPHILS # BLD AUTO: 0.03 K/UL — SIGNIFICANT CHANGE UP (ref 0–0.2)
BASOPHILS NFR BLD AUTO: 1 % — SIGNIFICANT CHANGE UP (ref 0–1)
BILIRUB SERPL-MCNC: 0.4 MG/DL — SIGNIFICANT CHANGE UP (ref 0.2–1.2)
BUN SERPL-MCNC: <3 MG/DL — LOW (ref 10–20)
CALCIUM SERPL-MCNC: 9.2 MG/DL — SIGNIFICANT CHANGE UP (ref 8.5–10.1)
CHLORIDE SERPL-SCNC: 102 MMOL/L — SIGNIFICANT CHANGE UP (ref 98–110)
CO2 SERPL-SCNC: 28 MMOL/L — SIGNIFICANT CHANGE UP (ref 17–32)
CREAT SERPL-MCNC: 0.6 MG/DL — LOW (ref 0.7–1.5)
EOSINOPHIL # BLD AUTO: 0.12 K/UL — SIGNIFICANT CHANGE UP (ref 0–0.7)
EOSINOPHIL NFR BLD AUTO: 3.9 % — SIGNIFICANT CHANGE UP (ref 0–8)
GLUCOSE SERPL-MCNC: 96 MG/DL — SIGNIFICANT CHANGE UP (ref 70–99)
HCT VFR BLD CALC: 34.3 % — LOW (ref 37–47)
HGB BLD-MCNC: 11.5 G/DL — LOW (ref 12–16)
IMM GRANULOCYTES NFR BLD AUTO: 0.3 % — SIGNIFICANT CHANGE UP (ref 0.1–0.3)
LIDOCAIN IGE QN: 74 U/L — HIGH (ref 7–60)
LYMPHOCYTES # BLD AUTO: 1.01 K/UL — LOW (ref 1.2–3.4)
LYMPHOCYTES # BLD AUTO: 32.7 % — SIGNIFICANT CHANGE UP (ref 20.5–51.1)
MAGNESIUM SERPL-MCNC: 1.4 MG/DL — LOW (ref 1.8–2.4)
MCHC RBC-ENTMCNC: 32.4 PG — HIGH (ref 27–31)
MCHC RBC-ENTMCNC: 33.5 G/DL — SIGNIFICANT CHANGE UP (ref 32–37)
MCV RBC AUTO: 96.6 FL — SIGNIFICANT CHANGE UP (ref 81–99)
MONOCYTES # BLD AUTO: 0.44 K/UL — SIGNIFICANT CHANGE UP (ref 0.1–0.6)
MONOCYTES NFR BLD AUTO: 14.2 % — HIGH (ref 1.7–9.3)
NEUTROPHILS # BLD AUTO: 1.48 K/UL — SIGNIFICANT CHANGE UP (ref 1.4–6.5)
NEUTROPHILS NFR BLD AUTO: 47.9 % — SIGNIFICANT CHANGE UP (ref 42.2–75.2)
NRBC # BLD: 0 /100 WBCS — SIGNIFICANT CHANGE UP (ref 0–0)
PLATELET # BLD AUTO: 218 K/UL — SIGNIFICANT CHANGE UP (ref 130–400)
POTASSIUM SERPL-MCNC: 3.6 MMOL/L — SIGNIFICANT CHANGE UP (ref 3.5–5)
POTASSIUM SERPL-SCNC: 3.6 MMOL/L — SIGNIFICANT CHANGE UP (ref 3.5–5)
PROT SERPL-MCNC: 6.2 G/DL — SIGNIFICANT CHANGE UP (ref 6–8)
RBC # BLD: 3.55 M/UL — LOW (ref 4.2–5.4)
RBC # FLD: 10.8 % — LOW (ref 11.5–14.5)
SODIUM SERPL-SCNC: 139 MMOL/L — SIGNIFICANT CHANGE UP (ref 135–146)
WBC # BLD: 3.09 K/UL — LOW (ref 4.8–10.8)
WBC # FLD AUTO: 3.09 K/UL — LOW (ref 4.8–10.8)

## 2021-07-22 PROCEDURE — 99232 SBSQ HOSP IP/OBS MODERATE 35: CPT

## 2021-07-22 RX ORDER — ACETAMINOPHEN 500 MG
1 TABLET ORAL
Qty: 56 | Refills: 0
Start: 2021-07-22 | End: 2021-08-04

## 2021-07-22 RX ORDER — LACTULOSE 10 G/15ML
10 SOLUTION ORAL THREE TIMES A DAY
Refills: 0 | Status: DISCONTINUED | OUTPATIENT
Start: 2021-07-22 | End: 2021-07-22

## 2021-07-22 RX ORDER — LIPASE/PROTEASE/AMYLASE 16-48-48K
3 CAPSULE,DELAYED RELEASE (ENTERIC COATED) ORAL
Qty: 0 | Refills: 0 | DISCHARGE
Start: 2021-07-22

## 2021-07-22 RX ORDER — MAGNESIUM SULFATE 500 MG/ML
2 VIAL (ML) INJECTION ONCE
Refills: 0 | Status: COMPLETED | OUTPATIENT
Start: 2021-07-22 | End: 2021-07-22

## 2021-07-22 RX ORDER — PANTOPRAZOLE SODIUM 20 MG/1
1 TABLET, DELAYED RELEASE ORAL
Qty: 30 | Refills: 0
Start: 2021-07-22 | End: 2021-08-20

## 2021-07-22 RX ORDER — GABAPENTIN 400 MG/1
1 CAPSULE ORAL
Qty: 30 | Refills: 0
Start: 2021-07-22 | End: 2021-07-31

## 2021-07-22 RX ADMIN — GABAPENTIN 100 MILLIGRAM(S): 400 CAPSULE ORAL at 05:26

## 2021-07-22 RX ADMIN — Medication 100 MILLIGRAM(S): at 11:23

## 2021-07-22 RX ADMIN — ONDANSETRON 4 MILLIGRAM(S): 8 TABLET, FILM COATED ORAL at 05:26

## 2021-07-22 RX ADMIN — HYDROMORPHONE HYDROCHLORIDE 1 MILLIGRAM(S): 2 INJECTION INTRAMUSCULAR; INTRAVENOUS; SUBCUTANEOUS at 05:26

## 2021-07-22 RX ADMIN — HYDROMORPHONE HYDROCHLORIDE 1 MILLIGRAM(S): 2 INJECTION INTRAMUSCULAR; INTRAVENOUS; SUBCUTANEOUS at 09:27

## 2021-07-22 RX ADMIN — HYDROMORPHONE HYDROCHLORIDE 1 MILLIGRAM(S): 2 INJECTION INTRAMUSCULAR; INTRAVENOUS; SUBCUTANEOUS at 01:20

## 2021-07-22 RX ADMIN — HYDROMORPHONE HYDROCHLORIDE 1 MILLIGRAM(S): 2 INJECTION INTRAMUSCULAR; INTRAVENOUS; SUBCUTANEOUS at 09:50

## 2021-07-22 RX ADMIN — FAMOTIDINE 20 MILLIGRAM(S): 10 INJECTION INTRAVENOUS at 11:24

## 2021-07-22 RX ADMIN — HYDROMORPHONE HYDROCHLORIDE 1 MILLIGRAM(S): 2 INJECTION INTRAMUSCULAR; INTRAVENOUS; SUBCUTANEOUS at 13:37

## 2021-07-22 RX ADMIN — ENOXAPARIN SODIUM 40 MILLIGRAM(S): 100 INJECTION SUBCUTANEOUS at 11:24

## 2021-07-22 RX ADMIN — HYDROMORPHONE HYDROCHLORIDE 1 MILLIGRAM(S): 2 INJECTION INTRAMUSCULAR; INTRAVENOUS; SUBCUTANEOUS at 05:56

## 2021-07-22 RX ADMIN — HYDROMORPHONE HYDROCHLORIDE 1 MILLIGRAM(S): 2 INJECTION INTRAMUSCULAR; INTRAVENOUS; SUBCUTANEOUS at 01:50

## 2021-07-22 RX ADMIN — SODIUM CHLORIDE 100 MILLILITER(S): 9 INJECTION, SOLUTION INTRAVENOUS at 01:27

## 2021-07-22 RX ADMIN — HYDROMORPHONE HYDROCHLORIDE 1 MILLIGRAM(S): 2 INJECTION INTRAMUSCULAR; INTRAVENOUS; SUBCUTANEOUS at 13:58

## 2021-07-22 RX ADMIN — Medication 50 GRAM(S): at 11:22

## 2021-07-22 RX ADMIN — Medication 3 CAPSULE(S): at 08:25

## 2021-07-22 RX ADMIN — Medication 3 CAPSULE(S): at 17:49

## 2021-07-22 RX ADMIN — SODIUM CHLORIDE 100 MILLILITER(S): 9 INJECTION, SOLUTION INTRAVENOUS at 08:25

## 2021-07-22 RX ADMIN — HYDROMORPHONE HYDROCHLORIDE 1 MILLIGRAM(S): 2 INJECTION INTRAMUSCULAR; INTRAVENOUS; SUBCUTANEOUS at 17:49

## 2021-07-22 RX ADMIN — Medication 3 CAPSULE(S): at 11:23

## 2021-07-22 RX ADMIN — ONDANSETRON 4 MILLIGRAM(S): 8 TABLET, FILM COATED ORAL at 13:37

## 2021-07-22 RX ADMIN — GABAPENTIN 100 MILLIGRAM(S): 400 CAPSULE ORAL at 13:37

## 2021-07-22 RX ADMIN — LACTULOSE 10 GRAM(S): 10 SOLUTION ORAL at 15:31

## 2021-07-22 RX ADMIN — Medication 1 MILLIGRAM(S): at 11:23
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
## 2021-07-22 NOTE — PROGRESS NOTE ADULT - SUBJECTIVE AND OBJECTIVE BOX
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 hepatic steatosis. Subcentimeter hypodensity in the right hepatic lobe redemonstrated, unchanged. No intrahepatic biliary ductal dilatation. The gallbladder is present.  SPLEEN: Patent splenic vein. The splenic parenchyma is unremarkable.  PANCREAS: Diffuse mesenteric edema surrounding the pancreas with redemonstration of pancreatic pseudocyst situated within the stomach wall measuring up to 2.9 x 3.2 x 3.2 cm, increased in size from prior examination of 1/21/2021. The pancreas enhances homogenously, consistent with acute interstitial edematous pancreatitis. Numerous pancreatic parenchymal calcifications redemonstrated. Small acute peripancreatic fluid collections are noted surrounding the region of the pancreatic head.  ADRENAL GLANDS: Unremarkable.  KIDNEYS: Symmetric nephrogram.  A central obstructing renal calculus.  ABDOMINOPELVIC NODES: No abdominal pelvic lymphadenopathy.  PELVIC ORGANS: Under distention limits complete assessment of the urinary bladder.  PERITONEUM/MESENTERY/BOWEL: No evidence of bowel obstruction or pneumoperitoneum.  BONES/SOFT TISSUES: Degenerative changes of the visualized thoracal lumbar spine, most significant at L5-S1.  OTHER: Normal caliber aorta      IMPRESSION:    Findings consistent with acute on chronic pancreatitis. Acute interstitial edematous pancreatitis with no evidence of pancreatic necrosis. Acute peripancreatic fluid collections are noted in the region of the pancreatic head. Redemonstration of pancreatic pseudocyst situated within the stomach wall, increased in size from prior examination of 1/21/2021.      XAM:  US ABDOMEN RT UPR QUADRANT        PROCEDURE DATE:  07/15/2021        INTERPRETATION:  CLINICAL INFORMATION: Abdominal pain, right upper quadrant    COMPARISON: 9/9/2020    TECHNIQUE: Sonography of the right upper quadrant.    FINDINGS:    Liver: Increased echogenicity.  Bile ducts: Normal caliber. Common bile duct measures 6 mm.  Gallbladder: Within normal limits.  Pancreas: Not well evaluated although there are a few small echogenic foci which in correlation with prior CT abdomen 1/21/2021 compatible with calcifications associated with chronic pancreatitis  Right kidney: 10.4 cm. No hydronephrosis.  Ascites: None.  IVC: Visualized portions are within normal limits.    IMPRESSION:    No sonographic evidence of acute cholecystitis.    Increased echogenicity of the liver which may be seen with hepatic steatosis.      EXAM:  XR ANKLE 2 VIEWS RT        PROCEDURE DATE:  07/16/2021        INTERPRETATION:  Clinical History / Reason for exam: Trauma    2 views of the right ankle.    Comparison: None    Findings/  impression: No acute displaced fracture or dislocation. Lateral malleolar soft tissue swelling. The ankle mortise is symmetric. No significant ankle joint effusion. Mild degenerative changes of the talonavicular joint.      EXAM:  XR FOOT 2 VIEWS RT        PROCEDURE DATE:  07/16/2021        INTERPRETATION:  Clinical History / Reason for exam: Trauma    2 views of the right foot.    Comparison: None    Findings/  impression: No acute displaced fracture or dislocation. Mild degenerative changes of the hallux MTP and interphalangeal joints. Dorsal foot soft tissue swelling.      XAM:  XR KNEE AP LAT 1-2V RT        PROCEDURE DATE:  07/16/2021        INTERPRETATION:  Clinical History / Reason for exam: Trauma    2 views of the right knee.    Comparison: September 1, 2014    Findings/  impression: No acute displaced fracture or dislocation. Chondrocalcinosis at the lateral compartment. Mild tricompartmental osteoarthritis, new since prior exam. No knee joint effusion.                                            --------------------------------------------------------------    PHYSICAL EXAM:  General: NAD  HEENT: Atraumatic, normocephalic  LUNGS: Clear to auscultation bilaterally  HEART: S1/S2 appreciated, regular rate and rhythm  ABDOMEN: Tenderness in RUQ, LUQ, RLQ on light palpation with radiation to the back. Bowel sounds present  SKIN: Healing ecchymosis on LT foot and b/l ankles                                           --------------------------------------------------------------

## 2021-07-22 NOTE — PROGRESS NOTE ADULT - NSICDXPILOT_GEN_ALL_CORE
[place]

## 2021-07-22 NOTE — PROGRESS NOTE ADULT - REASON FOR ADMISSION
Pancreatitis
abdominal pain
Pancreatitis
Pancreatitis

## 2021-07-22 NOTE — DISCHARGE NOTE NURSING/CASE MANAGEMENT/SOCIAL WORK - NSDCVIVACCINE_GEN_ALL_CORE_FT
influenza, injectable, quadrivalent, preservative free; 12-Oct-2019 14:23; Celsa Martins (RN); [employer]; [id number] (Exp. Date: 30-Jun-2020); IntraMuscular; Deltoid Left.; 0.5 milliLiter(s); VIS (VIS Published: 15-Aug-2019, VIS Presented: 12-Oct-2019);   influenza, injectable, quadrivalent, preservative free; 10-Sep-2020 12:19; Shani Fleming (RN); [employer]; [id number] (Exp. Date: 30-Jun-2021); IntraMuscular; Deltoid Left.; 0.5 milliLiter(s); VIS (VIS Published: 15-Aug-2019, VIS Presented: 10-Sep-2020);   Tdap; 01-Sep-2014 17:06; Cheryl Cotto (MELINDA); [employer]; [id number]; IntraMuscular; Deltoid Right.; 0.5 milliLiter(s);   Tdap; 01-Sep-2014 17:09; Cheryl Cotto (MELINDA); [employer]; [id number]; IntraMuscular; Deltoid Right.; 0.5 milliLiter(s);

## 2021-07-22 NOTE — DISCHARGE NOTE NURSING/CASE MANAGEMENT/SOCIAL WORK - PATIENT PORTAL LINK FT
You can access the FollowMyHealth Patient Portal offered by [facility] by registering at the following website: [email or web address]. By joining [employer]’s FollowMyHealth portal, you will also be able to view your health information using other applications (apps) compatible with our system.
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
## 2021-07-22 NOTE — PROGRESS NOTE ADULT - PROVIDER SPECIALTY LIST ADULT
Gastroenterology
Internal Medicine
Hospitalist
Hospitalist
Internal Medicine
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
## 2021-08-05 ENCOUNTER — INPATIENT (INPATIENT)
Facility: HOSPITAL | Age: 46
LOS: 8 days | Discharge: HOME | End: 2021-08-14
Attending: INTERNAL MEDICINE | Admitting: INTERNAL MEDICINE
Payer: MEDICAID

## 2021-08-05 VITALS
SYSTOLIC BLOOD PRESSURE: 147 MMHG | OXYGEN SATURATION: 98 % | HEART RATE: 133 BPM | RESPIRATION RATE: 18 BRPM | HEIGHT: 64 IN | TEMPERATURE: 97 F | DIASTOLIC BLOOD PRESSURE: 103 MMHG

## 2021-08-05 DIAGNOSIS — Z87.2 PERSONAL HISTORY OF DISEASES OF THE SKIN AND SUBCUTANEOUS TISSUE: Chronic | ICD-10-CM

## 2021-08-05 DIAGNOSIS — Z98.890 OTHER SPECIFIED POSTPROCEDURAL STATES: Chronic | ICD-10-CM

## 2021-08-05 LAB
ALBUMIN SERPL ELPH-MCNC: 4.9 G/DL — SIGNIFICANT CHANGE UP (ref 3.5–5.2)
ALP SERPL-CCNC: 112 U/L — SIGNIFICANT CHANGE UP (ref 30–115)
ALT FLD-CCNC: 35 U/L — SIGNIFICANT CHANGE UP (ref 0–41)
ANION GAP SERPL CALC-SCNC: 26 MMOL/L — HIGH (ref 7–14)
AST SERPL-CCNC: 146 U/L — HIGH (ref 0–41)
BASOPHILS # BLD AUTO: 0.05 K/UL — SIGNIFICANT CHANGE UP (ref 0–0.2)
BASOPHILS NFR BLD AUTO: 0.9 % — SIGNIFICANT CHANGE UP (ref 0–1)
BILIRUB DIRECT SERPL-MCNC: 0.2 MG/DL — SIGNIFICANT CHANGE UP (ref 0–0.2)
BILIRUB INDIRECT FLD-MCNC: 0.4 MG/DL — SIGNIFICANT CHANGE UP (ref 0.2–1.2)
BILIRUB SERPL-MCNC: 0.6 MG/DL — SIGNIFICANT CHANGE UP (ref 0.2–1.2)
BUN SERPL-MCNC: 12 MG/DL — SIGNIFICANT CHANGE UP (ref 10–20)
CALCIUM SERPL-MCNC: 9.1 MG/DL — SIGNIFICANT CHANGE UP (ref 8.5–10.1)
CHLORIDE SERPL-SCNC: 95 MMOL/L — LOW (ref 98–110)
CO2 SERPL-SCNC: 16 MMOL/L — LOW (ref 17–32)
CREAT SERPL-MCNC: 0.7 MG/DL — SIGNIFICANT CHANGE UP (ref 0.7–1.5)
EOSINOPHIL # BLD AUTO: 0 K/UL — SIGNIFICANT CHANGE UP (ref 0–0.7)
EOSINOPHIL NFR BLD AUTO: 0 % — SIGNIFICANT CHANGE UP (ref 0–8)
GLUCOSE SERPL-MCNC: 104 MG/DL — HIGH (ref 70–99)
HCG SERPL QL: NEGATIVE — SIGNIFICANT CHANGE UP
HCT VFR BLD CALC: 38.4 % — SIGNIFICANT CHANGE UP (ref 37–47)
HGB BLD-MCNC: 12.8 G/DL — SIGNIFICANT CHANGE UP (ref 12–16)
IMM GRANULOCYTES NFR BLD AUTO: 0.4 % — HIGH (ref 0.1–0.3)
LIDOCAIN IGE QN: 150 U/L — HIGH (ref 7–60)
LYMPHOCYTES # BLD AUTO: 0.66 K/UL — LOW (ref 1.2–3.4)
LYMPHOCYTES # BLD AUTO: 12 % — LOW (ref 20.5–51.1)
MCHC RBC-ENTMCNC: 31.5 PG — HIGH (ref 27–31)
MCHC RBC-ENTMCNC: 33.3 G/DL — SIGNIFICANT CHANGE UP (ref 32–37)
MCV RBC AUTO: 94.6 FL — SIGNIFICANT CHANGE UP (ref 81–99)
MONOCYTES # BLD AUTO: 0.22 K/UL — SIGNIFICANT CHANGE UP (ref 0.1–0.6)
MONOCYTES NFR BLD AUTO: 4 % — SIGNIFICANT CHANGE UP (ref 1.7–9.3)
NEUTROPHILS # BLD AUTO: 4.54 K/UL — SIGNIFICANT CHANGE UP (ref 1.4–6.5)
NEUTROPHILS NFR BLD AUTO: 82.7 % — HIGH (ref 42.2–75.2)
NRBC # BLD: 0 /100 WBCS — SIGNIFICANT CHANGE UP (ref 0–0)
PLATELET # BLD AUTO: 192 K/UL — SIGNIFICANT CHANGE UP (ref 130–400)
POTASSIUM SERPL-MCNC: 3.5 MMOL/L — SIGNIFICANT CHANGE UP (ref 3.5–5)
POTASSIUM SERPL-SCNC: 3.5 MMOL/L — SIGNIFICANT CHANGE UP (ref 3.5–5)
PROT SERPL-MCNC: 8.4 G/DL — HIGH (ref 6–8)
RBC # BLD: 4.06 M/UL — LOW (ref 4.2–5.4)
RBC # FLD: 11 % — LOW (ref 11.5–14.5)
SODIUM SERPL-SCNC: 137 MMOL/L — SIGNIFICANT CHANGE UP (ref 135–146)
WBC # BLD: 5.49 K/UL — SIGNIFICANT CHANGE UP (ref 4.8–10.8)
WBC # FLD AUTO: 5.49 K/UL — SIGNIFICANT CHANGE UP (ref 4.8–10.8)

## 2021-08-05 PROCEDURE — 99291 CRITICAL CARE FIRST HOUR: CPT

## 2021-08-05 RX ORDER — MORPHINE SULFATE 50 MG/1
4 CAPSULE, EXTENDED RELEASE ORAL ONCE
Refills: 0 | Status: DISCONTINUED | OUTPATIENT
Start: 2021-08-05 | End: 2021-08-05

## 2021-08-05 RX ORDER — SODIUM CHLORIDE 9 MG/ML
1000 INJECTION INTRAMUSCULAR; INTRAVENOUS; SUBCUTANEOUS ONCE
Refills: 0 | Status: COMPLETED | OUTPATIENT
Start: 2021-08-05 | End: 2021-08-05

## 2021-08-05 RX ORDER — ONDANSETRON 8 MG/1
4 TABLET, FILM COATED ORAL ONCE
Refills: 0 | Status: COMPLETED | OUTPATIENT
Start: 2021-08-05 | End: 2021-08-05

## 2021-08-05 RX ADMIN — Medication 2 MILLIGRAM(S): at 22:59

## 2021-08-05 RX ADMIN — MORPHINE SULFATE 4 MILLIGRAM(S): 50 CAPSULE, EXTENDED RELEASE ORAL at 22:59

## 2021-08-05 RX ADMIN — ONDANSETRON 4 MILLIGRAM(S): 8 TABLET, FILM COATED ORAL at 22:58

## 2021-08-05 RX ADMIN — SODIUM CHLORIDE 1000 MILLILITER(S): 9 INJECTION INTRAMUSCULAR; INTRAVENOUS; SUBCUTANEOUS at 22:58

## 2021-08-05 NOTE — ED ADULT TRIAGE NOTE - RESPIRATORY RATE (BREATHS/MIN)
54-yo Male with PMHx of Afib on Xarelto, Alcoholic hepatitis presents with severe sepsis and ascites. 20
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
## 2021-08-06 LAB
BASE EXCESS BLDV CALC-SCNC: -5.4 MMOL/L — LOW (ref -2–2)
CA-I SERPL-SCNC: 1.04 MMOL/L — LOW (ref 1.12–1.3)
GAS PNL BLDV: 137 MMOL/L — SIGNIFICANT CHANGE UP (ref 136–145)
GAS PNL BLDV: SIGNIFICANT CHANGE UP
HCO3 BLDV-SCNC: 20 MMOL/L — LOW (ref 22–29)
HCT VFR BLDA CALC: 34.1 % — SIGNIFICANT CHANGE UP (ref 34–44)
HGB BLD CALC-MCNC: 11.1 G/DL — LOW (ref 14–18)
LACTATE BLDV-MCNC: 1.6 MMOL/L — SIGNIFICANT CHANGE UP (ref 0.5–1.6)
LACTATE SERPL-SCNC: 6.4 MMOL/L — CRITICAL HIGH (ref 0.7–2)
PCO2 BLDV: 36 MMHG — LOW (ref 41–51)
PH BLDV: 7.34 — SIGNIFICANT CHANGE UP (ref 7.26–7.43)
PO2 BLDV: 39 MMHG — SIGNIFICANT CHANGE UP (ref 20–40)
POTASSIUM BLDV-SCNC: 3.8 MMOL/L — SIGNIFICANT CHANGE UP (ref 3.3–5.6)
SAO2 % BLDV: 69 % — SIGNIFICANT CHANGE UP
SARS-COV-2 RNA SPEC QL NAA+PROBE: SIGNIFICANT CHANGE UP

## 2021-08-06 PROCEDURE — 74177 CT ABD & PELVIS W/CONTRAST: CPT | Mod: 26,MA

## 2021-08-06 PROCEDURE — 99223 1ST HOSP IP/OBS HIGH 75: CPT

## 2021-08-06 PROCEDURE — 99221 1ST HOSP IP/OBS SF/LOW 40: CPT

## 2021-08-06 RX ORDER — THIAMINE MONONITRATE (VIT B1) 100 MG
100 TABLET ORAL DAILY
Refills: 0 | Status: DISCONTINUED | OUTPATIENT
Start: 2021-08-06 | End: 2021-08-14

## 2021-08-06 RX ORDER — MORPHINE SULFATE 50 MG/1
4 CAPSULE, EXTENDED RELEASE ORAL ONCE
Refills: 0 | Status: DISCONTINUED | OUTPATIENT
Start: 2021-08-06 | End: 2021-08-06

## 2021-08-06 RX ORDER — METRONIDAZOLE 500 MG
TABLET ORAL
Refills: 0 | Status: DISCONTINUED | OUTPATIENT
Start: 2021-08-06 | End: 2021-08-06

## 2021-08-06 RX ORDER — SODIUM CHLORIDE 9 MG/ML
1000 INJECTION, SOLUTION INTRAVENOUS
Refills: 0 | Status: DISCONTINUED | OUTPATIENT
Start: 2021-08-06 | End: 2021-08-07

## 2021-08-06 RX ORDER — MORPHINE SULFATE 50 MG/1
8 CAPSULE, EXTENDED RELEASE ORAL EVERY 8 HOURS
Refills: 0 | Status: DISCONTINUED | OUTPATIENT
Start: 2021-08-06 | End: 2021-08-06

## 2021-08-06 RX ORDER — ONDANSETRON 8 MG/1
4 TABLET, FILM COATED ORAL ONCE
Refills: 0 | Status: COMPLETED | OUTPATIENT
Start: 2021-08-06 | End: 2021-08-06

## 2021-08-06 RX ORDER — METRONIDAZOLE 500 MG
500 TABLET ORAL EVERY 8 HOURS
Refills: 0 | Status: DISCONTINUED | OUTPATIENT
Start: 2021-08-06 | End: 2021-08-06

## 2021-08-06 RX ORDER — SODIUM CHLORIDE 9 MG/ML
1000 INJECTION, SOLUTION INTRAVENOUS ONCE
Refills: 0 | Status: COMPLETED | OUTPATIENT
Start: 2021-08-06 | End: 2021-08-06

## 2021-08-06 RX ORDER — CIPROFLOXACIN LACTATE 400MG/40ML
400 VIAL (ML) INTRAVENOUS ONCE
Refills: 0 | Status: COMPLETED | OUTPATIENT
Start: 2021-08-06 | End: 2021-08-06

## 2021-08-06 RX ORDER — CIPROFLOXACIN LACTATE 400MG/40ML
400 VIAL (ML) INTRAVENOUS EVERY 12 HOURS
Refills: 0 | Status: DISCONTINUED | OUTPATIENT
Start: 2021-08-06 | End: 2021-08-06

## 2021-08-06 RX ORDER — SODIUM CHLORIDE 9 MG/ML
1000 INJECTION INTRAMUSCULAR; INTRAVENOUS; SUBCUTANEOUS ONCE
Refills: 0 | Status: COMPLETED | OUTPATIENT
Start: 2021-08-06 | End: 2021-08-06

## 2021-08-06 RX ORDER — MORPHINE SULFATE 50 MG/1
4 CAPSULE, EXTENDED RELEASE ORAL EVERY 6 HOURS
Refills: 0 | Status: DISCONTINUED | OUTPATIENT
Start: 2021-08-06 | End: 2021-08-07

## 2021-08-06 RX ORDER — PANTOPRAZOLE SODIUM 20 MG/1
40 TABLET, DELAYED RELEASE ORAL EVERY 12 HOURS
Refills: 0 | Status: DISCONTINUED | OUTPATIENT
Start: 2021-08-06 | End: 2021-08-14

## 2021-08-06 RX ORDER — GABAPENTIN 400 MG/1
100 CAPSULE ORAL THREE TIMES A DAY
Refills: 0 | Status: DISCONTINUED | OUTPATIENT
Start: 2021-08-06 | End: 2021-08-14

## 2021-08-06 RX ORDER — LIPASE/PROTEASE/AMYLASE 16-48-48K
3 CAPSULE,DELAYED RELEASE (ENTERIC COATED) ORAL
Refills: 0 | Status: DISCONTINUED | OUTPATIENT
Start: 2021-08-06 | End: 2021-08-14

## 2021-08-06 RX ORDER — METRONIDAZOLE 500 MG
500 TABLET ORAL ONCE
Refills: 0 | Status: COMPLETED | OUTPATIENT
Start: 2021-08-06 | End: 2021-08-06

## 2021-08-06 RX ORDER — FOLIC ACID 0.8 MG
1 TABLET ORAL DAILY
Refills: 0 | Status: DISCONTINUED | OUTPATIENT
Start: 2021-08-06 | End: 2021-08-14

## 2021-08-06 RX ORDER — ENOXAPARIN SODIUM 100 MG/ML
40 INJECTION SUBCUTANEOUS DAILY
Refills: 0 | Status: DISCONTINUED | OUTPATIENT
Start: 2021-08-06 | End: 2021-08-14

## 2021-08-06 RX ORDER — ACETAMINOPHEN 500 MG
650 TABLET ORAL EVERY 6 HOURS
Refills: 0 | Status: DISCONTINUED | OUTPATIENT
Start: 2021-08-06 | End: 2021-08-14

## 2021-08-06 RX ORDER — MORPHINE SULFATE 50 MG/1
8 CAPSULE, EXTENDED RELEASE ORAL EVERY 6 HOURS
Refills: 0 | Status: DISCONTINUED | OUTPATIENT
Start: 2021-08-06 | End: 2021-08-11

## 2021-08-06 RX ORDER — CIPROFLOXACIN LACTATE 400MG/40ML
VIAL (ML) INTRAVENOUS
Refills: 0 | Status: DISCONTINUED | OUTPATIENT
Start: 2021-08-06 | End: 2021-08-06

## 2021-08-06 RX ORDER — ONDANSETRON 8 MG/1
4 TABLET, FILM COATED ORAL EVERY 4 HOURS
Refills: 0 | Status: DISCONTINUED | OUTPATIENT
Start: 2021-08-06 | End: 2021-08-14

## 2021-08-06 RX ORDER — LANOLIN ALCOHOL/MO/W.PET/CERES
3 CREAM (GRAM) TOPICAL AT BEDTIME
Refills: 0 | Status: DISCONTINUED | OUTPATIENT
Start: 2021-08-06 | End: 2021-08-14

## 2021-08-06 RX ORDER — HYDROMORPHONE HYDROCHLORIDE 2 MG/ML
0.5 INJECTION INTRAMUSCULAR; INTRAVENOUS; SUBCUTANEOUS ONCE
Refills: 0 | Status: DISCONTINUED | OUTPATIENT
Start: 2021-08-06 | End: 2021-08-06

## 2021-08-06 RX ADMIN — PANTOPRAZOLE SODIUM 40 MILLIGRAM(S): 20 TABLET, DELAYED RELEASE ORAL at 12:34

## 2021-08-06 RX ADMIN — Medication 100 MILLIGRAM(S): at 12:41

## 2021-08-06 RX ADMIN — SODIUM CHLORIDE 300 MILLILITER(S): 9 INJECTION, SOLUTION INTRAVENOUS at 17:11

## 2021-08-06 RX ADMIN — Medication 200 MILLIGRAM(S): at 05:52

## 2021-08-06 RX ADMIN — ONDANSETRON 4 MILLIGRAM(S): 8 TABLET, FILM COATED ORAL at 04:33

## 2021-08-06 RX ADMIN — SODIUM CHLORIDE 1000 MILLILITER(S): 9 INJECTION INTRAMUSCULAR; INTRAVENOUS; SUBCUTANEOUS at 00:35

## 2021-08-06 RX ADMIN — ONDANSETRON 4 MILLIGRAM(S): 8 TABLET, FILM COATED ORAL at 12:40

## 2021-08-06 RX ADMIN — MORPHINE SULFATE 4 MILLIGRAM(S): 50 CAPSULE, EXTENDED RELEASE ORAL at 04:33

## 2021-08-06 RX ADMIN — SODIUM CHLORIDE 300 MILLILITER(S): 9 INJECTION, SOLUTION INTRAVENOUS at 09:14

## 2021-08-06 RX ADMIN — ONDANSETRON 4 MILLIGRAM(S): 8 TABLET, FILM COATED ORAL at 01:29

## 2021-08-06 RX ADMIN — Medication 100 MILLIGRAM(S): at 05:52

## 2021-08-06 RX ADMIN — MORPHINE SULFATE 4 MILLIGRAM(S): 50 CAPSULE, EXTENDED RELEASE ORAL at 23:27

## 2021-08-06 RX ADMIN — SODIUM CHLORIDE 300 MILLILITER(S): 9 INJECTION, SOLUTION INTRAVENOUS at 12:45

## 2021-08-06 RX ADMIN — ENOXAPARIN SODIUM 40 MILLIGRAM(S): 100 INJECTION SUBCUTANEOUS at 12:41

## 2021-08-06 RX ADMIN — SODIUM CHLORIDE 1000 MILLILITER(S): 9 INJECTION, SOLUTION INTRAVENOUS at 04:33

## 2021-08-06 RX ADMIN — GABAPENTIN 100 MILLIGRAM(S): 400 CAPSULE ORAL at 21:42

## 2021-08-06 RX ADMIN — MORPHINE SULFATE 4 MILLIGRAM(S): 50 CAPSULE, EXTENDED RELEASE ORAL at 00:35

## 2021-08-06 RX ADMIN — MORPHINE SULFATE 8 MILLIGRAM(S): 50 CAPSULE, EXTENDED RELEASE ORAL at 17:11

## 2021-08-06 RX ADMIN — Medication 1 MILLIGRAM(S): at 12:40

## 2021-08-06 RX ADMIN — Medication 2 MILLIGRAM(S): at 00:02

## 2021-08-06 RX ADMIN — MORPHINE SULFATE 8 MILLIGRAM(S): 50 CAPSULE, EXTENDED RELEASE ORAL at 09:17

## 2021-08-06 RX ADMIN — HYDROMORPHONE HYDROCHLORIDE 0.5 MILLIGRAM(S): 2 INJECTION INTRAMUSCULAR; INTRAVENOUS; SUBCUTANEOUS at 13:56

## 2021-08-06 RX ADMIN — MORPHINE SULFATE 4 MILLIGRAM(S): 50 CAPSULE, EXTENDED RELEASE ORAL at 23:19

## 2021-08-06 RX ADMIN — ONDANSETRON 4 MILLIGRAM(S): 8 TABLET, FILM COATED ORAL at 09:17

## 2021-08-06 RX ADMIN — Medication 3 CAPSULE(S): at 12:40
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
## 2021-08-06 NOTE — H&P ADULT - NSHPLABSRESULTS_GEN_ALL_CORE
12.8   5.49  )-----------( 192      ( 05 Aug 2021 23:00 )             38.4       08-05    137  |  95<L>  |  12  ----------------------------<  104<H>  3.5   |  16<L>  |  0.7    Ca    9.1      05 Aug 2021 23:00    TPro  8.4<H>  /  Alb  4.9  /  TBili  0.6  /  DBili  0.2  /  AST  146<H>  /  ALT  35  /  AlkPhos  112  08-05                            CAPILLARY BLOOD GLUCOSE
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
## 2021-08-06 NOTE — ED PROVIDER NOTE - PROGRESS NOTE DETAILS
ER: s/p 4 mg of ativan, pt sleeping not in any distress. will continue to monitor TD: Surgery consulted, discussed case with resident Jorge who agrees to see pt in ED. TD: No acute surgical intervention per resident Jorge who states they will continue to follow pt as inpatient. GI consult placed on teams; awaiting call back. TD; Abx ordered as per GI recommendation who states they will see and evaluate pt in the morning. Pt endorsed to MARCUS Cavanaugh for admission to stepdown.

## 2021-08-06 NOTE — ED PROVIDER NOTE - CLINICAL SUMMARY MEDICAL DECISION MAKING FREE TEXT BOX
46 yr old f that presents with abd pain. labs, imaging, ekg obtained. pt findings concerning for necrotizing pancreatitis. pt admitted to SDU. GI and Surgery aware of pt.
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
## 2021-08-06 NOTE — ED PROVIDER NOTE - PHYSICAL EXAMINATION
CONSTITUTIONAL: Well-appearing; well-nourished; in no apparent distress.   EYES: PERRL; EOM intact.   ENT: normal nose; no rhinorrhea; normal pharynx with no tonsillar hypertrophy.   NECK: Supple; non-tender; no cervical lymphadenopathy.   CARDIOVASCULAR: + tachycardia; no murmurs, rubs, or gallops.   RESPIRATORY: Normal chest excursion with respiration; breath sounds clear and equal bilaterally; no wheezes, rhonchi, or rales.  GI/: Normal bowel sounds; non-distended; + diffuse abd ttp. No cva ttp  MS: No evidence of trauma or deformity. Normal ROM in all four extremities; + mild ttp to coccyx. distal pulses are normal.   SKIN: Normal for age and race; warm; no ecchymosis, abrasions, lacerations noted  NEURO/PSYCH: A & O x 4; grossly unremarkable. mood and manner are appropriate.
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
## 2021-08-06 NOTE — CONSULT NOTE ADULT - ASSESSMENT
46 year old female with Past Medical History of ETOH abuse and multiple episodes of pancreatitis, chronic pancreatitis, with known pseudocyst presents with epigastric abdominal pain since yesterday. last drink  46 year old female with Past Medical History of ETOH abuse and multiple episodes of pancreatitis, chronic pancreatitis, with known pseudocyst presents with epigastric abdominal pain since yesterday. last drink tuesday 8/3/2021, never been sober. known history of pancreatic pseudocyst in the pancreatic tail extending cephalad toward the gastric wall. The collection may be partially within the gastric wall. Cannot definitively confirm mucosal/luminal extension on MRI. currently with extension of the cyst    *Walled off necrosis  -size 5.9 * 5.4 * 5.9 cm. No discrete thick wall.   -mass effect on the stomach, patient with nausea and vomiting   -recurrent pancreatitis  -patient with poor follow up, never showed to appointment     Rec  -IV hydration  -pain control  -npo, advance to clear when tolerates  -anti-emetics  -will discuss with attending about EUS guided drainage    *H pylori gastritis  *Focal intestinal metaplasia at the GE junction on last EGD /  No dysplasia   -unclear if treated  -needs repeat EGD with biopsies as OP    -for questions call [phone] til 5pm then call GI service at [phone] after 5pm and on weekends 46 year old female with Past Medical History of ETOH abuse and multiple episodes of pancreatitis, chronic pancreatitis, with known pseudocyst presents with epigastric abdominal pain since yesterday. last drink tuesday 8/3/2021, never been sober. known history of pancreatic pseudocyst in the pancreatic tail extending cephalad toward the gastric wall. The collection may be partially within the gastric wall. Cannot definitively confirm mucosal/luminal extension on MRI. currently with extension of the cyst    *Walled off necrosis  -size 5.9 * 5.4 * 5.9 cm. No discrete thick wall.   -mass effect on the stomach, patient with nausea and vomiting   -recurrent pancreatitis  -patient with poor follow up, never showed to appointment     Rec  -IV hydration  -pain control  -npo, advance to clear when tolerates  -anti-emetics  -EUS with axios stent drainage on monday  -on sunday, make sure all electrolytes corrected, check INR, NPO after sunday midnight    *H pylori gastritis  *Focal intestinal metaplasia at the GE junction on last EGD /  No dysplasia   -unclear if treated  -needs repeat EGD with biopsies as OP    -for questions call [phone] til 5pm then call GI service at [phone] after 5pm and on weekends
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
## 2021-08-06 NOTE — H&P ADULT - NSHPPHYSICALEXAM_GEN_ALL_CORE
GENERAL: NAD, lying in bed comfortably  HEAD:  Atraumatic, Normocephalic  CHEST/LUNG: Clear to auscultation bilaterally  HEART: Regular rate and rhythm  ABDOMEN: Abdominal tenderness to palpation in the epigastric region  EXTREMITIES: No peripheral edema  NERVOUS SYSTEM:  Alert & Oriented X3, speech clear. No deficits   MSK: FROM all 4 extremities, full and equal strength  SKIN: No rashes or lesions

## 2021-08-06 NOTE — CONSULT NOTE ADULT - ATTENDING COMMENTS
Patient seen and examined, Assessment and plan above,She has history of chronic alcohol abuse, presented in the past with multiple episodes of pancreatitis due to alcohol induced pancreatitis.  This admission has epigastric pain however developing what appears to be a walled off pancreatic necrosis.  Plan for endoscopic ultrasound examination on Monday after the acute episode resolves with possible cyst gastrostomy and transgastric EUS drainage, will follow-up,
Patient seen and examined with surgery team in ED awaiting hospitalization and discussed management plans with patient. Recent hospital admission for same problem seen by GI.  Patient comfortable with epigastric pain and mild tenderness only.  Lab and CT scan reviewed. Large pancreatic pseudocyst attached to stomach wall larger than last admission.  Gi to see patient regarding possible endoscopy and drainage. No surgical intervention at this time will follow with GI

## 2021-08-06 NOTE — ED PROVIDER NOTE - CARE PLAN
Principal Discharge DX:	Pancreatitis, recurrent  Secondary Diagnosis:	Alcohol withdrawal syndrome without complication

## 2021-08-06 NOTE — CONSULT NOTE ADULT - ASSESSMENT
ASSESSMENT:  46yF w/ PMHx of Case of 45 y/o female with PMH of ETOH abuse and multiple episodes of pancreatitis that comes to the ED referring abdominal pain, nausea and vomiting. Physical exam findings, imaging, and labs as documented above. Suggestive of acute on chronic pancreatitis.     PLAN:  -keep NPO  -IVFs  -pain management  -consult GI      Above plan discussed with Attending Surgeon Dr. Wade  , patient, patient family, and Primary team  08-06-21 @ 04:57    CONSULT SPECTRA: [id number]     ASSESSMENT:  46yF w/ PMHx of Case of 45 y/o female with PMH of ETOH abuse and multiple episodes of pancreatitis that comes to the ED referring abdominal pain, nausea and vomiting. Physical exam findings, imaging, and labs as documented above. Suggestive of acute on chronic pancreatitis.     PLAN:  -keep NPO  -IVFs  -pain management  -consult GI    Senior Surgical Resident Note  I have edited the above note and agree with the current treatment plan  46yF w/ PMHx of Case of 45 y/o female with PMH of ETOH abuse and multiple episodes of pancreatitis that comes to the ED referring abdominal pain, nausea and vomiting. CT showing possible pancreatic pseudocyst vs walled off necrosis given heterogenous pattern of cyst. No acute surgical intervention at this point. Would recommend GI consult for drainage. NPO IVF  Above plan was discussed with Dr. Wade , patient, patient's family present at bedside, and the BLUE team ([id number])  --------------------------------------------------------------------------------------

## 2021-08-06 NOTE — ED PROVIDER NOTE - NS ED ROS FT
Constitutional: no fever, chills, no recent weight loss, change in appetite or malaise  Eyes: no redness/discharge/pain/vision changes  ENT: no rhinorrhea/ear pain/sore throat  Cardiac: No chest pain, SOB or edema.  Respiratory: No cough or respiratory distress  GI: + abd pain, n/v. No diarrhea/bloody stools  : No dysuria, frequency, urgency or hematuria  MS: + lower back pain. no loss of ROM, no weakness  Neuro: No headache or weakness. No LOC.  Skin: No skin rash.  Endocrine: No history of thyroid disease or diabetes.  Except as documented in the HPI, all other systems are negative.

## 2021-08-07 LAB
A1C WITH ESTIMATED AVERAGE GLUCOSE RESULT: 5 % — SIGNIFICANT CHANGE UP (ref 4–5.6)
ALBUMIN SERPL ELPH-MCNC: 4.4 G/DL — SIGNIFICANT CHANGE UP (ref 3.5–5.2)
ALP SERPL-CCNC: 81 U/L — SIGNIFICANT CHANGE UP (ref 30–115)
ALT FLD-CCNC: 24 U/L — SIGNIFICANT CHANGE UP (ref 0–41)
ANION GAP SERPL CALC-SCNC: 21 MMOL/L — HIGH (ref 7–14)
APTT BLD: 30.3 SEC — SIGNIFICANT CHANGE UP (ref 27–39.2)
AST SERPL-CCNC: 99 U/L — HIGH (ref 0–41)
BASOPHILS # BLD AUTO: 0.02 K/UL — SIGNIFICANT CHANGE UP (ref 0–0.2)
BASOPHILS NFR BLD AUTO: 0.5 % — SIGNIFICANT CHANGE UP (ref 0–1)
BILIRUB SERPL-MCNC: 0.7 MG/DL — SIGNIFICANT CHANGE UP (ref 0.2–1.2)
BUN SERPL-MCNC: <3 MG/DL — LOW (ref 10–20)
CALCIUM SERPL-MCNC: 9 MG/DL — SIGNIFICANT CHANGE UP (ref 8.5–10.1)
CHLORIDE SERPL-SCNC: 95 MMOL/L — LOW (ref 98–110)
CHOLEST SERPL-MCNC: 196 MG/DL — SIGNIFICANT CHANGE UP
CO2 SERPL-SCNC: 21 MMOL/L — SIGNIFICANT CHANGE UP (ref 17–32)
COVID-19 SPIKE DOMAIN AB INTERP: NEGATIVE — SIGNIFICANT CHANGE UP
COVID-19 SPIKE DOMAIN ANTIBODY RESULT: 0.4 U/ML — SIGNIFICANT CHANGE UP
CREAT SERPL-MCNC: 0.5 MG/DL — LOW (ref 0.7–1.5)
EOSINOPHIL # BLD AUTO: 0.05 K/UL — SIGNIFICANT CHANGE UP (ref 0–0.7)
EOSINOPHIL NFR BLD AUTO: 1.2 % — SIGNIFICANT CHANGE UP (ref 0–8)
ESTIMATED AVERAGE GLUCOSE: 97 MG/DL — SIGNIFICANT CHANGE UP (ref 68–114)
GLUCOSE SERPL-MCNC: 82 MG/DL — SIGNIFICANT CHANGE UP (ref 70–99)
HCT VFR BLD CALC: 33 % — LOW (ref 37–47)
HDLC SERPL-MCNC: 78 MG/DL — SIGNIFICANT CHANGE UP
HGB BLD-MCNC: 11.5 G/DL — LOW (ref 12–16)
IMM GRANULOCYTES NFR BLD AUTO: 0.2 % — SIGNIFICANT CHANGE UP (ref 0.1–0.3)
INR BLD: 1.03 RATIO — SIGNIFICANT CHANGE UP (ref 0.65–1.3)
LIPID PNL WITH DIRECT LDL SERPL: 97 MG/DL — SIGNIFICANT CHANGE UP
LYMPHOCYTES # BLD AUTO: 0.66 K/UL — LOW (ref 1.2–3.4)
LYMPHOCYTES # BLD AUTO: 15.8 % — LOW (ref 20.5–51.1)
MCHC RBC-ENTMCNC: 31.7 PG — HIGH (ref 27–31)
MCHC RBC-ENTMCNC: 34.8 G/DL — SIGNIFICANT CHANGE UP (ref 32–37)
MCV RBC AUTO: 90.9 FL — SIGNIFICANT CHANGE UP (ref 81–99)
MONOCYTES # BLD AUTO: 0.3 K/UL — SIGNIFICANT CHANGE UP (ref 0.1–0.6)
MONOCYTES NFR BLD AUTO: 7.2 % — SIGNIFICANT CHANGE UP (ref 1.7–9.3)
NEUTROPHILS # BLD AUTO: 3.13 K/UL — SIGNIFICANT CHANGE UP (ref 1.4–6.5)
NEUTROPHILS NFR BLD AUTO: 75.1 % — SIGNIFICANT CHANGE UP (ref 42.2–75.2)
NON HDL CHOLESTEROL: 118 MG/DL — SIGNIFICANT CHANGE UP
NRBC # BLD: 0 /100 WBCS — SIGNIFICANT CHANGE UP (ref 0–0)
PLATELET # BLD AUTO: 147 K/UL — SIGNIFICANT CHANGE UP (ref 130–400)
POTASSIUM SERPL-MCNC: 3.2 MMOL/L — LOW (ref 3.5–5)
POTASSIUM SERPL-SCNC: 3.2 MMOL/L — LOW (ref 3.5–5)
PROT SERPL-MCNC: 7.4 G/DL — SIGNIFICANT CHANGE UP (ref 6–8)
PROTHROM AB SERPL-ACNC: 11.8 SEC — SIGNIFICANT CHANGE UP (ref 9.95–12.87)
RBC # BLD: 3.63 M/UL — LOW (ref 4.2–5.4)
RBC # FLD: 10.7 % — LOW (ref 11.5–14.5)
SARS-COV-2 IGG+IGM SERPL QL IA: 0.4 U/ML — SIGNIFICANT CHANGE UP
SARS-COV-2 IGG+IGM SERPL QL IA: NEGATIVE — SIGNIFICANT CHANGE UP
SODIUM SERPL-SCNC: 137 MMOL/L — SIGNIFICANT CHANGE UP (ref 135–146)
TRIGL SERPL-MCNC: 213 MG/DL — HIGH
WBC # BLD: 4.17 K/UL — LOW (ref 4.8–10.8)
WBC # FLD AUTO: 4.17 K/UL — LOW (ref 4.8–10.8)

## 2021-08-07 PROCEDURE — 99232 SBSQ HOSP IP/OBS MODERATE 35: CPT

## 2021-08-07 PROCEDURE — 99233 SBSQ HOSP IP/OBS HIGH 50: CPT

## 2021-08-07 RX ORDER — MORPHINE SULFATE 50 MG/1
4 CAPSULE, EXTENDED RELEASE ORAL EVERY 4 HOURS
Refills: 0 | Status: DISCONTINUED | OUTPATIENT
Start: 2021-08-07 | End: 2021-08-11

## 2021-08-07 RX ORDER — POTASSIUM CHLORIDE 20 MEQ
20 PACKET (EA) ORAL ONCE
Refills: 0 | Status: COMPLETED | OUTPATIENT
Start: 2021-08-07 | End: 2021-08-07

## 2021-08-07 RX ORDER — SODIUM CHLORIDE 9 MG/ML
1000 INJECTION, SOLUTION INTRAVENOUS
Refills: 0 | Status: DISCONTINUED | OUTPATIENT
Start: 2021-08-07 | End: 2021-08-07

## 2021-08-07 RX ORDER — SODIUM CHLORIDE 9 MG/ML
1000 INJECTION, SOLUTION INTRAVENOUS
Refills: 0 | Status: DISCONTINUED | OUTPATIENT
Start: 2021-08-07 | End: 2021-08-08

## 2021-08-07 RX ADMIN — MORPHINE SULFATE 4 MILLIGRAM(S): 50 CAPSULE, EXTENDED RELEASE ORAL at 22:08

## 2021-08-07 RX ADMIN — Medication 1 MILLIGRAM(S): at 11:09

## 2021-08-07 RX ADMIN — MORPHINE SULFATE 4 MILLIGRAM(S): 50 CAPSULE, EXTENDED RELEASE ORAL at 11:08

## 2021-08-07 RX ADMIN — Medication 100 MILLIGRAM(S): at 11:09

## 2021-08-07 RX ADMIN — MORPHINE SULFATE 8 MILLIGRAM(S): 50 CAPSULE, EXTENDED RELEASE ORAL at 11:08

## 2021-08-07 RX ADMIN — PANTOPRAZOLE SODIUM 40 MILLIGRAM(S): 20 TABLET, DELAYED RELEASE ORAL at 18:23

## 2021-08-07 RX ADMIN — MORPHINE SULFATE 8 MILLIGRAM(S): 50 CAPSULE, EXTENDED RELEASE ORAL at 05:59

## 2021-08-07 RX ADMIN — Medication 50 MILLIEQUIVALENT(S): at 16:16

## 2021-08-07 RX ADMIN — GABAPENTIN 100 MILLIGRAM(S): 400 CAPSULE ORAL at 16:05

## 2021-08-07 RX ADMIN — ONDANSETRON 4 MILLIGRAM(S): 8 TABLET, FILM COATED ORAL at 04:13

## 2021-08-07 RX ADMIN — PANTOPRAZOLE SODIUM 40 MILLIGRAM(S): 20 TABLET, DELAYED RELEASE ORAL at 05:56

## 2021-08-07 RX ADMIN — MORPHINE SULFATE 4 MILLIGRAM(S): 50 CAPSULE, EXTENDED RELEASE ORAL at 16:35

## 2021-08-07 RX ADMIN — SODIUM CHLORIDE 100 MILLILITER(S): 9 INJECTION, SOLUTION INTRAVENOUS at 22:08

## 2021-08-07 RX ADMIN — GABAPENTIN 100 MILLIGRAM(S): 400 CAPSULE ORAL at 22:08

## 2021-08-07 RX ADMIN — GABAPENTIN 100 MILLIGRAM(S): 400 CAPSULE ORAL at 05:55

## 2021-08-07 RX ADMIN — MORPHINE SULFATE 8 MILLIGRAM(S): 50 CAPSULE, EXTENDED RELEASE ORAL at 06:59

## 2021-08-07 RX ADMIN — Medication 3 CAPSULE(S): at 18:23

## 2021-08-07 NOTE — PROGRESS NOTE ADULT - ASSESSMENT
ASSESSMENT:  46yF w/ PMHx of Case of 47 y/o female with PMH of ETOH abuse and multiple episodes of pancreatitis that comes to the ED referring abdominal pain, nausea and vomiting. Physical exam findings, imaging, and labs as documented above. Suggestive of acute on chronic pancreatitis.     PLAN:  - keep NPO  - IVFs  - pain management  - GI consulted will do EUS /EGD on Monday
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
## 2021-08-07 NOTE — PROGRESS NOTE ADULT - ASSESSMENT
46 year old female with Past Medical History of ETOH abuse and multiple episodes of pancreatitis with known pseudocyst (with possible communication with stomach) presents with epigastric abdominal pain since yesterday.    # Acute on Chronic Pancreatitis  with walled off necrosis sec to alcohol abuse  # HAGMA sec to  Lactic acidosis  -  CT Abdomen and Pelvis w/ IV Cont (08.06.21 @ 02:16) >Interval increase in size of a collection within the proximal gastric serosa, now measuring 5.9 x 5.4 x 5.9 cm. The collection is now heterogeneous (previously homogeneously fluid) although a discrete thick encapsulated wall is not identified. Findings may reflect developing walled off necrosis. The inferior aspect of the collection abuts the pancreatic tail with questionable communication with the pancreatic duct. Consider GI consultation to determine if fluid sampling may be of benefit. Associated gastric wall thickening and trace fluid and fat stranding in the left upper quadrant.Sequelae of chronic pancreatitis. Overall peripancreatic inflammatory changes have improved compared to prior. Limited CT ability to exclude a mild component of acute pancreatitis.  - Lipase, Serum: 150 U/L (08.05.21 @ 23:00)  -Triglycerides, Serum: 213 mg/dL (08.07.21 @ 04:30)  - c/w pain meds  - c/w creon  - decrease IVF  rate to 150 ml/hr  - Start clear diet  - GI eval: EUS with axios stent drainage on monday  - surgery eval: No surgical intervention at this time  - trend lactate    # H pylori gastritis  - Focal intestinal metaplasia at the GE junction on last EGD /  No dysplasia   - c/w protonix  - As per GI needs needs repeat EGD  as outpt    # Alcohol abuse wit transaminitis  - ativan prn  - c/w thiamine, folate  - monitr LFT    # Hypokalemia  - replete K    # DVT prophylaxis    # Full code    #Pending:  EUS with axios stent drainage on monday, trend lactate  # Discussed plan of care with patient  # Disposition: Home when stable
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
## 2021-08-08 LAB
ALBUMIN SERPL ELPH-MCNC: 4.1 G/DL — SIGNIFICANT CHANGE UP (ref 3.5–5.2)
ALP SERPL-CCNC: 82 U/L — SIGNIFICANT CHANGE UP (ref 30–115)
ALT FLD-CCNC: 27 U/L — SIGNIFICANT CHANGE UP (ref 0–41)
ANION GAP SERPL CALC-SCNC: 12 MMOL/L — SIGNIFICANT CHANGE UP (ref 7–14)
ANION GAP SERPL CALC-SCNC: 16 MMOL/L — HIGH (ref 7–14)
APTT BLD: 32.1 SEC — SIGNIFICANT CHANGE UP (ref 27–39.2)
AST SERPL-CCNC: 131 U/L — HIGH (ref 0–41)
BILIRUB SERPL-MCNC: 0.7 MG/DL — SIGNIFICANT CHANGE UP (ref 0.2–1.2)
BUN SERPL-MCNC: <3 MG/DL — LOW (ref 10–20)
BUN SERPL-MCNC: <3 MG/DL — LOW (ref 10–20)
CALCIUM SERPL-MCNC: 9.3 MG/DL — SIGNIFICANT CHANGE UP (ref 8.5–10.1)
CALCIUM SERPL-MCNC: 9.3 MG/DL — SIGNIFICANT CHANGE UP (ref 8.5–10.1)
CHLORIDE SERPL-SCNC: 95 MMOL/L — LOW (ref 98–110)
CHLORIDE SERPL-SCNC: 98 MMOL/L — SIGNIFICANT CHANGE UP (ref 98–110)
CO2 SERPL-SCNC: 25 MMOL/L — SIGNIFICANT CHANGE UP (ref 17–32)
CO2 SERPL-SCNC: 27 MMOL/L — SIGNIFICANT CHANGE UP (ref 17–32)
CREAT SERPL-MCNC: 0.5 MG/DL — LOW (ref 0.7–1.5)
CREAT SERPL-MCNC: 0.7 MG/DL — SIGNIFICANT CHANGE UP (ref 0.7–1.5)
GLUCOSE SERPL-MCNC: 101 MG/DL — HIGH (ref 70–99)
GLUCOSE SERPL-MCNC: 121 MG/DL — HIGH (ref 70–99)
HCT VFR BLD CALC: 33.3 % — LOW (ref 37–47)
HGB BLD-MCNC: 11.6 G/DL — LOW (ref 12–16)
INR BLD: 1.01 RATIO — SIGNIFICANT CHANGE UP (ref 0.65–1.3)
LACTATE SERPL-SCNC: 1 MMOL/L — SIGNIFICANT CHANGE UP (ref 0.7–2)
MAGNESIUM SERPL-MCNC: 1.1 MG/DL — LOW (ref 1.8–2.4)
MAGNESIUM SERPL-MCNC: 1.4 MG/DL — LOW (ref 1.8–2.4)
MCHC RBC-ENTMCNC: 32.4 PG — HIGH (ref 27–31)
MCHC RBC-ENTMCNC: 34.8 G/DL — SIGNIFICANT CHANGE UP (ref 32–37)
MCV RBC AUTO: 93 FL — SIGNIFICANT CHANGE UP (ref 81–99)
NRBC # BLD: 0 /100 WBCS — SIGNIFICANT CHANGE UP (ref 0–0)
PLATELET # BLD AUTO: 142 K/UL — SIGNIFICANT CHANGE UP (ref 130–400)
POTASSIUM SERPL-MCNC: 2.9 MMOL/L — LOW (ref 3.5–5)
POTASSIUM SERPL-MCNC: 3.3 MMOL/L — LOW (ref 3.5–5)
POTASSIUM SERPL-SCNC: 2.9 MMOL/L — LOW (ref 3.5–5)
POTASSIUM SERPL-SCNC: 3.3 MMOL/L — LOW (ref 3.5–5)
PROT SERPL-MCNC: 6.9 G/DL — SIGNIFICANT CHANGE UP (ref 6–8)
PROTHROM AB SERPL-ACNC: 11.6 SEC — SIGNIFICANT CHANGE UP (ref 9.95–12.87)
RBC # BLD: 3.58 M/UL — LOW (ref 4.2–5.4)
RBC # FLD: 10.8 % — LOW (ref 11.5–14.5)
SARS-COV-2 RNA SPEC QL NAA+PROBE: SIGNIFICANT CHANGE UP
SODIUM SERPL-SCNC: 136 MMOL/L — SIGNIFICANT CHANGE UP (ref 135–146)
SODIUM SERPL-SCNC: 137 MMOL/L — SIGNIFICANT CHANGE UP (ref 135–146)
VANCOMYCIN FLD-MCNC: <4 UG/ML — LOW (ref 5–10)
WBC # BLD: 3.08 K/UL — LOW (ref 4.8–10.8)
WBC # FLD AUTO: 3.08 K/UL — LOW (ref 4.8–10.8)

## 2021-08-08 PROCEDURE — 99233 SBSQ HOSP IP/OBS HIGH 50: CPT

## 2021-08-08 PROCEDURE — 99232 SBSQ HOSP IP/OBS MODERATE 35: CPT

## 2021-08-08 RX ORDER — SODIUM CHLORIDE 9 MG/ML
1000 INJECTION, SOLUTION INTRAVENOUS
Refills: 0 | Status: DISCONTINUED | OUTPATIENT
Start: 2021-08-08 | End: 2021-08-11

## 2021-08-08 RX ORDER — MAGNESIUM SULFATE 500 MG/ML
2 VIAL (ML) INJECTION ONCE
Refills: 0 | Status: COMPLETED | OUTPATIENT
Start: 2021-08-08 | End: 2021-08-08

## 2021-08-08 RX ORDER — POTASSIUM CHLORIDE 20 MEQ
20 PACKET (EA) ORAL
Refills: 0 | Status: COMPLETED | OUTPATIENT
Start: 2021-08-08 | End: 2021-08-08

## 2021-08-08 RX ORDER — MAGNESIUM SULFATE 500 MG/ML
2 VIAL (ML) INJECTION ONCE
Refills: 0 | Status: DISCONTINUED | OUTPATIENT
Start: 2021-08-08 | End: 2021-08-08

## 2021-08-08 RX ORDER — POTASSIUM CHLORIDE 20 MEQ
20 PACKET (EA) ORAL
Refills: 0 | Status: DISCONTINUED | OUTPATIENT
Start: 2021-08-08 | End: 2021-08-08

## 2021-08-08 RX ORDER — MAGNESIUM SULFATE 500 MG/ML
4 VIAL (ML) INJECTION ONCE
Refills: 0 | Status: DISCONTINUED | OUTPATIENT
Start: 2021-08-08 | End: 2021-08-08

## 2021-08-08 RX ADMIN — GABAPENTIN 100 MILLIGRAM(S): 400 CAPSULE ORAL at 22:03

## 2021-08-08 RX ADMIN — MORPHINE SULFATE 4 MILLIGRAM(S): 50 CAPSULE, EXTENDED RELEASE ORAL at 16:18

## 2021-08-08 RX ADMIN — Medication 50 MILLIEQUIVALENT(S): at 22:31

## 2021-08-08 RX ADMIN — PANTOPRAZOLE SODIUM 40 MILLIGRAM(S): 20 TABLET, DELAYED RELEASE ORAL at 18:48

## 2021-08-08 RX ADMIN — MORPHINE SULFATE 4 MILLIGRAM(S): 50 CAPSULE, EXTENDED RELEASE ORAL at 11:25

## 2021-08-08 RX ADMIN — GABAPENTIN 100 MILLIGRAM(S): 400 CAPSULE ORAL at 13:05

## 2021-08-08 RX ADMIN — Medication 3 CAPSULE(S): at 18:48

## 2021-08-08 RX ADMIN — MORPHINE SULFATE 4 MILLIGRAM(S): 50 CAPSULE, EXTENDED RELEASE ORAL at 18:49

## 2021-08-08 RX ADMIN — Medication 25 GRAM(S): at 20:39

## 2021-08-08 RX ADMIN — Medication 100 MILLIGRAM(S): at 11:27

## 2021-08-08 RX ADMIN — Medication 50 MILLIEQUIVALENT(S): at 16:18

## 2021-08-08 RX ADMIN — MORPHINE SULFATE 4 MILLIGRAM(S): 50 CAPSULE, EXTENDED RELEASE ORAL at 06:57

## 2021-08-08 RX ADMIN — Medication 3 CAPSULE(S): at 11:27

## 2021-08-08 RX ADMIN — Medication 50 MILLIEQUIVALENT(S): at 13:05

## 2021-08-08 RX ADMIN — GABAPENTIN 100 MILLIGRAM(S): 400 CAPSULE ORAL at 06:58

## 2021-08-08 RX ADMIN — MORPHINE SULFATE 4 MILLIGRAM(S): 50 CAPSULE, EXTENDED RELEASE ORAL at 20:34

## 2021-08-08 RX ADMIN — Medication 3 CAPSULE(S): at 08:34

## 2021-08-08 RX ADMIN — ENOXAPARIN SODIUM 40 MILLIGRAM(S): 100 INJECTION SUBCUTANEOUS at 11:26

## 2021-08-08 RX ADMIN — Medication 1 MILLIGRAM(S): at 11:26

## 2021-08-08 RX ADMIN — MORPHINE SULFATE 4 MILLIGRAM(S): 50 CAPSULE, EXTENDED RELEASE ORAL at 21:04

## 2021-08-08 RX ADMIN — Medication 50 MILLIEQUIVALENT(S): at 18:51

## 2021-08-08 RX ADMIN — Medication 16.67 GRAM(S): at 16:18

## 2021-08-08 RX ADMIN — PANTOPRAZOLE SODIUM 40 MILLIGRAM(S): 20 TABLET, DELAYED RELEASE ORAL at 06:57

## 2021-08-08 RX ADMIN — MORPHINE SULFATE 4 MILLIGRAM(S): 50 CAPSULE, EXTENDED RELEASE ORAL at 02:16

## 2021-08-08 NOTE — PROGRESS NOTE ADULT - ASSESSMENT
46 year old female with Past Medical History of ETOH abuse and multiple episodes of pancreatitis with known pseudocyst (with possible communication with stomach) presents with epigastric abdominal pain since yesterday.    # Acute on Chronic Pancreatitis  with walled off necrosis sec to alcohol abuse  # HAGMA sec to  Lactic acidosis-resolved  -  CT Abdomen and Pelvis w/ IV Cont (08.06.21 @ 02:16) >Interval increase in size of a collection within the proximal gastric serosa, now measuring 5.9 x 5.4 x 5.9 cm. The collection is now heterogeneous (previously homogeneously fluid) although a discrete thick encapsulated wall is not identified. Findings may reflect developing walled off necrosis. The inferior aspect of the collection abuts the pancreatic tail with questionable communication with the pancreatic duct. Consider GI consultation to determine if fluid sampling may be of benefit. Associated gastric wall thickening and trace fluid and fat stranding in the left upper quadrant.Sequelae of chronic pancreatitis. Overall peripancreatic inflammatory changes have improved compared to prior. Limited CT ability to exclude a mild component of acute pancreatitis.  - Lipase, Serum: 150 U/L (08.05.21 @ 23:00)  -Triglycerides, Serum: 213 mg/dL (08.07.21 @ 04:30)  - c/w pain meds  - c/w creon  - decrease IVF  rate to 100 ml/hr  - Full liquid diet  - GI eval: EUS with axios stent drainage on monday  - surgery eval: No surgical intervention at this time    # H pylori gastritis  - Focal intestinal metaplasia at the GE junction on last EGD /  No dysplasia   - c/w protonix  - As per GI needs needs repeat EGD  as outpt    # Alcohol abuse wit transaminitis  - ativan prn  - c/w thiamine, folate  - monitr LFT    # Hypomagnesemia/ Hypokalemia  - replete Mg,  K    # DVT prophylaxis    # Full code    #Pending:  EUS with axios stent drainage on monday, F/u K, Mg  # Discussed plan of care with patient  # Disposition: Home when stable
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
## 2021-08-08 NOTE — PROGRESS NOTE ADULT - SUBJECTIVE AND OBJECTIVE BOX
LAURA BARAJAS 46y Female      Patient is a 46y old  Female who presents with a chief complaint of Abdominal Pain (08 Aug 2021 09:32)        INTERVAL HPI/OVERNIGHT EVENTS: No acute events overnight. Pt reports that her abdominal pain is improving and is now limited mainly to the epigastric area with radiation to the back. Pt denies any N or V. Pt reports not having passed a BM since admission (3 days).  Pt wishes to have her diet advanced.       PHYSICAL EXAM:  GENERAL: NAD  HEAD:  Normocephalic  EYES:  conjunctiva and sclera clear  ENMT: Moist mucous membranes  NECK: Supple  NERVOUS SYSTEM:  Alert, awake  CHEST/LUNG: Good air exchange bilaterally, no wheeze  ABDOMEN: Diffuse abdominal tenderness, accentuated on the epigastric area   HEART: Regular rate and rhythm  No LLE       Vital Signs Last 24 Hrs  T(C): 37.2 (08 Aug 2021 05:19), Max: 37.4 (07 Aug 2021 20:00)  T(F): 99 (08 Aug 2021 05:19), Max: 99.3 (07 Aug 2021 20:00)  HR: 99 (08 Aug 2021 05:19) (99 - 119)  BP: 161/101 (08 Aug 2021 05:19) (143/93 - 161/101)  BP(mean): --  RR: 18 (07 Aug 2021 20:00) (18 - 20)  SpO2: 99% (07 Aug 2021 16:17) (99% - 99%)      Consultant(s) Notes Reviewed:  [X] YES  [ ] NO  Care Discussed with Consultants/Other Providers [X] YES  [ ] NO  Imaging Personally Reviewed:  [X] YES  [ ] NO      LABS:                        11.6   3.08  )-----------( 142      ( 08 Aug 2021 07:32 )             33.3     08-08    136  |  95<L>  |  <3<L>  ----------------------------<  101<H>  2.9<L>   |  25  |  0.5<L>    Ca    9.3      08 Aug 2021 07:32  Mg     1.1     08-08    TPro  6.9  /  Alb  4.1  /  TBili  0.7  /  DBili  x   /  AST  131<H>  /  ALT  27  /  AlkPhos  82  08-08    PT/INR - ( 08 Aug 2021 07:32 )   PT: 11.60 sec;   INR: 1.01 ratio         PTT - ( 08 Aug 2021 07:32 )  PTT:32.1 sec      CAPILLARY BLOOD GLUCOSE

## 2021-08-08 NOTE — PROGRESS NOTE ADULT - ASSESSMENT
ASSESSMENT:  46yF w/ PMHx of Case of 45 y/o female with PMH of ETOH abuse and multiple episodes of pancreatitis that comes to the ED referring abdominal pain, nausea and vomiting. Physical exam findings, imaging, and labs as documented above. Suggestive of acute on chronic pancreatitis as well as a large pancreatic pseudocyst    PLAN:  - GI consulted will do EUS /EGD on Monday  - pain management  - no surgical intervention at this time    Lines/Tubes: PIV    SPECTRA: [id number]
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
## 2021-08-09 ENCOUNTER — TRANSCRIPTION ENCOUNTER (OUTPATIENT)
Age: 46
End: 2021-08-09

## 2021-08-09 ENCOUNTER — RESULT REVIEW (OUTPATIENT)
Age: 46
End: 2021-08-09

## 2021-08-09 LAB
ALBUMIN SERPL ELPH-MCNC: 4.3 G/DL — SIGNIFICANT CHANGE UP (ref 3.5–5.2)
ALP SERPL-CCNC: 84 U/L — SIGNIFICANT CHANGE UP (ref 30–115)
ALT FLD-CCNC: 35 U/L — SIGNIFICANT CHANGE UP (ref 0–41)
ANION GAP SERPL CALC-SCNC: 10 MMOL/L — SIGNIFICANT CHANGE UP (ref 7–14)
AST SERPL-CCNC: 156 U/L — HIGH (ref 0–41)
BILIRUB SERPL-MCNC: 0.6 MG/DL — SIGNIFICANT CHANGE UP (ref 0.2–1.2)
BUN SERPL-MCNC: <3 MG/DL — LOW (ref 10–20)
CALCIUM SERPL-MCNC: 9.4 MG/DL — SIGNIFICANT CHANGE UP (ref 8.5–10.1)
CHLORIDE SERPL-SCNC: 101 MMOL/L — SIGNIFICANT CHANGE UP (ref 98–110)
CO2 SERPL-SCNC: 27 MMOL/L — SIGNIFICANT CHANGE UP (ref 17–32)
CREAT SERPL-MCNC: 0.6 MG/DL — LOW (ref 0.7–1.5)
GLUCOSE SERPL-MCNC: 82 MG/DL — SIGNIFICANT CHANGE UP (ref 70–99)
MAGNESIUM SERPL-MCNC: 2.2 MG/DL — SIGNIFICANT CHANGE UP (ref 1.8–2.4)
POTASSIUM SERPL-MCNC: 4.7 MMOL/L — SIGNIFICANT CHANGE UP (ref 3.5–5)
POTASSIUM SERPL-SCNC: 4.7 MMOL/L — SIGNIFICANT CHANGE UP (ref 3.5–5)
PROT SERPL-MCNC: 7.2 G/DL — SIGNIFICANT CHANGE UP (ref 6–8)
SODIUM SERPL-SCNC: 138 MMOL/L — SIGNIFICANT CHANGE UP (ref 135–146)

## 2021-08-09 PROCEDURE — 43239 EGD BIOPSY SINGLE/MULTIPLE: CPT | Mod: XU

## 2021-08-09 PROCEDURE — 99233 SBSQ HOSP IP/OBS HIGH 50: CPT

## 2021-08-09 PROCEDURE — 88305 TISSUE EXAM BY PATHOLOGIST: CPT | Mod: 26

## 2021-08-09 PROCEDURE — 43259 EGD US EXAM DUODENUM/JEJUNUM: CPT

## 2021-08-09 PROCEDURE — 99231 SBSQ HOSP IP/OBS SF/LOW 25: CPT

## 2021-08-09 PROCEDURE — 88312 SPECIAL STAINS GROUP 1: CPT | Mod: 26

## 2021-08-09 RX ADMIN — MORPHINE SULFATE 4 MILLIGRAM(S): 50 CAPSULE, EXTENDED RELEASE ORAL at 00:49

## 2021-08-09 RX ADMIN — SODIUM CHLORIDE 75 MILLILITER(S): 9 INJECTION, SOLUTION INTRAVENOUS at 12:37

## 2021-08-09 RX ADMIN — MORPHINE SULFATE 4 MILLIGRAM(S): 50 CAPSULE, EXTENDED RELEASE ORAL at 05:34

## 2021-08-09 RX ADMIN — MORPHINE SULFATE 4 MILLIGRAM(S): 50 CAPSULE, EXTENDED RELEASE ORAL at 01:20

## 2021-08-09 RX ADMIN — GABAPENTIN 100 MILLIGRAM(S): 400 CAPSULE ORAL at 05:01

## 2021-08-09 RX ADMIN — GABAPENTIN 100 MILLIGRAM(S): 400 CAPSULE ORAL at 21:11

## 2021-08-09 RX ADMIN — PANTOPRAZOLE SODIUM 40 MILLIGRAM(S): 20 TABLET, DELAYED RELEASE ORAL at 05:01

## 2021-08-09 RX ADMIN — Medication 1 MILLIGRAM(S): at 11:28

## 2021-08-09 RX ADMIN — ONDANSETRON 4 MILLIGRAM(S): 8 TABLET, FILM COATED ORAL at 18:23

## 2021-08-09 RX ADMIN — Medication 100 MILLIGRAM(S): at 11:28

## 2021-08-09 RX ADMIN — MORPHINE SULFATE 4 MILLIGRAM(S): 50 CAPSULE, EXTENDED RELEASE ORAL at 11:03

## 2021-08-09 RX ADMIN — MORPHINE SULFATE 8 MILLIGRAM(S): 50 CAPSULE, EXTENDED RELEASE ORAL at 19:50

## 2021-08-09 RX ADMIN — MORPHINE SULFATE 4 MILLIGRAM(S): 50 CAPSULE, EXTENDED RELEASE ORAL at 06:10
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
## 2021-08-09 NOTE — PRE-OP CHECKLIST - SELECT TESTS ORDERED
BMP/CBC/PT/PTT/INR/Hepatic Function/Type and Screen/EKG/CXR/Results in MD note/POCT Blood Glucose/COVID-19

## 2021-08-09 NOTE — PROGRESS NOTE ADULT - ASSESSMENT
46 year old female with Past Medical History of ETOH abuse and multiple episodes of pancreatitis with known pseudocyst (with possible communication with stomach) presents with epigastric abdominal pain. CT abd: CT abdomen: enlarging pseudocyst now 5.9x5.9x5.4 (walled off necrosis with mass effect on the stomach) compared to 2.2x2.1x1.8 on earlier imaging. EUS done 0n 08/09 with no drainage.     # Acute on Chronic Pancreatitis 2/2 Alcohol use with enlarging pseudocyst on CT  - Lipase 150, lactate 6, afebrile, WBC 5k, tachycardic on admission   - CT abdomen: enlarging pseudocyst now` 5.9x5.9x5.4 (walled off necrosis with mass effect on the stomach) compared to 2.2x2.1x1.8 on earlier imaging  - EUS with axios stent drainage 08/09: No drainage, can followup out pt with GI with repeat CT per [facility]  - Pain control: Morphine 4mg q4h PRN  for moderate pain and 8mg Q6h for severe pain  - c/w Protonix twice daily  - c/w Creon 35106 three times a day  - c/w Gabapentin 100 three times a day for chronic pancreatitis (was not taking at home)  - [facility] diet to full liquid    # Hypokalemia - stable  - 8/7 K 3.2 ---> 20meq once ---> 8/8 K 2.9 ---> 20meq for 3 doses --> now 08/09 at 4.7    # Lactic Acidosis - resolved   - likely due to dehydration   - lactate 6.4 on admission --> lactate  1 on 8/8    # H/O Alcohol Abuse  - c/w folic acid 1mg OD   - c/w thiamine 100mG OD   - Peth level pending     DVT: Lovenox  GI: Protonix  Activity as tolerated 46 year old female with Past Medical History of ETOH abuse and multiple episodes of pancreatitis with known pseudocyst (with possible communication with stomach) presents with epigastric abdominal pain. CT abd: CT abdomen: enlarging pseudocyst now 5.9x5.9x5.4 (walled off necrosis with mass effect on the stomach) compared to 2.2x2.1x1.8 on earlier imaging. EUS done 0n 08/09 with no drainage.     # Acute on Chronic Pancreatitis 2/2 Alcohol use with enlarging pseudocyst on CT  - Lipase 150, lactate 6, afebrile, WBC 5k, tachycardic on admission   - CT abdomen: enlarging pseudocyst now` 5.9x5.9x5.4 (walled off necrosis with mass effect on the stomach) compared to 2.2x2.1x1.8 on earlier imaging  - EUS with axios stent drainage 08/09: No drainage, can followup out pt with GI with repeat CT per [facility]  - Pain control: Morphine 4mg q4h PRN  for moderate pain and 8mg Q6h for severe pain  - c/w Protonix twice daily  - c/w Creon 11398 three times a day  - c/w Gabapentin 100 three times a day for chronic pancreatitis (was not taking at home)  - Advance diet to full liquid    # Hypokalemia - stable  - 8/7 K 3.2 ---> 20meq once ---> 8/8 K 2.9 ---> 20meq for 3 doses --> now 08/09 at 4.7    # Lactic Acidosis - resolved   - likely due to dehydration   - lactate 6.4 on admission --> lactate  1 on 8/8    # H/O Alcohol Abuse  - c/w folic acid 1mg OD   - c/w thiamine 100mG OD   - Peth level pending     # Thiamine and folic acid deficiencies, suspected  - c/w supplements    DVT: Lovenox  GI: Protonix  Activity as tolerated

## 2021-08-09 NOTE — CHART NOTE - NSCHARTNOTEFT_GEN_A_CORE
pt s/p EUS    Impression:  -EGD w/ non-erosive gastritis (biopsy) and a bulge noted in the stomach body at the level of the lesser curvature that is likely due to compression from the pancreatic collection.    -EUS w/ walled-off collection that measured 4.5 x 3.9 cm in largest dimension, looked well circumscribed, heterogeneous, containing debris, consistent with walled-off pancreatic necrosis.    -EUS w/ pancreatic parenchymal hypoechogenicity and calcifications in the pancreatic head, body, and tail, suggestive of chronic pancreatitis. There was a reactive appearing, periportal lymph node that looked irregular, homogeneous, well circumscribed, measuring around 1.2 cm in largest dimension.      Plan:  -Await pathology results  -[facility] diet as tolerated  -Continue Creon at current dose with half-dose given with snacks  -Rest of management and supportive care per primary team  -Alcohol cessation  -Follow-up at the GI clinic (appointment made for 8/24/2021 at 4 pm)
PACU ANESTHESIA ADMISSION NOTE      Procedure:   Post op diagnosis:      ____  Intubated  TV:______       Rate: ______      FiO2: ______    __x__  Patent Airway    __x__  Full return of protective reflexes    __x__  Full recovery from anesthesia / back to baseline     Vitals:   T:97.5          R:20                  BP: 127/80                 Sat: 97                  P: 108      Mental Status:  _x___ Awake   __x___ Alert   _____ Drowsy   _____ Sedated    Nausea/Vomiting:  _x___ NO  ______Yes,   __x__ See Post - Op Orders          Pain Scale (0-10):  __0___    Treatment: ____ None    __x__ See Post - Op/PCA Orders    Post - Operative Fluids:   ____ Oral   __x__ See Post - Op Orders    Plan: Discharge:   ____Home       __x___Floor     _____Critical Care    _____  Other:_________________    Comments: When parameters met.

## 2021-08-09 NOTE — PROGRESS NOTE ADULT - ASSESSMENT
ASSESSMENT:  46yF w/ PMHx of Case of 47 y/o female with PMH of ETOH abuse and multiple episodes of pancreatitis that comes to the ED referring abdominal pain, nausea and vomiting. Physical exam findings, imaging, and labs as documented above. Suggestive of acute on chronic pancreatitis as well as a large pancreatic pseudocyst.    PLAN:  - GI consulted will do EUS /EGD on today- f/u results  - pain management  - no surgical intervention at this time    Lines/Tubes: PIV    SPECTRA: [id number]

## 2021-08-09 NOTE — PRE-ANESTHESIA EVALUATION ADULT - NSANTHOSAYNRD_GEN_A_CORE
No. ANH screening performed.  STOP BANG Legend: 0-2 = LOW Risk; 3-4 = INTERMEDIATE Risk; 5-8 = HIGH Risk

## 2021-08-10 ENCOUNTER — TRANSCRIPTION ENCOUNTER (OUTPATIENT)
Age: 46
End: 2021-08-10

## 2021-08-10 LAB
ANION GAP SERPL CALC-SCNC: 10 MMOL/L — SIGNIFICANT CHANGE UP (ref 7–14)
BASOPHILS # BLD AUTO: 0.01 K/UL — SIGNIFICANT CHANGE UP (ref 0–0.2)
BASOPHILS NFR BLD AUTO: 0.2 % — SIGNIFICANT CHANGE UP (ref 0–1)
BUN SERPL-MCNC: <3 MG/DL — LOW (ref 10–20)
CALCIUM SERPL-MCNC: 9.3 MG/DL — SIGNIFICANT CHANGE UP (ref 8.5–10.1)
CHLORIDE SERPL-SCNC: 99 MMOL/L — SIGNIFICANT CHANGE UP (ref 98–110)
CO2 SERPL-SCNC: 28 MMOL/L — SIGNIFICANT CHANGE UP (ref 17–32)
CREAT SERPL-MCNC: 0.6 MG/DL — LOW (ref 0.7–1.5)
EOSINOPHIL # BLD AUTO: 0.02 K/UL — SIGNIFICANT CHANGE UP (ref 0–0.7)
EOSINOPHIL NFR BLD AUTO: 0.5 % — SIGNIFICANT CHANGE UP (ref 0–8)
GLUCOSE SERPL-MCNC: 100 MG/DL — HIGH (ref 70–99)
HCT VFR BLD CALC: 31.5 % — LOW (ref 37–47)
HGB BLD-MCNC: 10.8 G/DL — LOW (ref 12–16)
IMM GRANULOCYTES NFR BLD AUTO: 0.2 % — SIGNIFICANT CHANGE UP (ref 0.1–0.3)
LYMPHOCYTES # BLD AUTO: 0.83 K/UL — LOW (ref 1.2–3.4)
LYMPHOCYTES # BLD AUTO: 20.2 % — LOW (ref 20.5–51.1)
MAGNESIUM SERPL-MCNC: 1.5 MG/DL — LOW (ref 1.8–2.4)
MCHC RBC-ENTMCNC: 31.9 PG — HIGH (ref 27–31)
MCHC RBC-ENTMCNC: 34.3 G/DL — SIGNIFICANT CHANGE UP (ref 32–37)
MCV RBC AUTO: 92.9 FL — SIGNIFICANT CHANGE UP (ref 81–99)
MONOCYTES # BLD AUTO: 0.48 K/UL — SIGNIFICANT CHANGE UP (ref 0.1–0.6)
MONOCYTES NFR BLD AUTO: 11.7 % — HIGH (ref 1.7–9.3)
NEUTROPHILS # BLD AUTO: 2.76 K/UL — SIGNIFICANT CHANGE UP (ref 1.4–6.5)
NEUTROPHILS NFR BLD AUTO: 67.2 % — SIGNIFICANT CHANGE UP (ref 42.2–75.2)
NRBC # BLD: 0 /100 WBCS — SIGNIFICANT CHANGE UP (ref 0–0)
PLATELET # BLD AUTO: 156 K/UL — SIGNIFICANT CHANGE UP (ref 130–400)
POTASSIUM SERPL-MCNC: 3.6 MMOL/L — SIGNIFICANT CHANGE UP (ref 3.5–5)
POTASSIUM SERPL-SCNC: 3.6 MMOL/L — SIGNIFICANT CHANGE UP (ref 3.5–5)
RBC # BLD: 3.39 M/UL — LOW (ref 4.2–5.4)
RBC # FLD: 10.8 % — LOW (ref 11.5–14.5)
SODIUM SERPL-SCNC: 137 MMOL/L — SIGNIFICANT CHANGE UP (ref 135–146)
WBC # BLD: 4.11 K/UL — LOW (ref 4.8–10.8)
WBC # FLD AUTO: 4.11 K/UL — LOW (ref 4.8–10.8)

## 2021-08-10 PROCEDURE — 72220 X-RAY EXAM SACRUM TAILBONE: CPT | Mod: 26

## 2021-08-10 PROCEDURE — 99231 SBSQ HOSP IP/OBS SF/LOW 25: CPT

## 2021-08-10 PROCEDURE — 99233 SBSQ HOSP IP/OBS HIGH 50: CPT

## 2021-08-10 PROCEDURE — 72040 X-RAY EXAM NECK SPINE 2-3 VW: CPT | Mod: 26

## 2021-08-10 RX ORDER — MAGNESIUM SULFATE 500 MG/ML
2 VIAL (ML) INJECTION ONCE
Refills: 0 | Status: COMPLETED | OUTPATIENT
Start: 2021-08-10 | End: 2021-08-10

## 2021-08-10 RX ADMIN — GABAPENTIN 100 MILLIGRAM(S): 400 CAPSULE ORAL at 13:35

## 2021-08-10 RX ADMIN — Medication 3 CAPSULE(S): at 13:35

## 2021-08-10 RX ADMIN — SODIUM CHLORIDE 75 MILLILITER(S): 9 INJECTION, SOLUTION INTRAVENOUS at 14:17

## 2021-08-10 RX ADMIN — GABAPENTIN 100 MILLIGRAM(S): 400 CAPSULE ORAL at 06:03

## 2021-08-10 RX ADMIN — Medication 1 MILLIGRAM(S): at 11:08

## 2021-08-10 RX ADMIN — PANTOPRAZOLE SODIUM 40 MILLIGRAM(S): 20 TABLET, DELAYED RELEASE ORAL at 06:04

## 2021-08-10 RX ADMIN — MORPHINE SULFATE 8 MILLIGRAM(S): 50 CAPSULE, EXTENDED RELEASE ORAL at 16:05

## 2021-08-10 RX ADMIN — ONDANSETRON 4 MILLIGRAM(S): 8 TABLET, FILM COATED ORAL at 09:55

## 2021-08-10 RX ADMIN — MORPHINE SULFATE 4 MILLIGRAM(S): 50 CAPSULE, EXTENDED RELEASE ORAL at 06:03

## 2021-08-10 RX ADMIN — GABAPENTIN 100 MILLIGRAM(S): 400 CAPSULE ORAL at 21:00

## 2021-08-10 RX ADMIN — MORPHINE SULFATE 8 MILLIGRAM(S): 50 CAPSULE, EXTENDED RELEASE ORAL at 22:38

## 2021-08-10 RX ADMIN — MORPHINE SULFATE 4 MILLIGRAM(S): 50 CAPSULE, EXTENDED RELEASE ORAL at 20:25

## 2021-08-10 RX ADMIN — ENOXAPARIN SODIUM 40 MILLIGRAM(S): 100 INJECTION SUBCUTANEOUS at 11:08

## 2021-08-10 RX ADMIN — MORPHINE SULFATE 4 MILLIGRAM(S): 50 CAPSULE, EXTENDED RELEASE ORAL at 01:02

## 2021-08-10 RX ADMIN — Medication 16.67 GRAM(S): at 11:07

## 2021-08-10 RX ADMIN — Medication 3 CAPSULE(S): at 09:54

## 2021-08-10 RX ADMIN — Medication 100 MILLIGRAM(S): at 11:09

## 2021-08-10 RX ADMIN — MORPHINE SULFATE 8 MILLIGRAM(S): 50 CAPSULE, EXTENDED RELEASE ORAL at 02:59

## 2021-08-10 RX ADMIN — Medication 3 CAPSULE(S): at 17:09

## 2021-08-10 RX ADMIN — MORPHINE SULFATE 8 MILLIGRAM(S): 50 CAPSULE, EXTENDED RELEASE ORAL at 09:54

## 2021-08-10 RX ADMIN — PANTOPRAZOLE SODIUM 40 MILLIGRAM(S): 20 TABLET, DELAYED RELEASE ORAL at 17:08

## 2021-08-10 NOTE — DISCHARGE NOTE PROVIDER - NSDCFUADDAPPT_GEN_ALL_CORE_FT
appointment made for 8/24/2021 at 4 pm at the [facility] please make sure you follow up with your GI doctor and get a repeat CT scan in 6-8 weeks. appointment made for 8/24/2021 at 4 pm at the [facility] please make sure you follow up with your GI doctor and get a repeat CT scan in 6-8 weeks.  Please fup with Pain management doctor for your chronic pain issue

## 2021-08-10 NOTE — DISCHARGE NOTE PROVIDER - PROVIDER TOKENS
PROVIDER:[TOKEN:[[id number] PROVIDER:[[id number]]],PROVIDER:[TOKEN:[[id number]]]
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
## 2021-08-10 NOTE — DISCHARGE NOTE PROVIDER - CARE PROVIDER_API CALL
Petey Elizondo)  Gastroenterology; Internal Medicine  [address] [place] [address]  Phone: [phone]  Fax: [phone]  Follow Up Time:    Petey Elizondo (MD)  Gastroenterology; Internal Medicine  [address] [place] [address]  Phone: [phone]  Fax: [phone]  Follow Up Time:     Gerardo Jones)  PhysicalRehab Medicine  [address] [place] [address]  Phone: [phone]  Fax: [phone]  Follow Up Time:

## 2021-08-10 NOTE — DISCHARGE NOTE PROVIDER - CARE PROVIDERS DIRECT ADDRESSES
[email or web address],DirectAddress_Unknown
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
## 2021-08-10 NOTE — PROGRESS NOTE ADULT - ATTENDING COMMENTS
Patient seen and examined on 8/10/21, has a fluid collection that is not mature for drainage as of now, will follow up as outpatient for repeat imaging and possible EUS-guided cystgastrostomy
Patient seen and examined with surgery resident on rounds and discussed management plans.  Continued epigastric pain requiring narcotics. Awaiting GI EUS and possible drainage..
Patient seen and examined with surgery team in ED awaiting hospitalization and discussed management plans with patient. Recent hospital admission for same problem seen by GI.  Patient comfortable with epigastric pain and mild tenderness only.  Lab and CT scan reviewed. Large pancreatic pseudocyst attached to stomach wall larger than last admission.  abdomen soft .  Gi to see patient regarding possible endoscopy and drainage.   pt requesting pain management  No surgical intervention at this time will follow with GI
Patient seen and examined with surgery team on rounds and discussed management plans. S/p egd and sono yesterday Patient comfortable sitting in bed tolerating diet no vomiting. No surgical intervention at this time Recall if any issues
Patient seen and examined with surgery PA on rounds and discussed management plans. Patient comfortable ambulatory with some epigastric tenderness only awaiting GI intervention tomorrow for possible endoscopic drainage.
as above     seen this morning    EUS note reviewed    Continue current treatment plan and advance diet as tolerated

## 2021-08-10 NOTE — PROGRESS NOTE ADULT - ASSESSMENT
46 year old female with Past Medical History of ETOH abuse and multiple episodes of pancreatitis with known pseudocyst (with possible communication with stomach) presents with epigastric abdominal pain. CT abd: CT abdomen: enlarging pseudocyst now 5.9x5.9x5.4 (walled off necrosis with mass effect on the stomach) compared to 2.2x2.1x1.8 on earlier imaging. EUS done 0n 08/09 with no drainage.     # Acute on Chronic Pancreatitis 2/2 Alcohol use with enlarging pseudocyst on CT  - Lipase 150, lactate 6, afebrile, WBC 5k, tachycardic on admission   - CT abdomen: enlarging pseudocyst now` 5.9x5.9x5.4 (walled off necrosis with mass effect on the stomach) compared to 2.2x2.1x1.8 on earlier imaging  - EUS with axios stent drainage 08/09: No drainage, can followup out pt with GI with repeat CT per [facility]  - Pain control: Morphine 4mg q4h PRN  for moderate pain and 8mg Q6h for severe pain  - c/w Protonix twice daily  - c/w Creon [id number] three times a day  - c/w Gabapentin 100 three times a day for chronic pancreatitis (was not taking at home)  - Advanced diet to full liquid.   GI has cleared for discharge.  If tolerates liquids, advance tomorrow to low fat diet and d/c planning.     # Hypokalemia - stable  repleted    # Lactic Acidosis - resolved       # H/O Alcohol Abuse  - c/w folic acid 1mg OD   - c/w thiamine 100mG OD   - Peth level pending     # Thiamine and folic acid deficiencies, suspected  - c/w supplements    DVT: Lovenox  GI: Protonix  Activity as tolerated  likely d/c in 24-48 hours
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
## 2021-08-10 NOTE — DISCHARGE NOTE PROVIDER - NSDCMRMEDTOKEN_GEN_ALL_CORE_FT
acetaminophen 650 mg oral tablet, extended release: 1 tab(s) orally every 6 hours as needed for pain  Creon 12,000 units oral delayed release capsule: 3 cap(s) orally 3 times a day (with meals)  gabapentin 100 mg oral capsule: 1 cap(s) orally 3 times a day  Protonix 40 mg oral delayed release tablet: 1 tab(s) orally once a day    acetaminophen 650 mg oral tablet, extended release: 1 tab(s) orally every 6 hours as needed for pain  Creon 12,000 units oral delayed release capsule: 3 cap(s) orally 3 times a day (with meals)  cyclobenzaprine 10 mg oral tablet: 1 tab(s) orally once a day   This medication makes you drowsy, please donot take while driving or during other activities which require you to be alert  gabapentin 100 mg oral capsule: 1 cap(s) orally 3 times a day  naproxen 500 mg oral tablet: 1 tab(s) orally 2 times a day  Protonix 40 mg oral delayed release tablet: 1 tab(s) orally once a day
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
## 2021-08-10 NOTE — PROGRESS NOTE ADULT - ASSESSMENT
ASSESSMENT:  46yF w/ PMHx of Case of 47 y/o female with PMH of ETOH abuse and multiple episodes of pancreatitis that comes to the ED referring abdominal pain, nausea and vomiting. Physical exam findings, imaging, and labs as documented above. Suggestive of acute on chronic pancreatitis as well as a large pancreatic pseudocyst.    PLAN:  - s/p egd with GI team   - f/u results  - pain management  - no surgical intervention at this time  -Await pathology results  -Alcohol cessation  -Follow-up at the GI clinic (appointment made for 8/24/2021 at 4 pm).      Lines/Tubes: PIV    SPECTRA: [id number]

## 2021-08-10 NOTE — PROGRESS NOTE ADULT - ASSESSMENT
Patient is a 46 year old female with Past Medical History of ETOH abuse and multiple episodes of pancreatitis, chronic pancreatitis, with known pseudocyst presents with epigastric abdominal pain. Patient was optimized and had EUS done 8/9. Patient on EUS had a walled-off collection that measured 4.5 x 3.9 cm in largest dimension, looked well circumscribed, heterogeneous, containing debris, consistent with walled-off pancreatic necrosis.  She also had pancreatic parenchymal hypoechogenicity and calcifications in the pancreatic head, body, and tail, suggestive of chronic pancreatitis. There was a reactive appearing, periportal lymph node that looked irregular, homogeneous, well circumscribed, measuring around 1.2 cm in largest dimension. No drainage was warranted. She has some sore throat this morning but feels ok. Ok to advance diet to low fat as tolerated. Pancreatic enzymes with meals and snacks, along with strict ETOH avoidance. Follow up set for patient at the GI clinic (appointment made for 8/24/2021 at 4 pm).    Walled off necrosis/ chronic Pancreatitis/ chronic ETOH use  - EUS as described above, no endoscopic drainage warranted- Full report to follow  - May advance diet to low fat as tolerated  - Creon four times daily   - ETOH avoidance stressed to avoid further damage to the Pancreas or further bouts of Pancreatitis  - Follow up at the GI clinic (appointment made for 8/24/2021 at 4 pm)- Please include in discharge instructions  - Recall [facility] as needed

## 2021-08-10 NOTE — PROGRESS NOTE ADULT - SUBJECTIVE AND OBJECTIVE BOX
S: abdo pain better  chronic neck and back pain  eating some liquid diet      All other pertinent ROS negative.      Vital Signs Last 24 Hrs  T(C): 36.2 (10 Aug 2021 13:07), Max: 36.5 (10 Aug 2021 05:07)  T(F): 97.2 (10 Aug 2021 13:07), Max: 97.7 (10 Aug 2021 05:07)  HR: 63 (10 Aug 2021 13:07) (63 - 88)  BP: 110/66 (10 Aug 2021 13:07) (110/66 - 136/94)  BP(mean): --  RR: 18 (10 Aug 2021 13:07) (18 - 18)  SpO2: --  PHYSICAL EXAM:    Constitutional: NAD, awake and alert, well-developed  HEENT: PERR, EOMI, Normal Hearing, MMM  Neck: Soft and supple, No LAD, No JVD  Respiratory: Breath sounds are clear bilaterally, No wheezing, rales or rhonchi  Cardiovascular: S1 and S2, regular rate and rhythm, no Murmurs, gallops or rubs  Gastrointestinal: Bowel Sounds present, soft, + TTP, nondistended, no guarding, no rebound  Extremities: No peripheral edema      MEDICATIONS:  MEDICATIONS  (STANDING):  enoxaparin Injectable 40 milliGRAM(s) SubCutaneous daily  folic acid 1 milliGRAM(s) Oral daily  gabapentin 100 milliGRAM(s) Oral three times a day  lactated ringers. 1000 milliLiter(s) (75 mL/Hr) IV Continuous <Continuous>  pancrelipase  (CREON 12,000 Lipase Units) 3 Capsule(s) Oral three times a day with meals  pantoprazole    Tablet 40 milliGRAM(s) Oral every 12 hours  thiamine 100 milliGRAM(s) Oral daily      LABS: All Labs Reviewed:                        10.8   4.11  )-----------( 156      ( 10 Aug 2021 07:48 )             31.5     08-10    137  |  99  |  <3<L>  ----------------------------<  100<H>  3.6   |  28  |  0.6<L>    Ca    9.3      10 Aug 2021 07:48  Mg     1.5     08-10    TPro  7.2  /  Alb  4.3  /  TBili  0.6  /  DBili  x   /  AST  156<H>  /  ALT  35  /  AlkPhos  84  08-09          Blood Culture:     Radiology: reviewed

## 2021-08-11 LAB
ANION GAP SERPL CALC-SCNC: 10 MMOL/L — SIGNIFICANT CHANGE UP (ref 7–14)
BASOPHILS # BLD AUTO: 0.03 K/UL — SIGNIFICANT CHANGE UP (ref 0–0.2)
BASOPHILS NFR BLD AUTO: 0.9 % — SIGNIFICANT CHANGE UP (ref 0–1)
BUN SERPL-MCNC: <3 MG/DL — LOW (ref 10–20)
CALCIUM SERPL-MCNC: 8.8 MG/DL — SIGNIFICANT CHANGE UP (ref 8.5–10.1)
CHLORIDE SERPL-SCNC: 102 MMOL/L — SIGNIFICANT CHANGE UP (ref 98–110)
CO2 SERPL-SCNC: 30 MMOL/L — SIGNIFICANT CHANGE UP (ref 17–32)
CREAT SERPL-MCNC: 0.5 MG/DL — LOW (ref 0.7–1.5)
EOSINOPHIL # BLD AUTO: 0.09 K/UL — SIGNIFICANT CHANGE UP (ref 0–0.7)
EOSINOPHIL NFR BLD AUTO: 2.8 % — SIGNIFICANT CHANGE UP (ref 0–8)
GLUCOSE SERPL-MCNC: 82 MG/DL — SIGNIFICANT CHANGE UP (ref 70–99)
HCT VFR BLD CALC: 31.3 % — LOW (ref 37–47)
HGB BLD-MCNC: 10.4 G/DL — LOW (ref 12–16)
IMM GRANULOCYTES NFR BLD AUTO: 0.3 % — SIGNIFICANT CHANGE UP (ref 0.1–0.3)
LYMPHOCYTES # BLD AUTO: 1.16 K/UL — LOW (ref 1.2–3.4)
LYMPHOCYTES # BLD AUTO: 36.1 % — SIGNIFICANT CHANGE UP (ref 20.5–51.1)
MAGNESIUM SERPL-MCNC: 1.7 MG/DL — LOW (ref 1.8–2.4)
MCHC RBC-ENTMCNC: 32.3 PG — HIGH (ref 27–31)
MCHC RBC-ENTMCNC: 33.2 G/DL — SIGNIFICANT CHANGE UP (ref 32–37)
MCV RBC AUTO: 97.2 FL — SIGNIFICANT CHANGE UP (ref 81–99)
MONOCYTES # BLD AUTO: 0.36 K/UL — SIGNIFICANT CHANGE UP (ref 0.1–0.6)
MONOCYTES NFR BLD AUTO: 11.2 % — HIGH (ref 1.7–9.3)
NEUTROPHILS # BLD AUTO: 1.56 K/UL — SIGNIFICANT CHANGE UP (ref 1.4–6.5)
NEUTROPHILS NFR BLD AUTO: 48.7 % — SIGNIFICANT CHANGE UP (ref 42.2–75.2)
NRBC # BLD: 0 /100 WBCS — SIGNIFICANT CHANGE UP (ref 0–0)
PLATELET # BLD AUTO: 136 K/UL — SIGNIFICANT CHANGE UP (ref 130–400)
POTASSIUM SERPL-MCNC: 3.7 MMOL/L — SIGNIFICANT CHANGE UP (ref 3.5–5)
POTASSIUM SERPL-SCNC: 3.7 MMOL/L — SIGNIFICANT CHANGE UP (ref 3.5–5)
RBC # BLD: 3.22 M/UL — LOW (ref 4.2–5.4)
RBC # FLD: 11.2 % — LOW (ref 11.5–14.5)
SODIUM SERPL-SCNC: 142 MMOL/L — SIGNIFICANT CHANGE UP (ref 135–146)
SURGICAL PATHOLOGY STUDY: SIGNIFICANT CHANGE UP
WBC # BLD: 3.21 K/UL — LOW (ref 4.8–10.8)
WBC # FLD AUTO: 3.21 K/UL — LOW (ref 4.8–10.8)

## 2021-08-11 PROCEDURE — 99233 SBSQ HOSP IP/OBS HIGH 50: CPT

## 2021-08-11 RX ORDER — SODIUM CHLORIDE 9 MG/ML
1000 INJECTION, SOLUTION INTRAVENOUS
Refills: 0 | Status: DISCONTINUED | OUTPATIENT
Start: 2021-08-11 | End: 2021-08-13

## 2021-08-11 RX ORDER — POLYETHYLENE GLYCOL 3350 17 G/17G
17 POWDER, FOR SOLUTION ORAL DAILY
Refills: 0 | Status: DISCONTINUED | OUTPATIENT
Start: 2021-08-11 | End: 2021-08-14

## 2021-08-11 RX ORDER — MORPHINE SULFATE 50 MG/1
4 CAPSULE, EXTENDED RELEASE ORAL ONCE
Refills: 0 | Status: DISCONTINUED | OUTPATIENT
Start: 2021-08-11 | End: 2021-08-11

## 2021-08-11 RX ORDER — SENNA PLUS 8.6 MG/1
2 TABLET ORAL AT BEDTIME
Refills: 0 | Status: DISCONTINUED | OUTPATIENT
Start: 2021-08-11 | End: 2021-08-14

## 2021-08-11 RX ORDER — OXYCODONE HYDROCHLORIDE 5 MG/1
10 TABLET ORAL EVERY 6 HOURS
Refills: 0 | Status: DISCONTINUED | OUTPATIENT
Start: 2021-08-11 | End: 2021-08-12

## 2021-08-11 RX ADMIN — Medication 3 CAPSULE(S): at 08:00

## 2021-08-11 RX ADMIN — MORPHINE SULFATE 4 MILLIGRAM(S): 50 CAPSULE, EXTENDED RELEASE ORAL at 01:15

## 2021-08-11 RX ADMIN — OXYCODONE HYDROCHLORIDE 10 MILLIGRAM(S): 5 TABLET ORAL at 17:38

## 2021-08-11 RX ADMIN — Medication 100 MILLIGRAM(S): at 11:33

## 2021-08-11 RX ADMIN — POLYETHYLENE GLYCOL 3350 17 GRAM(S): 17 POWDER, FOR SOLUTION ORAL at 11:36

## 2021-08-11 RX ADMIN — PANTOPRAZOLE SODIUM 40 MILLIGRAM(S): 20 TABLET, DELAYED RELEASE ORAL at 05:36

## 2021-08-11 RX ADMIN — Medication 3 CAPSULE(S): at 17:34

## 2021-08-11 RX ADMIN — GABAPENTIN 100 MILLIGRAM(S): 400 CAPSULE ORAL at 13:03

## 2021-08-11 RX ADMIN — GABAPENTIN 100 MILLIGRAM(S): 400 CAPSULE ORAL at 05:37

## 2021-08-11 RX ADMIN — SODIUM CHLORIDE 100 MILLILITER(S): 9 INJECTION, SOLUTION INTRAVENOUS at 21:28

## 2021-08-11 RX ADMIN — SENNA PLUS 2 TABLET(S): 8.6 TABLET ORAL at 21:29

## 2021-08-11 RX ADMIN — MORPHINE SULFATE 4 MILLIGRAM(S): 50 CAPSULE, EXTENDED RELEASE ORAL at 22:45

## 2021-08-11 RX ADMIN — Medication 3 CAPSULE(S): at 11:33

## 2021-08-11 RX ADMIN — SODIUM CHLORIDE 100 MILLILITER(S): 9 INJECTION, SOLUTION INTRAVENOUS at 17:35

## 2021-08-11 RX ADMIN — ONDANSETRON 4 MILLIGRAM(S): 8 TABLET, FILM COATED ORAL at 05:35

## 2021-08-11 RX ADMIN — OXYCODONE HYDROCHLORIDE 10 MILLIGRAM(S): 5 TABLET ORAL at 21:26

## 2021-08-11 RX ADMIN — MORPHINE SULFATE 8 MILLIGRAM(S): 50 CAPSULE, EXTENDED RELEASE ORAL at 05:35

## 2021-08-11 RX ADMIN — MORPHINE SULFATE 4 MILLIGRAM(S): 50 CAPSULE, EXTENDED RELEASE ORAL at 08:59

## 2021-08-11 RX ADMIN — Medication 1 MILLIGRAM(S): at 11:33

## 2021-08-11 RX ADMIN — GABAPENTIN 100 MILLIGRAM(S): 400 CAPSULE ORAL at 21:28

## 2021-08-11 RX ADMIN — ENOXAPARIN SODIUM 40 MILLIGRAM(S): 100 INJECTION SUBCUTANEOUS at 11:34

## 2021-08-11 RX ADMIN — PANTOPRAZOLE SODIUM 40 MILLIGRAM(S): 20 TABLET, DELAYED RELEASE ORAL at 17:35

## 2021-08-11 RX ADMIN — Medication 30 MILLILITER(S): at 18:49
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
## 2021-08-11 NOTE — PROGRESS NOTE ADULT - SUBJECTIVE AND OBJECTIVE BOX
Patient is a 46y old  Female who presents with a chief complaint of Abdominal Pain (10 Aug 2021 17:47)      Patient seen and examined at bedside.  Patient denies any chest pain or shortness of breath.  Patient endorses abdominal pain radiating to the back as a 9 (on a 1-10 scale)  ALLERGIES:  Compazine (Unknown)    MEDICATIONS:  acetaminophen   Tablet .. 650 milliGRAM(s) Oral every 6 hours PRN  aluminum hydroxide/magnesium hydroxide/simethicone Suspension 30 milliLiter(s) Oral every 4 hours PRN  enoxaparin Injectable 40 milliGRAM(s) SubCutaneous daily  folic acid 1 milliGRAM(s) Oral daily  gabapentin 100 milliGRAM(s) Oral three times a day  lactated ringers. 1000 milliLiter(s) IV Continuous <Continuous>  LORazepam   Injectable 2 milliGRAM(s) IV Push every 2 hours PRN  melatonin 3 milliGRAM(s) Oral at bedtime PRN  ondansetron Injectable 4 milliGRAM(s) IV Push every 4 hours PRN  oxyCODONE    IR 10 milliGRAM(s) Oral every 6 hours PRN  pancrelipase  (CREON 12,000 Lipase Units) 3 Capsule(s) Oral three times a day with meals  pantoprazole    Tablet 40 milliGRAM(s) Oral every 12 hours  polyethylene glycol 3350 17 Gram(s) Oral daily  senna 2 Tablet(s) Oral at bedtime  thiamine 100 milliGRAM(s) Oral daily    Vital Signs Last 24 Hrs  T(F): 97.5 (11 Aug 2021 13:21), Max: 98.3 (11 Aug 2021 04:32)  HR: 86 (11 Aug 2021 13:21) (78 - 86)  BP: 143/83 (11 Aug 2021 13:21) (109/63 - 153/72)  RR: 18 (11 Aug 2021 13:21) (18 - 18)  SpO2: --  I&O's Summary      PHYSICAL EXAM:  General: NAD, A/O x 3  ENT: MMM  Neck: Supple, No JVD  Lungs: Clear to auscultation bilaterally, no crackles or wheezing   Cardio: RRR, S1/S2, No murmurs  Abdomen: Soft, +tender, Nondistended;  Extremities: No cyanosis, No edema    LABS:                        10.4   3.21  )-----------( 136      ( 11 Aug 2021 05:34 )             31.3     08-11    142  |  102  |  <3  ----------------------------<  82  3.7   |  30  |  0.5    Ca    8.8      11 Aug 2021 05:34  Mg     1.7     08-11    TPro  7.2  /  Alb  4.3  /  TBili  0.6  /  DBili  x   /  AST  156  /  ALT  35  /  AlkPhos  84  08-09    eGFR if Non African American: 118 mL/min/1.73M2 (08-11-21 @ 05:34)  eGFR if : 136 mL/min/1.73M2 (08-11-21 @ 05:34)            08-07 Chol 196 mg/dL LDL -- HDL 78 mg/dL Trig 213 mg/dL                      COVID-19 PCR: NotDetec (08-08-21 @ 17:45)  COVID-19 PCR: NotDetec (08-06-21 @ 01:03)  COVID-19 PCR: NotDetec (07-15-21 @ 20:15)      RADIOLOGY & ADDITIONAL TESTS:    Care Discussed with Consultants/Other Providers:
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
## 2021-08-11 NOTE — PROGRESS NOTE ADULT - ASSESSMENT
46 year old female with Past Medical History of ETOH abuse and multiple episodes of pancreatitis with known pseudocyst (with possible communication with stomach) presents with epigastric abdominal pain. CT abd: CT abdomen: enlarging pseudocyst now 5.9x5.9x5.4 (walled off necrosis with mass effect on the stomach) compared to 2.2x2.1x1.8 on earlier imaging. EUS done 0n 08/09 with no drainage.     # Acute on Chronic Pancreatitis 2/2 Alcohol use with enlarging pseudocyst on CT  - Lipase 150, lactate 6, afebrile, WBC 5k, tachycardic on admission   - CT abdomen: enlarging pseudocyst now` 5.9x5.9x5.4 (walled off necrosis with mass effect on the stomach) compared to 2.2x2.1x1.8 on earlier imaging  - EUS with axios stent drainage 08/09: No drainage, can followup out pt with GI with repeat CT in 6 weeks per [facility]  - Pain control: Morphine IV stopped today 8/11 and converted to PO oxycodone  - c/w Protonix twice daily  - c/w Creon [id number] three times a day  - c/w Gabapentin 100 three times a day for chronic pancreatitis (was not taking at home)  - Advanced diet to dash regular diet  - IVF increased to 100cc/hr on 8/11  GI has cleared for discharge.    # Hypokalemia - stable  repleted    # Lactic Acidosis - resolved       # H/O Alcohol Abuse  - c/w folic acid 1mg OD   - c/w thiamine 100mG OD   - Peth level pending     # Thiamine and folic acid deficiencies, suspected  - c/w supplements    DVT: Lovenox  GI: Protonix  Activity as tolerated  DC when pain is tolerable without pain meds

## 2021-08-12 LAB
ANION GAP SERPL CALC-SCNC: 13 MMOL/L — SIGNIFICANT CHANGE UP (ref 7–14)
BASOPHILS # BLD AUTO: 0.03 K/UL — SIGNIFICANT CHANGE UP (ref 0–0.2)
BASOPHILS NFR BLD AUTO: 1 % — SIGNIFICANT CHANGE UP (ref 0–1)
BUN SERPL-MCNC: 4 MG/DL — LOW (ref 10–20)
CALCIUM SERPL-MCNC: 8.8 MG/DL — SIGNIFICANT CHANGE UP (ref 8.5–10.1)
CHLORIDE SERPL-SCNC: 100 MMOL/L — SIGNIFICANT CHANGE UP (ref 98–110)
CO2 SERPL-SCNC: 25 MMOL/L — SIGNIFICANT CHANGE UP (ref 17–32)
CREAT SERPL-MCNC: 0.7 MG/DL — SIGNIFICANT CHANGE UP (ref 0.7–1.5)
EOSINOPHIL # BLD AUTO: 0.07 K/UL — SIGNIFICANT CHANGE UP (ref 0–0.7)
EOSINOPHIL NFR BLD AUTO: 2.4 % — SIGNIFICANT CHANGE UP (ref 0–8)
GLUCOSE SERPL-MCNC: 108 MG/DL — HIGH (ref 70–99)
HCT VFR BLD CALC: 29.9 % — LOW (ref 37–47)
HGB BLD-MCNC: 10.3 G/DL — LOW (ref 12–16)
IMM GRANULOCYTES NFR BLD AUTO: 0.3 % — SIGNIFICANT CHANGE UP (ref 0.1–0.3)
LYMPHOCYTES # BLD AUTO: 0.97 K/UL — LOW (ref 1.2–3.4)
LYMPHOCYTES # BLD AUTO: 32.8 % — SIGNIFICANT CHANGE UP (ref 20.5–51.1)
MAGNESIUM SERPL-MCNC: 1.6 MG/DL — LOW (ref 1.8–2.4)
MCHC RBC-ENTMCNC: 31.8 PG — HIGH (ref 27–31)
MCHC RBC-ENTMCNC: 34.4 G/DL — SIGNIFICANT CHANGE UP (ref 32–37)
MCV RBC AUTO: 92.3 FL — SIGNIFICANT CHANGE UP (ref 81–99)
MONOCYTES # BLD AUTO: 0.41 K/UL — SIGNIFICANT CHANGE UP (ref 0.1–0.6)
MONOCYTES NFR BLD AUTO: 13.9 % — HIGH (ref 1.7–9.3)
NEUTROPHILS # BLD AUTO: 1.47 K/UL — SIGNIFICANT CHANGE UP (ref 1.4–6.5)
NEUTROPHILS NFR BLD AUTO: 49.6 % — SIGNIFICANT CHANGE UP (ref 42.2–75.2)
NRBC # BLD: 0 /100 WBCS — SIGNIFICANT CHANGE UP (ref 0–0)
PLATELET # BLD AUTO: 149 K/UL — SIGNIFICANT CHANGE UP (ref 130–400)
POTASSIUM SERPL-MCNC: 3.4 MMOL/L — LOW (ref 3.5–5)
POTASSIUM SERPL-SCNC: 3.4 MMOL/L — LOW (ref 3.5–5)
RBC # BLD: 3.24 M/UL — LOW (ref 4.2–5.4)
RBC # FLD: 11 % — LOW (ref 11.5–14.5)
SODIUM SERPL-SCNC: 138 MMOL/L — SIGNIFICANT CHANGE UP (ref 135–146)
WBC # BLD: 2.96 K/UL — LOW (ref 4.8–10.8)
WBC # FLD AUTO: 2.96 K/UL — LOW (ref 4.8–10.8)

## 2021-08-12 PROCEDURE — 99232 SBSQ HOSP IP/OBS MODERATE 35: CPT

## 2021-08-12 RX ORDER — POTASSIUM CHLORIDE 20 MEQ
40 PACKET (EA) ORAL ONCE
Refills: 0 | Status: COMPLETED | OUTPATIENT
Start: 2021-08-12 | End: 2021-08-12

## 2021-08-12 RX ORDER — POTASSIUM CHLORIDE 20 MEQ
20 PACKET (EA) ORAL ONCE
Refills: 0 | Status: COMPLETED | OUTPATIENT
Start: 2021-08-12 | End: 2021-08-12

## 2021-08-12 RX ORDER — MAGNESIUM SULFATE 500 MG/ML
2 VIAL (ML) INJECTION ONCE
Refills: 0 | Status: COMPLETED | OUTPATIENT
Start: 2021-08-12 | End: 2021-08-12

## 2021-08-12 RX ORDER — MORPHINE SULFATE 50 MG/1
2 CAPSULE, EXTENDED RELEASE ORAL EVERY 4 HOURS
Refills: 0 | Status: DISCONTINUED | OUTPATIENT
Start: 2021-08-12 | End: 2021-08-13

## 2021-08-12 RX ADMIN — GABAPENTIN 100 MILLIGRAM(S): 400 CAPSULE ORAL at 05:08

## 2021-08-12 RX ADMIN — GABAPENTIN 100 MILLIGRAM(S): 400 CAPSULE ORAL at 21:44

## 2021-08-12 RX ADMIN — Medication 50 MILLIEQUIVALENT(S): at 17:15

## 2021-08-12 RX ADMIN — ONDANSETRON 4 MILLIGRAM(S): 8 TABLET, FILM COATED ORAL at 17:14

## 2021-08-12 RX ADMIN — MORPHINE SULFATE 2 MILLIGRAM(S): 50 CAPSULE, EXTENDED RELEASE ORAL at 23:06

## 2021-08-12 RX ADMIN — MORPHINE SULFATE 2 MILLIGRAM(S): 50 CAPSULE, EXTENDED RELEASE ORAL at 14:42

## 2021-08-12 RX ADMIN — Medication 50 GRAM(S): at 12:16

## 2021-08-12 RX ADMIN — ONDANSETRON 4 MILLIGRAM(S): 8 TABLET, FILM COATED ORAL at 10:46

## 2021-08-12 RX ADMIN — ENOXAPARIN SODIUM 40 MILLIGRAM(S): 100 INJECTION SUBCUTANEOUS at 11:09

## 2021-08-12 RX ADMIN — GABAPENTIN 100 MILLIGRAM(S): 400 CAPSULE ORAL at 13:23

## 2021-08-12 RX ADMIN — POLYETHYLENE GLYCOL 3350 17 GRAM(S): 17 POWDER, FOR SOLUTION ORAL at 11:09

## 2021-08-12 RX ADMIN — Medication 3 CAPSULE(S): at 08:39

## 2021-08-12 RX ADMIN — Medication 3 CAPSULE(S): at 17:13

## 2021-08-12 RX ADMIN — MORPHINE SULFATE 2 MILLIGRAM(S): 50 CAPSULE, EXTENDED RELEASE ORAL at 19:22

## 2021-08-12 RX ADMIN — Medication 1 MILLIGRAM(S): at 11:08

## 2021-08-12 RX ADMIN — Medication 100 MILLIGRAM(S): at 11:08

## 2021-08-12 RX ADMIN — Medication 50 MILLIEQUIVALENT(S): at 21:47

## 2021-08-12 RX ADMIN — Medication 3 CAPSULE(S): at 11:10

## 2021-08-12 RX ADMIN — OXYCODONE HYDROCHLORIDE 10 MILLIGRAM(S): 5 TABLET ORAL at 05:08

## 2021-08-12 RX ADMIN — SENNA PLUS 2 TABLET(S): 8.6 TABLET ORAL at 21:44

## 2021-08-12 RX ADMIN — MORPHINE SULFATE 2 MILLIGRAM(S): 50 CAPSULE, EXTENDED RELEASE ORAL at 18:45

## 2021-08-12 RX ADMIN — PANTOPRAZOLE SODIUM 40 MILLIGRAM(S): 20 TABLET, DELAYED RELEASE ORAL at 05:08

## 2021-08-12 RX ADMIN — PANTOPRAZOLE SODIUM 40 MILLIGRAM(S): 20 TABLET, DELAYED RELEASE ORAL at 17:13

## 2021-08-12 NOTE — PROGRESS NOTE ADULT - ASSESSMENT
46 year old female with Past Medical History of ETOH abuse and multiple episodes of pancreatitis with known pseudocyst (with possible communication with stomach) presents with epigastric abdominal pain. CT abd: CT abdomen: enlarging pseudocyst now 5.9x5.9x5.4 (walled off necrosis with mass effect on the stomach) compared to 2.2x2.1x1.8 on earlier imaging. EUS done 0n 08/09 with no drainage.     # Acute on Chronic Pancreatitis 2/2 Alcohol use with enlarging pseudocyst on CT  - Lipase 150, lactate 6, afebrile, WBC 5k, tachycardic on admission   - CT abdomen: enlarging pseudocyst now` 5.9x5.9x5.4 (walled off necrosis with mass effect on the stomach) compared to 2.2x2.1x1.8 on earlier imaging  - EUS with axios stent drainage 08/09: No drainage, can followup out pt with GI with repeat CT in 6 weeks per [facility]  - Pain control: Morphine IV stopped today 8/11 and converted to PO oxycodone, reconverted to IV morphine and chronic pain consulted 8/12  - c/w Protonix twice daily  - c/w Creon [id number] three times a day  - c/w Gabapentin 100 three times a day for chronic pancreatitis (was not taking at home)  - Advanced diet to dash regular diet  - IVF increased to 100cc/hr on 8/11  GI has cleared for discharge.    # Hypokalemia - stable  repleted again 8/12    # Hypomagnesium  - repleted, follow level     # Lactic Acidosis - resolved       # H/O Alcohol Abuse  - c/w folic acid 1mg OD   - c/w thiamine 100mG OD   - Peth level pending     # Thiamine and folic acid deficiencies, suspected  - c/w supplements    DVT: Lovenox  GI: Protonix  Activity as tolerated  DC when pain is tolerable without pain meds

## 2021-08-12 NOTE — PROGRESS NOTE ADULT - SUBJECTIVE AND OBJECTIVE BOX
Patient is a 46y old  Female who presents with a chief complaint of Abdominal Pain (12 Aug 2021 13:44)      Patient seen and examined at bedside.  Patient reports her pain in neck, lower back and abd is 9/10 and she feels that the oral pain meds do not help her at all.   ALLERGIES:  Compazine (Unknown)    MEDICATIONS:  acetaminophen   Tablet .. 650 milliGRAM(s) Oral every 6 hours PRN  aluminum hydroxide/magnesium hydroxide/simethicone Suspension 30 milliLiter(s) Oral every 4 hours PRN  enoxaparin Injectable 40 milliGRAM(s) SubCutaneous daily  folic acid 1 milliGRAM(s) Oral daily  gabapentin 100 milliGRAM(s) Oral three times a day  lactated ringers. 1000 milliLiter(s) IV Continuous <Continuous>  LORazepam   Injectable 2 milliGRAM(s) IV Push every 2 hours PRN  melatonin 3 milliGRAM(s) Oral at bedtime PRN  morphine  - Injectable 2 milliGRAM(s) IV Push every 4 hours PRN  ondansetron Injectable 4 milliGRAM(s) IV Push every 4 hours PRN  pancrelipase  (CREON 12,000 Lipase Units) 3 Capsule(s) Oral three times a day with meals  pantoprazole    Tablet 40 milliGRAM(s) Oral every 12 hours  polyethylene glycol 3350 17 Gram(s) Oral daily  potassium chloride  20 mEq/100 mL IVPB 20 milliEquivalent(s) IV Intermittent once  potassium chloride  20 mEq/100 mL IVPB 20 milliEquivalent(s) IV Intermittent once  senna 2 Tablet(s) Oral at bedtime  thiamine 100 milliGRAM(s) Oral daily    Vital Signs Last 24 Hrs  T(F): 96.7 (12 Aug 2021 12:37), Max: 98.2 (12 Aug 2021 05:41)  HR: 73 (12 Aug 2021 12:37) (73 - 95)  BP: 156/94 (12 Aug 2021 12:37) (135/88 - 156/94)  RR: 18 (12 Aug 2021 12:37) (18 - 18)  SpO2: --  I&O's Summary      PHYSICAL EXAM:  General: NAD, A/O x 3  ENT: MMM  Neck: Supple, No JVD  Lungs: Clear to auscultation bilaterally  Cardio: RRR, S1/S2, No murmurs  Abdomen: Soft, +tender, Nondistended; Bowel sounds present  Extremities: No cyanosis, No edema    LABS:                        10.3   2.96  )-----------( 149      ( 12 Aug 2021 05:46 )             29.9     08-12    138  |  100  |  4   ----------------------------<  108  3.4   |  25  |  0.7    Ca    8.8      12 Aug 2021 05:46  Mg     1.6     08-12      eGFR if Non African American: 105 mL/min/1.73M2 (08-12-21 @ 05:46)  eGFR if African American: 122 mL/min/1.73M2 (08-12-21 @ 05:46)            08-07 Chol 196 mg/dL LDL -- HDL 78 mg/dL Trig 213 mg/dL                      COVID-19 PCR: NotDetec (08-08-21 @ 17:45)  COVID-19 PCR: NotDetec (08-06-21 @ 01:03)  COVID-19 PCR: NotDetec (07-15-21 @ 20:15)      RADIOLOGY & ADDITIONAL TESTS:    Care Discussed with Consultants/Other Providers:
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
## 2021-08-13 LAB
ANION GAP SERPL CALC-SCNC: 15 MMOL/L — HIGH (ref 7–14)
BASOPHILS # BLD AUTO: 0.03 K/UL — SIGNIFICANT CHANGE UP (ref 0–0.2)
BASOPHILS NFR BLD AUTO: 0.8 % — SIGNIFICANT CHANGE UP (ref 0–1)
BUN SERPL-MCNC: 4 MG/DL — LOW (ref 10–20)
CALCIUM SERPL-MCNC: 9.4 MG/DL — SIGNIFICANT CHANGE UP (ref 8.5–10.1)
CHLORIDE SERPL-SCNC: 102 MMOL/L — SIGNIFICANT CHANGE UP (ref 98–110)
CO2 SERPL-SCNC: 21 MMOL/L — SIGNIFICANT CHANGE UP (ref 17–32)
CREAT SERPL-MCNC: 0.7 MG/DL — SIGNIFICANT CHANGE UP (ref 0.7–1.5)
EOSINOPHIL # BLD AUTO: 0.07 K/UL — SIGNIFICANT CHANGE UP (ref 0–0.7)
EOSINOPHIL NFR BLD AUTO: 1.9 % — SIGNIFICANT CHANGE UP (ref 0–8)
GLUCOSE SERPL-MCNC: 106 MG/DL — HIGH (ref 70–99)
HCT VFR BLD CALC: 38.5 % — SIGNIFICANT CHANGE UP (ref 37–47)
HGB BLD-MCNC: 13 G/DL — SIGNIFICANT CHANGE UP (ref 12–16)
IMM GRANULOCYTES NFR BLD AUTO: 0.3 % — SIGNIFICANT CHANGE UP (ref 0.1–0.3)
LIDOCAIN IGE QN: 37 U/L — SIGNIFICANT CHANGE UP (ref 7–60)
LYMPHOCYTES # BLD AUTO: 0.9 K/UL — LOW (ref 1.2–3.4)
LYMPHOCYTES # BLD AUTO: 24.8 % — SIGNIFICANT CHANGE UP (ref 20.5–51.1)
MAGNESIUM SERPL-MCNC: 2 MG/DL — SIGNIFICANT CHANGE UP (ref 1.8–2.4)
MCHC RBC-ENTMCNC: 32.3 PG — HIGH (ref 27–31)
MCHC RBC-ENTMCNC: 33.8 G/DL — SIGNIFICANT CHANGE UP (ref 32–37)
MCV RBC AUTO: 95.5 FL — SIGNIFICANT CHANGE UP (ref 81–99)
MONOCYTES # BLD AUTO: 0.53 K/UL — SIGNIFICANT CHANGE UP (ref 0.1–0.6)
MONOCYTES NFR BLD AUTO: 14.6 % — HIGH (ref 1.7–9.3)
NEUTROPHILS # BLD AUTO: 2.09 K/UL — SIGNIFICANT CHANGE UP (ref 1.4–6.5)
NEUTROPHILS NFR BLD AUTO: 57.6 % — SIGNIFICANT CHANGE UP (ref 42.2–75.2)
NRBC # BLD: 0 /100 WBCS — SIGNIFICANT CHANGE UP (ref 0–0)
PLATELET # BLD AUTO: 165 K/UL — SIGNIFICANT CHANGE UP (ref 130–400)
POTASSIUM SERPL-MCNC: 4.6 MMOL/L — SIGNIFICANT CHANGE UP (ref 3.5–5)
POTASSIUM SERPL-SCNC: 4.6 MMOL/L — SIGNIFICANT CHANGE UP (ref 3.5–5)
RBC # BLD: 4.03 M/UL — LOW (ref 4.2–5.4)
RBC # FLD: 10.9 % — LOW (ref 11.5–14.5)
SODIUM SERPL-SCNC: 138 MMOL/L — SIGNIFICANT CHANGE UP (ref 135–146)
WBC # BLD: 3.63 K/UL — LOW (ref 4.8–10.8)
WBC # FLD AUTO: 3.63 K/UL — LOW (ref 4.8–10.8)

## 2021-08-13 PROCEDURE — 72110 X-RAY EXAM L-2 SPINE 4/>VWS: CPT | Mod: 26

## 2021-08-13 PROCEDURE — 99232 SBSQ HOSP IP/OBS MODERATE 35: CPT

## 2021-08-13 RX ORDER — MORPHINE SULFATE 50 MG/1
2 CAPSULE, EXTENDED RELEASE ORAL EVERY 8 HOURS
Refills: 0 | Status: DISCONTINUED | OUTPATIENT
Start: 2021-08-13 | End: 2021-08-14

## 2021-08-13 RX ORDER — LACTULOSE 10 G/15ML
10 SOLUTION ORAL ONCE
Refills: 0 | Status: COMPLETED | OUTPATIENT
Start: 2021-08-13 | End: 2021-08-13

## 2021-08-13 RX ORDER — LIDOCAINE 4 G/100G
2 CREAM TOPICAL DAILY
Refills: 0 | Status: DISCONTINUED | OUTPATIENT
Start: 2021-08-13 | End: 2021-08-14

## 2021-08-13 RX ORDER — SODIUM CHLORIDE 9 MG/ML
1000 INJECTION, SOLUTION INTRAVENOUS
Refills: 0 | Status: DISCONTINUED | OUTPATIENT
Start: 2021-08-13 | End: 2021-08-14

## 2021-08-13 RX ADMIN — LIDOCAINE 2 PATCH: 4 CREAM TOPICAL at 12:10

## 2021-08-13 RX ADMIN — MORPHINE SULFATE 2 MILLIGRAM(S): 50 CAPSULE, EXTENDED RELEASE ORAL at 12:09

## 2021-08-13 RX ADMIN — SENNA PLUS 2 TABLET(S): 8.6 TABLET ORAL at 22:20

## 2021-08-13 RX ADMIN — SODIUM CHLORIDE 50 MILLILITER(S): 9 INJECTION, SOLUTION INTRAVENOUS at 22:22

## 2021-08-13 RX ADMIN — Medication 3 CAPSULE(S): at 12:09

## 2021-08-13 RX ADMIN — PANTOPRAZOLE SODIUM 40 MILLIGRAM(S): 20 TABLET, DELAYED RELEASE ORAL at 05:46

## 2021-08-13 RX ADMIN — POLYETHYLENE GLYCOL 3350 17 GRAM(S): 17 POWDER, FOR SOLUTION ORAL at 12:10

## 2021-08-13 RX ADMIN — Medication 1 MILLIGRAM(S): at 12:09

## 2021-08-13 RX ADMIN — Medication 100 MILLIGRAM(S): at 12:09

## 2021-08-13 RX ADMIN — ENOXAPARIN SODIUM 40 MILLIGRAM(S): 100 INJECTION SUBCUTANEOUS at 12:09

## 2021-08-13 RX ADMIN — MORPHINE SULFATE 2 MILLIGRAM(S): 50 CAPSULE, EXTENDED RELEASE ORAL at 17:04

## 2021-08-13 RX ADMIN — PANTOPRAZOLE SODIUM 40 MILLIGRAM(S): 20 TABLET, DELAYED RELEASE ORAL at 17:05

## 2021-08-13 RX ADMIN — LACTULOSE 10 GRAM(S): 10 SOLUTION ORAL at 18:49

## 2021-08-13 RX ADMIN — ONDANSETRON 4 MILLIGRAM(S): 8 TABLET, FILM COATED ORAL at 22:19

## 2021-08-13 RX ADMIN — MORPHINE SULFATE 2 MILLIGRAM(S): 50 CAPSULE, EXTENDED RELEASE ORAL at 08:07

## 2021-08-13 RX ADMIN — GABAPENTIN 100 MILLIGRAM(S): 400 CAPSULE ORAL at 16:40

## 2021-08-13 RX ADMIN — Medication 3 CAPSULE(S): at 17:05

## 2021-08-13 RX ADMIN — Medication 3 CAPSULE(S): at 07:53

## 2021-08-13 RX ADMIN — GABAPENTIN 100 MILLIGRAM(S): 400 CAPSULE ORAL at 22:20

## 2021-08-13 RX ADMIN — MORPHINE SULFATE 2 MILLIGRAM(S): 50 CAPSULE, EXTENDED RELEASE ORAL at 03:10

## 2021-08-13 RX ADMIN — GABAPENTIN 100 MILLIGRAM(S): 400 CAPSULE ORAL at 05:47

## 2021-08-13 NOTE — PROGRESS NOTE ADULT - PROVIDER SPECIALTY LIST ADULT
Internal Medicine
Hospitalist
Internal Medicine
Internal Medicine
Surgery
Gastroenterology
Hospitalist
Internal Medicine
Surgery
Surgery
Internal Medicine
Internal Medicine
Surgery
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
## 2021-08-13 NOTE — PROGRESS NOTE ADULT - SUBJECTIVE AND OBJECTIVE BOX
Patient is a 46y old  Female who presents with a chief complaint of Abdominal Pain (13 Aug 2021 13:01)      Patient seen and examined at bedside.  Patient denies any chest pain or shortness of breath but reports that upper back, abd and lower back continues with pain   ALLERGIES:  Compazine (Unknown)    MEDICATIONS:  acetaminophen   Tablet .. 650 milliGRAM(s) Oral every 6 hours PRN  aluminum hydroxide/magnesium hydroxide/simethicone Suspension 30 milliLiter(s) Oral every 4 hours PRN  enoxaparin Injectable 40 milliGRAM(s) SubCutaneous daily  folic acid 1 milliGRAM(s) Oral daily  gabapentin 100 milliGRAM(s) Oral three times a day  lactated ringers. 1000 milliLiter(s) IV Continuous <Continuous>  lidocaine   4% Patch 2 Patch Transdermal daily  LORazepam   Injectable 2 milliGRAM(s) IV Push every 2 hours PRN  melatonin 3 milliGRAM(s) Oral at bedtime PRN  morphine  - Injectable 2 milliGRAM(s) IV Push every 4 hours PRN  ondansetron Injectable 4 milliGRAM(s) IV Push every 4 hours PRN  pancrelipase  (CREON 12,000 Lipase Units) 3 Capsule(s) Oral three times a day with meals  pantoprazole    Tablet 40 milliGRAM(s) Oral every 12 hours  polyethylene glycol 3350 17 Gram(s) Oral daily  senna 2 Tablet(s) Oral at bedtime  thiamine 100 milliGRAM(s) Oral daily    Vital Signs Last 24 Hrs  T(F): 98.5 (13 Aug 2021 12:31), Max: 98.5 (13 Aug 2021 12:31)  HR: 87 (13 Aug 2021 12:31) (83 - 87)  BP: 147/99 (13 Aug 2021 12:31) (137/75 - 169/104)  RR: 20 (13 Aug 2021 12:31) (18 - 20)  SpO2: --  I&O's Summary      PHYSICAL EXAM:  General: NAD, A/O x 3  ENT: MMM  Neck: Supple, No JVD  Lungs: Clear to auscultation bilaterally  Cardio: RRR, S1/S2, No murmurs  Abdomen: Soft, +tender, Nondistended; Bowel sounds present  Extremities: No cyanosis, No edema    LABS:                        13.0   3.63  )-----------( 165      ( 13 Aug 2021 05:16 )             38.5     08-13    138  |  102  |  4   ----------------------------<  106  4.6   |  21  |  0.7    Ca    9.4      13 Aug 2021 05:16  Mg     2.0     08-13      eGFR if Non African American: 105 mL/min/1.73M2 (08-13-21 @ 05:16)  eGFR if African American: 122 mL/min/1.73M2 (08-13-21 @ 05:16)            08-07 Chol 196 mg/dL LDL -- HDL 78 mg/dL Trig 213 mg/dL                      COVID-19 PCR: NotDetec (08-08-21 @ 17:45)  COVID-19 PCR: NotDetec (08-06-21 @ 01:03)  COVID-19 PCR: NotDetec (07-15-21 @ 20:15)      RADIOLOGY & ADDITIONAL TESTS:    Care Discussed with Consultants/Other Providers:

## 2021-08-13 NOTE — PROGRESS NOTE ADULT - ASSESSMENT
46 year old female with Past Medical History of ETOH abuse and multiple episodes of pancreatitis with known pseudocyst (with possible communication with stomach) presents with epigastric abdominal pain. CT abd: CT abdomen: enlarging pseudocyst now 5.9x5.9x5.4 (walled off necrosis with mass effect on the stomach) compared to 2.2x2.1x1.8 on earlier imaging. EUS done 0n 08/09 with no drainage.     # Acute on Chronic Pancreatitis 2/2 Alcohol use with enlarging pseudocyst on CT  - Lipase 150, lactate 6, afebrile, WBC 5k, tachycardic on admission   - 8/13 lipase has trended down to 37  - CT abdomen: enlarging pseudocyst now` 5.9x5.9x5.4 (walled off necrosis with mass effect on the stomach) compared to 2.2x2.1x1.8 on earlier imaging  - EUS with axios stent drainage 08/09: No drainage, can followup out pt with GI with repeat CT in 6 weeks per [facility]  - Pain control: Morphine IV stopped today 8/11 and converted to PO oxycodone, reconverted to IV morphine and chronic pain consulted 8/12  - c/w Protonix twice daily  - c/w Creon 54745 three times a day  - c/w Gabapentin 100 three times a day for chronic pancreatitis (was not taking at home)  - Advanced diet to dash regular diet  - IVF decreased to 50cc/hr on 8/13  GI has cleared for discharge.  - Pain consult recs are prending   - reducing IV morphine 2mg from q6hrs to q8hrs 8/13    # Hypokalemia - stable  repleted again 8/12    # Hypomagnesium  - repleted, follow level     # Lactic Acidosis - resolved       # H/O Alcohol Abuse  - c/w folic acid 1mg OD   - c/w thiamine 100mG OD   - Peth level pending     # Thiamine and folic acid deficiencies, suspected  - c/w supplements    DVT: Lovenox  GI: Protonix  Activity as tolerated  DC when pain is tolerable without pain meds
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
## 2021-08-14 ENCOUNTER — TRANSCRIPTION ENCOUNTER (OUTPATIENT)
Age: 46
End: 2021-08-14

## 2021-08-14 VITALS
SYSTOLIC BLOOD PRESSURE: 141 MMHG | DIASTOLIC BLOOD PRESSURE: 76 MMHG | RESPIRATION RATE: 26 BRPM | TEMPERATURE: 98 F | HEART RATE: 91 BPM

## 2021-08-14 LAB
ALBUMIN SERPL ELPH-MCNC: 4 G/DL — SIGNIFICANT CHANGE UP (ref 3.5–5.2)
ALP SERPL-CCNC: 102 U/L — SIGNIFICANT CHANGE UP (ref 30–115)
ALT FLD-CCNC: 61 U/L — HIGH (ref 0–41)
ANION GAP SERPL CALC-SCNC: 11 MMOL/L — SIGNIFICANT CHANGE UP (ref 7–14)
AST SERPL-CCNC: 133 U/L — HIGH (ref 0–41)
BASOPHILS # BLD AUTO: 0.03 K/UL — SIGNIFICANT CHANGE UP (ref 0–0.2)
BASOPHILS NFR BLD AUTO: 0.8 % — SIGNIFICANT CHANGE UP (ref 0–1)
BILIRUB SERPL-MCNC: 0.4 MG/DL — SIGNIFICANT CHANGE UP (ref 0.2–1.2)
BUN SERPL-MCNC: 4 MG/DL — LOW (ref 10–20)
CALCIUM SERPL-MCNC: 9 MG/DL — SIGNIFICANT CHANGE UP (ref 8.5–10.1)
CHLORIDE SERPL-SCNC: 104 MMOL/L — SIGNIFICANT CHANGE UP (ref 98–110)
CO2 SERPL-SCNC: 23 MMOL/L — SIGNIFICANT CHANGE UP (ref 17–32)
CREAT SERPL-MCNC: 0.7 MG/DL — SIGNIFICANT CHANGE UP (ref 0.7–1.5)
EOSINOPHIL # BLD AUTO: 0.09 K/UL — SIGNIFICANT CHANGE UP (ref 0–0.7)
EOSINOPHIL NFR BLD AUTO: 2.3 % — SIGNIFICANT CHANGE UP (ref 0–8)
GLUCOSE SERPL-MCNC: 97 MG/DL — SIGNIFICANT CHANGE UP (ref 70–99)
HCT VFR BLD CALC: 34.9 % — LOW (ref 37–47)
HGB BLD-MCNC: 11.8 G/DL — LOW (ref 12–16)
IMM GRANULOCYTES NFR BLD AUTO: 0.3 % — SIGNIFICANT CHANGE UP (ref 0.1–0.3)
LYMPHOCYTES # BLD AUTO: 1.05 K/UL — LOW (ref 1.2–3.4)
LYMPHOCYTES # BLD AUTO: 26.7 % — SIGNIFICANT CHANGE UP (ref 20.5–51.1)
MAGNESIUM SERPL-MCNC: 1.9 MG/DL — SIGNIFICANT CHANGE UP (ref 1.8–2.4)
MCHC RBC-ENTMCNC: 32.2 PG — HIGH (ref 27–31)
MCHC RBC-ENTMCNC: 33.8 G/DL — SIGNIFICANT CHANGE UP (ref 32–37)
MCV RBC AUTO: 95.4 FL — SIGNIFICANT CHANGE UP (ref 81–99)
MONOCYTES # BLD AUTO: 0.6 K/UL — SIGNIFICANT CHANGE UP (ref 0.1–0.6)
MONOCYTES NFR BLD AUTO: 15.3 % — HIGH (ref 1.7–9.3)
NEUTROPHILS # BLD AUTO: 2.15 K/UL — SIGNIFICANT CHANGE UP (ref 1.4–6.5)
NEUTROPHILS NFR BLD AUTO: 54.6 % — SIGNIFICANT CHANGE UP (ref 42.2–75.2)
NRBC # BLD: 0 /100 WBCS — SIGNIFICANT CHANGE UP (ref 0–0)
PLATELET # BLD AUTO: 214 K/UL — SIGNIFICANT CHANGE UP (ref 130–400)
POTASSIUM SERPL-MCNC: 3.9 MMOL/L — SIGNIFICANT CHANGE UP (ref 3.5–5)
POTASSIUM SERPL-SCNC: 3.9 MMOL/L — SIGNIFICANT CHANGE UP (ref 3.5–5)
PROT SERPL-MCNC: 6.6 G/DL — SIGNIFICANT CHANGE UP (ref 6–8)
RBC # BLD: 3.66 M/UL — LOW (ref 4.2–5.4)
RBC # FLD: 11.1 % — LOW (ref 11.5–14.5)
SODIUM SERPL-SCNC: 138 MMOL/L — SIGNIFICANT CHANGE UP (ref 135–146)
WBC # BLD: 3.93 K/UL — LOW (ref 4.8–10.8)
WBC # FLD AUTO: 3.93 K/UL — LOW (ref 4.8–10.8)

## 2021-08-14 RX ORDER — CYCLOBENZAPRINE HYDROCHLORIDE 10 MG/1
10 TABLET, FILM COATED ORAL
Refills: 0 | Status: DISCONTINUED | OUTPATIENT
Start: 2021-08-14 | End: 2021-08-14

## 2021-08-14 RX ORDER — CYCLOBENZAPRINE HYDROCHLORIDE 10 MG/1
1 TABLET, FILM COATED ORAL
Qty: 30 | Refills: 0
Start: 2021-08-14 | End: 2021-09-12

## 2021-08-14 RX ADMIN — Medication 1 MILLIGRAM(S): at 11:10

## 2021-08-14 RX ADMIN — ONDANSETRON 4 MILLIGRAM(S): 8 TABLET, FILM COATED ORAL at 13:17

## 2021-08-14 RX ADMIN — Medication 3 CAPSULE(S): at 13:17

## 2021-08-14 RX ADMIN — GABAPENTIN 100 MILLIGRAM(S): 400 CAPSULE ORAL at 13:17

## 2021-08-14 RX ADMIN — Medication 10 MILLIGRAM(S): at 15:19

## 2021-08-14 RX ADMIN — PANTOPRAZOLE SODIUM 40 MILLIGRAM(S): 20 TABLET, DELAYED RELEASE ORAL at 06:51

## 2021-08-14 RX ADMIN — MORPHINE SULFATE 2 MILLIGRAM(S): 50 CAPSULE, EXTENDED RELEASE ORAL at 01:23

## 2021-08-14 RX ADMIN — GABAPENTIN 100 MILLIGRAM(S): 400 CAPSULE ORAL at 06:50

## 2021-08-14 RX ADMIN — POLYETHYLENE GLYCOL 3350 17 GRAM(S): 17 POWDER, FOR SOLUTION ORAL at 11:12

## 2021-08-14 RX ADMIN — Medication 3 CAPSULE(S): at 08:34

## 2021-08-14 RX ADMIN — ENOXAPARIN SODIUM 40 MILLIGRAM(S): 100 INJECTION SUBCUTANEOUS at 11:10

## 2021-08-14 RX ADMIN — Medication 100 MILLIGRAM(S): at 11:12

## 2021-08-14 RX ADMIN — LIDOCAINE 2 PATCH: 4 CREAM TOPICAL at 11:11

## 2021-08-14 NOTE — DISCHARGE NOTE NURSING/CASE MANAGEMENT/SOCIAL WORK - NSDCVIVACCINE_GEN_ALL_CORE_FT
influenza, injectable, quadrivalent, preservative free; 12-Oct-2019 14:23; Celsa Martins (RN); [employer]; [id number] (Exp. Date: 30-Jun-2020); IntraMuscular; Deltoid Left.; 0.5 milliLiter(s); VIS (VIS Published: 15-Aug-2019, VIS Presented: 12-Oct-2019);   influenza, injectable, quadrivalent, preservative free; 10-Sep-2020 12:19; Shani Fleming (RN); [employer]; [id number] (Exp. Date: 30-Jun-2021); IntraMuscular; Deltoid Left.; 0.5 milliLiter(s); VIS (VIS Published: 15-Aug-2019, VIS Presented: 10-Sep-2020);   Tdap; 01-Sep-2014 17:06; Cheryl Cotto (MELINDA); [employer]; [id number]; IntraMuscular; Deltoid Right.; 0.5 milliLiter(s);   Tdap; 01-Sep-2014 17:09; Cheryl Cotto (MELINDA); [employer]; [id number]; IntraMuscular; Deltoid Right.; 0.5 milliLiter(s);

## 2021-08-14 NOTE — DISCHARGE NOTE NURSING/CASE MANAGEMENT/SOCIAL WORK - PATIENT PORTAL LINK FT
You can access the FollowMyHealth Patient Portal offered by [facility] by registering at the following website: [email or web address]. By joining [facility]’s FollowMyHealth portal, you will also be able to view your health information using other applications (apps) compatible with our system.

## 2021-08-14 NOTE — DISCHARGE NOTE NURSING/CASE MANAGEMENT/SOCIAL WORK - NSDCFUADDAPPT_GEN_ALL_CORE_FT
appointment made for 8/24/2021 at 4 pm [facility] please make sure you follow up with your GI doctor and get a repeat CT scan in 6-8 weeks.  Please fup with Pain management doctor for your chronic pain issue

## 2021-08-15 LAB — PHOSPHATIDYLETHANOL (PETH) - RESULT: 1296 NG/ML — HIGH

## 2021-08-19 DIAGNOSIS — E51.9 THIAMINE DEFICIENCY, UNSPECIFIED: ICD-10-CM

## 2021-08-19 DIAGNOSIS — K86.89 OTHER SPECIFIED DISEASES OF PANCREAS: ICD-10-CM

## 2021-08-19 DIAGNOSIS — Z91.419 PERSONAL HISTORY OF UNSPECIFIED ADULT ABUSE: ICD-10-CM

## 2021-08-19 DIAGNOSIS — K85.21 ALCOHOL INDUCED ACUTE PANCREATITIS WITH UNINFECTED NECROSIS: ICD-10-CM

## 2021-08-19 DIAGNOSIS — K86.0 ALCOHOL-INDUCED CHRONIC PANCREATITIS: ICD-10-CM

## 2021-08-19 DIAGNOSIS — K29.70 GASTRITIS, UNSPECIFIED, WITHOUT BLEEDING: ICD-10-CM

## 2021-08-19 DIAGNOSIS — R10.9 UNSPECIFIED ABDOMINAL PAIN: ICD-10-CM

## 2021-08-19 DIAGNOSIS — E83.42 HYPOMAGNESEMIA: ICD-10-CM

## 2021-08-19 DIAGNOSIS — E87.2 ACIDOSIS: ICD-10-CM

## 2021-08-19 DIAGNOSIS — F10.20 ALCOHOL DEPENDENCE, UNCOMPLICATED: ICD-10-CM

## 2021-08-19 DIAGNOSIS — E87.6 HYPOKALEMIA: ICD-10-CM

## 2021-08-19 DIAGNOSIS — E53.8 DEFICIENCY OF OTHER SPECIFIED B GROUP VITAMINS: ICD-10-CM

## 2021-08-19 DIAGNOSIS — M47.9 SPONDYLOSIS, UNSPECIFIED: ICD-10-CM

## 2021-09-22 ENCOUNTER — INPATIENT (INPATIENT)
Facility: HOSPITAL | Age: 46
LOS: 14 days | Discharge: HOME | End: 2021-10-07
Attending: STUDENT IN AN ORGANIZED HEALTH CARE EDUCATION/TRAINING PROGRAM | Admitting: STUDENT IN AN ORGANIZED HEALTH CARE EDUCATION/TRAINING PROGRAM
Payer: MEDICAID

## 2021-09-22 VITALS
TEMPERATURE: 99 F | OXYGEN SATURATION: 99 % | RESPIRATION RATE: 18 BRPM | SYSTOLIC BLOOD PRESSURE: 136 MMHG | HEART RATE: 125 BPM | HEIGHT: 64 IN | DIASTOLIC BLOOD PRESSURE: 83 MMHG | WEIGHT: 130.07 LBS

## 2021-09-22 DIAGNOSIS — Z87.2 PERSONAL HISTORY OF DISEASES OF THE SKIN AND SUBCUTANEOUS TISSUE: Chronic | ICD-10-CM

## 2021-09-22 DIAGNOSIS — Z98.890 OTHER SPECIFIED POSTPROCEDURAL STATES: Chronic | ICD-10-CM

## 2021-09-22 LAB
ALBUMIN SERPL ELPH-MCNC: 4.6 G/DL — SIGNIFICANT CHANGE UP (ref 3.5–5.2)
ALBUMIN SERPL ELPH-MCNC: 5.1 G/DL — SIGNIFICANT CHANGE UP (ref 3.5–5.2)
ALP SERPL-CCNC: 138 U/L — HIGH (ref 30–115)
ALP SERPL-CCNC: 165 U/L — HIGH (ref 30–115)
ALT FLD-CCNC: 111 U/L — HIGH (ref 0–41)
ALT FLD-CCNC: 86 U/L — HIGH (ref 0–41)
AMYLASE P1 CFR SERPL: 253 U/L — HIGH (ref 25–115)
ANION GAP SERPL CALC-SCNC: 16 MMOL/L — HIGH (ref 7–14)
ANION GAP SERPL CALC-SCNC: 23 MMOL/L — HIGH (ref 7–14)
APPEARANCE UR: CLEAR — SIGNIFICANT CHANGE UP
AST SERPL-CCNC: 257 U/L — HIGH (ref 0–41)
AST SERPL-CCNC: 349 U/L — HIGH (ref 0–41)
BACTERIA # UR AUTO: ABNORMAL
BASOPHILS # BLD AUTO: 0.06 K/UL — SIGNIFICANT CHANGE UP (ref 0–0.2)
BASOPHILS NFR BLD AUTO: 1.3 % — HIGH (ref 0–1)
BILIRUB DIRECT SERPL-MCNC: 0.2 MG/DL — SIGNIFICANT CHANGE UP (ref 0–0.2)
BILIRUB INDIRECT FLD-MCNC: 0.5 MG/DL — SIGNIFICANT CHANGE UP (ref 0.2–1.2)
BILIRUB SERPL-MCNC: 0.7 MG/DL — SIGNIFICANT CHANGE UP (ref 0.2–1.2)
BILIRUB SERPL-MCNC: 0.9 MG/DL — SIGNIFICANT CHANGE UP (ref 0.2–1.2)
BILIRUB UR-MCNC: NEGATIVE — SIGNIFICANT CHANGE UP
BUN SERPL-MCNC: 9 MG/DL — LOW (ref 10–20)
BUN SERPL-MCNC: 9 MG/DL — LOW (ref 10–20)
CALCIUM SERPL-MCNC: 8.7 MG/DL — SIGNIFICANT CHANGE UP (ref 8.5–10.1)
CALCIUM SERPL-MCNC: 9.2 MG/DL — SIGNIFICANT CHANGE UP (ref 8.5–10.1)
CHLORIDE SERPL-SCNC: 91 MMOL/L — LOW (ref 98–110)
CHLORIDE SERPL-SCNC: 95 MMOL/L — LOW (ref 98–110)
CHOLEST SERPL-MCNC: 319 MG/DL — HIGH
CO2 SERPL-SCNC: 19 MMOL/L — SIGNIFICANT CHANGE UP (ref 17–32)
CO2 SERPL-SCNC: 24 MMOL/L — SIGNIFICANT CHANGE UP (ref 17–32)
COLOR SPEC: YELLOW — SIGNIFICANT CHANGE UP
CREAT SERPL-MCNC: 0.6 MG/DL — LOW (ref 0.7–1.5)
CREAT SERPL-MCNC: 0.7 MG/DL — SIGNIFICANT CHANGE UP (ref 0.7–1.5)
DIFF PNL FLD: NEGATIVE — SIGNIFICANT CHANGE UP
EOSINOPHIL # BLD AUTO: 0.01 K/UL — SIGNIFICANT CHANGE UP (ref 0–0.7)
EOSINOPHIL NFR BLD AUTO: 0.2 % — SIGNIFICANT CHANGE UP (ref 0–8)
EPI CELLS # UR: 11 /HPF — HIGH (ref 0–5)
ETHANOL SERPL-MCNC: 89 MG/DL — HIGH
GLUCOSE SERPL-MCNC: 104 MG/DL — HIGH (ref 70–99)
GLUCOSE SERPL-MCNC: 105 MG/DL — HIGH (ref 70–99)
GLUCOSE UR QL: NEGATIVE — SIGNIFICANT CHANGE UP
HCG SERPL QL: NEGATIVE — SIGNIFICANT CHANGE UP
HCT VFR BLD CALC: 32.5 % — LOW (ref 37–47)
HCT VFR BLD CALC: 37.9 % — SIGNIFICANT CHANGE UP (ref 37–47)
HDLC SERPL-MCNC: 177 MG/DL — SIGNIFICANT CHANGE UP
HGB BLD-MCNC: 11 G/DL — LOW (ref 12–16)
HGB BLD-MCNC: 13 G/DL — SIGNIFICANT CHANGE UP (ref 12–16)
HYALINE CASTS # UR AUTO: 45 /LPF — HIGH (ref 0–7)
IMM GRANULOCYTES NFR BLD AUTO: 0.2 % — SIGNIFICANT CHANGE UP (ref 0.1–0.3)
KETONES UR-MCNC: ABNORMAL
LACTATE SERPL-SCNC: 1.8 MMOL/L — SIGNIFICANT CHANGE UP (ref 0.7–2)
LACTATE SERPL-SCNC: 4.4 MMOL/L — CRITICAL HIGH (ref 0.7–2)
LEUKOCYTE ESTERASE UR-ACNC: NEGATIVE — SIGNIFICANT CHANGE UP
LIDOCAIN IGE QN: 1281 U/L — HIGH (ref 7–60)
LIPID PNL WITH DIRECT LDL SERPL: 147 MG/DL — HIGH
LYMPHOCYTES # BLD AUTO: 0.84 K/UL — LOW (ref 1.2–3.4)
LYMPHOCYTES # BLD AUTO: 17.6 % — LOW (ref 20.5–51.1)
MAGNESIUM SERPL-MCNC: 1.6 MG/DL — LOW (ref 1.8–2.4)
MAGNESIUM SERPL-MCNC: 2.8 MG/DL — HIGH (ref 1.8–2.4)
MCHC RBC-ENTMCNC: 31.4 PG — HIGH (ref 27–31)
MCHC RBC-ENTMCNC: 31.6 PG — HIGH (ref 27–31)
MCHC RBC-ENTMCNC: 33.8 G/DL — SIGNIFICANT CHANGE UP (ref 32–37)
MCHC RBC-ENTMCNC: 34.3 G/DL — SIGNIFICANT CHANGE UP (ref 32–37)
MCV RBC AUTO: 92 FL — SIGNIFICANT CHANGE UP (ref 81–99)
MCV RBC AUTO: 92.9 FL — SIGNIFICANT CHANGE UP (ref 81–99)
MONOCYTES # BLD AUTO: 0.34 K/UL — SIGNIFICANT CHANGE UP (ref 0.1–0.6)
MONOCYTES NFR BLD AUTO: 7.1 % — SIGNIFICANT CHANGE UP (ref 1.7–9.3)
NEUTROPHILS # BLD AUTO: 3.51 K/UL — SIGNIFICANT CHANGE UP (ref 1.4–6.5)
NEUTROPHILS NFR BLD AUTO: 73.6 % — SIGNIFICANT CHANGE UP (ref 42.2–75.2)
NITRITE UR-MCNC: NEGATIVE — SIGNIFICANT CHANGE UP
NON HDL CHOLESTEROL: 142 MG/DL — HIGH
NRBC # BLD: 0 /100 WBCS — SIGNIFICANT CHANGE UP (ref 0–0)
NRBC # BLD: 0 /100 WBCS — SIGNIFICANT CHANGE UP (ref 0–0)
PH UR: 7 — SIGNIFICANT CHANGE UP (ref 5–8)
PLATELET # BLD AUTO: 204 K/UL — SIGNIFICANT CHANGE UP (ref 130–400)
PLATELET # BLD AUTO: 276 K/UL — SIGNIFICANT CHANGE UP (ref 130–400)
POTASSIUM SERPL-MCNC: 3.6 MMOL/L — SIGNIFICANT CHANGE UP (ref 3.5–5)
POTASSIUM SERPL-MCNC: 3.8 MMOL/L — SIGNIFICANT CHANGE UP (ref 3.5–5)
POTASSIUM SERPL-SCNC: 3.6 MMOL/L — SIGNIFICANT CHANGE UP (ref 3.5–5)
POTASSIUM SERPL-SCNC: 3.8 MMOL/L — SIGNIFICANT CHANGE UP (ref 3.5–5)
PROT SERPL-MCNC: 7.6 G/DL — SIGNIFICANT CHANGE UP (ref 6–8)
PROT SERPL-MCNC: 8.6 G/DL — HIGH (ref 6–8)
PROT UR-MCNC: ABNORMAL
RBC # BLD: 3.5 M/UL — LOW (ref 4.2–5.4)
RBC # BLD: 4.12 M/UL — LOW (ref 4.2–5.4)
RBC # FLD: 12.1 % — SIGNIFICANT CHANGE UP (ref 11.5–14.5)
RBC # FLD: 12.2 % — SIGNIFICANT CHANGE UP (ref 11.5–14.5)
RBC CASTS # UR COMP ASSIST: 12 /HPF — HIGH (ref 0–4)
SARS-COV-2 RNA SPEC QL NAA+PROBE: SIGNIFICANT CHANGE UP
SODIUM SERPL-SCNC: 133 MMOL/L — LOW (ref 135–146)
SODIUM SERPL-SCNC: 135 MMOL/L — SIGNIFICANT CHANGE UP (ref 135–146)
SP GR SPEC: >1.05 (ref 1.01–1.03)
TRIGL SERPL-MCNC: 99 MG/DL — SIGNIFICANT CHANGE UP
UROBILINOGEN FLD QL: SIGNIFICANT CHANGE UP
WBC # BLD: 3.72 K/UL — LOW (ref 4.8–10.8)
WBC # BLD: 4.77 K/UL — LOW (ref 4.8–10.8)
WBC # FLD AUTO: 3.72 K/UL — LOW (ref 4.8–10.8)
WBC # FLD AUTO: 4.77 K/UL — LOW (ref 4.8–10.8)
WBC UR QL: 4 /HPF — SIGNIFICANT CHANGE UP (ref 0–5)

## 2021-09-22 PROCEDURE — 74177 CT ABD & PELVIS W/CONTRAST: CPT | Mod: 26,ME

## 2021-09-22 PROCEDURE — G1004: CPT

## 2021-09-22 PROCEDURE — 99285 EMERGENCY DEPT VISIT HI MDM: CPT

## 2021-09-22 PROCEDURE — 93010 ELECTROCARDIOGRAM REPORT: CPT

## 2021-09-22 PROCEDURE — 71045 X-RAY EXAM CHEST 1 VIEW: CPT | Mod: 26

## 2021-09-22 PROCEDURE — 99223 1ST HOSP IP/OBS HIGH 75: CPT

## 2021-09-22 PROCEDURE — 72110 X-RAY EXAM L-2 SPINE 4/>VWS: CPT | Mod: 26

## 2021-09-22 RX ORDER — MORPHINE SULFATE 50 MG/1
2 CAPSULE, EXTENDED RELEASE ORAL ONCE
Refills: 0 | Status: DISCONTINUED | OUTPATIENT
Start: 2021-09-22 | End: 2021-09-22

## 2021-09-22 RX ORDER — FOLIC ACID 0.8 MG
1 TABLET ORAL DAILY
Refills: 0 | Status: DISCONTINUED | OUTPATIENT
Start: 2021-09-22 | End: 2021-09-26

## 2021-09-22 RX ORDER — CHLORHEXIDINE GLUCONATE 213 G/1000ML
1 SOLUTION TOPICAL ONCE
Refills: 0 | Status: DISCONTINUED | OUTPATIENT
Start: 2021-09-22 | End: 2021-10-07

## 2021-09-22 RX ORDER — SODIUM CHLORIDE 9 MG/ML
3000 INJECTION, SOLUTION INTRAVENOUS ONCE
Refills: 0 | Status: COMPLETED | OUTPATIENT
Start: 2021-09-22 | End: 2021-09-22

## 2021-09-22 RX ORDER — ONDANSETRON 8 MG/1
4 TABLET, FILM COATED ORAL EVERY 8 HOURS
Refills: 0 | Status: DISCONTINUED | OUTPATIENT
Start: 2021-09-22 | End: 2021-10-07

## 2021-09-22 RX ORDER — ONDANSETRON 8 MG/1
4 TABLET, FILM COATED ORAL ONCE
Refills: 0 | Status: DISCONTINUED | OUTPATIENT
Start: 2021-09-22 | End: 2021-09-22

## 2021-09-22 RX ORDER — MORPHINE SULFATE 50 MG/1
4 CAPSULE, EXTENDED RELEASE ORAL ONCE
Refills: 0 | Status: DISCONTINUED | OUTPATIENT
Start: 2021-09-22 | End: 2021-09-22

## 2021-09-22 RX ORDER — HYDROMORPHONE HYDROCHLORIDE 2 MG/ML
1 INJECTION INTRAMUSCULAR; INTRAVENOUS; SUBCUTANEOUS ONCE
Refills: 0 | Status: DISCONTINUED | OUTPATIENT
Start: 2021-09-22 | End: 2021-09-22

## 2021-09-22 RX ORDER — PANTOPRAZOLE SODIUM 20 MG/1
40 TABLET, DELAYED RELEASE ORAL DAILY
Refills: 0 | Status: DISCONTINUED | OUTPATIENT
Start: 2021-09-22 | End: 2021-10-07

## 2021-09-22 RX ORDER — MAGNESIUM SULFATE 500 MG/ML
2 VIAL (ML) INJECTION ONCE
Refills: 0 | Status: COMPLETED | OUTPATIENT
Start: 2021-09-22 | End: 2021-09-22

## 2021-09-22 RX ORDER — LIPASE/PROTEASE/AMYLASE 16-48-48K
3 CAPSULE,DELAYED RELEASE (ENTERIC COATED) ORAL
Refills: 0 | Status: DISCONTINUED | OUTPATIENT
Start: 2021-09-22 | End: 2021-10-07

## 2021-09-22 RX ORDER — FAMOTIDINE 10 MG/ML
20 INJECTION INTRAVENOUS ONCE
Refills: 0 | Status: COMPLETED | OUTPATIENT
Start: 2021-09-22 | End: 2021-09-22

## 2021-09-22 RX ORDER — SODIUM CHLORIDE 9 MG/ML
1000 INJECTION, SOLUTION INTRAVENOUS
Refills: 0 | Status: DISCONTINUED | OUTPATIENT
Start: 2021-09-22 | End: 2021-09-28

## 2021-09-22 RX ORDER — METOCLOPRAMIDE HCL 10 MG
10 TABLET ORAL ONCE
Refills: 0 | Status: COMPLETED | OUTPATIENT
Start: 2021-09-22 | End: 2021-09-22

## 2021-09-22 RX ORDER — MORPHINE SULFATE 50 MG/1
2 CAPSULE, EXTENDED RELEASE ORAL EVERY 6 HOURS
Refills: 0 | Status: DISCONTINUED | OUTPATIENT
Start: 2021-09-22 | End: 2021-09-23

## 2021-09-22 RX ORDER — CYCLOBENZAPRINE HYDROCHLORIDE 10 MG/1
10 TABLET, FILM COATED ORAL THREE TIMES A DAY
Refills: 0 | Status: DISCONTINUED | OUTPATIENT
Start: 2021-09-22 | End: 2021-10-02

## 2021-09-22 RX ORDER — THIAMINE MONONITRATE (VIT B1) 100 MG
100 TABLET ORAL DAILY
Refills: 0 | Status: DISCONTINUED | OUTPATIENT
Start: 2021-09-22 | End: 2021-09-26

## 2021-09-22 RX ORDER — INFLUENZA VIRUS VACCINE 15; 15; 15; 15 UG/.5ML; UG/.5ML; UG/.5ML; UG/.5ML
0.5 SUSPENSION INTRAMUSCULAR ONCE
Refills: 0 | Status: COMPLETED | OUTPATIENT
Start: 2021-09-22 | End: 2021-10-07

## 2021-09-22 RX ADMIN — MORPHINE SULFATE 4 MILLIGRAM(S): 50 CAPSULE, EXTENDED RELEASE ORAL at 03:38

## 2021-09-22 RX ADMIN — Medication 100 MILLIGRAM(S): at 11:56

## 2021-09-22 RX ADMIN — SODIUM CHLORIDE 150 MILLILITER(S): 9 INJECTION, SOLUTION INTRAVENOUS at 15:14

## 2021-09-22 RX ADMIN — FAMOTIDINE 104 MILLIGRAM(S): 10 INJECTION INTRAVENOUS at 05:49

## 2021-09-22 RX ADMIN — SODIUM CHLORIDE 3000 MILLILITER(S): 9 INJECTION, SOLUTION INTRAVENOUS at 03:38

## 2021-09-22 RX ADMIN — Medication 104 MILLIGRAM(S): at 03:38

## 2021-09-22 RX ADMIN — MORPHINE SULFATE 4 MILLIGRAM(S): 50 CAPSULE, EXTENDED RELEASE ORAL at 05:49

## 2021-09-22 RX ADMIN — MORPHINE SULFATE 4 MILLIGRAM(S): 50 CAPSULE, EXTENDED RELEASE ORAL at 07:42

## 2021-09-22 RX ADMIN — HYDROMORPHONE HYDROCHLORIDE 1 MILLIGRAM(S): 2 INJECTION INTRAMUSCULAR; INTRAVENOUS; SUBCUTANEOUS at 11:16

## 2021-09-22 RX ADMIN — MORPHINE SULFATE 2 MILLIGRAM(S): 50 CAPSULE, EXTENDED RELEASE ORAL at 23:25

## 2021-09-22 RX ADMIN — Medication 50 GRAM(S): at 05:34

## 2021-09-22 RX ADMIN — PANTOPRAZOLE SODIUM 40 MILLIGRAM(S): 20 TABLET, DELAYED RELEASE ORAL at 11:55

## 2021-09-22 RX ADMIN — ONDANSETRON 4 MILLIGRAM(S): 8 TABLET, FILM COATED ORAL at 22:21

## 2021-09-22 RX ADMIN — Medication 50 MILLIGRAM(S): at 05:50

## 2021-09-22 RX ADMIN — Medication 25 GRAM(S): at 11:20

## 2021-09-22 RX ADMIN — MORPHINE SULFATE 2 MILLIGRAM(S): 50 CAPSULE, EXTENDED RELEASE ORAL at 22:37

## 2021-09-22 RX ADMIN — MORPHINE SULFATE 2 MILLIGRAM(S): 50 CAPSULE, EXTENDED RELEASE ORAL at 15:12

## 2021-09-22 RX ADMIN — MORPHINE SULFATE 2 MILLIGRAM(S): 50 CAPSULE, EXTENDED RELEASE ORAL at 21:00

## 2021-09-22 RX ADMIN — MORPHINE SULFATE 2 MILLIGRAM(S): 50 CAPSULE, EXTENDED RELEASE ORAL at 20:32

## 2021-09-22 RX ADMIN — SODIUM CHLORIDE 150 MILLILITER(S): 9 INJECTION, SOLUTION INTRAVENOUS at 22:38

## 2021-09-22 RX ADMIN — Medication 1 MILLIGRAM(S): at 11:55

## 2021-09-22 RX ADMIN — SODIUM CHLORIDE 150 MILLILITER(S): 9 INJECTION, SOLUTION INTRAVENOUS at 11:18

## 2021-09-22 NOTE — H&P ADULT - ATTENDING COMMENTS
Patient was seen and examined 44 y/o F w/ PMH/o  ETOH abuse and alcoholic pancreatitis with pseudocyst presenting to the hospital w/ worsening abdominal pain. She reports her last drink was 2 days ago but she didn't drink much. PAtient was found to have acute on chronic pancreatitis on imaging.    PE:  Gen: NAD, pleasant  HEENT: dry mucus membrane, At, NC, EOMI  CVS: RRR, no m/r/g  Resp: CTAb/l, no w/r/r  Abd: soft, +tenderness in LUQ, no rebound, no guarding  Ext: Full ROMX4, no LE edema    #Acute on Chronic pancreatitis likely EtOh induced  #Likely Alcoholic Ketoacidosis  #Hx/o recurrent pancreatitis w/ hx/o pseudocyst formation  - NPO for now; Aggressive IV hydration  - IV zofran for N/A  - pain control  - GI consult  - CIWA protocol with Ativan  - Thiamine, Folate, MV  - Catch team consult    Patient was seen and examined by myself. Case was discussed with resident Dr. Valentino. Agree with his HPI and A/P.
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
## 2021-09-22 NOTE — H&P ADULT - NSHPLABSRESULTS_GEN_ALL_CORE
13.0   4.77  )-----------( 276      ( 22 Sep 2021 04:00 )             37.9       09-22    133<L>  |  91<L>  |  9<L>  ----------------------------<  104<H>  3.8   |  19  |  0.7    Ca    9.2      22 Sep 2021 04:00  Mg     1.6     09-22    TPro  8.6<H>  /  Alb  5.1  /  TBili  0.7  /  DBili  0.2  /  AST  349<H>  /  ALT  111<H>  /  AlkPhos  165<H>  09-22                      Lactate Trend  09-22 @ 07:33 Lactate:1.8   09-22 @ 04:00 Lactate:4.4             CAPILLARY BLOOD GLUCOSE

## 2021-09-22 NOTE — ED PROVIDER NOTE - NS ED ROS FT
Review of Systems  Constitutional:  No fever, chills.  Eyes:  No visual changes, eye pain, or discharge.  ENMT:  No hearing changes, pain, or discharge. No nasal congestion, discharge, or bleeding. No throat pain, swelling, or difficulty swallowing.  Cardiac:  No chest pain, palpitations, syncope, or edema.  Respiratory:  No dyspnea, cough. No hemoptysis.  GI:  No diarrhea. (+) nausea, vomiting, abd pain  :  No dysuria, hematuria, frequency, or burning.   MS:  No back pain.  Skin:  No skin rash, pruritis, jaundice, or lesions.  Neuro:  No headache, dizziness, loss of sensation, or focal weakness.  No change in mental status.

## 2021-09-22 NOTE — ED PROVIDER NOTE - OBJECTIVE STATEMENT
47 yo F with PMHx of alcohol abuse and recurrent pancreatitis presents to the ED c/o moderate epigastric pain, nausea and few episodes of NBNB vomiting that started yesterday and has been persisting. Pt states she drinks much less frequently now but did have a few drinks yesterday and pain started soon after. She states the pain is the same as previous episodes of pancreatitis. She denies other complaints. Pt denies fever, chills, diarrhea, headache, dizziness, weakness, chest pain, SOB, back pain, LOC, trauma, urinary symptoms, cough, calf pain/swelling, recent travel, recent surgery.

## 2021-09-22 NOTE — H&P ADULT - NSHPPHYSICALEXAM_GEN_ALL_CORE
PHYSICAL EXAM:  GENERAL: Uncomfortable, mentating well  HEAD:  Atraumatic, Normocephalic  EYES: EOMI, PERRLA, anicteric sclera  CHEST/LUNG: Clear to auscultation bilaterally; No wheeze; No crackles; No accessory muscles used  HEART: Regular rate and rhythm; No murmurs;   ABDOMEN: Soft, tenderness, guarding to light palpation in all quadrants, Nondistended; Bowel sounds present  EXTREMITIES:  2+ Peripheral Pulses, No cyanosis or edema  PSYCH: AAOx3  NEUROLOGY: non-focal  SKIN: No rashes or lesions
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
## 2021-09-22 NOTE — ED PROVIDER NOTE - PHYSICAL EXAMINATION
VITAL SIGNS: I have reviewed nursing notes and confirm.  CONSTITUTIONAL: Well-developed; well-nourished; in no acute distress.  SKIN: Skin exam is warm and dry, no acute rash.  HEAD: Normocephalic; atraumatic.  EYES: Conjunctiva and sclera clear.  ENT: No nasal discharge; airway clear.   CARD: S1, S2 normal; no murmurs, gallops, or rubs. Regular rate and rhythm.  RESP: No wheezes, rales or rhonchi. Speaking in full sentences.   ABD: Normal bowel sounds; soft; non-distended; (+) epigastric TTP; No rebound or guarding. No CVA tenderness.  EXT: Normal ROM. No clubbing, cyanosis or edema.  NEURO: Alert, oriented. Grossly unremarkable. No focal deficits.
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
## 2021-09-22 NOTE — CONSULT NOTE ADULT - SUBJECTIVE AND OBJECTIVE BOX
Spoke to medical resident taking care of the patient. She reports that patient is on CIWA protocol and her withdrawal symptoms were well control. The CATCH team  will be seeing the patient shortly.

## 2021-09-23 LAB
ALBUMIN SERPL ELPH-MCNC: 4.2 G/DL — SIGNIFICANT CHANGE UP (ref 3.5–5.2)
ALP SERPL-CCNC: 109 U/L — SIGNIFICANT CHANGE UP (ref 30–115)
ALT FLD-CCNC: 66 U/L — HIGH (ref 0–41)
ANION GAP SERPL CALC-SCNC: 14 MMOL/L — SIGNIFICANT CHANGE UP (ref 7–14)
APTT BLD: 30.3 SEC — SIGNIFICANT CHANGE UP (ref 27–39.2)
AST SERPL-CCNC: 200 U/L — HIGH (ref 0–41)
BASOPHILS # BLD AUTO: 0.03 K/UL — SIGNIFICANT CHANGE UP (ref 0–0.2)
BASOPHILS NFR BLD AUTO: 1.1 % — HIGH (ref 0–1)
BILIRUB SERPL-MCNC: 0.7 MG/DL — SIGNIFICANT CHANGE UP (ref 0.2–1.2)
BUN SERPL-MCNC: 3 MG/DL — LOW (ref 10–20)
CALCIUM SERPL-MCNC: 8.8 MG/DL — SIGNIFICANT CHANGE UP (ref 8.5–10.1)
CHLORIDE SERPL-SCNC: 97 MMOL/L — LOW (ref 98–110)
CO2 SERPL-SCNC: 26 MMOL/L — SIGNIFICANT CHANGE UP (ref 17–32)
COVID-19 SPIKE DOMAIN AB INTERP: NEGATIVE — SIGNIFICANT CHANGE UP
COVID-19 SPIKE DOMAIN ANTIBODY RESULT: 0.4 U/ML — SIGNIFICANT CHANGE UP
CREAT SERPL-MCNC: 0.6 MG/DL — LOW (ref 0.7–1.5)
EOSINOPHIL # BLD AUTO: 0.04 K/UL — SIGNIFICANT CHANGE UP (ref 0–0.7)
EOSINOPHIL NFR BLD AUTO: 1.4 % — SIGNIFICANT CHANGE UP (ref 0–8)
GLUCOSE SERPL-MCNC: 101 MG/DL — HIGH (ref 70–99)
HCT VFR BLD CALC: 30.7 % — LOW (ref 37–47)
HGB BLD-MCNC: 10.5 G/DL — LOW (ref 12–16)
IMM GRANULOCYTES NFR BLD AUTO: 0.4 % — HIGH (ref 0.1–0.3)
INR BLD: 0.96 RATIO — SIGNIFICANT CHANGE UP (ref 0.65–1.3)
LYMPHOCYTES # BLD AUTO: 0.66 K/UL — LOW (ref 1.2–3.4)
LYMPHOCYTES # BLD AUTO: 23.2 % — SIGNIFICANT CHANGE UP (ref 20.5–51.1)
MAGNESIUM SERPL-MCNC: 1.7 MG/DL — LOW (ref 1.8–2.4)
MCHC RBC-ENTMCNC: 32 PG — HIGH (ref 27–31)
MCHC RBC-ENTMCNC: 34.2 G/DL — SIGNIFICANT CHANGE UP (ref 32–37)
MCV RBC AUTO: 93.6 FL — SIGNIFICANT CHANGE UP (ref 81–99)
MONOCYTES # BLD AUTO: 0.19 K/UL — SIGNIFICANT CHANGE UP (ref 0.1–0.6)
MONOCYTES NFR BLD AUTO: 6.7 % — SIGNIFICANT CHANGE UP (ref 1.7–9.3)
NEUTROPHILS # BLD AUTO: 1.91 K/UL — SIGNIFICANT CHANGE UP (ref 1.4–6.5)
NEUTROPHILS NFR BLD AUTO: 67.2 % — SIGNIFICANT CHANGE UP (ref 42.2–75.2)
NRBC # BLD: 0 /100 WBCS — SIGNIFICANT CHANGE UP (ref 0–0)
PLATELET # BLD AUTO: 164 K/UL — SIGNIFICANT CHANGE UP (ref 130–400)
POTASSIUM SERPL-MCNC: 3.3 MMOL/L — LOW (ref 3.5–5)
POTASSIUM SERPL-SCNC: 3.3 MMOL/L — LOW (ref 3.5–5)
PROT SERPL-MCNC: 6.9 G/DL — SIGNIFICANT CHANGE UP (ref 6–8)
PROTHROM AB SERPL-ACNC: 11.1 SEC — SIGNIFICANT CHANGE UP (ref 9.95–12.87)
RBC # BLD: 3.28 M/UL — LOW (ref 4.2–5.4)
RBC # FLD: 11.9 % — SIGNIFICANT CHANGE UP (ref 11.5–14.5)
SARS-COV-2 IGG+IGM SERPL QL IA: 0.4 U/ML — SIGNIFICANT CHANGE UP
SARS-COV-2 IGG+IGM SERPL QL IA: NEGATIVE — SIGNIFICANT CHANGE UP
SODIUM SERPL-SCNC: 137 MMOL/L — SIGNIFICANT CHANGE UP (ref 135–146)
WBC # BLD: 2.84 K/UL — LOW (ref 4.8–10.8)
WBC # FLD AUTO: 2.84 K/UL — LOW (ref 4.8–10.8)

## 2021-09-23 PROCEDURE — 99232 SBSQ HOSP IP/OBS MODERATE 35: CPT

## 2021-09-23 PROCEDURE — 99223 1ST HOSP IP/OBS HIGH 75: CPT

## 2021-09-23 RX ORDER — HYDROMORPHONE HYDROCHLORIDE 2 MG/ML
0.5 INJECTION INTRAMUSCULAR; INTRAVENOUS; SUBCUTANEOUS ONCE
Refills: 0 | Status: DISCONTINUED | OUTPATIENT
Start: 2021-09-23 | End: 2021-09-23

## 2021-09-23 RX ORDER — HYDROMORPHONE HYDROCHLORIDE 2 MG/ML
1 INJECTION INTRAMUSCULAR; INTRAVENOUS; SUBCUTANEOUS EVERY 4 HOURS
Refills: 0 | Status: DISCONTINUED | OUTPATIENT
Start: 2021-09-23 | End: 2021-09-30

## 2021-09-23 RX ORDER — ENOXAPARIN SODIUM 100 MG/ML
40 INJECTION SUBCUTANEOUS DAILY
Refills: 0 | Status: DISCONTINUED | OUTPATIENT
Start: 2021-09-23 | End: 2021-10-07

## 2021-09-23 RX ORDER — MORPHINE SULFATE 50 MG/1
4 CAPSULE, EXTENDED RELEASE ORAL ONCE
Refills: 0 | Status: DISCONTINUED | OUTPATIENT
Start: 2021-09-23 | End: 2021-09-23

## 2021-09-23 RX ORDER — MORPHINE SULFATE 50 MG/1
2 CAPSULE, EXTENDED RELEASE ORAL ONCE
Refills: 0 | Status: DISCONTINUED | OUTPATIENT
Start: 2021-09-23 | End: 2021-09-23

## 2021-09-23 RX ORDER — MORPHINE SULFATE 50 MG/1
4 CAPSULE, EXTENDED RELEASE ORAL EVERY 4 HOURS
Refills: 0 | Status: DISCONTINUED | OUTPATIENT
Start: 2021-09-23 | End: 2021-09-23

## 2021-09-23 RX ORDER — POTASSIUM CHLORIDE 20 MEQ
20 PACKET (EA) ORAL ONCE
Refills: 0 | Status: COMPLETED | OUTPATIENT
Start: 2021-09-23 | End: 2021-09-23

## 2021-09-23 RX ORDER — MORPHINE SULFATE 50 MG/1
2 CAPSULE, EXTENDED RELEASE ORAL EVERY 4 HOURS
Refills: 0 | Status: DISCONTINUED | OUTPATIENT
Start: 2021-09-23 | End: 2021-09-23

## 2021-09-23 RX ORDER — MAGNESIUM SULFATE 500 MG/ML
2 VIAL (ML) INJECTION ONCE
Refills: 0 | Status: COMPLETED | OUTPATIENT
Start: 2021-09-23 | End: 2021-09-23

## 2021-09-23 RX ADMIN — MORPHINE SULFATE 4 MILLIGRAM(S): 50 CAPSULE, EXTENDED RELEASE ORAL at 07:15

## 2021-09-23 RX ADMIN — MORPHINE SULFATE 4 MILLIGRAM(S): 50 CAPSULE, EXTENDED RELEASE ORAL at 10:19

## 2021-09-23 RX ADMIN — MORPHINE SULFATE 4 MILLIGRAM(S): 50 CAPSULE, EXTENDED RELEASE ORAL at 08:25

## 2021-09-23 RX ADMIN — MORPHINE SULFATE 2 MILLIGRAM(S): 50 CAPSULE, EXTENDED RELEASE ORAL at 05:10

## 2021-09-23 RX ADMIN — PANTOPRAZOLE SODIUM 40 MILLIGRAM(S): 20 TABLET, DELAYED RELEASE ORAL at 13:25

## 2021-09-23 RX ADMIN — HYDROMORPHONE HYDROCHLORIDE 1 MILLIGRAM(S): 2 INJECTION INTRAMUSCULAR; INTRAVENOUS; SUBCUTANEOUS at 21:52

## 2021-09-23 RX ADMIN — HYDROMORPHONE HYDROCHLORIDE 1 MILLIGRAM(S): 2 INJECTION INTRAMUSCULAR; INTRAVENOUS; SUBCUTANEOUS at 17:50

## 2021-09-23 RX ADMIN — MORPHINE SULFATE 2 MILLIGRAM(S): 50 CAPSULE, EXTENDED RELEASE ORAL at 04:09

## 2021-09-23 RX ADMIN — HYDROMORPHONE HYDROCHLORIDE 1 MILLIGRAM(S): 2 INJECTION INTRAMUSCULAR; INTRAVENOUS; SUBCUTANEOUS at 15:01

## 2021-09-23 RX ADMIN — Medication 50 MILLIEQUIVALENT(S): at 13:33

## 2021-09-23 RX ADMIN — Medication 1 MILLIGRAM(S): at 14:39

## 2021-09-23 RX ADMIN — MORPHINE SULFATE 2 MILLIGRAM(S): 50 CAPSULE, EXTENDED RELEASE ORAL at 01:07

## 2021-09-23 RX ADMIN — Medication 50 GRAM(S): at 11:01

## 2021-09-23 RX ADMIN — HYDROMORPHONE HYDROCHLORIDE 1 MILLIGRAM(S): 2 INJECTION INTRAMUSCULAR; INTRAVENOUS; SUBCUTANEOUS at 13:41

## 2021-09-23 RX ADMIN — HYDROMORPHONE HYDROCHLORIDE 1 MILLIGRAM(S): 2 INJECTION INTRAMUSCULAR; INTRAVENOUS; SUBCUTANEOUS at 18:28

## 2021-09-23 RX ADMIN — MORPHINE SULFATE 4 MILLIGRAM(S): 50 CAPSULE, EXTENDED RELEASE ORAL at 11:18

## 2021-09-23 RX ADMIN — ENOXAPARIN SODIUM 40 MILLIGRAM(S): 100 INJECTION SUBCUTANEOUS at 13:24

## 2021-09-23 RX ADMIN — MORPHINE SULFATE 2 MILLIGRAM(S): 50 CAPSULE, EXTENDED RELEASE ORAL at 01:30

## 2021-09-23 RX ADMIN — SODIUM CHLORIDE 150 MILLILITER(S): 9 INJECTION, SOLUTION INTRAVENOUS at 08:19

## 2021-09-23 RX ADMIN — Medication 100 MILLIGRAM(S): at 13:55

## 2021-09-23 RX ADMIN — ONDANSETRON 4 MILLIGRAM(S): 8 TABLET, FILM COATED ORAL at 13:26

## 2021-09-23 NOTE — CONSULT NOTE ADULT - ASSESSMENT
Patient is a 46 year old female with Past Medical History of ETOH abuse and multiple episodes of pancreatitis, chronic pancreatitis, with known pseudocyst presents with epigastric abdominal pain. Patient had recent EUS done 8/9. Patient on EUS had a walled-off collection that measured 4.5 x 3.9 cm in largest dimension, looked well circumscribed, heterogeneous, containing debris, consistent with walled-off pancreatic necrosis.  She also had pancreatic parenchymal hypoechogenicity and calcifications in the pancreatic head, body, and tail, suggestive of chronic pancreatitis. There was a reactive appearing, periportal lymph node that looked irregular, homogeneous, well circumscribed, measuring around 1.2 cm in largest dimension. No drainage was warranted. She presented again after consuming ETOH. She notes bad Epigastric pain, was sharp and severe. This pain is similar to prior presentations. PAtient with stable collection. Patient again instructed for her own health to avoid ETOH, as she continues to damge her pancreas which is causing irreversible damage ( Already has a calcified Pancreas).     Walled off necrosis/ chronic Pancreatitis/ chronic ETOH use  - EUS done 8/9- drainage not warranted   - CT scan with stable 6cm collection  - IV hydration and pain control  - Psychiatry follow up as exhibiting consistent poor choices resulting in significant organ damage specifically of the Pancreas despite being educated multiple times on ETOH avoidance   - Patient no showed follow [facility] at [facility] on [facility] which was set for her prior to last discharge
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
## 2021-09-24 LAB
ALBUMIN SERPL ELPH-MCNC: 4.4 G/DL — SIGNIFICANT CHANGE UP (ref 3.5–5.2)
ALP SERPL-CCNC: 119 U/L — HIGH (ref 30–115)
ALT FLD-CCNC: 72 U/L — HIGH (ref 0–41)
ANION GAP SERPL CALC-SCNC: 21 MMOL/L — HIGH (ref 7–14)
AST SERPL-CCNC: 273 U/L — HIGH (ref 0–41)
BASOPHILS # BLD AUTO: 0.03 K/UL — SIGNIFICANT CHANGE UP (ref 0–0.2)
BASOPHILS NFR BLD AUTO: 1.1 % — HIGH (ref 0–1)
BILIRUB SERPL-MCNC: 0.6 MG/DL — SIGNIFICANT CHANGE UP (ref 0.2–1.2)
BUN SERPL-MCNC: <3 MG/DL — LOW (ref 10–20)
CALCIUM SERPL-MCNC: 9.2 MG/DL — SIGNIFICANT CHANGE UP (ref 8.5–10.1)
CHLORIDE SERPL-SCNC: 97 MMOL/L — LOW (ref 98–110)
CO2 SERPL-SCNC: 20 MMOL/L — SIGNIFICANT CHANGE UP (ref 17–32)
CREAT SERPL-MCNC: 0.5 MG/DL — LOW (ref 0.7–1.5)
EOSINOPHIL # BLD AUTO: 0.1 K/UL — SIGNIFICANT CHANGE UP (ref 0–0.7)
EOSINOPHIL NFR BLD AUTO: 3.7 % — SIGNIFICANT CHANGE UP (ref 0–8)
GLUCOSE SERPL-MCNC: 66 MG/DL — LOW (ref 70–99)
HCT VFR BLD CALC: 33.3 % — LOW (ref 37–47)
HGB BLD-MCNC: 11.2 G/DL — LOW (ref 12–16)
IMM GRANULOCYTES NFR BLD AUTO: 0 % — LOW (ref 0.1–0.3)
LYMPHOCYTES # BLD AUTO: 0.68 K/UL — LOW (ref 1.2–3.4)
LYMPHOCYTES # BLD AUTO: 25.5 % — SIGNIFICANT CHANGE UP (ref 20.5–51.1)
MAGNESIUM SERPL-MCNC: 1.7 MG/DL — LOW (ref 1.8–2.4)
MCHC RBC-ENTMCNC: 32 PG — HIGH (ref 27–31)
MCHC RBC-ENTMCNC: 33.6 G/DL — SIGNIFICANT CHANGE UP (ref 32–37)
MCV RBC AUTO: 95.1 FL — SIGNIFICANT CHANGE UP (ref 81–99)
MONOCYTES # BLD AUTO: 0.12 K/UL — SIGNIFICANT CHANGE UP (ref 0.1–0.6)
MONOCYTES NFR BLD AUTO: 4.5 % — SIGNIFICANT CHANGE UP (ref 1.7–9.3)
NEUTROPHILS # BLD AUTO: 1.74 K/UL — SIGNIFICANT CHANGE UP (ref 1.4–6.5)
NEUTROPHILS NFR BLD AUTO: 65.2 % — SIGNIFICANT CHANGE UP (ref 42.2–75.2)
NRBC # BLD: 0 /100 WBCS — SIGNIFICANT CHANGE UP (ref 0–0)
PLATELET # BLD AUTO: 162 K/UL — SIGNIFICANT CHANGE UP (ref 130–400)
POTASSIUM SERPL-MCNC: 3.9 MMOL/L — SIGNIFICANT CHANGE UP (ref 3.5–5)
POTASSIUM SERPL-SCNC: 3.9 MMOL/L — SIGNIFICANT CHANGE UP (ref 3.5–5)
PROT SERPL-MCNC: 7.4 G/DL — SIGNIFICANT CHANGE UP (ref 6–8)
RBC # BLD: 3.5 M/UL — LOW (ref 4.2–5.4)
RBC # FLD: 11.9 % — SIGNIFICANT CHANGE UP (ref 11.5–14.5)
SODIUM SERPL-SCNC: 138 MMOL/L — SIGNIFICANT CHANGE UP (ref 135–146)
WBC # BLD: 2.67 K/UL — LOW (ref 4.8–10.8)
WBC # FLD AUTO: 2.67 K/UL — LOW (ref 4.8–10.8)

## 2021-09-24 PROCEDURE — 99232 SBSQ HOSP IP/OBS MODERATE 35: CPT

## 2021-09-24 RX ORDER — HYDROMORPHONE HYDROCHLORIDE 2 MG/ML
0.5 INJECTION INTRAMUSCULAR; INTRAVENOUS; SUBCUTANEOUS ONCE
Refills: 0 | Status: DISCONTINUED | OUTPATIENT
Start: 2021-09-24 | End: 2021-09-24

## 2021-09-24 RX ADMIN — Medication 100 MILLIGRAM(S): at 13:00

## 2021-09-24 RX ADMIN — Medication 3 CAPSULE(S): at 11:52

## 2021-09-24 RX ADMIN — ENOXAPARIN SODIUM 40 MILLIGRAM(S): 100 INJECTION SUBCUTANEOUS at 11:53

## 2021-09-24 RX ADMIN — Medication 3 CAPSULE(S): at 17:26

## 2021-09-24 RX ADMIN — ONDANSETRON 4 MILLIGRAM(S): 8 TABLET, FILM COATED ORAL at 13:00

## 2021-09-24 RX ADMIN — PANTOPRAZOLE SODIUM 40 MILLIGRAM(S): 20 TABLET, DELAYED RELEASE ORAL at 11:53

## 2021-09-24 RX ADMIN — HYDROMORPHONE HYDROCHLORIDE 1 MILLIGRAM(S): 2 INJECTION INTRAMUSCULAR; INTRAVENOUS; SUBCUTANEOUS at 01:56

## 2021-09-24 RX ADMIN — HYDROMORPHONE HYDROCHLORIDE 1 MILLIGRAM(S): 2 INJECTION INTRAMUSCULAR; INTRAVENOUS; SUBCUTANEOUS at 10:46

## 2021-09-24 RX ADMIN — SODIUM CHLORIDE 200 MILLILITER(S): 9 INJECTION, SOLUTION INTRAVENOUS at 10:46

## 2021-09-24 RX ADMIN — ONDANSETRON 4 MILLIGRAM(S): 8 TABLET, FILM COATED ORAL at 01:55

## 2021-09-24 RX ADMIN — HYDROMORPHONE HYDROCHLORIDE 1 MILLIGRAM(S): 2 INJECTION INTRAMUSCULAR; INTRAVENOUS; SUBCUTANEOUS at 15:13

## 2021-09-24 RX ADMIN — HYDROMORPHONE HYDROCHLORIDE 1 MILLIGRAM(S): 2 INJECTION INTRAMUSCULAR; INTRAVENOUS; SUBCUTANEOUS at 18:50

## 2021-09-24 RX ADMIN — HYDROMORPHONE HYDROCHLORIDE 0.5 MILLIGRAM(S): 2 INJECTION INTRAMUSCULAR; INTRAVENOUS; SUBCUTANEOUS at 21:14

## 2021-09-24 RX ADMIN — HYDROMORPHONE HYDROCHLORIDE 1 MILLIGRAM(S): 2 INJECTION INTRAMUSCULAR; INTRAVENOUS; SUBCUTANEOUS at 23:20

## 2021-09-24 RX ADMIN — Medication 1 MILLIGRAM(S): at 13:00

## 2021-09-24 RX ADMIN — HYDROMORPHONE HYDROCHLORIDE 1 MILLIGRAM(S): 2 INJECTION INTRAMUSCULAR; INTRAVENOUS; SUBCUTANEOUS at 06:44

## 2021-09-24 RX ADMIN — HYDROMORPHONE HYDROCHLORIDE 1 MILLIGRAM(S): 2 INJECTION INTRAMUSCULAR; INTRAVENOUS; SUBCUTANEOUS at 15:16

## 2021-09-24 RX ADMIN — HYDROMORPHONE HYDROCHLORIDE 0.5 MILLIGRAM(S): 2 INJECTION INTRAMUSCULAR; INTRAVENOUS; SUBCUTANEOUS at 22:10

## 2021-09-24 NOTE — PROGRESS NOTE ADULT - ASSESSMENT
Patient is a 46 year old female with Past Medical History of ETOH abuse and multiple episodes of pancreatitis, chronic pancreatitis, with known pseudocyst presents with epigastric abdominal pain. Patient had recent EUS done 8/9. Patient on EUS had a walled-off collection that measured 4.5 x 3.9 cm in largest dimension, looked well circumscribed, heterogeneous, containing debris, consistent with walled-off pancreatic necrosis.  She also had pancreatic parenchymal hypoechogenicity and calcifications in the pancreatic head, body, and tail, suggestive of chronic pancreatitis. There was a reactive appearing, periportal lymph node that looked irregular, homogeneous, well circumscribed, measuring around 1.2 cm in largest dimension. No drainage was warranted. She presented again after consuming ETOH. She notes bad Epigastric pain, was sharp and severe. This pain is similar to prior presentations. Patient with stable collection. Patient feeling better.     Walled off necrosis/ chronic Pancreatitis/ chronic ETOH use  - EUS done 8/9- drainage not warranted   - CT scan with stable 6cm collection  - IV hydration and pain control, can increase LR to 200ml/hr, can also place on clear diet as tolerated   - Patient no showed follow [facility] on [facility] which was set for her prior to last discharge   - Patient will need GI follow up at [id number] for appointment
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
## 2021-09-25 LAB
ALBUMIN SERPL ELPH-MCNC: 4.3 G/DL — SIGNIFICANT CHANGE UP (ref 3.5–5.2)
ALP SERPL-CCNC: 130 U/L — HIGH (ref 30–115)
ALT FLD-CCNC: 80 U/L — HIGH (ref 0–41)
ANION GAP SERPL CALC-SCNC: 18 MMOL/L — HIGH (ref 7–14)
AST SERPL-CCNC: 302 U/L — HIGH (ref 0–41)
BASOPHILS # BLD AUTO: 0.03 K/UL — SIGNIFICANT CHANGE UP (ref 0–0.2)
BASOPHILS NFR BLD AUTO: 1.3 % — HIGH (ref 0–1)
BILIRUB SERPL-MCNC: 0.6 MG/DL — SIGNIFICANT CHANGE UP (ref 0.2–1.2)
BUN SERPL-MCNC: <3 MG/DL — LOW (ref 10–20)
CALCIUM SERPL-MCNC: 9.6 MG/DL — SIGNIFICANT CHANGE UP (ref 8.5–10.1)
CHLORIDE SERPL-SCNC: 96 MMOL/L — LOW (ref 98–110)
CO2 SERPL-SCNC: 22 MMOL/L — SIGNIFICANT CHANGE UP (ref 17–32)
CREAT SERPL-MCNC: 0.5 MG/DL — LOW (ref 0.7–1.5)
EOSINOPHIL # BLD AUTO: 0.1 K/UL — SIGNIFICANT CHANGE UP (ref 0–0.7)
EOSINOPHIL NFR BLD AUTO: 4.4 % — SIGNIFICANT CHANGE UP (ref 0–8)
GLUCOSE SERPL-MCNC: 92 MG/DL — SIGNIFICANT CHANGE UP (ref 70–99)
HCT VFR BLD CALC: 33.8 % — LOW (ref 37–47)
HGB BLD-MCNC: 11.4 G/DL — LOW (ref 12–16)
IMM GRANULOCYTES NFR BLD AUTO: 0.4 % — HIGH (ref 0.1–0.3)
LYMPHOCYTES # BLD AUTO: 0.6 K/UL — LOW (ref 1.2–3.4)
LYMPHOCYTES # BLD AUTO: 26.3 % — SIGNIFICANT CHANGE UP (ref 20.5–51.1)
MAGNESIUM SERPL-MCNC: 1.4 MG/DL — LOW (ref 1.8–2.4)
MCHC RBC-ENTMCNC: 31.8 PG — HIGH (ref 27–31)
MCHC RBC-ENTMCNC: 33.7 G/DL — SIGNIFICANT CHANGE UP (ref 32–37)
MCV RBC AUTO: 94.2 FL — SIGNIFICANT CHANGE UP (ref 81–99)
MONOCYTES # BLD AUTO: 0.21 K/UL — SIGNIFICANT CHANGE UP (ref 0.1–0.6)
MONOCYTES NFR BLD AUTO: 9.2 % — SIGNIFICANT CHANGE UP (ref 1.7–9.3)
NEUTROPHILS # BLD AUTO: 1.33 K/UL — LOW (ref 1.4–6.5)
NEUTROPHILS NFR BLD AUTO: 58.4 % — SIGNIFICANT CHANGE UP (ref 42.2–75.2)
NRBC # BLD: 0 /100 WBCS — SIGNIFICANT CHANGE UP (ref 0–0)
PLATELET # BLD AUTO: 150 K/UL — SIGNIFICANT CHANGE UP (ref 130–400)
POTASSIUM SERPL-MCNC: 3.8 MMOL/L — SIGNIFICANT CHANGE UP (ref 3.5–5)
POTASSIUM SERPL-SCNC: 3.8 MMOL/L — SIGNIFICANT CHANGE UP (ref 3.5–5)
PROT SERPL-MCNC: 7.3 G/DL — SIGNIFICANT CHANGE UP (ref 6–8)
RBC # BLD: 3.59 M/UL — LOW (ref 4.2–5.4)
RBC # FLD: 11.9 % — SIGNIFICANT CHANGE UP (ref 11.5–14.5)
SODIUM SERPL-SCNC: 136 MMOL/L — SIGNIFICANT CHANGE UP (ref 135–146)
WBC # BLD: 2.28 K/UL — LOW (ref 4.8–10.8)
WBC # FLD AUTO: 2.28 K/UL — LOW (ref 4.8–10.8)

## 2021-09-25 PROCEDURE — 99233 SBSQ HOSP IP/OBS HIGH 50: CPT

## 2021-09-25 RX ORDER — MAGNESIUM SULFATE 500 MG/ML
2 VIAL (ML) INJECTION
Refills: 0 | Status: COMPLETED | OUTPATIENT
Start: 2021-09-25 | End: 2021-09-25

## 2021-09-25 RX ADMIN — Medication 1 MILLIGRAM(S): at 16:03

## 2021-09-25 RX ADMIN — ONDANSETRON 4 MILLIGRAM(S): 8 TABLET, FILM COATED ORAL at 03:32

## 2021-09-25 RX ADMIN — HYDROMORPHONE HYDROCHLORIDE 1 MILLIGRAM(S): 2 INJECTION INTRAMUSCULAR; INTRAVENOUS; SUBCUTANEOUS at 12:19

## 2021-09-25 RX ADMIN — Medication 100 MILLIGRAM(S): at 16:02

## 2021-09-25 RX ADMIN — HYDROMORPHONE HYDROCHLORIDE 1 MILLIGRAM(S): 2 INJECTION INTRAMUSCULAR; INTRAVENOUS; SUBCUTANEOUS at 16:50

## 2021-09-25 RX ADMIN — HYDROMORPHONE HYDROCHLORIDE 1 MILLIGRAM(S): 2 INJECTION INTRAMUSCULAR; INTRAVENOUS; SUBCUTANEOUS at 03:30

## 2021-09-25 RX ADMIN — PANTOPRAZOLE SODIUM 40 MILLIGRAM(S): 20 TABLET, DELAYED RELEASE ORAL at 12:25

## 2021-09-25 RX ADMIN — HYDROMORPHONE HYDROCHLORIDE 1 MILLIGRAM(S): 2 INJECTION INTRAMUSCULAR; INTRAVENOUS; SUBCUTANEOUS at 20:46

## 2021-09-25 RX ADMIN — HYDROMORPHONE HYDROCHLORIDE 1 MILLIGRAM(S): 2 INJECTION INTRAMUSCULAR; INTRAVENOUS; SUBCUTANEOUS at 07:49

## 2021-09-25 RX ADMIN — ENOXAPARIN SODIUM 40 MILLIGRAM(S): 100 INJECTION SUBCUTANEOUS at 12:20

## 2021-09-25 RX ADMIN — ONDANSETRON 4 MILLIGRAM(S): 8 TABLET, FILM COATED ORAL at 20:46

## 2021-09-25 RX ADMIN — Medication 3 CAPSULE(S): at 07:49

## 2021-09-25 RX ADMIN — Medication 3 CAPSULE(S): at 12:20

## 2021-09-25 RX ADMIN — Medication 3 CAPSULE(S): at 17:28

## 2021-09-25 RX ADMIN — HYDROMORPHONE HYDROCHLORIDE 1 MILLIGRAM(S): 2 INJECTION INTRAMUSCULAR; INTRAVENOUS; SUBCUTANEOUS at 12:50

## 2021-09-25 RX ADMIN — SODIUM CHLORIDE 200 MILLILITER(S): 9 INJECTION, SOLUTION INTRAVENOUS at 15:03

## 2021-09-25 RX ADMIN — HYDROMORPHONE HYDROCHLORIDE 1 MILLIGRAM(S): 2 INJECTION INTRAMUSCULAR; INTRAVENOUS; SUBCUTANEOUS at 08:20

## 2021-09-25 RX ADMIN — Medication 50 GRAM(S): at 18:53

## 2021-09-25 RX ADMIN — HYDROMORPHONE HYDROCHLORIDE 1 MILLIGRAM(S): 2 INJECTION INTRAMUSCULAR; INTRAVENOUS; SUBCUTANEOUS at 16:15

## 2021-09-25 RX ADMIN — Medication 50 GRAM(S): at 22:18
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
## 2021-09-26 LAB
ALBUMIN SERPL ELPH-MCNC: 3.9 G/DL — SIGNIFICANT CHANGE UP (ref 3.5–5.2)
ALP SERPL-CCNC: 125 U/L — HIGH (ref 30–115)
ALT FLD-CCNC: 91 U/L — HIGH (ref 0–41)
ANION GAP SERPL CALC-SCNC: 17 MMOL/L — HIGH (ref 7–14)
AST SERPL-CCNC: 306 U/L — HIGH (ref 0–41)
BASOPHILS # BLD AUTO: 0.03 K/UL — SIGNIFICANT CHANGE UP (ref 0–0.2)
BASOPHILS NFR BLD AUTO: 1.2 % — HIGH (ref 0–1)
BILIRUB SERPL-MCNC: 0.5 MG/DL — SIGNIFICANT CHANGE UP (ref 0.2–1.2)
BUN SERPL-MCNC: <3 MG/DL — LOW (ref 10–20)
CALCIUM SERPL-MCNC: 9.1 MG/DL — SIGNIFICANT CHANGE UP (ref 8.5–10.1)
CHLORIDE SERPL-SCNC: 98 MMOL/L — SIGNIFICANT CHANGE UP (ref 98–110)
CO2 SERPL-SCNC: 23 MMOL/L — SIGNIFICANT CHANGE UP (ref 17–32)
CREAT SERPL-MCNC: 0.6 MG/DL — LOW (ref 0.7–1.5)
EOSINOPHIL # BLD AUTO: 0.07 K/UL — SIGNIFICANT CHANGE UP (ref 0–0.7)
EOSINOPHIL NFR BLD AUTO: 2.9 % — SIGNIFICANT CHANGE UP (ref 0–8)
GLUCOSE SERPL-MCNC: 98 MG/DL — SIGNIFICANT CHANGE UP (ref 70–99)
HCT VFR BLD CALC: 32.4 % — LOW (ref 37–47)
HGB BLD-MCNC: 11.1 G/DL — LOW (ref 12–16)
IMM GRANULOCYTES NFR BLD AUTO: 0 % — LOW (ref 0.1–0.3)
LYMPHOCYTES # BLD AUTO: 0.58 K/UL — LOW (ref 1.2–3.4)
LYMPHOCYTES # BLD AUTO: 24 % — SIGNIFICANT CHANGE UP (ref 20.5–51.1)
MAGNESIUM SERPL-MCNC: 1.8 MG/DL — SIGNIFICANT CHANGE UP (ref 1.8–2.4)
MCHC RBC-ENTMCNC: 31.9 PG — HIGH (ref 27–31)
MCHC RBC-ENTMCNC: 34.3 G/DL — SIGNIFICANT CHANGE UP (ref 32–37)
MCV RBC AUTO: 93.1 FL — SIGNIFICANT CHANGE UP (ref 81–99)
MONOCYTES # BLD AUTO: 0.34 K/UL — SIGNIFICANT CHANGE UP (ref 0.1–0.6)
MONOCYTES NFR BLD AUTO: 14 % — HIGH (ref 1.7–9.3)
NEUTROPHILS # BLD AUTO: 1.4 K/UL — SIGNIFICANT CHANGE UP (ref 1.4–6.5)
NEUTROPHILS NFR BLD AUTO: 57.9 % — SIGNIFICANT CHANGE UP (ref 42.2–75.2)
NRBC # BLD: 0 /100 WBCS — SIGNIFICANT CHANGE UP (ref 0–0)
PLATELET # BLD AUTO: 174 K/UL — SIGNIFICANT CHANGE UP (ref 130–400)
POTASSIUM SERPL-MCNC: 3.5 MMOL/L — SIGNIFICANT CHANGE UP (ref 3.5–5)
POTASSIUM SERPL-SCNC: 3.5 MMOL/L — SIGNIFICANT CHANGE UP (ref 3.5–5)
PROT SERPL-MCNC: 6.7 G/DL — SIGNIFICANT CHANGE UP (ref 6–8)
RBC # BLD: 3.48 M/UL — LOW (ref 4.2–5.4)
RBC # FLD: 12.1 % — SIGNIFICANT CHANGE UP (ref 11.5–14.5)
SODIUM SERPL-SCNC: 138 MMOL/L — SIGNIFICANT CHANGE UP (ref 135–146)
WBC # BLD: 2.42 K/UL — LOW (ref 4.8–10.8)
WBC # FLD AUTO: 2.42 K/UL — LOW (ref 4.8–10.8)

## 2021-09-26 PROCEDURE — 99233 SBSQ HOSP IP/OBS HIGH 50: CPT

## 2021-09-26 RX ORDER — FOLIC ACID 0.8 MG
1 TABLET ORAL DAILY
Refills: 0 | Status: DISCONTINUED | OUTPATIENT
Start: 2021-09-26 | End: 2021-09-30

## 2021-09-26 RX ORDER — FAMOTIDINE 10 MG/ML
20 INJECTION INTRAVENOUS DAILY
Refills: 0 | Status: DISCONTINUED | OUTPATIENT
Start: 2021-09-26 | End: 2021-09-28

## 2021-09-26 RX ORDER — CALCIUM CARBONATE 500(1250)
1 TABLET ORAL
Refills: 0 | Status: DISCONTINUED | OUTPATIENT
Start: 2021-09-26 | End: 2021-10-07

## 2021-09-26 RX ORDER — THIAMINE MONONITRATE (VIT B1) 100 MG
100 TABLET ORAL DAILY
Refills: 0 | Status: DISCONTINUED | OUTPATIENT
Start: 2021-09-26 | End: 2021-09-30

## 2021-09-26 RX ORDER — HYDROMORPHONE HYDROCHLORIDE 2 MG/ML
1 INJECTION INTRAMUSCULAR; INTRAVENOUS; SUBCUTANEOUS ONCE
Refills: 0 | Status: DISCONTINUED | OUTPATIENT
Start: 2021-09-26 | End: 2021-09-26

## 2021-09-26 RX ADMIN — HYDROMORPHONE HYDROCHLORIDE 1 MILLIGRAM(S): 2 INJECTION INTRAMUSCULAR; INTRAVENOUS; SUBCUTANEOUS at 22:39

## 2021-09-26 RX ADMIN — Medication 3 CAPSULE(S): at 17:46

## 2021-09-26 RX ADMIN — HYDROMORPHONE HYDROCHLORIDE 1 MILLIGRAM(S): 2 INJECTION INTRAMUSCULAR; INTRAVENOUS; SUBCUTANEOUS at 14:00

## 2021-09-26 RX ADMIN — Medication 3 CAPSULE(S): at 07:53

## 2021-09-26 RX ADMIN — HYDROMORPHONE HYDROCHLORIDE 1 MILLIGRAM(S): 2 INJECTION INTRAMUSCULAR; INTRAVENOUS; SUBCUTANEOUS at 10:20

## 2021-09-26 RX ADMIN — Medication 1 MILLIGRAM(S): at 13:33

## 2021-09-26 RX ADMIN — ENOXAPARIN SODIUM 40 MILLIGRAM(S): 100 INJECTION SUBCUTANEOUS at 12:04

## 2021-09-26 RX ADMIN — Medication 100 MILLIGRAM(S): at 13:33

## 2021-09-26 RX ADMIN — HYDROMORPHONE HYDROCHLORIDE 1 MILLIGRAM(S): 2 INJECTION INTRAMUSCULAR; INTRAVENOUS; SUBCUTANEOUS at 13:32

## 2021-09-26 RX ADMIN — HYDROMORPHONE HYDROCHLORIDE 1 MILLIGRAM(S): 2 INJECTION INTRAMUSCULAR; INTRAVENOUS; SUBCUTANEOUS at 01:01

## 2021-09-26 RX ADMIN — HYDROMORPHONE HYDROCHLORIDE 1 MILLIGRAM(S): 2 INJECTION INTRAMUSCULAR; INTRAVENOUS; SUBCUTANEOUS at 18:34

## 2021-09-26 RX ADMIN — HYDROMORPHONE HYDROCHLORIDE 1 MILLIGRAM(S): 2 INJECTION INTRAMUSCULAR; INTRAVENOUS; SUBCUTANEOUS at 05:21

## 2021-09-26 RX ADMIN — SODIUM CHLORIDE 200 MILLILITER(S): 9 INJECTION, SOLUTION INTRAVENOUS at 14:00

## 2021-09-26 RX ADMIN — ONDANSETRON 4 MILLIGRAM(S): 8 TABLET, FILM COATED ORAL at 17:49

## 2021-09-26 RX ADMIN — FAMOTIDINE 20 MILLIGRAM(S): 10 INJECTION INTRAVENOUS at 13:33

## 2021-09-26 RX ADMIN — HYDROMORPHONE HYDROCHLORIDE 1 MILLIGRAM(S): 2 INJECTION INTRAMUSCULAR; INTRAVENOUS; SUBCUTANEOUS at 09:50

## 2021-09-26 RX ADMIN — PANTOPRAZOLE SODIUM 40 MILLIGRAM(S): 20 TABLET, DELAYED RELEASE ORAL at 12:04

## 2021-09-26 RX ADMIN — HYDROMORPHONE HYDROCHLORIDE 1 MILLIGRAM(S): 2 INJECTION INTRAMUSCULAR; INTRAVENOUS; SUBCUTANEOUS at 14:36

## 2021-09-26 RX ADMIN — Medication 3 CAPSULE(S): at 12:04

## 2021-09-26 RX ADMIN — HYDROMORPHONE HYDROCHLORIDE 1 MILLIGRAM(S): 2 INJECTION INTRAMUSCULAR; INTRAVENOUS; SUBCUTANEOUS at 15:00
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
## 2021-09-27 LAB
ALBUMIN SERPL ELPH-MCNC: 3.9 G/DL — SIGNIFICANT CHANGE UP (ref 3.5–5.2)
ALP SERPL-CCNC: 126 U/L — HIGH (ref 30–115)
ALT FLD-CCNC: 87 U/L — HIGH (ref 0–41)
ANION GAP SERPL CALC-SCNC: 17 MMOL/L — HIGH (ref 7–14)
AST SERPL-CCNC: 251 U/L — HIGH (ref 0–41)
BASOPHILS # BLD AUTO: 0.03 K/UL — SIGNIFICANT CHANGE UP (ref 0–0.2)
BASOPHILS NFR BLD AUTO: 1.3 % — HIGH (ref 0–1)
BILIRUB SERPL-MCNC: 0.6 MG/DL — SIGNIFICANT CHANGE UP (ref 0.2–1.2)
BUN SERPL-MCNC: <3 MG/DL — LOW (ref 10–20)
CALCIUM SERPL-MCNC: 9.1 MG/DL — SIGNIFICANT CHANGE UP (ref 8.5–10.1)
CHLORIDE SERPL-SCNC: 101 MMOL/L — SIGNIFICANT CHANGE UP (ref 98–110)
CO2 SERPL-SCNC: 22 MMOL/L — SIGNIFICANT CHANGE UP (ref 17–32)
CREAT SERPL-MCNC: 0.6 MG/DL — LOW (ref 0.7–1.5)
EOSINOPHIL # BLD AUTO: 0.07 K/UL — SIGNIFICANT CHANGE UP (ref 0–0.7)
EOSINOPHIL NFR BLD AUTO: 3.1 % — SIGNIFICANT CHANGE UP (ref 0–8)
GLUCOSE SERPL-MCNC: 92 MG/DL — SIGNIFICANT CHANGE UP (ref 70–99)
HCT VFR BLD CALC: 32.1 % — LOW (ref 37–47)
HGB BLD-MCNC: 10.8 G/DL — LOW (ref 12–16)
IMM GRANULOCYTES NFR BLD AUTO: 0 % — LOW (ref 0.1–0.3)
LYMPHOCYTES # BLD AUTO: 0.84 K/UL — LOW (ref 1.2–3.4)
LYMPHOCYTES # BLD AUTO: 37.5 % — SIGNIFICANT CHANGE UP (ref 20.5–51.1)
MAGNESIUM SERPL-MCNC: 1.5 MG/DL — LOW (ref 1.8–2.4)
MCHC RBC-ENTMCNC: 31.7 PG — HIGH (ref 27–31)
MCHC RBC-ENTMCNC: 33.6 G/DL — SIGNIFICANT CHANGE UP (ref 32–37)
MCV RBC AUTO: 94.1 FL — SIGNIFICANT CHANGE UP (ref 81–99)
MONOCYTES # BLD AUTO: 0.27 K/UL — SIGNIFICANT CHANGE UP (ref 0.1–0.6)
MONOCYTES NFR BLD AUTO: 12.1 % — HIGH (ref 1.7–9.3)
NEUTROPHILS # BLD AUTO: 1.03 K/UL — LOW (ref 1.4–6.5)
NEUTROPHILS NFR BLD AUTO: 46 % — SIGNIFICANT CHANGE UP (ref 42.2–75.2)
NRBC # BLD: 0 /100 WBCS — SIGNIFICANT CHANGE UP (ref 0–0)
PLATELET # BLD AUTO: 153 K/UL — SIGNIFICANT CHANGE UP (ref 130–400)
POTASSIUM SERPL-MCNC: 3.5 MMOL/L — SIGNIFICANT CHANGE UP (ref 3.5–5)
POTASSIUM SERPL-SCNC: 3.5 MMOL/L — SIGNIFICANT CHANGE UP (ref 3.5–5)
PROT SERPL-MCNC: 6.4 G/DL — SIGNIFICANT CHANGE UP (ref 6–8)
RBC # BLD: 3.41 M/UL — LOW (ref 4.2–5.4)
RBC # FLD: 12.2 % — SIGNIFICANT CHANGE UP (ref 11.5–14.5)
SODIUM SERPL-SCNC: 140 MMOL/L — SIGNIFICANT CHANGE UP (ref 135–146)
WBC # BLD: 2.24 K/UL — LOW (ref 4.8–10.8)
WBC # FLD AUTO: 2.24 K/UL — LOW (ref 4.8–10.8)

## 2021-09-27 PROCEDURE — 99233 SBSQ HOSP IP/OBS HIGH 50: CPT

## 2021-09-27 RX ORDER — MAGNESIUM SULFATE 500 MG/ML
2 VIAL (ML) INJECTION ONCE
Refills: 0 | Status: COMPLETED | OUTPATIENT
Start: 2021-09-27 | End: 2021-09-27

## 2021-09-27 RX ADMIN — HYDROMORPHONE HYDROCHLORIDE 1 MILLIGRAM(S): 2 INJECTION INTRAMUSCULAR; INTRAVENOUS; SUBCUTANEOUS at 06:40

## 2021-09-27 RX ADMIN — HYDROMORPHONE HYDROCHLORIDE 1 MILLIGRAM(S): 2 INJECTION INTRAMUSCULAR; INTRAVENOUS; SUBCUTANEOUS at 23:01

## 2021-09-27 RX ADMIN — ENOXAPARIN SODIUM 40 MILLIGRAM(S): 100 INJECTION SUBCUTANEOUS at 11:20

## 2021-09-27 RX ADMIN — HYDROMORPHONE HYDROCHLORIDE 1 MILLIGRAM(S): 2 INJECTION INTRAMUSCULAR; INTRAVENOUS; SUBCUTANEOUS at 18:16

## 2021-09-27 RX ADMIN — Medication 1 MILLIGRAM(S): at 11:20

## 2021-09-27 RX ADMIN — HYDROMORPHONE HYDROCHLORIDE 1 MILLIGRAM(S): 2 INJECTION INTRAMUSCULAR; INTRAVENOUS; SUBCUTANEOUS at 09:49

## 2021-09-27 RX ADMIN — Medication 100 MILLIGRAM(S): at 11:20

## 2021-09-27 RX ADMIN — ONDANSETRON 4 MILLIGRAM(S): 8 TABLET, FILM COATED ORAL at 11:56

## 2021-09-27 RX ADMIN — PANTOPRAZOLE SODIUM 40 MILLIGRAM(S): 20 TABLET, DELAYED RELEASE ORAL at 11:20

## 2021-09-27 RX ADMIN — FAMOTIDINE 20 MILLIGRAM(S): 10 INJECTION INTRAVENOUS at 11:20

## 2021-09-27 RX ADMIN — HYDROMORPHONE HYDROCHLORIDE 1 MILLIGRAM(S): 2 INJECTION INTRAMUSCULAR; INTRAVENOUS; SUBCUTANEOUS at 01:00

## 2021-09-27 RX ADMIN — ONDANSETRON 4 MILLIGRAM(S): 8 TABLET, FILM COATED ORAL at 02:39

## 2021-09-27 RX ADMIN — HYDROMORPHONE HYDROCHLORIDE 1 MILLIGRAM(S): 2 INJECTION INTRAMUSCULAR; INTRAVENOUS; SUBCUTANEOUS at 18:19

## 2021-09-27 RX ADMIN — HYDROMORPHONE HYDROCHLORIDE 1 MILLIGRAM(S): 2 INJECTION INTRAMUSCULAR; INTRAVENOUS; SUBCUTANEOUS at 02:40

## 2021-09-27 RX ADMIN — Medication 3 CAPSULE(S): at 17:25

## 2021-09-27 RX ADMIN — HYDROMORPHONE HYDROCHLORIDE 1 MILLIGRAM(S): 2 INJECTION INTRAMUSCULAR; INTRAVENOUS; SUBCUTANEOUS at 14:10

## 2021-09-27 RX ADMIN — Medication 50 GRAM(S): at 13:11

## 2021-09-27 RX ADMIN — HYDROMORPHONE HYDROCHLORIDE 1 MILLIGRAM(S): 2 INJECTION INTRAMUSCULAR; INTRAVENOUS; SUBCUTANEOUS at 22:46

## 2021-09-27 RX ADMIN — HYDROMORPHONE HYDROCHLORIDE 1 MILLIGRAM(S): 2 INJECTION INTRAMUSCULAR; INTRAVENOUS; SUBCUTANEOUS at 10:21

## 2021-09-27 RX ADMIN — HYDROMORPHONE HYDROCHLORIDE 1 MILLIGRAM(S): 2 INJECTION INTRAMUSCULAR; INTRAVENOUS; SUBCUTANEOUS at 15:45

## 2021-09-27 RX ADMIN — HYDROMORPHONE HYDROCHLORIDE 1 MILLIGRAM(S): 2 INJECTION INTRAMUSCULAR; INTRAVENOUS; SUBCUTANEOUS at 07:45
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
## 2021-09-28 LAB
ALBUMIN SERPL ELPH-MCNC: 4 G/DL — SIGNIFICANT CHANGE UP (ref 3.5–5.2)
ALP SERPL-CCNC: 126 U/L — HIGH (ref 30–115)
ALT FLD-CCNC: 87 U/L — HIGH (ref 0–41)
ANION GAP SERPL CALC-SCNC: 13 MMOL/L — SIGNIFICANT CHANGE UP (ref 7–14)
AST SERPL-CCNC: 239 U/L — HIGH (ref 0–41)
BASOPHILS # BLD AUTO: 0.02 K/UL — SIGNIFICANT CHANGE UP (ref 0–0.2)
BASOPHILS NFR BLD AUTO: 0.8 % — SIGNIFICANT CHANGE UP (ref 0–1)
BILIRUB SERPL-MCNC: 0.6 MG/DL — SIGNIFICANT CHANGE UP (ref 0.2–1.2)
BUN SERPL-MCNC: <3 MG/DL — LOW (ref 10–20)
CALCIUM SERPL-MCNC: 9.5 MG/DL — SIGNIFICANT CHANGE UP (ref 8.5–10.1)
CHLORIDE SERPL-SCNC: 102 MMOL/L — SIGNIFICANT CHANGE UP (ref 98–110)
CO2 SERPL-SCNC: 24 MMOL/L — SIGNIFICANT CHANGE UP (ref 17–32)
CREAT SERPL-MCNC: 0.5 MG/DL — LOW (ref 0.7–1.5)
EOSINOPHIL # BLD AUTO: 0.09 K/UL — SIGNIFICANT CHANGE UP (ref 0–0.7)
EOSINOPHIL NFR BLD AUTO: 3.5 % — SIGNIFICANT CHANGE UP (ref 0–8)
GLUCOSE SERPL-MCNC: 89 MG/DL — SIGNIFICANT CHANGE UP (ref 70–99)
HCT VFR BLD CALC: 32.8 % — LOW (ref 37–47)
HGB BLD-MCNC: 11.1 G/DL — LOW (ref 12–16)
IMM GRANULOCYTES NFR BLD AUTO: 0 % — LOW (ref 0.1–0.3)
LYMPHOCYTES # BLD AUTO: 0.81 K/UL — LOW (ref 1.2–3.4)
LYMPHOCYTES # BLD AUTO: 31.8 % — SIGNIFICANT CHANGE UP (ref 20.5–51.1)
MAGNESIUM SERPL-MCNC: 1.6 MG/DL — LOW (ref 1.8–2.4)
MCHC RBC-ENTMCNC: 31.8 PG — HIGH (ref 27–31)
MCHC RBC-ENTMCNC: 33.8 G/DL — SIGNIFICANT CHANGE UP (ref 32–37)
MCV RBC AUTO: 94 FL — SIGNIFICANT CHANGE UP (ref 81–99)
MONOCYTES # BLD AUTO: 0.35 K/UL — SIGNIFICANT CHANGE UP (ref 0.1–0.6)
MONOCYTES NFR BLD AUTO: 13.7 % — HIGH (ref 1.7–9.3)
NEUTROPHILS # BLD AUTO: 1.28 K/UL — LOW (ref 1.4–6.5)
NEUTROPHILS NFR BLD AUTO: 50.2 % — SIGNIFICANT CHANGE UP (ref 42.2–75.2)
NRBC # BLD: 0 /100 WBCS — SIGNIFICANT CHANGE UP (ref 0–0)
PLATELET # BLD AUTO: 174 K/UL — SIGNIFICANT CHANGE UP (ref 130–400)
POTASSIUM SERPL-MCNC: 3.6 MMOL/L — SIGNIFICANT CHANGE UP (ref 3.5–5)
POTASSIUM SERPL-SCNC: 3.6 MMOL/L — SIGNIFICANT CHANGE UP (ref 3.5–5)
PROT SERPL-MCNC: 6.8 G/DL — SIGNIFICANT CHANGE UP (ref 6–8)
RBC # BLD: 3.49 M/UL — LOW (ref 4.2–5.4)
RBC # FLD: 12.4 % — SIGNIFICANT CHANGE UP (ref 11.5–14.5)
SODIUM SERPL-SCNC: 139 MMOL/L — SIGNIFICANT CHANGE UP (ref 135–146)
WBC # BLD: 2.55 K/UL — LOW (ref 4.8–10.8)
WBC # FLD AUTO: 2.55 K/UL — LOW (ref 4.8–10.8)

## 2021-09-28 PROCEDURE — 74177 CT ABD & PELVIS W/CONTRAST: CPT | Mod: 26

## 2021-09-28 PROCEDURE — 99233 SBSQ HOSP IP/OBS HIGH 50: CPT

## 2021-09-28 RX ORDER — ELECTROLYTE SOLUTION,INJ
1 VIAL (ML) INTRAVENOUS
Refills: 0 | Status: DISCONTINUED | OUTPATIENT
Start: 2021-09-28 | End: 2021-09-28

## 2021-09-28 RX ORDER — MAGNESIUM SULFATE 500 MG/ML
2 VIAL (ML) INJECTION ONCE
Refills: 0 | Status: COMPLETED | OUTPATIENT
Start: 2021-09-28 | End: 2021-09-28

## 2021-09-28 RX ORDER — I.V. FAT EMULSION 20 G/100ML
1.7 EMULSION INTRAVENOUS
Qty: 100.12 | Refills: 0 | Status: DISCONTINUED | OUTPATIENT
Start: 2021-09-28 | End: 2021-09-28

## 2021-09-28 RX ADMIN — PANTOPRAZOLE SODIUM 40 MILLIGRAM(S): 20 TABLET, DELAYED RELEASE ORAL at 12:08

## 2021-09-28 RX ADMIN — HYDROMORPHONE HYDROCHLORIDE 1 MILLIGRAM(S): 2 INJECTION INTRAMUSCULAR; INTRAVENOUS; SUBCUTANEOUS at 09:30

## 2021-09-28 RX ADMIN — HYDROMORPHONE HYDROCHLORIDE 1 MILLIGRAM(S): 2 INJECTION INTRAMUSCULAR; INTRAVENOUS; SUBCUTANEOUS at 12:08

## 2021-09-28 RX ADMIN — Medication 25 GRAM(S): at 09:25

## 2021-09-28 RX ADMIN — HYDROMORPHONE HYDROCHLORIDE 1 MILLIGRAM(S): 2 INJECTION INTRAMUSCULAR; INTRAVENOUS; SUBCUTANEOUS at 21:19

## 2021-09-28 RX ADMIN — ENOXAPARIN SODIUM 40 MILLIGRAM(S): 100 INJECTION SUBCUTANEOUS at 12:14

## 2021-09-28 RX ADMIN — FAMOTIDINE 20 MILLIGRAM(S): 10 INJECTION INTRAVENOUS at 12:07

## 2021-09-28 RX ADMIN — Medication 1 MILLIGRAM(S): at 12:07

## 2021-09-28 RX ADMIN — HYDROMORPHONE HYDROCHLORIDE 1 MILLIGRAM(S): 2 INJECTION INTRAMUSCULAR; INTRAVENOUS; SUBCUTANEOUS at 17:14

## 2021-09-28 RX ADMIN — HYDROMORPHONE HYDROCHLORIDE 1 MILLIGRAM(S): 2 INJECTION INTRAMUSCULAR; INTRAVENOUS; SUBCUTANEOUS at 13:00

## 2021-09-28 RX ADMIN — HYDROMORPHONE HYDROCHLORIDE 1 MILLIGRAM(S): 2 INJECTION INTRAMUSCULAR; INTRAVENOUS; SUBCUTANEOUS at 18:00

## 2021-09-28 RX ADMIN — HYDROMORPHONE HYDROCHLORIDE 1 MILLIGRAM(S): 2 INJECTION INTRAMUSCULAR; INTRAVENOUS; SUBCUTANEOUS at 03:17

## 2021-09-28 RX ADMIN — ONDANSETRON 4 MILLIGRAM(S): 8 TABLET, FILM COATED ORAL at 06:43

## 2021-09-28 RX ADMIN — Medication 100 MILLIGRAM(S): at 12:07

## 2021-09-28 RX ADMIN — HYDROMORPHONE HYDROCHLORIDE 1 MILLIGRAM(S): 2 INJECTION INTRAMUSCULAR; INTRAVENOUS; SUBCUTANEOUS at 07:42
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
## 2021-09-29 ENCOUNTER — APPOINTMENT (OUTPATIENT)
Dept: INTERNAL MEDICINE | Facility: CLINIC | Age: 46
End: 2021-09-29

## 2021-09-29 LAB
ALBUMIN SERPL ELPH-MCNC: 3.9 G/DL — SIGNIFICANT CHANGE UP (ref 3.5–5.2)
ALP SERPL-CCNC: 118 U/L — HIGH (ref 30–115)
ALT FLD-CCNC: 73 U/L — HIGH (ref 0–41)
ANION GAP SERPL CALC-SCNC: 14 MMOL/L — SIGNIFICANT CHANGE UP (ref 7–14)
AST SERPL-CCNC: 194 U/L — HIGH (ref 0–41)
BASOPHILS # BLD AUTO: 0.03 K/UL — SIGNIFICANT CHANGE UP (ref 0–0.2)
BASOPHILS NFR BLD AUTO: 1.1 % — HIGH (ref 0–1)
BILIRUB SERPL-MCNC: 0.5 MG/DL — SIGNIFICANT CHANGE UP (ref 0.2–1.2)
BUN SERPL-MCNC: <3 MG/DL — LOW (ref 10–20)
CALCIUM SERPL-MCNC: 9.2 MG/DL — SIGNIFICANT CHANGE UP (ref 8.5–10.1)
CHLORIDE SERPL-SCNC: 100 MMOL/L — SIGNIFICANT CHANGE UP (ref 98–110)
CO2 SERPL-SCNC: 20 MMOL/L — SIGNIFICANT CHANGE UP (ref 17–32)
CREAT SERPL-MCNC: 0.5 MG/DL — LOW (ref 0.7–1.5)
EOSINOPHIL # BLD AUTO: 0.1 K/UL — SIGNIFICANT CHANGE UP (ref 0–0.7)
EOSINOPHIL NFR BLD AUTO: 3.7 % — SIGNIFICANT CHANGE UP (ref 0–8)
GLUCOSE SERPL-MCNC: 135 MG/DL — HIGH (ref 70–99)
HCT VFR BLD CALC: 32.3 % — LOW (ref 37–47)
HGB BLD-MCNC: 11.2 G/DL — LOW (ref 12–16)
IMM GRANULOCYTES NFR BLD AUTO: 0.4 % — HIGH (ref 0.1–0.3)
LYMPHOCYTES # BLD AUTO: 0.71 K/UL — LOW (ref 1.2–3.4)
LYMPHOCYTES # BLD AUTO: 26.2 % — SIGNIFICANT CHANGE UP (ref 20.5–51.1)
MAGNESIUM SERPL-MCNC: 1.6 MG/DL — LOW (ref 1.8–2.4)
MCHC RBC-ENTMCNC: 32.2 PG — HIGH (ref 27–31)
MCHC RBC-ENTMCNC: 34.7 G/DL — SIGNIFICANT CHANGE UP (ref 32–37)
MCV RBC AUTO: 92.8 FL — SIGNIFICANT CHANGE UP (ref 81–99)
MONOCYTES # BLD AUTO: 0.38 K/UL — SIGNIFICANT CHANGE UP (ref 0.1–0.6)
MONOCYTES NFR BLD AUTO: 14 % — HIGH (ref 1.7–9.3)
NEUTROPHILS # BLD AUTO: 1.48 K/UL — SIGNIFICANT CHANGE UP (ref 1.4–6.5)
NEUTROPHILS NFR BLD AUTO: 54.6 % — SIGNIFICANT CHANGE UP (ref 42.2–75.2)
NRBC # BLD: 0 /100 WBCS — SIGNIFICANT CHANGE UP (ref 0–0)
PHOSPHATE SERPL-MCNC: 4.2 MG/DL — SIGNIFICANT CHANGE UP (ref 2.1–4.9)
PLATELET # BLD AUTO: 180 K/UL — SIGNIFICANT CHANGE UP (ref 130–400)
POTASSIUM SERPL-MCNC: 3.4 MMOL/L — LOW (ref 3.5–5)
POTASSIUM SERPL-SCNC: 3.4 MMOL/L — LOW (ref 3.5–5)
PROT SERPL-MCNC: 6.8 G/DL — SIGNIFICANT CHANGE UP (ref 6–8)
RBC # BLD: 3.48 M/UL — LOW (ref 4.2–5.4)
RBC # FLD: 12.3 % — SIGNIFICANT CHANGE UP (ref 11.5–14.5)
SODIUM SERPL-SCNC: 134 MMOL/L — LOW (ref 135–146)
WBC # BLD: 2.71 K/UL — LOW (ref 4.8–10.8)
WBC # FLD AUTO: 2.71 K/UL — LOW (ref 4.8–10.8)

## 2021-09-29 PROCEDURE — 99233 SBSQ HOSP IP/OBS HIGH 50: CPT

## 2021-09-29 PROCEDURE — 99232 SBSQ HOSP IP/OBS MODERATE 35: CPT

## 2021-09-29 RX ORDER — I.V. FAT EMULSION 20 G/100ML
1.7 EMULSION INTRAVENOUS
Qty: 100.12 | Refills: 0 | Status: DISCONTINUED | OUTPATIENT
Start: 2021-09-29 | End: 2021-09-29

## 2021-09-29 RX ORDER — ELECTROLYTE SOLUTION,INJ
1 VIAL (ML) INTRAVENOUS
Refills: 0 | Status: DISCONTINUED | OUTPATIENT
Start: 2021-09-29 | End: 2021-09-29

## 2021-09-29 RX ADMIN — HYDROMORPHONE HYDROCHLORIDE 1 MILLIGRAM(S): 2 INJECTION INTRAMUSCULAR; INTRAVENOUS; SUBCUTANEOUS at 10:36

## 2021-09-29 RX ADMIN — HYDROMORPHONE HYDROCHLORIDE 1 MILLIGRAM(S): 2 INJECTION INTRAMUSCULAR; INTRAVENOUS; SUBCUTANEOUS at 19:11

## 2021-09-29 RX ADMIN — HYDROMORPHONE HYDROCHLORIDE 1 MILLIGRAM(S): 2 INJECTION INTRAMUSCULAR; INTRAVENOUS; SUBCUTANEOUS at 11:41

## 2021-09-29 RX ADMIN — Medication 3 CAPSULE(S): at 17:36

## 2021-09-29 RX ADMIN — HYDROMORPHONE HYDROCHLORIDE 1 MILLIGRAM(S): 2 INJECTION INTRAMUSCULAR; INTRAVENOUS; SUBCUTANEOUS at 05:27

## 2021-09-29 RX ADMIN — HYDROMORPHONE HYDROCHLORIDE 1 MILLIGRAM(S): 2 INJECTION INTRAMUSCULAR; INTRAVENOUS; SUBCUTANEOUS at 01:30

## 2021-09-29 RX ADMIN — ONDANSETRON 4 MILLIGRAM(S): 8 TABLET, FILM COATED ORAL at 10:57

## 2021-09-29 RX ADMIN — HYDROMORPHONE HYDROCHLORIDE 1 MILLIGRAM(S): 2 INJECTION INTRAMUSCULAR; INTRAVENOUS; SUBCUTANEOUS at 23:00

## 2021-09-29 RX ADMIN — Medication 3 CAPSULE(S): at 11:36

## 2021-09-29 RX ADMIN — Medication 1 MILLIGRAM(S): at 11:36

## 2021-09-29 RX ADMIN — HYDROMORPHONE HYDROCHLORIDE 1 MILLIGRAM(S): 2 INJECTION INTRAMUSCULAR; INTRAVENOUS; SUBCUTANEOUS at 14:59

## 2021-09-29 RX ADMIN — Medication 3 CAPSULE(S): at 07:48

## 2021-09-29 RX ADMIN — ENOXAPARIN SODIUM 40 MILLIGRAM(S): 100 INJECTION SUBCUTANEOUS at 11:37

## 2021-09-29 RX ADMIN — Medication 100 MILLIGRAM(S): at 11:36

## 2021-09-29 RX ADMIN — PANTOPRAZOLE SODIUM 40 MILLIGRAM(S): 20 TABLET, DELAYED RELEASE ORAL at 11:36

## 2021-09-29 RX ADMIN — HYDROMORPHONE HYDROCHLORIDE 1 MILLIGRAM(S): 2 INJECTION INTRAMUSCULAR; INTRAVENOUS; SUBCUTANEOUS at 16:04

## 2021-09-29 RX ADMIN — HYDROMORPHONE HYDROCHLORIDE 1 MILLIGRAM(S): 2 INJECTION INTRAMUSCULAR; INTRAVENOUS; SUBCUTANEOUS at 18:59
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
## 2021-09-29 NOTE — PROGRESS NOTE ADULT - ASSESSMENT
Patient is a 46 year old female with Past Medical History of currrent ETOH abuse and multiple episodes of alcoholic pancreatitis, chronic pancreatitis, with known pseudocyst presents with epigastric abdominal pain. Patient had recent EUS done 8/9. Patient on EUS had a walled-off collection that measured 4.5 x 3.9 cm in largest dimension, looked well circumscribed, heterogeneous, containing debris, consistent with walled-off pancreatic necrosis.  She also had pancreatic parenchymal hypoechogenicity and calcifications in the pancreatic head, body, and tail, suggestive of chronic pancreatitis. There was a reactive appearing, periportal lymph node that looked irregular, homogeneous, well circumscribed, measuring around 1.2 cm in largest dimension. No drainage was warranted. Now she presents again with epigastric pain    Chronic alcoholic pancreatitis with pseudocyst slightly increasing in size.  - EUS done 8/9- drainage not warranted   - CT scan with IV contrast 9/28/2021 : 6.5cm X6.5cm subserosal gastric pseudocyst  -hemodynamically stable and no signs of sepsis    Rec:  -lactose free, low fat full liquids.  -Pain management.  -c/w parenteral nutrition  -oral pantoprazole 40mg daily.  -Will evaluate for EUS guided pseudocyst drainage if the pain doesn't improve.
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
## 2021-09-29 NOTE — CHART NOTE - NSCHARTNOTEFT_GEN_A_CORE
c/o post prandial pain and fatigue today  IV access an issue, difficulty obtaining access last night and placed overnight  PPN started once access was established  Today taking minimal full liquids and +pain after taking PO    T(F): 98.1 (09-29-21 @ 12:49), Max: 98.1 (09-29-21 @ 12:49)  HR: 85 (09-29-21 @ 12:49) (74 - 85)  BP: 120/79 (09-29-21 @ 12:49) (120/79 - 135/89)  RR: 18 (09-29-21 @ 12:49) (18 - 19)  SpO2: 97% (09-28-21 @ 20:57) (97% - 97%)    I&O's Detail    28 Sep 2021 07:01  -  29 Sep 2021 07:00  --------------------------------------------------------  IN:    Oral Fluid: 500 mL  Total IN: 500 mL    OUT:  Total OUT: 0 mL    Total NET: 500 mL    09-29    134<L>  |  100  |  <3<L>  ----------------------------<  135<H>  3.4<L>   |  20  |  0.5<L>    Ca    9.2      29 Sep 2021 04:30  Phos  4.2     09-29  Mg     1.6     09-29    TPro  6.8  /  Alb  3.9  /  TBili  0.5  /  DBili  x   /  AST  194<H>  /  ALT  73<H>  /  AlkPhos  118<H>  09-29                        11.2   2.71  )-----------( 180      ( 29 Sep 2021 04:30 )             32.3     Diet, Full Liquid (09-29-21 @ 07:43)    ASSESSMENT  - Acute on Chronic Pancreatitis w/ stable walled off necrosis  - Acute on Chronic back pain  - mild protein calorie malnutrition 2nd to NPO/inability to tolerated PO since adm and 2-3 days PTA, BUN <3  - hypomagnesemia    PLAN  - full liquid low fat diet as tolerated, keep NPO if post prandial pain persists  - cont PPN tonight as long as IV holds  - daily bmp, in phos, mg while on parenteral nutrition  - if post prandial pain persists over next 24hrs will require PICC for TPN  - f/u with GI  - send vitamin D 25-OH, vitamin B12, folate and thiamine levels  - d/c thiamine and folic acid after 7d total  - cont creon prior to each meal as pt takes at home (3 pills each meal)  - will follow

## 2021-09-30 LAB
24R-OH-CALCIDIOL SERPL-MCNC: 30 NG/ML — SIGNIFICANT CHANGE UP (ref 30–80)
ALBUMIN SERPL ELPH-MCNC: 4.4 G/DL — SIGNIFICANT CHANGE UP (ref 3.5–5.2)
ALP SERPL-CCNC: 120 U/L — HIGH (ref 30–115)
ALT FLD-CCNC: 61 U/L — HIGH (ref 0–41)
ANION GAP SERPL CALC-SCNC: 17 MMOL/L — HIGH (ref 7–14)
AST SERPL-CCNC: 162 U/L — HIGH (ref 0–41)
BASOPHILS # BLD AUTO: 0.03 K/UL — SIGNIFICANT CHANGE UP (ref 0–0.2)
BASOPHILS NFR BLD AUTO: 0.8 % — SIGNIFICANT CHANGE UP (ref 0–1)
BILIRUB SERPL-MCNC: 0.3 MG/DL — SIGNIFICANT CHANGE UP (ref 0.2–1.2)
BUN SERPL-MCNC: 6 MG/DL — LOW (ref 10–20)
CALCIUM SERPL-MCNC: 9.4 MG/DL — SIGNIFICANT CHANGE UP (ref 8.5–10.1)
CHLORIDE SERPL-SCNC: 101 MMOL/L — SIGNIFICANT CHANGE UP (ref 98–110)
CO2 SERPL-SCNC: 20 MMOL/L — SIGNIFICANT CHANGE UP (ref 17–32)
CREAT SERPL-MCNC: 0.6 MG/DL — LOW (ref 0.7–1.5)
EOSINOPHIL # BLD AUTO: 0.12 K/UL — SIGNIFICANT CHANGE UP (ref 0–0.7)
EOSINOPHIL NFR BLD AUTO: 3.1 % — SIGNIFICANT CHANGE UP (ref 0–8)
GLUCOSE SERPL-MCNC: 96 MG/DL — SIGNIFICANT CHANGE UP (ref 70–99)
HCT VFR BLD CALC: 38.5 % — SIGNIFICANT CHANGE UP (ref 37–47)
HGB BLD-MCNC: 12.9 G/DL — SIGNIFICANT CHANGE UP (ref 12–16)
IMM GRANULOCYTES NFR BLD AUTO: 0.3 % — SIGNIFICANT CHANGE UP (ref 0.1–0.3)
LYMPHOCYTES # BLD AUTO: 1.21 K/UL — SIGNIFICANT CHANGE UP (ref 1.2–3.4)
LYMPHOCYTES # BLD AUTO: 31.5 % — SIGNIFICANT CHANGE UP (ref 20.5–51.1)
MAGNESIUM SERPL-MCNC: 2 MG/DL — SIGNIFICANT CHANGE UP (ref 1.8–2.4)
MCHC RBC-ENTMCNC: 31.9 PG — HIGH (ref 27–31)
MCHC RBC-ENTMCNC: 33.5 G/DL — SIGNIFICANT CHANGE UP (ref 32–37)
MCV RBC AUTO: 95.1 FL — SIGNIFICANT CHANGE UP (ref 81–99)
MONOCYTES # BLD AUTO: 0.5 K/UL — SIGNIFICANT CHANGE UP (ref 0.1–0.6)
MONOCYTES NFR BLD AUTO: 13 % — HIGH (ref 1.7–9.3)
NEUTROPHILS # BLD AUTO: 1.97 K/UL — SIGNIFICANT CHANGE UP (ref 1.4–6.5)
NEUTROPHILS NFR BLD AUTO: 51.3 % — SIGNIFICANT CHANGE UP (ref 42.2–75.2)
NRBC # BLD: 0 /100 WBCS — SIGNIFICANT CHANGE UP (ref 0–0)
PHOSPHATE SERPL-MCNC: 4.7 MG/DL — SIGNIFICANT CHANGE UP (ref 2.1–4.9)
PLATELET # BLD AUTO: 206 K/UL — SIGNIFICANT CHANGE UP (ref 130–400)
POTASSIUM SERPL-MCNC: 4.3 MMOL/L — SIGNIFICANT CHANGE UP (ref 3.5–5)
POTASSIUM SERPL-SCNC: 4.3 MMOL/L — SIGNIFICANT CHANGE UP (ref 3.5–5)
PROT SERPL-MCNC: 7.3 G/DL — SIGNIFICANT CHANGE UP (ref 6–8)
RBC # BLD: 4.05 M/UL — LOW (ref 4.2–5.4)
RBC # FLD: 12.2 % — SIGNIFICANT CHANGE UP (ref 11.5–14.5)
SODIUM SERPL-SCNC: 138 MMOL/L — SIGNIFICANT CHANGE UP (ref 135–146)
WBC # BLD: 3.84 K/UL — LOW (ref 4.8–10.8)
WBC # FLD AUTO: 3.84 K/UL — LOW (ref 4.8–10.8)

## 2021-09-30 PROCEDURE — 99233 SBSQ HOSP IP/OBS HIGH 50: CPT

## 2021-09-30 RX ORDER — ELECTROLYTE SOLUTION,INJ
1 VIAL (ML) INTRAVENOUS
Refills: 0 | Status: DISCONTINUED | OUTPATIENT
Start: 2021-09-30 | End: 2021-09-30

## 2021-09-30 RX ORDER — HYDROMORPHONE HYDROCHLORIDE 2 MG/ML
1 INJECTION INTRAMUSCULAR; INTRAVENOUS; SUBCUTANEOUS EVERY 4 HOURS
Refills: 0 | Status: DISCONTINUED | OUTPATIENT
Start: 2021-10-01 | End: 2021-10-02

## 2021-09-30 RX ORDER — I.V. FAT EMULSION 20 G/100ML
1.7 EMULSION INTRAVENOUS
Qty: 100.12 | Refills: 0 | Status: DISCONTINUED | OUTPATIENT
Start: 2021-09-30 | End: 2021-09-30

## 2021-09-30 RX ADMIN — I.V. FAT EMULSION 31.3 GM/KG/DAY: 20 EMULSION INTRAVENOUS at 20:53

## 2021-09-30 RX ADMIN — PANTOPRAZOLE SODIUM 40 MILLIGRAM(S): 20 TABLET, DELAYED RELEASE ORAL at 11:34

## 2021-09-30 RX ADMIN — HYDROMORPHONE HYDROCHLORIDE 1 MILLIGRAM(S): 2 INJECTION INTRAMUSCULAR; INTRAVENOUS; SUBCUTANEOUS at 18:52

## 2021-09-30 RX ADMIN — ONDANSETRON 4 MILLIGRAM(S): 8 TABLET, FILM COATED ORAL at 03:00

## 2021-09-30 RX ADMIN — HYDROMORPHONE HYDROCHLORIDE 1 MILLIGRAM(S): 2 INJECTION INTRAMUSCULAR; INTRAVENOUS; SUBCUTANEOUS at 03:00

## 2021-09-30 RX ADMIN — HYDROMORPHONE HYDROCHLORIDE 1 MILLIGRAM(S): 2 INJECTION INTRAMUSCULAR; INTRAVENOUS; SUBCUTANEOUS at 23:16

## 2021-09-30 RX ADMIN — HYDROMORPHONE HYDROCHLORIDE 1 MILLIGRAM(S): 2 INJECTION INTRAMUSCULAR; INTRAVENOUS; SUBCUTANEOUS at 15:58

## 2021-09-30 RX ADMIN — HYDROMORPHONE HYDROCHLORIDE 1 MILLIGRAM(S): 2 INJECTION INTRAMUSCULAR; INTRAVENOUS; SUBCUTANEOUS at 07:00

## 2021-09-30 RX ADMIN — HYDROMORPHONE HYDROCHLORIDE 1 MILLIGRAM(S): 2 INJECTION INTRAMUSCULAR; INTRAVENOUS; SUBCUTANEOUS at 10:35

## 2021-09-30 RX ADMIN — HYDROMORPHONE HYDROCHLORIDE 1 MILLIGRAM(S): 2 INJECTION INTRAMUSCULAR; INTRAVENOUS; SUBCUTANEOUS at 14:47

## 2021-09-30 RX ADMIN — Medication 1 EACH: at 20:53

## 2021-09-30 RX ADMIN — ENOXAPARIN SODIUM 40 MILLIGRAM(S): 100 INJECTION SUBCUTANEOUS at 11:34

## 2021-09-30 RX ADMIN — Medication 3 CAPSULE(S): at 17:19

## 2021-09-30 RX ADMIN — ONDANSETRON 4 MILLIGRAM(S): 8 TABLET, FILM COATED ORAL at 23:16

## 2021-10-01 LAB
ALBUMIN SERPL ELPH-MCNC: 3.8 G/DL — SIGNIFICANT CHANGE UP (ref 3.5–5.2)
ALP SERPL-CCNC: 108 U/L — SIGNIFICANT CHANGE UP (ref 30–115)
ALT FLD-CCNC: 42 U/L — HIGH (ref 0–41)
ANION GAP SERPL CALC-SCNC: 14 MMOL/L — SIGNIFICANT CHANGE UP (ref 7–14)
AST SERPL-CCNC: 114 U/L — HIGH (ref 0–41)
BASOPHILS # BLD AUTO: 0.03 K/UL — SIGNIFICANT CHANGE UP (ref 0–0.2)
BASOPHILS NFR BLD AUTO: 0.9 % — SIGNIFICANT CHANGE UP (ref 0–1)
BILIRUB SERPL-MCNC: 0.3 MG/DL — SIGNIFICANT CHANGE UP (ref 0.2–1.2)
BUN SERPL-MCNC: 7 MG/DL — LOW (ref 10–20)
CALCIUM SERPL-MCNC: 9.2 MG/DL — SIGNIFICANT CHANGE UP (ref 8.5–10.1)
CHLORIDE SERPL-SCNC: 105 MMOL/L — SIGNIFICANT CHANGE UP (ref 98–110)
CO2 SERPL-SCNC: 21 MMOL/L — SIGNIFICANT CHANGE UP (ref 17–32)
CREAT SERPL-MCNC: 0.5 MG/DL — LOW (ref 0.7–1.5)
EOSINOPHIL # BLD AUTO: 0.11 K/UL — SIGNIFICANT CHANGE UP (ref 0–0.7)
EOSINOPHIL NFR BLD AUTO: 3.2 % — SIGNIFICANT CHANGE UP (ref 0–8)
FOLATE SERPL-MCNC: >20 NG/ML — SIGNIFICANT CHANGE UP
GLUCOSE SERPL-MCNC: 91 MG/DL — SIGNIFICANT CHANGE UP (ref 70–99)
HCT VFR BLD CALC: 35.1 % — LOW (ref 37–47)
HGB BLD-MCNC: 11.7 G/DL — LOW (ref 12–16)
IMM GRANULOCYTES NFR BLD AUTO: 0.3 % — SIGNIFICANT CHANGE UP (ref 0.1–0.3)
LYMPHOCYTES # BLD AUTO: 0.83 K/UL — LOW (ref 1.2–3.4)
LYMPHOCYTES # BLD AUTO: 24.1 % — SIGNIFICANT CHANGE UP (ref 20.5–51.1)
MAGNESIUM SERPL-MCNC: 1.8 MG/DL — SIGNIFICANT CHANGE UP (ref 1.8–2.4)
MCHC RBC-ENTMCNC: 31.7 PG — HIGH (ref 27–31)
MCHC RBC-ENTMCNC: 33.3 G/DL — SIGNIFICANT CHANGE UP (ref 32–37)
MCV RBC AUTO: 95.1 FL — SIGNIFICANT CHANGE UP (ref 81–99)
MONOCYTES # BLD AUTO: 0.47 K/UL — SIGNIFICANT CHANGE UP (ref 0.1–0.6)
MONOCYTES NFR BLD AUTO: 13.6 % — HIGH (ref 1.7–9.3)
NEUTROPHILS # BLD AUTO: 2 K/UL — SIGNIFICANT CHANGE UP (ref 1.4–6.5)
NEUTROPHILS NFR BLD AUTO: 57.9 % — SIGNIFICANT CHANGE UP (ref 42.2–75.2)
NRBC # BLD: 0 /100 WBCS — SIGNIFICANT CHANGE UP (ref 0–0)
PHOSPHATE SERPL-MCNC: 4.2 MG/DL — SIGNIFICANT CHANGE UP (ref 2.1–4.9)
PLATELET # BLD AUTO: 195 K/UL — SIGNIFICANT CHANGE UP (ref 130–400)
POTASSIUM SERPL-MCNC: 4.3 MMOL/L — SIGNIFICANT CHANGE UP (ref 3.5–5)
POTASSIUM SERPL-SCNC: 4.3 MMOL/L — SIGNIFICANT CHANGE UP (ref 3.5–5)
PROT SERPL-MCNC: 6.7 G/DL — SIGNIFICANT CHANGE UP (ref 6–8)
RBC # BLD: 3.69 M/UL — LOW (ref 4.2–5.4)
RBC # FLD: 12 % — SIGNIFICANT CHANGE UP (ref 11.5–14.5)
SODIUM SERPL-SCNC: 140 MMOL/L — SIGNIFICANT CHANGE UP (ref 135–146)
VIT B12 SERPL-MCNC: 606 PG/ML — SIGNIFICANT CHANGE UP (ref 232–1245)
WBC # BLD: 3.45 K/UL — LOW (ref 4.8–10.8)
WBC # FLD AUTO: 3.45 K/UL — LOW (ref 4.8–10.8)

## 2021-10-01 PROCEDURE — 36573 INSJ PICC RS&I 5 YR+: CPT

## 2021-10-01 PROCEDURE — 99233 SBSQ HOSP IP/OBS HIGH 50: CPT

## 2021-10-01 RX ORDER — ELECTROLYTE SOLUTION,INJ
1 VIAL (ML) INTRAVENOUS
Refills: 0 | Status: DISCONTINUED | OUTPATIENT
Start: 2021-10-01 | End: 2021-10-01

## 2021-10-01 RX ORDER — GABAPENTIN 400 MG/1
300 CAPSULE ORAL DAILY
Refills: 0 | Status: DISCONTINUED | OUTPATIENT
Start: 2021-10-01 | End: 2021-10-02

## 2021-10-01 RX ADMIN — ENOXAPARIN SODIUM 40 MILLIGRAM(S): 100 INJECTION SUBCUTANEOUS at 13:11

## 2021-10-01 RX ADMIN — Medication 3 CAPSULE(S): at 07:43

## 2021-10-01 RX ADMIN — HYDROMORPHONE HYDROCHLORIDE 1 MILLIGRAM(S): 2 INJECTION INTRAMUSCULAR; INTRAVENOUS; SUBCUTANEOUS at 17:22

## 2021-10-01 RX ADMIN — Medication 3 CAPSULE(S): at 17:11

## 2021-10-01 RX ADMIN — Medication 1 EACH: at 21:35

## 2021-10-01 RX ADMIN — HYDROMORPHONE HYDROCHLORIDE 1 MILLIGRAM(S): 2 INJECTION INTRAMUSCULAR; INTRAVENOUS; SUBCUTANEOUS at 03:25

## 2021-10-01 RX ADMIN — ONDANSETRON 4 MILLIGRAM(S): 8 TABLET, FILM COATED ORAL at 13:20

## 2021-10-01 RX ADMIN — HYDROMORPHONE HYDROCHLORIDE 1 MILLIGRAM(S): 2 INJECTION INTRAMUSCULAR; INTRAVENOUS; SUBCUTANEOUS at 21:59

## 2021-10-01 RX ADMIN — HYDROMORPHONE HYDROCHLORIDE 1 MILLIGRAM(S): 2 INJECTION INTRAMUSCULAR; INTRAVENOUS; SUBCUTANEOUS at 13:10

## 2021-10-01 RX ADMIN — PANTOPRAZOLE SODIUM 40 MILLIGRAM(S): 20 TABLET, DELAYED RELEASE ORAL at 13:10

## 2021-10-01 RX ADMIN — GABAPENTIN 300 MILLIGRAM(S): 400 CAPSULE ORAL at 13:11

## 2021-10-01 RX ADMIN — HYDROMORPHONE HYDROCHLORIDE 1 MILLIGRAM(S): 2 INJECTION INTRAMUSCULAR; INTRAVENOUS; SUBCUTANEOUS at 07:42

## 2021-10-01 RX ADMIN — Medication 3 CAPSULE(S): at 13:10

## 2021-10-01 RX ADMIN — ONDANSETRON 4 MILLIGRAM(S): 8 TABLET, FILM COATED ORAL at 23:58
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
## 2021-10-01 NOTE — CHART NOTE - NSCHARTNOTEFT_GEN_A_CORE
s/p single lumen PICC today  cont with post prandial pain, unable to tolerate po  ppn infusing via peripheral line  vitamin levels checked and NL    T(F): 96.9 (10-01-21 @ 14:42), Max: 97.5 (10-01-21 @ 06:20)  HR: 75 (10-01-21 @ 14:42) (75 - 81)  BP: 120/83 (10-01-21 @ 14:42) (117/72 - 131/78)  RR: 18 (10-01-21 @ 14:42) (18 - 20)    10-01    140  |  105  |  7<L>  ----------------------------<  91  4.3   |  21  |  0.5<L>    Ca    9.2      01 Oct 2021 05:32  Phos  4.2     10-01  Mg     1.8     10-01    Vitamin B12, Serum: 606 pg/mL (09.30.21 @ 10:40)  Folate, Serum: >20.0 ng/mL (09.30.21 @ 10:40)  Vitamin D, 25-Hydroxy: 30     TPro  6.7  /  Alb  3.8  /  TBili  0.3  /  DBili  x   /  AST  114<H>  /  ALT  42<H>  /  AlkPhos  108  10-01                        11.7   3.45  )-----------( 195      ( 01 Oct 2021 05:32 )             35.1     Diet, NPO after Midnight:      NPO Start Date: 29-Sep-2021,   NPO Start Time: 23:59 (09-29-21 @ 18:50)  Diet, Full Liquid (09-29-21 @ 07:43)    ASSESSMENT  - Acute on Chronic Pancreatitis w/ stable walled off necrosis  - Acute on Chronic back pain  - mild protein calorie malnutrition 2nd to NPO/inability to tolerated PO since adm and 2-3 days PTA, BUN <3  - hypomagnesemia    PLAN  - keep NPO 2nd to post prandial pain  - TPN tonight via PICC  - daily bmp, in phos, mg while on parenteral nutrition  - hold creon while NPO  - f/u with GI

## 2021-10-02 LAB
ALBUMIN SERPL ELPH-MCNC: 3.9 G/DL — SIGNIFICANT CHANGE UP (ref 3.5–5.2)
ALP SERPL-CCNC: 95 U/L — SIGNIFICANT CHANGE UP (ref 30–115)
ALT FLD-CCNC: 46 U/L — HIGH (ref 0–41)
ANION GAP SERPL CALC-SCNC: 16 MMOL/L — HIGH (ref 7–14)
AST SERPL-CCNC: 135 U/L — HIGH (ref 0–41)
BASOPHILS # BLD AUTO: 0.05 K/UL — SIGNIFICANT CHANGE UP (ref 0–0.2)
BASOPHILS NFR BLD AUTO: 1.5 % — HIGH (ref 0–1)
BILIRUB SERPL-MCNC: 0.4 MG/DL — SIGNIFICANT CHANGE UP (ref 0.2–1.2)
BUN SERPL-MCNC: 7 MG/DL — LOW (ref 10–20)
CALCIUM SERPL-MCNC: 8.9 MG/DL — SIGNIFICANT CHANGE UP (ref 8.5–10.1)
CHLORIDE SERPL-SCNC: 105 MMOL/L — SIGNIFICANT CHANGE UP (ref 98–110)
CO2 SERPL-SCNC: 17 MMOL/L — SIGNIFICANT CHANGE UP (ref 17–32)
CREAT SERPL-MCNC: 0.5 MG/DL — LOW (ref 0.7–1.5)
EOSINOPHIL # BLD AUTO: 0.08 K/UL — SIGNIFICANT CHANGE UP (ref 0–0.7)
EOSINOPHIL NFR BLD AUTO: 2.4 % — SIGNIFICANT CHANGE UP (ref 0–8)
GLUCOSE SERPL-MCNC: 103 MG/DL — HIGH (ref 70–99)
HCT VFR BLD CALC: 34.3 % — LOW (ref 37–47)
HGB BLD-MCNC: 11.4 G/DL — LOW (ref 12–16)
IMM GRANULOCYTES NFR BLD AUTO: 0 % — LOW (ref 0.1–0.3)
LYMPHOCYTES # BLD AUTO: 0.77 K/UL — LOW (ref 1.2–3.4)
LYMPHOCYTES # BLD AUTO: 22.7 % — SIGNIFICANT CHANGE UP (ref 20.5–51.1)
MAGNESIUM SERPL-MCNC: 1.8 MG/DL — SIGNIFICANT CHANGE UP (ref 1.8–2.4)
MCHC RBC-ENTMCNC: 31.3 PG — HIGH (ref 27–31)
MCHC RBC-ENTMCNC: 33.2 G/DL — SIGNIFICANT CHANGE UP (ref 32–37)
MCV RBC AUTO: 94.2 FL — SIGNIFICANT CHANGE UP (ref 81–99)
MONOCYTES # BLD AUTO: 0.53 K/UL — SIGNIFICANT CHANGE UP (ref 0.1–0.6)
MONOCYTES NFR BLD AUTO: 15.6 % — HIGH (ref 1.7–9.3)
NEUTROPHILS # BLD AUTO: 1.96 K/UL — SIGNIFICANT CHANGE UP (ref 1.4–6.5)
NEUTROPHILS NFR BLD AUTO: 57.8 % — SIGNIFICANT CHANGE UP (ref 42.2–75.2)
NRBC # BLD: 0 /100 WBCS — SIGNIFICANT CHANGE UP (ref 0–0)
PHOSPHATE SERPL-MCNC: 3.8 MG/DL — SIGNIFICANT CHANGE UP (ref 2.1–4.9)
PLATELET # BLD AUTO: 202 K/UL — SIGNIFICANT CHANGE UP (ref 130–400)
POTASSIUM SERPL-MCNC: 4.4 MMOL/L — SIGNIFICANT CHANGE UP (ref 3.5–5)
POTASSIUM SERPL-SCNC: 4.4 MMOL/L — SIGNIFICANT CHANGE UP (ref 3.5–5)
PROT SERPL-MCNC: 6.6 G/DL — SIGNIFICANT CHANGE UP (ref 6–8)
RBC # BLD: 3.64 M/UL — LOW (ref 4.2–5.4)
RBC # FLD: 11.9 % — SIGNIFICANT CHANGE UP (ref 11.5–14.5)
SODIUM SERPL-SCNC: 138 MMOL/L — SIGNIFICANT CHANGE UP (ref 135–146)
WBC # BLD: 3.39 K/UL — LOW (ref 4.8–10.8)
WBC # FLD AUTO: 3.39 K/UL — LOW (ref 4.8–10.8)

## 2021-10-02 PROCEDURE — 99233 SBSQ HOSP IP/OBS HIGH 50: CPT

## 2021-10-02 RX ORDER — OXYCODONE HYDROCHLORIDE 5 MG/1
5 TABLET ORAL EVERY 6 HOURS
Refills: 0 | Status: DISCONTINUED | OUTPATIENT
Start: 2021-10-02 | End: 2021-10-03

## 2021-10-02 RX ORDER — GABAPENTIN 400 MG/1
600 CAPSULE ORAL THREE TIMES A DAY
Refills: 0 | Status: DISCONTINUED | OUTPATIENT
Start: 2021-10-02 | End: 2021-10-07

## 2021-10-02 RX ORDER — POLYETHYLENE GLYCOL 3350 17 G/17G
17 POWDER, FOR SOLUTION ORAL DAILY
Refills: 0 | Status: DISCONTINUED | OUTPATIENT
Start: 2021-10-02 | End: 2021-10-07

## 2021-10-02 RX ORDER — LIDOCAINE 4 G/100G
1 CREAM TOPICAL EVERY 24 HOURS
Refills: 0 | Status: DISCONTINUED | OUTPATIENT
Start: 2021-10-02 | End: 2021-10-07

## 2021-10-02 RX ORDER — OXYCODONE HYDROCHLORIDE 5 MG/1
10 TABLET ORAL EVERY 6 HOURS
Refills: 0 | Status: DISCONTINUED | OUTPATIENT
Start: 2021-10-02 | End: 2021-10-03

## 2021-10-02 RX ORDER — CYCLOBENZAPRINE HYDROCHLORIDE 10 MG/1
10 TABLET, FILM COATED ORAL
Refills: 0 | Status: DISCONTINUED | OUTPATIENT
Start: 2021-10-02 | End: 2021-10-07

## 2021-10-02 RX ORDER — I.V. FAT EMULSION 20 G/100ML
1.7 EMULSION INTRAVENOUS
Qty: 100.12 | Refills: 0 | Status: DISCONTINUED | OUTPATIENT
Start: 2021-10-02 | End: 2021-10-02

## 2021-10-02 RX ORDER — SENNA PLUS 8.6 MG/1
1 TABLET ORAL AT BEDTIME
Refills: 0 | Status: DISCONTINUED | OUTPATIENT
Start: 2021-10-02 | End: 2021-10-07

## 2021-10-02 RX ORDER — ELECTROLYTE SOLUTION,INJ
1 VIAL (ML) INTRAVENOUS
Refills: 0 | Status: DISCONTINUED | OUTPATIENT
Start: 2021-10-02 | End: 2021-10-02

## 2021-10-02 RX ADMIN — OXYCODONE HYDROCHLORIDE 10 MILLIGRAM(S): 5 TABLET ORAL at 12:39

## 2021-10-02 RX ADMIN — LIDOCAINE 1 PATCH: 4 CREAM TOPICAL at 12:05

## 2021-10-02 RX ADMIN — GABAPENTIN 600 MILLIGRAM(S): 400 CAPSULE ORAL at 12:19

## 2021-10-02 RX ADMIN — OXYCODONE HYDROCHLORIDE 10 MILLIGRAM(S): 5 TABLET ORAL at 21:50

## 2021-10-02 RX ADMIN — PANTOPRAZOLE SODIUM 40 MILLIGRAM(S): 20 TABLET, DELAYED RELEASE ORAL at 12:07

## 2021-10-02 RX ADMIN — ONDANSETRON 4 MILLIGRAM(S): 8 TABLET, FILM COATED ORAL at 12:39

## 2021-10-02 RX ADMIN — Medication 3 CAPSULE(S): at 17:04

## 2021-10-02 RX ADMIN — GABAPENTIN 600 MILLIGRAM(S): 400 CAPSULE ORAL at 21:14

## 2021-10-02 RX ADMIN — Medication 500 MILLIGRAM(S): at 01:19

## 2021-10-02 RX ADMIN — OXYCODONE HYDROCHLORIDE 10 MILLIGRAM(S): 5 TABLET ORAL at 21:20

## 2021-10-02 RX ADMIN — OXYCODONE HYDROCHLORIDE 10 MILLIGRAM(S): 5 TABLET ORAL at 13:20

## 2021-10-02 RX ADMIN — Medication 3 CAPSULE(S): at 12:13

## 2021-10-02 RX ADMIN — SENNA PLUS 1 TABLET(S): 8.6 TABLET ORAL at 21:14

## 2021-10-02 RX ADMIN — HYDROMORPHONE HYDROCHLORIDE 1 MILLIGRAM(S): 2 INJECTION INTRAMUSCULAR; INTRAVENOUS; SUBCUTANEOUS at 09:36

## 2021-10-02 RX ADMIN — Medication 500 MILLIGRAM(S): at 17:04

## 2021-10-02 RX ADMIN — HYDROMORPHONE HYDROCHLORIDE 1 MILLIGRAM(S): 2 INJECTION INTRAMUSCULAR; INTRAVENOUS; SUBCUTANEOUS at 08:24

## 2021-10-02 RX ADMIN — LIDOCAINE 1 PATCH: 4 CREAM TOPICAL at 19:20

## 2021-10-02 RX ADMIN — ENOXAPARIN SODIUM 40 MILLIGRAM(S): 100 INJECTION SUBCUTANEOUS at 12:06

## 2021-10-02 RX ADMIN — LIDOCAINE 1 PATCH: 4 CREAM TOPICAL at 21:14

## 2021-10-02 RX ADMIN — Medication 1 EACH: at 20:03

## 2021-10-02 RX ADMIN — HYDROMORPHONE HYDROCHLORIDE 1 MILLIGRAM(S): 2 INJECTION INTRAMUSCULAR; INTRAVENOUS; SUBCUTANEOUS at 02:32

## 2021-10-02 RX ADMIN — POLYETHYLENE GLYCOL 3350 17 GRAM(S): 17 POWDER, FOR SOLUTION ORAL at 12:13

## 2021-10-02 RX ADMIN — Medication 3 CAPSULE(S): at 09:41

## 2021-10-02 RX ADMIN — I.V. FAT EMULSION 31.3 GM/KG/DAY: 20 EMULSION INTRAVENOUS at 20:03
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
## 2021-10-02 NOTE — PROGRESS NOTE ADULT - SUBJECTIVE AND OBJECTIVE BOX
LAURA BARAJAS  46y  Female      Patient is a 46y old  Female who presents with a chief complaint of Acute on chronic pancreatitis (01 Oct 2021 17:09)      INTERVAL HPI/OVERNIGHT EVENTS: no acute events overnight. patient still c/o "severe pain" but resolved with pain meds and minimal PO trials.       REVIEW OF SYSTEMS:  CONSTITUTIONAL: No fever, weight loss, or fatigue  RESPIRATORY: No cough, wheezing, chills or hemoptysis; No shortness of breath  CARDIOVASCULAR: No chest pain, palpitations, dizziness, or leg swelling  GASTROINTESTINAL:+ abdominal pain  GENITOURINARY: No dysuria, frequency, hematuria, or incontinence  NEUROLOGICAL: No headaches, memory loss, loss of strength, numbness, or tremors  SKIN: No itching, burning, rashes, or lesions   MUSCULOSKELETAL: No joint pain or swelling; No muscle, back, or extremity pain  PSYCHIATRIC: No depression, anxiety, mood swings, or difficulty sleeping  All other review of systems negative    VITALS  T(C): 36.5 (10-02-21 @ 04:57), Max: 36.5 (10-02-21 @ 04:57)  HR: 91 (10-02-21 @ 04:57) (75 - 91)  BP: 117/80 (10-02-21 @ 04:57) (110/71 - 120/83)  RR: 20 (10-02-21 @ 04:57) (18 - 20)  SpO2: --  Wt(kg): --Vital Signs Last 24 Hrs  T(C): 36.5 (02 Oct 2021 04:57), Max: 36.5 (02 Oct 2021 04:57)  T(F): 97.7 (02 Oct 2021 04:57), Max: 97.7 (02 Oct 2021 04:57)  HR: 91 (02 Oct 2021 04:57) (75 - 91)  BP: 117/80 (02 Oct 2021 04:57) (110/71 - 120/83)  BP(mean): --  RR: 20 (02 Oct 2021 04:57) (18 - 20)  SpO2: --    PHYSICAL EXAM:  Gen- middle-age F, NAD, mildly toxic appearing  Eyes- anicteric scleara, non injected conjunctiva, EOMI  ENT- hearing grossly intact, oropharynx clear, MMM  Chest- symmetrical chest rise, CTAB, no coarse breath sounds  Cardiac- RRR, normal s1s2, no RMG apprecitated  Abdominal- non distended, soft, severely ttp  Ext- no clubbing or cyanosis  Skin- warm, dry, intact  Neuro- AOx3, normal mentation  Psych- frustrated mood, congruent to affect, cooperative    Consultant(s) Notes Reviewed:  [x ] YES  [ ] NO    Discussed with Consultants/Other Providers [ x] YES     LABS                          11.4   3.39  )-----------( 202      ( 02 Oct 2021 07:26 )             34.3     10-02    138  |  105  |  7<L>  ----------------------------<  103<H>  4.4   |  17  |  0.5<L>    Ca    8.9      02 Oct 2021 07:26  Phos  3.8     10-02  Mg     1.8     10-02    TPro  6.6  /  Alb  3.9  /  TBili  0.4  /  DBili  x   /  AST  135<H>  /  ALT  46<H>  /  AlkPhos  95  10-02    RADIOLOGY & ADDITIONAL TESTS:  - no images 10/2    MEDICATIONS  (STANDING):  chlorhexidine 4% Liquid 1 Application(s) Topical once  cyclobenzaprine 10 milliGRAM(s) Oral two times a day  enoxaparin Injectable 40 milliGRAM(s) SubCutaneous daily  gabapentin 600 milliGRAM(s) Oral three times a day  influenza   Vaccine 0.5 milliLiter(s) IntraMuscular once  lidocaine   4% Patch 1 Patch Transdermal every 24 hours  naproxen 500 milliGRAM(s) Oral two times a day  pancrelipase  (CREON 12,000 Lipase Units) 3 Capsule(s) Oral three times a day with meals  pantoprazole  Injectable 40 milliGRAM(s) IV Push daily  Parenteral Nutrition - Adult 1 Each (90 mL/Hr) TPN Continuous <Continuous>  polyethylene glycol 3350 17 Gram(s) Oral daily  senna 1 Tablet(s) Oral at bedtime    MEDICATIONS  (PRN):  calcium carbonate    500 mG (Tums) Chewable 1 Tablet(s) Chew four times a day PRN Heartburn  ondansetron Injectable 4 milliGRAM(s) IV Push every 8 hours PRN Nausea and/or Vomiting  oxyCODONE    IR 5 milliGRAM(s) Oral every 6 hours PRN Moderate Pain (4 - 6)  oxyCODONE    IR 10 milliGRAM(s) Oral every 6 hours PRN Severe Pain (7 - 10)

## 2021-10-03 LAB
ALBUMIN SERPL ELPH-MCNC: 3.7 G/DL — SIGNIFICANT CHANGE UP (ref 3.5–5.2)
ALBUMIN SERPL ELPH-MCNC: 4 G/DL — SIGNIFICANT CHANGE UP (ref 3.5–5.2)
ALP SERPL-CCNC: 94 U/L — SIGNIFICANT CHANGE UP (ref 30–115)
ALP SERPL-CCNC: 94 U/L — SIGNIFICANT CHANGE UP (ref 30–115)
ALT FLD-CCNC: 41 U/L — SIGNIFICANT CHANGE UP (ref 0–41)
ALT FLD-CCNC: 42 U/L — HIGH (ref 0–41)
ANION GAP SERPL CALC-SCNC: 14 MMOL/L — SIGNIFICANT CHANGE UP (ref 7–14)
ANION GAP SERPL CALC-SCNC: 15 MMOL/L — HIGH (ref 7–14)
ANION GAP SERPL CALC-SCNC: 16 MMOL/L — HIGH (ref 7–14)
AST SERPL-CCNC: 103 U/L — HIGH (ref 0–41)
AST SERPL-CCNC: 129 U/L — HIGH (ref 0–41)
BASOPHILS # BLD AUTO: 0.05 K/UL — SIGNIFICANT CHANGE UP (ref 0–0.2)
BASOPHILS NFR BLD AUTO: 1.2 % — HIGH (ref 0–1)
BILIRUB SERPL-MCNC: 0.2 MG/DL — SIGNIFICANT CHANGE UP (ref 0.2–1.2)
BILIRUB SERPL-MCNC: 0.2 MG/DL — SIGNIFICANT CHANGE UP (ref 0.2–1.2)
BUN SERPL-MCNC: 11 MG/DL — SIGNIFICANT CHANGE UP (ref 10–20)
BUN SERPL-MCNC: 11 MG/DL — SIGNIFICANT CHANGE UP (ref 10–20)
BUN SERPL-MCNC: 9 MG/DL — LOW (ref 10–20)
CALCIUM SERPL-MCNC: 8.7 MG/DL — SIGNIFICANT CHANGE UP (ref 8.5–10.1)
CALCIUM SERPL-MCNC: 8.8 MG/DL — SIGNIFICANT CHANGE UP (ref 8.5–10.1)
CALCIUM SERPL-MCNC: 9 MG/DL — SIGNIFICANT CHANGE UP (ref 8.5–10.1)
CHLORIDE SERPL-SCNC: 103 MMOL/L — SIGNIFICANT CHANGE UP (ref 98–110)
CHLORIDE SERPL-SCNC: 107 MMOL/L — SIGNIFICANT CHANGE UP (ref 98–110)
CHLORIDE SERPL-SCNC: 107 MMOL/L — SIGNIFICANT CHANGE UP (ref 98–110)
CO2 SERPL-SCNC: 16 MMOL/L — LOW (ref 17–32)
CO2 SERPL-SCNC: 17 MMOL/L — SIGNIFICANT CHANGE UP (ref 17–32)
CO2 SERPL-SCNC: 17 MMOL/L — SIGNIFICANT CHANGE UP (ref 17–32)
CREAT SERPL-MCNC: 0.5 MG/DL — LOW (ref 0.7–1.5)
EOSINOPHIL # BLD AUTO: 0.1 K/UL — SIGNIFICANT CHANGE UP (ref 0–0.7)
EOSINOPHIL NFR BLD AUTO: 2.4 % — SIGNIFICANT CHANGE UP (ref 0–8)
GLUCOSE SERPL-MCNC: 108 MG/DL — HIGH (ref 70–99)
GLUCOSE SERPL-MCNC: 120 MG/DL — HIGH (ref 70–99)
GLUCOSE SERPL-MCNC: 99 MG/DL — SIGNIFICANT CHANGE UP (ref 70–99)
HCT VFR BLD CALC: 35.7 % — LOW (ref 37–47)
HGB BLD-MCNC: 12.1 G/DL — SIGNIFICANT CHANGE UP (ref 12–16)
IMM GRANULOCYTES NFR BLD AUTO: 0.2 % — SIGNIFICANT CHANGE UP (ref 0.1–0.3)
LYMPHOCYTES # BLD AUTO: 0.77 K/UL — LOW (ref 1.2–3.4)
LYMPHOCYTES # BLD AUTO: 18.6 % — LOW (ref 20.5–51.1)
MAGNESIUM SERPL-MCNC: 2 MG/DL — SIGNIFICANT CHANGE UP (ref 1.8–2.4)
MCHC RBC-ENTMCNC: 32.2 PG — HIGH (ref 27–31)
MCHC RBC-ENTMCNC: 33.9 G/DL — SIGNIFICANT CHANGE UP (ref 32–37)
MCV RBC AUTO: 94.9 FL — SIGNIFICANT CHANGE UP (ref 81–99)
MONOCYTES # BLD AUTO: 0.53 K/UL — SIGNIFICANT CHANGE UP (ref 0.1–0.6)
MONOCYTES NFR BLD AUTO: 12.8 % — HIGH (ref 1.7–9.3)
NEUTROPHILS # BLD AUTO: 2.67 K/UL — SIGNIFICANT CHANGE UP (ref 1.4–6.5)
NEUTROPHILS NFR BLD AUTO: 64.8 % — SIGNIFICANT CHANGE UP (ref 42.2–75.2)
NRBC # BLD: 0 /100 WBCS — SIGNIFICANT CHANGE UP (ref 0–0)
PHOSPHATE SERPL-MCNC: 2.8 MG/DL — SIGNIFICANT CHANGE UP (ref 2.1–4.9)
PLATELET # BLD AUTO: 249 K/UL — SIGNIFICANT CHANGE UP (ref 130–400)
POTASSIUM SERPL-MCNC: 3.9 MMOL/L — SIGNIFICANT CHANGE UP (ref 3.5–5)
POTASSIUM SERPL-MCNC: 4.1 MMOL/L — SIGNIFICANT CHANGE UP (ref 3.5–5)
POTASSIUM SERPL-MCNC: 5.1 MMOL/L — HIGH (ref 3.5–5)
POTASSIUM SERPL-SCNC: 3.9 MMOL/L — SIGNIFICANT CHANGE UP (ref 3.5–5)
POTASSIUM SERPL-SCNC: 4.1 MMOL/L — SIGNIFICANT CHANGE UP (ref 3.5–5)
POTASSIUM SERPL-SCNC: 5.1 MMOL/L — HIGH (ref 3.5–5)
PROT SERPL-MCNC: 6.8 G/DL — SIGNIFICANT CHANGE UP (ref 6–8)
PROT SERPL-MCNC: 7 G/DL — SIGNIFICANT CHANGE UP (ref 6–8)
RBC # BLD: 3.76 M/UL — LOW (ref 4.2–5.4)
RBC # FLD: 11.9 % — SIGNIFICANT CHANGE UP (ref 11.5–14.5)
SODIUM SERPL-SCNC: 135 MMOL/L — SIGNIFICANT CHANGE UP (ref 135–146)
SODIUM SERPL-SCNC: 138 MMOL/L — SIGNIFICANT CHANGE UP (ref 135–146)
SODIUM SERPL-SCNC: 139 MMOL/L — SIGNIFICANT CHANGE UP (ref 135–146)
WBC # BLD: 4.13 K/UL — LOW (ref 4.8–10.8)
WBC # FLD AUTO: 4.13 K/UL — LOW (ref 4.8–10.8)

## 2021-10-03 PROCEDURE — 99233 SBSQ HOSP IP/OBS HIGH 50: CPT

## 2021-10-03 RX ORDER — HYDROMORPHONE HYDROCHLORIDE 2 MG/ML
1 INJECTION INTRAMUSCULAR; INTRAVENOUS; SUBCUTANEOUS EVERY 4 HOURS
Refills: 0 | Status: DISCONTINUED | OUTPATIENT
Start: 2021-10-03 | End: 2021-10-04

## 2021-10-03 RX ORDER — ELECTROLYTE SOLUTION,INJ
1 VIAL (ML) INTRAVENOUS
Refills: 0 | Status: DISCONTINUED | OUTPATIENT
Start: 2021-10-03 | End: 2021-10-03

## 2021-10-03 RX ADMIN — OXYCODONE HYDROCHLORIDE 10 MILLIGRAM(S): 5 TABLET ORAL at 03:43

## 2021-10-03 RX ADMIN — GABAPENTIN 600 MILLIGRAM(S): 400 CAPSULE ORAL at 05:24

## 2021-10-03 RX ADMIN — HYDROMORPHONE HYDROCHLORIDE 1 MILLIGRAM(S): 2 INJECTION INTRAMUSCULAR; INTRAVENOUS; SUBCUTANEOUS at 15:49

## 2021-10-03 RX ADMIN — GABAPENTIN 600 MILLIGRAM(S): 400 CAPSULE ORAL at 21:25

## 2021-10-03 RX ADMIN — POLYETHYLENE GLYCOL 3350 17 GRAM(S): 17 POWDER, FOR SOLUTION ORAL at 12:37

## 2021-10-03 RX ADMIN — ONDANSETRON 4 MILLIGRAM(S): 8 TABLET, FILM COATED ORAL at 17:36

## 2021-10-03 RX ADMIN — HYDROMORPHONE HYDROCHLORIDE 1 MILLIGRAM(S): 2 INJECTION INTRAMUSCULAR; INTRAVENOUS; SUBCUTANEOUS at 15:48

## 2021-10-03 RX ADMIN — HYDROMORPHONE HYDROCHLORIDE 1 MILLIGRAM(S): 2 INJECTION INTRAMUSCULAR; INTRAVENOUS; SUBCUTANEOUS at 21:00

## 2021-10-03 RX ADMIN — ENOXAPARIN SODIUM 40 MILLIGRAM(S): 100 INJECTION SUBCUTANEOUS at 12:37

## 2021-10-03 RX ADMIN — LIDOCAINE 1 PATCH: 4 CREAM TOPICAL at 12:37

## 2021-10-03 RX ADMIN — Medication 500 MILLIGRAM(S): at 17:36

## 2021-10-03 RX ADMIN — PANTOPRAZOLE SODIUM 40 MILLIGRAM(S): 20 TABLET, DELAYED RELEASE ORAL at 12:37

## 2021-10-03 RX ADMIN — Medication 500 MILLIGRAM(S): at 05:25

## 2021-10-03 RX ADMIN — HYDROMORPHONE HYDROCHLORIDE 1 MILLIGRAM(S): 2 INJECTION INTRAMUSCULAR; INTRAVENOUS; SUBCUTANEOUS at 19:53

## 2021-10-03 RX ADMIN — Medication 3 CAPSULE(S): at 07:33

## 2021-10-03 RX ADMIN — LIDOCAINE 1 PATCH: 4 CREAM TOPICAL at 17:46

## 2021-10-03 RX ADMIN — SENNA PLUS 1 TABLET(S): 8.6 TABLET ORAL at 21:25

## 2021-10-03 RX ADMIN — Medication 3 CAPSULE(S): at 12:36

## 2021-10-03 RX ADMIN — GABAPENTIN 600 MILLIGRAM(S): 400 CAPSULE ORAL at 13:49

## 2021-10-03 RX ADMIN — OXYCODONE HYDROCHLORIDE 10 MILLIGRAM(S): 5 TABLET ORAL at 04:15
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
## 2021-10-03 NOTE — PROGRESS NOTE ADULT - SUBJECTIVE AND OBJECTIVE BOX
BARAJASLAURA WILKERSON  46y  Female      Patient is a 46y old  Female who presents with a chief complaint of Acute on chronic pancreatitis (01 Oct 2021 17:09)      INTERVAL HPI/OVERNIGHT EVENTS: no acute events overnight. patient still has poor PO intake. Very upset that her IV dilaudid was stopped. Demanding it is replaced instead of oral regimen that chronic pain prescribed. Also refuses to talk to SW again.       REVIEW OF SYSTEMS:  CONSTITUTIONAL: No fever, weight loss, or fatigue  RESPIRATORY: No cough, wheezing, chills or hemoptysis; No shortness of breath  CARDIOVASCULAR: No chest pain, palpitations, dizziness, or leg swelling  GASTROINTESTINAL: No abdominal or epigastric pain. No nausea, vomiting, or hematemesis; No diarrhea or constipation. No melena or hematochezia.  GENITOURINARY: + abdominal pain and poor oral intake  NEUROLOGICAL: No headaches, memory loss, loss of strength, numbness, or tremors  SKIN: No itching, burning, rashes, or lesions   MUSCULOSKELETAL: No joint pain or swelling; No muscle, back, or extremity pain  PSYCHIATRIC: No depression, anxiety, mood swings, or difficulty sleeping  All other review of systems negative    VITALS  T(C): 36.7 (10-03-21 @ 05:30), Max: 36.7 (10-03-21 @ 05:30)  HR: 78 (10-03-21 @ 07:51) (78 - 121)  BP: 126/76 (10-03-21 @ 05:30) (125/79 - 136/76)  RR: 18 (10-03-21 @ 07:51) (18 - 18)  SpO2: 98% (10-03-21 @ 07:51) (98% - 98%)  Wt(kg): --Vital Signs Last 24 Hrs  T(C): 36.7 (03 Oct 2021 05:30), Max: 36.7 (03 Oct 2021 05:30)  T(F): 98.1 (03 Oct 2021 05:30), Max: 98.1 (03 Oct 2021 05:30)  HR: 78 (03 Oct 2021 07:51) (78 - 121)  BP: 126/76 (03 Oct 2021 05:30) (125/79 - 136/76)  BP(mean): --  RR: 18 (03 Oct 2021 07:51) (18 - 18)  SpO2: 98% (03 Oct 2021 07:51) (98% - 98%)      10-02-21 @ 07:01  -  10-03-21 @ 07:00  --------------------------------------------------------  IN: 1424.3 mL / OUT: 0 mL / NET: 1424.3 mL        PHYSICAL EXAM:  Gen- middle-age F, NAD, mildly toxic appearing  Eyes- anicteric scleara, non injected conjunctiva, EOMI  ENT- hearing grossly intact, oropharynx clear, MMM  Chest- symmetrical chest rise, CTAB, no coarse breath sounds  Cardiac- RRR, normal s1s2, no RMG apprecitated  Abdominal- non distended, soft, severely ttp  Ext- no clubbing or cyanosis  Skin- warm, dry, intact  Neuro- AOx3, normal mentation  Psych- frustrated mood, congruent to affect, cooperative  Consultant(s) Notes Reviewed:  [x ] YES  [ ] NO    Discussed with Consultants/Other Providers [ x] YES     LABS                          12.1   4.13  )-----------( 249      ( 03 Oct 2021 05:49 )             35.7     10-03    138  |  107  |  9<L>  ----------------------------<  120<H>  5.1<H>   |  17  |  0.5<L>    Ca    9.0      03 Oct 2021 05:49  Phos  2.8     10-03  Mg     2.0     10-03    TPro  7.0  /  Alb  4.0  /  TBili  0.2  /  DBili  x   /  AST  129<H>  /  ALT  42<H>  /  AlkPhos  94  10-03      RADIOLOGY & ADDITIONAL TESTS:  - no images 10/3    MEDICATIONS  (STANDING):  chlorhexidine 4% Liquid 1 Application(s) Topical once  cyclobenzaprine 10 milliGRAM(s) Oral two times a day  enoxaparin Injectable 40 milliGRAM(s) SubCutaneous daily  fat emulsion (Plant Based) 20% Infusion 1.697 Gm/kG/Day (31.3 mL/Hr) IV Continuous <Continuous>  gabapentin 600 milliGRAM(s) Oral three times a day  influenza   Vaccine 0.5 milliLiter(s) IntraMuscular once  lidocaine   4% Patch 1 Patch Transdermal every 24 hours  naproxen 500 milliGRAM(s) Oral two times a day  pancrelipase  (CREON 12,000 Lipase Units) 3 Capsule(s) Oral three times a day with meals  pantoprazole  Injectable 40 milliGRAM(s) IV Push daily  Parenteral Nutrition - Adult 1 Each (90 mL/Hr) TPN Continuous <Continuous>  Parenteral Nutrition - Adult 1 Each (90 mL/Hr) TPN Continuous <Continuous>  polyethylene glycol 3350 17 Gram(s) Oral daily  senna 1 Tablet(s) Oral at bedtime    MEDICATIONS  (PRN):  calcium carbonate    500 mG (Tums) Chewable 1 Tablet(s) Chew four times a day PRN Heartburn  ondansetron Injectable 4 milliGRAM(s) IV Push every 8 hours PRN Nausea and/or Vomiting  oxyCODONE    IR 5 milliGRAM(s) Oral every 6 hours PRN Moderate Pain (4 - 6)  oxyCODONE    IR 10 milliGRAM(s) Oral every 6 hours PRN Severe Pain (7 - 10)

## 2021-10-04 LAB
ALBUMIN SERPL ELPH-MCNC: 4.2 G/DL — SIGNIFICANT CHANGE UP (ref 3.5–5.2)
ALP SERPL-CCNC: 103 U/L — SIGNIFICANT CHANGE UP (ref 30–115)
ALT FLD-CCNC: 51 U/L — HIGH (ref 0–41)
ANION GAP SERPL CALC-SCNC: 13 MMOL/L — SIGNIFICANT CHANGE UP (ref 7–14)
AST SERPL-CCNC: 134 U/L — HIGH (ref 0–41)
BASOPHILS # BLD AUTO: 0.04 K/UL — SIGNIFICANT CHANGE UP (ref 0–0.2)
BASOPHILS NFR BLD AUTO: 0.8 % — SIGNIFICANT CHANGE UP (ref 0–1)
BILIRUB SERPL-MCNC: 0.4 MG/DL — SIGNIFICANT CHANGE UP (ref 0.2–1.2)
BUN SERPL-MCNC: 11 MG/DL — SIGNIFICANT CHANGE UP (ref 10–20)
CALCIUM SERPL-MCNC: 9.4 MG/DL — SIGNIFICANT CHANGE UP (ref 8.5–10.1)
CHLORIDE SERPL-SCNC: 105 MMOL/L — SIGNIFICANT CHANGE UP (ref 98–110)
CO2 SERPL-SCNC: 23 MMOL/L — SIGNIFICANT CHANGE UP (ref 17–32)
CREAT SERPL-MCNC: 0.5 MG/DL — LOW (ref 0.7–1.5)
EOSINOPHIL # BLD AUTO: 0.12 K/UL — SIGNIFICANT CHANGE UP (ref 0–0.7)
EOSINOPHIL NFR BLD AUTO: 2.3 % — SIGNIFICANT CHANGE UP (ref 0–8)
GLUCOSE SERPL-MCNC: 91 MG/DL — SIGNIFICANT CHANGE UP (ref 70–99)
HCT VFR BLD CALC: 38 % — SIGNIFICANT CHANGE UP (ref 37–47)
HGB BLD-MCNC: 12.1 G/DL — SIGNIFICANT CHANGE UP (ref 12–16)
IMM GRANULOCYTES NFR BLD AUTO: 0.2 % — SIGNIFICANT CHANGE UP (ref 0.1–0.3)
LYMPHOCYTES # BLD AUTO: 0.82 K/UL — LOW (ref 1.2–3.4)
LYMPHOCYTES # BLD AUTO: 15.7 % — LOW (ref 20.5–51.1)
MAGNESIUM SERPL-MCNC: 1.8 MG/DL — SIGNIFICANT CHANGE UP (ref 1.8–2.4)
MCHC RBC-ENTMCNC: 30.6 PG — SIGNIFICANT CHANGE UP (ref 27–31)
MCHC RBC-ENTMCNC: 31.8 G/DL — LOW (ref 32–37)
MCV RBC AUTO: 96.2 FL — SIGNIFICANT CHANGE UP (ref 81–99)
MONOCYTES # BLD AUTO: 0.47 K/UL — SIGNIFICANT CHANGE UP (ref 0.1–0.6)
MONOCYTES NFR BLD AUTO: 9 % — SIGNIFICANT CHANGE UP (ref 1.7–9.3)
NEUTROPHILS # BLD AUTO: 3.77 K/UL — SIGNIFICANT CHANGE UP (ref 1.4–6.5)
NEUTROPHILS NFR BLD AUTO: 72 % — SIGNIFICANT CHANGE UP (ref 42.2–75.2)
NRBC # BLD: 0 /100 WBCS — SIGNIFICANT CHANGE UP (ref 0–0)
PHOSPHATE SERPL-MCNC: 4.9 MG/DL — SIGNIFICANT CHANGE UP (ref 2.1–4.9)
PLATELET # BLD AUTO: 252 K/UL — SIGNIFICANT CHANGE UP (ref 130–400)
POTASSIUM SERPL-MCNC: 4 MMOL/L — SIGNIFICANT CHANGE UP (ref 3.5–5)
POTASSIUM SERPL-SCNC: 4 MMOL/L — SIGNIFICANT CHANGE UP (ref 3.5–5)
PROT SERPL-MCNC: 7 G/DL — SIGNIFICANT CHANGE UP (ref 6–8)
RBC # BLD: 3.95 M/UL — LOW (ref 4.2–5.4)
RBC # FLD: 12 % — SIGNIFICANT CHANGE UP (ref 11.5–14.5)
SODIUM SERPL-SCNC: 141 MMOL/L — SIGNIFICANT CHANGE UP (ref 135–146)
WBC # BLD: 5.23 K/UL — SIGNIFICANT CHANGE UP (ref 4.8–10.8)
WBC # FLD AUTO: 5.23 K/UL — SIGNIFICANT CHANGE UP (ref 4.8–10.8)

## 2021-10-04 PROCEDURE — 99232 SBSQ HOSP IP/OBS MODERATE 35: CPT

## 2021-10-04 PROCEDURE — 99233 SBSQ HOSP IP/OBS HIGH 50: CPT

## 2021-10-04 RX ORDER — ELECTROLYTE SOLUTION,INJ
1 VIAL (ML) INTRAVENOUS
Refills: 0 | Status: DISCONTINUED | OUTPATIENT
Start: 2021-10-04 | End: 2021-10-05

## 2021-10-04 RX ORDER — OXYCODONE HYDROCHLORIDE 5 MG/1
10 TABLET ORAL EVERY 4 HOURS
Refills: 0 | Status: DISCONTINUED | OUTPATIENT
Start: 2021-10-04 | End: 2021-10-06

## 2021-10-04 RX ADMIN — HYDROMORPHONE HYDROCHLORIDE 1 MILLIGRAM(S): 2 INJECTION INTRAMUSCULAR; INTRAVENOUS; SUBCUTANEOUS at 01:05

## 2021-10-04 RX ADMIN — Medication 500 MILLIGRAM(S): at 05:42

## 2021-10-04 RX ADMIN — ONDANSETRON 4 MILLIGRAM(S): 8 TABLET, FILM COATED ORAL at 11:34

## 2021-10-04 RX ADMIN — Medication 500 MILLIGRAM(S): at 07:49

## 2021-10-04 RX ADMIN — GABAPENTIN 600 MILLIGRAM(S): 400 CAPSULE ORAL at 15:45

## 2021-10-04 RX ADMIN — PANTOPRAZOLE SODIUM 40 MILLIGRAM(S): 20 TABLET, DELAYED RELEASE ORAL at 11:37

## 2021-10-04 RX ADMIN — Medication 1 EACH: at 20:33

## 2021-10-04 RX ADMIN — LIDOCAINE 1 PATCH: 4 CREAM TOPICAL at 11:36

## 2021-10-04 RX ADMIN — POLYETHYLENE GLYCOL 3350 17 GRAM(S): 17 POWDER, FOR SOLUTION ORAL at 11:36

## 2021-10-04 RX ADMIN — LIDOCAINE 1 PATCH: 4 CREAM TOPICAL at 00:57

## 2021-10-04 RX ADMIN — GABAPENTIN 600 MILLIGRAM(S): 400 CAPSULE ORAL at 05:42

## 2021-10-04 RX ADMIN — ENOXAPARIN SODIUM 40 MILLIGRAM(S): 100 INJECTION SUBCUTANEOUS at 11:35

## 2021-10-04 RX ADMIN — HYDROMORPHONE HYDROCHLORIDE 1 MILLIGRAM(S): 2 INJECTION INTRAMUSCULAR; INTRAVENOUS; SUBCUTANEOUS at 07:49

## 2021-10-04 RX ADMIN — GABAPENTIN 600 MILLIGRAM(S): 400 CAPSULE ORAL at 21:19

## 2021-10-04 RX ADMIN — Medication 3 CAPSULE(S): at 09:57

## 2021-10-04 RX ADMIN — Medication 3 CAPSULE(S): at 11:34

## 2021-10-04 RX ADMIN — HYDROMORPHONE HYDROCHLORIDE 1 MILLIGRAM(S): 2 INJECTION INTRAMUSCULAR; INTRAVENOUS; SUBCUTANEOUS at 05:44

## 2021-10-04 RX ADMIN — OXYCODONE HYDROCHLORIDE 10 MILLIGRAM(S): 5 TABLET ORAL at 15:45

## 2021-10-04 RX ADMIN — SENNA PLUS 1 TABLET(S): 8.6 TABLET ORAL at 21:19

## 2021-10-04 RX ADMIN — Medication 3 CAPSULE(S): at 16:55

## 2021-10-04 RX ADMIN — HYDROMORPHONE HYDROCHLORIDE 1 MILLIGRAM(S): 2 INJECTION INTRAMUSCULAR; INTRAVENOUS; SUBCUTANEOUS at 03:00
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
## 2021-10-04 NOTE — PROGRESS NOTE ADULT - ATTENDING SUPERVISION STATEMENT
Resident
Fellow
Resident
Resident
ACP/Fellow
ACP/Fellow
Resident
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
## 2021-10-04 NOTE — PROGRESS NOTE ADULT - ASSESSMENT
Patient is a 45 y/o female with Past Medical History of ETOH abuse and multiple episodes of pancreatitis, chronic pancreatitis, with known pseudocyst presents with epigastric abdominal pain. Patient had recent EUS done 8/9. Patient on EUS had a walled-off collection that measured 4.5 x 3.9 cm in largest dimension, looked well circumscribed, heterogeneous, containing debris, consistent with walled-off pancreatic necrosis.  She also had pancreatic parenchymal hypoechogenicity and calcifications in the pancreatic head, body, and tail, suggestive of chronic pancreatitis. There was a reactive appearing, periportal lymph node that looked irregular, homogeneous, well circumscribed, measuring around 1.2 cm in largest dimension. No drainage was warranted. She presented again with abdominal pain. She notes bad Epigastric pain, which was sharp and severe. Pain is improved but notes to staff cannot tolerate PO. Patient seen sleeping, in no visible pain.       Walled off necrosis/ chronic Pancreatitis/ chronic ETOH use/ chronic pain  - EUS done 8/9- drainage not warranted   - CT scan with stable 6cm collection  - Can try Lyrica for pain, 75-100mg BID  - Low fat diet   - Limit opioid IV pain medications   - Anticipate discharge, no evidence ot suggest acute Pancreatitis, nor mass should not be causing pain  - Will setup [facility] at [facility]  - No plan for Endoscopic intervention
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
## 2021-10-05 ENCOUNTER — TRANSCRIPTION ENCOUNTER (OUTPATIENT)
Age: 46
End: 2021-10-05

## 2021-10-05 LAB
ALBUMIN SERPL ELPH-MCNC: 4.1 G/DL — SIGNIFICANT CHANGE UP (ref 3.5–5.2)
ALP SERPL-CCNC: 101 U/L — SIGNIFICANT CHANGE UP (ref 30–115)
ALT FLD-CCNC: 72 U/L — HIGH (ref 0–41)
ANION GAP SERPL CALC-SCNC: 13 MMOL/L — SIGNIFICANT CHANGE UP (ref 7–14)
AST SERPL-CCNC: 169 U/L — HIGH (ref 0–41)
BASOPHILS # BLD AUTO: 0.06 K/UL — SIGNIFICANT CHANGE UP (ref 0–0.2)
BASOPHILS NFR BLD AUTO: 0.9 % — SIGNIFICANT CHANGE UP (ref 0–1)
BILIRUB DIRECT SERPL-MCNC: <0.2 MG/DL — SIGNIFICANT CHANGE UP (ref 0–0.2)
BILIRUB INDIRECT FLD-MCNC: >0.1 MG/DL — LOW (ref 0.2–1.2)
BILIRUB SERPL-MCNC: 0.3 MG/DL — SIGNIFICANT CHANGE UP (ref 0.2–1.2)
BUN SERPL-MCNC: 8 MG/DL — LOW (ref 10–20)
CALCIUM SERPL-MCNC: 9.6 MG/DL — SIGNIFICANT CHANGE UP (ref 8.5–10.1)
CHLORIDE SERPL-SCNC: 105 MMOL/L — SIGNIFICANT CHANGE UP (ref 98–110)
CO2 SERPL-SCNC: 22 MMOL/L — SIGNIFICANT CHANGE UP (ref 17–32)
CREAT SERPL-MCNC: 0.6 MG/DL — LOW (ref 0.7–1.5)
EOSINOPHIL # BLD AUTO: 0.07 K/UL — SIGNIFICANT CHANGE UP (ref 0–0.7)
EOSINOPHIL NFR BLD AUTO: 1.1 % — SIGNIFICANT CHANGE UP (ref 0–8)
GLUCOSE SERPL-MCNC: 123 MG/DL — HIGH (ref 70–99)
HCT VFR BLD CALC: 35.4 % — LOW (ref 37–47)
HGB BLD-MCNC: 11.8 G/DL — LOW (ref 12–16)
IMM GRANULOCYTES NFR BLD AUTO: 0.3 % — SIGNIFICANT CHANGE UP (ref 0.1–0.3)
LYMPHOCYTES # BLD AUTO: 0.96 K/UL — LOW (ref 1.2–3.4)
LYMPHOCYTES # BLD AUTO: 15.2 % — LOW (ref 20.5–51.1)
MAGNESIUM SERPL-MCNC: 1.8 MG/DL — SIGNIFICANT CHANGE UP (ref 1.8–2.4)
MCHC RBC-ENTMCNC: 31.3 PG — HIGH (ref 27–31)
MCHC RBC-ENTMCNC: 33.3 G/DL — SIGNIFICANT CHANGE UP (ref 32–37)
MCV RBC AUTO: 93.9 FL — SIGNIFICANT CHANGE UP (ref 81–99)
MONOCYTES # BLD AUTO: 0.53 K/UL — SIGNIFICANT CHANGE UP (ref 0.1–0.6)
MONOCYTES NFR BLD AUTO: 8.4 % — SIGNIFICANT CHANGE UP (ref 1.7–9.3)
NEUTROPHILS # BLD AUTO: 4.69 K/UL — SIGNIFICANT CHANGE UP (ref 1.4–6.5)
NEUTROPHILS NFR BLD AUTO: 74.1 % — SIGNIFICANT CHANGE UP (ref 42.2–75.2)
NRBC # BLD: 0 /100 WBCS — SIGNIFICANT CHANGE UP (ref 0–0)
PHOSPHATE SERPL-MCNC: 2.4 MG/DL — SIGNIFICANT CHANGE UP (ref 2.1–4.9)
PLATELET # BLD AUTO: 287 K/UL — SIGNIFICANT CHANGE UP (ref 130–400)
POTASSIUM SERPL-MCNC: 4.2 MMOL/L — SIGNIFICANT CHANGE UP (ref 3.5–5)
POTASSIUM SERPL-SCNC: 4.2 MMOL/L — SIGNIFICANT CHANGE UP (ref 3.5–5)
PROT SERPL-MCNC: 7.2 G/DL — SIGNIFICANT CHANGE UP (ref 6–8)
RBC # BLD: 3.77 M/UL — LOW (ref 4.2–5.4)
RBC # FLD: 11.9 % — SIGNIFICANT CHANGE UP (ref 11.5–14.5)
SODIUM SERPL-SCNC: 140 MMOL/L — SIGNIFICANT CHANGE UP (ref 135–146)
WBC # BLD: 6.33 K/UL — SIGNIFICANT CHANGE UP (ref 4.8–10.8)
WBC # FLD AUTO: 6.33 K/UL — SIGNIFICANT CHANGE UP (ref 4.8–10.8)

## 2021-10-05 RX ORDER — KETOROLAC TROMETHAMINE 30 MG/ML
30 SYRINGE (ML) INJECTION EVERY 6 HOURS
Refills: 0 | Status: DISCONTINUED | OUTPATIENT
Start: 2021-10-05 | End: 2021-10-07

## 2021-10-05 RX ORDER — SODIUM CHLORIDE 9 MG/ML
1000 INJECTION INTRAMUSCULAR; INTRAVENOUS; SUBCUTANEOUS
Refills: 0 | Status: DISCONTINUED | OUTPATIENT
Start: 2021-10-05 | End: 2021-10-07

## 2021-10-05 RX ADMIN — SENNA PLUS 1 TABLET(S): 8.6 TABLET ORAL at 21:52

## 2021-10-05 RX ADMIN — Medication 3 CAPSULE(S): at 17:11

## 2021-10-05 RX ADMIN — GABAPENTIN 600 MILLIGRAM(S): 400 CAPSULE ORAL at 13:52

## 2021-10-05 RX ADMIN — Medication 3 CAPSULE(S): at 07:49

## 2021-10-05 RX ADMIN — LIDOCAINE 1 PATCH: 4 CREAM TOPICAL at 11:28

## 2021-10-05 RX ADMIN — SODIUM CHLORIDE 75 MILLILITER(S): 9 INJECTION INTRAMUSCULAR; INTRAVENOUS; SUBCUTANEOUS at 17:25

## 2021-10-05 RX ADMIN — CYCLOBENZAPRINE HYDROCHLORIDE 10 MILLIGRAM(S): 10 TABLET, FILM COATED ORAL at 05:42

## 2021-10-05 RX ADMIN — Medication 30 MILLIGRAM(S): at 04:27

## 2021-10-05 RX ADMIN — ENOXAPARIN SODIUM 40 MILLIGRAM(S): 100 INJECTION SUBCUTANEOUS at 11:27

## 2021-10-05 RX ADMIN — GABAPENTIN 600 MILLIGRAM(S): 400 CAPSULE ORAL at 05:42

## 2021-10-05 RX ADMIN — GABAPENTIN 600 MILLIGRAM(S): 400 CAPSULE ORAL at 21:51

## 2021-10-05 RX ADMIN — OXYCODONE HYDROCHLORIDE 10 MILLIGRAM(S): 5 TABLET ORAL at 20:08

## 2021-10-05 RX ADMIN — Medication 3 CAPSULE(S): at 11:30

## 2021-10-05 RX ADMIN — PANTOPRAZOLE SODIUM 40 MILLIGRAM(S): 20 TABLET, DELAYED RELEASE ORAL at 11:27

## 2021-10-05 RX ADMIN — Medication 500 MILLIGRAM(S): at 05:41

## 2021-10-05 RX ADMIN — POLYETHYLENE GLYCOL 3350 17 GRAM(S): 17 POWDER, FOR SOLUTION ORAL at 11:28

## 2021-10-05 NOTE — DISCHARGE NOTE PROVIDER - NSFOLLOWUPCLINICS_GEN_ALL_ED_FT
[facility]  Medicine  [address], [place]   Phone: [phone]  Fax:   Follow Up Time: 1 week

## 2021-10-05 NOTE — DISCHARGE NOTE PROVIDER - CARE PROVIDERS DIRECT ADDRESSES
,[email or web address]
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
## 2021-10-05 NOTE — CHART NOTE - NSCHARTNOTEFT_GEN_A_CORE
On full liquid diet  Pain meds being managed  TPN being tapered and d/c'd today  Long discussion with [facility] PA today re: pt and tx plan    T(F): 97 (10-05-21 @ 04:47), Max: 97 (10-05-21 @ 04:47)  HR: 89 (10-05-21 @ 14:30) (74 - 89)  BP: 126/85 (10-05-21 @ 14:30) (126/85 - 144/85)  RR: 18 (10-05-21 @ 14:30) (17 - 18)  SpO2: --    I&O's Detail    04 Oct 2021 07:01  -  05 Oct 2021 07:00  --------------------------------------------------------  IN:    Oral Fluid: 708 mL  Total IN: 708 mL    OUT:  Total OUT: 0 mL    Total NET: 708 mL      05 Oct 2021 07:01  -  05 Oct 2021 16:48  --------------------------------------------------------  IN:    Oral Fluid: 100 mL  Total IN: 100 mL    OUT:  Total OUT: 0 mL    Total NET: 100 mL    10-05    140  |  105  |  8<L>  ----------------------------<  123<H>  4.2   |  22  |  0.6<L>    Ca    9.6      05 Oct 2021 06:48  Phos  2.4     10-05  Mg     1.8     10-05    TPro  7.2  /  Alb  4.1  /  TBili  0.3  /  DBili  <0.2  /  AST  169<H>  /  ALT  72<H>  /  AlkPhos  101  10-05                          11.8   6.33  )-----------( 287      ( 05 Oct 2021 06:48 )             35.4     Diet, Full Liquid:   Low Fat (LOWFAT) (10-02-21 @ 10:56)  Diet, NPO after Midnight:      NPO Start Date: 29-Sep-2021,   NPO Start Time: 23:59 (09-29-21 @ 18:50)    ASSESSMENT  - Acute on Chronic Pancreatitis w/ stable walled off necrosis  - Acute on Chronic back pain  - mild protein calorie malnutrition 2nd to NPO/inability to tolerated PO since adm and 2-3 days PTA, BUN <3  - hypomagnesemia    PLAN  - TPN tapered and d/c'd today  - PO as tolerated per GI  - creon with meals  - pt is NOT a candidate for home TPN  - pt needs  and behavioral health upon discharge. Social issues need to be addressed

## 2021-10-05 NOTE — DISCHARGE NOTE PROVIDER - NSDCFUADDAPPT_GEN_ALL_CORE_FT
You need to follow up with [facility] as an outpatient, this is very important for management of your pancreatitis.  You need to follow up with [facility] as an outpatient, this is very important for management of your pancreatitis.     Follow up with PCP Dr. Arroyo, or you can be seen in the Medicine clinic for follow up     Referral for Orthopedic Surgeon, Dr. Juani Busch provided, please call office for appointment

## 2021-10-05 NOTE — DISCHARGE NOTE PROVIDER - PROVIDER TOKENS
PROVIDER:[TOKEN:[[id number]],FOLLOWUP:[2 weeks]] PROVIDER:[TOKEN:[[id number]],FOLLOWUP:[2 weeks]],PROVIDER:[TOKEN:[[id number]],FOLLOWUP:[1 week],ESTABLISHEDPATIENT:[T]] PROVIDER:[TOKEN:[[id number]],FOLLOWUP:[1 week]],PROVIDER:[TOKEN:[[id number]],FOLLOWUP:[1 week]],PROVIDER:[TOKEN:[[id number]],FOLLOWUP:[2 weeks]]

## 2021-10-05 NOTE — PROGRESS NOTE ADULT - ASSESSMENT
47 yo woman admitted for treatment of acute on chronic pancreatitis.     - DC oxycodone.  - Start hydromorphone 4mg PO q 4 hours/prn.  - I do not recommend restarting IV opioids.   - Discussed outpatient follow up. Patient reports that she usually does not require opioid medication as an outpatient. She may follow up for interventional procedures if needed. [facility] [phone].

## 2021-10-05 NOTE — DISCHARGE NOTE PROVIDER - NSDCCPCAREPLAN_GEN_ALL_CORE_FT
PRINCIPAL DISCHARGE DIAGNOSIS  Diagnosis: Acute on chronic pancreatitis  Assessment and Plan of Treatment: You came to the ED complaining of severe, constant epigastric pain with numerous episdoes of nausea and non-bloody, non bilious emesis. It was determined that you were having an acute episode of chronic pancreatitis. Chronic pancreatitis is long-lasting inflammation and scarring of the pancreas. The pancreas is a gland that is located behind the stomach. It makes enzymes that help to digest food. The pancreas also releases hormones called glucagon and insulin, which help regulate blood sugar (glucose). Damage to the pancreas may affect digestion, cause pain in the upper abdomen and back, and cause diabetes. Inflammation can also irritate other organs in the abdomen near the pancreas. At first, pancreatitis may be sudden (acute). If you have several or prolonged episodes of acute pancreatitis, the condition can turn into chronic pancreatitis. The most common cause of this condition is alcohol abuse. Other causes include high levels of triglycerides in the blood or gallstones. This condition is more likely to develop in people who are 35–55 years old, have family history of pancreatitis, who smoke tobacco or who drink large amounts of alcohol over a long period of time. Symptoms of this condition may include pain in the abdomen or upper back which may get worse after eating, nausea and vomiting fever or weight loss. Do not drink alcohol. If you need help quitting, ask your health care provider. Please follow a diet as told by your health care provider or dietitian, if this applies. This may include limiting how much fat you eat, eating smaller meals more often, avoiding caffeine and drinking enough fluid.  Please contact a health care provider if you have pain that does not get better with medicine or have sudden weight loss. Get help right away if you have sudden swelling in your abdomen, start to vomit often, vomit blood, have diarrhea that does not go away or have blood in your stool.         PRINCIPAL DISCHARGE DIAGNOSIS  Diagnosis: Acute on chronic pancreatitis  Assessment and Plan of Treatment: You came to the ED complaining of severe, constant epigastric pain with numerous episdoes of nausea and non-bloody, non bilious emesis. It was determined that you were having an acute episode of chronic pancreatitis. Chronic pancreatitis is long-lasting inflammation and scarring of the pancreas. The pancreas is a gland that is located behind the stomach. It makes enzymes that help to digest food. The pancreas also releases hormones called glucagon and insulin, which help regulate blood sugar (glucose). Damage to the pancreas may affect digestion, cause pain in the upper abdomen and back, and cause diabetes. Inflammation can also irritate other organs in the abdomen near the pancreas. At first, pancreatitis may be sudden (acute). If you have several or prolonged episodes of acute pancreatitis, the condition can turn into chronic pancreatitis. The most common cause of this condition is alcohol abuse. Other causes include high levels of triglycerides in the blood or gallstones. This condition is more likely to develop in people who are 35–55 years old, have family history of pancreatitis, who smoke tobacco or who drink large amounts of alcohol over a long period of time. Symptoms of this condition may include pain in the abdomen or upper back which may get worse after eating, nausea and vomiting fever or weight loss. Do not drink alcohol. If you need help quitting, ask your health care provider. Please follow a diet as told by your health care provider or dietitian, if this applies. This may include limiting how much fat you eat, eating smaller meals more often, avoiding caffeine and drinking enough fluid.  Please contact a health care provider if you have pain that does not get better with medicine or have sudden weight loss. Get help right away if you have sudden swelling in your abdomen, start to vomit often, vomit blood, have diarrhea that does not go away or have blood in your stool.  Please follow up with your gastroenterologist in 1-2 weeks.
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
## 2021-10-05 NOTE — DISCHARGE NOTE PROVIDER - CARE PROVIDER_API CALL
Haley Cobos)  Internal Medicine  [address] [place] [address], [address] [place] [address]  Phone: [phone]  Fax: [phone]  Follow Up Time: 2 weeks   Haley Cobos)  Internal Medicine  [address] [facility], [address]st [place] [address]  Phone: [phone]  Fax: [phone]  Follow Up Time: 2 weeks    Petey Elizondo)  Gastroenterology; Internal Medicine  [address] [place] [address]  Phone: [phone]  Fax: [phone]  Established Patient  Follow Up Time: 1 week   Petey Elizondo)  Gastroenterology; Internal Medicine  [address] [place] [address]  Phone: [phone]  Fax: [phone]  Follow Up Time: 1 week    Kwasi Arroyo (DO)  Medicine  Physicians  [address] [facility], [address]st [place] [address]  Phone: [phone]  Fax: [phone]  Follow Up Time: 1 week    Nathanael Hardy)  Orthopaedic Surgery  [address] [place] [address]  Phone: [phone]  Fax: [phone]  Follow Up Time: 2 weeks

## 2021-10-05 NOTE — DISCHARGE NOTE PROVIDER - NSDCMRMEDTOKEN_GEN_ALL_CORE_FT
acetaminophen 650 mg oral tablet, extended release: 1 tab(s) orally every 6 hours as needed for pain  Creon 12,000 units oral delayed release capsule: 3 cap(s) orally 3 times a day (with meals)  cyclobenzaprine 10 mg oral tablet: 1 tab(s) orally once a day   This medication makes you drowsy, please donot take while driving or during other activities which require you to be alert  gabapentin 100 mg oral capsule: 1 cap(s) orally 3 times a day  naproxen 500 mg oral tablet: 1 tab(s) orally 2 times a day  Protonix 40 mg oral delayed release tablet: 1 tab(s) orally once a day    acetaminophen 650 mg oral tablet, extended release: 1 tab(s) orally every 6 hours as needed for pain  calcium carbonate 500 mg (200 mg elemental calcium) oral tablet, chewable: 1 tab(s) orally 4 times a day, As needed, Heartburn  Creon 12,000 units oral delayed release capsule: 3 cap(s) orally 3 times a day (with meals)  cyclobenzaprine 10 mg oral tablet: 1 tab(s) orally once a day   This medication makes you drowsy, please donot take while driving or during other activities which require you to be alert  gabapentin 100 mg oral capsule: 1 cap(s) orally 3 times a day  lidocaine 4% topical film:  topically   naproxen 500 mg oral tablet: 1 tab(s) orally 2 times a day  Protonix 40 mg oral delayed release tablet: 1 tab(s) orally once a day    acetaminophen 650 mg oral tablet, extended release: 1 tab(s) orally every 6 hours as needed for pain  calcium carbonate 500 mg (200 mg elemental calcium) oral tablet, chewable: 1 tab(s) orally 4 times a day, As needed, Heartburn  Creon 12,000 units oral delayed release capsule: 3 cap(s) orally 3 times a day (with meals)  gabapentin 100 mg oral capsule: 1 cap(s) orally 3 times a day  lidocaine 4% topical film:  topically   naproxen 500 mg oral tablet: 1 tab(s) orally 2 times a day  Protonix 40 mg oral delayed release tablet: 1 tab(s) orally once a day    acetaminophen 650 mg oral tablet, extended release: 1 tab(s) orally every 6 hours as needed for pain  baclofen 10 mg oral tablet: 1 tab(s) orally 3 times a day, As Needed -Muscle Spasm - for muscle spasm MDD:30mg  calcium carbonate 500 mg (200 mg elemental calcium) oral tablet, chewable: 1 tab(s) orally 4 times a day, As needed, Heartburn  Creon 12,000 units oral delayed release capsule: 3 cap(s) orally 3 times a day (with meals)  gabapentin 100 mg oral capsule: 1 cap(s) orally 3 times a day  lidocaine 4% topical film:  topically   naproxen 500 mg oral tablet: 1 tab(s) orally 2 times a day  ondansetron 4 mg oral tablet, disintegratin tab(s) orally 3 times a day   Protonix 40 mg oral delayed release tablet: 1 tab(s) orally once a day

## 2021-10-05 NOTE — PROGRESS NOTE ADULT - TIME BILLING
Performing history and physical, chart review, counseling patient, and coordinating care with covering physician.

## 2021-10-06 LAB
ALBUMIN SERPL ELPH-MCNC: 4 G/DL — SIGNIFICANT CHANGE UP (ref 3.5–5.2)
ALP SERPL-CCNC: 107 U/L — SIGNIFICANT CHANGE UP (ref 30–115)
ALT FLD-CCNC: 115 U/L — HIGH (ref 0–41)
ANION GAP SERPL CALC-SCNC: 11 MMOL/L — SIGNIFICANT CHANGE UP (ref 7–14)
AST SERPL-CCNC: 300 U/L — HIGH (ref 0–41)
BASOPHILS # BLD AUTO: 0.08 K/UL — SIGNIFICANT CHANGE UP (ref 0–0.2)
BASOPHILS NFR BLD AUTO: 1.5 % — HIGH (ref 0–1)
BILIRUB SERPL-MCNC: 0.3 MG/DL — SIGNIFICANT CHANGE UP (ref 0.2–1.2)
BUN SERPL-MCNC: 5 MG/DL — LOW (ref 10–20)
CALCIUM SERPL-MCNC: 9.1 MG/DL — SIGNIFICANT CHANGE UP (ref 8.5–10.1)
CHLORIDE SERPL-SCNC: 106 MMOL/L — SIGNIFICANT CHANGE UP (ref 98–110)
CO2 SERPL-SCNC: 21 MMOL/L — SIGNIFICANT CHANGE UP (ref 17–32)
CREAT SERPL-MCNC: 0.5 MG/DL — LOW (ref 0.7–1.5)
EOSINOPHIL # BLD AUTO: 0.1 K/UL — SIGNIFICANT CHANGE UP (ref 0–0.7)
EOSINOPHIL NFR BLD AUTO: 1.9 % — SIGNIFICANT CHANGE UP (ref 0–8)
GLUCOSE SERPL-MCNC: 105 MG/DL — HIGH (ref 70–99)
HCT VFR BLD CALC: 35.8 % — LOW (ref 37–47)
HGB BLD-MCNC: 11.9 G/DL — LOW (ref 12–16)
IMM GRANULOCYTES NFR BLD AUTO: 0.4 % — HIGH (ref 0.1–0.3)
LYMPHOCYTES # BLD AUTO: 0.95 K/UL — LOW (ref 1.2–3.4)
LYMPHOCYTES # BLD AUTO: 18 % — LOW (ref 20.5–51.1)
MAGNESIUM SERPL-MCNC: 1.5 MG/DL — LOW (ref 1.8–2.4)
MCHC RBC-ENTMCNC: 31.6 PG — HIGH (ref 27–31)
MCHC RBC-ENTMCNC: 33.2 G/DL — SIGNIFICANT CHANGE UP (ref 32–37)
MCV RBC AUTO: 95.2 FL — SIGNIFICANT CHANGE UP (ref 81–99)
MONOCYTES # BLD AUTO: 0.47 K/UL — SIGNIFICANT CHANGE UP (ref 0.1–0.6)
MONOCYTES NFR BLD AUTO: 8.9 % — SIGNIFICANT CHANGE UP (ref 1.7–9.3)
NEUTROPHILS # BLD AUTO: 3.67 K/UL — SIGNIFICANT CHANGE UP (ref 1.4–6.5)
NEUTROPHILS NFR BLD AUTO: 69.3 % — SIGNIFICANT CHANGE UP (ref 42.2–75.2)
NRBC # BLD: 0 /100 WBCS — SIGNIFICANT CHANGE UP (ref 0–0)
PHOSPHATE SERPL-MCNC: 3.5 MG/DL — SIGNIFICANT CHANGE UP (ref 2.1–4.9)
PLATELET # BLD AUTO: 279 K/UL — SIGNIFICANT CHANGE UP (ref 130–400)
POTASSIUM SERPL-MCNC: 3.9 MMOL/L — SIGNIFICANT CHANGE UP (ref 3.5–5)
POTASSIUM SERPL-SCNC: 3.9 MMOL/L — SIGNIFICANT CHANGE UP (ref 3.5–5)
PROT SERPL-MCNC: 6.9 G/DL — SIGNIFICANT CHANGE UP (ref 6–8)
RBC # BLD: 3.76 M/UL — LOW (ref 4.2–5.4)
RBC # FLD: 12.1 % — SIGNIFICANT CHANGE UP (ref 11.5–14.5)
SODIUM SERPL-SCNC: 138 MMOL/L — SIGNIFICANT CHANGE UP (ref 135–146)
WBC # BLD: 5.29 K/UL — SIGNIFICANT CHANGE UP (ref 4.8–10.8)
WBC # FLD AUTO: 5.29 K/UL — SIGNIFICANT CHANGE UP (ref 4.8–10.8)

## 2021-10-06 PROCEDURE — 99233 SBSQ HOSP IP/OBS HIGH 50: CPT

## 2021-10-06 RX ORDER — BACLOFEN 100 %
10 POWDER (GRAM) MISCELLANEOUS ONCE
Refills: 0 | Status: COMPLETED | OUTPATIENT
Start: 2021-10-06 | End: 2021-10-06

## 2021-10-06 RX ORDER — CALCIUM CARBONATE 500(1250)
1 TABLET ORAL
Qty: 0 | Refills: 0 | DISCHARGE
Start: 2021-10-06

## 2021-10-06 RX ORDER — HYDROMORPHONE HYDROCHLORIDE 2 MG/ML
4 INJECTION INTRAMUSCULAR; INTRAVENOUS; SUBCUTANEOUS EVERY 4 HOURS
Refills: 0 | Status: DISCONTINUED | OUTPATIENT
Start: 2021-10-06 | End: 2021-10-07

## 2021-10-06 RX ORDER — BACLOFEN 100 %
1 POWDER (GRAM) MISCELLANEOUS
Qty: 0 | Refills: 0 | DISCHARGE
Start: 2021-10-06

## 2021-10-06 RX ORDER — MAGNESIUM SULFATE 500 MG/ML
2 VIAL (ML) INJECTION ONCE
Refills: 0 | Status: COMPLETED | OUTPATIENT
Start: 2021-10-06 | End: 2021-10-06

## 2021-10-06 RX ORDER — LIDOCAINE 4 G/100G
0 CREAM TOPICAL
Qty: 0 | Refills: 0 | DISCHARGE
Start: 2021-10-06

## 2021-10-06 RX ADMIN — Medication 3 CAPSULE(S): at 18:02

## 2021-10-06 RX ADMIN — PANTOPRAZOLE SODIUM 40 MILLIGRAM(S): 20 TABLET, DELAYED RELEASE ORAL at 12:20

## 2021-10-06 RX ADMIN — Medication 50 GRAM(S): at 18:40

## 2021-10-06 RX ADMIN — Medication 3 CAPSULE(S): at 12:16

## 2021-10-06 RX ADMIN — SENNA PLUS 1 TABLET(S): 8.6 TABLET ORAL at 21:47

## 2021-10-06 RX ADMIN — ENOXAPARIN SODIUM 40 MILLIGRAM(S): 100 INJECTION SUBCUTANEOUS at 12:18

## 2021-10-06 RX ADMIN — POLYETHYLENE GLYCOL 3350 17 GRAM(S): 17 POWDER, FOR SOLUTION ORAL at 12:18

## 2021-10-06 RX ADMIN — Medication 3 CAPSULE(S): at 08:11

## 2021-10-06 RX ADMIN — CYCLOBENZAPRINE HYDROCHLORIDE 10 MILLIGRAM(S): 10 TABLET, FILM COATED ORAL at 06:30

## 2021-10-06 RX ADMIN — ONDANSETRON 4 MILLIGRAM(S): 8 TABLET, FILM COATED ORAL at 12:20

## 2021-10-06 RX ADMIN — HYDROMORPHONE HYDROCHLORIDE 4 MILLIGRAM(S): 2 INJECTION INTRAMUSCULAR; INTRAVENOUS; SUBCUTANEOUS at 20:16

## 2021-10-06 RX ADMIN — GABAPENTIN 600 MILLIGRAM(S): 400 CAPSULE ORAL at 14:23

## 2021-10-06 RX ADMIN — Medication 10 MILLIGRAM(S): at 18:41

## 2021-10-06 RX ADMIN — HYDROMORPHONE HYDROCHLORIDE 4 MILLIGRAM(S): 2 INJECTION INTRAMUSCULAR; INTRAVENOUS; SUBCUTANEOUS at 21:43

## 2021-10-06 RX ADMIN — LIDOCAINE 1 PATCH: 4 CREAM TOPICAL at 14:22

## 2021-10-06 RX ADMIN — SODIUM CHLORIDE 75 MILLILITER(S): 9 INJECTION INTRAMUSCULAR; INTRAVENOUS; SUBCUTANEOUS at 06:53

## 2021-10-06 RX ADMIN — Medication 500 MILLIGRAM(S): at 06:29

## 2021-10-06 RX ADMIN — GABAPENTIN 600 MILLIGRAM(S): 400 CAPSULE ORAL at 21:47

## 2021-10-06 RX ADMIN — LIDOCAINE 1 PATCH: 4 CREAM TOPICAL at 20:17

## 2021-10-06 RX ADMIN — HYDROMORPHONE HYDROCHLORIDE 4 MILLIGRAM(S): 2 INJECTION INTRAMUSCULAR; INTRAVENOUS; SUBCUTANEOUS at 10:37

## 2021-10-06 RX ADMIN — HYDROMORPHONE HYDROCHLORIDE 4 MILLIGRAM(S): 2 INJECTION INTRAMUSCULAR; INTRAVENOUS; SUBCUTANEOUS at 18:25

## 2021-10-06 RX ADMIN — HYDROMORPHONE HYDROCHLORIDE 4 MILLIGRAM(S): 2 INJECTION INTRAMUSCULAR; INTRAVENOUS; SUBCUTANEOUS at 14:24

## 2021-10-06 RX ADMIN — HYDROMORPHONE HYDROCHLORIDE 4 MILLIGRAM(S): 2 INJECTION INTRAMUSCULAR; INTRAVENOUS; SUBCUTANEOUS at 14:58

## 2021-10-06 RX ADMIN — GABAPENTIN 600 MILLIGRAM(S): 400 CAPSULE ORAL at 06:29

## 2021-10-06 RX ADMIN — HYDROMORPHONE HYDROCHLORIDE 4 MILLIGRAM(S): 2 INJECTION INTRAMUSCULAR; INTRAVENOUS; SUBCUTANEOUS at 06:28

## 2021-10-06 NOTE — PROGRESS NOTE ADULT - PROVIDER SPECIALTY LIST ADULT
Hospitalist
Internal Medicine
Hospitalist
Internal Medicine
Gastroenterology
Gastroenterology
Internal Medicine
Gastroenterology
Pain Medicine

## 2021-10-06 NOTE — CHART NOTE - NSCHARTNOTEFT_GEN_A_CORE
T(F): 96.6 (10-06-21 @ 05:26), Max: 96.6 (10-06-21 @ 05:26)  HR: 92 (10-06-21 @ 05:26) (79 - 92)  BP: 146/92 (10-06-21 @ 05:26) (116/67 - 146/92)  RR: 18 (10-06-21 @ 05:26) (17 - 18)  SpO2: --    I&O's Detail    05 Oct 2021 07:01  -  06 Oct 2021 07:00  --------------------------------------------------------  IN:    Oral Fluid: 700 mL  Total IN: 700 mL    OUT:  Total OUT: 0 mL    Total NET: 700 mL    10-05    140  |  105  |  8<L>  ----------------------------<  123<H>  4.2   |  22  |  0.6<L>    Ca    9.6      05 Oct 2021 06:48  Phos  2.4     10-05  Mg     1.8     10-05    TPro  7.2  /  Alb  4.1  /  TBili  0.3  /  DBili  <0.2  /  AST  169<H>  /  ALT  72<H>  /  AlkPhos  101  10-05                        11.8   6.33  )-----------( 287      ( 05 Oct 2021 06:48 )             35.4      Diet, Full Liquid:   Low Fat (LOWFAT) (10-02-21 @ 10:56)  Diet, NPO after Midnight:      NPO Start Date: 29-Sep-2021,   NPO Start Time: 23:59 (09-29-21 @ 18:50)    ASSESSMENT  - Acute on Chronic Pancreatitis w/ stable walled off necrosis  - Acute on Chronic back pain  - mild protein calorie malnutrition 2nd to NPO/inability to tolerated PO since adm and 2-3 days PTA, BUN <3  - hypomagnesemia    PLAN  - TPN tapered and d/c'd yesterday, currently off  - PO as tolerated per GI  - creon with meals  - pain management, GI rec start lyrica  - pt is NOT a candidate for home TPN  - pt needs  and behavioral health upon discharge. Social issues need to be addressed. TPN tapered and d/c'd yesterday  Tolerated PO for breakfast this am, had some farina  Pain meds managed by pain management  Possible d/c today    T(F): 96.6 (10-06-21 @ 05:26), Max: 96.6 (10-06-21 @ 05:26)  HR: 92 (10-06-21 @ 05:26) (79 - 92)  BP: 146/92 (10-06-21 @ 05:26) (116/67 - 146/92)  RR: 18 (10-06-21 @ 05:26) (17 - 18)  SpO2: --    I&O's Detail    05 Oct 2021 07:01  -  06 Oct 2021 07:00  --------------------------------------------------------  IN:    Oral Fluid: 700 mL  Total IN: 700 mL    OUT:  Total OUT: 0 mL    Total NET: 700 mL    10-05    140  |  105  |  8<L>  ----------------------------<  123<H>  4.2   |  22  |  0.6<L>    Ca    9.6      05 Oct 2021 06:48  Phos  2.4     10-05  Mg     1.8     10-05    TPro  7.2  /  Alb  4.1  /  TBili  0.3  /  DBili  <0.2  /  AST  169<H>  /  ALT  72<H>  /  AlkPhos  101  10-05                        11.8   6.33  )-----------( 287      ( 05 Oct 2021 06:48 )             35.4      Diet, Full Liquid:   Low Fat (LOWFAT) (10-02-21 @ 10:56)  Diet, NPO after Midnight:      NPO Start Date: 29-Sep-2021,   NPO Start Time: 23:59 (09-29-21 @ 18:50)    ASSESSMENT  - Acute on Chronic Pancreatitis w/ stable walled off necrosis  - Acute on Chronic back pain  - mild protein calorie malnutrition 2nd to NPO/inability to tolerated PO since adm and 2-3 days PTA, BUN <3  - hypomagnesemia    PLAN  - TPN tapered and d/c'd yesterday, currently off  - PO as tolerated per GI  - creon with meals  - pain management, GI rec start lyrica  - pt is NOT a candidate for home TPN, d/c PICC prior to d/c   - pt needs  and behavioral health upon discharge. Social issues need to be addressed. TPN tapered and d/c'd yesterday - had discussed with primary team at the time  Tolerated PO for breakfast this am, had some farina  Pain meds managed by pain management  Possible d/c today    T(F): 96.6 (10-06-21 @ 05:26), Max: 96.6 (10-06-21 @ 05:26)  HR: 92 (10-06-21 @ 05:26) (79 - 92)  BP: 146/92 (10-06-21 @ 05:26) (116/67 - 146/92)  RR: 18 (10-06-21 @ 05:26) (17 - 18)  SpO2: --    I&O's Detail    05 Oct 2021 07:01  -  06 Oct 2021 07:00  --------------------------------------------------------  IN:    Oral Fluid: 700 mL  Total IN: 700 mL    OUT:  Total OUT: 0 mL    Total NET: 700 mL    10-05    140  |  105  |  8<L>  ----------------------------<  123<H>  4.2   |  22  |  0.6<L>    Ca    9.6      05 Oct 2021 06:48  Phos  2.4     10-05  Mg     1.8     10-05    TPro  7.2  /  Alb  4.1  /  TBili  0.3  /  DBili  <0.2  /  AST  169<H>  /  ALT  72<H>  /  AlkPhos  101  10-05                        11.8   6.33  )-----------( 287      ( 05 Oct 2021 06:48 )             35.4      Diet, Full Liquid:   Low Fat (LOWFAT) (10-02-21 @ 10:56)  Diet, NPO after Midnight:      NPO Start Date: 29-Sep-2021,   NPO Start Time: 23:59 (09-29-21 @ 18:50)    ASSESSMENT  - Acute on Chronic Pancreatitis w/ stable walled off necrosis  - Acute on Chronic back pain  - mild protein calorie malnutrition 2nd to NPO/inability to tolerated PO since adm and 2-3 days PTA, BUN <3  - hypomagnesemia    PLAN  - TPN tapered and d/c'd yesterday, currently off  - PO as tolerated per GI - can diet be advanced beyond liquids? (still must be low fat)  - creon with meals  - pain management, GI rec start lyrica  - pt is NOT a candidate for home TPN, d/c PICC prior to d/c   - pt needs  and behavioral health upon discharge. Social issues need to be addressed.

## 2021-10-06 NOTE — CHART NOTE - NSCHARTNOTESELECT_GEN_ALL_CORE
Nutrition Support/Nutrition Services
Class II - visualization of the soft palate, fauces, and uvula
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
## 2021-10-07 ENCOUNTER — TRANSCRIPTION ENCOUNTER (OUTPATIENT)
Age: 46
End: 2021-10-07

## 2021-10-07 VITALS
TEMPERATURE: 98 F | DIASTOLIC BLOOD PRESSURE: 73 MMHG | HEART RATE: 76 BPM | RESPIRATION RATE: 18 BRPM | SYSTOLIC BLOOD PRESSURE: 132 MMHG

## 2021-10-07 LAB
ALBUMIN SERPL ELPH-MCNC: 3.5 G/DL — SIGNIFICANT CHANGE UP (ref 3.5–5.2)
ALP SERPL-CCNC: 105 U/L — SIGNIFICANT CHANGE UP (ref 30–115)
ALT FLD-CCNC: 195 U/L — HIGH (ref 0–41)
ANION GAP SERPL CALC-SCNC: 15 MMOL/L — HIGH (ref 7–14)
AST SERPL-CCNC: 460 U/L — HIGH (ref 0–41)
BASOPHILS # BLD AUTO: 0.08 K/UL — SIGNIFICANT CHANGE UP (ref 0–0.2)
BASOPHILS NFR BLD AUTO: 1.8 % — HIGH (ref 0–1)
BILIRUB SERPL-MCNC: 0.2 MG/DL — SIGNIFICANT CHANGE UP (ref 0.2–1.2)
BUN SERPL-MCNC: 5 MG/DL — LOW (ref 10–20)
CALCIUM SERPL-MCNC: 8.5 MG/DL — SIGNIFICANT CHANGE UP (ref 8.5–10.1)
CHLORIDE SERPL-SCNC: 107 MMOL/L — SIGNIFICANT CHANGE UP (ref 98–110)
CO2 SERPL-SCNC: 19 MMOL/L — SIGNIFICANT CHANGE UP (ref 17–32)
CREAT SERPL-MCNC: 0.5 MG/DL — LOW (ref 0.7–1.5)
EOSINOPHIL # BLD AUTO: 0.19 K/UL — SIGNIFICANT CHANGE UP (ref 0–0.7)
EOSINOPHIL NFR BLD AUTO: 4.2 % — SIGNIFICANT CHANGE UP (ref 0–8)
GLUCOSE SERPL-MCNC: 84 MG/DL — SIGNIFICANT CHANGE UP (ref 70–99)
HCT VFR BLD CALC: 31.6 % — LOW (ref 37–47)
HGB BLD-MCNC: 10.4 G/DL — LOW (ref 12–16)
IMM GRANULOCYTES NFR BLD AUTO: 0.4 % — HIGH (ref 0.1–0.3)
LIDOCAIN IGE QN: 51 U/L — SIGNIFICANT CHANGE UP (ref 7–60)
LYMPHOCYTES # BLD AUTO: 0.89 K/UL — LOW (ref 1.2–3.4)
LYMPHOCYTES # BLD AUTO: 19.6 % — LOW (ref 20.5–51.1)
MAGNESIUM SERPL-MCNC: 1.6 MG/DL — LOW (ref 1.8–2.4)
MCHC RBC-ENTMCNC: 31 PG — SIGNIFICANT CHANGE UP (ref 27–31)
MCHC RBC-ENTMCNC: 32.9 G/DL — SIGNIFICANT CHANGE UP (ref 32–37)
MCV RBC AUTO: 94.3 FL — SIGNIFICANT CHANGE UP (ref 81–99)
MONOCYTES # BLD AUTO: 0.57 K/UL — SIGNIFICANT CHANGE UP (ref 0.1–0.6)
MONOCYTES NFR BLD AUTO: 12.6 % — HIGH (ref 1.7–9.3)
NEUTROPHILS # BLD AUTO: 2.79 K/UL — SIGNIFICANT CHANGE UP (ref 1.4–6.5)
NEUTROPHILS NFR BLD AUTO: 61.4 % — SIGNIFICANT CHANGE UP (ref 42.2–75.2)
NRBC # BLD: 0 /100 WBCS — SIGNIFICANT CHANGE UP (ref 0–0)
PHOSPHATE SERPL-MCNC: 5.7 MG/DL — HIGH (ref 2.1–4.9)
PLATELET # BLD AUTO: 271 K/UL — SIGNIFICANT CHANGE UP (ref 130–400)
POTASSIUM SERPL-MCNC: 3.8 MMOL/L — SIGNIFICANT CHANGE UP (ref 3.5–5)
POTASSIUM SERPL-SCNC: 3.8 MMOL/L — SIGNIFICANT CHANGE UP (ref 3.5–5)
PROT SERPL-MCNC: 6.1 G/DL — SIGNIFICANT CHANGE UP (ref 6–8)
RBC # BLD: 3.35 M/UL — LOW (ref 4.2–5.4)
RBC # FLD: 12 % — SIGNIFICANT CHANGE UP (ref 11.5–14.5)
SODIUM SERPL-SCNC: 141 MMOL/L — SIGNIFICANT CHANGE UP (ref 135–146)
VIT B1 SERPL-MCNC: 206.3 NMOL/L — HIGH (ref 66.5–200)
WBC # BLD: 4.54 K/UL — LOW (ref 4.8–10.8)
WBC # FLD AUTO: 4.54 K/UL — LOW (ref 4.8–10.8)

## 2021-10-07 PROCEDURE — 99233 SBSQ HOSP IP/OBS HIGH 50: CPT

## 2021-10-07 RX ORDER — MAGNESIUM SULFATE 500 MG/ML
2 VIAL (ML) INJECTION ONCE
Refills: 0 | Status: COMPLETED | OUTPATIENT
Start: 2021-10-07 | End: 2021-10-07

## 2021-10-07 RX ORDER — ONDANSETRON 8 MG/1
1 TABLET, FILM COATED ORAL
Qty: 30 | Refills: 0
Start: 2021-10-07 | End: 2021-10-16

## 2021-10-07 RX ORDER — LIPASE/PROTEASE/AMYLASE 16-48-48K
3 CAPSULE,DELAYED RELEASE (ENTERIC COATED) ORAL
Qty: 270 | Refills: 0
Start: 2021-10-07 | End: 2021-11-05

## 2021-10-07 RX ORDER — GABAPENTIN 400 MG/1
1 CAPSULE ORAL
Qty: 90 | Refills: 0
Start: 2021-10-07 | End: 2021-11-05

## 2021-10-07 RX ORDER — BACLOFEN 100 %
1 POWDER (GRAM) MISCELLANEOUS
Qty: 90 | Refills: 0
Start: 2021-10-07 | End: 2021-11-05

## 2021-10-07 RX ADMIN — Medication 3 CAPSULE(S): at 09:23

## 2021-10-07 RX ADMIN — SODIUM CHLORIDE 75 MILLILITER(S): 9 INJECTION INTRAMUSCULAR; INTRAVENOUS; SUBCUTANEOUS at 00:59

## 2021-10-07 RX ADMIN — HYDROMORPHONE HYDROCHLORIDE 4 MILLIGRAM(S): 2 INJECTION INTRAMUSCULAR; INTRAVENOUS; SUBCUTANEOUS at 14:30

## 2021-10-07 RX ADMIN — LIDOCAINE 1 PATCH: 4 CREAM TOPICAL at 00:54

## 2021-10-07 RX ADMIN — Medication 500 MILLIGRAM(S): at 06:08

## 2021-10-07 RX ADMIN — POLYETHYLENE GLYCOL 3350 17 GRAM(S): 17 POWDER, FOR SOLUTION ORAL at 12:18

## 2021-10-07 RX ADMIN — Medication 3 CAPSULE(S): at 12:17

## 2021-10-07 RX ADMIN — ENOXAPARIN SODIUM 40 MILLIGRAM(S): 100 INJECTION SUBCUTANEOUS at 12:22

## 2021-10-07 RX ADMIN — GABAPENTIN 600 MILLIGRAM(S): 400 CAPSULE ORAL at 05:06

## 2021-10-07 RX ADMIN — Medication 500 MILLIGRAM(S): at 17:38

## 2021-10-07 RX ADMIN — SODIUM CHLORIDE 75 MILLILITER(S): 9 INJECTION INTRAMUSCULAR; INTRAVENOUS; SUBCUTANEOUS at 09:25

## 2021-10-07 RX ADMIN — ONDANSETRON 4 MILLIGRAM(S): 8 TABLET, FILM COATED ORAL at 12:31

## 2021-10-07 RX ADMIN — PANTOPRAZOLE SODIUM 40 MILLIGRAM(S): 20 TABLET, DELAYED RELEASE ORAL at 12:19

## 2021-10-07 RX ADMIN — LIDOCAINE 1 PATCH: 4 CREAM TOPICAL at 12:22

## 2021-10-07 RX ADMIN — GABAPENTIN 600 MILLIGRAM(S): 400 CAPSULE ORAL at 14:42

## 2021-10-07 RX ADMIN — Medication 500 MILLIGRAM(S): at 05:01

## 2021-10-07 RX ADMIN — Medication 50 GRAM(S): at 10:03

## 2021-10-07 RX ADMIN — HYDROMORPHONE HYDROCHLORIDE 4 MILLIGRAM(S): 2 INJECTION INTRAMUSCULAR; INTRAVENOUS; SUBCUTANEOUS at 02:40

## 2021-10-07 RX ADMIN — HYDROMORPHONE HYDROCHLORIDE 4 MILLIGRAM(S): 2 INJECTION INTRAMUSCULAR; INTRAVENOUS; SUBCUTANEOUS at 10:28

## 2021-10-07 RX ADMIN — HYDROMORPHONE HYDROCHLORIDE 4 MILLIGRAM(S): 2 INJECTION INTRAMUSCULAR; INTRAVENOUS; SUBCUTANEOUS at 03:06

## 2021-10-07 RX ADMIN — HYDROMORPHONE HYDROCHLORIDE 4 MILLIGRAM(S): 2 INJECTION INTRAMUSCULAR; INTRAVENOUS; SUBCUTANEOUS at 13:59

## 2021-10-07 RX ADMIN — INFLUENZA VIRUS VACCINE 0.5 MILLILITER(S): 15; 15; 15; 15 SUSPENSION INTRAMUSCULAR at 16:12

## 2021-10-07 RX ADMIN — Medication 3 CAPSULE(S): at 16:37

## 2021-10-07 RX ADMIN — Medication 500 MILLIGRAM(S): at 17:00

## 2021-10-07 RX ADMIN — HYDROMORPHONE HYDROCHLORIDE 4 MILLIGRAM(S): 2 INJECTION INTRAMUSCULAR; INTRAVENOUS; SUBCUTANEOUS at 09:57

## 2021-10-07 NOTE — DISCHARGE NOTE NURSING/CASE MANAGEMENT/SOCIAL WORK - NSDCVIVACCINE_GEN_ALL_CORE_FT
influenza, injectable, quadrivalent, preservative free; 12-Oct-2019 14:23; Celsa Martins (RN); [employer]; [id number] (Exp. Date: 30-Jun-2020); IntraMuscular; Deltoid Left.; 0.5 milliLiter(s); VIS (VIS Published: 15-Aug-2019, VIS Presented: 12-Oct-2019);   influenza, injectable, quadrivalent, preservative free; 10-Sep-2020 12:19; Shani Fleming (RN); [employer]; [id number] (Exp. Date: 30-Jun-2021); IntraMuscular; Deltoid Left.; 0.5 milliLiter(s); VIS (VIS Published: 15-Aug-2019, VIS Presented: 10-Sep-2020);   influenza, injectable, quadrivalent, preservative free; 07-Oct-2021 16:12; Janessa Jaquez (RN); [employer]; [id number] (Exp. Date: 30-Jun-2022); IntraMuscular; Deltoid Left.; 0.5 milliLiter(s); VIS (VIS Published: 15-Aug-2019, VIS Presented: 07-Oct-2021);   Tdap; 01-Sep-2014 17:06; Cheryl Cotto (MELINDA); [employer]; [id number]; IntraMuscular; Deltoid Right.; 0.5 milliLiter(s);   Tdap; 01-Sep-2014 17:09; Cheryl Cotto (MELINDA); [employer]; [id number]; IntraMuscular; Deltoid Right.; 0.5 milliLiter(s);

## 2021-10-07 NOTE — DISCHARGE NOTE NURSING/CASE MANAGEMENT/SOCIAL WORK - NSDCFUADDAPPT_GEN_ALL_CORE_FT
You need to follow up with advanced GI as an outpatient, this is very important for management of your pancreatitis.     Follow up with PCP Dr. Arroyo, or you can be seen in the Medicine clinic for follow up     Referral for Orthopedic Surgeon, Dr. Juani Busch provided, please call office for appointment

## 2021-10-07 NOTE — DISCHARGE NOTE NURSING/CASE MANAGEMENT/SOCIAL WORK - PATIENT PORTAL LINK FT
You can access the FollowMyHealth Patient Portal offered by [facility] by registering at the following website: [email or web address]. By joining [email or web address]’s FollowMyHealth portal, you will also be able to view your health information using other applications (apps) compatible with our system.

## 2021-10-13 DIAGNOSIS — M51.37 OTHER INTERVERTEBRAL DISC DEGENERATION, LUMBOSACRAL REGION: ICD-10-CM

## 2021-10-13 DIAGNOSIS — K76.0 FATTY (CHANGE OF) LIVER, NOT ELSEWHERE CLASSIFIED: ICD-10-CM
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
## 2021-10-13 DIAGNOSIS — Z91.410 PERSONAL HISTORY OF ADULT PHYSICAL AND SEXUAL ABUSE: ICD-10-CM

## 2021-10-13 DIAGNOSIS — M25.78 OSTEOPHYTE, VERTEBRAE: ICD-10-CM

## 2021-10-13 DIAGNOSIS — E51.9 THIAMINE DEFICIENCY, UNSPECIFIED: ICD-10-CM

## 2021-10-13 DIAGNOSIS — Z59.00 HOMELESSNESS UNSPECIFIED: ICD-10-CM

## 2021-10-13 DIAGNOSIS — E87.2 ACIDOSIS: ICD-10-CM
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
## 2021-10-13 DIAGNOSIS — K86.0 ALCOHOL-INDUCED CHRONIC PANCREATITIS: ICD-10-CM

## 2021-10-13 DIAGNOSIS — E53.8 DEFICIENCY OF OTHER SPECIFIED B GROUP VITAMINS: ICD-10-CM

## 2021-10-13 DIAGNOSIS — G89.29 OTHER CHRONIC PAIN: ICD-10-CM

## 2021-10-13 DIAGNOSIS — R63.8 OTHER SYMPTOMS AND SIGNS CONCERNING FOOD AND FLUID INTAKE: ICD-10-CM

## 2021-10-13 DIAGNOSIS — Z87.19 PERSONAL HISTORY OF OTHER DISEASES OF THE DIGESTIVE SYSTEM: ICD-10-CM

## 2021-10-13 DIAGNOSIS — F10.188 ALCOHOL ABUSE WITH OTHER ALCOHOL-INDUCED DISORDER: ICD-10-CM

## 2021-10-13 DIAGNOSIS — E44.1 MILD PROTEIN-CALORIE MALNUTRITION: ICD-10-CM

## 2021-10-13 DIAGNOSIS — T74.91XA UNSPECIFIED ADULT MALTREATMENT, CONFIRMED, INITIAL ENCOUNTER: ICD-10-CM

## 2021-10-13 DIAGNOSIS — K31.89 OTHER DISEASES OF STOMACH AND DUODENUM: ICD-10-CM

## 2021-10-13 DIAGNOSIS — M54.9 DORSALGIA, UNSPECIFIED: ICD-10-CM

## 2021-10-13 DIAGNOSIS — R10.9 UNSPECIFIED ABDOMINAL PAIN: ICD-10-CM

## 2021-10-13 DIAGNOSIS — K85.21 ALCOHOL INDUCED ACUTE PANCREATITIS WITH UNINFECTED NECROSIS: ICD-10-CM

## 2021-10-13 DIAGNOSIS — K86.89 OTHER SPECIFIED DISEASES OF PANCREAS: ICD-10-CM

## 2021-10-13 DIAGNOSIS — E83.42 HYPOMAGNESEMIA: ICD-10-CM

## 2021-10-13 DIAGNOSIS — R00.0 TACHYCARDIA, UNSPECIFIED: ICD-10-CM

## 2021-10-13 DIAGNOSIS — M43.17 SPONDYLOLISTHESIS, LUMBOSACRAL REGION: ICD-10-CM

## 2021-10-13 DIAGNOSIS — R74.01 ELEVATION OF LEVELS OF LIVER TRANSAMINASE LEVELS: ICD-10-CM

## 2021-10-13 SDOH — ECONOMIC STABILITY - HOUSING INSECURITY: HOMELESSNESS UNSPECIFIED: Z59.00

## 2021-10-15 ENCOUNTER — APPOINTMENT (OUTPATIENT)
Dept: GASTROENTEROLOGY | Facility: CLINIC | Age: 46
End: 2021-10-15

## 2021-11-05 NOTE — PHYSICAL THERAPY INITIAL EVALUATION ADULT - MODALITIES TREATMENT COMMENTS
Pt A&Ox4, ambulated to bathroom w/ walker x1 assist, pt became dizzy, lethargic, and nauseous. Pt w/ pallor complexion, c/o extreme thirst. BP while sitting-100/59m HR-53, pt unable to tolerate standing for standing BP. Pt nola kiran'ed back to bed. Pt placed in bed, bed trended, BP lying in bed-121/60, HR-68. Pt stated she "feels a lot better" when placed back in bed.
. pt was educated in positioning strategies and positioned to try to minimize pain

## 2021-11-06 ENCOUNTER — INPATIENT (INPATIENT)
Facility: HOSPITAL | Age: 46
LOS: 20 days | Discharge: HOME | End: 2021-11-27
Attending: HOSPITALIST | Admitting: HOSPITALIST
Payer: MEDICAID

## 2021-11-06 VITALS
HEART RATE: 124 BPM | HEIGHT: 64 IN | DIASTOLIC BLOOD PRESSURE: 97 MMHG | TEMPERATURE: 98 F | SYSTOLIC BLOOD PRESSURE: 186 MMHG | OXYGEN SATURATION: 99 % | RESPIRATION RATE: 20 BRPM

## 2021-11-06 DIAGNOSIS — Z87.2 PERSONAL HISTORY OF DISEASES OF THE SKIN AND SUBCUTANEOUS TISSUE: Chronic | ICD-10-CM

## 2021-11-06 DIAGNOSIS — Z98.890 OTHER SPECIFIED POSTPROCEDURAL STATES: Chronic | ICD-10-CM

## 2021-11-06 LAB
ALBUMIN SERPL ELPH-MCNC: 4.9 G/DL — SIGNIFICANT CHANGE UP (ref 3.5–5.2)
ALP SERPL-CCNC: 91 U/L — SIGNIFICANT CHANGE UP (ref 30–115)
ALT FLD-CCNC: 46 U/L — HIGH (ref 0–41)
ANION GAP SERPL CALC-SCNC: 20 MMOL/L — HIGH (ref 7–14)
AST SERPL-CCNC: 211 U/L — HIGH (ref 0–41)
BASOPHILS # BLD AUTO: 0.04 K/UL — SIGNIFICANT CHANGE UP (ref 0–0.2)
BASOPHILS NFR BLD AUTO: 0.7 % — SIGNIFICANT CHANGE UP (ref 0–1)
BILIRUB SERPL-MCNC: 0.7 MG/DL — SIGNIFICANT CHANGE UP (ref 0.2–1.2)
BUN SERPL-MCNC: 11 MG/DL — SIGNIFICANT CHANGE UP (ref 10–20)
CALCIUM SERPL-MCNC: 9.6 MG/DL — SIGNIFICANT CHANGE UP (ref 8.5–10.1)
CHLORIDE SERPL-SCNC: 95 MMOL/L — LOW (ref 98–110)
CO2 SERPL-SCNC: 16 MMOL/L — LOW (ref 17–32)
CREAT SERPL-MCNC: 0.9 MG/DL — SIGNIFICANT CHANGE UP (ref 0.7–1.5)
EOSINOPHIL # BLD AUTO: 0.01 K/UL — SIGNIFICANT CHANGE UP (ref 0–0.7)
EOSINOPHIL NFR BLD AUTO: 0.2 % — SIGNIFICANT CHANGE UP (ref 0–8)
ETHANOL SERPL-MCNC: <10 MG/DL — SIGNIFICANT CHANGE UP
GLUCOSE SERPL-MCNC: 111 MG/DL — HIGH (ref 70–99)
HCG SERPL QL: NEGATIVE — SIGNIFICANT CHANGE UP
HCT VFR BLD CALC: 37.2 % — SIGNIFICANT CHANGE UP (ref 37–47)
HGB BLD-MCNC: 12.9 G/DL — SIGNIFICANT CHANGE UP (ref 12–16)
IMM GRANULOCYTES NFR BLD AUTO: 0.5 % — HIGH (ref 0.1–0.3)
LACTATE SERPL-SCNC: 1.6 MMOL/L — SIGNIFICANT CHANGE UP (ref 0.7–2)
LIDOCAIN IGE QN: 1173 U/L — HIGH (ref 7–60)
LYMPHOCYTES # BLD AUTO: 0.77 K/UL — LOW (ref 1.2–3.4)
LYMPHOCYTES # BLD AUTO: 12.7 % — LOW (ref 20.5–51.1)
MCHC RBC-ENTMCNC: 32.5 PG — HIGH (ref 27–31)
MCHC RBC-ENTMCNC: 34.7 G/DL — SIGNIFICANT CHANGE UP (ref 32–37)
MCV RBC AUTO: 93.7 FL — SIGNIFICANT CHANGE UP (ref 81–99)
MONOCYTES # BLD AUTO: 0.28 K/UL — SIGNIFICANT CHANGE UP (ref 0.1–0.6)
MONOCYTES NFR BLD AUTO: 4.6 % — SIGNIFICANT CHANGE UP (ref 1.7–9.3)
NEUTROPHILS # BLD AUTO: 4.92 K/UL — SIGNIFICANT CHANGE UP (ref 1.4–6.5)
NEUTROPHILS NFR BLD AUTO: 81.3 % — HIGH (ref 42.2–75.2)
NRBC # BLD: 0 /100 WBCS — SIGNIFICANT CHANGE UP (ref 0–0)
PLATELET # BLD AUTO: 196 K/UL — SIGNIFICANT CHANGE UP (ref 130–400)
POTASSIUM SERPL-MCNC: 5.6 MMOL/L — HIGH (ref 3.5–5)
POTASSIUM SERPL-SCNC: 5.6 MMOL/L — HIGH (ref 3.5–5)
PROT SERPL-MCNC: 8.8 G/DL — HIGH (ref 6–8)
RBC # BLD: 3.97 M/UL — LOW (ref 4.2–5.4)
RBC # FLD: 12.4 % — SIGNIFICANT CHANGE UP (ref 11.5–14.5)
SODIUM SERPL-SCNC: 131 MMOL/L — LOW (ref 135–146)
WBC # BLD: 6.05 K/UL — SIGNIFICANT CHANGE UP (ref 4.8–10.8)
WBC # FLD AUTO: 6.05 K/UL — SIGNIFICANT CHANGE UP (ref 4.8–10.8)

## 2021-11-06 PROCEDURE — 99285 EMERGENCY DEPT VISIT HI MDM: CPT

## 2021-11-06 PROCEDURE — 70450 CT HEAD/BRAIN W/O DYE: CPT | Mod: 26,MA

## 2021-11-06 PROCEDURE — 74177 CT ABD & PELVIS W/CONTRAST: CPT | Mod: 26,MA

## 2021-11-06 PROCEDURE — 71046 X-RAY EXAM CHEST 2 VIEWS: CPT | Mod: 26

## 2021-11-06 RX ORDER — FAMOTIDINE 10 MG/ML
20 INJECTION INTRAVENOUS ONCE
Refills: 0 | Status: COMPLETED | OUTPATIENT
Start: 2021-11-06 | End: 2021-11-06

## 2021-11-06 RX ORDER — SODIUM CHLORIDE 9 MG/ML
1000 INJECTION, SOLUTION INTRAVENOUS ONCE
Refills: 0 | Status: COMPLETED | OUTPATIENT
Start: 2021-11-06 | End: 2021-11-06

## 2021-11-06 RX ORDER — DIPHENHYDRAMINE HCL 50 MG
50 CAPSULE ORAL ONCE
Refills: 0 | Status: COMPLETED | OUTPATIENT
Start: 2021-11-06 | End: 2021-11-06

## 2021-11-06 RX ORDER — MORPHINE SULFATE 50 MG/1
4 CAPSULE, EXTENDED RELEASE ORAL ONCE
Refills: 0 | Status: DISCONTINUED | OUTPATIENT
Start: 2021-11-06 | End: 2021-11-06

## 2021-11-06 RX ORDER — METHOCARBAMOL 500 MG/1
500 TABLET, FILM COATED ORAL ONCE
Refills: 0 | Status: COMPLETED | OUTPATIENT
Start: 2021-11-06 | End: 2021-11-06

## 2021-11-06 RX ORDER — ONDANSETRON 8 MG/1
4 TABLET, FILM COATED ORAL ONCE
Refills: 0 | Status: COMPLETED | OUTPATIENT
Start: 2021-11-06 | End: 2021-11-06

## 2021-11-06 RX ORDER — HYDROMORPHONE HYDROCHLORIDE 2 MG/ML
1 INJECTION INTRAMUSCULAR; INTRAVENOUS; SUBCUTANEOUS ONCE
Refills: 0 | Status: DISCONTINUED | OUTPATIENT
Start: 2021-11-06 | End: 2021-11-06

## 2021-11-06 RX ORDER — SODIUM CHLORIDE 9 MG/ML
1000 INJECTION INTRAMUSCULAR; INTRAVENOUS; SUBCUTANEOUS
Refills: 0 | Status: DISCONTINUED | OUTPATIENT
Start: 2021-11-06 | End: 2021-11-08

## 2021-11-06 RX ADMIN — ONDANSETRON 4 MILLIGRAM(S): 8 TABLET, FILM COATED ORAL at 18:07

## 2021-11-06 RX ADMIN — ONDANSETRON 4 MILLIGRAM(S): 8 TABLET, FILM COATED ORAL at 22:32

## 2021-11-06 RX ADMIN — SODIUM CHLORIDE 250 MILLILITER(S): 9 INJECTION INTRAMUSCULAR; INTRAVENOUS; SUBCUTANEOUS at 23:55

## 2021-11-06 RX ADMIN — Medication 50 MILLIGRAM(S): at 19:47

## 2021-11-06 RX ADMIN — HYDROMORPHONE HYDROCHLORIDE 1 MILLIGRAM(S): 2 INJECTION INTRAMUSCULAR; INTRAVENOUS; SUBCUTANEOUS at 22:33

## 2021-11-06 RX ADMIN — FAMOTIDINE 20 MILLIGRAM(S): 10 INJECTION INTRAVENOUS at 18:08

## 2021-11-06 RX ADMIN — MORPHINE SULFATE 4 MILLIGRAM(S): 50 CAPSULE, EXTENDED RELEASE ORAL at 18:42

## 2021-11-06 RX ADMIN — SODIUM CHLORIDE 1000 MILLILITER(S): 9 INJECTION, SOLUTION INTRAVENOUS at 22:54

## 2021-11-06 RX ADMIN — Medication 102 MILLIGRAM(S): at 19:17

## 2021-11-06 RX ADMIN — HYDROMORPHONE HYDROCHLORIDE 1 MILLIGRAM(S): 2 INJECTION INTRAMUSCULAR; INTRAVENOUS; SUBCUTANEOUS at 22:53

## 2021-11-06 RX ADMIN — METHOCARBAMOL 500 MILLIGRAM(S): 500 TABLET, FILM COATED ORAL at 17:59

## 2021-11-06 RX ADMIN — SODIUM CHLORIDE 1000 MILLILITER(S): 9 INJECTION, SOLUTION INTRAVENOUS at 18:00

## 2021-11-06 RX ADMIN — MORPHINE SULFATE 4 MILLIGRAM(S): 50 CAPSULE, EXTENDED RELEASE ORAL at 19:00

## 2021-11-06 NOTE — H&P ADULT - NSHPPHYSICALEXAM_GEN_ALL_CORE
GENERAL: In distress from abdominal pain; Aox3  HEAD:  Atraumatic, Normocephalic  EYES: Sclera clear  ENT: dry mucous membranes  NECK: Supple, No JVD;   CHEST/LUNG: Clear to auscultation bilaterally; Unlabored respirations  HEART: Regular rate and rhythm;   ABDOMEN: Mildly distended, tender, with gaurding  EXTREMITIES:  No edema
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
## 2021-11-06 NOTE — H&P ADULT - NSHPLABSRESULTS_GEN_ALL_CORE
12.9   6.05  )-----------( 196      ( 06 Nov 2021 18:21 )             37.2       11-06    131<L>  |  95<L>  |  11  ----------------------------<  111<H>  5.6<H>   |  16<L>  |  0.9    Ca    9.6      06 Nov 2021 18:21    TPro  8.8<H>  /  Alb  4.9  /  TBili  0.7  /  DBili  x   /  AST  211<H>  /  ALT  46<H>  /  AlkPhos  91  11-06                            CAPILLARY BLOOD GLUCOSE
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
## 2021-11-06 NOTE — ED PROVIDER NOTE - NS ED ROS FT
Constitutional: (-) fever, (-) dizziness  Eyes/ENT: (+) blurry vision, (-) epistaxis  Cardiovascular: (+) chest pain, (-) syncope, (-) palpitations  Respiratory: (-) cough, (-) shortness of breath  Gastrointestinal: (+) vomiting, (-) diarrhea, (+) nausea  Musculoskeletal: (-) neck pain, (-) back pain, (-) joint pain  Integumentary: (-) rash, (-) edema  Neurological: (+) headache, (-) altered mental status  Psychiatric: (-) hallucinations  Allergic/Immunologic: (-) pruritus

## 2021-11-06 NOTE — ED PROVIDER NOTE - PHYSICAL EXAMINATION
Physical Exam    Vital Signs: I have reviewed the initial vital signs.  Constitutional: well-nourished, appears stated age, no acute distress  Eyes: Conjunctiva pale, Sclera clear, PERRLA, EOMI.  Cardiovascular: S1 and S2, regular rate, regular rhythm, well-perfused extremities, radial pulses equal and 2+  Respiratory: unlabored respiratory effort, clear to auscultation bilaterally no wheezing, rales and rhonchi  Gastrointestinal: soft, diffusely tender abdomen, no pulsatile mass, normal bowl sounds, bilateral CVA tenderness   Musculoskeletal: supple neck, no lower extremity edema, mid line tenderness  Integumentary: warm, dry, visible yellow discoloration below eyes   Neurologic: awake, alert, cranial nerves II-XII grossly intact, extremities’ motor and sensory functions grossly intact  Psychiatric: appropriate mood, appropriate affect

## 2021-11-07 LAB
ALBUMIN SERPL ELPH-MCNC: 3.6 G/DL — SIGNIFICANT CHANGE UP (ref 3.5–5.2)
ALP SERPL-CCNC: 62 U/L — SIGNIFICANT CHANGE UP (ref 30–115)
ALT FLD-CCNC: 41 U/L — SIGNIFICANT CHANGE UP (ref 0–41)
ANION GAP SERPL CALC-SCNC: 14 MMOL/L — SIGNIFICANT CHANGE UP (ref 7–14)
APPEARANCE UR: CLEAR — SIGNIFICANT CHANGE UP
AST SERPL-CCNC: 170 U/L — HIGH (ref 0–41)
BASOPHILS # BLD AUTO: 0.01 K/UL — SIGNIFICANT CHANGE UP (ref 0–0.2)
BASOPHILS # BLD AUTO: 0.03 K/UL — SIGNIFICANT CHANGE UP (ref 0–0.2)
BASOPHILS NFR BLD AUTO: 0.5 % — SIGNIFICANT CHANGE UP (ref 0–1)
BASOPHILS NFR BLD AUTO: 1.3 % — HIGH (ref 0–1)
BILIRUB SERPL-MCNC: 0.5 MG/DL — SIGNIFICANT CHANGE UP (ref 0.2–1.2)
BILIRUB UR-MCNC: NEGATIVE — SIGNIFICANT CHANGE UP
BUN SERPL-MCNC: 6 MG/DL — LOW (ref 10–20)
CALCIUM SERPL-MCNC: 8 MG/DL — LOW (ref 8.5–10.1)
CHLORIDE SERPL-SCNC: 104 MMOL/L — SIGNIFICANT CHANGE UP (ref 98–110)
CO2 SERPL-SCNC: 20 MMOL/L — SIGNIFICANT CHANGE UP (ref 17–32)
COLOR SPEC: SIGNIFICANT CHANGE UP
CREAT SERPL-MCNC: 0.6 MG/DL — LOW (ref 0.7–1.5)
DIFF PNL FLD: NEGATIVE — SIGNIFICANT CHANGE UP
EOSINOPHIL # BLD AUTO: 0.06 K/UL — SIGNIFICANT CHANGE UP (ref 0–0.7)
EOSINOPHIL # BLD AUTO: 0.06 K/UL — SIGNIFICANT CHANGE UP (ref 0–0.7)
EOSINOPHIL NFR BLD AUTO: 2.6 % — SIGNIFICANT CHANGE UP (ref 0–8)
EOSINOPHIL NFR BLD AUTO: 2.7 % — SIGNIFICANT CHANGE UP (ref 0–8)
GAS PNL BLDA: SIGNIFICANT CHANGE UP
GLUCOSE SERPL-MCNC: 76 MG/DL — SIGNIFICANT CHANGE UP (ref 70–99)
GLUCOSE UR QL: NEGATIVE — SIGNIFICANT CHANGE UP
HCT VFR BLD CALC: 28.4 % — LOW (ref 37–47)
HCT VFR BLD CALC: 30 % — LOW (ref 37–47)
HGB BLD-MCNC: 9.3 G/DL — LOW (ref 12–16)
HGB BLD-MCNC: 9.9 G/DL — LOW (ref 12–16)
IMM GRANULOCYTES NFR BLD AUTO: 0.4 % — HIGH (ref 0.1–0.3)
IMM GRANULOCYTES NFR BLD AUTO: 0.5 % — HIGH (ref 0.1–0.3)
KETONES UR-MCNC: NEGATIVE — SIGNIFICANT CHANGE UP
LEUKOCYTE ESTERASE UR-ACNC: NEGATIVE — SIGNIFICANT CHANGE UP
LYMPHOCYTES # BLD AUTO: 0.62 K/UL — LOW (ref 1.2–3.4)
LYMPHOCYTES # BLD AUTO: 0.63 K/UL — LOW (ref 1.2–3.4)
LYMPHOCYTES # BLD AUTO: 27.2 % — SIGNIFICANT CHANGE UP (ref 20.5–51.1)
LYMPHOCYTES # BLD AUTO: 28.6 % — SIGNIFICANT CHANGE UP (ref 20.5–51.1)
MAGNESIUM SERPL-MCNC: 1.6 MG/DL — LOW (ref 1.8–2.4)
MCHC RBC-ENTMCNC: 32 PG — HIGH (ref 27–31)
MCHC RBC-ENTMCNC: 32 PG — HIGH (ref 27–31)
MCHC RBC-ENTMCNC: 32.7 G/DL — SIGNIFICANT CHANGE UP (ref 32–37)
MCHC RBC-ENTMCNC: 33 G/DL — SIGNIFICANT CHANGE UP (ref 32–37)
MCV RBC AUTO: 97.1 FL — SIGNIFICANT CHANGE UP (ref 81–99)
MCV RBC AUTO: 97.6 FL — SIGNIFICANT CHANGE UP (ref 81–99)
MONOCYTES # BLD AUTO: 0.2 K/UL — SIGNIFICANT CHANGE UP (ref 0.1–0.6)
MONOCYTES # BLD AUTO: 0.2 K/UL — SIGNIFICANT CHANGE UP (ref 0.1–0.6)
MONOCYTES NFR BLD AUTO: 8.8 % — SIGNIFICANT CHANGE UP (ref 1.7–9.3)
MONOCYTES NFR BLD AUTO: 9.1 % — SIGNIFICANT CHANGE UP (ref 1.7–9.3)
NEUTROPHILS # BLD AUTO: 1.29 K/UL — LOW (ref 1.4–6.5)
NEUTROPHILS # BLD AUTO: 1.36 K/UL — LOW (ref 1.4–6.5)
NEUTROPHILS NFR BLD AUTO: 58.6 % — SIGNIFICANT CHANGE UP (ref 42.2–75.2)
NEUTROPHILS NFR BLD AUTO: 59.7 % — SIGNIFICANT CHANGE UP (ref 42.2–75.2)
NITRITE UR-MCNC: NEGATIVE — SIGNIFICANT CHANGE UP
NRBC # BLD: 0 /100 WBCS — SIGNIFICANT CHANGE UP (ref 0–0)
NRBC # BLD: 0 /100 WBCS — SIGNIFICANT CHANGE UP (ref 0–0)
PH UR: 5.5 — SIGNIFICANT CHANGE UP (ref 5–8)
PLATELET # BLD AUTO: 136 K/UL — SIGNIFICANT CHANGE UP (ref 130–400)
PLATELET # BLD AUTO: 146 K/UL — SIGNIFICANT CHANGE UP (ref 130–400)
POTASSIUM SERPL-MCNC: 3.4 MMOL/L — LOW (ref 3.5–5)
POTASSIUM SERPL-SCNC: 3.4 MMOL/L — LOW (ref 3.5–5)
PROT SERPL-MCNC: 6.1 G/DL — SIGNIFICANT CHANGE UP (ref 6–8)
PROT UR-MCNC: NEGATIVE — SIGNIFICANT CHANGE UP
RBC # BLD: 2.91 M/UL — LOW (ref 4.2–5.4)
RBC # BLD: 3.09 M/UL — LOW (ref 4.2–5.4)
RBC # FLD: 12.4 % — SIGNIFICANT CHANGE UP (ref 11.5–14.5)
RBC # FLD: 12.5 % — SIGNIFICANT CHANGE UP (ref 11.5–14.5)
SARS-COV-2 RNA SPEC QL NAA+PROBE: SIGNIFICANT CHANGE UP
SODIUM SERPL-SCNC: 138 MMOL/L — SIGNIFICANT CHANGE UP (ref 135–146)
SP GR SPEC: 1.02 — SIGNIFICANT CHANGE UP (ref 1.01–1.03)
UROBILINOGEN FLD QL: SIGNIFICANT CHANGE UP
WBC # BLD: 2.2 K/UL — LOW (ref 4.8–10.8)
WBC # BLD: 2.28 K/UL — LOW (ref 4.8–10.8)
WBC # FLD AUTO: 2.2 K/UL — LOW (ref 4.8–10.8)
WBC # FLD AUTO: 2.28 K/UL — LOW (ref 4.8–10.8)

## 2021-11-07 PROCEDURE — 99223 1ST HOSP IP/OBS HIGH 75: CPT

## 2021-11-07 RX ORDER — MORPHINE SULFATE 50 MG/1
2 CAPSULE, EXTENDED RELEASE ORAL ONCE
Refills: 0 | Status: DISCONTINUED | OUTPATIENT
Start: 2021-11-07 | End: 2021-11-07

## 2021-11-07 RX ORDER — MORPHINE SULFATE 50 MG/1
4 CAPSULE, EXTENDED RELEASE ORAL EVERY 6 HOURS
Refills: 0 | Status: DISCONTINUED | OUTPATIENT
Start: 2021-11-07 | End: 2021-11-07

## 2021-11-07 RX ORDER — LIPASE/PROTEASE/AMYLASE 16-48-48K
3 CAPSULE,DELAYED RELEASE (ENTERIC COATED) ORAL
Refills: 0 | Status: DISCONTINUED | OUTPATIENT
Start: 2021-11-07 | End: 2021-11-27

## 2021-11-07 RX ORDER — SODIUM CHLORIDE 9 MG/ML
1000 INJECTION INTRAMUSCULAR; INTRAVENOUS; SUBCUTANEOUS
Refills: 0 | Status: DISCONTINUED | OUTPATIENT
Start: 2021-11-07 | End: 2021-11-08

## 2021-11-07 RX ORDER — HYDROMORPHONE HYDROCHLORIDE 2 MG/ML
1 INJECTION INTRAMUSCULAR; INTRAVENOUS; SUBCUTANEOUS EVERY 4 HOURS
Refills: 0 | Status: DISCONTINUED | OUTPATIENT
Start: 2021-11-07 | End: 2021-11-08

## 2021-11-07 RX ORDER — GABAPENTIN 400 MG/1
600 CAPSULE ORAL THREE TIMES A DAY
Refills: 0 | Status: DISCONTINUED | OUTPATIENT
Start: 2021-11-07 | End: 2021-11-07

## 2021-11-07 RX ORDER — ENOXAPARIN SODIUM 100 MG/ML
40 INJECTION SUBCUTANEOUS AT BEDTIME
Refills: 0 | Status: DISCONTINUED | OUTPATIENT
Start: 2021-11-07 | End: 2021-11-27

## 2021-11-07 RX ORDER — MORPHINE SULFATE 50 MG/1
4 CAPSULE, EXTENDED RELEASE ORAL
Refills: 0 | Status: DISCONTINUED | OUTPATIENT
Start: 2021-11-07 | End: 2021-11-07

## 2021-11-07 RX ORDER — ONDANSETRON 8 MG/1
4 TABLET, FILM COATED ORAL EVERY 6 HOURS
Refills: 0 | Status: DISCONTINUED | OUTPATIENT
Start: 2021-11-07 | End: 2021-11-27

## 2021-11-07 RX ORDER — MAGNESIUM SULFATE 500 MG/ML
2 VIAL (ML) INJECTION ONCE
Refills: 0 | Status: COMPLETED | OUTPATIENT
Start: 2021-11-07 | End: 2021-11-07

## 2021-11-07 RX ORDER — GABAPENTIN 400 MG/1
600 CAPSULE ORAL
Refills: 0 | Status: DISCONTINUED | OUTPATIENT
Start: 2021-11-07 | End: 2021-11-09

## 2021-11-07 RX ORDER — FOLIC ACID 0.8 MG
1 TABLET ORAL DAILY
Refills: 0 | Status: DISCONTINUED | OUTPATIENT
Start: 2021-11-07 | End: 2021-11-22

## 2021-11-07 RX ORDER — THIAMINE MONONITRATE (VIT B1) 100 MG
100 TABLET ORAL DAILY
Refills: 0 | Status: DISCONTINUED | OUTPATIENT
Start: 2021-11-07 | End: 2021-11-22

## 2021-11-07 RX ORDER — MORPHINE SULFATE 50 MG/1
2 CAPSULE, EXTENDED RELEASE ORAL
Refills: 0 | Status: DISCONTINUED | OUTPATIENT
Start: 2021-11-07 | End: 2021-11-07

## 2021-11-07 RX ORDER — PANTOPRAZOLE SODIUM 20 MG/1
40 TABLET, DELAYED RELEASE ORAL
Refills: 0 | Status: DISCONTINUED | OUTPATIENT
Start: 2021-11-07 | End: 2021-11-12

## 2021-11-07 RX ADMIN — MORPHINE SULFATE 2 MILLIGRAM(S): 50 CAPSULE, EXTENDED RELEASE ORAL at 13:54

## 2021-11-07 RX ADMIN — MORPHINE SULFATE 2 MILLIGRAM(S): 50 CAPSULE, EXTENDED RELEASE ORAL at 09:05

## 2021-11-07 RX ADMIN — GABAPENTIN 600 MILLIGRAM(S): 400 CAPSULE ORAL at 05:35

## 2021-11-07 RX ADMIN — PANTOPRAZOLE SODIUM 40 MILLIGRAM(S): 20 TABLET, DELAYED RELEASE ORAL at 05:35

## 2021-11-07 RX ADMIN — HYDROMORPHONE HYDROCHLORIDE 1 MILLIGRAM(S): 2 INJECTION INTRAMUSCULAR; INTRAVENOUS; SUBCUTANEOUS at 17:35

## 2021-11-07 RX ADMIN — SODIUM CHLORIDE 250 MILLILITER(S): 9 INJECTION INTRAMUSCULAR; INTRAVENOUS; SUBCUTANEOUS at 09:53

## 2021-11-07 RX ADMIN — ENOXAPARIN SODIUM 40 MILLIGRAM(S): 100 INJECTION SUBCUTANEOUS at 21:30

## 2021-11-07 RX ADMIN — Medication 25 GRAM(S): at 01:32

## 2021-11-07 RX ADMIN — HYDROMORPHONE HYDROCHLORIDE 1 MILLIGRAM(S): 2 INJECTION INTRAMUSCULAR; INTRAVENOUS; SUBCUTANEOUS at 17:20

## 2021-11-07 RX ADMIN — MORPHINE SULFATE 2 MILLIGRAM(S): 50 CAPSULE, EXTENDED RELEASE ORAL at 12:58

## 2021-11-07 RX ADMIN — SODIUM CHLORIDE 150 MILLILITER(S): 9 INJECTION INTRAMUSCULAR; INTRAVENOUS; SUBCUTANEOUS at 20:16

## 2021-11-07 RX ADMIN — MORPHINE SULFATE 4 MILLIGRAM(S): 50 CAPSULE, EXTENDED RELEASE ORAL at 04:54

## 2021-11-07 RX ADMIN — HYDROMORPHONE HYDROCHLORIDE 1 MILLIGRAM(S): 2 INJECTION INTRAMUSCULAR; INTRAVENOUS; SUBCUTANEOUS at 22:00

## 2021-11-07 RX ADMIN — MORPHINE SULFATE 2 MILLIGRAM(S): 50 CAPSULE, EXTENDED RELEASE ORAL at 12:43

## 2021-11-07 RX ADMIN — SODIUM CHLORIDE 250 MILLILITER(S): 9 INJECTION INTRAMUSCULAR; INTRAVENOUS; SUBCUTANEOUS at 15:25

## 2021-11-07 RX ADMIN — ONDANSETRON 4 MILLIGRAM(S): 8 TABLET, FILM COATED ORAL at 23:49

## 2021-11-07 RX ADMIN — MORPHINE SULFATE 4 MILLIGRAM(S): 50 CAPSULE, EXTENDED RELEASE ORAL at 01:43

## 2021-11-07 RX ADMIN — GABAPENTIN 600 MILLIGRAM(S): 400 CAPSULE ORAL at 18:32

## 2021-11-07 RX ADMIN — ONDANSETRON 4 MILLIGRAM(S): 8 TABLET, FILM COATED ORAL at 01:42

## 2021-11-07 RX ADMIN — HYDROMORPHONE HYDROCHLORIDE 1 MILLIGRAM(S): 2 INJECTION INTRAMUSCULAR; INTRAVENOUS; SUBCUTANEOUS at 21:29

## 2021-11-07 RX ADMIN — Medication 30 MILLILITER(S): at 05:35

## 2021-11-07 RX ADMIN — MORPHINE SULFATE 4 MILLIGRAM(S): 50 CAPSULE, EXTENDED RELEASE ORAL at 01:41

## 2021-11-07 RX ADMIN — MORPHINE SULFATE 2 MILLIGRAM(S): 50 CAPSULE, EXTENDED RELEASE ORAL at 09:20

## 2021-11-07 RX ADMIN — MORPHINE SULFATE 4 MILLIGRAM(S): 50 CAPSULE, EXTENDED RELEASE ORAL at 04:24

## 2021-11-07 RX ADMIN — MORPHINE SULFATE 2 MILLIGRAM(S): 50 CAPSULE, EXTENDED RELEASE ORAL at 13:39
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
## 2021-11-08 LAB
ALBUMIN SERPL ELPH-MCNC: 3.6 G/DL — SIGNIFICANT CHANGE UP (ref 3.5–5.2)
ALP SERPL-CCNC: 66 U/L — SIGNIFICANT CHANGE UP (ref 30–115)
ALT FLD-CCNC: 53 U/L — HIGH (ref 0–41)
ANION GAP SERPL CALC-SCNC: 15 MMOL/L — HIGH (ref 7–14)
ANION GAP SERPL CALC-SCNC: 16 MMOL/L — HIGH (ref 7–14)
AST SERPL-CCNC: 193 U/L — HIGH (ref 0–41)
BASOPHILS # BLD AUTO: 0.02 K/UL — SIGNIFICANT CHANGE UP (ref 0–0.2)
BASOPHILS NFR BLD AUTO: 1 % — SIGNIFICANT CHANGE UP (ref 0–1)
BILIRUB SERPL-MCNC: 0.3 MG/DL — SIGNIFICANT CHANGE UP (ref 0.2–1.2)
BUN SERPL-MCNC: <3 MG/DL — LOW (ref 10–20)
BUN SERPL-MCNC: <3 MG/DL — LOW (ref 10–20)
CALCIUM SERPL-MCNC: 8.5 MG/DL — SIGNIFICANT CHANGE UP (ref 8.5–10.1)
CALCIUM SERPL-MCNC: 8.6 MG/DL — SIGNIFICANT CHANGE UP (ref 8.5–10.1)
CHLORIDE SERPL-SCNC: 103 MMOL/L — SIGNIFICANT CHANGE UP (ref 98–110)
CHLORIDE SERPL-SCNC: 103 MMOL/L — SIGNIFICANT CHANGE UP (ref 98–110)
CO2 SERPL-SCNC: 18 MMOL/L — SIGNIFICANT CHANGE UP (ref 17–32)
CO2 SERPL-SCNC: 20 MMOL/L — SIGNIFICANT CHANGE UP (ref 17–32)
COVID-19 NUCLEOCAPSID GAM AB INTERP: NEGATIVE — SIGNIFICANT CHANGE UP
COVID-19 NUCLEOCAPSID TOTAL GAM ANTIBODY RESULT: 0.09 INDEX — SIGNIFICANT CHANGE UP
COVID-19 SPIKE DOMAIN AB INTERP: POSITIVE
COVID-19 SPIKE DOMAIN ANTIBODY RESULT: 1.78 U/ML — HIGH
CREAT SERPL-MCNC: 0.6 MG/DL — LOW (ref 0.7–1.5)
CREAT SERPL-MCNC: 0.6 MG/DL — LOW (ref 0.7–1.5)
EOSINOPHIL # BLD AUTO: 0.07 K/UL — SIGNIFICANT CHANGE UP (ref 0–0.7)
EOSINOPHIL NFR BLD AUTO: 3.4 % — SIGNIFICANT CHANGE UP (ref 0–8)
GLUCOSE SERPL-MCNC: 100 MG/DL — HIGH (ref 70–99)
GLUCOSE SERPL-MCNC: 86 MG/DL — SIGNIFICANT CHANGE UP (ref 70–99)
HCT VFR BLD CALC: 28.7 % — LOW (ref 37–47)
HGB BLD-MCNC: 9.8 G/DL — LOW (ref 12–16)
IMM GRANULOCYTES NFR BLD AUTO: 0.5 % — HIGH (ref 0.1–0.3)
LYMPHOCYTES # BLD AUTO: 0.65 K/UL — LOW (ref 1.2–3.4)
LYMPHOCYTES # BLD AUTO: 31.4 % — SIGNIFICANT CHANGE UP (ref 20.5–51.1)
MAGNESIUM SERPL-MCNC: 1.8 MG/DL — SIGNIFICANT CHANGE UP (ref 1.8–2.4)
MCHC RBC-ENTMCNC: 32.7 PG — HIGH (ref 27–31)
MCHC RBC-ENTMCNC: 34.1 G/DL — SIGNIFICANT CHANGE UP (ref 32–37)
MCV RBC AUTO: 95.7 FL — SIGNIFICANT CHANGE UP (ref 81–99)
MONOCYTES # BLD AUTO: 0.28 K/UL — SIGNIFICANT CHANGE UP (ref 0.1–0.6)
MONOCYTES NFR BLD AUTO: 13.5 % — HIGH (ref 1.7–9.3)
NEUTROPHILS # BLD AUTO: 1.04 K/UL — LOW (ref 1.4–6.5)
NEUTROPHILS NFR BLD AUTO: 50.2 % — SIGNIFICANT CHANGE UP (ref 42.2–75.2)
NRBC # BLD: 0 /100 WBCS — SIGNIFICANT CHANGE UP (ref 0–0)
PLATELET # BLD AUTO: 158 K/UL — SIGNIFICANT CHANGE UP (ref 130–400)
POTASSIUM SERPL-MCNC: 3.2 MMOL/L — LOW (ref 3.5–5)
POTASSIUM SERPL-MCNC: 3.4 MMOL/L — LOW (ref 3.5–5)
POTASSIUM SERPL-SCNC: 3.2 MMOL/L — LOW (ref 3.5–5)
POTASSIUM SERPL-SCNC: 3.4 MMOL/L — LOW (ref 3.5–5)
PROT SERPL-MCNC: 6.2 G/DL — SIGNIFICANT CHANGE UP (ref 6–8)
RBC # BLD: 3 M/UL — LOW (ref 4.2–5.4)
RBC # FLD: 12.2 % — SIGNIFICANT CHANGE UP (ref 11.5–14.5)
SARS-COV-2 IGG+IGM SERPL QL IA: 0.09 INDEX — SIGNIFICANT CHANGE UP
SARS-COV-2 IGG+IGM SERPL QL IA: 1.78 U/ML — HIGH
SARS-COV-2 IGG+IGM SERPL QL IA: NEGATIVE — SIGNIFICANT CHANGE UP
SARS-COV-2 IGG+IGM SERPL QL IA: POSITIVE
SODIUM SERPL-SCNC: 136 MMOL/L — SIGNIFICANT CHANGE UP (ref 135–146)
SODIUM SERPL-SCNC: 139 MMOL/L — SIGNIFICANT CHANGE UP (ref 135–146)
WBC # BLD: 2.07 K/UL — LOW (ref 4.8–10.8)
WBC # FLD AUTO: 2.07 K/UL — LOW (ref 4.8–10.8)

## 2021-11-08 PROCEDURE — 99233 SBSQ HOSP IP/OBS HIGH 50: CPT

## 2021-11-08 PROCEDURE — 99223 1ST HOSP IP/OBS HIGH 75: CPT

## 2021-11-08 RX ORDER — SODIUM CHLORIDE 9 MG/ML
1000 INJECTION INTRAMUSCULAR; INTRAVENOUS; SUBCUTANEOUS
Refills: 0 | Status: DISCONTINUED | OUTPATIENT
Start: 2021-11-08 | End: 2021-11-12

## 2021-11-08 RX ORDER — MORPHINE SULFATE 50 MG/1
2 CAPSULE, EXTENDED RELEASE ORAL
Refills: 0 | Status: DISCONTINUED | OUTPATIENT
Start: 2021-11-08 | End: 2021-11-08

## 2021-11-08 RX ORDER — MORPHINE SULFATE 50 MG/1
2 CAPSULE, EXTENDED RELEASE ORAL ONCE
Refills: 0 | Status: DISCONTINUED | OUTPATIENT
Start: 2021-11-08 | End: 2021-11-08

## 2021-11-08 RX ORDER — MAGNESIUM SULFATE 500 MG/ML
1 VIAL (ML) INJECTION ONCE
Refills: 0 | Status: COMPLETED | OUTPATIENT
Start: 2021-11-08 | End: 2021-11-08

## 2021-11-08 RX ORDER — SODIUM CHLORIDE 9 MG/ML
1000 INJECTION INTRAMUSCULAR; INTRAVENOUS; SUBCUTANEOUS
Refills: 0 | Status: DISCONTINUED | OUTPATIENT
Start: 2021-11-08 | End: 2021-11-08

## 2021-11-08 RX ORDER — MORPHINE SULFATE 50 MG/1
1 CAPSULE, EXTENDED RELEASE ORAL
Refills: 0 | Status: DISCONTINUED | OUTPATIENT
Start: 2021-11-08 | End: 2021-11-08

## 2021-11-08 RX ORDER — HYDROMORPHONE HYDROCHLORIDE 2 MG/ML
1 INJECTION INTRAMUSCULAR; INTRAVENOUS; SUBCUTANEOUS EVERY 4 HOURS
Refills: 0 | Status: DISCONTINUED | OUTPATIENT
Start: 2021-11-08 | End: 2021-11-09

## 2021-11-08 RX ADMIN — Medication 1 MILLIGRAM(S): at 11:39

## 2021-11-08 RX ADMIN — HYDROMORPHONE HYDROCHLORIDE 1 MILLIGRAM(S): 2 INJECTION INTRAMUSCULAR; INTRAVENOUS; SUBCUTANEOUS at 13:51

## 2021-11-08 RX ADMIN — SODIUM CHLORIDE 250 MILLILITER(S): 9 INJECTION INTRAMUSCULAR; INTRAVENOUS; SUBCUTANEOUS at 14:22

## 2021-11-08 RX ADMIN — HYDROMORPHONE HYDROCHLORIDE 1 MILLIGRAM(S): 2 INJECTION INTRAMUSCULAR; INTRAVENOUS; SUBCUTANEOUS at 17:55

## 2021-11-08 RX ADMIN — MORPHINE SULFATE 1 MILLIGRAM(S): 50 CAPSULE, EXTENDED RELEASE ORAL at 10:28

## 2021-11-08 RX ADMIN — HYDROMORPHONE HYDROCHLORIDE 1 MILLIGRAM(S): 2 INJECTION INTRAMUSCULAR; INTRAVENOUS; SUBCUTANEOUS at 19:08

## 2021-11-08 RX ADMIN — ENOXAPARIN SODIUM 40 MILLIGRAM(S): 100 INJECTION SUBCUTANEOUS at 21:31

## 2021-11-08 RX ADMIN — MORPHINE SULFATE 1 MILLIGRAM(S): 50 CAPSULE, EXTENDED RELEASE ORAL at 11:21

## 2021-11-08 RX ADMIN — HYDROMORPHONE HYDROCHLORIDE 1 MILLIGRAM(S): 2 INJECTION INTRAMUSCULAR; INTRAVENOUS; SUBCUTANEOUS at 05:59

## 2021-11-08 RX ADMIN — SODIUM CHLORIDE 250 MILLILITER(S): 9 INJECTION INTRAMUSCULAR; INTRAVENOUS; SUBCUTANEOUS at 10:14

## 2021-11-08 RX ADMIN — GABAPENTIN 600 MILLIGRAM(S): 400 CAPSULE ORAL at 05:57

## 2021-11-08 RX ADMIN — SODIUM CHLORIDE 150 MILLILITER(S): 9 INJECTION INTRAMUSCULAR; INTRAVENOUS; SUBCUTANEOUS at 03:00

## 2021-11-08 RX ADMIN — HYDROMORPHONE HYDROCHLORIDE 1 MILLIGRAM(S): 2 INJECTION INTRAMUSCULAR; INTRAVENOUS; SUBCUTANEOUS at 01:51

## 2021-11-08 RX ADMIN — Medication 100 GRAM(S): at 14:23

## 2021-11-08 RX ADMIN — HYDROMORPHONE HYDROCHLORIDE 1 MILLIGRAM(S): 2 INJECTION INTRAMUSCULAR; INTRAVENOUS; SUBCUTANEOUS at 02:20

## 2021-11-08 RX ADMIN — GABAPENTIN 600 MILLIGRAM(S): 400 CAPSULE ORAL at 17:55

## 2021-11-08 RX ADMIN — HYDROMORPHONE HYDROCHLORIDE 1 MILLIGRAM(S): 2 INJECTION INTRAMUSCULAR; INTRAVENOUS; SUBCUTANEOUS at 22:33

## 2021-11-08 RX ADMIN — SODIUM CHLORIDE 250 MILLILITER(S): 9 INJECTION INTRAMUSCULAR; INTRAVENOUS; SUBCUTANEOUS at 19:52

## 2021-11-08 RX ADMIN — HYDROMORPHONE HYDROCHLORIDE 1 MILLIGRAM(S): 2 INJECTION INTRAMUSCULAR; INTRAVENOUS; SUBCUTANEOUS at 13:14

## 2021-11-08 RX ADMIN — MORPHINE SULFATE 2 MILLIGRAM(S): 50 CAPSULE, EXTENDED RELEASE ORAL at 11:30

## 2021-11-08 RX ADMIN — PANTOPRAZOLE SODIUM 40 MILLIGRAM(S): 20 TABLET, DELAYED RELEASE ORAL at 05:58

## 2021-11-08 RX ADMIN — MORPHINE SULFATE 2 MILLIGRAM(S): 50 CAPSULE, EXTENDED RELEASE ORAL at 14:00

## 2021-11-08 RX ADMIN — Medication 100 MILLIGRAM(S): at 11:39

## 2021-11-08 RX ADMIN — HYDROMORPHONE HYDROCHLORIDE 1 MILLIGRAM(S): 2 INJECTION INTRAMUSCULAR; INTRAVENOUS; SUBCUTANEOUS at 22:18
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
## 2021-11-09 LAB
ALBUMIN SERPL ELPH-MCNC: 3.6 G/DL — SIGNIFICANT CHANGE UP (ref 3.5–5.2)
ALP SERPL-CCNC: 79 U/L — SIGNIFICANT CHANGE UP (ref 30–115)
ALT FLD-CCNC: 51 U/L — HIGH (ref 0–41)
AMYLASE P1 CFR SERPL: 101 U/L — SIGNIFICANT CHANGE UP (ref 25–115)
ANION GAP SERPL CALC-SCNC: 14 MMOL/L — SIGNIFICANT CHANGE UP (ref 7–14)
AST SERPL-CCNC: 176 U/L — HIGH (ref 0–41)
BASOPHILS # BLD AUTO: 0.02 K/UL — SIGNIFICANT CHANGE UP (ref 0–0.2)
BASOPHILS NFR BLD AUTO: 0.9 % — SIGNIFICANT CHANGE UP (ref 0–1)
BILIRUB SERPL-MCNC: 0.3 MG/DL — SIGNIFICANT CHANGE UP (ref 0.2–1.2)
BUN SERPL-MCNC: <3 MG/DL — LOW (ref 10–20)
CALCIUM SERPL-MCNC: 8.3 MG/DL — LOW (ref 8.5–10.1)
CHLORIDE SERPL-SCNC: 103 MMOL/L — SIGNIFICANT CHANGE UP (ref 98–110)
CO2 SERPL-SCNC: 20 MMOL/L — SIGNIFICANT CHANGE UP (ref 17–32)
CREAT SERPL-MCNC: 0.6 MG/DL — LOW (ref 0.7–1.5)
EOSINOPHIL # BLD AUTO: 0.08 K/UL — SIGNIFICANT CHANGE UP (ref 0–0.7)
EOSINOPHIL NFR BLD AUTO: 3.8 % — SIGNIFICANT CHANGE UP (ref 0–8)
GLUCOSE SERPL-MCNC: 85 MG/DL — SIGNIFICANT CHANGE UP (ref 70–99)
HCT VFR BLD CALC: 29.1 % — LOW (ref 37–47)
HGB BLD-MCNC: 10.1 G/DL — LOW (ref 12–16)
IMM GRANULOCYTES NFR BLD AUTO: 0.9 % — HIGH (ref 0.1–0.3)
LIDOCAIN IGE QN: 45 U/L — SIGNIFICANT CHANGE UP (ref 7–60)
LYMPHOCYTES # BLD AUTO: 0.81 K/UL — LOW (ref 1.2–3.4)
LYMPHOCYTES # BLD AUTO: 38.2 % — SIGNIFICANT CHANGE UP (ref 20.5–51.1)
MAGNESIUM SERPL-MCNC: 1.7 MG/DL — LOW (ref 1.8–2.4)
MCHC RBC-ENTMCNC: 32.5 PG — HIGH (ref 27–31)
MCHC RBC-ENTMCNC: 34.7 G/DL — SIGNIFICANT CHANGE UP (ref 32–37)
MCV RBC AUTO: 93.6 FL — SIGNIFICANT CHANGE UP (ref 81–99)
MONOCYTES # BLD AUTO: 0.26 K/UL — SIGNIFICANT CHANGE UP (ref 0.1–0.6)
MONOCYTES NFR BLD AUTO: 12.3 % — HIGH (ref 1.7–9.3)
NEUTROPHILS # BLD AUTO: 0.93 K/UL — LOW (ref 1.4–6.5)
NEUTROPHILS NFR BLD AUTO: 43.9 % — SIGNIFICANT CHANGE UP (ref 42.2–75.2)
NRBC # BLD: 0 /100 WBCS — SIGNIFICANT CHANGE UP (ref 0–0)
PHOSPHATE SERPL-MCNC: 3.4 MG/DL — SIGNIFICANT CHANGE UP (ref 2.1–4.9)
PLATELET # BLD AUTO: 163 K/UL — SIGNIFICANT CHANGE UP (ref 130–400)
POTASSIUM SERPL-MCNC: 3.3 MMOL/L — LOW (ref 3.5–5)
POTASSIUM SERPL-SCNC: 3.3 MMOL/L — LOW (ref 3.5–5)
PROT SERPL-MCNC: 6.5 G/DL — SIGNIFICANT CHANGE UP (ref 6–8)
RBC # BLD: 3.11 M/UL — LOW (ref 4.2–5.4)
RBC # FLD: 12.2 % — SIGNIFICANT CHANGE UP (ref 11.5–14.5)
SODIUM SERPL-SCNC: 137 MMOL/L — SIGNIFICANT CHANGE UP (ref 135–146)
WBC # BLD: 2.12 K/UL — LOW (ref 4.8–10.8)
WBC # FLD AUTO: 2.12 K/UL — LOW (ref 4.8–10.8)

## 2021-11-09 PROCEDURE — 99233 SBSQ HOSP IP/OBS HIGH 50: CPT

## 2021-11-09 RX ORDER — MAGNESIUM SULFATE 500 MG/ML
1 VIAL (ML) INJECTION ONCE
Refills: 0 | Status: COMPLETED | OUTPATIENT
Start: 2021-11-09 | End: 2021-11-09

## 2021-11-09 RX ORDER — POTASSIUM CHLORIDE 20 MEQ
20 PACKET (EA) ORAL
Refills: 0 | Status: COMPLETED | OUTPATIENT
Start: 2021-11-09 | End: 2021-11-09

## 2021-11-09 RX ORDER — HYDROMORPHONE HYDROCHLORIDE 2 MG/ML
0.5 INJECTION INTRAMUSCULAR; INTRAVENOUS; SUBCUTANEOUS EVERY 4 HOURS
Refills: 0 | Status: DISCONTINUED | OUTPATIENT
Start: 2021-11-09 | End: 2021-11-12

## 2021-11-09 RX ORDER — HYDROMORPHONE HYDROCHLORIDE 2 MG/ML
0.25 INJECTION INTRAMUSCULAR; INTRAVENOUS; SUBCUTANEOUS ONCE
Refills: 0 | Status: DISCONTINUED | OUTPATIENT
Start: 2021-11-09 | End: 2021-11-09

## 2021-11-09 RX ADMIN — HYDROMORPHONE HYDROCHLORIDE 1 MILLIGRAM(S): 2 INJECTION INTRAMUSCULAR; INTRAVENOUS; SUBCUTANEOUS at 06:41

## 2021-11-09 RX ADMIN — Medication 50 MILLIEQUIVALENT(S): at 11:12

## 2021-11-09 RX ADMIN — HYDROMORPHONE HYDROCHLORIDE 0.5 MILLIGRAM(S): 2 INJECTION INTRAMUSCULAR; INTRAVENOUS; SUBCUTANEOUS at 16:00

## 2021-11-09 RX ADMIN — Medication 100 MILLIGRAM(S): at 11:10

## 2021-11-09 RX ADMIN — HYDROMORPHONE HYDROCHLORIDE 0.5 MILLIGRAM(S): 2 INJECTION INTRAMUSCULAR; INTRAVENOUS; SUBCUTANEOUS at 11:28

## 2021-11-09 RX ADMIN — Medication 50 MILLIEQUIVALENT(S): at 13:17

## 2021-11-09 RX ADMIN — HYDROMORPHONE HYDROCHLORIDE 1 MILLIGRAM(S): 2 INJECTION INTRAMUSCULAR; INTRAVENOUS; SUBCUTANEOUS at 02:25

## 2021-11-09 RX ADMIN — HYDROMORPHONE HYDROCHLORIDE 0.25 MILLIGRAM(S): 2 INJECTION INTRAMUSCULAR; INTRAVENOUS; SUBCUTANEOUS at 18:51

## 2021-11-09 RX ADMIN — HYDROMORPHONE HYDROCHLORIDE 0.5 MILLIGRAM(S): 2 INJECTION INTRAMUSCULAR; INTRAVENOUS; SUBCUTANEOUS at 20:27

## 2021-11-09 RX ADMIN — HYDROMORPHONE HYDROCHLORIDE 0.5 MILLIGRAM(S): 2 INJECTION INTRAMUSCULAR; INTRAVENOUS; SUBCUTANEOUS at 11:43

## 2021-11-09 RX ADMIN — GABAPENTIN 600 MILLIGRAM(S): 400 CAPSULE ORAL at 05:54

## 2021-11-09 RX ADMIN — Medication 3 CAPSULE(S): at 11:24

## 2021-11-09 RX ADMIN — SODIUM CHLORIDE 250 MILLILITER(S): 9 INJECTION INTRAMUSCULAR; INTRAVENOUS; SUBCUTANEOUS at 04:51

## 2021-11-09 RX ADMIN — HYDROMORPHONE HYDROCHLORIDE 1 MILLIGRAM(S): 2 INJECTION INTRAMUSCULAR; INTRAVENOUS; SUBCUTANEOUS at 06:56

## 2021-11-09 RX ADMIN — SODIUM CHLORIDE 250 MILLILITER(S): 9 INJECTION INTRAMUSCULAR; INTRAVENOUS; SUBCUTANEOUS at 15:19

## 2021-11-09 RX ADMIN — HYDROMORPHONE HYDROCHLORIDE 1 MILLIGRAM(S): 2 INJECTION INTRAMUSCULAR; INTRAVENOUS; SUBCUTANEOUS at 02:40

## 2021-11-09 RX ADMIN — Medication 1 MILLIGRAM(S): at 11:13

## 2021-11-09 RX ADMIN — PANTOPRAZOLE SODIUM 40 MILLIGRAM(S): 20 TABLET, DELAYED RELEASE ORAL at 05:55

## 2021-11-09 RX ADMIN — SODIUM CHLORIDE 250 MILLILITER(S): 9 INJECTION INTRAMUSCULAR; INTRAVENOUS; SUBCUTANEOUS at 20:13

## 2021-11-09 RX ADMIN — ENOXAPARIN SODIUM 40 MILLIGRAM(S): 100 INJECTION SUBCUTANEOUS at 21:59

## 2021-11-09 RX ADMIN — SODIUM CHLORIDE 250 MILLILITER(S): 9 INJECTION INTRAMUSCULAR; INTRAVENOUS; SUBCUTANEOUS at 11:12

## 2021-11-09 RX ADMIN — HYDROMORPHONE HYDROCHLORIDE 0.5 MILLIGRAM(S): 2 INJECTION INTRAMUSCULAR; INTRAVENOUS; SUBCUTANEOUS at 16:15

## 2021-11-09 RX ADMIN — Medication 100 GRAM(S): at 16:00

## 2021-11-09 RX ADMIN — HYDROMORPHONE HYDROCHLORIDE 0.25 MILLIGRAM(S): 2 INJECTION INTRAMUSCULAR; INTRAVENOUS; SUBCUTANEOUS at 18:07

## 2021-11-09 RX ADMIN — HYDROMORPHONE HYDROCHLORIDE 0.5 MILLIGRAM(S): 2 INJECTION INTRAMUSCULAR; INTRAVENOUS; SUBCUTANEOUS at 20:12

## 2021-11-09 NOTE — PROGRESS NOTE ADULT - ASSESSMENT
Patient is a 47 y/o female with Past Medical History of ETOH abuse and multiple episodes of pancreatitis, chronic pancreatitis, with known pseudocyst presents with epigastric abdominal pain. Patient had last EUS done 8/9. Patient on EUS had a walled-off collection that measured 4.5 x 3.9 cm in largest dimension, looked well circumscribed, heterogeneous, containing debris, consistent with walled-off pancreatic necrosis.  She also had pancreatic parenchymal hypoechogenicity and calcifications in the pancreatic head, body, and tail, suggestive of chronic pancreatitis. There was a reactive appearing, periportal lymph node that looked irregular, homogeneous, well circumscribed, measuring around 1.2 cm in largest dimension. No drainage was warranted. She had subsequent presentations after  again for abdominal pain. She notes Epigastric pain, which was sharp and severe. Pain is improved since admission but still present. CT imaging done in the ED. Admits to drinking ETOH still but denies recent intoxication. Pain control, try Lyrica and can always up titrate dose. Would avoid Opiates as has chemical dependency issues and still consumes ETOH despite being educated on the detriment she is doing to her Pancreas. IF hydration. Progress to low fat diet as tolerated. Can make a follow up with [facility] [phone] but has no-shown multiple appointments in the past.     Acute on Chronic Pancreatitis secondary to active ETOH use  - EUS done 8/9- drainage not warranted   - CT scan with stable 6cm collection unchanged  - IV hydration and pain control, avoid Opioids can try Lyrica and up titrate  - Appears comfortable and sleeping upon entering the room  - High risk for any procedure that requires follow up as cannot reliably abstain from ETOH use despite education, and inability to keep pre-made outpatient follow ups  - Please make follow up for her in [facility] [phone]  - Patient appears relatively stable and does not require an intervention at this time  - Recall [facility] if change in clinic status such as sepsis, or upgrade in care
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
## 2021-11-10 LAB
ALBUMIN SERPL ELPH-MCNC: 3.8 G/DL — SIGNIFICANT CHANGE UP (ref 3.5–5.2)
ALP SERPL-CCNC: 79 U/L — SIGNIFICANT CHANGE UP (ref 30–115)
ALT FLD-CCNC: 51 U/L — HIGH (ref 0–41)
ANION GAP SERPL CALC-SCNC: 15 MMOL/L — HIGH (ref 7–14)
AST SERPL-CCNC: 158 U/L — HIGH (ref 0–41)
BASOPHILS # BLD AUTO: 0.03 K/UL — SIGNIFICANT CHANGE UP (ref 0–0.2)
BASOPHILS NFR BLD AUTO: 1.3 % — HIGH (ref 0–1)
BILIRUB SERPL-MCNC: 0.4 MG/DL — SIGNIFICANT CHANGE UP (ref 0.2–1.2)
BUN SERPL-MCNC: <3 MG/DL — LOW (ref 10–20)
CALCIUM SERPL-MCNC: 8.4 MG/DL — LOW (ref 8.5–10.1)
CHLORIDE SERPL-SCNC: 102 MMOL/L — SIGNIFICANT CHANGE UP (ref 98–110)
CO2 SERPL-SCNC: 20 MMOL/L — SIGNIFICANT CHANGE UP (ref 17–32)
CREAT SERPL-MCNC: 0.5 MG/DL — LOW (ref 0.7–1.5)
EOSINOPHIL # BLD AUTO: 0.09 K/UL — SIGNIFICANT CHANGE UP (ref 0–0.7)
EOSINOPHIL NFR BLD AUTO: 4 % — SIGNIFICANT CHANGE UP (ref 0–8)
GLUCOSE SERPL-MCNC: 85 MG/DL — SIGNIFICANT CHANGE UP (ref 70–99)
HCT VFR BLD CALC: 30.7 % — LOW (ref 37–47)
HGB BLD-MCNC: 10.9 G/DL — LOW (ref 12–16)
IMM GRANULOCYTES NFR BLD AUTO: 0 % — LOW (ref 0.1–0.3)
LYMPHOCYTES # BLD AUTO: 0.91 K/UL — LOW (ref 1.2–3.4)
LYMPHOCYTES # BLD AUTO: 40.3 % — SIGNIFICANT CHANGE UP (ref 20.5–51.1)
MCHC RBC-ENTMCNC: 32.8 PG — HIGH (ref 27–31)
MCHC RBC-ENTMCNC: 35.5 G/DL — SIGNIFICANT CHANGE UP (ref 32–37)
MCV RBC AUTO: 92.5 FL — SIGNIFICANT CHANGE UP (ref 81–99)
MONOCYTES # BLD AUTO: 0.37 K/UL — SIGNIFICANT CHANGE UP (ref 0.1–0.6)
MONOCYTES NFR BLD AUTO: 16.4 % — HIGH (ref 1.7–9.3)
NEUTROPHILS # BLD AUTO: 0.86 K/UL — LOW (ref 1.4–6.5)
NEUTROPHILS NFR BLD AUTO: 38 % — LOW (ref 42.2–75.2)
NRBC # BLD: 0 /100 WBCS — SIGNIFICANT CHANGE UP (ref 0–0)
PHOSPHATE SERPL-MCNC: 3.6 MG/DL — SIGNIFICANT CHANGE UP (ref 2.1–4.9)
PLATELET # BLD AUTO: 189 K/UL — SIGNIFICANT CHANGE UP (ref 130–400)
POTASSIUM SERPL-MCNC: 3.1 MMOL/L — LOW (ref 3.5–5)
POTASSIUM SERPL-SCNC: 3.1 MMOL/L — LOW (ref 3.5–5)
PROT SERPL-MCNC: 6.5 G/DL — SIGNIFICANT CHANGE UP (ref 6–8)
RBC # BLD: 3.32 M/UL — LOW (ref 4.2–5.4)
RBC # FLD: 12 % — SIGNIFICANT CHANGE UP (ref 11.5–14.5)
SODIUM SERPL-SCNC: 137 MMOL/L — SIGNIFICANT CHANGE UP (ref 135–146)
WBC # BLD: 2.26 K/UL — LOW (ref 4.8–10.8)
WBC # FLD AUTO: 2.26 K/UL — LOW (ref 4.8–10.8)

## 2021-11-10 PROCEDURE — 99233 SBSQ HOSP IP/OBS HIGH 50: CPT

## 2021-11-10 RX ORDER — GABAPENTIN 400 MG/1
300 CAPSULE ORAL THREE TIMES A DAY
Refills: 0 | Status: DISCONTINUED | OUTPATIENT
Start: 2021-11-10 | End: 2021-11-20

## 2021-11-10 RX ORDER — POTASSIUM CHLORIDE 20 MEQ
20 PACKET (EA) ORAL
Refills: 0 | Status: COMPLETED | OUTPATIENT
Start: 2021-11-10 | End: 2021-11-10

## 2021-11-10 RX ORDER — MAGNESIUM SULFATE 500 MG/ML
2 VIAL (ML) INJECTION ONCE
Refills: 0 | Status: COMPLETED | OUTPATIENT
Start: 2021-11-10 | End: 2021-11-11

## 2021-11-10 RX ADMIN — Medication 3 CAPSULE(S): at 12:16

## 2021-11-10 RX ADMIN — HYDROMORPHONE HYDROCHLORIDE 0.5 MILLIGRAM(S): 2 INJECTION INTRAMUSCULAR; INTRAVENOUS; SUBCUTANEOUS at 05:09

## 2021-11-10 RX ADMIN — GABAPENTIN 300 MILLIGRAM(S): 400 CAPSULE ORAL at 21:37

## 2021-11-10 RX ADMIN — HYDROMORPHONE HYDROCHLORIDE 0.5 MILLIGRAM(S): 2 INJECTION INTRAMUSCULAR; INTRAVENOUS; SUBCUTANEOUS at 21:35

## 2021-11-10 RX ADMIN — HYDROMORPHONE HYDROCHLORIDE 0.5 MILLIGRAM(S): 2 INJECTION INTRAMUSCULAR; INTRAVENOUS; SUBCUTANEOUS at 04:54

## 2021-11-10 RX ADMIN — HYDROMORPHONE HYDROCHLORIDE 0.5 MILLIGRAM(S): 2 INJECTION INTRAMUSCULAR; INTRAVENOUS; SUBCUTANEOUS at 16:00

## 2021-11-10 RX ADMIN — SODIUM CHLORIDE 250 MILLILITER(S): 9 INJECTION INTRAMUSCULAR; INTRAVENOUS; SUBCUTANEOUS at 04:49

## 2021-11-10 RX ADMIN — SODIUM CHLORIDE 250 MILLILITER(S): 9 INJECTION INTRAMUSCULAR; INTRAVENOUS; SUBCUTANEOUS at 21:39

## 2021-11-10 RX ADMIN — ENOXAPARIN SODIUM 40 MILLIGRAM(S): 100 INJECTION SUBCUTANEOUS at 21:36

## 2021-11-10 RX ADMIN — SODIUM CHLORIDE 250 MILLILITER(S): 9 INJECTION INTRAMUSCULAR; INTRAVENOUS; SUBCUTANEOUS at 08:18

## 2021-11-10 RX ADMIN — HYDROMORPHONE HYDROCHLORIDE 0.5 MILLIGRAM(S): 2 INJECTION INTRAMUSCULAR; INTRAVENOUS; SUBCUTANEOUS at 15:57

## 2021-11-10 RX ADMIN — Medication 50 MILLIEQUIVALENT(S): at 17:54

## 2021-11-10 RX ADMIN — HYDROMORPHONE HYDROCHLORIDE 0.5 MILLIGRAM(S): 2 INJECTION INTRAMUSCULAR; INTRAVENOUS; SUBCUTANEOUS at 23:38

## 2021-11-10 RX ADMIN — Medication 100 MILLIGRAM(S): at 12:16

## 2021-11-10 RX ADMIN — HYDROMORPHONE HYDROCHLORIDE 0.5 MILLIGRAM(S): 2 INJECTION INTRAMUSCULAR; INTRAVENOUS; SUBCUTANEOUS at 00:51

## 2021-11-10 RX ADMIN — GABAPENTIN 300 MILLIGRAM(S): 400 CAPSULE ORAL at 14:10

## 2021-11-10 RX ADMIN — HYDROMORPHONE HYDROCHLORIDE 0.5 MILLIGRAM(S): 2 INJECTION INTRAMUSCULAR; INTRAVENOUS; SUBCUTANEOUS at 09:30

## 2021-11-10 RX ADMIN — HYDROMORPHONE HYDROCHLORIDE 0.5 MILLIGRAM(S): 2 INJECTION INTRAMUSCULAR; INTRAVENOUS; SUBCUTANEOUS at 00:36

## 2021-11-10 RX ADMIN — Medication 50 MILLIEQUIVALENT(S): at 21:15

## 2021-11-10 RX ADMIN — HYDROMORPHONE HYDROCHLORIDE 0.5 MILLIGRAM(S): 2 INJECTION INTRAMUSCULAR; INTRAVENOUS; SUBCUTANEOUS at 09:00

## 2021-11-10 RX ADMIN — PANTOPRAZOLE SODIUM 40 MILLIGRAM(S): 20 TABLET, DELAYED RELEASE ORAL at 05:22

## 2021-11-10 RX ADMIN — Medication 3 CAPSULE(S): at 17:28
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
## 2021-11-10 NOTE — PROGRESS NOTE ADULT - SUBJECTIVE AND OBJECTIVE BOX
LAURA BARAJAS  46y  Female      Patient is a 46y old  Female who presents with a chief complaint of Acute on chronic pancreatitis; .      INTERVAL HPI/OVERNIGHT EVENTS:      ******************************* REVIEW OF SYSTEMS:**********************************************    All other review of systems negative    *********************** VITALS ******************************************    T(F): 97.5 (11-10-21 @ 12:20)  HR: 81 (11-10-21 @ 12:20) (81 - 88)  BP: 144/95 (11-10-21 @ 12:20) (137/99 - 148/99)  RR: 14 (11-10-21 @ 12:20) (14 - 18)  SpO2: 100% (11-10-21 @ 08:29) (100% - 100%)            ******************************** PHYSICAL EXAM:**************************************************  GENERAL: NAD    PSYCH: no agitation, baseline mentation  HEENT:     NERVOUS SYSTEM:  Alert & Oriented X3, MS  5/5 B/L  UE and LE ; Sensory intact    PULMONARY: JOSE, CTA    CARDIOVASCULAR: S1S2 RRR    GI: Soft, Epigastric tenderness , ND; BS present.    EXTREMITIES:  2+ Peripheral Pulses, No clubbing, cyanosis, or edema    LYMPH: No lymphadenopathy noted    SKIN: No rashes or lesions      **************************** LABS *******************************************************                          10.9   2.26  )-----------( 189      ( 10 Nov 2021 06:30 )             30.7     11-10    137  |  102  |  <3<L>  ----------------------------<  85  3.1<L>   |  20  |  0.5<L>    Ca    8.4<L>      10 Nov 2021 06:30  Phos  3.6     11-10  Mg     1.6     11-10    TPro  6.5  /  Alb  3.8  /  TBili  0.4  /  DBili  x   /  AST  158<H>  /  ALT  51<H>  /  AlkPhos  79  11-10          Lactate Trend  11-06 @ 18:21 Lactate:1.6         CAPILLARY BLOOD GLUCOSE              **************************Active Medications *******************************************  Compazine (Unknown)      enoxaparin Injectable 40 milliGRAM(s) SubCutaneous at bedtime  folic acid 1 milliGRAM(s) Oral daily  gabapentin 300 milliGRAM(s) Oral three times a day  HYDROmorphone  Injectable 0.5 milliGRAM(s) IV Push every 4 hours PRN  magnesium sulfate  IVPB 2 Gram(s) IV Intermittent once  ondansetron    Tablet 4 milliGRAM(s) Oral every 6 hours PRN  pancrelipase  (CREON 12,000 Lipase Units) 3 Capsule(s) Oral three times a day with meals  pantoprazole    Tablet 40 milliGRAM(s) Oral before breakfast  sodium chloride 0.9%. 1000 milliLiter(s) IV Continuous <Continuous>  thiamine 100 milliGRAM(s) Oral daily      ***************************************************  RADIOLOGY & ADDITIONAL TESTS:    Imaging Personally Reviewed:  [ ] YES  [ ] NO    HEALTH ISSUES - PROBLEM Dx:

## 2021-11-11 LAB
ALBUMIN SERPL ELPH-MCNC: 3.4 G/DL — LOW (ref 3.5–5.2)
ALP SERPL-CCNC: 73 U/L — SIGNIFICANT CHANGE UP (ref 30–115)
ALT FLD-CCNC: 39 U/L — SIGNIFICANT CHANGE UP (ref 0–41)
ANION GAP SERPL CALC-SCNC: 17 MMOL/L — HIGH (ref 7–14)
AST SERPL-CCNC: 116 U/L — HIGH (ref 0–41)
BASOPHILS # BLD AUTO: 0.15 K/UL — SIGNIFICANT CHANGE UP (ref 0–0.2)
BASOPHILS NFR BLD AUTO: 6.2 % — HIGH (ref 0–1)
BILIRUB SERPL-MCNC: 0.3 MG/DL — SIGNIFICANT CHANGE UP (ref 0.2–1.2)
BUN SERPL-MCNC: <3 MG/DL — LOW (ref 10–20)
CALCIUM SERPL-MCNC: 8.2 MG/DL — LOW (ref 8.5–10.1)
CHLORIDE SERPL-SCNC: 107 MMOL/L — SIGNIFICANT CHANGE UP (ref 98–110)
CO2 SERPL-SCNC: 17 MMOL/L — SIGNIFICANT CHANGE UP (ref 17–32)
CREAT SERPL-MCNC: 0.5 MG/DL — LOW (ref 0.7–1.5)
CRP SERPL-MCNC: 6 MG/L — HIGH
EOSINOPHIL # BLD AUTO: 0.06 K/UL — SIGNIFICANT CHANGE UP (ref 0–0.7)
EOSINOPHIL NFR BLD AUTO: 2.7 % — SIGNIFICANT CHANGE UP (ref 0–8)
ERYTHROCYTE [SEDIMENTATION RATE] IN BLOOD: 56 MM/HR — HIGH (ref 0–20)
GLUCOSE SERPL-MCNC: 92 MG/DL — SIGNIFICANT CHANGE UP (ref 70–99)
HCT VFR BLD CALC: 29 % — LOW (ref 37–47)
HGB BLD-MCNC: 10.1 G/DL — LOW (ref 12–16)
LYMPHOCYTES # BLD AUTO: 0.97 K/UL — LOW (ref 1.2–3.4)
LYMPHOCYTES # BLD AUTO: 40.7 % — SIGNIFICANT CHANGE UP (ref 20.5–51.1)
MAGNESIUM SERPL-MCNC: 2.5 MG/DL — HIGH (ref 1.8–2.4)
MANUAL SMEAR VERIFICATION: SIGNIFICANT CHANGE UP
MCHC RBC-ENTMCNC: 32.3 PG — HIGH (ref 27–31)
MCHC RBC-ENTMCNC: 34.8 G/DL — SIGNIFICANT CHANGE UP (ref 32–37)
MCV RBC AUTO: 92.7 FL — SIGNIFICANT CHANGE UP (ref 81–99)
MONOCYTES # BLD AUTO: 0.15 K/UL — SIGNIFICANT CHANGE UP (ref 0.1–0.6)
MONOCYTES NFR BLD AUTO: 6.2 % — SIGNIFICANT CHANGE UP (ref 1.7–9.3)
NEUTROPHILS # BLD AUTO: 1.06 K/UL — LOW (ref 1.4–6.5)
NEUTROPHILS NFR BLD AUTO: 44.2 % — SIGNIFICANT CHANGE UP (ref 42.2–75.2)
PLAT MORPH BLD: NORMAL — SIGNIFICANT CHANGE UP
PLATELET # BLD AUTO: 192 K/UL — SIGNIFICANT CHANGE UP (ref 130–400)
POTASSIUM SERPL-MCNC: 3.1 MMOL/L — LOW (ref 3.5–5)
POTASSIUM SERPL-SCNC: 3.1 MMOL/L — LOW (ref 3.5–5)
PROCALCITONIN SERPL-MCNC: 0.04 NG/ML — SIGNIFICANT CHANGE UP (ref 0.02–0.1)
PROT SERPL-MCNC: 5.8 G/DL — LOW (ref 6–8)
RBC # BLD: 3.13 M/UL — LOW (ref 4.2–5.4)
RBC # FLD: 12.3 % — SIGNIFICANT CHANGE UP (ref 11.5–14.5)
RBC BLD AUTO: NORMAL — SIGNIFICANT CHANGE UP
SMUDGE CELLS # BLD: PRESENT — SIGNIFICANT CHANGE UP
SODIUM SERPL-SCNC: 141 MMOL/L — SIGNIFICANT CHANGE UP (ref 135–146)
WBC # BLD: 2.39 K/UL — LOW (ref 4.8–10.8)
WBC # FLD AUTO: 2.39 K/UL — LOW (ref 4.8–10.8)

## 2021-11-11 PROCEDURE — 99233 SBSQ HOSP IP/OBS HIGH 50: CPT

## 2021-11-11 RX ORDER — ACETAMINOPHEN 500 MG
1000 TABLET ORAL ONCE
Refills: 0 | Status: COMPLETED | OUTPATIENT
Start: 2021-11-11 | End: 2021-11-11

## 2021-11-11 RX ORDER — KETOROLAC TROMETHAMINE 30 MG/ML
15 SYRINGE (ML) INJECTION
Refills: 0 | Status: DISCONTINUED | OUTPATIENT
Start: 2021-11-11 | End: 2021-11-12

## 2021-11-11 RX ORDER — POTASSIUM CHLORIDE 20 MEQ
20 PACKET (EA) ORAL
Refills: 0 | Status: COMPLETED | OUTPATIENT
Start: 2021-11-11 | End: 2021-11-11

## 2021-11-11 RX ADMIN — Medication 15 MILLIGRAM(S): at 20:07

## 2021-11-11 RX ADMIN — HYDROMORPHONE HYDROCHLORIDE 0.5 MILLIGRAM(S): 2 INJECTION INTRAMUSCULAR; INTRAVENOUS; SUBCUTANEOUS at 10:42

## 2021-11-11 RX ADMIN — HYDROMORPHONE HYDROCHLORIDE 0.5 MILLIGRAM(S): 2 INJECTION INTRAMUSCULAR; INTRAVENOUS; SUBCUTANEOUS at 18:43

## 2021-11-11 RX ADMIN — HYDROMORPHONE HYDROCHLORIDE 0.5 MILLIGRAM(S): 2 INJECTION INTRAMUSCULAR; INTRAVENOUS; SUBCUTANEOUS at 01:40

## 2021-11-11 RX ADMIN — ONDANSETRON 4 MILLIGRAM(S): 8 TABLET, FILM COATED ORAL at 14:40

## 2021-11-11 RX ADMIN — GABAPENTIN 300 MILLIGRAM(S): 400 CAPSULE ORAL at 13:23

## 2021-11-11 RX ADMIN — HYDROMORPHONE HYDROCHLORIDE 0.5 MILLIGRAM(S): 2 INJECTION INTRAMUSCULAR; INTRAVENOUS; SUBCUTANEOUS at 05:27

## 2021-11-11 RX ADMIN — Medication 50 MILLIEQUIVALENT(S): at 17:45

## 2021-11-11 RX ADMIN — Medication 50 GRAM(S): at 05:26

## 2021-11-11 RX ADMIN — HYDROMORPHONE HYDROCHLORIDE 0.5 MILLIGRAM(S): 2 INJECTION INTRAMUSCULAR; INTRAVENOUS; SUBCUTANEOUS at 14:55

## 2021-11-11 RX ADMIN — HYDROMORPHONE HYDROCHLORIDE 0.5 MILLIGRAM(S): 2 INJECTION INTRAMUSCULAR; INTRAVENOUS; SUBCUTANEOUS at 23:24

## 2021-11-11 RX ADMIN — GABAPENTIN 300 MILLIGRAM(S): 400 CAPSULE ORAL at 21:01

## 2021-11-11 RX ADMIN — Medication 3 CAPSULE(S): at 11:44

## 2021-11-11 RX ADMIN — PANTOPRAZOLE SODIUM 40 MILLIGRAM(S): 20 TABLET, DELAYED RELEASE ORAL at 06:23

## 2021-11-11 RX ADMIN — Medication 3 CAPSULE(S): at 08:06

## 2021-11-11 RX ADMIN — Medication 1 MILLIGRAM(S): at 11:49

## 2021-11-11 RX ADMIN — ENOXAPARIN SODIUM 40 MILLIGRAM(S): 100 INJECTION SUBCUTANEOUS at 21:01

## 2021-11-11 RX ADMIN — Medication 15 MILLIGRAM(S): at 20:37

## 2021-11-11 RX ADMIN — Medication 50 MILLIEQUIVALENT(S): at 13:12

## 2021-11-11 RX ADMIN — Medication 3 CAPSULE(S): at 17:45

## 2021-11-11 RX ADMIN — HYDROMORPHONE HYDROCHLORIDE 0.5 MILLIGRAM(S): 2 INJECTION INTRAMUSCULAR; INTRAVENOUS; SUBCUTANEOUS at 06:23

## 2021-11-11 RX ADMIN — GABAPENTIN 300 MILLIGRAM(S): 400 CAPSULE ORAL at 05:25

## 2021-11-11 RX ADMIN — HYDROMORPHONE HYDROCHLORIDE 0.5 MILLIGRAM(S): 2 INJECTION INTRAMUSCULAR; INTRAVENOUS; SUBCUTANEOUS at 02:30

## 2021-11-11 RX ADMIN — HYDROMORPHONE HYDROCHLORIDE 0.5 MILLIGRAM(S): 2 INJECTION INTRAMUSCULAR; INTRAVENOUS; SUBCUTANEOUS at 22:54

## 2021-11-11 RX ADMIN — Medication 100 MILLIGRAM(S): at 11:44

## 2021-11-11 RX ADMIN — HYDROMORPHONE HYDROCHLORIDE 0.5 MILLIGRAM(S): 2 INJECTION INTRAMUSCULAR; INTRAVENOUS; SUBCUTANEOUS at 18:58

## 2021-11-11 RX ADMIN — HYDROMORPHONE HYDROCHLORIDE 0.5 MILLIGRAM(S): 2 INJECTION INTRAMUSCULAR; INTRAVENOUS; SUBCUTANEOUS at 10:27

## 2021-11-11 RX ADMIN — HYDROMORPHONE HYDROCHLORIDE 0.5 MILLIGRAM(S): 2 INJECTION INTRAMUSCULAR; INTRAVENOUS; SUBCUTANEOUS at 14:40
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
## 2021-11-11 NOTE — PROGRESS NOTE ADULT - SUBJECTIVE AND OBJECTIVE BOX
LAURA BARAJAS  46y  Female      Patient is a 46y old  Female who presents with a chief complaint of Acute on chronic pancreatitis;       INTERVAL HPI/OVERNIGHT EVENTS:      ******************************* REVIEW OF SYSTEMS:*********************************************    All other review of systems negative    *********************** VITALS ******************************************    T(F): 97 (11-11-21 @ 12:39)  HR: 77 (11-11-21 @ 12:58) (69 - 92)  BP: 128/87 (11-11-21 @ 12:58) (128/87 - 158/99)  RR: 18 (11-11-21 @ 12:39) (14 - 18)  SpO2: --            ******************************** PHYSICAL EXAM:**************************************************  GENERAL: NAD    PSYCH: no agitation, baseline mentation  HEENT:     NERVOUS SYSTEM:  Alert & Oriented X3,     PULMONARY: JOSE, CTA    CARDIOVASCULAR: S1S2 RRR    GI: Soft, Epigastric tenderness , ND; BS present.    EXTREMITIES:  2+ Peripheral Pulses, No clubbing, cyanosis, or edema    LYMPH: No lymphadenopathy noted    SKIN: No rashes or lesions      **************************** LABS *******************************************************                          10.1   2.39  )-----------( 192      ( 11 Nov 2021 04:30 )             29.0     11-11    141  |  107  |  <3<L>  ----------------------------<  92  3.1<L>   |  17  |  0.5<L>    Ca    8.2<L>      11 Nov 2021 04:30  Phos  3.6     11-10  Mg     2.5     11-11    TPro  5.8<L>  /  Alb  3.4<L>  /  TBili  0.3  /  DBili  x   /  AST  116<H>  /  ALT  39  /  AlkPhos  73  11-11          Lactate Trend  11-06 @ 18:21 Lactate:1.6         CAPILLARY BLOOD GLUCOSE              **************************Active Medications *******************************************  Compazine (Unknown)      acetaminophen   IVPB .. 1000 milliGRAM(s) IV Intermittent once  enoxaparin Injectable 40 milliGRAM(s) SubCutaneous at bedtime  folic acid 1 milliGRAM(s) Oral daily  gabapentin 300 milliGRAM(s) Oral three times a day  HYDROmorphone  Injectable 0.5 milliGRAM(s) IV Push every 4 hours PRN  ketorolac   Injectable 15 milliGRAM(s) IV Push two times a day PRN  ondansetron    Tablet 4 milliGRAM(s) Oral every 6 hours PRN  pancrelipase  (CREON 12,000 Lipase Units) 3 Capsule(s) Oral three times a day with meals  pantoprazole    Tablet 40 milliGRAM(s) Oral before breakfast  potassium chloride  20 mEq/100 mL IVPB 20 milliEquivalent(s) IV Intermittent every 2 hours  sodium chloride 0.9%. 1000 milliLiter(s) IV Continuous <Continuous>  thiamine 100 milliGRAM(s) Oral daily      ***************************************************  RADIOLOGY & ADDITIONAL TESTS:    Imaging Personally Reviewed:  [ ] YES  [ ] NO    HEALTH ISSUES - PROBLEM Dx:

## 2021-11-12 LAB
ALBUMIN SERPL ELPH-MCNC: 3.6 G/DL — SIGNIFICANT CHANGE UP (ref 3.5–5.2)
ALP SERPL-CCNC: 77 U/L — SIGNIFICANT CHANGE UP (ref 30–115)
ALT FLD-CCNC: 48 U/L — HIGH (ref 0–41)
ANION GAP SERPL CALC-SCNC: 13 MMOL/L — SIGNIFICANT CHANGE UP (ref 7–14)
AST SERPL-CCNC: 134 U/L — HIGH (ref 0–41)
BASOPHILS # BLD AUTO: 0.03 K/UL — SIGNIFICANT CHANGE UP (ref 0–0.2)
BASOPHILS NFR BLD AUTO: 1.1 % — HIGH (ref 0–1)
BILIRUB SERPL-MCNC: 0.3 MG/DL — SIGNIFICANT CHANGE UP (ref 0.2–1.2)
BUN SERPL-MCNC: <3 MG/DL — LOW (ref 10–20)
CALCIUM SERPL-MCNC: 8.3 MG/DL — LOW (ref 8.5–10.1)
CHLORIDE SERPL-SCNC: 106 MMOL/L — SIGNIFICANT CHANGE UP (ref 98–110)
CO2 SERPL-SCNC: 19 MMOL/L — SIGNIFICANT CHANGE UP (ref 17–32)
CREAT SERPL-MCNC: 0.5 MG/DL — LOW (ref 0.7–1.5)
EOSINOPHIL # BLD AUTO: 0.1 K/UL — SIGNIFICANT CHANGE UP (ref 0–0.7)
EOSINOPHIL NFR BLD AUTO: 3.7 % — SIGNIFICANT CHANGE UP (ref 0–8)
GLUCOSE SERPL-MCNC: 106 MG/DL — HIGH (ref 70–99)
HCT VFR BLD CALC: 30 % — LOW (ref 37–47)
HGB BLD-MCNC: 10.4 G/DL — LOW (ref 12–16)
IMM GRANULOCYTES NFR BLD AUTO: 0 % — LOW (ref 0.1–0.3)
LYMPHOCYTES # BLD AUTO: 0.88 K/UL — LOW (ref 1.2–3.4)
LYMPHOCYTES # BLD AUTO: 32.7 % — SIGNIFICANT CHANGE UP (ref 20.5–51.1)
MAGNESIUM SERPL-MCNC: 1.4 MG/DL — LOW (ref 1.8–2.4)
MCHC RBC-ENTMCNC: 32.2 PG — HIGH (ref 27–31)
MCHC RBC-ENTMCNC: 34.7 G/DL — SIGNIFICANT CHANGE UP (ref 32–37)
MCV RBC AUTO: 92.9 FL — SIGNIFICANT CHANGE UP (ref 81–99)
MONOCYTES # BLD AUTO: 0.42 K/UL — SIGNIFICANT CHANGE UP (ref 0.1–0.6)
MONOCYTES NFR BLD AUTO: 15.6 % — HIGH (ref 1.7–9.3)
NEUTROPHILS # BLD AUTO: 1.26 K/UL — LOW (ref 1.4–6.5)
NEUTROPHILS NFR BLD AUTO: 46.9 % — SIGNIFICANT CHANGE UP (ref 42.2–75.2)
NRBC # BLD: 0 /100 WBCS — SIGNIFICANT CHANGE UP (ref 0–0)
PLATELET # BLD AUTO: 223 K/UL — SIGNIFICANT CHANGE UP (ref 130–400)
POTASSIUM SERPL-MCNC: 3.4 MMOL/L — LOW (ref 3.5–5)
POTASSIUM SERPL-SCNC: 3.4 MMOL/L — LOW (ref 3.5–5)
PROT SERPL-MCNC: 6 G/DL — SIGNIFICANT CHANGE UP (ref 6–8)
RBC # BLD: 3.23 M/UL — LOW (ref 4.2–5.4)
RBC # FLD: 12.2 % — SIGNIFICANT CHANGE UP (ref 11.5–14.5)
SODIUM SERPL-SCNC: 138 MMOL/L — SIGNIFICANT CHANGE UP (ref 135–146)
WBC # BLD: 2.69 K/UL — LOW (ref 4.8–10.8)
WBC # FLD AUTO: 2.69 K/UL — LOW (ref 4.8–10.8)

## 2021-11-12 PROCEDURE — 99233 SBSQ HOSP IP/OBS HIGH 50: CPT

## 2021-11-12 RX ORDER — HYDROMORPHONE HYDROCHLORIDE 2 MG/ML
1 INJECTION INTRAMUSCULAR; INTRAVENOUS; SUBCUTANEOUS DAILY
Refills: 0 | Status: DISCONTINUED | OUTPATIENT
Start: 2021-11-12 | End: 2021-11-12

## 2021-11-12 RX ORDER — HYDROMORPHONE HYDROCHLORIDE 2 MG/ML
1 INJECTION INTRAMUSCULAR; INTRAVENOUS; SUBCUTANEOUS EVERY 4 HOURS
Refills: 0 | Status: DISCONTINUED | OUTPATIENT
Start: 2021-11-12 | End: 2021-11-19

## 2021-11-12 RX ORDER — DEXTROSE MONOHYDRATE, SODIUM CHLORIDE, AND POTASSIUM CHLORIDE 50; .745; 4.5 G/1000ML; G/1000ML; G/1000ML
1000 INJECTION, SOLUTION INTRAVENOUS
Refills: 0 | Status: DISCONTINUED | OUTPATIENT
Start: 2021-11-12 | End: 2021-11-15

## 2021-11-12 RX ORDER — PANTOPRAZOLE SODIUM 20 MG/1
40 TABLET, DELAYED RELEASE ORAL
Refills: 0 | Status: COMPLETED | OUTPATIENT
Start: 2021-11-12 | End: 2021-11-19

## 2021-11-12 RX ORDER — MAGNESIUM SULFATE 500 MG/ML
2 VIAL (ML) INJECTION ONCE
Refills: 0 | Status: COMPLETED | OUTPATIENT
Start: 2021-11-12 | End: 2021-11-12

## 2021-11-12 RX ADMIN — ENOXAPARIN SODIUM 40 MILLIGRAM(S): 100 INJECTION SUBCUTANEOUS at 21:10

## 2021-11-12 RX ADMIN — PANTOPRAZOLE SODIUM 40 MILLIGRAM(S): 20 TABLET, DELAYED RELEASE ORAL at 17:21

## 2021-11-12 RX ADMIN — PANTOPRAZOLE SODIUM 40 MILLIGRAM(S): 20 TABLET, DELAYED RELEASE ORAL at 10:30

## 2021-11-12 RX ADMIN — HYDROMORPHONE HYDROCHLORIDE 1 MILLIGRAM(S): 2 INJECTION INTRAMUSCULAR; INTRAVENOUS; SUBCUTANEOUS at 19:57

## 2021-11-12 RX ADMIN — DEXTROSE MONOHYDRATE, SODIUM CHLORIDE, AND POTASSIUM CHLORIDE 150 MILLILITER(S): 50; .745; 4.5 INJECTION, SOLUTION INTRAVENOUS at 11:04

## 2021-11-12 RX ADMIN — HYDROMORPHONE HYDROCHLORIDE 0.5 MILLIGRAM(S): 2 INJECTION INTRAMUSCULAR; INTRAVENOUS; SUBCUTANEOUS at 03:05

## 2021-11-12 RX ADMIN — HYDROMORPHONE HYDROCHLORIDE 1 MILLIGRAM(S): 2 INJECTION INTRAMUSCULAR; INTRAVENOUS; SUBCUTANEOUS at 16:06

## 2021-11-12 RX ADMIN — GABAPENTIN 300 MILLIGRAM(S): 400 CAPSULE ORAL at 05:23

## 2021-11-12 RX ADMIN — Medication 15 MILLIGRAM(S): at 07:57

## 2021-11-12 RX ADMIN — Medication 1 MILLIGRAM(S): at 11:05

## 2021-11-12 RX ADMIN — Medication 50 GRAM(S): at 11:36

## 2021-11-12 RX ADMIN — Medication 15 MILLIGRAM(S): at 08:12

## 2021-11-12 RX ADMIN — HYDROMORPHONE HYDROCHLORIDE 0.5 MILLIGRAM(S): 2 INJECTION INTRAMUSCULAR; INTRAVENOUS; SUBCUTANEOUS at 06:40

## 2021-11-12 RX ADMIN — HYDROMORPHONE HYDROCHLORIDE 1 MILLIGRAM(S): 2 INJECTION INTRAMUSCULAR; INTRAVENOUS; SUBCUTANEOUS at 20:07

## 2021-11-12 RX ADMIN — HYDROMORPHONE HYDROCHLORIDE 1 MILLIGRAM(S): 2 INJECTION INTRAMUSCULAR; INTRAVENOUS; SUBCUTANEOUS at 23:55

## 2021-11-12 RX ADMIN — Medication 100 MILLIGRAM(S): at 11:05

## 2021-11-12 RX ADMIN — GABAPENTIN 300 MILLIGRAM(S): 400 CAPSULE ORAL at 13:09

## 2021-11-12 RX ADMIN — Medication 3 CAPSULE(S): at 17:22

## 2021-11-12 RX ADMIN — Medication 3 CAPSULE(S): at 07:54

## 2021-11-12 RX ADMIN — HYDROMORPHONE HYDROCHLORIDE 1 MILLIGRAM(S): 2 INJECTION INTRAMUSCULAR; INTRAVENOUS; SUBCUTANEOUS at 09:34

## 2021-11-12 RX ADMIN — HYDROMORPHONE HYDROCHLORIDE 1 MILLIGRAM(S): 2 INJECTION INTRAMUSCULAR; INTRAVENOUS; SUBCUTANEOUS at 15:51

## 2021-11-12 RX ADMIN — HYDROMORPHONE HYDROCHLORIDE 0.5 MILLIGRAM(S): 2 INJECTION INTRAMUSCULAR; INTRAVENOUS; SUBCUTANEOUS at 06:55

## 2021-11-12 RX ADMIN — SODIUM CHLORIDE 250 MILLILITER(S): 9 INJECTION INTRAMUSCULAR; INTRAVENOUS; SUBCUTANEOUS at 07:55

## 2021-11-12 RX ADMIN — HYDROMORPHONE HYDROCHLORIDE 0.5 MILLIGRAM(S): 2 INJECTION INTRAMUSCULAR; INTRAVENOUS; SUBCUTANEOUS at 02:50

## 2021-11-12 RX ADMIN — Medication 3 CAPSULE(S): at 13:09

## 2021-11-12 RX ADMIN — DEXTROSE MONOHYDRATE, SODIUM CHLORIDE, AND POTASSIUM CHLORIDE 150 MILLILITER(S): 50; .745; 4.5 INJECTION, SOLUTION INTRAVENOUS at 18:12

## 2021-11-12 RX ADMIN — GABAPENTIN 300 MILLIGRAM(S): 400 CAPSULE ORAL at 21:10

## 2021-11-12 RX ADMIN — HYDROMORPHONE HYDROCHLORIDE 1 MILLIGRAM(S): 2 INJECTION INTRAMUSCULAR; INTRAVENOUS; SUBCUTANEOUS at 09:49

## 2021-11-12 RX ADMIN — PANTOPRAZOLE SODIUM 40 MILLIGRAM(S): 20 TABLET, DELAYED RELEASE ORAL at 05:24

## 2021-11-12 NOTE — PROGRESS NOTE ADULT - SUBJECTIVE AND OBJECTIVE BOX
LAURA BARAJAS  46y  Female      Patient is a 46y old  Female who presents with a chief complaint of Acute on chronic pancreatitis; .      INTERVAL HPI/OVERNIGHT EVENTS:      ******************************* REVIEW OF SYSTEMS:**********************************************    All other review of systems negative    *********************** VITALS ******************************************    T(F): 98.4 (11-12-21 @ 13:04)  HR: 74 (11-12-21 @ 13:04) (69 - 74)  BP: 130/89 (11-12-21 @ 13:04) (130/89 - 139/81)  RR: 18 (11-12-21 @ 13:04) (18 - 20)  SpO2: 96% (11-12-21 @ 13:04) (96% - 96%)            ******************************** PHYSICAL EXAM:**************************************************  GENERAL: NAD    PSYCH: no agitation, baseline mentation  HEENT:     NERVOUS SYSTEM:  Alert & Oriented X3, MS  5/5 B/L  UE and LE ;    PULMONARY: JOSE, CTA    CARDIOVASCULAR: S1S2 RRR    GI: Soft, epigastric tenderness , ND; BS present.    EXTREMITIES:  2+ Peripheral Pulses, No clubbing, cyanosis, or edema    LYMPH: No lymphadenopathy noted    SKIN: No rashes or lesions      **************************** LABS *******************************************************                          10.4   2.69  )-----------( 223      ( 12 Nov 2021 09:35 )             30.0     11-12    138  |  106  |  <3<L>  ----------------------------<  106<H>  3.4<L>   |  19  |  0.5<L>    Ca    8.3<L>      12 Nov 2021 09:35  Mg     1.4     11-12    TPro  6.0  /  Alb  3.6  /  TBili  0.3  /  DBili  x   /  AST  134<H>  /  ALT  48<H>  /  AlkPhos  77  11-12          Lactate Trend        CAPILLARY BLOOD GLUCOSE              **************************Active Medications *******************************************  Compazine (Unknown)      enoxaparin Injectable 40 milliGRAM(s) SubCutaneous at bedtime  folic acid 1 milliGRAM(s) Oral daily  gabapentin 300 milliGRAM(s) Oral three times a day  HYDROmorphone  Injectable 1 milliGRAM(s) IV Push every 4 hours PRN  ondansetron    Tablet 4 milliGRAM(s) Oral every 6 hours PRN  pancrelipase  (CREON 12,000 Lipase Units) 3 Capsule(s) Oral three times a day with meals  pantoprazole  Injectable 40 milliGRAM(s) IV Push two times a day  sodium chloride 0.9% with potassium chloride 20 mEq/L 1000 milliLiter(s) IV Continuous <Continuous>  thiamine 100 milliGRAM(s) Oral daily      ***************************************************  RADIOLOGY & ADDITIONAL TESTS:    Imaging Personally Reviewed:  [ ] YES  [ ] NO    HEALTH ISSUES - PROBLEM Dx:
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
## 2021-11-13 LAB
ALBUMIN SERPL ELPH-MCNC: 3.8 G/DL — SIGNIFICANT CHANGE UP (ref 3.5–5.2)
ALP SERPL-CCNC: 81 U/L — SIGNIFICANT CHANGE UP (ref 30–115)
ALT FLD-CCNC: 56 U/L — HIGH (ref 0–41)
ANION GAP SERPL CALC-SCNC: 16 MMOL/L — HIGH (ref 7–14)
APPEARANCE UR: CLEAR — SIGNIFICANT CHANGE UP
AST SERPL-CCNC: 153 U/L — HIGH (ref 0–41)
BASOPHILS # BLD AUTO: 0.05 K/UL — SIGNIFICANT CHANGE UP (ref 0–0.2)
BASOPHILS NFR BLD AUTO: 1 % — SIGNIFICANT CHANGE UP (ref 0–1)
BILIRUB SERPL-MCNC: 0.3 MG/DL — SIGNIFICANT CHANGE UP (ref 0.2–1.2)
BILIRUB UR-MCNC: NEGATIVE — SIGNIFICANT CHANGE UP
BUN SERPL-MCNC: 4 MG/DL — LOW (ref 10–20)
CALCIUM SERPL-MCNC: 8.8 MG/DL — SIGNIFICANT CHANGE UP (ref 8.5–10.1)
CHLORIDE SERPL-SCNC: 100 MMOL/L — SIGNIFICANT CHANGE UP (ref 98–110)
CO2 SERPL-SCNC: 19 MMOL/L — SIGNIFICANT CHANGE UP (ref 17–32)
COLOR SPEC: SIGNIFICANT CHANGE UP
CREAT SERPL-MCNC: 0.6 MG/DL — LOW (ref 0.7–1.5)
DIFF PNL FLD: NEGATIVE — SIGNIFICANT CHANGE UP
EOSINOPHIL # BLD AUTO: 0.03 K/UL — SIGNIFICANT CHANGE UP (ref 0–0.7)
EOSINOPHIL NFR BLD AUTO: 0.6 % — SIGNIFICANT CHANGE UP (ref 0–8)
GLUCOSE SERPL-MCNC: 93 MG/DL — SIGNIFICANT CHANGE UP (ref 70–99)
GLUCOSE UR QL: NEGATIVE — SIGNIFICANT CHANGE UP
HCT VFR BLD CALC: 30 % — LOW (ref 37–47)
HGB BLD-MCNC: 10.4 G/DL — LOW (ref 12–16)
IMM GRANULOCYTES NFR BLD AUTO: 0.2 % — SIGNIFICANT CHANGE UP (ref 0.1–0.3)
KETONES UR-MCNC: NEGATIVE — SIGNIFICANT CHANGE UP
LEUKOCYTE ESTERASE UR-ACNC: NEGATIVE — SIGNIFICANT CHANGE UP
LYMPHOCYTES # BLD AUTO: 0.67 K/UL — LOW (ref 1.2–3.4)
LYMPHOCYTES # BLD AUTO: 12.9 % — LOW (ref 20.5–51.1)
MAGNESIUM SERPL-MCNC: 1.3 MG/DL — LOW (ref 1.8–2.4)
MCHC RBC-ENTMCNC: 31.5 PG — HIGH (ref 27–31)
MCHC RBC-ENTMCNC: 34.7 G/DL — SIGNIFICANT CHANGE UP (ref 32–37)
MCV RBC AUTO: 90.9 FL — SIGNIFICANT CHANGE UP (ref 81–99)
MONOCYTES # BLD AUTO: 0.72 K/UL — HIGH (ref 0.1–0.6)
MONOCYTES NFR BLD AUTO: 13.9 % — HIGH (ref 1.7–9.3)
NEUTROPHILS # BLD AUTO: 3.71 K/UL — SIGNIFICANT CHANGE UP (ref 1.4–6.5)
NEUTROPHILS NFR BLD AUTO: 71.4 % — SIGNIFICANT CHANGE UP (ref 42.2–75.2)
NITRITE UR-MCNC: NEGATIVE — SIGNIFICANT CHANGE UP
NRBC # BLD: 0 /100 WBCS — SIGNIFICANT CHANGE UP (ref 0–0)
PH UR: 6.5 — SIGNIFICANT CHANGE UP (ref 5–8)
PHOSPHATE SERPL-MCNC: 3 MG/DL — SIGNIFICANT CHANGE UP (ref 2.1–4.9)
PLATELET # BLD AUTO: 232 K/UL — SIGNIFICANT CHANGE UP (ref 130–400)
POTASSIUM SERPL-MCNC: 3.3 MMOL/L — LOW (ref 3.5–5)
POTASSIUM SERPL-SCNC: 3.3 MMOL/L — LOW (ref 3.5–5)
PROT SERPL-MCNC: 6.4 G/DL — SIGNIFICANT CHANGE UP (ref 6–8)
PROT UR-MCNC: NEGATIVE — SIGNIFICANT CHANGE UP
RBC # BLD: 3.3 M/UL — LOW (ref 4.2–5.4)
RBC # FLD: 12.1 % — SIGNIFICANT CHANGE UP (ref 11.5–14.5)
SARS-COV-2 RNA SPEC QL NAA+PROBE: SIGNIFICANT CHANGE UP
SODIUM SERPL-SCNC: 135 MMOL/L — SIGNIFICANT CHANGE UP (ref 135–146)
SP GR SPEC: 1.01 — SIGNIFICANT CHANGE UP (ref 1.01–1.03)
UROBILINOGEN FLD QL: SIGNIFICANT CHANGE UP
WBC # BLD: 5.19 K/UL — SIGNIFICANT CHANGE UP (ref 4.8–10.8)
WBC # FLD AUTO: 5.19 K/UL — SIGNIFICANT CHANGE UP (ref 4.8–10.8)

## 2021-11-13 PROCEDURE — 74177 CT ABD & PELVIS W/CONTRAST: CPT | Mod: 26

## 2021-11-13 PROCEDURE — 99233 SBSQ HOSP IP/OBS HIGH 50: CPT

## 2021-11-13 PROCEDURE — 71045 X-RAY EXAM CHEST 1 VIEW: CPT | Mod: 26

## 2021-11-13 RX ORDER — ACETAMINOPHEN 500 MG
1000 TABLET ORAL ONCE
Refills: 0 | Status: COMPLETED | OUTPATIENT
Start: 2021-11-13 | End: 2021-11-13

## 2021-11-13 RX ORDER — CEFEPIME 1 G/1
INJECTION, POWDER, FOR SOLUTION INTRAMUSCULAR; INTRAVENOUS
Refills: 0 | Status: DISCONTINUED | OUTPATIENT
Start: 2021-11-13 | End: 2021-11-15

## 2021-11-13 RX ORDER — MAGNESIUM SULFATE 500 MG/ML
2 VIAL (ML) INJECTION
Refills: 0 | Status: COMPLETED | OUTPATIENT
Start: 2021-11-13 | End: 2021-11-13

## 2021-11-13 RX ORDER — POTASSIUM CHLORIDE 20 MEQ
20 PACKET (EA) ORAL ONCE
Refills: 0 | Status: COMPLETED | OUTPATIENT
Start: 2021-11-13 | End: 2021-11-13

## 2021-11-13 RX ORDER — CEFEPIME 1 G/1
2000 INJECTION, POWDER, FOR SOLUTION INTRAMUSCULAR; INTRAVENOUS EVERY 12 HOURS
Refills: 0 | Status: DISCONTINUED | OUTPATIENT
Start: 2021-11-14 | End: 2021-11-15

## 2021-11-13 RX ORDER — CEFEPIME 1 G/1
2000 INJECTION, POWDER, FOR SOLUTION INTRAMUSCULAR; INTRAVENOUS ONCE
Refills: 0 | Status: COMPLETED | OUTPATIENT
Start: 2021-11-13 | End: 2021-11-13

## 2021-11-13 RX ORDER — ACETAMINOPHEN 500 MG
650 TABLET ORAL EVERY 6 HOURS
Refills: 0 | Status: DISCONTINUED | OUTPATIENT
Start: 2021-11-13 | End: 2021-11-13

## 2021-11-13 RX ADMIN — Medication 25 GRAM(S): at 09:12

## 2021-11-13 RX ADMIN — HYDROMORPHONE HYDROCHLORIDE 1 MILLIGRAM(S): 2 INJECTION INTRAMUSCULAR; INTRAVENOUS; SUBCUTANEOUS at 21:15

## 2021-11-13 RX ADMIN — DEXTROSE MONOHYDRATE, SODIUM CHLORIDE, AND POTASSIUM CHLORIDE 150 MILLILITER(S): 50; .745; 4.5 INJECTION, SOLUTION INTRAVENOUS at 08:11

## 2021-11-13 RX ADMIN — CEFEPIME 100 MILLIGRAM(S): 1 INJECTION, POWDER, FOR SOLUTION INTRAMUSCULAR; INTRAVENOUS at 17:00

## 2021-11-13 RX ADMIN — Medication 3 CAPSULE(S): at 17:01

## 2021-11-13 RX ADMIN — Medication 1000 MILLIGRAM(S): at 13:17

## 2021-11-13 RX ADMIN — Medication 400 MILLIGRAM(S): at 20:59

## 2021-11-13 RX ADMIN — PANTOPRAZOLE SODIUM 40 MILLIGRAM(S): 20 TABLET, DELAYED RELEASE ORAL at 17:01

## 2021-11-13 RX ADMIN — HYDROMORPHONE HYDROCHLORIDE 1 MILLIGRAM(S): 2 INJECTION INTRAMUSCULAR; INTRAVENOUS; SUBCUTANEOUS at 08:06

## 2021-11-13 RX ADMIN — Medication 20 MILLIEQUIVALENT(S): at 09:12

## 2021-11-13 RX ADMIN — HYDROMORPHONE HYDROCHLORIDE 1 MILLIGRAM(S): 2 INJECTION INTRAMUSCULAR; INTRAVENOUS; SUBCUTANEOUS at 17:21

## 2021-11-13 RX ADMIN — ENOXAPARIN SODIUM 40 MILLIGRAM(S): 100 INJECTION SUBCUTANEOUS at 21:18

## 2021-11-13 RX ADMIN — HYDROMORPHONE HYDROCHLORIDE 1 MILLIGRAM(S): 2 INJECTION INTRAMUSCULAR; INTRAVENOUS; SUBCUTANEOUS at 12:51

## 2021-11-13 RX ADMIN — Medication 25 GRAM(S): at 11:46

## 2021-11-13 RX ADMIN — PANTOPRAZOLE SODIUM 40 MILLIGRAM(S): 20 TABLET, DELAYED RELEASE ORAL at 05:44

## 2021-11-13 RX ADMIN — HYDROMORPHONE HYDROCHLORIDE 1 MILLIGRAM(S): 2 INJECTION INTRAMUSCULAR; INTRAVENOUS; SUBCUTANEOUS at 00:55

## 2021-11-13 RX ADMIN — ONDANSETRON 4 MILLIGRAM(S): 8 TABLET, FILM COATED ORAL at 12:53

## 2021-11-13 RX ADMIN — HYDROMORPHONE HYDROCHLORIDE 1 MILLIGRAM(S): 2 INJECTION INTRAMUSCULAR; INTRAVENOUS; SUBCUTANEOUS at 17:06

## 2021-11-13 RX ADMIN — HYDROMORPHONE HYDROCHLORIDE 1 MILLIGRAM(S): 2 INJECTION INTRAMUSCULAR; INTRAVENOUS; SUBCUTANEOUS at 03:58

## 2021-11-13 RX ADMIN — Medication 1 MILLIGRAM(S): at 11:46

## 2021-11-13 RX ADMIN — GABAPENTIN 300 MILLIGRAM(S): 400 CAPSULE ORAL at 05:44

## 2021-11-13 RX ADMIN — Medication 400 MILLIGRAM(S): at 12:47

## 2021-11-13 RX ADMIN — GABAPENTIN 300 MILLIGRAM(S): 400 CAPSULE ORAL at 13:19

## 2021-11-13 RX ADMIN — HYDROMORPHONE HYDROCHLORIDE 1 MILLIGRAM(S): 2 INJECTION INTRAMUSCULAR; INTRAVENOUS; SUBCUTANEOUS at 04:13

## 2021-11-13 RX ADMIN — HYDROMORPHONE HYDROCHLORIDE 1 MILLIGRAM(S): 2 INJECTION INTRAMUSCULAR; INTRAVENOUS; SUBCUTANEOUS at 21:45

## 2021-11-13 RX ADMIN — GABAPENTIN 300 MILLIGRAM(S): 400 CAPSULE ORAL at 21:19

## 2021-11-13 RX ADMIN — DEXTROSE MONOHYDRATE, SODIUM CHLORIDE, AND POTASSIUM CHLORIDE 150 MILLILITER(S): 50; .745; 4.5 INJECTION, SOLUTION INTRAVENOUS at 01:05

## 2021-11-13 RX ADMIN — Medication 3 CAPSULE(S): at 11:45

## 2021-11-13 RX ADMIN — Medication 3 CAPSULE(S): at 08:07

## 2021-11-13 RX ADMIN — Medication 1000 MILLIGRAM(S): at 21:29

## 2021-11-13 RX ADMIN — Medication 25 GRAM(S): at 15:27

## 2021-11-13 RX ADMIN — Medication 100 MILLIGRAM(S): at 11:45

## 2021-11-13 RX ADMIN — Medication 650 MILLIGRAM(S): at 05:45

## 2021-11-13 RX ADMIN — HYDROMORPHONE HYDROCHLORIDE 1 MILLIGRAM(S): 2 INJECTION INTRAMUSCULAR; INTRAVENOUS; SUBCUTANEOUS at 12:36

## 2021-11-13 RX ADMIN — HYDROMORPHONE HYDROCHLORIDE 1 MILLIGRAM(S): 2 INJECTION INTRAMUSCULAR; INTRAVENOUS; SUBCUTANEOUS at 08:21
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
## 2021-11-13 NOTE — PROGRESS NOTE ADULT - SUBJECTIVE AND OBJECTIVE BOX
LAURA BARAJAS  46y  Female      Patient is a 46y old  Female who presents with a chief complaint of Acute on chronic pancreatitis;      INTERVAL HPI/OVERNIGHT EVENTS:      ******************************* REVIEW OF SYSTEMS:**********************************************    All other review of systems negative    *********************** VITALS ******************************************    T(F): 102.3 (21 @ 13:30)  HR: 118 (21 @ 13:30) (80 - 119)  BP: 124/76 (21 @ 13:30) (124/76 - 130/83)  RR: 18 (21 @ 13:30) (18 - 18)  SpO2: 99% (21 @ 05:00) (98% - 99%)            ******************************** PHYSICAL EXAM:**************************************************  GENERAL: NAD    PSYCH: no agitation, baseline mentation  HEENT:     NERVOUS SYSTEM:  Alert & Oriented X3,     PULMONARY: JOSE, CTA    CARDIOVASCULAR: S1S2 RRR    GI: Soft,  epigastric area ND; BS present.    EXTREMITIES:  2+ Peripheral Pulses, No clubbing, cyanosis, or edema    LYMPH: No lymphadenopathy noted    SKIN: No rashes or lesions      **************************** LABS *******************************************************                          10.4   5.19  )-----------( 232      ( 2021 06:39 )             30.0         135  |  100  |  4<L>  ----------------------------<  93  3.3<L>   |  19  |  0.6<L>    Ca    8.8      2021 06:39  Phos  3.0       Mg     1.3         TPro  6.4  /  Alb  3.8  /  TBili  0.3  /  DBili  x   /  AST  153<H>  /  ALT  56<H>  /  AlkPhos  81        Urinalysis Basic - ( 2021 11:00 )    Color: Light Yellow / Appearance: Clear / S.009 / pH: x  Gluc: x / Ketone: Negative  / Bili: Negative / Urobili: <2 mg/dL   Blood: x / Protein: Negative / Nitrite: Negative   Leuk Esterase: Negative / RBC: x / WBC x   Sq Epi: x / Non Sq Epi: x / Bacteria: x        Lactate Trend        CAPILLARY BLOOD GLUCOSE              **************************Active Medications *******************************************  Compazine (Unknown)      enoxaparin Injectable 40 milliGRAM(s) SubCutaneous at bedtime  folic acid 1 milliGRAM(s) Oral daily  gabapentin 300 milliGRAM(s) Oral three times a day  HYDROmorphone  Injectable 1 milliGRAM(s) IV Push every 4 hours PRN  magnesium sulfate  IVPB 2 Gram(s) IV Intermittent every 2 hours  ondansetron    Tablet 4 milliGRAM(s) Oral every 6 hours PRN  pancrelipase  (CREON 12,000 Lipase Units) 3 Capsule(s) Oral three times a day with meals  pantoprazole  Injectable 40 milliGRAM(s) IV Push two times a day  sodium chloride 0.9% with potassium chloride 20 mEq/L 1000 milliLiter(s) IV Continuous <Continuous>  thiamine 100 milliGRAM(s) Oral daily      ***************************************************  RADIOLOGY & ADDITIONAL TESTS:    Imaging Personally Reviewed:  [ ] YES  [ ] NO    HEALTH ISSUES - PROBLEM Dx:

## 2021-11-14 LAB
ALBUMIN SERPL ELPH-MCNC: 3.9 G/DL — SIGNIFICANT CHANGE UP (ref 3.5–5.2)
ALBUMIN SERPL ELPH-MCNC: 3.9 G/DL — SIGNIFICANT CHANGE UP (ref 3.5–5.2)
ALP SERPL-CCNC: 80 U/L — SIGNIFICANT CHANGE UP (ref 30–115)
ALP SERPL-CCNC: 88 U/L — SIGNIFICANT CHANGE UP (ref 30–115)
ALT FLD-CCNC: 42 U/L — HIGH (ref 0–41)
ALT FLD-CCNC: 45 U/L — HIGH (ref 0–41)
ANION GAP SERPL CALC-SCNC: 15 MMOL/L — HIGH (ref 7–14)
ANION GAP SERPL CALC-SCNC: 21 MMOL/L — HIGH (ref 7–14)
AST SERPL-CCNC: 108 U/L — HIGH (ref 0–41)
AST SERPL-CCNC: 120 U/L — HIGH (ref 0–41)
BASOPHILS # BLD AUTO: 0.02 K/UL — SIGNIFICANT CHANGE UP (ref 0–0.2)
BASOPHILS # BLD AUTO: 0.03 K/UL — SIGNIFICANT CHANGE UP (ref 0–0.2)
BASOPHILS NFR BLD AUTO: 0.5 % — SIGNIFICANT CHANGE UP (ref 0–1)
BASOPHILS NFR BLD AUTO: 0.6 % — SIGNIFICANT CHANGE UP (ref 0–1)
BILIRUB SERPL-MCNC: 0.4 MG/DL — SIGNIFICANT CHANGE UP (ref 0.2–1.2)
BILIRUB SERPL-MCNC: 0.6 MG/DL — SIGNIFICANT CHANGE UP (ref 0.2–1.2)
BUN SERPL-MCNC: 5 MG/DL — LOW (ref 10–20)
BUN SERPL-MCNC: 5 MG/DL — LOW (ref 10–20)
CALCIUM SERPL-MCNC: 8.3 MG/DL — LOW (ref 8.5–10.1)
CALCIUM SERPL-MCNC: 8.5 MG/DL — SIGNIFICANT CHANGE UP (ref 8.5–10.1)
CHLORIDE SERPL-SCNC: 102 MMOL/L — SIGNIFICANT CHANGE UP (ref 98–110)
CHLORIDE SERPL-SCNC: 103 MMOL/L — SIGNIFICANT CHANGE UP (ref 98–110)
CO2 SERPL-SCNC: 13 MMOL/L — LOW (ref 17–32)
CO2 SERPL-SCNC: 18 MMOL/L — SIGNIFICANT CHANGE UP (ref 17–32)
CREAT SERPL-MCNC: 0.6 MG/DL — LOW (ref 0.7–1.5)
CREAT SERPL-MCNC: 0.7 MG/DL — SIGNIFICANT CHANGE UP (ref 0.7–1.5)
EOSINOPHIL # BLD AUTO: 0 K/UL — SIGNIFICANT CHANGE UP (ref 0–0.7)
EOSINOPHIL # BLD AUTO: 0 K/UL — SIGNIFICANT CHANGE UP (ref 0–0.7)
EOSINOPHIL NFR BLD AUTO: 0 % — SIGNIFICANT CHANGE UP (ref 0–8)
EOSINOPHIL NFR BLD AUTO: 0 % — SIGNIFICANT CHANGE UP (ref 0–8)
ERYTHROCYTE [SEDIMENTATION RATE] IN BLOOD: 44 MM/HR — HIGH (ref 0–20)
GLUCOSE SERPL-MCNC: 103 MG/DL — HIGH (ref 70–99)
GLUCOSE SERPL-MCNC: 105 MG/DL — HIGH (ref 70–99)
GRAM STN FLD: SIGNIFICANT CHANGE UP
HCT VFR BLD CALC: 34.7 % — LOW (ref 37–47)
HCT VFR BLD CALC: 35.7 % — LOW (ref 37–47)
HGB BLD-MCNC: 11.7 G/DL — LOW (ref 12–16)
HGB BLD-MCNC: 11.8 G/DL — LOW (ref 12–16)
IMM GRANULOCYTES NFR BLD AUTO: 0.2 % — SIGNIFICANT CHANGE UP (ref 0.1–0.3)
IMM GRANULOCYTES NFR BLD AUTO: 0.4 % — HIGH (ref 0.1–0.3)
LYMPHOCYTES # BLD AUTO: 0.39 K/UL — LOW (ref 1.2–3.4)
LYMPHOCYTES # BLD AUTO: 0.66 K/UL — LOW (ref 1.2–3.4)
LYMPHOCYTES # BLD AUTO: 13.5 % — LOW (ref 20.5–51.1)
LYMPHOCYTES # BLD AUTO: 9.5 % — LOW (ref 20.5–51.1)
MAGNESIUM SERPL-MCNC: 2 MG/DL — SIGNIFICANT CHANGE UP (ref 1.8–2.4)
MAGNESIUM SERPL-MCNC: 2.1 MG/DL — SIGNIFICANT CHANGE UP (ref 1.8–2.4)
MCHC RBC-ENTMCNC: 31.9 PG — HIGH (ref 27–31)
MCHC RBC-ENTMCNC: 31.9 PG — HIGH (ref 27–31)
MCHC RBC-ENTMCNC: 33.1 G/DL — SIGNIFICANT CHANGE UP (ref 32–37)
MCHC RBC-ENTMCNC: 33.7 G/DL — SIGNIFICANT CHANGE UP (ref 32–37)
MCV RBC AUTO: 94.6 FL — SIGNIFICANT CHANGE UP (ref 81–99)
MCV RBC AUTO: 96.5 FL — SIGNIFICANT CHANGE UP (ref 81–99)
METHOD TYPE: SIGNIFICANT CHANGE UP
MONOCYTES # BLD AUTO: 0.37 K/UL — SIGNIFICANT CHANGE UP (ref 0.1–0.6)
MONOCYTES # BLD AUTO: 0.4 K/UL — SIGNIFICANT CHANGE UP (ref 0.1–0.6)
MONOCYTES NFR BLD AUTO: 8.2 % — SIGNIFICANT CHANGE UP (ref 1.7–9.3)
MONOCYTES NFR BLD AUTO: 9 % — SIGNIFICANT CHANGE UP (ref 1.7–9.3)
MSSA DNA SPEC QL NAA+PROBE: SIGNIFICANT CHANGE UP
NEUTROPHILS # BLD AUTO: 3.33 K/UL — SIGNIFICANT CHANGE UP (ref 1.4–6.5)
NEUTROPHILS # BLD AUTO: 3.77 K/UL — SIGNIFICANT CHANGE UP (ref 1.4–6.5)
NEUTROPHILS NFR BLD AUTO: 77.3 % — HIGH (ref 42.2–75.2)
NEUTROPHILS NFR BLD AUTO: 80.8 % — HIGH (ref 42.2–75.2)
NRBC # BLD: 0 /100 WBCS — SIGNIFICANT CHANGE UP (ref 0–0)
NRBC # BLD: 0 /100 WBCS — SIGNIFICANT CHANGE UP (ref 0–0)
PLATELET # BLD AUTO: 204 K/UL — SIGNIFICANT CHANGE UP (ref 130–400)
PLATELET # BLD AUTO: 211 K/UL — SIGNIFICANT CHANGE UP (ref 130–400)
POTASSIUM SERPL-MCNC: 4 MMOL/L — SIGNIFICANT CHANGE UP (ref 3.5–5)
POTASSIUM SERPL-MCNC: 4.7 MMOL/L — SIGNIFICANT CHANGE UP (ref 3.5–5)
POTASSIUM SERPL-SCNC: 4 MMOL/L — SIGNIFICANT CHANGE UP (ref 3.5–5)
POTASSIUM SERPL-SCNC: 4.7 MMOL/L — SIGNIFICANT CHANGE UP (ref 3.5–5)
PROT SERPL-MCNC: 6.7 G/DL — SIGNIFICANT CHANGE UP (ref 6–8)
PROT SERPL-MCNC: 6.8 G/DL — SIGNIFICANT CHANGE UP (ref 6–8)
RBC # BLD: 3.67 M/UL — LOW (ref 4.2–5.4)
RBC # BLD: 3.7 M/UL — LOW (ref 4.2–5.4)
RBC # FLD: 12.5 % — SIGNIFICANT CHANGE UP (ref 11.5–14.5)
RBC # FLD: 12.6 % — SIGNIFICANT CHANGE UP (ref 11.5–14.5)
SODIUM SERPL-SCNC: 135 MMOL/L — SIGNIFICANT CHANGE UP (ref 135–146)
SODIUM SERPL-SCNC: 137 MMOL/L — SIGNIFICANT CHANGE UP (ref 135–146)
WBC # BLD: 4.12 K/UL — LOW (ref 4.8–10.8)
WBC # BLD: 4.88 K/UL — SIGNIFICANT CHANGE UP (ref 4.8–10.8)
WBC # FLD AUTO: 4.12 K/UL — LOW (ref 4.8–10.8)
WBC # FLD AUTO: 4.88 K/UL — SIGNIFICANT CHANGE UP (ref 4.8–10.8)

## 2021-11-14 PROCEDURE — 93970 EXTREMITY STUDY: CPT | Mod: 26

## 2021-11-14 PROCEDURE — 99233 SBSQ HOSP IP/OBS HIGH 50: CPT

## 2021-11-14 RX ORDER — ACETAMINOPHEN 500 MG
600 TABLET ORAL ONCE
Refills: 0 | Status: COMPLETED | OUTPATIENT
Start: 2021-11-14 | End: 2021-11-14

## 2021-11-14 RX ORDER — ACETAMINOPHEN 500 MG
650 TABLET ORAL EVERY 12 HOURS
Refills: 0 | Status: DISCONTINUED | OUTPATIENT
Start: 2021-11-14 | End: 2021-11-24

## 2021-11-14 RX ORDER — VANCOMYCIN HCL 1 G
1000 VIAL (EA) INTRAVENOUS EVERY 12 HOURS
Refills: 0 | Status: DISCONTINUED | OUTPATIENT
Start: 2021-11-14 | End: 2021-11-15

## 2021-11-14 RX ORDER — METRONIDAZOLE 500 MG
500 TABLET ORAL ONCE
Refills: 0 | Status: COMPLETED | OUTPATIENT
Start: 2021-11-14 | End: 2021-11-14

## 2021-11-14 RX ORDER — METRONIDAZOLE 500 MG
TABLET ORAL
Refills: 0 | Status: DISCONTINUED | OUTPATIENT
Start: 2021-11-14 | End: 2021-11-14

## 2021-11-14 RX ORDER — VANCOMYCIN HCL 1 G
1000 VIAL (EA) INTRAVENOUS ONCE
Refills: 0 | Status: COMPLETED | OUTPATIENT
Start: 2021-11-14 | End: 2021-11-14

## 2021-11-14 RX ORDER — VANCOMYCIN HCL 1 G
VIAL (EA) INTRAVENOUS
Refills: 0 | Status: DISCONTINUED | OUTPATIENT
Start: 2021-11-14 | End: 2021-11-15

## 2021-11-14 RX ORDER — IBUPROFEN 200 MG
600 TABLET ORAL EVERY 6 HOURS
Refills: 0 | Status: DISCONTINUED | OUTPATIENT
Start: 2021-11-14 | End: 2021-11-27

## 2021-11-14 RX ORDER — KETOROLAC TROMETHAMINE 30 MG/ML
6 SYRINGE (ML) INJECTION ONCE
Refills: 0 | Status: DISCONTINUED | OUTPATIENT
Start: 2021-11-14 | End: 2021-11-14

## 2021-11-14 RX ORDER — METRONIDAZOLE 500 MG
500 TABLET ORAL EVERY 8 HOURS
Refills: 0 | Status: DISCONTINUED | OUTPATIENT
Start: 2021-11-14 | End: 2021-11-14

## 2021-11-14 RX ADMIN — DEXTROSE MONOHYDRATE, SODIUM CHLORIDE, AND POTASSIUM CHLORIDE 150 MILLILITER(S): 50; .745; 4.5 INJECTION, SOLUTION INTRAVENOUS at 05:59

## 2021-11-14 RX ADMIN — CEFEPIME 100 MILLIGRAM(S): 1 INJECTION, POWDER, FOR SOLUTION INTRAMUSCULAR; INTRAVENOUS at 05:08

## 2021-11-14 RX ADMIN — HYDROMORPHONE HYDROCHLORIDE 1 MILLIGRAM(S): 2 INJECTION INTRAMUSCULAR; INTRAVENOUS; SUBCUTANEOUS at 09:30

## 2021-11-14 RX ADMIN — HYDROMORPHONE HYDROCHLORIDE 1 MILLIGRAM(S): 2 INJECTION INTRAMUSCULAR; INTRAVENOUS; SUBCUTANEOUS at 01:15

## 2021-11-14 RX ADMIN — HYDROMORPHONE HYDROCHLORIDE 1 MILLIGRAM(S): 2 INJECTION INTRAMUSCULAR; INTRAVENOUS; SUBCUTANEOUS at 21:19

## 2021-11-14 RX ADMIN — PANTOPRAZOLE SODIUM 40 MILLIGRAM(S): 20 TABLET, DELAYED RELEASE ORAL at 17:18

## 2021-11-14 RX ADMIN — Medication 1 MILLIGRAM(S): at 11:28

## 2021-11-14 RX ADMIN — HYDROMORPHONE HYDROCHLORIDE 1 MILLIGRAM(S): 2 INJECTION INTRAMUSCULAR; INTRAVENOUS; SUBCUTANEOUS at 05:45

## 2021-11-14 RX ADMIN — Medication 6 MILLIGRAM(S): at 03:29

## 2021-11-14 RX ADMIN — HYDROMORPHONE HYDROCHLORIDE 1 MILLIGRAM(S): 2 INJECTION INTRAMUSCULAR; INTRAVENOUS; SUBCUTANEOUS at 05:15

## 2021-11-14 RX ADMIN — Medication 650 MILLIGRAM(S): at 11:33

## 2021-11-14 RX ADMIN — CEFEPIME 100 MILLIGRAM(S): 1 INJECTION, POWDER, FOR SOLUTION INTRAMUSCULAR; INTRAVENOUS at 17:18

## 2021-11-14 RX ADMIN — HYDROMORPHONE HYDROCHLORIDE 1 MILLIGRAM(S): 2 INJECTION INTRAMUSCULAR; INTRAVENOUS; SUBCUTANEOUS at 09:15

## 2021-11-14 RX ADMIN — HYDROMORPHONE HYDROCHLORIDE 1 MILLIGRAM(S): 2 INJECTION INTRAMUSCULAR; INTRAVENOUS; SUBCUTANEOUS at 21:49

## 2021-11-14 RX ADMIN — HYDROMORPHONE HYDROCHLORIDE 1 MILLIGRAM(S): 2 INJECTION INTRAMUSCULAR; INTRAVENOUS; SUBCUTANEOUS at 13:15

## 2021-11-14 RX ADMIN — Medication 650 MILLIGRAM(S): at 12:03

## 2021-11-14 RX ADMIN — HYDROMORPHONE HYDROCHLORIDE 1 MILLIGRAM(S): 2 INJECTION INTRAMUSCULAR; INTRAVENOUS; SUBCUTANEOUS at 17:34

## 2021-11-14 RX ADMIN — Medication 600 MILLIGRAM(S): at 12:29

## 2021-11-14 RX ADMIN — Medication 250 MILLIGRAM(S): at 06:00

## 2021-11-14 RX ADMIN — GABAPENTIN 300 MILLIGRAM(S): 400 CAPSULE ORAL at 13:15

## 2021-11-14 RX ADMIN — Medication 600 MILLIGRAM(S): at 12:59

## 2021-11-14 RX ADMIN — Medication 100 MILLIGRAM(S): at 11:14

## 2021-11-14 RX ADMIN — Medication 3 CAPSULE(S): at 11:22

## 2021-11-14 RX ADMIN — HYDROMORPHONE HYDROCHLORIDE 1 MILLIGRAM(S): 2 INJECTION INTRAMUSCULAR; INTRAVENOUS; SUBCUTANEOUS at 17:19

## 2021-11-14 RX ADMIN — HYDROMORPHONE HYDROCHLORIDE 1 MILLIGRAM(S): 2 INJECTION INTRAMUSCULAR; INTRAVENOUS; SUBCUTANEOUS at 02:26

## 2021-11-14 RX ADMIN — Medication 6 MILLIGRAM(S): at 03:59

## 2021-11-14 RX ADMIN — Medication 600 MILLIGRAM(S): at 04:01

## 2021-11-14 RX ADMIN — DEXTROSE MONOHYDRATE, SODIUM CHLORIDE, AND POTASSIUM CHLORIDE 150 MILLILITER(S): 50; .745; 4.5 INJECTION, SOLUTION INTRAVENOUS at 12:29

## 2021-11-14 RX ADMIN — GABAPENTIN 300 MILLIGRAM(S): 400 CAPSULE ORAL at 21:15

## 2021-11-14 RX ADMIN — GABAPENTIN 300 MILLIGRAM(S): 400 CAPSULE ORAL at 05:09

## 2021-11-14 RX ADMIN — ENOXAPARIN SODIUM 40 MILLIGRAM(S): 100 INJECTION SUBCUTANEOUS at 21:15

## 2021-11-14 RX ADMIN — HYDROMORPHONE HYDROCHLORIDE 1 MILLIGRAM(S): 2 INJECTION INTRAMUSCULAR; INTRAVENOUS; SUBCUTANEOUS at 13:30

## 2021-11-14 RX ADMIN — Medication 3 CAPSULE(S): at 07:47

## 2021-11-14 RX ADMIN — Medication 240 MILLIGRAM(S): at 03:31

## 2021-11-14 RX ADMIN — PANTOPRAZOLE SODIUM 40 MILLIGRAM(S): 20 TABLET, DELAYED RELEASE ORAL at 05:09

## 2021-11-14 RX ADMIN — Medication 3 CAPSULE(S): at 17:17

## 2021-11-14 RX ADMIN — Medication 250 MILLIGRAM(S): at 17:17

## 2021-11-14 NOTE — PROGRESS NOTE ADULT - SUBJECTIVE AND OBJECTIVE BOX
LAURA BARAJAS  46y  Female      Patient is a 46y old  Female who presents with a chief complaint of Acute on chronic pancreatitis;      INTERVAL HPI/OVERNIGHT EVENTS:      ******************************* REVIEW OF SYSTEMS:**********************************************    All other review of systems negative    *********************** VITALS ******************************************    T(F): 102.2 (21 @ 11:29)  HR: 113 (21 @ 04:56) (113 - 129)  BP: 112/65 (21 @ 04:56) (112/65 - 143/91)  RR: 18 (21 @ 04:56) (18 - 18)  SpO2: 97% (21 @ 04:56) (97% - 97%)            ******************************** PHYSICAL EXAM:**************************************************  GENERAL: NAD    PSYCH: no agitation, baseline mentation  HEENT:     NERVOUS SYSTEM:  Alert & Oriented X3,   PULMONARY: JOSE, CTA    CARDIOVASCULAR: S1S2 RRR    GI: Soft, NT, ND; BS present.    EXTREMITIES:  2+ Peripheral Pulses, No clubbing. RUE induration/ tenderness  around previous IV line    LYMPH: No lymphadenopathy noted    SKIN: No rashes or lesions      **************************** LABS *******************************************************                          11.8   4.88  )-----------( 204      ( 2021 04:30 )             35.7         137  |  103  |  5<L>  ----------------------------<  103<H>  4.7   |  13<L>  |  0.6<L>    Ca    8.3<L>      2021 04:30  Phos  3.0       Mg     2.0         TPro  6.8  /  Alb  3.9  /  TBili  0.6  /  DBili  x   /  AST  108<H>  /  ALT  42<H>  /  AlkPhos  80        Urinalysis Basic - ( 2021 11:00 )    Color: Light Yellow / Appearance: Clear / S.009 / pH: x  Gluc: x / Ketone: Negative  / Bili: Negative / Urobili: <2 mg/dL   Blood: x / Protein: Negative / Nitrite: Negative   Leuk Esterase: Negative / RBC: x / WBC x   Sq Epi: x / Non Sq Epi: x / Bacteria: x        Lactate Trend        CAPILLARY BLOOD GLUCOSE          Culture - Blood (collected 21 @ 13:45)  Source: .Blood Blood  Gram Stain (21 @ 07:51):    Growth in aerobic and anaerobic bottles: Gram Positive Cocci in Clusters  Preliminary Report (21 @ 07:51):    Growth in aerobic and anaerobic bottles: Gram Positive Cocci in Clusters    ***Blood Panel PCR results on this specimen are available    approximately 3 hours after the Gram stain result.***    Gram stain, PCR, and/or culture results may not always    correspond due to difference in methodologies.    ************************************************************    This PCR assay was performed by multiplex PCR. This    Assay tests for 66 bacterial and resistance gene targets.    Please refer to the [facility] test directory    at [email or web address] for details.  Organism: Blood Culture PCR (21 @ 10:02)  Organism: Blood Culture PCR (21 @ 10:02)      -  Staphylococcus aureus: Detec Any isolate of Staphylococcus aureus from a blood culture is NOT considered a contaminant.      Method Type: PCR        Culture - Blood (collected 21 @ 13:45)  Source: .Blood Blood  Gram Stain (21 @ 07:51):    Growth in aerobic and anaerobic bottles: Gram Positive Cocci in Clusters  Preliminary Report (21 @ 07:51):    Growth in aerobic and anaerobic bottles: Gram Positive Cocci in Clusters    ***Blood Panel PCR results on this specimen are available    approximately 3 hours after the Gram stain result.***    Gram stain, PCR, and/or culture results may not always    correspond due to difference in methodologies.    ************************************************************    This PCR assay was performed by multiplex PCR. This    Assay tests for 66 bacterial and resistance gene targets.    Please refer to the [facility] test directory    at [email or web address] for details.  Organism: Blood Culture PCR (21 @ 10:02)  Organism: Blood Culture PCR (21 @ 10:02)      -  Staphylococcus aureus: Detec Any isolate of Staphylococcus aureus from a blood culture is NOT considered a contaminant.      Method Type: PCR        **************************Active Medications *******************************************  Compazine (Unknown)      acetaminophen     Tablet .. 650 milliGRAM(s) Oral every 12 hours PRN  cefepime   IVPB 2000 milliGRAM(s) IV Intermittent every 12 hours  cefepime   IVPB      enoxaparin Injectable 40 milliGRAM(s) SubCutaneous at bedtime  folic acid 1 milliGRAM(s) Oral daily  gabapentin 300 milliGRAM(s) Oral three times a day  HYDROmorphone  Injectable 1 milliGRAM(s) IV Push every 4 hours PRN  ibuprofen  Tablet. 600 milliGRAM(s) Oral every 6 hours PRN  ondansetron    Tablet 4 milliGRAM(s) Oral every 6 hours PRN  pancrelipase  (CREON 12,000 Lipase Units) 3 Capsule(s) Oral three times a day with meals  pantoprazole  Injectable 40 milliGRAM(s) IV Push two times a day  sodium chloride 0.9% with potassium chloride 20 mEq/L 1000 milliLiter(s) IV Continuous <Continuous>  thiamine 100 milliGRAM(s) Oral daily  vancomycin  IVPB 1000 milliGRAM(s) IV Intermittent every 12 hours  vancomycin  IVPB          ***************************************************  RADIOLOGY & ADDITIONAL TESTS:    Imaging Personally Reviewed:  [ ] YES  [ ] NO    HEALTH ISSUES - PROBLEM Dx:
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
## 2021-11-14 NOTE — CHART NOTE - NSCHARTNOTEFT_GEN_A_CORE
Tmax 104, pt. has erythema and tenderness on posterior aspect of right forearm- cellulitis?    -started vancomycin 1g IV  -fu ID recs

## 2021-11-15 LAB
ALBUMIN SERPL ELPH-MCNC: 3.6 G/DL — SIGNIFICANT CHANGE UP (ref 3.5–5.2)
ALP SERPL-CCNC: 89 U/L — SIGNIFICANT CHANGE UP (ref 30–115)
ALT FLD-CCNC: 40 U/L — SIGNIFICANT CHANGE UP (ref 0–41)
ANION GAP SERPL CALC-SCNC: 16 MMOL/L — HIGH (ref 7–14)
AST SERPL-CCNC: 94 U/L — HIGH (ref 0–41)
BASOPHILS # BLD AUTO: 0.03 K/UL — SIGNIFICANT CHANGE UP (ref 0–0.2)
BASOPHILS NFR BLD AUTO: 0.6 % — SIGNIFICANT CHANGE UP (ref 0–1)
BILIRUB SERPL-MCNC: 0.4 MG/DL — SIGNIFICANT CHANGE UP (ref 0.2–1.2)
BUN SERPL-MCNC: 5 MG/DL — LOW (ref 10–20)
CALCIUM SERPL-MCNC: 8.7 MG/DL — SIGNIFICANT CHANGE UP (ref 8.5–10.1)
CHLORIDE SERPL-SCNC: 102 MMOL/L — SIGNIFICANT CHANGE UP (ref 98–110)
CO2 SERPL-SCNC: 18 MMOL/L — SIGNIFICANT CHANGE UP (ref 17–32)
CREAT SERPL-MCNC: 0.5 MG/DL — LOW (ref 0.7–1.5)
CRP SERPL-MCNC: 110 MG/L — HIGH
EOSINOPHIL # BLD AUTO: 0.02 K/UL — SIGNIFICANT CHANGE UP (ref 0–0.7)
EOSINOPHIL NFR BLD AUTO: 0.4 % — SIGNIFICANT CHANGE UP (ref 0–8)
GLUCOSE SERPL-MCNC: 100 MG/DL — HIGH (ref 70–99)
HCT VFR BLD CALC: 32.7 % — LOW (ref 37–47)
HGB BLD-MCNC: 10.9 G/DL — LOW (ref 12–16)
IMM GRANULOCYTES NFR BLD AUTO: 0.4 % — HIGH (ref 0.1–0.3)
LYMPHOCYTES # BLD AUTO: 0.93 K/UL — LOW (ref 1.2–3.4)
LYMPHOCYTES # BLD AUTO: 18 % — LOW (ref 20.5–51.1)
MAGNESIUM SERPL-MCNC: 1.4 MG/DL — LOW (ref 1.8–2.4)
MCHC RBC-ENTMCNC: 32.1 PG — HIGH (ref 27–31)
MCHC RBC-ENTMCNC: 33.3 G/DL — SIGNIFICANT CHANGE UP (ref 32–37)
MCV RBC AUTO: 96.2 FL — SIGNIFICANT CHANGE UP (ref 81–99)
MONOCYTES # BLD AUTO: 0.55 K/UL — SIGNIFICANT CHANGE UP (ref 0.1–0.6)
MONOCYTES NFR BLD AUTO: 10.7 % — HIGH (ref 1.7–9.3)
NEUTROPHILS # BLD AUTO: 3.61 K/UL — SIGNIFICANT CHANGE UP (ref 1.4–6.5)
NEUTROPHILS NFR BLD AUTO: 69.9 % — SIGNIFICANT CHANGE UP (ref 42.2–75.2)
NRBC # BLD: 0 /100 WBCS — SIGNIFICANT CHANGE UP (ref 0–0)
PLATELET # BLD AUTO: 221 K/UL — SIGNIFICANT CHANGE UP (ref 130–400)
POTASSIUM SERPL-MCNC: 4.4 MMOL/L — SIGNIFICANT CHANGE UP (ref 3.5–5)
POTASSIUM SERPL-SCNC: 4.4 MMOL/L — SIGNIFICANT CHANGE UP (ref 3.5–5)
PROCALCITONIN SERPL-MCNC: 0.35 NG/ML — HIGH (ref 0.02–0.1)
PROT SERPL-MCNC: 6.4 G/DL — SIGNIFICANT CHANGE UP (ref 6–8)
RBC # BLD: 3.4 M/UL — LOW (ref 4.2–5.4)
RBC # FLD: 12.6 % — SIGNIFICANT CHANGE UP (ref 11.5–14.5)
SODIUM SERPL-SCNC: 136 MMOL/L — SIGNIFICANT CHANGE UP (ref 135–146)
WBC # BLD: 5.16 K/UL — SIGNIFICANT CHANGE UP (ref 4.8–10.8)
WBC # FLD AUTO: 5.16 K/UL — SIGNIFICANT CHANGE UP (ref 4.8–10.8)

## 2021-11-15 PROCEDURE — 99233 SBSQ HOSP IP/OBS HIGH 50: CPT

## 2021-11-15 PROCEDURE — 73201 CT UPPER EXTREMITY W/DYE: CPT | Mod: 26,RT

## 2021-11-15 RX ORDER — CEFAZOLIN SODIUM 1 G
VIAL (EA) INJECTION
Refills: 0 | Status: DISCONTINUED | OUTPATIENT
Start: 2021-11-15 | End: 2021-11-23

## 2021-11-15 RX ORDER — SODIUM CHLORIDE 9 MG/ML
1000 INJECTION, SOLUTION INTRAVENOUS
Refills: 0 | Status: DISCONTINUED | OUTPATIENT
Start: 2021-11-15 | End: 2021-11-19

## 2021-11-15 RX ORDER — CEFAZOLIN SODIUM 1 G
2000 VIAL (EA) INJECTION ONCE
Refills: 0 | Status: COMPLETED | OUTPATIENT
Start: 2021-11-15 | End: 2021-11-15

## 2021-11-15 RX ORDER — CEFAZOLIN SODIUM 1 G
2000 VIAL (EA) INJECTION EVERY 8 HOURS
Refills: 0 | Status: DISCONTINUED | OUTPATIENT
Start: 2021-11-15 | End: 2021-11-23

## 2021-11-15 RX ADMIN — Medication 600 MILLIGRAM(S): at 12:00

## 2021-11-15 RX ADMIN — HYDROMORPHONE HYDROCHLORIDE 1 MILLIGRAM(S): 2 INJECTION INTRAMUSCULAR; INTRAVENOUS; SUBCUTANEOUS at 19:03

## 2021-11-15 RX ADMIN — Medication 600 MILLIGRAM(S): at 10:36

## 2021-11-15 RX ADMIN — Medication 100 MILLIGRAM(S): at 18:08

## 2021-11-15 RX ADMIN — Medication 650 MILLIGRAM(S): at 13:24

## 2021-11-15 RX ADMIN — HYDROMORPHONE HYDROCHLORIDE 1 MILLIGRAM(S): 2 INJECTION INTRAMUSCULAR; INTRAVENOUS; SUBCUTANEOUS at 14:51

## 2021-11-15 RX ADMIN — Medication 100 MILLIGRAM(S): at 12:15

## 2021-11-15 RX ADMIN — HYDROMORPHONE HYDROCHLORIDE 1 MILLIGRAM(S): 2 INJECTION INTRAMUSCULAR; INTRAVENOUS; SUBCUTANEOUS at 01:37

## 2021-11-15 RX ADMIN — GABAPENTIN 300 MILLIGRAM(S): 400 CAPSULE ORAL at 05:12

## 2021-11-15 RX ADMIN — Medication 250 MILLIGRAM(S): at 05:09

## 2021-11-15 RX ADMIN — Medication 3 CAPSULE(S): at 07:49

## 2021-11-15 RX ADMIN — ENOXAPARIN SODIUM 40 MILLIGRAM(S): 100 INJECTION SUBCUTANEOUS at 21:14

## 2021-11-15 RX ADMIN — PANTOPRAZOLE SODIUM 40 MILLIGRAM(S): 20 TABLET, DELAYED RELEASE ORAL at 05:11

## 2021-11-15 RX ADMIN — PANTOPRAZOLE SODIUM 40 MILLIGRAM(S): 20 TABLET, DELAYED RELEASE ORAL at 18:09

## 2021-11-15 RX ADMIN — SODIUM CHLORIDE 150 MILLILITER(S): 9 INJECTION, SOLUTION INTRAVENOUS at 17:27

## 2021-11-15 RX ADMIN — ONDANSETRON 4 MILLIGRAM(S): 8 TABLET, FILM COATED ORAL at 13:27

## 2021-11-15 RX ADMIN — HYDROMORPHONE HYDROCHLORIDE 1 MILLIGRAM(S): 2 INJECTION INTRAMUSCULAR; INTRAVENOUS; SUBCUTANEOUS at 22:49

## 2021-11-15 RX ADMIN — Medication 650 MILLIGRAM(S): at 14:00

## 2021-11-15 RX ADMIN — HYDROMORPHONE HYDROCHLORIDE 1 MILLIGRAM(S): 2 INJECTION INTRAMUSCULAR; INTRAVENOUS; SUBCUTANEOUS at 10:48

## 2021-11-15 RX ADMIN — HYDROMORPHONE HYDROCHLORIDE 1 MILLIGRAM(S): 2 INJECTION INTRAMUSCULAR; INTRAVENOUS; SUBCUTANEOUS at 14:36

## 2021-11-15 RX ADMIN — Medication 650 MILLIGRAM(S): at 00:53

## 2021-11-15 RX ADMIN — GABAPENTIN 300 MILLIGRAM(S): 400 CAPSULE ORAL at 21:14

## 2021-11-15 RX ADMIN — CEFEPIME 100 MILLIGRAM(S): 1 INJECTION, POWDER, FOR SOLUTION INTRAMUSCULAR; INTRAVENOUS at 05:09

## 2021-11-15 RX ADMIN — Medication 1 MILLIGRAM(S): at 12:14

## 2021-11-15 RX ADMIN — Medication 3 CAPSULE(S): at 18:09

## 2021-11-15 RX ADMIN — HYDROMORPHONE HYDROCHLORIDE 1 MILLIGRAM(S): 2 INJECTION INTRAMUSCULAR; INTRAVENOUS; SUBCUTANEOUS at 18:48

## 2021-11-15 RX ADMIN — Medication 600 MILLIGRAM(S): at 21:14

## 2021-11-15 RX ADMIN — Medication 100 MILLIGRAM(S): at 22:48

## 2021-11-15 RX ADMIN — Medication 650 MILLIGRAM(S): at 01:23

## 2021-11-15 RX ADMIN — Medication 600 MILLIGRAM(S): at 00:52

## 2021-11-15 RX ADMIN — HYDROMORPHONE HYDROCHLORIDE 1 MILLIGRAM(S): 2 INJECTION INTRAMUSCULAR; INTRAVENOUS; SUBCUTANEOUS at 10:33

## 2021-11-15 RX ADMIN — HYDROMORPHONE HYDROCHLORIDE 1 MILLIGRAM(S): 2 INJECTION INTRAMUSCULAR; INTRAVENOUS; SUBCUTANEOUS at 06:00

## 2021-11-15 RX ADMIN — GABAPENTIN 300 MILLIGRAM(S): 400 CAPSULE ORAL at 13:23

## 2021-11-15 RX ADMIN — SODIUM CHLORIDE 150 MILLILITER(S): 9 INJECTION, SOLUTION INTRAVENOUS at 10:33

## 2021-11-15 RX ADMIN — Medication 3 CAPSULE(S): at 12:15

## 2021-11-15 NOTE — PROGRESS NOTE ADULT - SUBJECTIVE AND OBJECTIVE BOX
LAURA BARAJAS  46y  Female      Patient is a 46y old  Female who presents with a chief complaint of Acute on chronic pancreatitis;       INTERVAL HPI/OVERNIGHT EVENTS:      ******************************* REVIEW OF SYSTEMS:**********************************************    All other review of systems negative    *********************** VITALS ******************************************    T(F): 101.5 (11-15-21 @ 14:42)  HR: 118 (11-15-21 @ 12:42) (94 - 118)  BP: 136/64 (11-15-21 @ 12:42) (105/57 - 136/64)  RR: 18 (11-15-21 @ 12:42) (18 - 18)  SpO2: 100% (11-15-21 @ 12:42) (100% - 100%)            ******************************** PHYSICAL EXAM:**************************************************  GENERAL: NAD    PSYCH: no agitation, baseline mentation  HEENT:     NERVOUS SYSTEM:  Alert & Oriented X3,    PULMONARY: JOSE, CTA    CARDIOVASCULAR: S1S2 RRR    GI: Soft, EPIGASTRIC TENDERNESS ND; BS present.    EXTREMITIES:  2+ Peripheral Pulses, Right forearm swollen     LYMPH: No lymphadenopathy noted    SKIN: No rashes or lesions      **************************** LABS *******************************************************                          10.9   5.16  )-----------( 221      ( 15 Nov 2021 08:33 )             32.7     11-15    136  |  102  |  5<L>  ----------------------------<  100<H>  4.4   |  18  |  0.5<L>    Ca    8.7      15 Nov 2021 08:33  Mg     1.4     11-15    TPro  6.4  /  Alb  3.6  /  TBili  0.4  /  DBili  x   /  AST  94<H>  /  ALT  40  /  AlkPhos  89  11-15          Lactate Trend        CAPILLARY BLOOD GLUCOSE              **************************Active Medications *******************************************  Compazine (Unknown)      acetaminophen     Tablet .. 650 milliGRAM(s) Oral every 12 hours PRN  cefepime   IVPB      cefepime   IVPB 2000 milliGRAM(s) IV Intermittent every 12 hours  enoxaparin Injectable 40 milliGRAM(s) SubCutaneous at bedtime  folic acid 1 milliGRAM(s) Oral daily  gabapentin 300 milliGRAM(s) Oral three times a day  HYDROmorphone  Injectable 1 milliGRAM(s) IV Push every 4 hours PRN  ibuprofen  Tablet. 600 milliGRAM(s) Oral every 6 hours PRN  lactated ringers. 1000 milliLiter(s) IV Continuous <Continuous>  ondansetron    Tablet 4 milliGRAM(s) Oral every 6 hours PRN  pancrelipase  (CREON 12,000 Lipase Units) 3 Capsule(s) Oral three times a day with meals  pantoprazole  Injectable 40 milliGRAM(s) IV Push two times a day  thiamine 100 milliGRAM(s) Oral daily  vancomycin  IVPB 1000 milliGRAM(s) IV Intermittent every 12 hours  vancomycin  IVPB          ***************************************************  RADIOLOGY & ADDITIONAL TESTS:    Imaging Personally Reviewed:  [ ] YES  [ ] NO    HEALTH ISSUES - PROBLEM Dx:
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
## 2021-11-15 NOTE — PROGRESS NOTE ADULT - ASSESSMENT
ASSESSMENT  47 y/o F w/ pmhx of  ETOH abuse and Chronic Alcoholic Pancreatitis with pseudocyst presenting to ED for worsening diffuse abdominal pain for x3 days radiating to the back    IMPRESSION  #Acute on Chronic Pancreatitis  - CT Abdomen and Pelvis w/ IV Cont (11.13.21 @ 17:45): IMPRESSION: Pancreatic pseudocyst unchanged. Chronic pancreatitis unchanged.    #Fevers/MSSA Bacteremia - potentially secondary to infected PIV with superficial venous thromboses  - Blood Cx 11/13+  - VA Duplex Upper Ext Vein Scan, Bilat (11.14.21 @ 16:59): No Evidence of deep venous thrombosis noted in the bilateral upper extremity Right cephalic superficial venous thromboses appears to be acute      #ETOH Abuse   #Abx allergy: Compazine (Unknown)    RECOMMENDATIONS  - please obtain CT RUE with contrast to rule out abscess   - stop vancomycin + cefepime   - start cefazolin 2g q 8 hours   - repeat blood cultures daily until negative  - check TTE  - trend fever curve  - supportive care for pancreatitis     Please call or message on [employer] Teams if with any questions.  Spectra [id number]

## 2021-11-15 NOTE — PROGRESS NOTE ADULT - SUBJECTIVE AND OBJECTIVE BOX
LAURA BARAJAS  46y, Female  Allergy: Compazine (Unknown)      LOS  9d    CHIEF COMPLAINT: Acute on chronic pancreatitis; (14 Nov 2021 14:47)      INTERVAL EVENTS/HPI  - No acute events overnight  - T(F): , Max: 103.3 (11-15-21 @ 00:53)  - remains persistently febrile with high fevers - right arm appears more swollen   - WBC Count: 5.16 (11-15-21 @ 08:33)  WBC Count: 4.88 (11-14-21 @ 04:30)     - Creatinine, Serum: 0.5 (11-15-21 @ 08:33)  Creatinine, Serum: 0.6 (11-14-21 @ 04:30)       ROS  General: Denies rigors, nightsweats  HEENT: Denies headache, rhinorrhea, sore throat, eye pain  CV: Denies CP, palpitations  PULM: Denies wheezing, hemoptysis  GI: Denies hematemesis, hematochezia, melena  : Denies discharge, hematuria  MSK: Denies arthralgias, myalgias  SKIN: Denies rash, lesions  NEURO: Denies paresthesias, weakness  PSYCH: Denies depression, anxiety    VITALS:  T(F): 102.3, Max: 103.3 (11-15-21 @ 00:53)  HR: 99  BP: 105/57  RR: 18Vital Signs Last 24 Hrs  T(C): 39.1 (15 Nov 2021 10:36), Max: 39.6 (15 Nov 2021 00:53)  T(F): 102.3 (15 Nov 2021 10:36), Max: 103.3 (15 Nov 2021 00:53)  HR: 99 (15 Nov 2021 04:37) (94 - 117)  BP: 105/57 (15 Nov 2021 04:37) (105/57 - 129/72)  BP(mean): --  RR: 18 (14 Nov 2021 20:38) (18 - 18)  SpO2: --    PHYSICAL EXAM:  Gen: NAD, resting in bed  HEENT: Normocephalic, atraumatic  Neck: supple, no lymphadenopathy  CV: Regular rate & regular rhythm  Lungs: decreased BS at bases, no fremitus  Abdomen: Soft, BS present  Ext: Warm, well perfused  Neuro: non focal, awake  Skin: right arm with area of induration   Lines: no phlebitis    FH: Non-contributory  Social Hx: Non-contributory    TESTS & MEASUREMENTS:                        10.9   5.16  )-----------( 221      ( 15 Nov 2021 08:33 )             32.7     11-15    136  |  102  |  5<L>  ----------------------------<  100<H>  4.4   |  18  |  0.5<L>    Ca    8.7      15 Nov 2021 08:33  Mg     1.4     11-15    TPro  6.4  /  Alb  3.6  /  TBili  0.4  /  DBili  x   /  AST  94<H>  /  ALT  40  /  AlkPhos  89  11-15    eGFR if Non African American: 118 mL/min/1.73M2 (11-15-21 @ 08:33)  eGFR if : 136 mL/min/1.73M2 (11-15-21 @ 08:33)    LIVER FUNCTIONS - ( 15 Nov 2021 08:33 )  Alb: 3.6 g/dL / Pro: 6.4 g/dL / ALK PHOS: 89 U/L / ALT: 40 U/L / AST: 94 U/L / GGT: x               Culture - Blood (collected 11-13-21 @ 13:45)  Source: .Blood Blood  Gram Stain (11-14-21 @ 07:51):    Growth in aerobic and anaerobic bottles: Gram Positive Cocci in Clusters  Preliminary Report (11-15-21 @ 12:05):    Growth in aerobic and anaerobic bottles: Staphylococcus aureus    Susceptibility to follow.    ***Blood Panel PCR results on this specimen are available    approximately 3 hours after the Gram stain result.***    Gram stain, PCR, and/or culture results maynot always    correspond due to difference in methodologies.    ************************************************************    This PCR assay was performed by multiplex PCR. This    Assay tests for 66 bacterial and resistance gene targets.    Please refer to the [facility] Labs test directory    at [email or web address] for details.  Organism: Blood Culture PCR (11-14-21 @ 10:02)  Organism: Blood Culture PCR (11-14-21 @ 10:02)      -  Staphylococcus aureus: Detec Any isolate of Staphylococcus aureus from a blood culture is NOT considered a contaminant.      Method Type: PCR            INFECTIOUS DISEASES TESTING  COVID-19 PCR: NotDetec (11-13-21 @ 13:57)  Procalcitonin, Serum: 0.04 (11-11-21 @ 04:30)  COVID-19 PCR: NotDetec (11-06-21 @ 23:15)  COVID-19 PCR: NotDetec (09-22-21 @ 06:35)  COVID-19 PCR: NotDetec (08-08-21 @ 17:45)  COVID-19 PCR: NotDetec (08-06-21 @ 01:03)  COVID-19 PCR: NotDetec (07-15-21 @ 20:15)  COVID-19 PCR: NotDetec (01-21-21 @ 21:37)      INFLAMMATORY MARKERS  Sedimentation Rate, Erythrocyte: 44 mm/Hr (11-14-21 @ 04:30)  C-Reactive Protein, Serum: 6 mg/L (11-11-21 @ 04:30)  Sedimentation Rate, Erythrocyte: 56 mm/Hr (11-11-21 @ 04:30)      RADIOLOGY & ADDITIONAL TESTS:  I have personally reviewed the last available Chest xray  CXR  Xray Chest 1 View- PORTABLE-Urgent:   EXAM:  XR CHEST PORTABLE URGENT 1V            PROCEDURE DATE:  11/13/2021            INTERPRETATION:  Clinical History / Reason for exam: Evaluate for infection    Comparison : Chest radiograph November 6, 2021.    Technique/Positioning: Frontal chest radiograph.    Findings:    Support devices: None.    Cardiac/mediastinum/hilum: Unchanged    Lung parenchyma/Pleura: Left basilar atelectasis noted. No other focal parenchyma opacities, pleural effusions or pneumothorax.    Skeleton/soft tissues:Unremarkable.    Impression:    New left basilar atelectasis.    --- End of Report ---              NAYELY DUBOSE MD; Attending Radiologist  This document has been electronically signed. Nov 13 2021  1:40PM (11-13-21 @ 10:54)      CT  CT Abdomen and Pelvis w/ IV Cont:   EXAM:  CT ABDOMEN AND PELVIS IC            PROCEDURE DATE:  11/13/2021            INTERPRETATION:  CLINICAL STATEMENT: Abdominal pain and fever    TECHNIQUE: Contiguous axial CT images were obtained from the lower chest to the pubic symphysis .  Oral contrast not given. 100 cc of Omnipaque 350 intravenous contrast was given and no contrast was discarded Reformatted images in the coronal and sagittal planes were acquired.    COMPARISON CT: 11/6/2021.    OTHER STUDIES USED FOR CORRELATION: None.    As compared to the previous exam there is no change in the appearance of the 6.5 cm pseudocyst contiguous to the stomach. The density of the pseudocyst fluid is consistent with simple fluid with no evidence of infection. No gas formation is seen inthe pseudocyst.  There are again seen changes of chronic pancreatitis with pancreatic duct dilatation. No acute pancreatic inflammation is seen. The pancreas shows homogeneous enhancement with no evidence of necrosis. The splenic and portal veins appear patent. The splenic artery is patent.  There is no ascites.  No intraperitoneal or retroperitoneal or pelvic abscess formation is seen.  The bowel pattern appears normal. There is no free air.  The spleen liver kidneys and adrenal glands appear normal.  There is no ascites.    IMPRESSION: Pancreatic pseudocyst unchanged. Chronic pancreatitis unchanged.    --- End of Report ---              ANTONELLA SAENZ MD; Attending Radiologist  This document has been electronically signed. Nov 13 20217:07PM (11-13-21 @ 17:45)      CARDIOLOGY TESTING      MEDICATIONS  cefepime   IVPB     cefepime   IVPB 2000 IV Intermittent every 12 hours  enoxaparin Injectable 40 SubCutaneous at bedtime  folic acid 1 Oral daily  gabapentin 300 Oral three times a day  lactated ringers. 1000 IV Continuous <Continuous>  pancrelipase  (CREON 12,000 Lipase Units) 3 Oral three times a day with meals  pantoprazole  Injectable 40 IV Push two times a day  thiamine 100 Oral daily  vancomycin  IVPB 1000 IV Intermittent every 12 hours  vancomycin  IVPB         WEIGHT  Weight (kg): 58.967 (11-07-21 @ 01:02)  Creatinine, Serum: 0.5 mg/dL (11-15-21 @ 08:33)      ANTIBIOTICS:  cefepime   IVPB      cefepime   IVPB 2000 milliGRAM(s) IV Intermittent every 12 hours  vancomycin  IVPB 1000 milliGRAM(s) IV Intermittent every 12 hours  vancomycin  IVPB          All available historical records have been reviewed
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
## 2021-11-16 LAB
-  AMPICILLIN/SULBACTAM: SIGNIFICANT CHANGE UP
-  CEFAZOLIN: SIGNIFICANT CHANGE UP
-  CLINDAMYCIN: SIGNIFICANT CHANGE UP
-  ERYTHROMYCIN: SIGNIFICANT CHANGE UP
-  GENTAMICIN: SIGNIFICANT CHANGE UP
-  OXACILLIN: SIGNIFICANT CHANGE UP
-  RIFAMPIN: SIGNIFICANT CHANGE UP
-  TETRACYCLINE: SIGNIFICANT CHANGE UP
-  TRIMETHOPRIM/SULFAMETHOXAZOLE: SIGNIFICANT CHANGE UP
-  VANCOMYCIN: SIGNIFICANT CHANGE UP
ALBUMIN SERPL ELPH-MCNC: 3.7 G/DL — SIGNIFICANT CHANGE UP (ref 3.5–5.2)
ALP SERPL-CCNC: 104 U/L — SIGNIFICANT CHANGE UP (ref 30–115)
ALT FLD-CCNC: 34 U/L — SIGNIFICANT CHANGE UP (ref 0–41)
ANION GAP SERPL CALC-SCNC: 17 MMOL/L — HIGH (ref 7–14)
AST SERPL-CCNC: 92 U/L — HIGH (ref 0–41)
BASOPHILS # BLD AUTO: 0.05 K/UL — SIGNIFICANT CHANGE UP (ref 0–0.2)
BASOPHILS NFR BLD AUTO: 1.1 % — HIGH (ref 0–1)
BILIRUB SERPL-MCNC: 0.3 MG/DL — SIGNIFICANT CHANGE UP (ref 0.2–1.2)
BUN SERPL-MCNC: 4 MG/DL — LOW (ref 10–20)
CALCIUM SERPL-MCNC: 9.4 MG/DL — SIGNIFICANT CHANGE UP (ref 8.5–10.1)
CHLORIDE SERPL-SCNC: 99 MMOL/L — SIGNIFICANT CHANGE UP (ref 98–110)
CO2 SERPL-SCNC: 22 MMOL/L — SIGNIFICANT CHANGE UP (ref 17–32)
CREAT SERPL-MCNC: 0.6 MG/DL — LOW (ref 0.7–1.5)
CULTURE RESULTS: SIGNIFICANT CHANGE UP
EOSINOPHIL # BLD AUTO: 0.06 K/UL — SIGNIFICANT CHANGE UP (ref 0–0.7)
EOSINOPHIL NFR BLD AUTO: 1.3 % — SIGNIFICANT CHANGE UP (ref 0–8)
GLUCOSE SERPL-MCNC: 89 MG/DL — SIGNIFICANT CHANGE UP (ref 70–99)
HCT VFR BLD CALC: 30.3 % — LOW (ref 37–47)
HGB BLD-MCNC: 10.2 G/DL — LOW (ref 12–16)
IMM GRANULOCYTES NFR BLD AUTO: 0.2 % — SIGNIFICANT CHANGE UP (ref 0.1–0.3)
LYMPHOCYTES # BLD AUTO: 1.13 K/UL — LOW (ref 1.2–3.4)
LYMPHOCYTES # BLD AUTO: 25.3 % — SIGNIFICANT CHANGE UP (ref 20.5–51.1)
MAGNESIUM SERPL-MCNC: 1.5 MG/DL — LOW (ref 1.8–2.4)
MCHC RBC-ENTMCNC: 31.5 PG — HIGH (ref 27–31)
MCHC RBC-ENTMCNC: 33.7 G/DL — SIGNIFICANT CHANGE UP (ref 32–37)
MCV RBC AUTO: 93.5 FL — SIGNIFICANT CHANGE UP (ref 81–99)
METHOD TYPE: SIGNIFICANT CHANGE UP
MONOCYTES # BLD AUTO: 0.74 K/UL — HIGH (ref 0.1–0.6)
MONOCYTES NFR BLD AUTO: 16.6 % — HIGH (ref 1.7–9.3)
NEUTROPHILS # BLD AUTO: 2.47 K/UL — SIGNIFICANT CHANGE UP (ref 1.4–6.5)
NEUTROPHILS NFR BLD AUTO: 55.5 % — SIGNIFICANT CHANGE UP (ref 42.2–75.2)
NRBC # BLD: 0 /100 WBCS — SIGNIFICANT CHANGE UP (ref 0–0)
ORGANISM # SPEC MICROSCOPIC CNT: SIGNIFICANT CHANGE UP
PLATELET # BLD AUTO: 237 K/UL — SIGNIFICANT CHANGE UP (ref 130–400)
POTASSIUM SERPL-MCNC: 3.4 MMOL/L — LOW (ref 3.5–5)
POTASSIUM SERPL-SCNC: 3.4 MMOL/L — LOW (ref 3.5–5)
PROT SERPL-MCNC: 6.6 G/DL — SIGNIFICANT CHANGE UP (ref 6–8)
RBC # BLD: 3.24 M/UL — LOW (ref 4.2–5.4)
RBC # FLD: 12.6 % — SIGNIFICANT CHANGE UP (ref 11.5–14.5)
SODIUM SERPL-SCNC: 138 MMOL/L — SIGNIFICANT CHANGE UP (ref 135–146)
SPECIMEN SOURCE: SIGNIFICANT CHANGE UP
WBC # BLD: 4.46 K/UL — LOW (ref 4.8–10.8)
WBC # FLD AUTO: 4.46 K/UL — LOW (ref 4.8–10.8)

## 2021-11-16 PROCEDURE — 99233 SBSQ HOSP IP/OBS HIGH 50: CPT

## 2021-11-16 PROCEDURE — 93306 TTE W/DOPPLER COMPLETE: CPT | Mod: 26

## 2021-11-16 RX ORDER — MAGNESIUM OXIDE 400 MG ORAL TABLET 241.3 MG
400 TABLET ORAL
Refills: 0 | Status: COMPLETED | OUTPATIENT
Start: 2021-11-16 | End: 2021-11-17

## 2021-11-16 RX ORDER — SENNA PLUS 8.6 MG/1
2 TABLET ORAL AT BEDTIME
Refills: 0 | Status: DISCONTINUED | OUTPATIENT
Start: 2021-11-16 | End: 2021-11-27

## 2021-11-16 RX ORDER — POLYETHYLENE GLYCOL 3350 17 G/17G
17 POWDER, FOR SOLUTION ORAL DAILY
Refills: 0 | Status: DISCONTINUED | OUTPATIENT
Start: 2021-11-16 | End: 2021-11-27

## 2021-11-16 RX ORDER — POTASSIUM CHLORIDE 20 MEQ
40 PACKET (EA) ORAL
Refills: 0 | Status: COMPLETED | OUTPATIENT
Start: 2021-11-16 | End: 2021-11-16

## 2021-11-16 RX ADMIN — ENOXAPARIN SODIUM 40 MILLIGRAM(S): 100 INJECTION SUBCUTANEOUS at 21:28

## 2021-11-16 RX ADMIN — HYDROMORPHONE HYDROCHLORIDE 1 MILLIGRAM(S): 2 INJECTION INTRAMUSCULAR; INTRAVENOUS; SUBCUTANEOUS at 07:42

## 2021-11-16 RX ADMIN — Medication 3 CAPSULE(S): at 17:55

## 2021-11-16 RX ADMIN — Medication 40 MILLIEQUIVALENT(S): at 15:44

## 2021-11-16 RX ADMIN — Medication 100 MILLIGRAM(S): at 21:55

## 2021-11-16 RX ADMIN — Medication 3 CAPSULE(S): at 07:52

## 2021-11-16 RX ADMIN — MAGNESIUM OXIDE 400 MG ORAL TABLET 400 MILLIGRAM(S): 241.3 TABLET ORAL at 15:44

## 2021-11-16 RX ADMIN — GABAPENTIN 300 MILLIGRAM(S): 400 CAPSULE ORAL at 21:14

## 2021-11-16 RX ADMIN — Medication 3 CAPSULE(S): at 11:34

## 2021-11-16 RX ADMIN — HYDROMORPHONE HYDROCHLORIDE 1 MILLIGRAM(S): 2 INJECTION INTRAMUSCULAR; INTRAVENOUS; SUBCUTANEOUS at 19:33

## 2021-11-16 RX ADMIN — HYDROMORPHONE HYDROCHLORIDE 1 MILLIGRAM(S): 2 INJECTION INTRAMUSCULAR; INTRAVENOUS; SUBCUTANEOUS at 16:00

## 2021-11-16 RX ADMIN — SENNA PLUS 2 TABLET(S): 8.6 TABLET ORAL at 21:12

## 2021-11-16 RX ADMIN — GABAPENTIN 300 MILLIGRAM(S): 400 CAPSULE ORAL at 05:11

## 2021-11-16 RX ADMIN — Medication 40 MILLIEQUIVALENT(S): at 17:55

## 2021-11-16 RX ADMIN — PANTOPRAZOLE SODIUM 40 MILLIGRAM(S): 20 TABLET, DELAYED RELEASE ORAL at 17:56

## 2021-11-16 RX ADMIN — Medication 100 MILLIGRAM(S): at 11:26

## 2021-11-16 RX ADMIN — POLYETHYLENE GLYCOL 3350 17 GRAM(S): 17 POWDER, FOR SOLUTION ORAL at 11:23

## 2021-11-16 RX ADMIN — Medication 1 MILLIGRAM(S): at 11:24

## 2021-11-16 RX ADMIN — PANTOPRAZOLE SODIUM 40 MILLIGRAM(S): 20 TABLET, DELAYED RELEASE ORAL at 05:11

## 2021-11-16 RX ADMIN — HYDROMORPHONE HYDROCHLORIDE 1 MILLIGRAM(S): 2 INJECTION INTRAMUSCULAR; INTRAVENOUS; SUBCUTANEOUS at 11:33

## 2021-11-16 RX ADMIN — HYDROMORPHONE HYDROCHLORIDE 1 MILLIGRAM(S): 2 INJECTION INTRAMUSCULAR; INTRAVENOUS; SUBCUTANEOUS at 11:48

## 2021-11-16 RX ADMIN — SODIUM CHLORIDE 150 MILLILITER(S): 9 INJECTION, SOLUTION INTRAVENOUS at 11:58

## 2021-11-16 RX ADMIN — GABAPENTIN 300 MILLIGRAM(S): 400 CAPSULE ORAL at 13:10

## 2021-11-16 RX ADMIN — HYDROMORPHONE HYDROCHLORIDE 1 MILLIGRAM(S): 2 INJECTION INTRAMUSCULAR; INTRAVENOUS; SUBCUTANEOUS at 03:21

## 2021-11-16 RX ADMIN — HYDROMORPHONE HYDROCHLORIDE 1 MILLIGRAM(S): 2 INJECTION INTRAMUSCULAR; INTRAVENOUS; SUBCUTANEOUS at 23:41

## 2021-11-16 RX ADMIN — HYDROMORPHONE HYDROCHLORIDE 1 MILLIGRAM(S): 2 INJECTION INTRAMUSCULAR; INTRAVENOUS; SUBCUTANEOUS at 21:00

## 2021-11-16 RX ADMIN — Medication 100 MILLIGRAM(S): at 05:11

## 2021-11-16 RX ADMIN — HYDROMORPHONE HYDROCHLORIDE 1 MILLIGRAM(S): 2 INJECTION INTRAMUSCULAR; INTRAVENOUS; SUBCUTANEOUS at 07:27

## 2021-11-16 RX ADMIN — Medication 100 MILLIGRAM(S): at 13:11

## 2021-11-16 RX ADMIN — Medication 100 MILLIGRAM(S): at 21:12

## 2021-11-16 RX ADMIN — HYDROMORPHONE HYDROCHLORIDE 1 MILLIGRAM(S): 2 INJECTION INTRAMUSCULAR; INTRAVENOUS; SUBCUTANEOUS at 15:45

## 2021-11-16 RX ADMIN — MAGNESIUM OXIDE 400 MG ORAL TABLET 400 MILLIGRAM(S): 241.3 TABLET ORAL at 17:55
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
## 2021-11-16 NOTE — PROGRESS NOTE ADULT - ASSESSMENT
ASSESSMENT  47 y/o F w/ pmhx of  ETOH abuse and Chronic Alcoholic Pancreatitis with pseudocyst presenting to ED for worsening diffuse abdominal pain for x3 days radiating to the back    IMPRESSION  #Acute on Chronic Pancreatitis  - CT Abdomen and Pelvis w/ IV Cont (11.13.21 @ 17:45): IMPRESSION: Pancreatic pseudocyst unchanged. Chronic pancreatitis unchanged.    #Fevers/MSSA Bacteremia - potentially secondary to infected PIV with superficial venous thromboses  - Blood Cx 11/13+  - [facility] Duplex Upper Ext Vein Scan, Bilat (11.14.21 @ 16:59): No Evidence of deep venous thrombosis noted in the bilateral upper extremity Right cephalic superficial venous thromboses appears to be acute  -  CT Forearm w/ IV Cont, Right (11.15.21 @ 22:39): Subcutaneous fat stranding and edema throughout the dorsal soft tissues of the forearm. No focal drainable fluid collection. Venous thrombosis visualized within the cephalic vein    #ETOH Abuse   #Abx allergy: Compazine (Unknown)    RECOMMENDATIONS  - cefazolin 2g q 8 hours   - will add clindamycin 900 mg TID   - fever curve improving - no abscess noted, but very indurated and given very high fevers - recommend burn evaluation   - check TTE  - follow-up surveillance cultures   - supportive care for pancreatitis     Please call or message on [employer] Teams if with any questions.  Spectra [phone]

## 2021-11-16 NOTE — PROGRESS NOTE ADULT - SUBJECTIVE AND OBJECTIVE BOX
LAURA BARAJAS  46y  Female      Patient is a 46y old  Female who presents with a chief complaint of Acute on chronic pancreatitis;      INTERVAL HPI/OVERNIGHT EVENTS:      ******************************* REVIEW OF SYSTEMS:*********************************************    All other review of systems negative    *********************** VITALS ******************************************    T(F): 97 (11-16-21 @ 12:05)  HR: 80 (11-16-21 @ 12:05) (80 - 107)  BP: 138/90 (11-16-21 @ 12:05) (122/84 - 138/90)  RR: 18 (11-16-21 @ 12:05) (18 - 18)  SpO2: 98% (11-15-21 @ 20:05) (98% - 98%)            ******************************** PHYSICAL EXAM:**************************************************  GENERAL: NAD    PSYCH: no agitation, baseline mentation  HEENT:     NERVOUS SYSTEM:  Alert & Oriented X3,     PULMONARY: JOSE, CTA    CARDIOVASCULAR: S1S2 RRR    GI: Soft, epigastric tenderness  ND; BS present.    EXTREMITIES:  2+ Peripheral Pulses, No clubbing, Right forearm induration with tenderness     LYMPH: No lymphadenopathy noted    SKIN: No rashes or lesions      **************************** LABS *******************************************************                          10.2   4.46  )-----------( 237      ( 16 Nov 2021 06:34 )             30.3     11-16    138  |  99  |  4<L>  ----------------------------<  89  3.4<L>   |  22  |  0.6<L>    Ca    9.4      16 Nov 2021 06:34  Mg     1.5     11-16    TPro  6.6  /  Alb  3.7  /  TBili  0.3  /  DBili  x   /  AST  92<H>  /  ALT  34  /  AlkPhos  104  11-16          Lactate Trend        CAPILLARY BLOOD GLUCOSE              **************************Active Medications *******************************************  Compazine (Unknown)      acetaminophen     Tablet .. 650 milliGRAM(s) Oral every 12 hours PRN  ceFAZolin   IVPB 2000 milliGRAM(s) IV Intermittent every 8 hours  ceFAZolin   IVPB      enoxaparin Injectable 40 milliGRAM(s) SubCutaneous at bedtime  folic acid 1 milliGRAM(s) Oral daily  gabapentin 300 milliGRAM(s) Oral three times a day  HYDROmorphone  Injectable 1 milliGRAM(s) IV Push every 4 hours PRN  ibuprofen  Tablet. 600 milliGRAM(s) Oral every 6 hours PRN  lactated ringers. 1000 milliLiter(s) IV Continuous <Continuous>  magnesium oxide 400 milliGRAM(s) Oral three times a day with meals  ondansetron    Tablet 4 milliGRAM(s) Oral every 6 hours PRN  pancrelipase  (CREON 12,000 Lipase Units) 3 Capsule(s) Oral three times a day with meals  pantoprazole  Injectable 40 milliGRAM(s) IV Push two times a day  polyethylene glycol 3350 17 Gram(s) Oral daily  potassium chloride   Powder 40 milliEquivalent(s) Oral every 2 hours  senna 2 Tablet(s) Oral at bedtime  thiamine 100 milliGRAM(s) Oral daily      ***************************************************  RADIOLOGY & ADDITIONAL TESTS:    Imaging Personally Reviewed:  [ ] YES  [ ] NO    HEALTH ISSUES - PROBLEM Dx:
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
## 2021-11-16 NOTE — PROGRESS NOTE ADULT - SUBJECTIVE AND OBJECTIVE BOX
LAURA BARAJAS  46y, Female  Allergy: Compazine (Unknown)      LOS  10d    CHIEF COMPLAINT: Acute on chronic pancreatitis; (16 Nov 2021 16:02)      INTERVAL EVENTS/HPI  - No acute events overnight  - T(F): , Max: 100.2 (11-15-21 @ 20:40)  - WBC Count: 4.46 (11-16-21 @ 06:34)  WBC Count: 5.16 (11-15-21 @ 08:33)     - Creatinine, Serum: 0.6 (11-16-21 @ 06:34)  Creatinine, Serum: 0.5 (11-15-21 @ 08:33)       ROS  General: Denies rigors, nightsweats  HEENT: Denies headache, rhinorrhea, sore throat, eye pain  CV: Denies CP, palpitations  PULM: Denies wheezing, hemoptysis  GI: Denies hematemesis, hematochezia, melena  : Denies discharge, hematuria  MSK: Denies arthralgias, myalgias  SKIN: Denies rash, lesions  NEURO: Denies paresthesias, weakness  PSYCH: Denies depression, anxiety    VITALS:  T(F): 97, Max: 100.2 (11-15-21 @ 20:40)  HR: 80  BP: 138/90  RR: 18Vital Signs Last 24 Hrs  T(C): 36.1 (16 Nov 2021 12:05), Max: 37.9 (15 Nov 2021 20:40)  T(F): 97 (16 Nov 2021 12:05), Max: 100.2 (15 Nov 2021 20:40)  HR: 80 (16 Nov 2021 12:05) (80 - 107)  BP: 138/90 (16 Nov 2021 12:05) (122/84 - 138/90)  BP(mean): --  RR: 18 (16 Nov 2021 12:05) (18 - 18)  SpO2: 98% (15 Nov 2021 20:05) (98% - 98%)    PHYSICAL EXAM:  Gen: NAD, resting in bed  HEENT: Normocephalic, atraumatic  Neck: supple, no lymphadenopathy  CV: Regular rate & regular rhythm  Lungs: decreased BS at bases, no fremitus  Abdomen: Soft, BS present  Ext: Warm, well perfused  Neuro: non focal, awake  Skin: indurated/firm   Lines: no phlebitis    FH: Non-contributory  Social Hx: Non-contributory    TESTS & MEASUREMENTS:                        10.2   4.46  )-----------( 237      ( 16 Nov 2021 06:34 )             30.3     11-16    138  |  99  |  4<L>  ----------------------------<  89  3.4<L>   |  22  |  0.6<L>    Ca    9.4      16 Nov 2021 06:34  Mg     1.5     11-16    TPro  6.6  /  Alb  3.7  /  TBili  0.3  /  DBili  x   /  AST  92<H>  /  ALT  34  /  AlkPhos  104  11-16    eGFR if Non African American: 111 mL/min/1.73M2 (11-16-21 @ 06:34)  eGFR if African American: 128 mL/min/1.73M2 (11-16-21 @ 06:34)    LIVER FUNCTIONS - ( 16 Nov 2021 06:34 )  Alb: 3.7 g/dL / Pro: 6.6 g/dL / ALK PHOS: 104 U/L / ALT: 34 U/L / AST: 92 U/L / GGT: x               Culture - Blood (collected 11-13-21 @ 13:45)  Source: .Blood Blood  Gram Stain (11-14-21 @ 07:51):    Growth in aerobic and anaerobic bottles: Gram Positive Cocci in Clusters  Final Report (11-16-21 @ 10:38):    Growth in aerobic and anaerobic bottles: Staphylococcus aureus    ***Blood Panel PCR results on this specimen are available    approximately 3 hours after the Gram stain result.***    Gram stain, PCR, and/or culture results may not always    correspond dueto difference in methodologies.    ************************************************************    This PCR assay was performed by multiplex PCR. This    Assay tests for 66 bacterial and resistance gene targets.    Please refer to the [facility] Labs test directory    at [email or web address] for details.  Organism: Blood Culture PCR  Staphylococcus aureus (11-16-21 @ 10:38)  Organism: Staphylococcus aureus (11-16-21 @ 10:38)      -  Ampicillin/Sulbactam: S <=8/4      -  Cefazolin: S <=4      -  Clindamycin: S <=0.25      -  Erythromycin: S <=0.25      -  Gentamicin: S <=1 Should not be used as monotherapy      -  Oxacillin: S <=0.25      -  RIF- Rifampin: S <=1 Should not be used as monotherapy      -  Tetra/Doxy: S <=1      -  Trimethoprim/Sulfamethoxazole: S <=0.5/9.5      -  Vancomycin: S 2      Method Type: VASILIY  Organism: Blood Culture PCR (11-16-21 @ 10:38)      -  Staphylococcus aureus: Detec Any isolate of Staphylococcus aureus from a blood culture is NOT considered a contaminant.      Method Type: PCR            INFECTIOUS DISEASES TESTING  Procalcitonin, Serum: 0.35 (11-14-21 @ 04:30)  COVID-19 PCR: NotDetec (11-13-21 @ 13:57)  Procalcitonin, Serum: 0.04 (11-11-21 @ 04:30)  COVID-19 PCR: NotDetec (11-06-21 @ 23:15)  COVID-19 PCR: NotDetec (09-22-21 @ 06:35)  COVID-19 PCR: NotDetec (08-08-21 @ 17:45)  COVID-19 PCR: NotDetec (08-06-21 @ 01:03)  COVID-19 PCR: NotDetec (07-15-21 @ 20:15)  COVID-19 PCR: NotDetec (01-21-21 @ 21:37)      INFLAMMATORY MARKERS  Sedimentation Rate, Erythrocyte: 44 mm/Hr (11-14-21 @ 04:30)  C-Reactive Protein, Serum: 110 mg/L (11-14-21 @ 04:30)  C-Reactive Protein, Serum: 6 mg/L (11-11-21 @ 04:30)  Sedimentation Rate, Erythrocyte: 56 mm/Hr (11-11-21 @ 04:30)      RADIOLOGY & ADDITIONAL TESTS:  I have personally reviewed the last available Chest xray  CXR      CT      CARDIOLOGY TESTING      MEDICATIONS  ceFAZolin   IVPB 2000 IV Intermittent every 8 hours  ceFAZolin   IVPB     enoxaparin Injectable 40 SubCutaneous at bedtime  folic acid 1 Oral daily  gabapentin 300 Oral three times a day  lactated ringers. 1000 IV Continuous <Continuous>  magnesium oxide 400 Oral three times a day with meals  pancrelipase  (CREON 12,000 Lipase Units) 3 Oral three times a day with meals  pantoprazole  Injectable 40 IV Push two times a day  polyethylene glycol 3350 17 Oral daily  senna 2 Oral at bedtime  thiamine 100 Oral daily      WEIGHT  Weight (kg): 58.967 (11-07-21 @ 01:02)  Creatinine, Serum: 0.6 mg/dL (11-16-21 @ 06:34)      ANTIBIOTICS:  ceFAZolin   IVPB 2000 milliGRAM(s) IV Intermittent every 8 hours  ceFAZolin   IVPB          All available historical records have been reviewed
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
## 2021-11-17 LAB
ALBUMIN SERPL ELPH-MCNC: 3.5 G/DL — SIGNIFICANT CHANGE UP (ref 3.5–5.2)
ALP SERPL-CCNC: 147 U/L — HIGH (ref 30–115)
ALT FLD-CCNC: 37 U/L — SIGNIFICANT CHANGE UP (ref 0–41)
ANION GAP SERPL CALC-SCNC: 18 MMOL/L — HIGH (ref 7–14)
AST SERPL-CCNC: 114 U/L — HIGH (ref 0–41)
BASOPHILS # BLD AUTO: 0.05 K/UL — SIGNIFICANT CHANGE UP (ref 0–0.2)
BASOPHILS NFR BLD AUTO: 1.3 % — HIGH (ref 0–1)
BILIRUB SERPL-MCNC: 0.3 MG/DL — SIGNIFICANT CHANGE UP (ref 0.2–1.2)
BUN SERPL-MCNC: 4 MG/DL — LOW (ref 10–20)
CALCIUM SERPL-MCNC: 9 MG/DL — SIGNIFICANT CHANGE UP (ref 8.5–10.1)
CHLORIDE SERPL-SCNC: 100 MMOL/L — SIGNIFICANT CHANGE UP (ref 98–110)
CO2 SERPL-SCNC: 21 MMOL/L — SIGNIFICANT CHANGE UP (ref 17–32)
CREAT SERPL-MCNC: 0.5 MG/DL — LOW (ref 0.7–1.5)
EOSINOPHIL # BLD AUTO: 0.09 K/UL — SIGNIFICANT CHANGE UP (ref 0–0.7)
EOSINOPHIL NFR BLD AUTO: 2.3 % — SIGNIFICANT CHANGE UP (ref 0–8)
GLUCOSE SERPL-MCNC: 92 MG/DL — SIGNIFICANT CHANGE UP (ref 70–99)
HCT VFR BLD CALC: 29.4 % — LOW (ref 37–47)
HGB BLD-MCNC: 9.9 G/DL — LOW (ref 12–16)
IMM GRANULOCYTES NFR BLD AUTO: 0.3 % — SIGNIFICANT CHANGE UP (ref 0.1–0.3)
LYMPHOCYTES # BLD AUTO: 1.05 K/UL — LOW (ref 1.2–3.4)
LYMPHOCYTES # BLD AUTO: 27.3 % — SIGNIFICANT CHANGE UP (ref 20.5–51.1)
MAGNESIUM SERPL-MCNC: 1.5 MG/DL — LOW (ref 1.8–2.4)
MCHC RBC-ENTMCNC: 31.1 PG — HIGH (ref 27–31)
MCHC RBC-ENTMCNC: 33.7 G/DL — SIGNIFICANT CHANGE UP (ref 32–37)
MCV RBC AUTO: 92.5 FL — SIGNIFICANT CHANGE UP (ref 81–99)
MONOCYTES # BLD AUTO: 0.49 K/UL — SIGNIFICANT CHANGE UP (ref 0.1–0.6)
MONOCYTES NFR BLD AUTO: 12.7 % — HIGH (ref 1.7–9.3)
NEUTROPHILS # BLD AUTO: 2.16 K/UL — SIGNIFICANT CHANGE UP (ref 1.4–6.5)
NEUTROPHILS NFR BLD AUTO: 56.1 % — SIGNIFICANT CHANGE UP (ref 42.2–75.2)
NRBC # BLD: 0 /100 WBCS — SIGNIFICANT CHANGE UP (ref 0–0)
PLATELET # BLD AUTO: 267 K/UL — SIGNIFICANT CHANGE UP (ref 130–400)
POTASSIUM SERPL-MCNC: 3.7 MMOL/L — SIGNIFICANT CHANGE UP (ref 3.5–5)
POTASSIUM SERPL-SCNC: 3.7 MMOL/L — SIGNIFICANT CHANGE UP (ref 3.5–5)
PROT SERPL-MCNC: 6.3 G/DL — SIGNIFICANT CHANGE UP (ref 6–8)
RBC # BLD: 3.18 M/UL — LOW (ref 4.2–5.4)
RBC # FLD: 12.6 % — SIGNIFICANT CHANGE UP (ref 11.5–14.5)
SODIUM SERPL-SCNC: 139 MMOL/L — SIGNIFICANT CHANGE UP (ref 135–146)
WBC # BLD: 3.85 K/UL — LOW (ref 4.8–10.8)
WBC # FLD AUTO: 3.85 K/UL — LOW (ref 4.8–10.8)

## 2021-11-17 PROCEDURE — 11043 DBRDMT MUSC&/FSCA 1ST 20/<: CPT

## 2021-11-17 PROCEDURE — 99232 SBSQ HOSP IP/OBS MODERATE 35: CPT | Mod: 25

## 2021-11-17 PROCEDURE — 99232 SBSQ HOSP IP/OBS MODERATE 35: CPT

## 2021-11-17 RX ORDER — MAGNESIUM SULFATE 500 MG/ML
2 VIAL (ML) INJECTION
Refills: 0 | Status: COMPLETED | OUTPATIENT
Start: 2021-11-17 | End: 2021-11-17

## 2021-11-17 RX ORDER — HYDROMORPHONE HYDROCHLORIDE 2 MG/ML
1 INJECTION INTRAMUSCULAR; INTRAVENOUS; SUBCUTANEOUS ONCE
Refills: 0 | Status: DISCONTINUED | OUTPATIENT
Start: 2021-11-17 | End: 2021-11-17

## 2021-11-17 RX ORDER — LACTOBACILLUS ACIDOPHILUS 100MM CELL
1 CAPSULE ORAL
Refills: 0 | Status: DISCONTINUED | OUTPATIENT
Start: 2021-11-17 | End: 2021-11-27

## 2021-11-17 RX ADMIN — PANTOPRAZOLE SODIUM 40 MILLIGRAM(S): 20 TABLET, DELAYED RELEASE ORAL at 17:13

## 2021-11-17 RX ADMIN — SODIUM CHLORIDE 150 MILLILITER(S): 9 INJECTION, SOLUTION INTRAVENOUS at 11:03

## 2021-11-17 RX ADMIN — Medication 50 GRAM(S): at 11:03

## 2021-11-17 RX ADMIN — SENNA PLUS 2 TABLET(S): 8.6 TABLET ORAL at 21:24

## 2021-11-17 RX ADMIN — GABAPENTIN 300 MILLIGRAM(S): 400 CAPSULE ORAL at 05:23

## 2021-11-17 RX ADMIN — Medication 100 MILLIGRAM(S): at 22:26

## 2021-11-17 RX ADMIN — HYDROMORPHONE HYDROCHLORIDE 1 MILLIGRAM(S): 2 INJECTION INTRAMUSCULAR; INTRAVENOUS; SUBCUTANEOUS at 17:19

## 2021-11-17 RX ADMIN — Medication 100 MILLIGRAM(S): at 05:23

## 2021-11-17 RX ADMIN — HYDROMORPHONE HYDROCHLORIDE 1 MILLIGRAM(S): 2 INJECTION INTRAMUSCULAR; INTRAVENOUS; SUBCUTANEOUS at 21:54

## 2021-11-17 RX ADMIN — HYDROMORPHONE HYDROCHLORIDE 1 MILLIGRAM(S): 2 INJECTION INTRAMUSCULAR; INTRAVENOUS; SUBCUTANEOUS at 03:39

## 2021-11-17 RX ADMIN — HYDROMORPHONE HYDROCHLORIDE 1 MILLIGRAM(S): 2 INJECTION INTRAMUSCULAR; INTRAVENOUS; SUBCUTANEOUS at 14:10

## 2021-11-17 RX ADMIN — HYDROMORPHONE HYDROCHLORIDE 1 MILLIGRAM(S): 2 INJECTION INTRAMUSCULAR; INTRAVENOUS; SUBCUTANEOUS at 12:05

## 2021-11-17 RX ADMIN — GABAPENTIN 300 MILLIGRAM(S): 400 CAPSULE ORAL at 21:24

## 2021-11-17 RX ADMIN — Medication 600 MILLIGRAM(S): at 14:05

## 2021-11-17 RX ADMIN — Medication 100 MILLIGRAM(S): at 21:25

## 2021-11-17 RX ADMIN — Medication 100 MILLIGRAM(S): at 13:00

## 2021-11-17 RX ADMIN — GABAPENTIN 300 MILLIGRAM(S): 400 CAPSULE ORAL at 13:00

## 2021-11-17 RX ADMIN — Medication 100 MILLIGRAM(S): at 11:05

## 2021-11-17 RX ADMIN — PANTOPRAZOLE SODIUM 40 MILLIGRAM(S): 20 TABLET, DELAYED RELEASE ORAL at 05:23

## 2021-11-17 RX ADMIN — Medication 1 TABLET(S): at 21:24

## 2021-11-17 RX ADMIN — Medication 100 MILLIGRAM(S): at 05:24

## 2021-11-17 RX ADMIN — HYDROMORPHONE HYDROCHLORIDE 1 MILLIGRAM(S): 2 INJECTION INTRAMUSCULAR; INTRAVENOUS; SUBCUTANEOUS at 21:24

## 2021-11-17 RX ADMIN — ENOXAPARIN SODIUM 40 MILLIGRAM(S): 100 INJECTION SUBCUTANEOUS at 21:24

## 2021-11-17 RX ADMIN — Medication 50 GRAM(S): at 09:04

## 2021-11-17 RX ADMIN — HYDROMORPHONE HYDROCHLORIDE 1 MILLIGRAM(S): 2 INJECTION INTRAMUSCULAR; INTRAVENOUS; SUBCUTANEOUS at 17:45

## 2021-11-17 RX ADMIN — Medication 600 MILLIGRAM(S): at 14:20

## 2021-11-17 RX ADMIN — Medication 3 CAPSULE(S): at 17:13

## 2021-11-17 RX ADMIN — HYDROMORPHONE HYDROCHLORIDE 1 MILLIGRAM(S): 2 INJECTION INTRAMUSCULAR; INTRAVENOUS; SUBCUTANEOUS at 11:47

## 2021-11-17 RX ADMIN — Medication 1 MILLIGRAM(S): at 11:06

## 2021-11-17 RX ADMIN — Medication 50 GRAM(S): at 12:00

## 2021-11-17 RX ADMIN — POLYETHYLENE GLYCOL 3350 17 GRAM(S): 17 POWDER, FOR SOLUTION ORAL at 11:03

## 2021-11-17 RX ADMIN — Medication 3 CAPSULE(S): at 11:07

## 2021-11-17 RX ADMIN — HYDROMORPHONE HYDROCHLORIDE 1 MILLIGRAM(S): 2 INJECTION INTRAMUSCULAR; INTRAVENOUS; SUBCUTANEOUS at 01:24

## 2021-11-17 RX ADMIN — HYDROMORPHONE HYDROCHLORIDE 1 MILLIGRAM(S): 2 INJECTION INTRAMUSCULAR; INTRAVENOUS; SUBCUTANEOUS at 04:09

## 2021-11-17 RX ADMIN — HYDROMORPHONE HYDROCHLORIDE 1 MILLIGRAM(S): 2 INJECTION INTRAMUSCULAR; INTRAVENOUS; SUBCUTANEOUS at 07:44

## 2021-11-17 RX ADMIN — ONDANSETRON 4 MILLIGRAM(S): 8 TABLET, FILM COATED ORAL at 11:04

## 2021-11-17 RX ADMIN — HYDROMORPHONE HYDROCHLORIDE 1 MILLIGRAM(S): 2 INJECTION INTRAMUSCULAR; INTRAVENOUS; SUBCUTANEOUS at 08:00

## 2021-11-17 RX ADMIN — HYDROMORPHONE HYDROCHLORIDE 1 MILLIGRAM(S): 2 INJECTION INTRAMUSCULAR; INTRAVENOUS; SUBCUTANEOUS at 14:20

## 2021-11-17 RX ADMIN — MAGNESIUM OXIDE 400 MG ORAL TABLET 400 MILLIGRAM(S): 241.3 TABLET ORAL at 07:44

## 2021-11-17 RX ADMIN — Medication 3 CAPSULE(S): at 07:44
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
## 2021-11-17 NOTE — CONSULT NOTE ADULT - ATTENDING COMMENTS
As above   History reviewed with pt    CT Forearm w/ IV Cont, Right (11.15.21 @ 22:39) >    1.  Superficial venous thrombosis with adjacent subcutaneous edema and fat stranding. Findings consistent with superficial thrombophlebitis.  2.  No focal drainable fluid collection.      EXAm _ as above - localized swelling with erythema and tenderness; no drainage ; small dry eschar at IV site No discrete fluctuance     discussed continued monitoring vs incision and exploration / drainage  - under local anesthesia at bedside -with pt  Rec- latter as site has been swollen and painful for ~ 5 days   She is agreeable to procedure
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
## 2021-11-17 NOTE — CONSULT NOTE ADULT - ASSESSMENT
assessment:  thrombophlebitis         Ass/ Rec:    IV abx as indicated  plan for bedside debridement today   Ice, Elevation and compression   Will follow.

## 2021-11-18 LAB
ALBUMIN SERPL ELPH-MCNC: 3.6 G/DL — SIGNIFICANT CHANGE UP (ref 3.5–5.2)
ALP SERPL-CCNC: 186 U/L — HIGH (ref 30–115)
ALT FLD-CCNC: 39 U/L — SIGNIFICANT CHANGE UP (ref 0–41)
ANION GAP SERPL CALC-SCNC: 18 MMOL/L — HIGH (ref 7–14)
AST SERPL-CCNC: 130 U/L — HIGH (ref 0–41)
BASOPHILS # BLD AUTO: 0.05 K/UL — SIGNIFICANT CHANGE UP (ref 0–0.2)
BASOPHILS NFR BLD AUTO: 0.9 % — SIGNIFICANT CHANGE UP (ref 0–1)
BILIRUB SERPL-MCNC: 0.2 MG/DL — SIGNIFICANT CHANGE UP (ref 0.2–1.2)
BUN SERPL-MCNC: 3 MG/DL — LOW (ref 10–20)
CALCIUM SERPL-MCNC: 8.8 MG/DL — SIGNIFICANT CHANGE UP (ref 8.5–10.1)
CHLORIDE SERPL-SCNC: 99 MMOL/L — SIGNIFICANT CHANGE UP (ref 98–110)
CO2 SERPL-SCNC: 21 MMOL/L — SIGNIFICANT CHANGE UP (ref 17–32)
CREAT SERPL-MCNC: 0.6 MG/DL — LOW (ref 0.7–1.5)
EOSINOPHIL # BLD AUTO: 0.12 K/UL — SIGNIFICANT CHANGE UP (ref 0–0.7)
EOSINOPHIL NFR BLD AUTO: 2.3 % — SIGNIFICANT CHANGE UP (ref 0–8)
GLUCOSE SERPL-MCNC: 86 MG/DL — SIGNIFICANT CHANGE UP (ref 70–99)
HCT VFR BLD CALC: 28 % — LOW (ref 37–47)
HGB BLD-MCNC: 9.5 G/DL — LOW (ref 12–16)
IMM GRANULOCYTES NFR BLD AUTO: 0.9 % — HIGH (ref 0.1–0.3)
LYMPHOCYTES # BLD AUTO: 1.13 K/UL — LOW (ref 1.2–3.4)
LYMPHOCYTES # BLD AUTO: 21.3 % — SIGNIFICANT CHANGE UP (ref 20.5–51.1)
MAGNESIUM SERPL-MCNC: 1.7 MG/DL — LOW (ref 1.8–2.4)
MCHC RBC-ENTMCNC: 31.8 PG — HIGH (ref 27–31)
MCHC RBC-ENTMCNC: 33.9 G/DL — SIGNIFICANT CHANGE UP (ref 32–37)
MCV RBC AUTO: 93.6 FL — SIGNIFICANT CHANGE UP (ref 81–99)
MONOCYTES # BLD AUTO: 0.59 K/UL — SIGNIFICANT CHANGE UP (ref 0.1–0.6)
MONOCYTES NFR BLD AUTO: 11.1 % — HIGH (ref 1.7–9.3)
NEUTROPHILS # BLD AUTO: 3.37 K/UL — SIGNIFICANT CHANGE UP (ref 1.4–6.5)
NEUTROPHILS NFR BLD AUTO: 63.5 % — SIGNIFICANT CHANGE UP (ref 42.2–75.2)
NRBC # BLD: 0 /100 WBCS — SIGNIFICANT CHANGE UP (ref 0–0)
PLATELET # BLD AUTO: 300 K/UL — SIGNIFICANT CHANGE UP (ref 130–400)
POTASSIUM SERPL-MCNC: 3.8 MMOL/L — SIGNIFICANT CHANGE UP (ref 3.5–5)
POTASSIUM SERPL-SCNC: 3.8 MMOL/L — SIGNIFICANT CHANGE UP (ref 3.5–5)
PROT SERPL-MCNC: 6.3 G/DL — SIGNIFICANT CHANGE UP (ref 6–8)
RBC # BLD: 2.99 M/UL — LOW (ref 4.2–5.4)
RBC # FLD: 12.8 % — SIGNIFICANT CHANGE UP (ref 11.5–14.5)
SODIUM SERPL-SCNC: 138 MMOL/L — SIGNIFICANT CHANGE UP (ref 135–146)
WBC # BLD: 5.31 K/UL — SIGNIFICANT CHANGE UP (ref 4.8–10.8)
WBC # FLD AUTO: 5.31 K/UL — SIGNIFICANT CHANGE UP (ref 4.8–10.8)

## 2021-11-18 PROCEDURE — 99233 SBSQ HOSP IP/OBS HIGH 50: CPT

## 2021-11-18 PROCEDURE — 99231 SBSQ HOSP IP/OBS SF/LOW 25: CPT

## 2021-11-18 RX ORDER — CHLORHEXIDINE GLUCONATE 213 G/1000ML
1 SOLUTION TOPICAL
Refills: 0 | Status: DISCONTINUED | OUTPATIENT
Start: 2021-11-18 | End: 2021-11-27

## 2021-11-18 RX ORDER — MORPHINE SULFATE 50 MG/1
1 CAPSULE, EXTENDED RELEASE ORAL ONCE
Refills: 0 | Status: DISCONTINUED | OUTPATIENT
Start: 2021-11-18 | End: 2021-11-18

## 2021-11-18 RX ORDER — HYDROMORPHONE HYDROCHLORIDE 2 MG/ML
1 INJECTION INTRAMUSCULAR; INTRAVENOUS; SUBCUTANEOUS ONCE
Refills: 0 | Status: DISCONTINUED | OUTPATIENT
Start: 2021-11-18 | End: 2021-11-18

## 2021-11-18 RX ADMIN — Medication 100 MILLIGRAM(S): at 05:24

## 2021-11-18 RX ADMIN — Medication 100 MILLIGRAM(S): at 21:10

## 2021-11-18 RX ADMIN — Medication 1 TABLET(S): at 21:10

## 2021-11-18 RX ADMIN — HYDROMORPHONE HYDROCHLORIDE 1 MILLIGRAM(S): 2 INJECTION INTRAMUSCULAR; INTRAVENOUS; SUBCUTANEOUS at 06:18

## 2021-11-18 RX ADMIN — HYDROMORPHONE HYDROCHLORIDE 1 MILLIGRAM(S): 2 INJECTION INTRAMUSCULAR; INTRAVENOUS; SUBCUTANEOUS at 20:12

## 2021-11-18 RX ADMIN — SODIUM CHLORIDE 150 MILLILITER(S): 9 INJECTION, SOLUTION INTRAVENOUS at 12:06

## 2021-11-18 RX ADMIN — HYDROMORPHONE HYDROCHLORIDE 1 MILLIGRAM(S): 2 INJECTION INTRAMUSCULAR; INTRAVENOUS; SUBCUTANEOUS at 20:30

## 2021-11-18 RX ADMIN — Medication 100 MILLIGRAM(S): at 14:09

## 2021-11-18 RX ADMIN — HYDROMORPHONE HYDROCHLORIDE 1 MILLIGRAM(S): 2 INJECTION INTRAMUSCULAR; INTRAVENOUS; SUBCUTANEOUS at 16:21

## 2021-11-18 RX ADMIN — SODIUM CHLORIDE 150 MILLILITER(S): 9 INJECTION, SOLUTION INTRAVENOUS at 20:18

## 2021-11-18 RX ADMIN — HYDROMORPHONE HYDROCHLORIDE 1 MILLIGRAM(S): 2 INJECTION INTRAMUSCULAR; INTRAVENOUS; SUBCUTANEOUS at 07:55

## 2021-11-18 RX ADMIN — Medication 3 CAPSULE(S): at 11:41

## 2021-11-18 RX ADMIN — PANTOPRAZOLE SODIUM 40 MILLIGRAM(S): 20 TABLET, DELAYED RELEASE ORAL at 05:27

## 2021-11-18 RX ADMIN — GABAPENTIN 300 MILLIGRAM(S): 400 CAPSULE ORAL at 05:25

## 2021-11-18 RX ADMIN — HYDROMORPHONE HYDROCHLORIDE 1 MILLIGRAM(S): 2 INJECTION INTRAMUSCULAR; INTRAVENOUS; SUBCUTANEOUS at 07:40

## 2021-11-18 RX ADMIN — SENNA PLUS 2 TABLET(S): 8.6 TABLET ORAL at 21:11

## 2021-11-18 RX ADMIN — HYDROMORPHONE HYDROCHLORIDE 1 MILLIGRAM(S): 2 INJECTION INTRAMUSCULAR; INTRAVENOUS; SUBCUTANEOUS at 03:14

## 2021-11-18 RX ADMIN — PANTOPRAZOLE SODIUM 40 MILLIGRAM(S): 20 TABLET, DELAYED RELEASE ORAL at 17:24

## 2021-11-18 RX ADMIN — Medication 3 CAPSULE(S): at 07:44

## 2021-11-18 RX ADMIN — Medication 100 MILLIGRAM(S): at 11:40

## 2021-11-18 RX ADMIN — HYDROMORPHONE HYDROCHLORIDE 1 MILLIGRAM(S): 2 INJECTION INTRAMUSCULAR; INTRAVENOUS; SUBCUTANEOUS at 06:03

## 2021-11-18 RX ADMIN — POLYETHYLENE GLYCOL 3350 17 GRAM(S): 17 POWDER, FOR SOLUTION ORAL at 11:43

## 2021-11-18 RX ADMIN — Medication 1 TABLET(S): at 09:46

## 2021-11-18 RX ADMIN — HYDROMORPHONE HYDROCHLORIDE 1 MILLIGRAM(S): 2 INJECTION INTRAMUSCULAR; INTRAVENOUS; SUBCUTANEOUS at 16:06

## 2021-11-18 RX ADMIN — SODIUM CHLORIDE 150 MILLILITER(S): 9 INJECTION, SOLUTION INTRAVENOUS at 05:39

## 2021-11-18 RX ADMIN — ENOXAPARIN SODIUM 40 MILLIGRAM(S): 100 INJECTION SUBCUTANEOUS at 21:11

## 2021-11-18 RX ADMIN — GABAPENTIN 300 MILLIGRAM(S): 400 CAPSULE ORAL at 13:31

## 2021-11-18 RX ADMIN — HYDROMORPHONE HYDROCHLORIDE 1 MILLIGRAM(S): 2 INJECTION INTRAMUSCULAR; INTRAVENOUS; SUBCUTANEOUS at 03:30

## 2021-11-18 RX ADMIN — Medication 3 CAPSULE(S): at 17:23

## 2021-11-18 RX ADMIN — Medication 100 MILLIGRAM(S): at 13:30

## 2021-11-18 RX ADMIN — HYDROMORPHONE HYDROCHLORIDE 1 MILLIGRAM(S): 2 INJECTION INTRAMUSCULAR; INTRAVENOUS; SUBCUTANEOUS at 12:19

## 2021-11-18 RX ADMIN — Medication 1 MILLIGRAM(S): at 11:40

## 2021-11-18 RX ADMIN — GABAPENTIN 300 MILLIGRAM(S): 400 CAPSULE ORAL at 21:10

## 2021-11-18 RX ADMIN — HYDROMORPHONE HYDROCHLORIDE 1 MILLIGRAM(S): 2 INJECTION INTRAMUSCULAR; INTRAVENOUS; SUBCUTANEOUS at 12:04

## 2021-11-18 NOTE — PROGRESS NOTE ADULT - SUBJECTIVE AND OBJECTIVE BOX
Patient is a 46y old  Female who presented with acute on chronic pancreatitis. Burn team consulted for right forearm abscess. Pt seen at bedside, mild pain during dressing change. No acute events overnight, no fever.    PPD#1 s/p excision and drainage right forearm abscess     Allergies    Compazine (Unknown)      INTERVAL HPI/OVERNIGHT EVENTS:  ICU Vital Signs Last 24 Hrs  T(C): 37.6 (18 Nov 2021 05:02), Max: 37.6 (18 Nov 2021 05:02)  T(F): 99.7 (18 Nov 2021 05:02), Max: 99.7 (18 Nov 2021 05:02)  HR: 92 (18 Nov 2021 05:02) (67 - 92)  BP: 138/86 (18 Nov 2021 05:02) (111/61 - 138/86)  RR: 18 (18 Nov 2021 05:02) (18 - 18)  SpO2: 95% (18 Nov 2021 07:35) (95% - 95%)      LABS:                        9.9    3.85  )-----------( 267      ( 17 Nov 2021 05:30 )             29.4             PHYSICAL EXAM:  GENERAL: well built, well nourished,  lying comfortably in bed in NAD  Skin: right forearm incision about 2cmx.05cm draining serosanguinous discharge, mild edema and erythema present, no pus, no malodor  Dressing changed by the burn team: xeroform packing

## 2021-11-18 NOTE — PROGRESS NOTE ADULT - ASSESSMENT
Pt is a 45 yo F with right forearm abscess  -continue dressing changes with xeroform/gauze/stretch bandage twice a day, may shower with dressing off  -follow up OR culture from 11/17  -antibiotics as indicated  -follow up with burn clinic upon discharge
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
## 2021-11-19 LAB
ALBUMIN SERPL ELPH-MCNC: 3.7 G/DL — SIGNIFICANT CHANGE UP (ref 3.5–5.2)
ALP SERPL-CCNC: 195 U/L — HIGH (ref 30–115)
ALT FLD-CCNC: 37 U/L — SIGNIFICANT CHANGE UP (ref 0–41)
ANION GAP SERPL CALC-SCNC: 17 MMOL/L — HIGH (ref 7–14)
AST SERPL-CCNC: 130 U/L — HIGH (ref 0–41)
BASOPHILS # BLD AUTO: 0.08 K/UL — SIGNIFICANT CHANGE UP (ref 0–0.2)
BASOPHILS NFR BLD AUTO: 1.7 % — HIGH (ref 0–1)
BILIRUB SERPL-MCNC: 0.2 MG/DL — SIGNIFICANT CHANGE UP (ref 0.2–1.2)
BUN SERPL-MCNC: 4 MG/DL — LOW (ref 10–20)
CALCIUM SERPL-MCNC: 9.3 MG/DL — SIGNIFICANT CHANGE UP (ref 8.5–10.1)
CHLORIDE SERPL-SCNC: 100 MMOL/L — SIGNIFICANT CHANGE UP (ref 98–110)
CO2 SERPL-SCNC: 22 MMOL/L — SIGNIFICANT CHANGE UP (ref 17–32)
CREAT SERPL-MCNC: 0.5 MG/DL — LOW (ref 0.7–1.5)
CULTURE RESULTS: NO GROWTH — SIGNIFICANT CHANGE UP
EOSINOPHIL # BLD AUTO: 0.13 K/UL — SIGNIFICANT CHANGE UP (ref 0–0.7)
EOSINOPHIL NFR BLD AUTO: 2.7 % — SIGNIFICANT CHANGE UP (ref 0–8)
GLUCOSE SERPL-MCNC: 95 MG/DL — SIGNIFICANT CHANGE UP (ref 70–99)
HCT VFR BLD CALC: 29.7 % — LOW (ref 37–47)
HGB BLD-MCNC: 10.1 G/DL — LOW (ref 12–16)
IMM GRANULOCYTES NFR BLD AUTO: 1.5 % — HIGH (ref 0.1–0.3)
LYMPHOCYTES # BLD AUTO: 1.3 K/UL — SIGNIFICANT CHANGE UP (ref 1.2–3.4)
LYMPHOCYTES # BLD AUTO: 27.3 % — SIGNIFICANT CHANGE UP (ref 20.5–51.1)
MAGNESIUM SERPL-MCNC: 1.5 MG/DL — LOW (ref 1.8–2.4)
MCHC RBC-ENTMCNC: 31.7 PG — HIGH (ref 27–31)
MCHC RBC-ENTMCNC: 34 G/DL — SIGNIFICANT CHANGE UP (ref 32–37)
MCV RBC AUTO: 93.1 FL — SIGNIFICANT CHANGE UP (ref 81–99)
MONOCYTES # BLD AUTO: 0.5 K/UL — SIGNIFICANT CHANGE UP (ref 0.1–0.6)
MONOCYTES NFR BLD AUTO: 10.5 % — HIGH (ref 1.7–9.3)
NEUTROPHILS # BLD AUTO: 2.69 K/UL — SIGNIFICANT CHANGE UP (ref 1.4–6.5)
NEUTROPHILS NFR BLD AUTO: 56.3 % — SIGNIFICANT CHANGE UP (ref 42.2–75.2)
NRBC # BLD: 0 /100 WBCS — SIGNIFICANT CHANGE UP (ref 0–0)
PHOSPHATE SERPL-MCNC: 5.1 MG/DL — HIGH (ref 2.1–4.9)
PLATELET # BLD AUTO: 351 K/UL — SIGNIFICANT CHANGE UP (ref 130–400)
POTASSIUM SERPL-MCNC: 3.7 MMOL/L — SIGNIFICANT CHANGE UP (ref 3.5–5)
POTASSIUM SERPL-SCNC: 3.7 MMOL/L — SIGNIFICANT CHANGE UP (ref 3.5–5)
PREALB SERPL-MCNC: 15 MG/DL — LOW (ref 20–40)
PROT SERPL-MCNC: 6.5 G/DL — SIGNIFICANT CHANGE UP (ref 6–8)
RBC # BLD: 3.19 M/UL — LOW (ref 4.2–5.4)
RBC # FLD: 12.8 % — SIGNIFICANT CHANGE UP (ref 11.5–14.5)
SARS-COV-2 RNA SPEC QL NAA+PROBE: SIGNIFICANT CHANGE UP
SODIUM SERPL-SCNC: 139 MMOL/L — SIGNIFICANT CHANGE UP (ref 135–146)
SPECIMEN SOURCE: SIGNIFICANT CHANGE UP
WBC # BLD: 4.77 K/UL — LOW (ref 4.8–10.8)
WBC # FLD AUTO: 4.77 K/UL — LOW (ref 4.8–10.8)

## 2021-11-19 PROCEDURE — 99233 SBSQ HOSP IP/OBS HIGH 50: CPT

## 2021-11-19 RX ORDER — IOHEXOL 300 MG/ML
30 INJECTION, SOLUTION INTRAVENOUS ONCE
Refills: 0 | Status: DISCONTINUED | OUTPATIENT
Start: 2021-11-19 | End: 2021-11-27

## 2021-11-19 RX ORDER — MORPHINE SULFATE 50 MG/1
1 CAPSULE, EXTENDED RELEASE ORAL ONCE
Refills: 0 | Status: DISCONTINUED | OUTPATIENT
Start: 2021-11-19 | End: 2021-11-19

## 2021-11-19 RX ORDER — KETOROLAC TROMETHAMINE 30 MG/ML
30 SYRINGE (ML) INJECTION ONCE
Refills: 0 | Status: DISCONTINUED | OUTPATIENT
Start: 2021-11-19 | End: 2021-11-19

## 2021-11-19 RX ORDER — SUCRALFATE 1 G
1 TABLET ORAL
Refills: 0 | Status: DISCONTINUED | OUTPATIENT
Start: 2021-11-19 | End: 2021-11-27

## 2021-11-19 RX ORDER — LANOLIN ALCOHOL/MO/W.PET/CERES
5 CREAM (GRAM) TOPICAL AT BEDTIME
Refills: 0 | Status: DISCONTINUED | OUTPATIENT
Start: 2021-11-19 | End: 2021-11-27

## 2021-11-19 RX ORDER — TRAMADOL HYDROCHLORIDE 50 MG/1
50 TABLET ORAL EVERY 6 HOURS
Refills: 0 | Status: DISCONTINUED | OUTPATIENT
Start: 2021-11-19 | End: 2021-11-20

## 2021-11-19 RX ORDER — TRAMADOL HYDROCHLORIDE 50 MG/1
50 TABLET ORAL EVERY 6 HOURS
Refills: 0 | Status: DISCONTINUED | OUTPATIENT
Start: 2021-11-19 | End: 2021-11-19

## 2021-11-19 RX ORDER — MAGNESIUM SULFATE 500 MG/ML
2 VIAL (ML) INJECTION EVERY 4 HOURS
Refills: 0 | Status: COMPLETED | OUTPATIENT
Start: 2021-11-19 | End: 2021-11-19

## 2021-11-19 RX ORDER — HYDROMORPHONE HYDROCHLORIDE 2 MG/ML
0.5 INJECTION INTRAMUSCULAR; INTRAVENOUS; SUBCUTANEOUS EVERY 4 HOURS
Refills: 0 | Status: DISCONTINUED | OUTPATIENT
Start: 2021-11-19 | End: 2021-11-19

## 2021-11-19 RX ADMIN — Medication 16.67 GRAM(S): at 21:51

## 2021-11-19 RX ADMIN — Medication 1 MILLIGRAM(S): at 12:43

## 2021-11-19 RX ADMIN — SENNA PLUS 2 TABLET(S): 8.6 TABLET ORAL at 21:54

## 2021-11-19 RX ADMIN — HYDROMORPHONE HYDROCHLORIDE 1 MILLIGRAM(S): 2 INJECTION INTRAMUSCULAR; INTRAVENOUS; SUBCUTANEOUS at 04:21

## 2021-11-19 RX ADMIN — MORPHINE SULFATE 1 MILLIGRAM(S): 50 CAPSULE, EXTENDED RELEASE ORAL at 17:14

## 2021-11-19 RX ADMIN — Medication 3 CAPSULE(S): at 11:40

## 2021-11-19 RX ADMIN — HYDROMORPHONE HYDROCHLORIDE 0.5 MILLIGRAM(S): 2 INJECTION INTRAMUSCULAR; INTRAVENOUS; SUBCUTANEOUS at 08:43

## 2021-11-19 RX ADMIN — Medication 3 CAPSULE(S): at 08:10

## 2021-11-19 RX ADMIN — Medication 100 MILLIGRAM(S): at 05:18

## 2021-11-19 RX ADMIN — Medication 1 GRAM(S): at 17:15

## 2021-11-19 RX ADMIN — CHLORHEXIDINE GLUCONATE 1 APPLICATION(S): 213 SOLUTION TOPICAL at 05:19

## 2021-11-19 RX ADMIN — POLYETHYLENE GLYCOL 3350 17 GRAM(S): 17 POWDER, FOR SOLUTION ORAL at 11:43

## 2021-11-19 RX ADMIN — Medication 100 MILLIGRAM(S): at 21:52

## 2021-11-19 RX ADMIN — MORPHINE SULFATE 1 MILLIGRAM(S): 50 CAPSULE, EXTENDED RELEASE ORAL at 17:30

## 2021-11-19 RX ADMIN — Medication 5 MILLIGRAM(S): at 21:56

## 2021-11-19 RX ADMIN — Medication 1 GRAM(S): at 23:10

## 2021-11-19 RX ADMIN — Medication 3 CAPSULE(S): at 17:15

## 2021-11-19 RX ADMIN — Medication 100 MILLIGRAM(S): at 13:40

## 2021-11-19 RX ADMIN — HYDROMORPHONE HYDROCHLORIDE 1 MILLIGRAM(S): 2 INJECTION INTRAMUSCULAR; INTRAVENOUS; SUBCUTANEOUS at 00:33

## 2021-11-19 RX ADMIN — Medication 30 MILLIGRAM(S): at 21:15

## 2021-11-19 RX ADMIN — HYDROMORPHONE HYDROCHLORIDE 0.5 MILLIGRAM(S): 2 INJECTION INTRAMUSCULAR; INTRAVENOUS; SUBCUTANEOUS at 08:28

## 2021-11-19 RX ADMIN — HYDROMORPHONE HYDROCHLORIDE 1 MILLIGRAM(S): 2 INJECTION INTRAMUSCULAR; INTRAVENOUS; SUBCUTANEOUS at 04:55

## 2021-11-19 RX ADMIN — GABAPENTIN 300 MILLIGRAM(S): 400 CAPSULE ORAL at 05:19

## 2021-11-19 RX ADMIN — SODIUM CHLORIDE 150 MILLILITER(S): 9 INJECTION, SOLUTION INTRAVENOUS at 05:17

## 2021-11-19 RX ADMIN — TRAMADOL HYDROCHLORIDE 50 MILLIGRAM(S): 50 TABLET ORAL at 13:30

## 2021-11-19 RX ADMIN — GABAPENTIN 300 MILLIGRAM(S): 400 CAPSULE ORAL at 13:02

## 2021-11-19 RX ADMIN — Medication 16.67 GRAM(S): at 15:03

## 2021-11-19 RX ADMIN — PANTOPRAZOLE SODIUM 40 MILLIGRAM(S): 20 TABLET, DELAYED RELEASE ORAL at 05:19

## 2021-11-19 RX ADMIN — ENOXAPARIN SODIUM 40 MILLIGRAM(S): 100 INJECTION SUBCUTANEOUS at 21:56

## 2021-11-19 RX ADMIN — Medication 100 MILLIGRAM(S): at 11:40

## 2021-11-19 RX ADMIN — Medication 1 TABLET(S): at 21:53

## 2021-11-19 RX ADMIN — HYDROMORPHONE HYDROCHLORIDE 1 MILLIGRAM(S): 2 INJECTION INTRAMUSCULAR; INTRAVENOUS; SUBCUTANEOUS at 00:18

## 2021-11-19 RX ADMIN — GABAPENTIN 300 MILLIGRAM(S): 400 CAPSULE ORAL at 21:53

## 2021-11-19 RX ADMIN — TRAMADOL HYDROCHLORIDE 50 MILLIGRAM(S): 50 TABLET ORAL at 13:01

## 2021-11-19 RX ADMIN — Medication 16.67 GRAM(S): at 18:37

## 2021-11-19 RX ADMIN — Medication 30 MILLIGRAM(S): at 19:54

## 2021-11-19 RX ADMIN — Medication 1 TABLET(S): at 11:40

## 2021-11-19 RX ADMIN — Medication 100 MILLIGRAM(S): at 13:12

## 2021-11-19 NOTE — PROGRESS NOTE ADULT - ASSESSMENT
ASSESSMENT  45 y/o F w/ pmhx of  ETOH abuse and Chronic Alcoholic Pancreatitis with pseudocyst presenting to ED for worsening diffuse abdominal pain for x3 days radiating to the back    IMPRESSION  #Acute on Chronic Pancreatitis  - CT Abdomen and Pelvis w/ IV Cont (11.13.21 @ 17:45): IMPRESSION: Pancreatic pseudocyst unchanged. Chronic pancreatitis unchanged.    #Fevers/MSSA Bacteremia - potentially secondary to infected PIV with superficial venous thromboses  - Blood Cx 11/13+  - [facility] Duplex Upper Ext Vein Scan, Bilat (11.14.21 @ 16:59): No Evidence of deep venous thrombosis noted in the bilateral upper extremity Right cephalic superficial venous thromboses appears to be acute  -  CT Forearm w/ IV Cont, Right (11.15.21 @ 22:39): Subcutaneous fat stranding and edema throughout the dorsal soft tissues of the forearm. No focal drainable fluid collection. Venous thrombosis visualized within the cephalic vein  - s/p debridement 11/17 -- Wound CX NG   - TTE negative for vegetation     #ETOH Abuse   #Abx allergy: Compazine (Unknown)    RECOMMENDATIONS  - can stop clindamycin   - cefazolin 2g q 8 hours  - plan for 2 weeks from negative cultures (11/16-11/29) followed by PO  Bactrim 1DS BID (11/30-12/13)     - Weekly CBC, CMP, ESR/CRP   - ID follow-up with Dr. Cyrus Holbrook for Telehealth. We will call the patient between 10:30-1:30      [id number]        [phone]       Fax [phone]    Please call or message on [employer] Teams if with any questions.  Spectra [id number]

## 2021-11-19 NOTE — PROGRESS NOTE ADULT - TIME BILLING
I have personally seen and examined this patient.    I have reviewed all pertinent clinical information and reviewed all relevant imaging and diagnostic studies personally.   I counseled the patient about diagnostic testing and treatment plan. All questions were answered.   I discussed recommendations with the primary team.
wound care
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
## 2021-11-19 NOTE — PROGRESS NOTE ADULT - SUBJECTIVE AND OBJECTIVE BOX
LAURA BARAJAS  46y, Female  Allergy: Compazine (Unknown)      LOS  13d    CHIEF COMPLAINT: Acute on chronic pancreatitis; (19 Nov 2021 10:45)      INTERVAL EVENTS/HPI  - No acute events overnight  - T(F): , Max: 98.3 (11-19-21 @ 05:03)  - Denies any worsening symptoms  - Tolerating medication  - WBC Count: 4.77 (11-19-21 @ 04:30)  WBC Count: 5.31 (11-18-21 @ 09:50)     - Creatinine, Serum: 0.5 (11-19-21 @ 04:30)  Creatinine, Serum: 0.6 (11-18-21 @ 09:50)       ROS  General: Denies rigors, nightsweats  HEENT: Denies headache, rhinorrhea, sore throat, eye pain  CV: Denies CP, palpitations  PULM: Denies wheezing, hemoptysis  GI: Denies hematemesis, hematochezia, melena  : Denies discharge, hematuria  MSK: Denies arthralgias, myalgias  SKIN: Denies rash, lesions  NEURO: Denies paresthesias, weakness  PSYCH: Denies depression, anxiety    VITALS:  T(F): 96.8, Max: 98.3 (11-19-21 @ 05:03)  HR: 77  BP: 150/93  RR: 18Vital Signs Last 24 Hrs  T(C): 36 (19 Nov 2021 13:04), Max: 36.8 (19 Nov 2021 05:03)  T(F): 96.8 (19 Nov 2021 13:04), Max: 98.3 (19 Nov 2021 05:03)  HR: 77 (19 Nov 2021 13:04) (70 - 86)  BP: 150/93 (19 Nov 2021 13:04) (148/84 - 150/95)  BP(mean): --  RR: 18 (19 Nov 2021 13:04) (17 - 18)  SpO2: 98% (19 Nov 2021 07:40) (98% - 98%)    PHYSICAL EXAM:  Gen: NAD, resting in bed  HEENT: Normocephalic, atraumatic  Neck: supple, no lymphadenopathy  CV: Regular rate & regular rhythm  Lungs: decreased BS at bases, no fremitus  Abdomen: Soft, BS present  Ext: right arm dressed   Neuro: non focal, awake  Skin: right arm dressed  Lines: no phlebitis    FH: Non-contributory  Social Hx: Non-contributory    TESTS & MEASUREMENTS:                        10.1   4.77  )-----------( 351      ( 19 Nov 2021 04:30 )             29.7     11-19    139  |  100  |  4<L>  ----------------------------<  95  3.7   |  22  |  0.5<L>    Ca    9.3      19 Nov 2021 04:30  Phos  5.1     11-19  Mg     1.5     11-19    TPro  6.5  /  Alb  3.7  /  TBili  0.2  /  DBili  x   /  AST  130<H>  /  ALT  37  /  AlkPhos  195<H>  11-19    eGFR if Non African American: 118 mL/min/1.73M2 (11-19-21 @ 04:30)  eGFR if African American: 136 mL/min/1.73M2 (11-19-21 @ 04:30)    LIVER FUNCTIONS - ( 19 Nov 2021 04:30 )  Alb: 3.7 g/dL / Pro: 6.5 g/dL / ALK PHOS: 195 U/L / ALT: 37 U/L / AST: 130 U/L / GGT: x               Culture - Other (collected 11-17-21 @ 13:51)  Source: .Other RUE  Preliminary Report (11-18-21 @ 19:18):    No growth    Culture - Blood (collected 11-17-21 @ 06:46)  Source: .Blood Blood  Preliminary Report (11-18-21 @ 14:01):    No growth to date.    Culture - Blood (collected 11-16-21 @ 11:00)  Source: .Blood Blood  Preliminary Report (11-17-21 @ 22:01):    No growth to date.    Culture - Blood (collected 11-13-21 @ 13:45)  Source: .Blood Blood  Gram Stain (11-14-21 @ 07:51):    Growth in aerobic and anaerobic bottles: Gram Positive Cocci in Clusters  Final Report (11-16-21 @ 10:38):    Growth in aerobic and anaerobic bottles: Staphylococcus aureus    ***Blood Panel PCR results on this specimen are available    approximately 3 hours after the Gram stain result.***    Gram stain, PCR, and/or culture results may not always    correspond dueto difference in methodologies.    ************************************************************    This PCR assay was performed by multiplex PCR. This    Assay tests for 66 bacterial and resistance gene targets.    Please refer to the [facility] test directory    at [email or web address] for details.  Organism: Blood Culture PCR  Staphylococcus aureus (11-16-21 @ 10:38)  Organism: Staphylococcus aureus (11-16-21 @ 10:38)      -  Ampicillin/Sulbactam: S <=8/4      -  Cefazolin: S <=4      -  Clindamycin: S <=0.25      -  Erythromycin: S <=0.25      -  Gentamicin: S <=1 Should not be used as monotherapy      -  Oxacillin: S <=0.25      -  RIF- Rifampin: S <=1 Should not be used as monotherapy      -  Tetra/Doxy: S <=1      -  Trimethoprim/Sulfamethoxazole: S <=0.5/9.5      -  Vancomycin: S 2      Method Type: VASILIY  Organism: Blood Culture PCR (11-16-21 @ 10:38)      -  Staphylococcus aureus: Detec Any isolate of Staphylococcus aureus from a blood culture is NOT considered a contaminant.      Method Type: PCR            INFECTIOUS DISEASES TESTING  Procalcitonin, Serum: 0.35 (11-14-21 @ 04:30)  COVID-19 PCR: NotDetec (11-13-21 @ 13:57)  Procalcitonin, Serum: 0.04 (11-11-21 @ 04:30)  COVID-19 PCR: NotDetec (11-06-21 @ 23:15)  COVID-19 PCR: NotDetec (09-22-21 @ 06:35)  COVID-19 PCR: NotDetec (08-08-21 @ 17:45)  COVID-19 PCR: NotDetec (08-06-21 @ 01:03)  COVID-19 PCR: NotDetec (07-15-21 @ 20:15)  COVID-19 PCR: NotDetec (01-21-21 @ 21:37)      INFLAMMATORY MARKERS  Sedimentation Rate, Erythrocyte: 44 mm/Hr (11-14-21 @ 04:30)  C-Reactive Protein, Serum: 110 mg/L (11-14-21 @ 04:30)  C-Reactive Protein, Serum: 6 mg/L (11-11-21 @ 04:30)  Sedimentation Rate, Erythrocyte: 56 mm/Hr (11-11-21 @ 04:30)      RADIOLOGY & ADDITIONAL TESTS:  I have personally reviewed the last available Chest xray  CXR      CT      CARDIOLOGY TESTING      MEDICATIONS  bisacodyl Suppository 10 Rectal daily  ceFAZolin   IVPB 2000 IV Intermittent every 8 hours  ceFAZolin   IVPB     chlorhexidine 4% Liquid 1 Topical <User Schedule>  enoxaparin Injectable 40 SubCutaneous at bedtime  folic acid 1 Oral daily  gabapentin 300 Oral three times a day  lactobacillus acidophilus 1 Oral two times a day  magnesium sulfate  IVPB 2 IV Intermittent every 4 hours  melatonin 5 Oral at bedtime  pancrelipase  (CREON 12,000 Lipase Units) 3 Oral three times a day with meals  polyethylene glycol 3350 17 Oral daily  senna 2 Oral at bedtime  sucralfate 1 Oral four times a day  thiamine 100 Oral daily      WEIGHT  Weight (kg): 58.967 (11-07-21 @ 01:02)  Creatinine, Serum: 0.5 mg/dL (11-19-21 @ 04:30)      ANTIBIOTICS:  ceFAZolin   IVPB 2000 milliGRAM(s) IV Intermittent every 8 hours  ceFAZolin   IVPB          All available historical records have been reviewed

## 2021-11-20 LAB
ALBUMIN SERPL ELPH-MCNC: 3.8 G/DL — SIGNIFICANT CHANGE UP (ref 3.5–5.2)
ALP SERPL-CCNC: 174 U/L — HIGH (ref 30–115)
ALT FLD-CCNC: 42 U/L — HIGH (ref 0–41)
ANION GAP SERPL CALC-SCNC: 18 MMOL/L — HIGH (ref 7–14)
AST SERPL-CCNC: 165 U/L — HIGH (ref 0–41)
BASOPHILS # BLD AUTO: 0.09 K/UL — SIGNIFICANT CHANGE UP (ref 0–0.2)
BASOPHILS NFR BLD AUTO: 1.6 % — HIGH (ref 0–1)
BILIRUB SERPL-MCNC: 0.2 MG/DL — SIGNIFICANT CHANGE UP (ref 0.2–1.2)
BUN SERPL-MCNC: 4 MG/DL — LOW (ref 10–20)
CALCIUM SERPL-MCNC: 9.5 MG/DL — SIGNIFICANT CHANGE UP (ref 8.5–10.1)
CHLORIDE SERPL-SCNC: 105 MMOL/L — SIGNIFICANT CHANGE UP (ref 98–110)
CO2 SERPL-SCNC: 19 MMOL/L — SIGNIFICANT CHANGE UP (ref 17–32)
CREAT SERPL-MCNC: 0.5 MG/DL — LOW (ref 0.7–1.5)
EOSINOPHIL # BLD AUTO: 0.1 K/UL — SIGNIFICANT CHANGE UP (ref 0–0.7)
EOSINOPHIL NFR BLD AUTO: 1.8 % — SIGNIFICANT CHANGE UP (ref 0–8)
GLUCOSE SERPL-MCNC: 104 MG/DL — HIGH (ref 70–99)
HCT VFR BLD CALC: 31.7 % — LOW (ref 37–47)
HGB BLD-MCNC: 10.7 G/DL — LOW (ref 12–16)
IMM GRANULOCYTES NFR BLD AUTO: 1.3 % — HIGH (ref 0.1–0.3)
LYMPHOCYTES # BLD AUTO: 1.28 K/UL — SIGNIFICANT CHANGE UP (ref 1.2–3.4)
LYMPHOCYTES # BLD AUTO: 22.9 % — SIGNIFICANT CHANGE UP (ref 20.5–51.1)
MAGNESIUM SERPL-MCNC: 2.2 MG/DL — SIGNIFICANT CHANGE UP (ref 1.8–2.4)
MCHC RBC-ENTMCNC: 31.1 PG — HIGH (ref 27–31)
MCHC RBC-ENTMCNC: 33.8 G/DL — SIGNIFICANT CHANGE UP (ref 32–37)
MCV RBC AUTO: 92.2 FL — SIGNIFICANT CHANGE UP (ref 81–99)
MONOCYTES # BLD AUTO: 0.5 K/UL — SIGNIFICANT CHANGE UP (ref 0.1–0.6)
MONOCYTES NFR BLD AUTO: 9 % — SIGNIFICANT CHANGE UP (ref 1.7–9.3)
NEUTROPHILS # BLD AUTO: 3.54 K/UL — SIGNIFICANT CHANGE UP (ref 1.4–6.5)
NEUTROPHILS NFR BLD AUTO: 63.4 % — SIGNIFICANT CHANGE UP (ref 42.2–75.2)
NRBC # BLD: 0 /100 WBCS — SIGNIFICANT CHANGE UP (ref 0–0)
PLATELET # BLD AUTO: 415 K/UL — HIGH (ref 130–400)
POTASSIUM SERPL-MCNC: 3.7 MMOL/L — SIGNIFICANT CHANGE UP (ref 3.5–5)
POTASSIUM SERPL-SCNC: 3.7 MMOL/L — SIGNIFICANT CHANGE UP (ref 3.5–5)
PROT SERPL-MCNC: 6.9 G/DL — SIGNIFICANT CHANGE UP (ref 6–8)
RBC # BLD: 3.44 M/UL — LOW (ref 4.2–5.4)
RBC # FLD: 13.1 % — SIGNIFICANT CHANGE UP (ref 11.5–14.5)
SODIUM SERPL-SCNC: 142 MMOL/L — SIGNIFICANT CHANGE UP (ref 135–146)
VIT D25+D1,25 OH+D1,25 PNL SERPL-MCNC: 10 PG/ML — LOW (ref 19.9–79.3)
WBC # BLD: 5.58 K/UL — SIGNIFICANT CHANGE UP (ref 4.8–10.8)
WBC # FLD AUTO: 5.58 K/UL — SIGNIFICANT CHANGE UP (ref 4.8–10.8)

## 2021-11-20 PROCEDURE — 99231 SBSQ HOSP IP/OBS SF/LOW 25: CPT

## 2021-11-20 PROCEDURE — 99233 SBSQ HOSP IP/OBS HIGH 50: CPT

## 2021-11-20 PROCEDURE — 74177 CT ABD & PELVIS W/CONTRAST: CPT | Mod: 26

## 2021-11-20 RX ORDER — IOHEXOL 300 MG/ML
30 INJECTION, SOLUTION INTRAVENOUS ONCE
Refills: 0 | Status: COMPLETED | OUTPATIENT
Start: 2021-11-20 | End: 2021-11-20

## 2021-11-20 RX ORDER — SODIUM CHLORIDE 9 MG/ML
1000 INJECTION INTRAMUSCULAR; INTRAVENOUS; SUBCUTANEOUS
Refills: 0 | Status: COMPLETED | OUTPATIENT
Start: 2021-11-20 | End: 2021-11-20

## 2021-11-20 RX ORDER — GABAPENTIN 400 MG/1
100 CAPSULE ORAL THREE TIMES A DAY
Refills: 0 | Status: DISCONTINUED | OUTPATIENT
Start: 2021-11-20 | End: 2021-11-20

## 2021-11-20 RX ORDER — PANTOPRAZOLE SODIUM 20 MG/1
40 TABLET, DELAYED RELEASE ORAL
Refills: 0 | Status: DISCONTINUED | OUTPATIENT
Start: 2021-11-20 | End: 2021-11-27

## 2021-11-20 RX ORDER — GABAPENTIN 400 MG/1
300 CAPSULE ORAL THREE TIMES A DAY
Refills: 0 | Status: DISCONTINUED | OUTPATIENT
Start: 2021-11-20 | End: 2021-11-27

## 2021-11-20 RX ORDER — HYDROMORPHONE HYDROCHLORIDE 2 MG/ML
2 INJECTION INTRAMUSCULAR; INTRAVENOUS; SUBCUTANEOUS EVERY 6 HOURS
Refills: 0 | Status: DISCONTINUED | OUTPATIENT
Start: 2021-11-20 | End: 2021-11-21

## 2021-11-20 RX ADMIN — GABAPENTIN 300 MILLIGRAM(S): 400 CAPSULE ORAL at 21:28

## 2021-11-20 RX ADMIN — SENNA PLUS 2 TABLET(S): 8.6 TABLET ORAL at 21:28

## 2021-11-20 RX ADMIN — Medication 1 TABLET(S): at 14:15

## 2021-11-20 RX ADMIN — CHLORHEXIDINE GLUCONATE 1 APPLICATION(S): 213 SOLUTION TOPICAL at 05:08

## 2021-11-20 RX ADMIN — Medication 3 CAPSULE(S): at 18:03

## 2021-11-20 RX ADMIN — Medication 100 MILLIGRAM(S): at 21:27

## 2021-11-20 RX ADMIN — ENOXAPARIN SODIUM 40 MILLIGRAM(S): 100 INJECTION SUBCUTANEOUS at 21:28

## 2021-11-20 RX ADMIN — POLYETHYLENE GLYCOL 3350 17 GRAM(S): 17 POWDER, FOR SOLUTION ORAL at 14:21

## 2021-11-20 RX ADMIN — Medication 100 MILLIGRAM(S): at 14:15

## 2021-11-20 RX ADMIN — Medication 1 GRAM(S): at 23:35

## 2021-11-20 RX ADMIN — SODIUM CHLORIDE 70 MILLILITER(S): 9 INJECTION INTRAMUSCULAR; INTRAVENOUS; SUBCUTANEOUS at 18:05

## 2021-11-20 RX ADMIN — ONDANSETRON 4 MILLIGRAM(S): 8 TABLET, FILM COATED ORAL at 20:17

## 2021-11-20 RX ADMIN — Medication 3 CAPSULE(S): at 14:14

## 2021-11-20 RX ADMIN — Medication 100 MILLIGRAM(S): at 05:10

## 2021-11-20 RX ADMIN — Medication 100 MILLIGRAM(S): at 14:30

## 2021-11-20 RX ADMIN — HYDROMORPHONE HYDROCHLORIDE 2 MILLIGRAM(S): 2 INJECTION INTRAMUSCULAR; INTRAVENOUS; SUBCUTANEOUS at 20:58

## 2021-11-20 RX ADMIN — HYDROMORPHONE HYDROCHLORIDE 2 MILLIGRAM(S): 2 INJECTION INTRAMUSCULAR; INTRAVENOUS; SUBCUTANEOUS at 14:13

## 2021-11-20 RX ADMIN — Medication 1 TABLET(S): at 21:28

## 2021-11-20 RX ADMIN — Medication 1 GRAM(S): at 14:19

## 2021-11-20 RX ADMIN — GABAPENTIN 300 MILLIGRAM(S): 400 CAPSULE ORAL at 05:10

## 2021-11-20 RX ADMIN — HYDROMORPHONE HYDROCHLORIDE 2 MILLIGRAM(S): 2 INJECTION INTRAMUSCULAR; INTRAVENOUS; SUBCUTANEOUS at 20:13

## 2021-11-20 RX ADMIN — Medication 5 MILLIGRAM(S): at 21:28

## 2021-11-20 RX ADMIN — HYDROMORPHONE HYDROCHLORIDE 2 MILLIGRAM(S): 2 INJECTION INTRAMUSCULAR; INTRAVENOUS; SUBCUTANEOUS at 14:43

## 2021-11-20 RX ADMIN — Medication 1 MILLIGRAM(S): at 14:26

## 2021-11-20 RX ADMIN — GABAPENTIN 300 MILLIGRAM(S): 400 CAPSULE ORAL at 14:16

## 2021-11-20 RX ADMIN — IOHEXOL 30 MILLILITER(S): 300 INJECTION, SOLUTION INTRAVENOUS at 10:44
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
## 2021-11-20 NOTE — PROGRESS NOTE ADULT - ATTENDING COMMENTS
As above     Right forearm incision site - sl dessicated ; decreased erythema; persistent deep ST induration and tenderness . no drainage expressed  irrigated and dressed      Continue wound care   Warm compresses for persistent inflammatory swelling   Concerns addressed

## 2021-11-20 NOTE — PROGRESS NOTE ADULT - SUBJECTIVE AND OBJECTIVE BOX
10.7   5.58  )-----------( 415      ( 20 Nov 2021 04:30 )             31.7   Patient is a 46y old  Female who presented with acute on chronic pancreatitis. Burn team consulted for right forearm abscess.         INTERVAL HPI/OVERNIGHT EVENTS:    POD#3 s/p incision and drainage right forearm abscess     Pt no complaints      Vital Signs Last 24 Hrs  T(C): 36.7 (20 Nov 2021 12:12), Max: 37.5 (19 Nov 2021 21:30)  T(F): 98 (20 Nov 2021 12:12), Max: 99.5 (19 Nov 2021 21:30)  HR: 85 (20 Nov 2021 12:12) (81 - 95)  BP: 128/83 (20 Nov 2021 12:12) (128/83 - 147/99)  RR: 16 (20 Nov 2021 12:12) (16 - 18)      Culture - Other (11.17.21 @ 13:51)    Specimen Source: .Other RUE    Culture Results: No growth        PHYSICAL EXAM:  GENERAL: well built, well nourished,  lying comfortably in bed in NAD  Skin: right forearm incision about 2cmx.05cm pink tissue at base, erythema and edema improved, no purulent drainage, non malodorous  Dressing changed by the burn team: xeroform packing

## 2021-11-20 NOTE — PROGRESS NOTE ADULT - ASSESSMENT
Pt is a 45 yo F with right forearm abscess    -continue dressing changes with xeroform/gauze/stretch bandage twice a day, may shower with dressing off  -Cultures no growth 11/17  -follow up in burn clinic upon discharge    Remainder of care per primary team\    Recall PRN
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
## 2021-11-21 LAB
ALBUMIN SERPL ELPH-MCNC: 3.6 G/DL — SIGNIFICANT CHANGE UP (ref 3.5–5.2)
ALP SERPL-CCNC: 144 U/L — HIGH (ref 30–115)
ALT FLD-CCNC: 40 U/L — SIGNIFICANT CHANGE UP (ref 0–41)
ANION GAP SERPL CALC-SCNC: 18 MMOL/L — HIGH (ref 7–14)
AST SERPL-CCNC: 169 U/L — HIGH (ref 0–41)
BASOPHILS # BLD AUTO: 0.06 K/UL — SIGNIFICANT CHANGE UP (ref 0–0.2)
BASOPHILS NFR BLD AUTO: 1.1 % — HIGH (ref 0–1)
BILIRUB SERPL-MCNC: <0.2 MG/DL — SIGNIFICANT CHANGE UP (ref 0.2–1.2)
BUN SERPL-MCNC: <3 MG/DL — LOW (ref 10–20)
CALCIUM SERPL-MCNC: 8.6 MG/DL — SIGNIFICANT CHANGE UP (ref 8.5–10.1)
CHLORIDE SERPL-SCNC: 105 MMOL/L — SIGNIFICANT CHANGE UP (ref 98–110)
CO2 SERPL-SCNC: 17 MMOL/L — SIGNIFICANT CHANGE UP (ref 17–32)
CREAT SERPL-MCNC: 0.5 MG/DL — LOW (ref 0.7–1.5)
CULTURE RESULTS: SIGNIFICANT CHANGE UP
EOSINOPHIL # BLD AUTO: 0.1 K/UL — SIGNIFICANT CHANGE UP (ref 0–0.7)
EOSINOPHIL NFR BLD AUTO: 1.8 % — SIGNIFICANT CHANGE UP (ref 0–8)
GLUCOSE SERPL-MCNC: 85 MG/DL — SIGNIFICANT CHANGE UP (ref 70–99)
HCT VFR BLD CALC: 30.7 % — LOW (ref 37–47)
HGB BLD-MCNC: 10.1 G/DL — LOW (ref 12–16)
IMM GRANULOCYTES NFR BLD AUTO: 0.7 % — HIGH (ref 0.1–0.3)
LYMPHOCYTES # BLD AUTO: 1.5 K/UL — SIGNIFICANT CHANGE UP (ref 1.2–3.4)
LYMPHOCYTES # BLD AUTO: 27.7 % — SIGNIFICANT CHANGE UP (ref 20.5–51.1)
MAGNESIUM SERPL-MCNC: 1.9 MG/DL — SIGNIFICANT CHANGE UP (ref 1.8–2.4)
MCHC RBC-ENTMCNC: 31.6 PG — HIGH (ref 27–31)
MCHC RBC-ENTMCNC: 32.9 G/DL — SIGNIFICANT CHANGE UP (ref 32–37)
MCV RBC AUTO: 95.9 FL — SIGNIFICANT CHANGE UP (ref 81–99)
MONOCYTES # BLD AUTO: 0.4 K/UL — SIGNIFICANT CHANGE UP (ref 0.1–0.6)
MONOCYTES NFR BLD AUTO: 7.4 % — SIGNIFICANT CHANGE UP (ref 1.7–9.3)
NEUTROPHILS # BLD AUTO: 3.32 K/UL — SIGNIFICANT CHANGE UP (ref 1.4–6.5)
NEUTROPHILS NFR BLD AUTO: 61.3 % — SIGNIFICANT CHANGE UP (ref 42.2–75.2)
NRBC # BLD: 0 /100 WBCS — SIGNIFICANT CHANGE UP (ref 0–0)
PLATELET # BLD AUTO: 442 K/UL — HIGH (ref 130–400)
POTASSIUM SERPL-MCNC: 3.6 MMOL/L — SIGNIFICANT CHANGE UP (ref 3.5–5)
POTASSIUM SERPL-SCNC: 3.6 MMOL/L — SIGNIFICANT CHANGE UP (ref 3.5–5)
PROT SERPL-MCNC: 6.4 G/DL — SIGNIFICANT CHANGE UP (ref 6–8)
RBC # BLD: 3.2 M/UL — LOW (ref 4.2–5.4)
RBC # FLD: 13.2 % — SIGNIFICANT CHANGE UP (ref 11.5–14.5)
SODIUM SERPL-SCNC: 140 MMOL/L — SIGNIFICANT CHANGE UP (ref 135–146)
SPECIMEN SOURCE: SIGNIFICANT CHANGE UP
WBC # BLD: 5.42 K/UL — SIGNIFICANT CHANGE UP (ref 4.8–10.8)
WBC # FLD AUTO: 5.42 K/UL — SIGNIFICANT CHANGE UP (ref 4.8–10.8)

## 2021-11-21 PROCEDURE — 99232 SBSQ HOSP IP/OBS MODERATE 35: CPT

## 2021-11-21 RX ORDER — LIDOCAINE 4 G/100G
1 CREAM TOPICAL DAILY
Refills: 0 | Status: DISCONTINUED | OUTPATIENT
Start: 2021-11-21 | End: 2021-11-27

## 2021-11-21 RX ORDER — HYDROMORPHONE HYDROCHLORIDE 2 MG/ML
4 INJECTION INTRAMUSCULAR; INTRAVENOUS; SUBCUTANEOUS EVERY 4 HOURS
Refills: 0 | Status: DISCONTINUED | OUTPATIENT
Start: 2021-11-21 | End: 2021-11-24

## 2021-11-21 RX ORDER — METHOCARBAMOL 500 MG/1
500 TABLET, FILM COATED ORAL EVERY 8 HOURS
Refills: 0 | Status: COMPLETED | OUTPATIENT
Start: 2021-11-21 | End: 2021-11-23

## 2021-11-21 RX ADMIN — Medication 3 CAPSULE(S): at 08:36

## 2021-11-21 RX ADMIN — Medication 1 GRAM(S): at 18:36

## 2021-11-21 RX ADMIN — PANTOPRAZOLE SODIUM 40 MILLIGRAM(S): 20 TABLET, DELAYED RELEASE ORAL at 05:14

## 2021-11-21 RX ADMIN — Medication 1 TABLET(S): at 22:02

## 2021-11-21 RX ADMIN — ENOXAPARIN SODIUM 40 MILLIGRAM(S): 100 INJECTION SUBCUTANEOUS at 22:02

## 2021-11-21 RX ADMIN — Medication 1 GRAM(S): at 12:57

## 2021-11-21 RX ADMIN — CHLORHEXIDINE GLUCONATE 1 APPLICATION(S): 213 SOLUTION TOPICAL at 05:14

## 2021-11-21 RX ADMIN — LIDOCAINE 1 PATCH: 4 CREAM TOPICAL at 16:44

## 2021-11-21 RX ADMIN — Medication 100 MILLIGRAM(S): at 16:11

## 2021-11-21 RX ADMIN — POLYETHYLENE GLYCOL 3350 17 GRAM(S): 17 POWDER, FOR SOLUTION ORAL at 13:04

## 2021-11-21 RX ADMIN — Medication 100 MILLIGRAM(S): at 05:17

## 2021-11-21 RX ADMIN — Medication 3 CAPSULE(S): at 18:36

## 2021-11-21 RX ADMIN — LIDOCAINE 1 PATCH: 4 CREAM TOPICAL at 20:10

## 2021-11-21 RX ADMIN — HYDROMORPHONE HYDROCHLORIDE 2 MILLIGRAM(S): 2 INJECTION INTRAMUSCULAR; INTRAVENOUS; SUBCUTANEOUS at 02:38

## 2021-11-21 RX ADMIN — GABAPENTIN 300 MILLIGRAM(S): 400 CAPSULE ORAL at 05:14

## 2021-11-21 RX ADMIN — HYDROMORPHONE HYDROCHLORIDE 4 MILLIGRAM(S): 2 INJECTION INTRAMUSCULAR; INTRAVENOUS; SUBCUTANEOUS at 22:01

## 2021-11-21 RX ADMIN — Medication 1 GRAM(S): at 23:52

## 2021-11-21 RX ADMIN — HYDROMORPHONE HYDROCHLORIDE 4 MILLIGRAM(S): 2 INJECTION INTRAMUSCULAR; INTRAVENOUS; SUBCUTANEOUS at 15:20

## 2021-11-21 RX ADMIN — Medication 100 MILLIGRAM(S): at 12:57

## 2021-11-21 RX ADMIN — Medication 100 MILLIGRAM(S): at 22:02

## 2021-11-21 RX ADMIN — HYDROMORPHONE HYDROCHLORIDE 4 MILLIGRAM(S): 2 INJECTION INTRAMUSCULAR; INTRAVENOUS; SUBCUTANEOUS at 22:31

## 2021-11-21 RX ADMIN — HYDROMORPHONE HYDROCHLORIDE 4 MILLIGRAM(S): 2 INJECTION INTRAMUSCULAR; INTRAVENOUS; SUBCUTANEOUS at 15:50

## 2021-11-21 RX ADMIN — SENNA PLUS 2 TABLET(S): 8.6 TABLET ORAL at 22:03

## 2021-11-21 RX ADMIN — Medication 1 MILLIGRAM(S): at 12:58

## 2021-11-21 RX ADMIN — GABAPENTIN 300 MILLIGRAM(S): 400 CAPSULE ORAL at 22:03

## 2021-11-21 RX ADMIN — GABAPENTIN 300 MILLIGRAM(S): 400 CAPSULE ORAL at 15:20

## 2021-11-21 RX ADMIN — METHOCARBAMOL 500 MILLIGRAM(S): 500 TABLET, FILM COATED ORAL at 18:37

## 2021-11-21 RX ADMIN — Medication 1 TABLET(S): at 12:56

## 2021-11-21 RX ADMIN — HYDROMORPHONE HYDROCHLORIDE 2 MILLIGRAM(S): 2 INJECTION INTRAMUSCULAR; INTRAVENOUS; SUBCUTANEOUS at 08:42

## 2021-11-21 RX ADMIN — Medication 5 MILLIGRAM(S): at 22:02

## 2021-11-21 RX ADMIN — HYDROMORPHONE HYDROCHLORIDE 2 MILLIGRAM(S): 2 INJECTION INTRAMUSCULAR; INTRAVENOUS; SUBCUTANEOUS at 09:15

## 2021-11-21 RX ADMIN — Medication 3 CAPSULE(S): at 12:57

## 2021-11-21 RX ADMIN — Medication 1 GRAM(S): at 05:14

## 2021-11-21 NOTE — PROGRESS NOTE ADULT - SUBJECTIVE AND OBJECTIVE BOX
LAURA BARAJAS  46y  Female      Patient is a 46y old  Female who presents with a chief complaint of Acute on chronic pancreatitis; (20 Nov 2021 15:12)      INTERVAL HPI/OVERNIGHT EVENTS: reports ongoing upper abdominal discomfort and LUQ abdominal discomfort radiating to the back associated with poor oral intake and GI upset    REVIEW OF SYSTEMS:  limited as above  All other review of systems negative    T(C): 36.8 (11-21-21 @ 12:22), Max: 36.8 (11-20-21 @ 22:05)  HR: 73 (11-21-21 @ 12:22) (66 - 96)  BP: 129/82 (11-21-21 @ 12:22) (118/70 - 129/82)  RR: 18 (11-21-21 @ 12:22) (18 - 18)  SpO2: --  Wt(kg): --Vital Signs Last 24 Hrs  T(C): 36.8 (21 Nov 2021 12:22), Max: 36.8 (20 Nov 2021 22:05)  T(F): 98.2 (21 Nov 2021 12:22), Max: 98.2 (20 Nov 2021 22:05)  HR: 73 (21 Nov 2021 12:22) (66 - 96)  BP: 129/82 (21 Nov 2021 12:22) (118/70 - 129/82)  BP(mean): --  RR: 18 (21 Nov 2021 12:22) (18 - 18)  SpO2: --        PHYSICAL EXAM:  GENERAL: NAD  PSYCH: no agitation, baseline mentation  NERVOUS SYSTEM:  Alert & Oriented X3, no new focal deficits  PULMONARY: Clear to percussion bilaterally; No rales, rhonchi, wheezing, or rubs  CARDIOVASCULAR: Regular rate and rhythm; No murmurs, rubs, or gallops  GI: grimacing to palpation in epigastrium, no rebound or guarding, +BS  EXTREMITIES:  2+ Peripheral Pulses, No clubbing, cyanosis, or edema    Consultant(s) Notes Reviewed:  [x ] YES  [ ] NO    Discussed with Consultants/Other Providers [ x] YES     LABS                          10.1   5.42  )-----------( 442      ( 21 Nov 2021 04:30 )             30.7     11-21    140  |  105  |  <3<L>  ----------------------------<  85  3.6   |  17  |  0.5<L>    Ca    8.6      21 Nov 2021 04:30  Mg     1.9     11-21    TPro  6.4  /  Alb  3.6  /  TBili  <0.2  /  DBili  x   /  AST  169<H>  /  ALT  40  /  AlkPhos  144<H>  11-21          Lactate Trend        CAPILLARY BLOOD GLUCOSE            RADIOLOGY & ADDITIONAL TESTS:    Imaging Personally Reviewed:  [ ] YES  [ ] NO    HEALTH ISSUES - PROBLEM Dx:
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
## 2021-11-22 LAB
ALBUMIN SERPL ELPH-MCNC: 3.8 G/DL — SIGNIFICANT CHANGE UP (ref 3.5–5.2)
ALP SERPL-CCNC: 130 U/L — HIGH (ref 30–115)
ALT FLD-CCNC: 42 U/L — HIGH (ref 0–41)
ANION GAP SERPL CALC-SCNC: 16 MMOL/L — HIGH (ref 7–14)
AST SERPL-CCNC: 190 U/L — HIGH (ref 0–41)
BASOPHILS # BLD AUTO: 0.08 K/UL — SIGNIFICANT CHANGE UP (ref 0–0.2)
BASOPHILS NFR BLD AUTO: 1.7 % — HIGH (ref 0–1)
BILIRUB SERPL-MCNC: 0.2 MG/DL — SIGNIFICANT CHANGE UP (ref 0.2–1.2)
BUN SERPL-MCNC: <3 MG/DL — LOW (ref 10–20)
CALCIUM SERPL-MCNC: 9.4 MG/DL — SIGNIFICANT CHANGE UP (ref 8.5–10.1)
CHLORIDE SERPL-SCNC: 105 MMOL/L — SIGNIFICANT CHANGE UP (ref 98–110)
CO2 SERPL-SCNC: 22 MMOL/L — SIGNIFICANT CHANGE UP (ref 17–32)
CREAT SERPL-MCNC: 0.5 MG/DL — LOW (ref 0.7–1.5)
CULTURE RESULTS: SIGNIFICANT CHANGE UP
EOSINOPHIL # BLD AUTO: 0.09 K/UL — SIGNIFICANT CHANGE UP (ref 0–0.7)
EOSINOPHIL NFR BLD AUTO: 1.9 % — SIGNIFICANT CHANGE UP (ref 0–8)
GLUCOSE SERPL-MCNC: 96 MG/DL — SIGNIFICANT CHANGE UP (ref 70–99)
HCT VFR BLD CALC: 31.7 % — LOW (ref 37–47)
HGB BLD-MCNC: 10.4 G/DL — LOW (ref 12–16)
IMM GRANULOCYTES NFR BLD AUTO: 0.8 % — HIGH (ref 0.1–0.3)
LYMPHOCYTES # BLD AUTO: 1.49 K/UL — SIGNIFICANT CHANGE UP (ref 1.2–3.4)
LYMPHOCYTES # BLD AUTO: 31 % — SIGNIFICANT CHANGE UP (ref 20.5–51.1)
MAGNESIUM SERPL-MCNC: 1.7 MG/DL — LOW (ref 1.8–2.4)
MCHC RBC-ENTMCNC: 31.1 PG — HIGH (ref 27–31)
MCHC RBC-ENTMCNC: 32.8 G/DL — SIGNIFICANT CHANGE UP (ref 32–37)
MCV RBC AUTO: 94.9 FL — SIGNIFICANT CHANGE UP (ref 81–99)
MONOCYTES # BLD AUTO: 0.47 K/UL — SIGNIFICANT CHANGE UP (ref 0.1–0.6)
MONOCYTES NFR BLD AUTO: 9.8 % — HIGH (ref 1.7–9.3)
NEUTROPHILS # BLD AUTO: 2.63 K/UL — SIGNIFICANT CHANGE UP (ref 1.4–6.5)
NEUTROPHILS NFR BLD AUTO: 54.8 % — SIGNIFICANT CHANGE UP (ref 42.2–75.2)
NRBC # BLD: 0 /100 WBCS — SIGNIFICANT CHANGE UP (ref 0–0)
PLATELET # BLD AUTO: 486 K/UL — HIGH (ref 130–400)
POTASSIUM SERPL-MCNC: 3.6 MMOL/L — SIGNIFICANT CHANGE UP (ref 3.5–5)
POTASSIUM SERPL-SCNC: 3.6 MMOL/L — SIGNIFICANT CHANGE UP (ref 3.5–5)
PROT SERPL-MCNC: 6.8 G/DL — SIGNIFICANT CHANGE UP (ref 6–8)
RBC # BLD: 3.34 M/UL — LOW (ref 4.2–5.4)
RBC # FLD: 13 % — SIGNIFICANT CHANGE UP (ref 11.5–14.5)
SODIUM SERPL-SCNC: 143 MMOL/L — SIGNIFICANT CHANGE UP (ref 135–146)
SPECIMEN SOURCE: SIGNIFICANT CHANGE UP
WBC # BLD: 4.8 K/UL — SIGNIFICANT CHANGE UP (ref 4.8–10.8)
WBC # FLD AUTO: 4.8 K/UL — SIGNIFICANT CHANGE UP (ref 4.8–10.8)

## 2021-11-22 PROCEDURE — 99232 SBSQ HOSP IP/OBS MODERATE 35: CPT

## 2021-11-22 RX ORDER — CHOLECALCIFEROL (VITAMIN D3) 125 MCG
2000 CAPSULE ORAL DAILY
Refills: 0 | Status: DISCONTINUED | OUTPATIENT
Start: 2021-11-22 | End: 2021-11-27

## 2021-11-22 RX ORDER — MAGNESIUM SULFATE 500 MG/ML
2 VIAL (ML) INJECTION ONCE
Refills: 0 | Status: COMPLETED | OUTPATIENT
Start: 2021-11-22 | End: 2021-11-22

## 2021-11-22 RX ORDER — MULTIVIT-MIN/FERROUS GLUCONATE 9 MG/15 ML
1 LIQUID (ML) ORAL DAILY
Refills: 0 | Status: DISCONTINUED | OUTPATIENT
Start: 2021-11-22 | End: 2021-11-27

## 2021-11-22 RX ADMIN — LIDOCAINE 1 PATCH: 4 CREAM TOPICAL at 18:05

## 2021-11-22 RX ADMIN — Medication 100 MILLIGRAM(S): at 05:34

## 2021-11-22 RX ADMIN — HYDROMORPHONE HYDROCHLORIDE 4 MILLIGRAM(S): 2 INJECTION INTRAMUSCULAR; INTRAVENOUS; SUBCUTANEOUS at 06:35

## 2021-11-22 RX ADMIN — ONDANSETRON 4 MILLIGRAM(S): 8 TABLET, FILM COATED ORAL at 23:29

## 2021-11-22 RX ADMIN — Medication 50 GRAM(S): at 12:23

## 2021-11-22 RX ADMIN — Medication 1 GRAM(S): at 05:33

## 2021-11-22 RX ADMIN — GABAPENTIN 300 MILLIGRAM(S): 400 CAPSULE ORAL at 21:23

## 2021-11-22 RX ADMIN — Medication 1 GRAM(S): at 17:20

## 2021-11-22 RX ADMIN — Medication 1 GRAM(S): at 12:23

## 2021-11-22 RX ADMIN — Medication 5 MILLIGRAM(S): at 21:23

## 2021-11-22 RX ADMIN — CHLORHEXIDINE GLUCONATE 1 APPLICATION(S): 213 SOLUTION TOPICAL at 05:34

## 2021-11-22 RX ADMIN — GABAPENTIN 300 MILLIGRAM(S): 400 CAPSULE ORAL at 14:47

## 2021-11-22 RX ADMIN — HYDROMORPHONE HYDROCHLORIDE 4 MILLIGRAM(S): 2 INJECTION INTRAMUSCULAR; INTRAVENOUS; SUBCUTANEOUS at 12:52

## 2021-11-22 RX ADMIN — Medication 1 TABLET(S): at 12:22

## 2021-11-22 RX ADMIN — Medication 1 GRAM(S): at 23:23

## 2021-11-22 RX ADMIN — Medication 3 CAPSULE(S): at 08:03

## 2021-11-22 RX ADMIN — Medication 1 TABLET(S): at 21:23

## 2021-11-22 RX ADMIN — METHOCARBAMOL 500 MILLIGRAM(S): 500 TABLET, FILM COATED ORAL at 09:48

## 2021-11-22 RX ADMIN — ONDANSETRON 4 MILLIGRAM(S): 8 TABLET, FILM COATED ORAL at 14:53

## 2021-11-22 RX ADMIN — HYDROMORPHONE HYDROCHLORIDE 4 MILLIGRAM(S): 2 INJECTION INTRAMUSCULAR; INTRAVENOUS; SUBCUTANEOUS at 12:22

## 2021-11-22 RX ADMIN — Medication 100 MILLIGRAM(S): at 21:53

## 2021-11-22 RX ADMIN — Medication 100 MILLIGRAM(S): at 12:23

## 2021-11-22 RX ADMIN — HYDROMORPHONE HYDROCHLORIDE 4 MILLIGRAM(S): 2 INJECTION INTRAMUSCULAR; INTRAVENOUS; SUBCUTANEOUS at 21:24

## 2021-11-22 RX ADMIN — METHOCARBAMOL 500 MILLIGRAM(S): 500 TABLET, FILM COATED ORAL at 18:49

## 2021-11-22 RX ADMIN — POLYETHYLENE GLYCOL 3350 17 GRAM(S): 17 POWDER, FOR SOLUTION ORAL at 12:23

## 2021-11-22 RX ADMIN — HYDROMORPHONE HYDROCHLORIDE 4 MILLIGRAM(S): 2 INJECTION INTRAMUSCULAR; INTRAVENOUS; SUBCUTANEOUS at 06:05

## 2021-11-22 RX ADMIN — Medication 3 CAPSULE(S): at 12:22

## 2021-11-22 RX ADMIN — GABAPENTIN 300 MILLIGRAM(S): 400 CAPSULE ORAL at 05:33

## 2021-11-22 RX ADMIN — Medication 1 MILLIGRAM(S): at 12:23

## 2021-11-22 RX ADMIN — HYDROMORPHONE HYDROCHLORIDE 4 MILLIGRAM(S): 2 INJECTION INTRAMUSCULAR; INTRAVENOUS; SUBCUTANEOUS at 17:20

## 2021-11-22 RX ADMIN — SENNA PLUS 2 TABLET(S): 8.6 TABLET ORAL at 21:23

## 2021-11-22 RX ADMIN — HYDROMORPHONE HYDROCHLORIDE 4 MILLIGRAM(S): 2 INJECTION INTRAMUSCULAR; INTRAVENOUS; SUBCUTANEOUS at 17:50

## 2021-11-22 RX ADMIN — LIDOCAINE 1 PATCH: 4 CREAM TOPICAL at 04:30

## 2021-11-22 RX ADMIN — LIDOCAINE 1 PATCH: 4 CREAM TOPICAL at 12:24

## 2021-11-22 RX ADMIN — HYDROMORPHONE HYDROCHLORIDE 4 MILLIGRAM(S): 2 INJECTION INTRAMUSCULAR; INTRAVENOUS; SUBCUTANEOUS at 21:54

## 2021-11-22 RX ADMIN — Medication 3 CAPSULE(S): at 17:20

## 2021-11-22 RX ADMIN — ENOXAPARIN SODIUM 40 MILLIGRAM(S): 100 INJECTION SUBCUTANEOUS at 21:23

## 2021-11-22 RX ADMIN — PANTOPRAZOLE SODIUM 40 MILLIGRAM(S): 20 TABLET, DELAYED RELEASE ORAL at 06:23

## 2021-11-22 RX ADMIN — Medication 100 MILLIGRAM(S): at 14:47

## 2021-11-22 NOTE — PROGRESS NOTE ADULT - SUBJECTIVE AND OBJECTIVE BOX
DAILY PROGRESS NOTE  ===========================================================    Patient Information:  LAURA BARAJAS  /  46y  /  Female  /  MRN#: [id number]    Hospital Day: 16d     |:::::::::::::::::::::::::::| SUBJECTIVE |:::::::::::::::::::::::::::|    OVERNIGHT EVENTS:   TODAY: Patient was seen today at bedside. Review of systems is otherwise negative.    |:::::::::::::::::::::::::::| OBJECTIVE |:::::::::::::::::::::::::::|    VITAL SIGNS: Last 24 Hours  T(C): 36.7 (22 Nov 2021 05:38), Max: 36.8 (21 Nov 2021 12:22)  T(F): 98 (22 Nov 2021 05:38), Max: 98.2 (21 Nov 2021 12:22)  HR: 77 (22 Nov 2021 05:38) (73 - 87)  BP: 123/85 (22 Nov 2021 05:38) (118/77 - 129/82)  BP(mean): --  RR: 18 (22 Nov 2021 05:38) (18 - 18)  SpO2: 98% (22 Nov 2021 08:42) (98% - 100%)    PHYSICAL EXAM:  GENERAL:   Awake, alert; NAD.  HEENT:  Head NC/AT; Conjunctivae pink, Sclera anicteric; Oral mucosa moist.  CARDIO:   Regular rate; Regular rhythm  RESP:   No rales, wheezing, or rhonchi appreciated.  GI:   Soft; NT/ND; BS; No guarding; No rebound tenderness.  EXT:   No edema.   SKIN:   Intact.    LAB RESULTS:                        10.4   4.80  )-----------( 486      ( 22 Nov 2021 07:33 )             31.7     11-22    143  |  105  |  <3<L>  ----------------------------<  96  3.6   |  22  |  0.5<L>    Ca    9.4      22 Nov 2021 07:33  Mg     1.7     11-22    TPro  6.8  /  Alb  3.8  /  TBili  0.2  /  DBili  x   /  AST  190<H>  /  ALT  42<H>  /  AlkPhos  130<H>  11-22                MICROBIOLOGY:    Culture - Blood (collected 20 Nov 2021 04:30)  Source: .Blood Blood  Preliminary Report (21 Nov 2021 17:01):    No growth to date.      RADIOLOGY:    ALLERGIES:  Compazine (Unknown)      ===========================================================     DAILY PROGRESS NOTE  ===========================================================    Patient Information:  LAURA BARAJAS  /  46y  /  Female  /  MRN#: [id number]    Hospital Day: 16d     |:::::::::::::::::::::::::::| SUBJECTIVE |:::::::::::::::::::::::::::|    OVERNIGHT EVENTS: None   TODAY: Patient was seen today at bedside. Review of systems is otherwise negative.    |:::::::::::::::::::::::::::| OBJECTIVE |:::::::::::::::::::::::::::|    VITAL SIGNS: Last 24 Hours  T(C): 36.7 (22 Nov 2021 05:38), Max: 36.8 (21 Nov 2021 12:22)  T(F): 98 (22 Nov 2021 05:38), Max: 98.2 (21 Nov 2021 12:22)  HR: 77 (22 Nov 2021 05:38) (73 - 87)  BP: 123/85 (22 Nov 2021 05:38) (118/77 - 129/82)  BP(mean): --  RR: 18 (22 Nov 2021 05:38) (18 - 18)  SpO2: 98% (22 Nov 2021 08:42) (98% - 100%)    PHYSICAL EXAM:  GENERAL:   Awake, alert; NAD.  HEENT:  Head NC/AT; Conjunctivae pink, Sclera anicteric; Oral mucosa moist.  CARDIO:   Regular rate; Regular rhythm  RESP:   No rales, wheezing, or rhonchi appreciated.  GI:   Soft; tenderness to palpation in the epigastric area, BS; No guarding; No rebound tenderness.  EXT:   No edema.     LAB RESULTS:                        10.4   4.80  )-----------( 486      ( 22 Nov 2021 07:33 )             31.7     11-22    143  |  105  |  <3<L>  ----------------------------<  96  3.6   |  22  |  0.5<L>    Ca    9.4      22 Nov 2021 07:33  Mg     1.7     11-22    TPro  6.8  /  Alb  3.8  /  TBili  0.2  /  DBili  x   /  AST  190<H>  /  ALT  42<H>  /  AlkPhos  130<H>  11-22                MICROBIOLOGY:    Culture - Blood (collected 20 Nov 2021 04:30)  Source: .Blood Blood  Preliminary Report (21 Nov 2021 17:01):    No growth to date.      RADIOLOGY:    ALLERGIES:  Compazine (Unknown)      ===========================================================
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
## 2021-11-22 NOTE — CHART NOTE - NSCHARTNOTEFT_GEN_A_CORE
3-day Calorie Count Results (11/19-11/21)    Day 1: 75kcal/1.5g PRO -- no Lunch/Dinner doc     Day 2: 88.5kcal/8.5g PRO -- no BF/Lunch doc     Day 3: 65kcal/1g PRO -- No lunch/dinner     3 Day- Average: 76.1kcal/3.6g PRO     --Very poor documentation, Calorie count results not significant.   ** New calorie Count started 11/22-11/24 -- Please document intake + Nutrition Supplement intake (Ensure Enlive)     --Will Collect results 11/25.   RD remains available: Willow Nunez, RDN [phone]

## 2021-11-22 NOTE — PROGRESS NOTE ADULT - SUBJECTIVE AND OBJECTIVE BOX
pt alert, calm, no c/o pain  abd not distended, no evidence of acute wt loss  pt receiving po diet but not eating much  calorie count posted but not filled out by staff - RD note appreciated, to restart  Vital Signs Last 24 Hrs  T(C): 36.6 (22 Nov 2021 12:20), Max: 36.7 (21 Nov 2021 21:48)  T(F): 97.9 (22 Nov 2021 12:20), Max: 98.1 (21 Nov 2021 21:48)  HR: 73 (22 Nov 2021 12:20) (73 - 87)  BP: 120/73 (22 Nov 2021 12:20) (118/77 - 123/85)  BP(mean): --  RR: 18 (22 Nov 2021 12:20) (18 - 18)  SpO2: 98% (22 Nov 2021 08:42) (98% - 100%)    MEDICATIONS  (STANDING):  bisacodyl Suppository 10 milliGRAM(s) Rectal daily  ceFAZolin   IVPB 2000 milliGRAM(s) IV Intermittent every 8 hours   chlorhexidine 4% Liquid 1 Application(s) Topical <User Schedule>  enoxaparin Injectable 40 milliGRAM(s) SubCutaneous at bedtime  folic acid 1 milliGRAM(s) Oral daily  gabapentin 300 milliGRAM(s) Oral three times a day  iohexol 300 mG (iodine)/mL Oral Solution 30 milliLiter(s) Oral once  lactobacillus acidophilus 1 Tablet(s) Oral two times a day  lidocaine   4% Patch 1 Patch Transdermal daily  melatonin 5 milliGRAM(s) Oral at bedtime  pancrelipase  (CREON 12,000 Lipase Units) 3 Capsule(s) Oral three times a day with meals  pantoprazole    Tablet 40 milliGRAM(s) Oral before breakfast  polyethylene glycol 3350 17 Gram(s) Oral daily  senna 2 Tablet(s) Oral at bedtime  sucralfate 1 Gram(s) Oral four times a day  thiamine 100 milliGRAM(s) Oral daily                        10.4   4.80  )-----------( 486      ( 22 Nov 2021 07:33 )             31.7   11-22    143  |  105  |  <3<L>  ----------------------------<  96  3.6   |  22  |  0.5<L>    Ca    9.4      22 Nov 2021 07:33  Mg     1.7     11-22    TPro  6.8  /  Alb  3.8  /  TBili  0.2  /  DBili  x   /  AST  190<H>  /  ALT  42<H>  /  AlkPhos  130<H>  11-22  Vitamin D, 25-Hydroxy (09.30.21 @ 10:40)   Vitamin D, 25-Hydroxy: 30: 30 - 80 ng/mL Optimum Levels
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
## 2021-11-23 LAB
ALBUMIN SERPL ELPH-MCNC: 4 G/DL — SIGNIFICANT CHANGE UP (ref 3.5–5.2)
ALP SERPL-CCNC: 124 U/L — HIGH (ref 30–115)
ALT FLD-CCNC: 46 U/L — HIGH (ref 0–41)
ANION GAP SERPL CALC-SCNC: 14 MMOL/L — SIGNIFICANT CHANGE UP (ref 7–14)
AST SERPL-CCNC: 211 U/L — HIGH (ref 0–41)
BASOPHILS # BLD AUTO: 0.06 K/UL — SIGNIFICANT CHANGE UP (ref 0–0.2)
BASOPHILS NFR BLD AUTO: 1.2 % — HIGH (ref 0–1)
BILIRUB SERPL-MCNC: <0.2 MG/DL — SIGNIFICANT CHANGE UP (ref 0.2–1.2)
BUN SERPL-MCNC: 5 MG/DL — LOW (ref 10–20)
CALCIUM SERPL-MCNC: 9.6 MG/DL — SIGNIFICANT CHANGE UP (ref 8.5–10.1)
CHLORIDE SERPL-SCNC: 102 MMOL/L — SIGNIFICANT CHANGE UP (ref 98–110)
CO2 SERPL-SCNC: 22 MMOL/L — SIGNIFICANT CHANGE UP (ref 17–32)
CREAT SERPL-MCNC: 0.6 MG/DL — LOW (ref 0.7–1.5)
CULTURE RESULTS: SIGNIFICANT CHANGE UP
CULTURE RESULTS: SIGNIFICANT CHANGE UP
EOSINOPHIL # BLD AUTO: 0.1 K/UL — SIGNIFICANT CHANGE UP (ref 0–0.7)
EOSINOPHIL NFR BLD AUTO: 2.1 % — SIGNIFICANT CHANGE UP (ref 0–8)
GLUCOSE SERPL-MCNC: 99 MG/DL — SIGNIFICANT CHANGE UP (ref 70–99)
HCT VFR BLD CALC: 33.2 % — LOW (ref 37–47)
HGB BLD-MCNC: 10.9 G/DL — LOW (ref 12–16)
IMM GRANULOCYTES NFR BLD AUTO: 0.4 % — HIGH (ref 0.1–0.3)
LYMPHOCYTES # BLD AUTO: 1.55 K/UL — SIGNIFICANT CHANGE UP (ref 1.2–3.4)
LYMPHOCYTES # BLD AUTO: 32.2 % — SIGNIFICANT CHANGE UP (ref 20.5–51.1)
MAGNESIUM SERPL-MCNC: 1.7 MG/DL — LOW (ref 1.8–2.4)
MCHC RBC-ENTMCNC: 31.4 PG — HIGH (ref 27–31)
MCHC RBC-ENTMCNC: 32.8 G/DL — SIGNIFICANT CHANGE UP (ref 32–37)
MCV RBC AUTO: 95.7 FL — SIGNIFICANT CHANGE UP (ref 81–99)
MONOCYTES # BLD AUTO: 0.48 K/UL — SIGNIFICANT CHANGE UP (ref 0.1–0.6)
MONOCYTES NFR BLD AUTO: 10 % — HIGH (ref 1.7–9.3)
NEUTROPHILS # BLD AUTO: 2.61 K/UL — SIGNIFICANT CHANGE UP (ref 1.4–6.5)
NEUTROPHILS NFR BLD AUTO: 54.1 % — SIGNIFICANT CHANGE UP (ref 42.2–75.2)
NRBC # BLD: 0 /100 WBCS — SIGNIFICANT CHANGE UP (ref 0–0)
PLATELET # BLD AUTO: 522 K/UL — HIGH (ref 130–400)
POTASSIUM SERPL-MCNC: 4 MMOL/L — SIGNIFICANT CHANGE UP (ref 3.5–5)
POTASSIUM SERPL-SCNC: 4 MMOL/L — SIGNIFICANT CHANGE UP (ref 3.5–5)
PROT SERPL-MCNC: 7.1 G/DL — SIGNIFICANT CHANGE UP (ref 6–8)
RBC # BLD: 3.47 M/UL — LOW (ref 4.2–5.4)
RBC # FLD: 12.9 % — SIGNIFICANT CHANGE UP (ref 11.5–14.5)
SODIUM SERPL-SCNC: 138 MMOL/L — SIGNIFICANT CHANGE UP (ref 135–146)
SPECIMEN SOURCE: SIGNIFICANT CHANGE UP
SPECIMEN SOURCE: SIGNIFICANT CHANGE UP
WBC # BLD: 4.82 K/UL — SIGNIFICANT CHANGE UP (ref 4.8–10.8)
WBC # FLD AUTO: 4.82 K/UL — SIGNIFICANT CHANGE UP (ref 4.8–10.8)

## 2021-11-23 PROCEDURE — 99232 SBSQ HOSP IP/OBS MODERATE 35: CPT

## 2021-11-23 RX ORDER — METHOCARBAMOL 500 MG/1
500 TABLET, FILM COATED ORAL EVERY 8 HOURS
Refills: 0 | Status: DISCONTINUED | OUTPATIENT
Start: 2021-11-23 | End: 2021-11-27

## 2021-11-23 RX ORDER — LINEZOLID 600 MG/300ML
600 INJECTION, SOLUTION INTRAVENOUS EVERY 12 HOURS
Refills: 0 | Status: DISCONTINUED | OUTPATIENT
Start: 2021-11-23 | End: 2021-11-27

## 2021-11-23 RX ADMIN — GABAPENTIN 300 MILLIGRAM(S): 400 CAPSULE ORAL at 05:31

## 2021-11-23 RX ADMIN — LIDOCAINE 1 PATCH: 4 CREAM TOPICAL at 23:15

## 2021-11-23 RX ADMIN — GABAPENTIN 300 MILLIGRAM(S): 400 CAPSULE ORAL at 13:28

## 2021-11-23 RX ADMIN — Medication 100 MILLIGRAM(S): at 13:27

## 2021-11-23 RX ADMIN — HYDROMORPHONE HYDROCHLORIDE 4 MILLIGRAM(S): 2 INJECTION INTRAMUSCULAR; INTRAVENOUS; SUBCUTANEOUS at 06:30

## 2021-11-23 RX ADMIN — Medication 1 TABLET(S): at 21:48

## 2021-11-23 RX ADMIN — Medication 1 GRAM(S): at 17:19

## 2021-11-23 RX ADMIN — CHLORHEXIDINE GLUCONATE 1 APPLICATION(S): 213 SOLUTION TOPICAL at 05:29

## 2021-11-23 RX ADMIN — LINEZOLID 600 MILLIGRAM(S): 600 INJECTION, SOLUTION INTRAVENOUS at 17:19

## 2021-11-23 RX ADMIN — ENOXAPARIN SODIUM 40 MILLIGRAM(S): 100 INJECTION SUBCUTANEOUS at 21:48

## 2021-11-23 RX ADMIN — Medication 3 CAPSULE(S): at 07:37

## 2021-11-23 RX ADMIN — Medication 2000 UNIT(S): at 11:27

## 2021-11-23 RX ADMIN — HYDROMORPHONE HYDROCHLORIDE 4 MILLIGRAM(S): 2 INJECTION INTRAMUSCULAR; INTRAVENOUS; SUBCUTANEOUS at 06:00

## 2021-11-23 RX ADMIN — HYDROMORPHONE HYDROCHLORIDE 4 MILLIGRAM(S): 2 INJECTION INTRAMUSCULAR; INTRAVENOUS; SUBCUTANEOUS at 18:09

## 2021-11-23 RX ADMIN — Medication 1 TABLET(S): at 11:28

## 2021-11-23 RX ADMIN — PANTOPRAZOLE SODIUM 40 MILLIGRAM(S): 20 TABLET, DELAYED RELEASE ORAL at 05:31

## 2021-11-23 RX ADMIN — POLYETHYLENE GLYCOL 3350 17 GRAM(S): 17 POWDER, FOR SOLUTION ORAL at 11:26

## 2021-11-23 RX ADMIN — Medication 3 CAPSULE(S): at 12:17

## 2021-11-23 RX ADMIN — GABAPENTIN 300 MILLIGRAM(S): 400 CAPSULE ORAL at 21:48

## 2021-11-23 RX ADMIN — HYDROMORPHONE HYDROCHLORIDE 4 MILLIGRAM(S): 2 INJECTION INTRAMUSCULAR; INTRAVENOUS; SUBCUTANEOUS at 09:59

## 2021-11-23 RX ADMIN — HYDROMORPHONE HYDROCHLORIDE 4 MILLIGRAM(S): 2 INJECTION INTRAMUSCULAR; INTRAVENOUS; SUBCUTANEOUS at 23:12

## 2021-11-23 RX ADMIN — Medication 1 TABLET(S): at 09:34

## 2021-11-23 RX ADMIN — LIDOCAINE 1 PATCH: 4 CREAM TOPICAL at 18:45

## 2021-11-23 RX ADMIN — HYDROMORPHONE HYDROCHLORIDE 4 MILLIGRAM(S): 2 INJECTION INTRAMUSCULAR; INTRAVENOUS; SUBCUTANEOUS at 01:45

## 2021-11-23 RX ADMIN — Medication 1 GRAM(S): at 23:14

## 2021-11-23 RX ADMIN — HYDROMORPHONE HYDROCHLORIDE 4 MILLIGRAM(S): 2 INJECTION INTRAMUSCULAR; INTRAVENOUS; SUBCUTANEOUS at 18:41

## 2021-11-23 RX ADMIN — LIDOCAINE 1 PATCH: 4 CREAM TOPICAL at 00:00

## 2021-11-23 RX ADMIN — METHOCARBAMOL 500 MILLIGRAM(S): 500 TABLET, FILM COATED ORAL at 06:00

## 2021-11-23 RX ADMIN — Medication 5 MILLIGRAM(S): at 21:48

## 2021-11-23 RX ADMIN — HYDROMORPHONE HYDROCHLORIDE 4 MILLIGRAM(S): 2 INJECTION INTRAMUSCULAR; INTRAVENOUS; SUBCUTANEOUS at 10:30

## 2021-11-23 RX ADMIN — LIDOCAINE 1 PATCH: 4 CREAM TOPICAL at 11:25

## 2021-11-23 RX ADMIN — METHOCARBAMOL 500 MILLIGRAM(S): 500 TABLET, FILM COATED ORAL at 14:39

## 2021-11-23 RX ADMIN — Medication 1 GRAM(S): at 11:26

## 2021-11-23 RX ADMIN — HYDROMORPHONE HYDROCHLORIDE 4 MILLIGRAM(S): 2 INJECTION INTRAMUSCULAR; INTRAVENOUS; SUBCUTANEOUS at 14:34

## 2021-11-23 RX ADMIN — Medication 100 MILLIGRAM(S): at 05:29

## 2021-11-23 RX ADMIN — METHOCARBAMOL 500 MILLIGRAM(S): 500 TABLET, FILM COATED ORAL at 22:42

## 2021-11-23 RX ADMIN — SENNA PLUS 2 TABLET(S): 8.6 TABLET ORAL at 21:48

## 2021-11-23 RX ADMIN — HYDROMORPHONE HYDROCHLORIDE 4 MILLIGRAM(S): 2 INJECTION INTRAMUSCULAR; INTRAVENOUS; SUBCUTANEOUS at 02:15

## 2021-11-23 RX ADMIN — HYDROMORPHONE HYDROCHLORIDE 4 MILLIGRAM(S): 2 INJECTION INTRAMUSCULAR; INTRAVENOUS; SUBCUTANEOUS at 22:42

## 2021-11-23 RX ADMIN — Medication 1 GRAM(S): at 05:30

## 2021-11-23 RX ADMIN — Medication 3 CAPSULE(S): at 17:20

## 2021-11-23 RX ADMIN — HYDROMORPHONE HYDROCHLORIDE 4 MILLIGRAM(S): 2 INJECTION INTRAMUSCULAR; INTRAVENOUS; SUBCUTANEOUS at 14:04
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
## 2021-11-23 NOTE — CHART NOTE - NSCHARTNOTEFT_GEN_A_CORE
Patient off floor.     Primary team requesting possible oral options.     Can switch to Linezolid 600 mg q 12 hours until 11/29, followed by PO bactrim 1dS BID from 11/30-12/13    Please call or message on [employer] Teams if with any questions.  Spectra [phone]

## 2021-11-24 LAB
ALBUMIN SERPL ELPH-MCNC: 3.8 G/DL — SIGNIFICANT CHANGE UP (ref 3.5–5.2)
ALP SERPL-CCNC: 114 U/L — SIGNIFICANT CHANGE UP (ref 30–115)
ALT FLD-CCNC: 35 U/L — SIGNIFICANT CHANGE UP (ref 0–41)
ANION GAP SERPL CALC-SCNC: 15 MMOL/L — HIGH (ref 7–14)
AST SERPL-CCNC: 162 U/L — HIGH (ref 0–41)
BASOPHILS # BLD AUTO: 0.07 K/UL — SIGNIFICANT CHANGE UP (ref 0–0.2)
BASOPHILS NFR BLD AUTO: 1.7 % — HIGH (ref 0–1)
BILIRUB SERPL-MCNC: <0.2 MG/DL — SIGNIFICANT CHANGE UP (ref 0.2–1.2)
BUN SERPL-MCNC: 7 MG/DL — LOW (ref 10–20)
CALCIUM SERPL-MCNC: 9.2 MG/DL — SIGNIFICANT CHANGE UP (ref 8.5–10.1)
CHLORIDE SERPL-SCNC: 101 MMOL/L — SIGNIFICANT CHANGE UP (ref 98–110)
CO2 SERPL-SCNC: 21 MMOL/L — SIGNIFICANT CHANGE UP (ref 17–32)
CREAT SERPL-MCNC: 0.5 MG/DL — LOW (ref 0.7–1.5)
CULTURE RESULTS: SIGNIFICANT CHANGE UP
EOSINOPHIL # BLD AUTO: 0.12 K/UL — SIGNIFICANT CHANGE UP (ref 0–0.7)
EOSINOPHIL NFR BLD AUTO: 2.9 % — SIGNIFICANT CHANGE UP (ref 0–8)
GLUCOSE SERPL-MCNC: 107 MG/DL — HIGH (ref 70–99)
HCT VFR BLD CALC: 32 % — LOW (ref 37–47)
HGB BLD-MCNC: 10.8 G/DL — LOW (ref 12–16)
IMM GRANULOCYTES NFR BLD AUTO: 0.5 % — HIGH (ref 0.1–0.3)
LYMPHOCYTES # BLD AUTO: 1.44 K/UL — SIGNIFICANT CHANGE UP (ref 1.2–3.4)
LYMPHOCYTES # BLD AUTO: 34.9 % — SIGNIFICANT CHANGE UP (ref 20.5–51.1)
MCHC RBC-ENTMCNC: 31.4 PG — HIGH (ref 27–31)
MCHC RBC-ENTMCNC: 33.8 G/DL — SIGNIFICANT CHANGE UP (ref 32–37)
MCV RBC AUTO: 93 FL — SIGNIFICANT CHANGE UP (ref 81–99)
MONOCYTES # BLD AUTO: 0.42 K/UL — SIGNIFICANT CHANGE UP (ref 0.1–0.6)
MONOCYTES NFR BLD AUTO: 10.2 % — HIGH (ref 1.7–9.3)
NEUTROPHILS # BLD AUTO: 2.06 K/UL — SIGNIFICANT CHANGE UP (ref 1.4–6.5)
NEUTROPHILS NFR BLD AUTO: 49.8 % — SIGNIFICANT CHANGE UP (ref 42.2–75.2)
NRBC # BLD: 0 /100 WBCS — SIGNIFICANT CHANGE UP (ref 0–0)
PLATELET # BLD AUTO: 477 K/UL — HIGH (ref 130–400)
POTASSIUM SERPL-MCNC: 3.8 MMOL/L — SIGNIFICANT CHANGE UP (ref 3.5–5)
POTASSIUM SERPL-SCNC: 3.8 MMOL/L — SIGNIFICANT CHANGE UP (ref 3.5–5)
PROT SERPL-MCNC: 6.6 G/DL — SIGNIFICANT CHANGE UP (ref 6–8)
RBC # BLD: 3.44 M/UL — LOW (ref 4.2–5.4)
RBC # FLD: 12.7 % — SIGNIFICANT CHANGE UP (ref 11.5–14.5)
SODIUM SERPL-SCNC: 137 MMOL/L — SIGNIFICANT CHANGE UP (ref 135–146)
SPECIMEN SOURCE: SIGNIFICANT CHANGE UP
VIT A SERPL-MCNC: 22 UG/DL — SIGNIFICANT CHANGE UP (ref 20.1–62)
WBC # BLD: 4.13 K/UL — LOW (ref 4.8–10.8)
WBC # FLD AUTO: 4.13 K/UL — LOW (ref 4.8–10.8)
ZINC SERPL-MCNC: 63 UG/DL — SIGNIFICANT CHANGE UP (ref 44–115)

## 2021-11-24 PROCEDURE — 99233 SBSQ HOSP IP/OBS HIGH 50: CPT

## 2021-11-24 RX ORDER — ACETAMINOPHEN 500 MG
1000 TABLET ORAL EVERY 8 HOURS
Refills: 0 | Status: DISCONTINUED | OUTPATIENT
Start: 2021-11-24 | End: 2021-11-27

## 2021-11-24 RX ORDER — HYDROMORPHONE HYDROCHLORIDE 2 MG/ML
2 INJECTION INTRAMUSCULAR; INTRAVENOUS; SUBCUTANEOUS EVERY 4 HOURS
Refills: 0 | Status: DISCONTINUED | OUTPATIENT
Start: 2021-11-24 | End: 2021-11-26

## 2021-11-24 RX ADMIN — LINEZOLID 600 MILLIGRAM(S): 600 INJECTION, SOLUTION INTRAVENOUS at 17:51

## 2021-11-24 RX ADMIN — Medication 3 CAPSULE(S): at 17:51

## 2021-11-24 RX ADMIN — GABAPENTIN 300 MILLIGRAM(S): 400 CAPSULE ORAL at 13:03

## 2021-11-24 RX ADMIN — HYDROMORPHONE HYDROCHLORIDE 2 MILLIGRAM(S): 2 INJECTION INTRAMUSCULAR; INTRAVENOUS; SUBCUTANEOUS at 13:58

## 2021-11-24 RX ADMIN — HYDROMORPHONE HYDROCHLORIDE 2 MILLIGRAM(S): 2 INJECTION INTRAMUSCULAR; INTRAVENOUS; SUBCUTANEOUS at 22:21

## 2021-11-24 RX ADMIN — HYDROMORPHONE HYDROCHLORIDE 4 MILLIGRAM(S): 2 INJECTION INTRAMUSCULAR; INTRAVENOUS; SUBCUTANEOUS at 11:58

## 2021-11-24 RX ADMIN — LIDOCAINE 1 PATCH: 4 CREAM TOPICAL at 19:00

## 2021-11-24 RX ADMIN — POLYETHYLENE GLYCOL 3350 17 GRAM(S): 17 POWDER, FOR SOLUTION ORAL at 11:13

## 2021-11-24 RX ADMIN — Medication 1 TABLET(S): at 22:02

## 2021-11-24 RX ADMIN — LINEZOLID 600 MILLIGRAM(S): 600 INJECTION, SOLUTION INTRAVENOUS at 05:35

## 2021-11-24 RX ADMIN — ONDANSETRON 4 MILLIGRAM(S): 8 TABLET, FILM COATED ORAL at 07:57

## 2021-11-24 RX ADMIN — HYDROMORPHONE HYDROCHLORIDE 2 MILLIGRAM(S): 2 INJECTION INTRAMUSCULAR; INTRAVENOUS; SUBCUTANEOUS at 18:06

## 2021-11-24 RX ADMIN — Medication 1 TABLET(S): at 11:13

## 2021-11-24 RX ADMIN — Medication 5 MILLIGRAM(S): at 22:02

## 2021-11-24 RX ADMIN — GABAPENTIN 300 MILLIGRAM(S): 400 CAPSULE ORAL at 22:02

## 2021-11-24 RX ADMIN — Medication 2000 UNIT(S): at 11:13

## 2021-11-24 RX ADMIN — SENNA PLUS 2 TABLET(S): 8.6 TABLET ORAL at 22:02

## 2021-11-24 RX ADMIN — LIDOCAINE 1 PATCH: 4 CREAM TOPICAL at 11:11

## 2021-11-24 RX ADMIN — CHLORHEXIDINE GLUCONATE 1 APPLICATION(S): 213 SOLUTION TOPICAL at 05:35

## 2021-11-24 RX ADMIN — Medication 1 GRAM(S): at 23:32

## 2021-11-24 RX ADMIN — ENOXAPARIN SODIUM 40 MILLIGRAM(S): 100 INJECTION SUBCUTANEOUS at 22:02

## 2021-11-24 RX ADMIN — HYDROMORPHONE HYDROCHLORIDE 2 MILLIGRAM(S): 2 INJECTION INTRAMUSCULAR; INTRAVENOUS; SUBCUTANEOUS at 22:06

## 2021-11-24 RX ADMIN — HYDROMORPHONE HYDROCHLORIDE 4 MILLIGRAM(S): 2 INJECTION INTRAMUSCULAR; INTRAVENOUS; SUBCUTANEOUS at 02:45

## 2021-11-24 RX ADMIN — PANTOPRAZOLE SODIUM 40 MILLIGRAM(S): 20 TABLET, DELAYED RELEASE ORAL at 05:35

## 2021-11-24 RX ADMIN — HYDROMORPHONE HYDROCHLORIDE 2 MILLIGRAM(S): 2 INJECTION INTRAMUSCULAR; INTRAVENOUS; SUBCUTANEOUS at 14:12

## 2021-11-24 RX ADMIN — LIDOCAINE 1 PATCH: 4 CREAM TOPICAL at 23:19

## 2021-11-24 RX ADMIN — HYDROMORPHONE HYDROCHLORIDE 4 MILLIGRAM(S): 2 INJECTION INTRAMUSCULAR; INTRAVENOUS; SUBCUTANEOUS at 07:18

## 2021-11-24 RX ADMIN — Medication 1000 MILLIGRAM(S): at 22:32

## 2021-11-24 RX ADMIN — HYDROMORPHONE HYDROCHLORIDE 2 MILLIGRAM(S): 2 INJECTION INTRAMUSCULAR; INTRAVENOUS; SUBCUTANEOUS at 18:25

## 2021-11-24 RX ADMIN — HYDROMORPHONE HYDROCHLORIDE 4 MILLIGRAM(S): 2 INJECTION INTRAMUSCULAR; INTRAVENOUS; SUBCUTANEOUS at 06:48

## 2021-11-24 RX ADMIN — Medication 1 GRAM(S): at 17:52

## 2021-11-24 RX ADMIN — HYDROMORPHONE HYDROCHLORIDE 4 MILLIGRAM(S): 2 INJECTION INTRAMUSCULAR; INTRAVENOUS; SUBCUTANEOUS at 03:15

## 2021-11-24 RX ADMIN — Medication 1 GRAM(S): at 05:35

## 2021-11-24 RX ADMIN — Medication 1000 MILLIGRAM(S): at 22:02

## 2021-11-24 RX ADMIN — GABAPENTIN 300 MILLIGRAM(S): 400 CAPSULE ORAL at 05:35

## 2021-11-24 RX ADMIN — Medication 1 TABLET(S): at 09:53

## 2021-11-24 RX ADMIN — Medication 1 GRAM(S): at 11:13

## 2021-11-24 RX ADMIN — HYDROMORPHONE HYDROCHLORIDE 4 MILLIGRAM(S): 2 INJECTION INTRAMUSCULAR; INTRAVENOUS; SUBCUTANEOUS at 11:28

## 2021-11-24 RX ADMIN — Medication 3 CAPSULE(S): at 07:55

## 2021-11-24 NOTE — CONSULT NOTE ADULT - ASSESSMENT
46 F w/ PMH ETOH abuse, chronic alcoholic pancreatitis w/ pseudocyst p/w worsening abdominal pain found to have acute on chronic pancreatitis w/ pseudocyst    - Would continue for the next 24-48 hours with IV pain medications, but would change to Hydromorphone 2mg IV q4h PRN for severe pain for the next 24 hours, then for following 24 hours would change to hydromorphone 1mg IV q4h PRN - she says pain is considerably less than previous  - Would then transition to oxycodone IR 5mg PO q4h PRN for moderate pain OR oxycodone IR 10mg PO q4h PRN for severe pain  - Tylenol 1000mg TID around the clock  - NSAIDS if not contraindicated  - Consider nortriptyline 10mg qhs for chronic pancreatitis pain  - Narcan for patient on high dose opioids  - Bowel regimen

## 2021-11-24 NOTE — CONSULT NOTE ADULT - REASON FOR ADMISSION
Acute on chronic pancreatitis;
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
## 2021-11-24 NOTE — CONSULT NOTE ADULT - CONSULT REASON
Acute on chronic pancreatitis;
acute on chronic pancreatitis pain
acute pancreatitis
Alcohol use hx, CATCH team
right forearm inflammation
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
## 2021-11-24 NOTE — PROGRESS NOTE ADULT - SUBJECTIVE AND OBJECTIVE BOX
LAURA BARAJAS  46y  Female      Patient is a 46y old  Female who presents with a chief complaint of Acute on chronic pancreatitis;       INTERVAL HPI/OVERNIGHT EVENTS:      ******************************* REVIEW OF SYSTEMS:**********************************************    All other review of systems negative    *********************** VITALS ******************************************    T(F): 96.8 (11-24-21 @ 14:03)  HR: 80 (11-24-21 @ 14:03) (73 - 80)  BP: 119/67 (11-24-21 @ 14:03) (113/70 - 121/71)  RR: 18 (11-24-21 @ 14:03) (18 - 18)  SpO2: 94% (11-23-21 @ 20:05) (94% - 94%)            ******************************** PHYSICAL EXAM:**************************************************  GENERAL: NAD    PSYCH: no agitation, baseline mentation  HEENT:     NERVOUS SYSTEM:  Alert & Oriented X3,   PULMONARY: JOSE, CTA    CARDIOVASCULAR: S1S2 RRR    GI: Soft, epigastric tenderness, ND; BS present.    EXTREMITIES:  2+ Peripheral Pulses, No clubbing, cyanosis, or edema    LYMPH: No lymphadenopathy noted    SKIN: No rashes or lesions      **************************** LABS *******************************************************                          10.8   4.13  )-----------( 477      ( 24 Nov 2021 06:35 )             32.0     11-24    137  |  101  |  7<L>  ----------------------------<  107<H>  3.8   |  21  |  0.5<L>    Ca    9.2      24 Nov 2021 06:35  Mg     1.7     11-23    TPro  6.6  /  Alb  3.8  /  TBili  <0.2  /  DBili  x   /  AST  162<H>  /  ALT  35  /  AlkPhos  114  11-24          Lactate Trend        CAPILLARY BLOOD GLUCOSE              **************************Active Medications *******************************************  Compazine (Unknown)      acetaminophen     Tablet .. 1000 milliGRAM(s) Oral every 8 hours  bisacodyl Suppository 10 milliGRAM(s) Rectal daily  chlorhexidine 4% Liquid 1 Application(s) Topical <User Schedule>  cholecalciferol 2000 Unit(s) Oral daily  enoxaparin Injectable 40 milliGRAM(s) SubCutaneous at bedtime  gabapentin 300 milliGRAM(s) Oral three times a day  HYDROmorphone  Injectable 2 milliGRAM(s) IV Push every 4 hours PRN  ibuprofen  Tablet. 600 milliGRAM(s) Oral every 6 hours PRN  iohexol 300 mG (iodine)/mL Oral Solution 30 milliLiter(s) Oral once  lactobacillus acidophilus 1 Tablet(s) Oral two times a day  lidocaine   4% Patch 1 Patch Transdermal daily  linezolid    Tablet 600 milliGRAM(s) Oral every 12 hours  melatonin 5 milliGRAM(s) Oral at bedtime  methocarbamol 500 milliGRAM(s) Oral every 8 hours PRN  multivitamin/minerals 1 Tablet(s) Oral daily  ondansetron    Tablet 4 milliGRAM(s) Oral every 6 hours PRN  pancrelipase  (CREON 12,000 Lipase Units) 3 Capsule(s) Oral three times a day with meals  pantoprazole    Tablet 40 milliGRAM(s) Oral before breakfast  polyethylene glycol 3350 17 Gram(s) Oral daily  senna 2 Tablet(s) Oral at bedtime  sucralfate 1 Gram(s) Oral four times a day      ***************************************************  RADIOLOGY & ADDITIONAL TESTS:    Imaging Personally Reviewed:  [ ] YES  [ ] NO    HEALTH ISSUES - PROBLEM Dx:

## 2021-11-24 NOTE — CONSULT NOTE ADULT - TIME BILLING
Coordination of Care  Patient education
I have personally seen and examined this patient.    I have reviewed all pertinent clinical information and reviewed all relevant imaging and diagnostic studies personally.   I counseled the patient about diagnostic testing and treatment plan. All questions were answered.   I discussed recommendations with the primary team.

## 2021-11-24 NOTE — PROGRESS NOTE ADULT - SUBJECTIVE AND OBJECTIVE BOX
DAILY PROGRESS NOTE  ===========================================================    Patient Information:  LAURA BARAJAS  /  46y  /  Female  /  MRN#: [id number]    Hospital Day: 17d     |:::::::::::::::::::::::::::| SUBJECTIVE |:::::::::::::::::::::::::::|    OVERNIGHT EVENTS:   TODAY: Patient was seen today at bedside. Review of systems is otherwise negative.    |:::::::::::::::::::::::::::| OBJECTIVE |:::::::::::::::::::::::::::|    VITAL SIGNS: Last 24 Hours  T(C): 35.6 (23 Nov 2021 05:45), Max: 36.7 (22 Nov 2021 21:06)  T(F): 96 (23 Nov 2021 05:45), Max: 98.1 (22 Nov 2021 21:06)  HR: 77 (23 Nov 2021 05:45) (73 - 77)  BP: 122/85 (23 Nov 2021 05:45) (115/69 - 122/85)  BP(mean): --  RR: 18 (23 Nov 2021 05:45) (18 - 18)  SpO2: 97% (23 Nov 2021 05:45) (97% - 97%)    PHYSICAL EXAM:  GENERAL:   Awake, alert; NAD.  HEENT:  Head NC/AT; Conjunctivae pink, Sclera anicteric; Oral mucosa moist.  CARDIO:   Regular rate; Regular rhythm  RESP:   No rales, wheezing, or rhonchi appreciated.  GI:   Soft; NT/ND; BS; No guarding; No rebound tenderness.  EXT:   No edema.       LAB RESULTS:                        10.9   4.82  )-----------( 522      ( 23 Nov 2021 04:30 )             33.2     11-23    138  |  102  |  5<L>  ----------------------------<  99  4.0   |  22  |  0.6<L>    Ca    9.6      23 Nov 2021 04:30  Mg     1.7     11-23    TPro  7.1  /  Alb  4.0  /  TBili  <0.2  /  DBili  x   /  AST  211<H>  /  ALT  46<H>  /  AlkPhos  124<H>  11-23    ALLERGIES:  Compazine (Unknown)      ===========================================================

## 2021-11-25 LAB
CULTURE RESULTS: SIGNIFICANT CHANGE UP
SARS-COV-2 RNA SPEC QL NAA+PROBE: SIGNIFICANT CHANGE UP
SPECIMEN SOURCE: SIGNIFICANT CHANGE UP

## 2021-11-25 PROCEDURE — 99233 SBSQ HOSP IP/OBS HIGH 50: CPT

## 2021-11-25 RX ADMIN — Medication 1 GRAM(S): at 05:29

## 2021-11-25 RX ADMIN — Medication 1 GRAM(S): at 11:02

## 2021-11-25 RX ADMIN — HYDROMORPHONE HYDROCHLORIDE 2 MILLIGRAM(S): 2 INJECTION INTRAMUSCULAR; INTRAVENOUS; SUBCUTANEOUS at 02:17

## 2021-11-25 RX ADMIN — Medication 1 TABLET(S): at 22:03

## 2021-11-25 RX ADMIN — Medication 3 CAPSULE(S): at 07:49

## 2021-11-25 RX ADMIN — POLYETHYLENE GLYCOL 3350 17 GRAM(S): 17 POWDER, FOR SOLUTION ORAL at 11:02

## 2021-11-25 RX ADMIN — Medication 1 GRAM(S): at 23:50

## 2021-11-25 RX ADMIN — Medication 3 CAPSULE(S): at 11:37

## 2021-11-25 RX ADMIN — HYDROMORPHONE HYDROCHLORIDE 2 MILLIGRAM(S): 2 INJECTION INTRAMUSCULAR; INTRAVENOUS; SUBCUTANEOUS at 10:20

## 2021-11-25 RX ADMIN — LINEZOLID 600 MILLIGRAM(S): 600 INJECTION, SOLUTION INTRAVENOUS at 05:29

## 2021-11-25 RX ADMIN — Medication 1000 MILLIGRAM(S): at 13:48

## 2021-11-25 RX ADMIN — ENOXAPARIN SODIUM 40 MILLIGRAM(S): 100 INJECTION SUBCUTANEOUS at 22:02

## 2021-11-25 RX ADMIN — CHLORHEXIDINE GLUCONATE 1 APPLICATION(S): 213 SOLUTION TOPICAL at 05:29

## 2021-11-25 RX ADMIN — HYDROMORPHONE HYDROCHLORIDE 2 MILLIGRAM(S): 2 INJECTION INTRAMUSCULAR; INTRAVENOUS; SUBCUTANEOUS at 14:42

## 2021-11-25 RX ADMIN — SENNA PLUS 2 TABLET(S): 8.6 TABLET ORAL at 22:03

## 2021-11-25 RX ADMIN — PANTOPRAZOLE SODIUM 40 MILLIGRAM(S): 20 TABLET, DELAYED RELEASE ORAL at 05:28

## 2021-11-25 RX ADMIN — GABAPENTIN 300 MILLIGRAM(S): 400 CAPSULE ORAL at 22:03

## 2021-11-25 RX ADMIN — Medication 1000 MILLIGRAM(S): at 05:29

## 2021-11-25 RX ADMIN — Medication 1000 MILLIGRAM(S): at 05:59

## 2021-11-25 RX ADMIN — HYDROMORPHONE HYDROCHLORIDE 2 MILLIGRAM(S): 2 INJECTION INTRAMUSCULAR; INTRAVENOUS; SUBCUTANEOUS at 23:07

## 2021-11-25 RX ADMIN — HYDROMORPHONE HYDROCHLORIDE 2 MILLIGRAM(S): 2 INJECTION INTRAMUSCULAR; INTRAVENOUS; SUBCUTANEOUS at 06:18

## 2021-11-25 RX ADMIN — Medication 1 TABLET(S): at 11:03

## 2021-11-25 RX ADMIN — HYDROMORPHONE HYDROCHLORIDE 2 MILLIGRAM(S): 2 INJECTION INTRAMUSCULAR; INTRAVENOUS; SUBCUTANEOUS at 10:35

## 2021-11-25 RX ADMIN — HYDROMORPHONE HYDROCHLORIDE 2 MILLIGRAM(S): 2 INJECTION INTRAMUSCULAR; INTRAVENOUS; SUBCUTANEOUS at 18:28

## 2021-11-25 RX ADMIN — LIDOCAINE 1 PATCH: 4 CREAM TOPICAL at 19:45

## 2021-11-25 RX ADMIN — HYDROMORPHONE HYDROCHLORIDE 2 MILLIGRAM(S): 2 INJECTION INTRAMUSCULAR; INTRAVENOUS; SUBCUTANEOUS at 14:27

## 2021-11-25 RX ADMIN — Medication 3 CAPSULE(S): at 17:20

## 2021-11-25 RX ADMIN — Medication 1 GRAM(S): at 17:21

## 2021-11-25 RX ADMIN — Medication 1000 MILLIGRAM(S): at 22:32

## 2021-11-25 RX ADMIN — LINEZOLID 600 MILLIGRAM(S): 600 INJECTION, SOLUTION INTRAVENOUS at 17:21

## 2021-11-25 RX ADMIN — HYDROMORPHONE HYDROCHLORIDE 2 MILLIGRAM(S): 2 INJECTION INTRAMUSCULAR; INTRAVENOUS; SUBCUTANEOUS at 02:32

## 2021-11-25 RX ADMIN — LIDOCAINE 1 PATCH: 4 CREAM TOPICAL at 23:15

## 2021-11-25 RX ADMIN — Medication 5 MILLIGRAM(S): at 22:03

## 2021-11-25 RX ADMIN — HYDROMORPHONE HYDROCHLORIDE 2 MILLIGRAM(S): 2 INJECTION INTRAMUSCULAR; INTRAVENOUS; SUBCUTANEOUS at 22:37

## 2021-11-25 RX ADMIN — GABAPENTIN 300 MILLIGRAM(S): 400 CAPSULE ORAL at 05:29

## 2021-11-25 RX ADMIN — HYDROMORPHONE HYDROCHLORIDE 2 MILLIGRAM(S): 2 INJECTION INTRAMUSCULAR; INTRAVENOUS; SUBCUTANEOUS at 18:43

## 2021-11-25 RX ADMIN — Medication 2000 UNIT(S): at 11:02

## 2021-11-25 RX ADMIN — Medication 1 TABLET(S): at 09:07

## 2021-11-25 RX ADMIN — GABAPENTIN 300 MILLIGRAM(S): 400 CAPSULE ORAL at 13:18

## 2021-11-25 RX ADMIN — Medication 1000 MILLIGRAM(S): at 13:18

## 2021-11-25 RX ADMIN — LIDOCAINE 1 PATCH: 4 CREAM TOPICAL at 11:04

## 2021-11-25 RX ADMIN — ONDANSETRON 4 MILLIGRAM(S): 8 TABLET, FILM COATED ORAL at 18:16

## 2021-11-25 RX ADMIN — Medication 1000 MILLIGRAM(S): at 22:02

## 2021-11-25 NOTE — PROGRESS NOTE ADULT - SUBJECTIVE AND OBJECTIVE BOX
LAURA BARAJAS  46y  Female      Patient is a 46y old  Female who presents with a chief complaint of Acute on chronic pancreatitis;       INTERVAL HPI/OVERNIGHT EVENTS:      ******************************* REVIEW OF SYSTEMS:**********************************************    All other review of systems negative    *********************** VITALS ******************************************    T(F): 96.8 (11-25-21 @ 12:10)  HR: 69 (11-25-21 @ 12:10) (69 - 83)  BP: 107/53 (11-25-21 @ 12:10) (99/70 - 110/65)  RR: 18 (11-25-21 @ 12:10) (18 - 18)  SpO2: --            ******************************** PHYSICAL EXAM:**************************************************  GENERAL: NAD    PSYCH: no agitation, baseline mentation  HEENT:     NERVOUS SYSTEM:  Alert & Oriented X3,   PULMONARY: JOSE, CTA    CARDIOVASCULAR: S1S2 RRR    GI: Soft, NT, ND; BS present.    EXTREMITIES:  2+ Peripheral Pulses, No clubbing, cyanosis, or edema    LYMPH: No lymphadenopathy noted    SKIN: No rashes or lesions      **************************** LABS *******************************************************                          10.8   4.13  )-----------( 477      ( 24 Nov 2021 06:35 )             32.0     11-24    137  |  101  |  7<L>  ----------------------------<  107<H>  3.8   |  21  |  0.5<L>    Ca    9.2      24 Nov 2021 06:35    TPro  6.6  /  Alb  3.8  /  TBili  <0.2  /  DBili  x   /  AST  162<H>  /  ALT  35  /  AlkPhos  114  11-24          Lactate Trend        CAPILLARY BLOOD GLUCOSE              **************************Active Medications *******************************************  Compazine (Unknown)      acetaminophen     Tablet .. 1000 milliGRAM(s) Oral every 8 hours  bisacodyl Suppository 10 milliGRAM(s) Rectal daily  chlorhexidine 4% Liquid 1 Application(s) Topical <User Schedule>  cholecalciferol 2000 Unit(s) Oral daily  enoxaparin Injectable 40 milliGRAM(s) SubCutaneous at bedtime  gabapentin 300 milliGRAM(s) Oral three times a day  HYDROmorphone  Injectable 2 milliGRAM(s) IV Push every 4 hours PRN  ibuprofen  Tablet. 600 milliGRAM(s) Oral every 6 hours PRN  iohexol 300 mG (iodine)/mL Oral Solution 30 milliLiter(s) Oral once  lactobacillus acidophilus 1 Tablet(s) Oral two times a day  lidocaine   4% Patch 1 Patch Transdermal daily  linezolid    Tablet 600 milliGRAM(s) Oral every 12 hours  melatonin 5 milliGRAM(s) Oral at bedtime  methocarbamol 500 milliGRAM(s) Oral every 8 hours PRN  multivitamin/minerals 1 Tablet(s) Oral daily  ondansetron    Tablet 4 milliGRAM(s) Oral every 6 hours PRN  pancrelipase  (CREON 12,000 Lipase Units) 3 Capsule(s) Oral three times a day with meals  pantoprazole    Tablet 40 milliGRAM(s) Oral before breakfast  polyethylene glycol 3350 17 Gram(s) Oral daily  senna 2 Tablet(s) Oral at bedtime  sucralfate 1 Gram(s) Oral four times a day      ***************************************************  RADIOLOGY & ADDITIONAL TESTS:    Imaging Personally Reviewed:  [ ] YES  [ ] NO    HEALTH ISSUES - PROBLEM Dx:
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
## 2021-11-26 ENCOUNTER — TRANSCRIPTION ENCOUNTER (OUTPATIENT)
Age: 46
End: 2021-11-26

## 2021-11-26 LAB — MENADIONE SERPL-MCNC: 0.28 NG/ML — SIGNIFICANT CHANGE UP (ref 0.1–2.2)

## 2021-11-26 PROCEDURE — 99233 SBSQ HOSP IP/OBS HIGH 50: CPT

## 2021-11-26 RX ORDER — HYDROMORPHONE HYDROCHLORIDE 2 MG/ML
1 INJECTION INTRAMUSCULAR; INTRAVENOUS; SUBCUTANEOUS EVERY 4 HOURS
Refills: 0 | Status: DISCONTINUED | OUTPATIENT
Start: 2021-11-26 | End: 2021-11-27

## 2021-11-26 RX ORDER — HYDROMORPHONE HYDROCHLORIDE 2 MG/ML
2 INJECTION INTRAMUSCULAR; INTRAVENOUS; SUBCUTANEOUS EVERY 4 HOURS
Refills: 0 | Status: DISCONTINUED | OUTPATIENT
Start: 2021-11-26 | End: 2021-11-26

## 2021-11-26 RX ADMIN — PANTOPRAZOLE SODIUM 40 MILLIGRAM(S): 20 TABLET, DELAYED RELEASE ORAL at 06:19

## 2021-11-26 RX ADMIN — GABAPENTIN 300 MILLIGRAM(S): 400 CAPSULE ORAL at 14:09

## 2021-11-26 RX ADMIN — LINEZOLID 600 MILLIGRAM(S): 600 INJECTION, SOLUTION INTRAVENOUS at 17:12

## 2021-11-26 RX ADMIN — HYDROMORPHONE HYDROCHLORIDE 2 MILLIGRAM(S): 2 INJECTION INTRAMUSCULAR; INTRAVENOUS; SUBCUTANEOUS at 11:15

## 2021-11-26 RX ADMIN — HYDROMORPHONE HYDROCHLORIDE 1 MILLIGRAM(S): 2 INJECTION INTRAMUSCULAR; INTRAVENOUS; SUBCUTANEOUS at 17:15

## 2021-11-26 RX ADMIN — Medication 1000 MILLIGRAM(S): at 21:17

## 2021-11-26 RX ADMIN — CHLORHEXIDINE GLUCONATE 1 APPLICATION(S): 213 SOLUTION TOPICAL at 05:26

## 2021-11-26 RX ADMIN — Medication 3 CAPSULE(S): at 17:11

## 2021-11-26 RX ADMIN — HYDROMORPHONE HYDROCHLORIDE 1 MILLIGRAM(S): 2 INJECTION INTRAMUSCULAR; INTRAVENOUS; SUBCUTANEOUS at 21:16

## 2021-11-26 RX ADMIN — HYDROMORPHONE HYDROCHLORIDE 2 MILLIGRAM(S): 2 INJECTION INTRAMUSCULAR; INTRAVENOUS; SUBCUTANEOUS at 07:00

## 2021-11-26 RX ADMIN — Medication 1 GRAM(S): at 23:27

## 2021-11-26 RX ADMIN — HYDROMORPHONE HYDROCHLORIDE 2 MILLIGRAM(S): 2 INJECTION INTRAMUSCULAR; INTRAVENOUS; SUBCUTANEOUS at 02:42

## 2021-11-26 RX ADMIN — HYDROMORPHONE HYDROCHLORIDE 1 MILLIGRAM(S): 2 INJECTION INTRAMUSCULAR; INTRAVENOUS; SUBCUTANEOUS at 21:31

## 2021-11-26 RX ADMIN — Medication 5 MILLIGRAM(S): at 21:16

## 2021-11-26 RX ADMIN — Medication 1 GRAM(S): at 17:13

## 2021-11-26 RX ADMIN — Medication 1 TABLET(S): at 21:17

## 2021-11-26 RX ADMIN — HYDROMORPHONE HYDROCHLORIDE 2 MILLIGRAM(S): 2 INJECTION INTRAMUSCULAR; INTRAVENOUS; SUBCUTANEOUS at 06:45

## 2021-11-26 RX ADMIN — LIDOCAINE 1 PATCH: 4 CREAM TOPICAL at 12:50

## 2021-11-26 RX ADMIN — Medication 1 TABLET(S): at 11:20

## 2021-11-26 RX ADMIN — Medication 1000 MILLIGRAM(S): at 22:04

## 2021-11-26 RX ADMIN — Medication 3 CAPSULE(S): at 08:50

## 2021-11-26 RX ADMIN — Medication 1000 MILLIGRAM(S): at 05:25

## 2021-11-26 RX ADMIN — GABAPENTIN 300 MILLIGRAM(S): 400 CAPSULE ORAL at 05:25

## 2021-11-26 RX ADMIN — SENNA PLUS 2 TABLET(S): 8.6 TABLET ORAL at 21:17

## 2021-11-26 RX ADMIN — POLYETHYLENE GLYCOL 3350 17 GRAM(S): 17 POWDER, FOR SOLUTION ORAL at 11:20

## 2021-11-26 RX ADMIN — Medication 1000 MILLIGRAM(S): at 17:11

## 2021-11-26 RX ADMIN — Medication 1000 MILLIGRAM(S): at 17:51

## 2021-11-26 RX ADMIN — Medication 1 GRAM(S): at 05:25

## 2021-11-26 RX ADMIN — Medication 2000 UNIT(S): at 11:21

## 2021-11-26 RX ADMIN — HYDROMORPHONE HYDROCHLORIDE 2 MILLIGRAM(S): 2 INJECTION INTRAMUSCULAR; INTRAVENOUS; SUBCUTANEOUS at 12:00

## 2021-11-26 RX ADMIN — GABAPENTIN 300 MILLIGRAM(S): 400 CAPSULE ORAL at 21:16

## 2021-11-26 RX ADMIN — Medication 1 GRAM(S): at 11:20

## 2021-11-26 RX ADMIN — LIDOCAINE 1 PATCH: 4 CREAM TOPICAL at 19:00

## 2021-11-26 RX ADMIN — Medication 10 MILLIGRAM(S): at 11:21

## 2021-11-26 RX ADMIN — HYDROMORPHONE HYDROCHLORIDE 2 MILLIGRAM(S): 2 INJECTION INTRAMUSCULAR; INTRAVENOUS; SUBCUTANEOUS at 02:57

## 2021-11-26 RX ADMIN — ENOXAPARIN SODIUM 40 MILLIGRAM(S): 100 INJECTION SUBCUTANEOUS at 21:19

## 2021-11-26 RX ADMIN — Medication 1000 MILLIGRAM(S): at 05:55

## 2021-11-26 RX ADMIN — LINEZOLID 600 MILLIGRAM(S): 600 INJECTION, SOLUTION INTRAVENOUS at 05:25

## 2021-11-26 RX ADMIN — Medication 1 TABLET(S): at 11:22

## 2021-11-26 RX ADMIN — HYDROMORPHONE HYDROCHLORIDE 1 MILLIGRAM(S): 2 INJECTION INTRAMUSCULAR; INTRAVENOUS; SUBCUTANEOUS at 17:51
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
## 2021-11-26 NOTE — DISCHARGE NOTE PROVIDER - NSDCMRMEDTOKEN_GEN_ALL_CORE_FT
Creon 12,000 units oral delayed release capsule: 3 cap(s) orally 3 times a day (with meals)  gabapentin 600 mg oral tablet: 1 tab(s) orally 3 times a day  ondansetron 4 mg oral tablet, disintegratin tab(s) orally 3 times a day   Protonix 40 mg oral delayed release tablet: 1 tab(s) orally once a day    acetaminophen 500 mg oral tablet: 2 tab(s) orally every 8 hours MDD:3  Creon 12,000 units oral delayed release capsule: 3 cap(s) orally 3 times a day (with meals)  gabapentin 300 mg oral capsule: 1 cap(s) orally 3 times a day  ondansetron 4 mg oral tablet, disintegratin tab(s) orally 3 times a day   Protonix 40 mg oral delayed release tablet: 1 tab(s) orally once a day    acetaminophen 500 mg oral tablet: 2 tab(s) orally every 8 hours MDD:3  cholecalciferol oral tablet: 2000 unit(s) orally once a day  Creon 12,000 units oral delayed release capsule: 3 cap(s) orally 3 times a day (with meals)  gabapentin 300 mg oral capsule: 1 cap(s) orally 3 times a day  lactobacillus acidophilus oral capsule: 1 cap(s) orally 2 times a day   lidocaine 4% topical film: Apply topically to affected area once a day to left lower rib area   methocarbamol 500 mg oral tablet: 1 tab(s) orally every 8 hours, As needed, Muscle Spasm  Multiple Vitamins with Minerals oral tablet: 1 tab(s) orally once a day  ondansetron 4 mg oral tablet, disintegratin tab(s) orally 3 times a day   polyethylene glycol 3350 oral powder for reconstitution: 17 gram(s) orally once a day, As Needed for for constipation  Protonix 40 mg oral delayed release tablet: 1 tab(s) orally once a day   senna oral tablet: 2 tab(s) orally once a day (at bedtime), As Needed for constipation  sucralfate 1 g oral tablet: 1 tab(s) orally 4 times a day  sulfamethoxazole-trimethoprim 800 mg-160 mg oral tablet: 1 tab(s) orally every 12 hours (end )

## 2021-11-26 NOTE — DISCHARGE NOTE PROVIDER - NSDCCPCAREPLAN_GEN_ALL_CORE_FT
PRINCIPAL DISCHARGE DIAGNOSIS  Diagnosis: Acute on chronic pancreatitis  Assessment and Plan of Treatment: You were admitted for acute on chronic Pancreatitis. This is now resolved. CTAP shows slightly decreased pseudocyst. Pain manaegmetn was consulted ivan our admission and there recommendations were followed. Please f/u with GI as outpt  # Right forearm abscess  -CT con forearm & U/S: superficial thrombophlebitis; no focal fluid collections  -no vegetations by TTE Echo  -Bld cx +S. aureus >> MSSA  - 11/17: bedside debridement of Right forearm by Burn  - s/p  cefazolin 2 q8h >> Switched to Zyvox, but Bactrim as per ID.   - ID on board  - f/u 11/17 tissue cx - no growth  - continue dressing changes with xeroform/gauze/stretch bandage twice a day, may shower with dressing off  - follow up with burn clinic upon discharge       PRINCIPAL DISCHARGE DIAGNOSIS  Diagnosis: Acute on chronic pancreatitis  Assessment and Plan of Treatment: You were admitted for acute on chronic Pancreatitis. This is now resolved. CTAP shows slightly decreased pseudocyst. Pain manaegmetn was consulted ivan our admission and there recommendations were followed. Please f/u with GI once discharged.  You also had a Right forearm abscess and you had a bedside debridement of Right forearm by Burn on 11/17.   Continue your antibiotics as prescribed  Linezolid 600 mg q 12 hours until 11/29, followed by PO bactrim 1dS BID from 11/30-12/13 and please continue dressing changes with xeroform/gauze/stretch bandage twice a day. You may shower with dressing off. Please follow up with burn clinic upon discharge

## 2021-11-26 NOTE — DISCHARGE NOTE PROVIDER - CARE PROVIDERS DIRECT ADDRESSES
[email or web address]

## 2021-11-26 NOTE — PROGRESS NOTE ADULT - ASSESSMENT
ASSESSMENT/PLAN  - acute on chronic pancreatitis with stable walled off necrosis  - bacteremia 2nd Rt forearm abscess, s/p I&D  - transaminitis  - alcohol abuse  - GERD  - chronic back pain    PLAN  - PO as tolerated  - creon with meals  - pain management, GI rec start lyrica on last admission  -- needs  and behavioral health upon discharge. Social issues need to be addressed long term     ASSESSMENT/PLAN  - acute on chronic pancreatitis with stable walled off necrosis  - bacteremia 2nd Rt forearm abscess, s/p I&D  - transaminitis  - alcohol abuse  - GERD  - chronic back pain    PLAN  - PO as tolerated - LOW FAT  - creon with meals  - pain management, GI rec start lyrica on last admission  -- needs  and behavioral health upon discharge. Social issues need to be addressed long term
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
## 2021-11-26 NOTE — DISCHARGE NOTE PROVIDER - CARE PROVIDER_API CALL
Petey Elizondo)  Gastroenterology; Internal Medicine  [address] [place] [address]  Phone: [phone]  Fax: [phone]  Follow Up Time: 1 week    Haley Cobos)  Internal Medicine  [address] [facility], [address] [place] [address]  Phone: [phone]  Fax: [phone]  Follow Up Time: 1 week    Chandler Pak)  Plastic Surgery  [address] [place] [address]  Phone: [phone]  Fax: [phone]  Follow Up Time: 1 week

## 2021-11-26 NOTE — PROGRESS NOTE ADULT - SUBJECTIVE AND OBJECTIVE BOX
Patient is a 46y old  Female who presents with a chief complaint of Acute on chronic pancreatitis; (26 Nov 2021 10:23)  pt seen and evaluated   alert awake talkative   able to dinner last night and breakfast this morning  ICU Vital Signs Last 24 Hrs  T(C): 37 (26 Nov 2021 12:41), Max: 37 (26 Nov 2021 12:41)  T(F): 98.6 (26 Nov 2021 12:41), Max: 98.6 (26 Nov 2021 12:41)  HR: 69 (26 Nov 2021 12:41) (63 - 73)  BP: 124/71 (26 Nov 2021 12:41) (103/51 - 124/71)  RR: 18 (26 Nov 2021 12:41) (18 - 18)  SpO2: 95% (25 Nov 2021 19:59) (95% - 95%)  Drug Dosing Weight  Height (cm): 162.6 (06 Nov 2021 16:48)  Weight (kg): 58.967 (07 Nov 2021 01:02)  BMI (kg/m2): 22.3 (07 Nov 2021 01:02)  BSA (m2): 1.63 (07 Nov 2021 01:02)    PHYSICAL EXAM:  Constitutional:  alert, awake , talkative  Gastrointestinal:  soft n/d  MEDICATIONS  (STANDING):  acetaminophen     Tablet .. 1000 milliGRAM(s) Oral every 8 hours  bisacodyl Suppository 10 milliGRAM(s) Rectal daily  chlorhexidine 4% Liquid 1 Application(s) Topical <User Schedule>  cholecalciferol 2000 Unit(s) Oral daily  enoxaparin Injectable 40 milliGRAM(s) SubCutaneous at bedtime  gabapentin 300 milliGRAM(s) Oral three times a day  iohexol 300 mG (iodine)/mL Oral Solution 30 milliLiter(s) Oral once  lactobacillus acidophilus 1 Tablet(s) Oral two times a day  lidocaine   4% Patch 1 Patch Transdermal daily  linezolid    Tablet 600 milliGRAM(s) Oral every 12 hours  melatonin 5 milliGRAM(s) Oral at bedtime  multivitamin/minerals 1 Tablet(s) Oral daily  pancrelipase  (CREON 12,000 Lipase Units) 3 Capsule(s) Oral three times a day with meals  pantoprazole    Tablet 40 milliGRAM(s) Oral before breakfast  polyethylene glycol 3350 17 Gram(s) Oral daily  senna 2 Tablet(s) Oral at bedtime  sucralfate 1 Gram(s) Oral four times a day  Diet, Regular:   Low Fat (LOWFAT)  Supplement Feeding Modality:  Oral  Ensure Enlive Cans or Servings Per Day:  3       Frequency:  Three Times a day (11-13-21 @ 08:13)     Patient is a 46y old  Female who presents with a chief complaint of Acute on chronic pancreatitis; (26 Nov 2021 10:23)  pt seen and evaluated   alert awake verbal  able to tolerate dinner last night and breakfast this morning  ICU Vital Signs Last 24 Hrs  T(C): 37 (26 Nov 2021 12:41), Max: 37 (26 Nov 2021 12:41)  T(F): 98.6 (26 Nov 2021 12:41), Max: 98.6 (26 Nov 2021 12:41)  HR: 69 (26 Nov 2021 12:41) (63 - 73)  BP: 124/71 (26 Nov 2021 12:41) (103/51 - 124/71)  RR: 18 (26 Nov 2021 12:41) (18 - 18)  SpO2: 95% (25 Nov 2021 19:59) (95% - 95%)  Drug Dosing Weight  Height (cm): 162.6 (06 Nov 2021 16:48)  Weight (kg): 58.967 (07 Nov 2021 01:02)  BMI (kg/m2): 22.3 (07 Nov 2021 01:02)  BSA (m2): 1.63 (07 Nov 2021 01:02)    PHYSICAL EXAM:  Constitutional:  alert, awake , erbal  Gastrointestinal:  soft n/d, no pain at present  MEDICATIONS  (STANDING):  acetaminophen     Tablet .. 1000 milliGRAM(s) Oral every 8 hours  bisacodyl Suppository 10 milliGRAM(s) Rectal daily  chlorhexidine 4% Liquid 1 Application(s) Topical <User Schedule>  cholecalciferol 2000 Unit(s) Oral daily  enoxaparin Injectable 40 milliGRAM(s) SubCutaneous at bedtime  gabapentin 300 milliGRAM(s) Oral three times a day  iohexol 300 mG (iodine)/mL Oral Solution 30 milliLiter(s) Oral once  lactobacillus acidophilus 1 Tablet(s) Oral two times a day  lidocaine   4% Patch 1 Patch Transdermal daily  linezolid    Tablet 600 milliGRAM(s) Oral every 12 hours  melatonin 5 milliGRAM(s) Oral at bedtime  multivitamin/minerals 1 Tablet(s) Oral daily  pancrelipase  (CREON 12,000 Lipase Units) 3 Capsule(s) Oral three times a day with meals  pantoprazole    Tablet 40 milliGRAM(s) Oral before breakfast  polyethylene glycol 3350 17 Gram(s) Oral daily  senna 2 Tablet(s) Oral at bedtime  sucralfate 1 Gram(s) Oral four times a day  Diet, Regular:   Low Fat (LOWFAT)  Supplement Feeding Modality:  Oral  Ensure Enlive Cans or Servings Per Day:  3       Frequency:  Three Times a day (11-13-21 @ 08:13)

## 2021-11-26 NOTE — PROGRESS NOTE ADULT - SUBJECTIVE AND OBJECTIVE BOX
LAURA BARAJAS  46y  Female      Patient is a 46y old  Female who presents with a chief complaint of Acute on chronic pancreatitis;       INTERVAL HPI/OVERNIGHT EVENTS:      ******************************* REVIEW OF SYSTEMS:**********************************************    All other review of systems negative    *********************** VITALS ******************************************    T(F): 98.6 (11-26-21 @ 12:41)  HR: 69 (11-26-21 @ 12:41) (63 - 73)  BP: 124/71 (11-26-21 @ 12:41) (103/51 - 124/71)  RR: 18 (11-26-21 @ 12:41) (18 - 18)  SpO2: 95% (11-25-21 @ 19:59) (95% - 95%)            ******************************** PHYSICAL EXAM:**************************************************  GENERAL: NAD    PSYCH: no agitation, baseline mentation  HEENT:     NERVOUS SYSTEM:  Alert & Oriented X3,     PULMONARY: JOSE, CTA    CARDIOVASCULAR: S1S2 RRR    GI: Soft, NT, ND; BS present.    EXTREMITIES:  2+ Peripheral Pulses, No clubbing, cyanosis, or edema    LYMPH: No lymphadenopathy noted    SKIN: No rashes or lesions      **************************** LABS *******************************************************                  Lactate Trend        CAPILLARY BLOOD GLUCOSE              **************************Active Medications *******************************************  Compazine (Unknown)      acetaminophen     Tablet .. 1000 milliGRAM(s) Oral every 8 hours  bisacodyl Suppository 10 milliGRAM(s) Rectal daily  chlorhexidine 4% Liquid 1 Application(s) Topical <User Schedule>  cholecalciferol 2000 Unit(s) Oral daily  enoxaparin Injectable 40 milliGRAM(s) SubCutaneous at bedtime  gabapentin 300 milliGRAM(s) Oral three times a day  HYDROmorphone  Injectable 1 milliGRAM(s) IV Push every 4 hours PRN  ibuprofen  Tablet. 600 milliGRAM(s) Oral every 6 hours PRN  iohexol 300 mG (iodine)/mL Oral Solution 30 milliLiter(s) Oral once  lactobacillus acidophilus 1 Tablet(s) Oral two times a day  lidocaine   4% Patch 1 Patch Transdermal daily  linezolid    Tablet 600 milliGRAM(s) Oral every 12 hours  melatonin 5 milliGRAM(s) Oral at bedtime  methocarbamol 500 milliGRAM(s) Oral every 8 hours PRN  multivitamin/minerals 1 Tablet(s) Oral daily  ondansetron    Tablet 4 milliGRAM(s) Oral every 6 hours PRN  pancrelipase  (CREON 12,000 Lipase Units) 3 Capsule(s) Oral three times a day with meals  pantoprazole    Tablet 40 milliGRAM(s) Oral before breakfast  polyethylene glycol 3350 17 Gram(s) Oral daily  senna 2 Tablet(s) Oral at bedtime  sucralfate 1 Gram(s) Oral four times a day      ***************************************************  RADIOLOGY & ADDITIONAL TESTS:    Imaging Personally Reviewed:  [ ] YES  [ ] NO    HEALTH ISSUES - PROBLEM Dx:

## 2021-11-27 ENCOUNTER — TRANSCRIPTION ENCOUNTER (OUTPATIENT)
Age: 46
End: 2021-11-27

## 2021-11-27 VITALS
RESPIRATION RATE: 18 BRPM | OXYGEN SATURATION: 98 % | HEART RATE: 120 BPM | TEMPERATURE: 98 F | SYSTOLIC BLOOD PRESSURE: 169 MMHG | DIASTOLIC BLOOD PRESSURE: 78 MMHG

## 2021-11-27 LAB
ALBUMIN SERPL ELPH-MCNC: 4 G/DL — SIGNIFICANT CHANGE UP (ref 3.5–5.2)
ALP SERPL-CCNC: 123 U/L — HIGH (ref 30–115)
ALT FLD-CCNC: 33 U/L — SIGNIFICANT CHANGE UP (ref 0–41)
ANION GAP SERPL CALC-SCNC: 17 MMOL/L — HIGH (ref 7–14)
AST SERPL-CCNC: 118 U/L — HIGH (ref 0–41)
BASOPHILS # BLD AUTO: 0.06 K/UL — SIGNIFICANT CHANGE UP (ref 0–0.2)
BASOPHILS NFR BLD AUTO: 1.3 % — HIGH (ref 0–1)
BILIRUB SERPL-MCNC: 0.3 MG/DL — SIGNIFICANT CHANGE UP (ref 0.2–1.2)
BUN SERPL-MCNC: 7 MG/DL — LOW (ref 10–20)
CALCIUM SERPL-MCNC: 9.9 MG/DL — SIGNIFICANT CHANGE UP (ref 8.5–10.1)
CHLORIDE SERPL-SCNC: 101 MMOL/L — SIGNIFICANT CHANGE UP (ref 98–110)
CO2 SERPL-SCNC: 22 MMOL/L — SIGNIFICANT CHANGE UP (ref 17–32)
CREAT SERPL-MCNC: 0.6 MG/DL — LOW (ref 0.7–1.5)
EOSINOPHIL # BLD AUTO: 0.12 K/UL — SIGNIFICANT CHANGE UP (ref 0–0.7)
EOSINOPHIL NFR BLD AUTO: 2.5 % — SIGNIFICANT CHANGE UP (ref 0–8)
GLUCOSE SERPL-MCNC: 95 MG/DL — SIGNIFICANT CHANGE UP (ref 70–99)
HCT VFR BLD CALC: 34.5 % — LOW (ref 37–47)
HGB BLD-MCNC: 11.5 G/DL — LOW (ref 12–16)
IMM GRANULOCYTES NFR BLD AUTO: 0.2 % — SIGNIFICANT CHANGE UP (ref 0.1–0.3)
LYMPHOCYTES # BLD AUTO: 1.5 K/UL — SIGNIFICANT CHANGE UP (ref 1.2–3.4)
LYMPHOCYTES # BLD AUTO: 31.8 % — SIGNIFICANT CHANGE UP (ref 20.5–51.1)
MCHC RBC-ENTMCNC: 31.2 PG — HIGH (ref 27–31)
MCHC RBC-ENTMCNC: 33.3 G/DL — SIGNIFICANT CHANGE UP (ref 32–37)
MCV RBC AUTO: 93.5 FL — SIGNIFICANT CHANGE UP (ref 81–99)
MONOCYTES # BLD AUTO: 0.49 K/UL — SIGNIFICANT CHANGE UP (ref 0.1–0.6)
MONOCYTES NFR BLD AUTO: 10.4 % — HIGH (ref 1.7–9.3)
NEUTROPHILS # BLD AUTO: 2.53 K/UL — SIGNIFICANT CHANGE UP (ref 1.4–6.5)
NEUTROPHILS NFR BLD AUTO: 53.8 % — SIGNIFICANT CHANGE UP (ref 42.2–75.2)
NRBC # BLD: 0 /100 WBCS — SIGNIFICANT CHANGE UP (ref 0–0)
PLATELET # BLD AUTO: 445 K/UL — HIGH (ref 130–400)
POTASSIUM SERPL-MCNC: 4.2 MMOL/L — SIGNIFICANT CHANGE UP (ref 3.5–5)
POTASSIUM SERPL-SCNC: 4.2 MMOL/L — SIGNIFICANT CHANGE UP (ref 3.5–5)
PROT SERPL-MCNC: 7.1 G/DL — SIGNIFICANT CHANGE UP (ref 6–8)
RBC # BLD: 3.69 M/UL — LOW (ref 4.2–5.4)
RBC # FLD: 12.8 % — SIGNIFICANT CHANGE UP (ref 11.5–14.5)
SODIUM SERPL-SCNC: 140 MMOL/L — SIGNIFICANT CHANGE UP (ref 135–146)
WBC # BLD: 4.71 K/UL — LOW (ref 4.8–10.8)
WBC # FLD AUTO: 4.71 K/UL — LOW (ref 4.8–10.8)

## 2021-11-27 PROCEDURE — 99233 SBSQ HOSP IP/OBS HIGH 50: CPT

## 2021-11-27 RX ORDER — POLYETHYLENE GLYCOL 3350 17 G/17G
17 POWDER, FOR SOLUTION ORAL
Qty: 510 | Refills: 1
Start: 2021-11-27 | End: 2022-01-25

## 2021-11-27 RX ORDER — LIDOCAINE 4 G/100G
1 CREAM TOPICAL
Qty: 30 | Refills: 1
Start: 2021-11-27 | End: 2022-01-25

## 2021-11-27 RX ORDER — GABAPENTIN 400 MG/1
1 CAPSULE ORAL
Qty: 0 | Refills: 0 | DISCHARGE

## 2021-11-27 RX ORDER — METHOCARBAMOL 500 MG/1
1 TABLET, FILM COATED ORAL
Qty: 90 | Refills: 1
Start: 2021-11-27 | End: 2022-01-25

## 2021-11-27 RX ORDER — CHOLECALCIFEROL (VITAMIN D3) 125 MCG
2000 CAPSULE ORAL
Qty: 30 | Refills: 1
Start: 2021-11-27 | End: 2022-01-25

## 2021-11-27 RX ORDER — OXYCODONE HYDROCHLORIDE 5 MG/1
5 TABLET ORAL EVERY 4 HOURS
Refills: 0 | Status: DISCONTINUED | OUTPATIENT
Start: 2021-11-27 | End: 2021-11-27

## 2021-11-27 RX ORDER — SENNA PLUS 8.6 MG/1
2 TABLET ORAL
Qty: 60 | Refills: 1
Start: 2021-11-27 | End: 2022-01-25

## 2021-11-27 RX ORDER — SUCRALFATE 1 G
1 TABLET ORAL
Qty: 120 | Refills: 1
Start: 2021-11-27 | End: 2022-01-25

## 2021-11-27 RX ORDER — GABAPENTIN 400 MG/1
1 CAPSULE ORAL
Qty: 0 | Refills: 0 | DISCHARGE
Start: 2021-11-27

## 2021-11-27 RX ORDER — ACETAMINOPHEN 500 MG
2 TABLET ORAL
Qty: 180 | Refills: 0
Start: 2021-11-27 | End: 2021-12-26

## 2021-11-27 RX ORDER — MULTIVIT-MIN/FERROUS GLUCONATE 9 MG/15 ML
1 LIQUID (ML) ORAL
Qty: 30 | Refills: 1
Start: 2021-11-27 | End: 2022-01-25

## 2021-11-27 RX ORDER — LACTOBACILLUS ACIDOPHILUS 100MM CELL
1 CAPSULE ORAL
Qty: 60 | Refills: 1
Start: 2021-11-27 | End: 2022-01-25

## 2021-11-27 RX ORDER — GABAPENTIN 400 MG/1
1 CAPSULE ORAL
Qty: 90 | Refills: 1
Start: 2021-11-27 | End: 2022-01-25

## 2021-11-27 RX ADMIN — Medication 3 CAPSULE(S): at 08:54

## 2021-11-27 RX ADMIN — Medication 3 CAPSULE(S): at 17:32

## 2021-11-27 RX ADMIN — Medication 1 TABLET(S): at 11:33

## 2021-11-27 RX ADMIN — Medication 1 TABLET(S): at 17:33

## 2021-11-27 RX ADMIN — Medication 1 GRAM(S): at 17:33

## 2021-11-27 RX ADMIN — Medication 1 GRAM(S): at 05:14

## 2021-11-27 RX ADMIN — HYDROMORPHONE HYDROCHLORIDE 1 MILLIGRAM(S): 2 INJECTION INTRAMUSCULAR; INTRAVENOUS; SUBCUTANEOUS at 12:29

## 2021-11-27 RX ADMIN — POLYETHYLENE GLYCOL 3350 17 GRAM(S): 17 POWDER, FOR SOLUTION ORAL at 11:33

## 2021-11-27 RX ADMIN — LIDOCAINE 1 PATCH: 4 CREAM TOPICAL at 00:01

## 2021-11-27 RX ADMIN — CHLORHEXIDINE GLUCONATE 1 APPLICATION(S): 213 SOLUTION TOPICAL at 05:12

## 2021-11-27 RX ADMIN — HYDROMORPHONE HYDROCHLORIDE 1 MILLIGRAM(S): 2 INJECTION INTRAMUSCULAR; INTRAVENOUS; SUBCUTANEOUS at 16:38

## 2021-11-27 RX ADMIN — Medication 2000 UNIT(S): at 11:31

## 2021-11-27 RX ADMIN — PANTOPRAZOLE SODIUM 40 MILLIGRAM(S): 20 TABLET, DELAYED RELEASE ORAL at 07:02

## 2021-11-27 RX ADMIN — Medication 1000 MILLIGRAM(S): at 14:00

## 2021-11-27 RX ADMIN — Medication 1 TABLET(S): at 11:31

## 2021-11-27 RX ADMIN — GABAPENTIN 300 MILLIGRAM(S): 400 CAPSULE ORAL at 05:13

## 2021-11-27 RX ADMIN — HYDROMORPHONE HYDROCHLORIDE 1 MILLIGRAM(S): 2 INJECTION INTRAMUSCULAR; INTRAVENOUS; SUBCUTANEOUS at 05:39

## 2021-11-27 RX ADMIN — Medication 1000 MILLIGRAM(S): at 05:12

## 2021-11-27 RX ADMIN — HYDROMORPHONE HYDROCHLORIDE 1 MILLIGRAM(S): 2 INJECTION INTRAMUSCULAR; INTRAVENOUS; SUBCUTANEOUS at 05:24

## 2021-11-27 RX ADMIN — Medication 1 TABLET(S): at 12:24

## 2021-11-27 RX ADMIN — HYDROMORPHONE HYDROCHLORIDE 1 MILLIGRAM(S): 2 INJECTION INTRAMUSCULAR; INTRAVENOUS; SUBCUTANEOUS at 12:27

## 2021-11-27 RX ADMIN — Medication 1000 MILLIGRAM(S): at 05:42

## 2021-11-27 RX ADMIN — LINEZOLID 600 MILLIGRAM(S): 600 INJECTION, SOLUTION INTRAVENOUS at 05:17

## 2021-11-27 RX ADMIN — Medication 1000 MILLIGRAM(S): at 14:30

## 2021-11-27 RX ADMIN — HYDROMORPHONE HYDROCHLORIDE 1 MILLIGRAM(S): 2 INJECTION INTRAMUSCULAR; INTRAVENOUS; SUBCUTANEOUS at 17:33

## 2021-11-27 RX ADMIN — LIDOCAINE 1 PATCH: 4 CREAM TOPICAL at 11:33

## 2021-11-27 RX ADMIN — Medication 3 CAPSULE(S): at 11:32

## 2021-11-27 RX ADMIN — Medication 1 GRAM(S): at 11:31

## 2021-11-27 RX ADMIN — GABAPENTIN 300 MILLIGRAM(S): 400 CAPSULE ORAL at 13:27

## 2021-11-27 NOTE — DISCHARGE NOTE NURSING/CASE MANAGEMENT/SOCIAL WORK - PATIENT PORTAL LINK FT
You can access the FollowMyHealth Patient Portal offered by [facility] by registering at the following website: [email or web address]. By joining [facility]’s FollowMyHealth portal, you will also be able to view your health information using other applications (apps) compatible with our system.
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
## 2021-11-27 NOTE — CHART NOTE - NSCHARTNOTEFT_GEN_A_CORE
3 day calorie count completed (11/22-11/24)   Day 1: 3 of 3 meals documented, no supplements documented, = 935kcal, 31g Protein   Day 2: 1 of 3 meals documented, no supplements, only ate ice cream = 130kcal, 2g Protein  Day 3: 3 of 3 meals documented, only ate dinner with 100% meal completion, 1 ensure documented but 0% consumed; = 661kcal, 36g Protein     3 day average = 575kcal (~40%), 69g Protein (~85% estimated needs)

## 2021-11-27 NOTE — PROGRESS NOTE ADULT - PROVIDER SPECIALTY LIST ADULT
Hospitalist
Internal Medicine
Hospitalist
Hospitalist
Internal Medicine
Gastroenterology
Hospitalist
Infectious Disease
Infectious Disease
Internal Medicine
Nutrition Support
Nutrition Support
Burn
Burn
Hospitalist
Internal Medicine
Infectious Disease

## 2021-11-27 NOTE — PROGRESS NOTE ADULT - REASON FOR ADMISSION
Acute on chronic pancreatitis;

## 2021-11-27 NOTE — PROGRESS NOTE ADULT - SUBJECTIVE AND OBJECTIVE BOX
LAURA BARAJAS  46y  Female      Patient is a 46y old  Female who presents with a chief complaint of Acute on chronic pancreatitis;      INTERVAL HPI/OVERNIGHT EVENTS:      ******************************* REVIEW OF SYSTEMS:**********************************************    All other review of systems negative    *********************** VITALS ******************************************    T(F): 98.5 (11-27-21 @ 12:35)  HR: 92 (11-27-21 @ 12:35) (84 - 92)  BP: 113/76 (11-27-21 @ 12:35) (113/76 - 129/81)  RR: 14 (11-27-21 @ 12:35) (14 - 18)  SpO2: 98% (11-27-21 @ 05:39) (98% - 98%)            ******************************** PHYSICAL EXAM:**************************************************  GENERAL: NAD    PSYCH: no agitation, baseline mentation  HEENT:     NERVOUS SYSTEM:  Alert & Oriented X3, MS  5/5 B/L  UE and LE ; Sensory intact    PULMONARY: JOSE, CTA    CARDIOVASCULAR: S1S2 RRR    GI: Soft, NT, ND; BS present.    EXTREMITIES:  2+ Peripheral Pulses, No clubbing, cyanosis, or edema    LYMPH: No lymphadenopathy noted    SKIN: No rashes or lesions      **************************** LABS *******************************************************                          11.5   4.71  )-----------( 445      ( 27 Nov 2021 04:30 )             34.5     11-27    140  |  101  |  7<L>  ----------------------------<  95  4.2   |  22  |  0.6<L>    Ca    9.9      27 Nov 2021 04:30    TPro  7.1  /  Alb  4.0  /  TBili  0.3  /  DBili  x   /  AST  118<H>  /  ALT  33  /  AlkPhos  123<H>  11-27          Lactate Trend        CAPILLARY BLOOD GLUCOSE              **************************Active Medications *******************************************  Compazine (Unknown)      acetaminophen     Tablet .. 1000 milliGRAM(s) Oral every 8 hours  bisacodyl Suppository 10 milliGRAM(s) Rectal daily  chlorhexidine 4% Liquid 1 Application(s) Topical <User Schedule>  cholecalciferol 2000 Unit(s) Oral daily  enoxaparin Injectable 40 milliGRAM(s) SubCutaneous at bedtime  gabapentin 300 milliGRAM(s) Oral three times a day  HYDROmorphone  Injectable 1 milliGRAM(s) IV Push every 4 hours PRN  ibuprofen  Tablet. 600 milliGRAM(s) Oral every 6 hours PRN  iohexol 300 mG (iodine)/mL Oral Solution 30 milliLiter(s) Oral once  lactobacillus acidophilus 1 Tablet(s) Oral two times a day  lidocaine   4% Patch 1 Patch Transdermal daily  melatonin 5 milliGRAM(s) Oral at bedtime  methocarbamol 500 milliGRAM(s) Oral every 8 hours PRN  multivitamin/minerals 1 Tablet(s) Oral daily  ondansetron    Tablet 4 milliGRAM(s) Oral every 6 hours PRN  oxyCODONE    IR 5 milliGRAM(s) Oral every 4 hours PRN  pancrelipase  (CREON 12,000 Lipase Units) 3 Capsule(s) Oral three times a day with meals  pantoprazole    Tablet 40 milliGRAM(s) Oral before breakfast  polyethylene glycol 3350 17 Gram(s) Oral daily  senna 2 Tablet(s) Oral at bedtime  sucralfate 1 Gram(s) Oral four times a day  trimethoprim  160 mG/sulfamethoxazole 800 mG 1 Tablet(s) Oral every 12 hours      ***************************************************  RADIOLOGY & ADDITIONAL TESTS:    Imaging Personally Reviewed:  [ ] YES  [ ] NO    HEALTH ISSUES - PROBLEM Dx:

## 2021-11-27 NOTE — DISCHARGE NOTE NURSING/CASE MANAGEMENT/SOCIAL WORK - NSDCVIVACCINE_GEN_ALL_CORE_FT
influenza, injectable, quadrivalent, preservative free; 12-Oct-2019 14:23; Celsa Martins (RN); [employer]; [id number] (Exp. Date: 30-Jun-2020); IntraMuscular; Deltoid Left.; 0.5 milliLiter(s); VIS (VIS Published: 15-Aug-2019, VIS Presented: 12-Oct-2019);   influenza, injectable, quadrivalent, preservative free; 10-Sep-2020 12:19; Shani Fleming (RN); [employer]; [id number] (Exp. Date: 30-Jun-2021); IntraMuscular; Deltoid Left.; 0.5 milliLiter(s); VIS (VIS Published: 15-Aug-2019, VIS Presented: 10-Sep-2020);   influenza, injectable, quadrivalent, preservative free; 07-Oct-2021 16:12; Janessa Jaquez (RN); [employer]; [id number] (Exp. Date: 30-Jun-2022); IntraMuscular; Deltoid Left.; 0.5 milliLiter(s); VIS (VIS Published: 15-Aug-2019, VIS Presented: 07-Oct-2021);   Tdap; 01-Sep-2014 17:06; Cheryl Cotto (MELINDA); [employer]; [id number]; IntraMuscular; Deltoid Right.; 0.5 milliLiter(s);   Tdap; 01-Sep-2014 17:09; Cheryl Cotto (MELINDA); [employer]; [id number]; IntraMuscular; Deltoid Right.; 0.5 milliLiter(s);

## 2021-11-28 RX ORDER — OXYCODONE HYDROCHLORIDE 5 MG/1
1 TABLET ORAL
Qty: 0 | Refills: 0 | DISCHARGE
Start: 2021-11-28 | End: 2021-12-01

## 2021-11-29 LAB
A-TOCOPHEROL VIT E SERPL-MCNC: 9 MG/L — SIGNIFICANT CHANGE UP (ref 7–25.1)
BETA+GAMMA TOCOPHEROL SERPL-MCNC: 2.1 MG/L — SIGNIFICANT CHANGE UP (ref 0.5–5.5)

## 2021-12-01 DIAGNOSIS — E87.5 HYPERKALEMIA: ICD-10-CM

## 2021-12-01 DIAGNOSIS — K76.0 FATTY (CHANGE OF) LIVER, NOT ELSEWHERE CLASSIFIED: ICD-10-CM
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
## 2021-12-01 DIAGNOSIS — E87.2 ACIDOSIS: ICD-10-CM

## 2021-12-01 DIAGNOSIS — E83.39 OTHER DISORDERS OF PHOSPHORUS METABOLISM: ICD-10-CM

## 2021-12-01 DIAGNOSIS — K86.0 ALCOHOL-INDUCED CHRONIC PANCREATITIS: ICD-10-CM

## 2021-12-01 DIAGNOSIS — L02.413 CUTANEOUS ABSCESS OF RIGHT UPPER LIMB: ICD-10-CM

## 2021-12-01 DIAGNOSIS — R74.01 ELEVATION OF LEVELS OF LIVER TRANSAMINASE LEVELS: ICD-10-CM

## 2021-12-01 DIAGNOSIS — I80.8 PHLEBITIS AND THROMBOPHLEBITIS OF OTHER SITES: ICD-10-CM

## 2021-12-01 DIAGNOSIS — T80.818A EXTRAVASATION OF OTHER VESICANT AGENT, INITIAL ENCOUNTER: ICD-10-CM

## 2021-12-01 DIAGNOSIS — E83.42 HYPOMAGNESEMIA: ICD-10-CM

## 2021-12-01 DIAGNOSIS — E87.6 HYPOKALEMIA: ICD-10-CM

## 2021-12-01 DIAGNOSIS — K86.3 PSEUDOCYST OF PANCREAS: ICD-10-CM

## 2021-12-01 DIAGNOSIS — G89.4 CHRONIC PAIN SYNDROME: ICD-10-CM

## 2021-12-01 DIAGNOSIS — K21.9 GASTRO-ESOPHAGEAL REFLUX DISEASE WITHOUT ESOPHAGITIS: ICD-10-CM

## 2021-12-01 DIAGNOSIS — K85.21 ALCOHOL INDUCED ACUTE PANCREATITIS WITH UNINFECTED NECROSIS: ICD-10-CM

## 2021-12-01 DIAGNOSIS — E51.9 THIAMINE DEFICIENCY, UNSPECIFIED: ICD-10-CM
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
## 2021-12-01 DIAGNOSIS — M54.9 DORSALGIA, UNSPECIFIED: ICD-10-CM

## 2021-12-01 DIAGNOSIS — F10.20 ALCOHOL DEPENDENCE, UNCOMPLICATED: ICD-10-CM

## 2021-12-01 DIAGNOSIS — E87.1 HYPO-OSMOLALITY AND HYPONATREMIA: ICD-10-CM

## 2021-12-01 DIAGNOSIS — D61.818 OTHER PANCYTOPENIA: ICD-10-CM

## 2021-12-01 DIAGNOSIS — R78.81 BACTEREMIA: ICD-10-CM

## 2021-12-02 NOTE — ED ADULT NURSE REASSESSMENT NOTE - NSIMPLEMENTINTERV_GEN_ALL_ED
For information on Fall & Injury Prevention, visit: [email or web address] OR  [email or web address] OR  https://www.cdc.gov/steadi/patient.html
Implemented All Fall Risk Interventions:  [place] to call system. Call bell, personal items and telephone within reach. Instruct patient to call for assistance. Room bathroom lighting operational. Non-slip footwear when patient is off stretcher. Physically safe environment: no spills, clutter or unnecessary equipment. Stretcher in lowest position, wheels locked, appropriate side rails in place. Provide visual cue, wrist band, yellow gown, etc. Monitor gait and stability. Monitor for mental status changes and reorient to person, place, and time. Review medications for side effects contributing to fall risk. Reinforce activity limits and safety measures with patient and family.

## 2021-12-30 ENCOUNTER — APPOINTMENT (OUTPATIENT)
Dept: INTERNAL MEDICINE | Facility: CLINIC | Age: 46
End: 2021-12-30

## 2022-03-21 NOTE — ED ADULT NURSE NOTE - NSIMPLEMENTINTERV_GEN_ALL_ED
no Implemented All Universal Safety Interventions:  [place] to call system. Call bell, personal items and telephone within reach. Instruct patient to call for assistance. Room bathroom lighting operational. Non-slip footwear when patient is off stretcher. Physically safe environment: no spills, clutter or unnecessary equipment. Stretcher in lowest position, wheels locked, appropriate side rails in place.

## 2022-05-04 NOTE — ED ADULT TRIAGE NOTE - NSWEIGHTCALCTOOLDRUG_GEN_A_CORE
I sent Xifanxan to the pt pharmacy on file. Clairfy with pt if she ate something around the time of her symptoms start date that could have caused her symptoms. Diarrhea should not last this long from a GI viral bug. Continue taking probiotic daily. If having Dizziness, light headedness, chest pain, palpitations, or low BP may need to be seen for blood work for potential low sodium and or potassium due to excessive diarrhea. Follow up with office if symptoms fail to improve or worsen.  used

## 2022-05-19 NOTE — PATIENT PROFILE ADULT - NSPRESCRALCSCORE_GEN_A_NUR_CAL
2
Otherwise healthy, 12 month old male bib parents c/o fever yesterday. +2 episodes of NBNB emesis today at 1PM (~8 hours ago), unable to tolerate PO since. Drinking but has decreased solid intake. Has never had fevers before which prompted parents to present here. Has not received 12 months shots yet, all other vaccines received. Has appointment tomorrow with pediatrician tomorrow. No diarrhea, rash or history of UTI. No other acute complaints at time of eval. IUTD. NKDA.

## 2022-05-30 NOTE — PATIENT PROFILE ADULT - TOBACCO USE
Senior Purposeful Rounding     Position:Repositions Self     Physical Environment:Room free from clutter, Clear path to bathroom , Adequate lighting and No safety hazards noted     Pain Rating/ Nonverbal Pain Behaviors:0;       Pain interventions Attempted: n/a     Patient Toileted:Independent Never smoker

## 2022-06-10 NOTE — PATIENT PROFILE ADULT - NSPROPTRIGHTSUPPORTPHONE_GEN_A_NUR
"Face to Face Note  -  Durable Medical Equipment    Obdulio Green D.O. - NPI: [id number]  I certify that this patient is under my care and that they had a durable medical equipment(DME)face to face encounter by myself that meets the physician DME face-to-face encounter requirements with this patient on:    Date of encounter:   Patient:                    MRN:                       YOB: 2020  Jan Palencia  [id number]  1940     The encounter with the patient was in whole, or in part, for the following medical condition, which is the primary reason for durable medical equipment:  Other - Pulmonary Embolism    I certify that, based on my findings, the following durable medical equipment is medically necessary:  Oxygen.    HOME O2 Saturation Measurements:(Values must be present for Home Oxygen orders)  Room air sat at rest: 87  Room air sat with amb: 85  With liters of O2: 3, O2 sat at rest with O2: 96  With Liters of O2: 3, O2 sat with amb with O2 : 92  Is the patient mobile?: Yes    My Clinical findings support the need for the above equipment due to:  Hypoxia    Supporting Symptoms: The patient requires supplemental oxygen, as the following interventions have been tried with limited or no improvement: \"Ambulation with oximetry    If patient feels more short of breath, they can go up to 6 liters per minute and contact healthcare provider.  "
[phone]

## 2022-07-28 PROBLEM — K85.90 ACUTE PANCREATITIS WITHOUT NECROSIS OR INFECTION, UNSPECIFIED: Chronic | Status: INACTIVE | Noted: 2019-08-18 | Resolved: 2020-09-09

## 2022-07-28 PROBLEM — F10.10 ALCOHOL ABUSE, UNCOMPLICATED: Chronic | Status: INACTIVE | Noted: 2019-11-07 | Resolved: 2020-09-09

## 2022-07-28 PROBLEM — K86.1 OTHER CHRONIC PANCREATITIS: Chronic | Status: INACTIVE | Noted: 2019-08-19 | Resolved: 2020-09-09

## 2022-08-24 ENCOUNTER — INPATIENT (INPATIENT)
Facility: HOSPITAL | Age: 47
LOS: 7 days | Discharge: HOME | End: 2022-09-01
Attending: INTERNAL MEDICINE | Admitting: INTERNAL MEDICINE

## 2022-08-24 VITALS
WEIGHT: 118.61 LBS | DIASTOLIC BLOOD PRESSURE: 92 MMHG | OXYGEN SATURATION: 100 % | HEIGHT: 64 IN | TEMPERATURE: 99 F | RESPIRATION RATE: 18 BRPM | HEART RATE: 116 BPM | SYSTOLIC BLOOD PRESSURE: 149 MMHG

## 2022-08-24 DIAGNOSIS — Z87.2 PERSONAL HISTORY OF DISEASES OF THE SKIN AND SUBCUTANEOUS TISSUE: Chronic | ICD-10-CM

## 2022-08-24 DIAGNOSIS — Z98.890 OTHER SPECIFIED POSTPROCEDURAL STATES: Chronic | ICD-10-CM

## 2022-08-24 LAB
ALBUMIN SERPL ELPH-MCNC: 3.7 G/DL — SIGNIFICANT CHANGE UP (ref 3.5–5.2)
ALBUMIN SERPL ELPH-MCNC: 4.2 G/DL — SIGNIFICANT CHANGE UP (ref 3.5–5.2)
ALP SERPL-CCNC: 106 U/L — SIGNIFICANT CHANGE UP (ref 30–115)
ALP SERPL-CCNC: 124 U/L — HIGH (ref 30–115)
ALT FLD-CCNC: 34 U/L — SIGNIFICANT CHANGE UP (ref 0–41)
ALT FLD-CCNC: 42 U/L — HIGH (ref 0–41)
ANION GAP SERPL CALC-SCNC: 12 MMOL/L — SIGNIFICANT CHANGE UP (ref 7–14)
ANION GAP SERPL CALC-SCNC: 13 MMOL/L — SIGNIFICANT CHANGE UP (ref 7–14)
ANION GAP SERPL CALC-SCNC: 16 MMOL/L — HIGH (ref 7–14)
APPEARANCE UR: CLEAR — SIGNIFICANT CHANGE UP
AST SERPL-CCNC: 120 U/L — HIGH (ref 0–41)
AST SERPL-CCNC: 99 U/L — HIGH (ref 0–41)
B-OH-BUTYR SERPL-SCNC: 1 MMOL/L — HIGH
BACTERIA # UR AUTO: NEGATIVE — SIGNIFICANT CHANGE UP
BASOPHILS # BLD AUTO: 0.01 K/UL — SIGNIFICANT CHANGE UP (ref 0–0.2)
BASOPHILS NFR BLD AUTO: 0.1 % — SIGNIFICANT CHANGE UP (ref 0–1)
BILIRUB DIRECT SERPL-MCNC: 0.2 MG/DL — SIGNIFICANT CHANGE UP (ref 0–0.3)
BILIRUB DIRECT SERPL-MCNC: 0.2 MG/DL — SIGNIFICANT CHANGE UP (ref 0–0.3)
BILIRUB INDIRECT FLD-MCNC: 0.5 MG/DL — SIGNIFICANT CHANGE UP (ref 0.2–1.2)
BILIRUB INDIRECT FLD-MCNC: 0.7 MG/DL — SIGNIFICANT CHANGE UP (ref 0.2–1.2)
BILIRUB SERPL-MCNC: 0.7 MG/DL — SIGNIFICANT CHANGE UP (ref 0.2–1.2)
BILIRUB SERPL-MCNC: 0.9 MG/DL — SIGNIFICANT CHANGE UP (ref 0.2–1.2)
BILIRUB UR-MCNC: NEGATIVE — SIGNIFICANT CHANGE UP
BUN SERPL-MCNC: 12 MG/DL — SIGNIFICANT CHANGE UP (ref 10–20)
BUN SERPL-MCNC: 7 MG/DL — LOW (ref 10–20)
BUN SERPL-MCNC: 9 MG/DL — LOW (ref 10–20)
CALCIUM SERPL-MCNC: 8 MG/DL — LOW (ref 8.5–10.1)
CALCIUM SERPL-MCNC: 8.3 MG/DL — LOW (ref 8.5–10.1)
CALCIUM SERPL-MCNC: 8.9 MG/DL — SIGNIFICANT CHANGE UP (ref 8.5–10.1)
CHLORIDE SERPL-SCNC: 102 MMOL/L — SIGNIFICANT CHANGE UP (ref 98–110)
CHLORIDE SERPL-SCNC: 102 MMOL/L — SIGNIFICANT CHANGE UP (ref 98–110)
CHLORIDE SERPL-SCNC: 96 MMOL/L — LOW (ref 98–110)
CO2 SERPL-SCNC: 23 MMOL/L — SIGNIFICANT CHANGE UP (ref 17–32)
CO2 SERPL-SCNC: 24 MMOL/L — SIGNIFICANT CHANGE UP (ref 17–32)
CO2 SERPL-SCNC: 25 MMOL/L — SIGNIFICANT CHANGE UP (ref 17–32)
COLOR SPEC: YELLOW — SIGNIFICANT CHANGE UP
CREAT SERPL-MCNC: 0.5 MG/DL — LOW (ref 0.7–1.5)
CREAT SERPL-MCNC: 0.5 MG/DL — LOW (ref 0.7–1.5)
CREAT SERPL-MCNC: 0.6 MG/DL — LOW (ref 0.7–1.5)
DIFF PNL FLD: NEGATIVE — SIGNIFICANT CHANGE UP
EGFR: 113 ML/MIN/1.73M2 — SIGNIFICANT CHANGE UP
EGFR: 118 ML/MIN/1.73M2 — SIGNIFICANT CHANGE UP
EGFR: 118 ML/MIN/1.73M2 — SIGNIFICANT CHANGE UP
EOSINOPHIL # BLD AUTO: 0.01 K/UL — SIGNIFICANT CHANGE UP (ref 0–0.7)
EOSINOPHIL NFR BLD AUTO: 0.1 % — SIGNIFICANT CHANGE UP (ref 0–8)
EPI CELLS # UR: 8 /HPF — HIGH (ref 0–5)
GLUCOSE SERPL-MCNC: 106 MG/DL — HIGH (ref 70–99)
GLUCOSE SERPL-MCNC: 111 MG/DL — HIGH (ref 70–99)
GLUCOSE SERPL-MCNC: 91 MG/DL — SIGNIFICANT CHANGE UP (ref 70–99)
GLUCOSE UR QL: NEGATIVE — SIGNIFICANT CHANGE UP
HCG SERPL QL: NEGATIVE — SIGNIFICANT CHANGE UP
HCT VFR BLD CALC: 36.1 % — LOW (ref 37–47)
HGB BLD-MCNC: 12.8 G/DL — SIGNIFICANT CHANGE UP (ref 12–16)
HYALINE CASTS # UR AUTO: 13 /LPF — HIGH (ref 0–7)
IMM GRANULOCYTES NFR BLD AUTO: 0.3 % — SIGNIFICANT CHANGE UP (ref 0.1–0.3)
KETONES UR-MCNC: ABNORMAL
LACTATE SERPL-SCNC: 1 MMOL/L — SIGNIFICANT CHANGE UP (ref 0.7–2)
LEUKOCYTE ESTERASE UR-ACNC: NEGATIVE — SIGNIFICANT CHANGE UP
LIDOCAIN IGE QN: 733 U/L — HIGH (ref 7–60)
LYMPHOCYTES # BLD AUTO: 0.65 K/UL — LOW (ref 1.2–3.4)
LYMPHOCYTES # BLD AUTO: 8.6 % — LOW (ref 20.5–51.1)
MAGNESIUM SERPL-MCNC: 1.1 MG/DL — LOW (ref 1.8–2.4)
MCHC RBC-ENTMCNC: 31.5 PG — HIGH (ref 27–31)
MCHC RBC-ENTMCNC: 35.5 G/DL — SIGNIFICANT CHANGE UP (ref 32–37)
MCV RBC AUTO: 88.9 FL — SIGNIFICANT CHANGE UP (ref 81–99)
MONOCYTES # BLD AUTO: 0.46 K/UL — SIGNIFICANT CHANGE UP (ref 0.1–0.6)
MONOCYTES NFR BLD AUTO: 6.1 % — SIGNIFICANT CHANGE UP (ref 1.7–9.3)
NEUTROPHILS # BLD AUTO: 6.4 K/UL — SIGNIFICANT CHANGE UP (ref 1.4–6.5)
NEUTROPHILS NFR BLD AUTO: 84.8 % — HIGH (ref 42.2–75.2)
NITRITE UR-MCNC: NEGATIVE — SIGNIFICANT CHANGE UP
NRBC # BLD: 0 /100 WBCS — SIGNIFICANT CHANGE UP (ref 0–0)
PH UR: 7 — SIGNIFICANT CHANGE UP (ref 5–8)
PHOSPHATE SERPL-MCNC: 2.4 MG/DL — SIGNIFICANT CHANGE UP (ref 2.1–4.9)
PLATELET # BLD AUTO: 126 K/UL — LOW (ref 130–400)
POTASSIUM SERPL-MCNC: 3.3 MMOL/L — LOW (ref 3.5–5)
POTASSIUM SERPL-MCNC: 3.3 MMOL/L — LOW (ref 3.5–5)
POTASSIUM SERPL-MCNC: 3.7 MMOL/L — SIGNIFICANT CHANGE UP (ref 3.5–5)
POTASSIUM SERPL-SCNC: 3.3 MMOL/L — LOW (ref 3.5–5)
POTASSIUM SERPL-SCNC: 3.3 MMOL/L — LOW (ref 3.5–5)
POTASSIUM SERPL-SCNC: 3.7 MMOL/L — SIGNIFICANT CHANGE UP (ref 3.5–5)
PROT SERPL-MCNC: 6.8 G/DL — SIGNIFICANT CHANGE UP (ref 6–8)
PROT SERPL-MCNC: 7.5 G/DL — SIGNIFICANT CHANGE UP (ref 6–8)
PROT UR-MCNC: ABNORMAL
RBC # BLD: 4.06 M/UL — LOW (ref 4.2–5.4)
RBC # FLD: 13.8 % — SIGNIFICANT CHANGE UP (ref 11.5–14.5)
RBC CASTS # UR COMP ASSIST: 4 /HPF — SIGNIFICANT CHANGE UP (ref 0–4)
SARS-COV-2 RNA SPEC QL NAA+PROBE: SIGNIFICANT CHANGE UP
SODIUM SERPL-SCNC: 137 MMOL/L — SIGNIFICANT CHANGE UP (ref 135–146)
SODIUM SERPL-SCNC: 138 MMOL/L — SIGNIFICANT CHANGE UP (ref 135–146)
SODIUM SERPL-SCNC: 138 MMOL/L — SIGNIFICANT CHANGE UP (ref 135–146)
SP GR SPEC: >1.05 (ref 1.01–1.03)
TRIGL SERPL-MCNC: 141 MG/DL — SIGNIFICANT CHANGE UP
UROBILINOGEN FLD QL: SIGNIFICANT CHANGE UP
WBC # BLD: 7.55 K/UL — SIGNIFICANT CHANGE UP (ref 4.8–10.8)
WBC # FLD AUTO: 7.55 K/UL — SIGNIFICANT CHANGE UP (ref 4.8–10.8)
WBC UR QL: 4 /HPF — SIGNIFICANT CHANGE UP (ref 0–5)

## 2022-08-24 PROCEDURE — 76705 ECHO EXAM OF ABDOMEN: CPT | Mod: 26

## 2022-08-24 PROCEDURE — 99285 EMERGENCY DEPT VISIT HI MDM: CPT

## 2022-08-24 PROCEDURE — 74177 CT ABD & PELVIS W/CONTRAST: CPT | Mod: 26,MA

## 2022-08-24 PROCEDURE — 99223 1ST HOSP IP/OBS HIGH 75: CPT

## 2022-08-24 RX ORDER — SODIUM CHLORIDE 9 MG/ML
1000 INJECTION, SOLUTION INTRAVENOUS
Refills: 0 | Status: DISCONTINUED | OUTPATIENT
Start: 2022-08-24 | End: 2022-08-24

## 2022-08-24 RX ORDER — SENNA PLUS 8.6 MG/1
2 TABLET ORAL AT BEDTIME
Refills: 0 | Status: DISCONTINUED | OUTPATIENT
Start: 2022-08-24 | End: 2022-08-29

## 2022-08-24 RX ORDER — ONDANSETRON 8 MG/1
8 TABLET, FILM COATED ORAL EVERY 12 HOURS
Refills: 0 | Status: DISCONTINUED | OUTPATIENT
Start: 2022-08-24 | End: 2022-09-01

## 2022-08-24 RX ORDER — ENOXAPARIN SODIUM 100 MG/ML
40 INJECTION SUBCUTANEOUS EVERY 24 HOURS
Refills: 0 | Status: DISCONTINUED | OUTPATIENT
Start: 2022-08-24 | End: 2022-09-01

## 2022-08-24 RX ORDER — POLYETHYLENE GLYCOL 3350 17 G/17G
17 POWDER, FOR SOLUTION ORAL DAILY
Refills: 0 | Status: DISCONTINUED | OUTPATIENT
Start: 2022-08-24 | End: 2022-08-29

## 2022-08-24 RX ORDER — MORPHINE SULFATE 50 MG/1
4 CAPSULE, EXTENDED RELEASE ORAL ONCE
Refills: 0 | Status: DISCONTINUED | OUTPATIENT
Start: 2022-08-24 | End: 2022-08-24

## 2022-08-24 RX ORDER — POTASSIUM CHLORIDE 20 MEQ
40 PACKET (EA) ORAL ONCE
Refills: 0 | Status: COMPLETED | OUTPATIENT
Start: 2022-08-24 | End: 2022-08-24

## 2022-08-24 RX ORDER — PANTOPRAZOLE SODIUM 20 MG/1
40 TABLET, DELAYED RELEASE ORAL
Refills: 0 | Status: DISCONTINUED | OUTPATIENT
Start: 2022-08-24 | End: 2022-08-27

## 2022-08-24 RX ORDER — FAMOTIDINE 10 MG/ML
20 INJECTION INTRAVENOUS ONCE
Refills: 0 | Status: COMPLETED | OUTPATIENT
Start: 2022-08-24 | End: 2022-08-24

## 2022-08-24 RX ORDER — THIAMINE MONONITRATE (VIT B1) 100 MG
100 TABLET ORAL DAILY
Refills: 0 | Status: COMPLETED | OUTPATIENT
Start: 2022-08-24 | End: 2022-08-27

## 2022-08-24 RX ORDER — HYDROMORPHONE HYDROCHLORIDE 2 MG/ML
0.5 INJECTION INTRAMUSCULAR; INTRAVENOUS; SUBCUTANEOUS THREE TIMES A DAY
Refills: 0 | Status: DISCONTINUED | OUTPATIENT
Start: 2022-08-24 | End: 2022-08-25

## 2022-08-24 RX ORDER — SODIUM CHLORIDE 9 MG/ML
1000 INJECTION INTRAMUSCULAR; INTRAVENOUS; SUBCUTANEOUS ONCE
Refills: 0 | Status: COMPLETED | OUTPATIENT
Start: 2022-08-24 | End: 2022-08-24

## 2022-08-24 RX ORDER — ONDANSETRON 8 MG/1
4 TABLET, FILM COATED ORAL ONCE
Refills: 0 | Status: COMPLETED | OUTPATIENT
Start: 2022-08-24 | End: 2022-08-24

## 2022-08-24 RX ORDER — MORPHINE SULFATE 50 MG/1
2 CAPSULE, EXTENDED RELEASE ORAL EVERY 6 HOURS
Refills: 0 | Status: DISCONTINUED | OUTPATIENT
Start: 2022-08-24 | End: 2022-08-25

## 2022-08-24 RX ORDER — FOLIC ACID 0.8 MG
1 TABLET ORAL DAILY
Refills: 0 | Status: DISCONTINUED | OUTPATIENT
Start: 2022-08-24 | End: 2022-09-01

## 2022-08-24 RX ORDER — SODIUM CHLORIDE 9 MG/ML
1000 INJECTION, SOLUTION INTRAVENOUS
Refills: 0 | Status: DISCONTINUED | OUTPATIENT
Start: 2022-08-24 | End: 2022-08-26

## 2022-08-24 RX ORDER — LACTOBACILLUS ACIDOPHILUS 100MM CELL
1 CAPSULE ORAL DAILY
Refills: 0 | Status: DISCONTINUED | OUTPATIENT
Start: 2022-08-24 | End: 2022-09-01

## 2022-08-24 RX ORDER — LIPASE/PROTEASE/AMYLASE 16-48-48K
3 CAPSULE,DELAYED RELEASE (ENTERIC COATED) ORAL
Refills: 0 | Status: DISCONTINUED | OUTPATIENT
Start: 2022-08-24 | End: 2022-09-01

## 2022-08-24 RX ORDER — GABAPENTIN 400 MG/1
300 CAPSULE ORAL THREE TIMES A DAY
Refills: 0 | Status: DISCONTINUED | OUTPATIENT
Start: 2022-08-24 | End: 2022-09-01

## 2022-08-24 RX ADMIN — SODIUM CHLORIDE 1000 MILLILITER(S): 9 INJECTION INTRAMUSCULAR; INTRAVENOUS; SUBCUTANEOUS at 12:01

## 2022-08-24 RX ADMIN — MORPHINE SULFATE 2 MILLIGRAM(S): 50 CAPSULE, EXTENDED RELEASE ORAL at 16:38

## 2022-08-24 RX ADMIN — Medication 0.5 MILLIGRAM(S): at 12:01

## 2022-08-24 RX ADMIN — SODIUM CHLORIDE 200 MILLILITER(S): 9 INJECTION, SOLUTION INTRAVENOUS at 16:38

## 2022-08-24 RX ADMIN — FAMOTIDINE 20 MILLIGRAM(S): 10 INJECTION INTRAVENOUS at 10:34

## 2022-08-24 RX ADMIN — MORPHINE SULFATE 4 MILLIGRAM(S): 50 CAPSULE, EXTENDED RELEASE ORAL at 10:34

## 2022-08-24 RX ADMIN — SODIUM CHLORIDE 1000 MILLILITER(S): 9 INJECTION INTRAMUSCULAR; INTRAVENOUS; SUBCUTANEOUS at 10:34

## 2022-08-24 RX ADMIN — ONDANSETRON 4 MILLIGRAM(S): 8 TABLET, FILM COATED ORAL at 10:34

## 2022-08-24 RX ADMIN — MORPHINE SULFATE 2 MILLIGRAM(S): 50 CAPSULE, EXTENDED RELEASE ORAL at 23:50

## 2022-08-24 RX ADMIN — MORPHINE SULFATE 2 MILLIGRAM(S): 50 CAPSULE, EXTENDED RELEASE ORAL at 23:26

## 2022-08-24 RX ADMIN — SODIUM CHLORIDE 200 MILLILITER(S): 9 INJECTION, SOLUTION INTRAVENOUS at 22:23

## 2022-08-24 RX ADMIN — Medication 40 MILLIEQUIVALENT(S): at 14:21

## 2022-08-24 RX ADMIN — GABAPENTIN 300 MILLIGRAM(S): 400 CAPSULE ORAL at 22:23

## 2022-08-24 NOTE — H&P ADULT - NSHPREVIEWOFSYSTEMS_GEN_ALL_CORE
Negative except as above Review of Systems:  •	CONSTITUTIONAL - No fever, No diaphoresis, No weight change  •	SKIN - No rash  •	HEMATOLOGIC - No abnormal bleeding or bruising  •	EYES - No eye pain, No blurred vision  •	ENT - No change in hearing, No sore throat, No neck pain, No rhinorrhea, No ear pain  •	RESPIRATORY - No shortness of breath, No cough  •	CARDIAC - chest tenderness from vomiting, No palpitations  •	GI - Complaisn of  abdominal pain, No nausea, No vomiting, No diarrhea, No constipation, No bright red blood per rectum or melena. No flank pain  •             - No dysuria, frequency, hematuria.   •	ENDO - No polydypsia, No polyuria, No heat/cold intolerance  •	MUSCULOSKELETAL - No joint paint, No swelling, No back pain  •	NEUROLOGIC - No numbness, No focal weakness, No headache, No dizziness  All other systems negative, unless specified in HPI Review of Systems:  •	CONSTITUTIONAL - No fever, No diaphoresis, No weight change  •	SKIN - No rash  •	HEMATOLOGIC - No abnormal bleeding or bruising  •	EYES - No eye pain, No blurred vision  •	ENT - No change in hearing, No sore throat, No neck pain, No rhinorrhea, No ear pain  •	RESPIRATORY - No shortness of breath, No cough  •	CARDIAC - chest tenderness from vomiting, No palpitations  •	GI - Complaisn of  abdominal pain, complains  nausea, vomiting, No diarrhea, No constipation, No bright red blood per rectum or melena. No flank pain  •             - No dysuria, frequency, hematuria.   •	ENDO - No polydypsia, No polyuria, No heat/cold intolerance  •	MUSCULOSKELETAL - No joint paint, No swelling, No back pain  •	NEUROLOGIC - No numbness, No focal weakness, No headache, No dizziness  All other systems negative, unless specified in HPI

## 2022-08-24 NOTE — ED PROVIDER NOTE - PHYSICAL EXAMINATION
Vital Signs: I have reviewed the initial vital signs.  Constitutional: NAD, well-nourished, appears stated age, no acute distress.  HEENT: Airway patent, moist MM, no erythema/swelling/deformity of oral structures. EOMI, PERRLA.  CV: regular rate, regular rhythm, well-perfused extremities, 2+ b/l DP and radial pulses equal.  Lungs: BCTA, no increased WOB.  ABD: +tenderness epigastric/ abdomen; ND, no guarding or rebound, no pulsatile mass, no hernias.   MSK: Neck supple, nontender, nl ROM, no stepoff. Chest nontender. Back nontender in TLS spine or to b/l bony structures or flanks. Ext nontender, nl rom, no deformity.   INTEG: Skin warm, dry, no rash.  NEURO: A&Ox3, normal strength, nl sensation throughout, normal speech.   PSYCH: Calm, cooperative, normal affect and interaction.

## 2022-08-24 NOTE — H&P ADULT - ASSESSMENT
INCOMPLETE 48 yo F patient with a PMH of chronic pancreatitis probably alcoholic- induced, and recurrent episodes of acute pancreatitis, presents to the ED for abdominal pain.    # Abdominal pain  # History of chronic pancreatitis    Continuous abdominal pain that started the day before the presentation, located mainly in the epigastric area, radiating to the back  Patient drank 3-4 glasses of beer/ day over the weekend  Elevated lipase, CT scan of abdomen consistent with acute pancreatitis  Patient has a history of recurrent acute pancreatitis, on chronic pancreatitis  s/p IV fluids in the ED  Will maintain on D5 + NS= 200 cc/h + zofran 8 mg IV BID PRN + morphine PRN for pain  Clear liquid diet for now and advance as tolerated  Ordered triglyceride levels, and US of right upper quadrant to check for any gallstones  C/w Creon, gabapentin, oxycodone held  Hepatic steatosis, unclear if cirrhosis; US of right upper quadrant will better assess  GI consult is in    # Alcohol use  # Elevated anion gap    Patient states that she has been trying to abstain, last alcohol use was on the weekend  AG= 16 with ketonuria on the UA; might be alcoholic ketoacidosis  Ordered serum b-hydroxybutyrate  Maintained on D5 + NS = 200 cc/h with IV KCl additive  On CIWA protocol symptom-triggered  C/w folic acid, thiamine and multivitamins  F/u AG on 8 pm and am labs      # Hypokalemia    Repleted in the ED, might decrease further as dextrose is added to the IV fluids  KCl added in the IV fluids  F/u 8 pm BMP      GI ppx: Home pantoprazole  DVT ppx: Lovenox 40 mg  Diet: Clear, for now  Activity: Ambulate as tolerated  Dispo: acute, floor  Full code 48 yo F patient with a PMH of chronic pancreatitis probably alcoholic- induced, and recurrent episodes of acute pancreatitis, presents to the ED for abdominal pain.    # Abdominal pain  # History of chronic pancreatitis    Continuous abdominal pain that started the day before the presentation, located mainly in the epigastric area, radiating to the back  Patient drank 3-4 glasses of beer/ day over the weekend  Elevated lipase, CT scan of abdomen consistent with acute pancreatitis  Patient has a history of recurrent acute pancreatitis, on chronic pancreatitis  s/p IV fluids in the ED  Will maintain on D5 + NS= 200 cc/h + zofran 8 mg IV BID PRN + morphine PRN for pain  Clear liquid diet for now and advance as tolerated  Ordered triglyceride levels, and US of right upper quadrant to check for any gallstones  C/w Creon, gabapentin, oxycodone held  Hepatic steatosis, unclear if cirrhosis; US of right upper quadrant will better assess  GI consult is in    # Alcohol use  # Elevated anion gap    Patient states that she has been trying to abstain, last alcohol use was on the weekend  AG= 16 with ketonuria on the UA; might be alcoholic ketoacidosis  Ordered serum b-hydroxybutyrate and VBG  Maintained on D5 + NS = 200 cc/h with IV KCl additive  On CIWA protocol symptom-triggered  C/w folic acid, thiamine and multivitamins  F/u AG on 8 pm and am labs      # Hypokalemia    Repleted in the ED, might decrease further as dextrose is added to the IV fluids  KCl added in the IV fluids  F/u 8 pm BMP      GI ppx: Home pantoprazole  DVT ppx: Lovenox 40 mg  Diet: Clear, for now  Activity: Ambulate as tolerated  Dispo: acute, floor  Full code

## 2022-08-24 NOTE — H&P ADULT - ATTENDING COMMENTS
48 yo F patient with a PMH of chronic pancreatitis comp. 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 Admitted with a Primary Diagnosis of  Acute on Chronic  Pancreatitis.     IMPRESSION   Acute on Chronic Pancreatitis Associated with Nausea and vomiting  Hepatic Steatosis   Hx of Chronic Pancreatitis    Plan  Patient drank 3-4 glasses of beer/ day over the weekend during her birthday party   ETOH counseling Given at bedside   Elevated lipase, CT scan of abdomen consistent with acute pancreatitis  C/w  D5 + NS= 200 cc/h + zofran 8 mg IV BID PRN + Dilaudid 0.5 PRN TID  NPO for now  Patient still Nauseous with sever abdominal Pain   Ordered triglyceride levels  C/w Creon, gabapentin  Start  Adequate Pain Management Dilaudid  CIWA  protocol  Electrolyte  Repletion     #Progress Note Handoff  Pending (specify):  Consults__f/u GI_______, Tests____BMP____, Test Results_______, Other_________    Disposition: Home Acute   Patient Seen at Bedside__________08/24_______ 46 yo F patient with a PMH of chronic pancreatitis comp. 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 Admitted with a Primary Diagnosis of  Acute on Chronic  Pancreatitis.     VITAL SIGNS: AFebrile, vital signs stable  CONSTITUTIONAL: Well-developed; well-nourished; in no acute distress.  SKIN: Skin exam is warm and dry, no acute rash.  HEAD: Normocephalic; atraumatic.  EYES: Pupils equal round reactive to light, Extraocular movements intact; conjunctiva and sclera clear.  ENT: No nasal discharge; airway clear. Moist mucus membranes.  NECK: Supple; non tender. No rigidity  CARD: Regular rate and rhythm. Normal S1, S2; no murmurs, gallops, or rubs.  RESP: Lungs clear to auscultation bilaterally. No wheezes, rales or rhonchi.  ABD: Sever Abdomen pain; tender; non-distended;  .   EXT: Normal ROM. No clubbing, cyanosis or edema. No calf tenderness to palpation.  NEURO: Alert and oriented x 3. No focal deficits.  PSYCH: Cooperative, appropriate.     IMPRESSION   Acute on Chronic Pancreatitis Associated with Nausea and vomiting  Hepatic Steatosis   Hx of Chronic Pancreatitis    Plan  Patient drank 3-4 glasses of beer/ day over the weekend during her birthday party   ETOH counseling Given at bedside   Elevated lipase, CT scan of abdomen consistent with acute pancreatitis  C/w  D5 + NS= 200 cc/h + zofran 8 mg IV BID PRN + Dilaudid 0.5 PRN TID  NPO for now  Patient still Nauseous with sever abdominal Pain   Ordered triglyceride levels  C/w Creon, gabapentin  Start  Adequate Pain Management Dilaudid  CIWA  protocol  Electrolyte  Repletion     #Progress Note Handoff  Pending (specify):  Consults__f/u GI_______, Tests____BMP____, Test Results_______, Other_________    Disposition: Home Acute   Patient Seen at Bedside__________08/24_______

## 2022-08-24 NOTE — H&P ADULT - NSHPLABSRESULTS_GEN_ALL_CORE
LABS:                          12.8   7.55  )-----------( 126      ( 24 Aug 2022 10:10 )             36.1     08-24    137  |  96<L>  |  12  ----------------------------<  106<H>  3.3<L>   |  25  |  0.6<L>    Ca    8.9      24 Aug 2022 10:10    TPro  7.5  /  Alb  4.2  /  TBili  0.9  /  DBili  0.2  /  AST  120<H>  /  ALT  42<H>  /  AlkPhos  124<H>  08-24    LIVER FUNCTIONS - ( 24 Aug 2022 10:10 )  Alb: 4.2 g/dL / Pro: 7.5 g/dL / ALK PHOS: 124 U/L / ALT: 42 U/L / AST: 120 U/L / GGT: x             Urinalysis Basic - ( 24 Aug 2022 15:08 )    Color: Yellow / Appearance: Clear / SG: >1.050 / pH: x  Gluc: x / Ketone: Moderate  / Bili: Negative / Urobili: <2 mg/dL   Blood: x / Protein: 30 mg/dL / Nitrite: Negative   Leuk Esterase: Negative / RBC: 4 /HPF / WBC 4 /HPF   Sq Epi: x / Non Sq Epi: 8 /HPF / Bacteria: Negative

## 2022-08-24 NOTE — H&P ADULT - HISTORY OF PRESENT ILLNESS
48 yo F patient with a PMH of chronic pancreatitis probably alcoholic- induced, and recurrent episodes of acute pancreatitis, presents to the ED for abdominal pain.  Patient started to have abdominal pain the day before the presentation, diffuse but mainly located in the epigastrium, radiating to the back, with associated nausea and clear, non-bloody emesis. Patient has been having consequently decrease in the appetite, without any fevers/ chills, change in bowel/ urinary habits, chest pain, shortness of breath, cough, myalgias/ arthralgias.    To note that the patient reports having abstained from alcohol use for several weeks, before having 3-4 glasses of alcohol/ day on the weekend.    Her previous episodes of acute pancreatitis chronic pancreatitis were complicated by necrosis and formation of a pseudocyst; no drainage was done inside [facility], but patient says that she underwent drainage in another hospital that was complicated by sepsis and she had to be discharged from the other hospital several weeks after with antibiotics.    Patient also ran out of medications, and she has not been taking any for several months.    In the ED, the patient was HD stable, tachycardic= 116, afebrile.  Labs showed WBC= 7.55, Hb= 12.9, plt= 126, K= 3.3, AST= 120, ALT= 124, lipase= 733.  CT scan of the abdomen shows diffusely enlarged and edematous pancreas, with diffuse stranding, no evidence of pancreatic necrosis, hepatic steatosis, and small volume ascites. The previously described pseudocyst resolved on this imaging study.    Patient is going to be admitted for management of acute pancreatitis.

## 2022-08-24 NOTE — ED PROVIDER NOTE - OBJECTIVE STATEMENT
47-year-old female with history of alcohol abuse, recurrent pancreatitis presents to the ED complaining of abdominal pain rating to the back with nausea and vomiting for 1 day.  Patient states was drinking this weekend.  No chest pain, shortness of breath, diarrhea or urinary symptoms.

## 2022-08-24 NOTE — ED PROVIDER NOTE - CLINICAL SUMMARY MEDICAL DECISION MAKING FREE TEXT BOX
I personally evaluated the patient. I reviewed the Resident´s or Physician Assistant´s note (as assigned above), and agree with the findings and plan except as documented in my note.  Patient evaluated for abdominal pain.  Labs, CT abdomen pelvis performed in the ED. Given medicatoin for pain. Patient CT consistent with chronic pancreatitis, patient unable to tolerate p.o., still complaining of persistent pain.  Patient admitted to medicine for further evaluation and treatment.

## 2022-08-24 NOTE — ED PROVIDER NOTE - ATTENDING APP SHARED VISIT CONTRIBUTION OF CARE
48 yo F pmh etoh abuse, chronic pancreatitis pw abd pain. Abd pain radiating towards her back, associated with nausea and several episodes nbnb vomiting for the past 1 day. Unable to tolerate PO, endorses drinking this past weekend. No cp, no sob, no urinary sx, no f/c, no leg swelling, no diarrhea, no vaginal bleeding/dc.     CONSTITUTIONAL: Well-developed; well-nourished; in no acute distress. Sitting up and providing appropriate history and physical examination  SKIN: skin exam is warm and dry, no acute rash.  HEAD: Normocephalic; atraumatic.  EYES: PERRL, 3 mm bilateral, no nystagmus, EOM intact; conjunctiva and sclera clear.  ENT: No nasal discharge; airway clear.  NECK: Supple; non tender. + full passive ROM in all directions. No JVD  CARD: S1, S2 normal; no murmurs, gallops, or rubs. Regular rate and rhythm. + Symmetric Strong Pulses  RESP: No wheezes, rales or rhonchi. Good air movement bilaterally  ABD: +mild distention and diffuse ttp. No Rebound, No Guarding, No signs of peritonitis, No CVA tenderness. No pulsatile abdominal mass. + Strong and Symmetric Pulses  EXT: Normal ROM. No clubbing, cyanosis or edema. Dp and Pt Pulses intact. Cap refill less than 3 seconds  NEURO: CN 2-12 intact, normal finger to nose, normal romberg, stable gait, no sensory or motor deficits, Alert, oriented, grossly unremarkable. No Focal deficits. GCS 15. NIH 0  PSYCH: Cooperative, appropriate.

## 2022-08-24 NOTE — H&P ADULT - NSHPPHYSICALEXAM_GEN_ALL_CORE
General: In mild distress due to the pain  Chest: CTAB  CV: RRR  Abdomen: diffuse tenderness on palpation, exquisite in the epigastric area, no rebound tenderness, no guarding  Extremities: no cyanosis, edema, clubbing  Neuro: A & O x3

## 2022-08-24 NOTE — ED ADULT NURSE NOTE - NS ED NOTE ABUSE RESPONSE YN
I have personally evaluated and examined the patient. The Attending was available to me as a supervising provider if needed. Yes

## 2022-08-24 NOTE — ED PROVIDER NOTE - NS ED ROS FT
Constitutional: (-) fever  Eyes/ENT: (-) blurry vision, (-) epistaxis  Cardiovascular: (-) chest pain, (-) syncope  Respiratory: (-) cough, (-) shortness of breath  Gastrointestinal:+ abd pain, + nausea, + vomiting, (-) diarrhea  Musculoskeletal: (-) neck pain, (-) back pain, (-) joint pain  Integumentary: (-) rash, (-) edema  Neurological: (-) headache, (-) altered mental status  Psychiatric: (-) hallucinations  Allergic/Immunologic: (-) pruritus

## 2022-08-24 NOTE — PATIENT PROFILE ADULT - FALL HARM RISK - UNIVERSAL INTERVENTIONS
Bed in lowest position, wheels locked, appropriate side rails in place/Call bell, personal items and telephone in reach/Instruct patient to call for assistance before getting out of bed or chair/Non-slip footwear when patient is out of bed/[place] to call system/Physically safe environment - no spills, clutter or unnecessary equipment/Purposeful Proactive Rounding/Room/bathroom lighting operational, light cord in reach

## 2022-08-24 NOTE — H&P ADULT - CONVERSATION DETAILS
Advanced Care Planning: FULL CODE. Patient would like all options taken  for  life preserving Mes[place].   I have had goals of care discussion, advanced directive and code status with patient/family today.  I have spent 30 minutes with this patient. Over 50% of the encounter was spent in counseling on and coordination of patient care. The above recommendations were discussed with the patient. The patient and or family had all questions answered and expressed understanding of the plan.

## 2022-08-25 LAB
ALBUMIN SERPL ELPH-MCNC: 3.4 G/DL — LOW (ref 3.5–5.2)
ALP SERPL-CCNC: 100 U/L — SIGNIFICANT CHANGE UP (ref 30–115)
ALT FLD-CCNC: 29 U/L — SIGNIFICANT CHANGE UP (ref 0–41)
ANION GAP SERPL CALC-SCNC: 12 MMOL/L — SIGNIFICANT CHANGE UP (ref 7–14)
AST SERPL-CCNC: 95 U/L — HIGH (ref 0–41)
BASOPHILS # BLD AUTO: 0.01 K/UL — SIGNIFICANT CHANGE UP (ref 0–0.2)
BASOPHILS NFR BLD AUTO: 0.1 % — SIGNIFICANT CHANGE UP (ref 0–1)
BILIRUB SERPL-MCNC: 0.7 MG/DL — SIGNIFICANT CHANGE UP (ref 0.2–1.2)
BUN SERPL-MCNC: 5 MG/DL — LOW (ref 10–20)
CALCIUM SERPL-MCNC: 7.9 MG/DL — LOW (ref 8.5–10.1)
CHLORIDE SERPL-SCNC: 104 MMOL/L — SIGNIFICANT CHANGE UP (ref 98–110)
CO2 SERPL-SCNC: 24 MMOL/L — SIGNIFICANT CHANGE UP (ref 17–32)
CREAT SERPL-MCNC: 0.6 MG/DL — LOW (ref 0.7–1.5)
EGFR: 113 ML/MIN/1.73M2 — SIGNIFICANT CHANGE UP
EOSINOPHIL # BLD AUTO: 0.03 K/UL — SIGNIFICANT CHANGE UP (ref 0–0.7)
EOSINOPHIL NFR BLD AUTO: 0.4 % — SIGNIFICANT CHANGE UP (ref 0–8)
GLUCOSE SERPL-MCNC: 72 MG/DL — SIGNIFICANT CHANGE UP (ref 70–99)
HCT VFR BLD CALC: 32.9 % — LOW (ref 37–47)
HGB BLD-MCNC: 11.1 G/DL — LOW (ref 12–16)
IMM GRANULOCYTES NFR BLD AUTO: 0.4 % — HIGH (ref 0.1–0.3)
LYMPHOCYTES # BLD AUTO: 0.94 K/UL — LOW (ref 1.2–3.4)
LYMPHOCYTES # BLD AUTO: 12.6 % — LOW (ref 20.5–51.1)
MAGNESIUM SERPL-MCNC: 1.1 MG/DL — LOW (ref 1.8–2.4)
MCHC RBC-ENTMCNC: 31.1 PG — HIGH (ref 27–31)
MCHC RBC-ENTMCNC: 33.7 G/DL — SIGNIFICANT CHANGE UP (ref 32–37)
MCV RBC AUTO: 92.2 FL — SIGNIFICANT CHANGE UP (ref 81–99)
MONOCYTES # BLD AUTO: 0.54 K/UL — SIGNIFICANT CHANGE UP (ref 0.1–0.6)
MONOCYTES NFR BLD AUTO: 7.2 % — SIGNIFICANT CHANGE UP (ref 1.7–9.3)
NEUTROPHILS # BLD AUTO: 5.92 K/UL — SIGNIFICANT CHANGE UP (ref 1.4–6.5)
NEUTROPHILS NFR BLD AUTO: 79.3 % — HIGH (ref 42.2–75.2)
NRBC # BLD: 0 /100 WBCS — SIGNIFICANT CHANGE UP (ref 0–0)
PLATELET # BLD AUTO: 126 K/UL — LOW (ref 130–400)
POTASSIUM SERPL-MCNC: 3.5 MMOL/L — SIGNIFICANT CHANGE UP (ref 3.5–5)
POTASSIUM SERPL-SCNC: 3.5 MMOL/L — SIGNIFICANT CHANGE UP (ref 3.5–5)
PROT SERPL-MCNC: 6.4 G/DL — SIGNIFICANT CHANGE UP (ref 6–8)
RBC # BLD: 3.57 M/UL — LOW (ref 4.2–5.4)
RBC # FLD: 13.4 % — SIGNIFICANT CHANGE UP (ref 11.5–14.5)
SODIUM SERPL-SCNC: 140 MMOL/L — SIGNIFICANT CHANGE UP (ref 135–146)
WBC # BLD: 7.47 K/UL — SIGNIFICANT CHANGE UP (ref 4.8–10.8)
WBC # FLD AUTO: 7.47 K/UL — SIGNIFICANT CHANGE UP (ref 4.8–10.8)

## 2022-08-25 PROCEDURE — 99223 1ST HOSP IP/OBS HIGH 75: CPT

## 2022-08-25 PROCEDURE — 99232 SBSQ HOSP IP/OBS MODERATE 35: CPT

## 2022-08-25 RX ORDER — HYDROMORPHONE HYDROCHLORIDE 2 MG/ML
1 INJECTION INTRAMUSCULAR; INTRAVENOUS; SUBCUTANEOUS EVERY 4 HOURS
Refills: 0 | Status: DISCONTINUED | OUTPATIENT
Start: 2022-08-25 | End: 2022-08-26

## 2022-08-25 RX ORDER — IBUPROFEN 200 MG
400 TABLET ORAL ONCE
Refills: 0 | Status: COMPLETED | OUTPATIENT
Start: 2022-08-25 | End: 2022-08-25

## 2022-08-25 RX ORDER — HYDROMORPHONE HYDROCHLORIDE 2 MG/ML
0.5 INJECTION INTRAMUSCULAR; INTRAVENOUS; SUBCUTANEOUS EVERY 6 HOURS
Refills: 0 | Status: DISCONTINUED | OUTPATIENT
Start: 2022-08-25 | End: 2022-08-25

## 2022-08-25 RX ORDER — MAGNESIUM SULFATE 500 MG/ML
2 VIAL (ML) INJECTION EVERY 4 HOURS
Refills: 0 | Status: COMPLETED | OUTPATIENT
Start: 2022-08-25 | End: 2022-08-25

## 2022-08-25 RX ORDER — HYDROMORPHONE HYDROCHLORIDE 2 MG/ML
0.5 INJECTION INTRAMUSCULAR; INTRAVENOUS; SUBCUTANEOUS EVERY 4 HOURS
Refills: 0 | Status: DISCONTINUED | OUTPATIENT
Start: 2022-08-25 | End: 2022-08-27

## 2022-08-25 RX ADMIN — HYDROMORPHONE HYDROCHLORIDE 1 MILLIGRAM(S): 2 INJECTION INTRAMUSCULAR; INTRAVENOUS; SUBCUTANEOUS at 21:22

## 2022-08-25 RX ADMIN — Medication 25 GRAM(S): at 18:46

## 2022-08-25 RX ADMIN — HYDROMORPHONE HYDROCHLORIDE 1 MILLIGRAM(S): 2 INJECTION INTRAMUSCULAR; INTRAVENOUS; SUBCUTANEOUS at 23:49

## 2022-08-25 RX ADMIN — Medication 1 TABLET(S): at 11:11

## 2022-08-25 RX ADMIN — MORPHINE SULFATE 2 MILLIGRAM(S): 50 CAPSULE, EXTENDED RELEASE ORAL at 09:26

## 2022-08-25 RX ADMIN — HYDROMORPHONE HYDROCHLORIDE 1 MILLIGRAM(S): 2 INJECTION INTRAMUSCULAR; INTRAVENOUS; SUBCUTANEOUS at 19:42

## 2022-08-25 RX ADMIN — MORPHINE SULFATE 2 MILLIGRAM(S): 50 CAPSULE, EXTENDED RELEASE ORAL at 08:56

## 2022-08-25 RX ADMIN — Medication 400 MILLIGRAM(S): at 03:21

## 2022-08-25 RX ADMIN — PANTOPRAZOLE SODIUM 40 MILLIGRAM(S): 20 TABLET, DELAYED RELEASE ORAL at 05:34

## 2022-08-25 RX ADMIN — HYDROMORPHONE HYDROCHLORIDE 0.5 MILLIGRAM(S): 2 INJECTION INTRAMUSCULAR; INTRAVENOUS; SUBCUTANEOUS at 01:10

## 2022-08-25 RX ADMIN — GABAPENTIN 300 MILLIGRAM(S): 400 CAPSULE ORAL at 21:24

## 2022-08-25 RX ADMIN — Medication 100 MILLIGRAM(S): at 11:11

## 2022-08-25 RX ADMIN — ONDANSETRON 8 MILLIGRAM(S): 8 TABLET, FILM COATED ORAL at 15:42

## 2022-08-25 RX ADMIN — ONDANSETRON 8 MILLIGRAM(S): 8 TABLET, FILM COATED ORAL at 01:09

## 2022-08-25 RX ADMIN — HYDROMORPHONE HYDROCHLORIDE 0.5 MILLIGRAM(S): 2 INJECTION INTRAMUSCULAR; INTRAVENOUS; SUBCUTANEOUS at 11:13

## 2022-08-25 RX ADMIN — Medication 25 GRAM(S): at 11:10

## 2022-08-25 RX ADMIN — SODIUM CHLORIDE 200 MILLILITER(S): 9 INJECTION, SOLUTION INTRAVENOUS at 08:31

## 2022-08-25 RX ADMIN — ENOXAPARIN SODIUM 40 MILLIGRAM(S): 100 INJECTION SUBCUTANEOUS at 18:54

## 2022-08-25 RX ADMIN — HYDROMORPHONE HYDROCHLORIDE 1 MILLIGRAM(S): 2 INJECTION INTRAMUSCULAR; INTRAVENOUS; SUBCUTANEOUS at 16:12

## 2022-08-25 RX ADMIN — HYDROMORPHONE HYDROCHLORIDE 0.5 MILLIGRAM(S): 2 INJECTION INTRAMUSCULAR; INTRAVENOUS; SUBCUTANEOUS at 05:35

## 2022-08-25 RX ADMIN — Medication 400 MILLIGRAM(S): at 03:04

## 2022-08-25 RX ADMIN — GABAPENTIN 300 MILLIGRAM(S): 400 CAPSULE ORAL at 05:34

## 2022-08-25 RX ADMIN — Medication 1 MILLIGRAM(S): at 11:11

## 2022-08-25 RX ADMIN — GABAPENTIN 300 MILLIGRAM(S): 400 CAPSULE ORAL at 14:28

## 2022-08-25 RX ADMIN — HYDROMORPHONE HYDROCHLORIDE 0.5 MILLIGRAM(S): 2 INJECTION INTRAMUSCULAR; INTRAVENOUS; SUBCUTANEOUS at 05:40

## 2022-08-25 RX ADMIN — Medication 25 GRAM(S): at 14:29

## 2022-08-25 RX ADMIN — HYDROMORPHONE HYDROCHLORIDE 1 MILLIGRAM(S): 2 INJECTION INTRAMUSCULAR; INTRAVENOUS; SUBCUTANEOUS at 15:42

## 2022-08-25 RX ADMIN — HYDROMORPHONE HYDROCHLORIDE 0.5 MILLIGRAM(S): 2 INJECTION INTRAMUSCULAR; INTRAVENOUS; SUBCUTANEOUS at 02:16

## 2022-08-25 RX ADMIN — Medication 3 CAPSULE(S): at 08:56

## 2022-08-25 NOTE — PATIENT PROFILE ADULT - DEAF OR HARD OF HEARING?
Update History & Physical    The patient's History and Physical of June 22, 2022 was reviewed with the patient and I examined the patient. There was no change. The surgical site was confirmed by the patient and me. Plan: The risks, benefits, expected outcome, and alternative to the recommended procedure have been discussed with the patient. Patient understands and wants to proceed with the procedure.      Electronically signed by Stella Rosales MD on 8/25/2022 at 9:49 AM no

## 2022-08-25 NOTE — CONSULT NOTE ADULT - ASSESSMENT
# Acute on chronic pancreatitis secondary to ETOH use.     -  8/2021: EUS w/ walled-off collection that measured 4.5 x 3.9 cm in largest dimension,  looked well circumscribed, heterogeneous, containing debris, consistent with  walled-off pancreatic necrosis. EUS w/ pancreatic parenchymal hypoechogenicity and calcifications in the  pancreatic head, body, and tail, suggestive of chronic pancreatitis.          # Acute on chronic pancreatitis secondary to ETOH use.     - Typical epigastric pain radiates to the back with nausea and vomiting.  - CT Findings compatible with acute on chronic pancreatitis; no evidence of hepatic necrosis or peripancreatic fluid collections. hepatic steatosis.  - RUQUS: Pancreatic calcifications. CBD 6 mm.  - Not on any medications   -  8/2021: EUS w/ walled-off collection that measured 4.5 x 3.9 cm in largest dimension,  looked well circumscribed, heterogeneous, containing debris, consistent with  walled-off pancreatic necrosis. EUS w/ pancreatic parenchymal hypoechogenicity and calcifications in the pancreatic head, body, and tail, suggestive of chronic pancreatitis.     Plan   - c/w LR @ 200/hour   - monitor BUN, Hct and UOP.   - pain control.   - correct electrolytes, thiamine and folate supplements   -          # Acute on chronic pancreatitis secondary to ETOH use.   # Mild alcoholic hepatitis,   - Typical epigastric pain radiates to the back with nausea and vomiting.  - CT Findings compatible with acute on chronic pancreatitis; no evidence of hepatic necrosis or peripancreatic fluid collections. hepatic steatosis.  - RUQUS: Pancreatic calcifications. CBD 6 mm.  - Not on any medications   -  8/2021: EUS w/ walled-off collection that measured 4.5 x 3.9 cm in largest dimension,  looked well circumscribed, heterogeneous, containing debris, consistent with  walled-off pancreatic necrosis. EUS w/ pancreatic parenchymal hypoechogenicity and calcifications in the pancreatic head, body, and tail, suggestive of chronic pancreatitis.     Plan   - c/w LR @ 200/hour   - monitor BUN, Hct and UOP.   - pain control.   - correct electrolytes, thiamine and folate supplements   - trend LFTs   - NPO for today, advance to clear liquids tomorrow if symptoms improve.    46 yo F patient with a PMH of chronic pancreatitis probably alcoholic- induced, and recurrent episodes of acute pancreatitis, presents to the ED for abdominal pain in the epigastrium, radiating to the back, with associated nausea and clear, non-bloody emesis. Patient has been having consequently decrease in the appetite.  Admitted for acute pancreatitis.      # Acute on chronic pancreatitis secondary to ETOH use.   # Mild alcoholic hepatitis.     - Lipase 733  - hemodynamically stable, afebrile   - Typical epigastric pain radiates to the back with nausea and vomiting.  - CT Findings compatible with acute on chronic pancreatitis; no evidence of hepatic necrosis or peripancreatic fluid collections. hepatic steatosis.  - RUQUS: Pancreatic calcifications. CBD 6 mm.  - Not on any medications   -  8/2021: EUS w/ walled-off collection that measured 4.5 x 3.9 cm in largest dimension,  looked well circumscribed, heterogeneous, containing debris, consistent with  walled-off pancreatic necrosis. EUS w/ pancreatic parenchymal hypoechogenicity and calcifications in the pancreatic head, body, and tail, suggestive of chronic pancreatitis.     Plan   - c/w LR @ 200/hour   - monitor BUN, Hct and UOP.   - pain control.   - correct electrolytes, thiamine and folate supplements   - trend LFTs   - NPO for today, advance to clear liquids tomorrow if symptoms improve.    48 yo F patient with a PMH of chronic pancreatitis probably alcoholic- induced, and recurrent episodes of acute pancreatitis, presents to the ED for abdominal pain in the epigastrium, radiating to the back, with associated nausea and clear, non-bloody emesis. Patient has been having consequently decrease in the appetite.  Admitted for acute pancreatitis.      # Acute on chronic pancreatitis secondary to ETOH use.   # Mild alcoholic hepatitis.     - Lipase 733  - hemodynamically stable, afebrile   - Typical epigastric pain radiates to the back with nausea and vomiting.  - CT Findings compatible with acute on chronic pancreatitis; no evidence of hepatic necrosis or peripancreatic fluid collections. hepatic steatosis.  - RUQUS: Pancreatic calcifications. CBD 6 mm.  - Not on any medications   -  8/2021: EUS w/ walled-off collection that measured 4.5 x 3.9 cm in largest dimension,  looked well circumscribed, heterogeneous, containing debris, consistent with  walled-off pancreatic necrosis. EUS w/ pancreatic parenchymal hypoechogenicity and calcifications in the pancreatic head, body, and tail, suggestive of chronic pancreatitis.     Plan   - c/w LR @ 200/hour   - monitor BUN, Hct and UOP.   - pain control.   - correct electrolytes, thiamine and folate supplements   - trend LFTs   - Alcohol abstinence   - monitor withdrawal symptoms.   - NPO for today, advance to clear liquids tomorrow if symptoms improve.

## 2022-08-25 NOTE — PROGRESS NOTE ADULT - ASSESSMENT
48 yo F patient with a PMH of chronic pancreatitis probably alcoholic- induced, and recurrent episodes of acute pancreatitis, presents to the ED for abdominal pain.    # Acute on chronic pancreatitis  - c/w IV fluid  - NPO for today  - pain control, dilaudid increase to 0.5 mg (mod pain) - 1 mg (severe pain) q4h prn  - c/w creon, gabapentin  - follow up GI    # ETOH abuse  # Elevated anion gap likely alcoholic ketoacidosis, resolved  - on CIWA protocol symptom-triggered  - c/w folic acid, thiamine and multivitamins    # Transaminitis likely ETOH induced, resolved  # Hepatic steatosis  - monitor LFTs    # Hypomagnesemia  - replete Mg today, monitor BMP and Mg    GI ppx: Home pantoprazole  DVT ppx: Lovenox 40 mg  Diet: NPO  Activity: Ambulate as tolerated  Dispo: acute, floor  Full code

## 2022-08-25 NOTE — CONSULT NOTE ADULT - SUBJECTIVE AND OBJECTIVE BOX
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  HEAD:  Atraumatic, Normocephalic  EYES: conjunctiva and sclera clear  NECK: Supple, no JVD or thyromegaly  CHEST/LUNG: Clear to auscultation bilaterally; No wheeze, rhonchi, or rales  HEART: Regular rate and rhythm; normal S1, S2, No murmurs.  ABDOMEN: Soft, nontender, nondistended; Bowel sounds present  NEUROLOGY: No asterixis or tremor.   SKIN: Intact, no jaundice        Data:                        11.1   7.47  )-----------( 126      ( 25 Aug 2022 08:54 )             32.9     Hgb Trend:  11.1  08-25-22 @ 08:54  12.8  08-24-22 @ 10:10      08-25    140  |  104  |  5<L>  ----------------------------<  72  3.5   |  24  |  0.6<L>    Ca    7.9<L>      25 Aug 2022 08:54  Phos  2.4     08-24  Mg     1.1     08-25    TPro  6.4  /  Alb  3.4<L>  /  TBili  0.7  /  DBili  x   /  AST  95<H>  /  ALT  29  /  AlkPhos  100  08-25    Liver panel trend:  TBili 0.7   /   AST 95   /   ALT 29   /   AlkP 100   /   Tptn 6.4   /   Alb 3.4    /   DBili --      08-25  TBili 0.7   /   AST 99   /   ALT 34   /   AlkP 106   /   Tptn 6.8   /   Alb 3.7    /   DBili 0.2      08-24  TBili 0.9   /      /   ALT 42   /   AlkP 124   /   Tptn 7.5   /   Alb 4.2    /   DBili 0.2      08-24              Radiology:  CT Abdomen and Pelvis w/ IV Cont:   ACC: [id number] EXAM:  CT ABDOMEN AND PELVIS IC                          PROCEDURE DATE:  08/24/2022          INTERPRETATION:  CLINICAL STATEMENT: Abdominal pain    TECHNIQUE: Contiguous axial CT images were obtained from the lower chest   to the pubic symphysis following administration of 100cc Omnipaque 350   intravenous contrast.  Oral contrast was not administered.  Reformatted   images in the coronal and sagittal planes were acquired.    COMPARISON CT: November 20, 2021    OTHER STUDIES USEDFOR CORRELATION: None.      FINDINGS:    LOWER CHEST: Unchanged 2 mm right lower lobe subpleural solid nodule   (4/1).    HEPATOBILIARY: Diffuse hepatic steatosis. Patent portal vein.    SPLEEN: Unremarkable.    PANCREAS: Diffuse enlarged, edematous pancreas with extensive hazy   adjacent fat stranding and surrounding fluid. Dilated main pancreatic   duct measures up to 7 mm. Multiple coarse desiccation seen throughout the   pancreatic parenchyma. Interval resolution of previously seen cystic   lesion in the left upper quadrant of the abdomen contiguous with the   stomach. Patent, although somewhat compressed appearing splenic vein.   Homogeneous enhancement of the pancreatic parenchyma. No peripancreatic   focal fluid collections.    ADRENALGLANDS: Unremarkable.    KIDNEYS: Symmetric renal enhancement bilaterally. No hydronephrosis..    ABDOMINOPELVIC NODES: Multiple scattered mildly prominent mesenteric and   peripancreatic pancreatic lymph nodes.    PELVIC ORGANS: Unremarkable.    PERITONEUM/MESENTERY/BOWEL: Small volume abdominopelvic ascites. No bowel   obstruction or pneumoperitoneum. Normal appendix.  Periduodenal fat   stranding, likely reactive from surrounding inflammation.    BONES/SOFT TISSUES: Degenerative changes of thespine noted. Bilateral L5   pars defects without significant spondylolisthesis.      IMPRESSION:  1.  Findings compatible with acute on chronic pancreatitis; no evidence   of hepatic necrosis or peripancreatic fluid collections.  2.  Hepatic steatosis.  3.  Small volume abdominopelvic ascites, likely reactive.    --- End of Report ---      NAYELY DUBOSE MD; Attending Radiologist  This document has been electronically signed. Aug 24 2022 12:55PM (08-24-22 @ 12:31)    US Abdomen Upper Quadrant Right:   ACC: [id number] EXAM:  US ABDOMEN RT UPR QUADRANT                          PROCEDURE DATE:  08/24/2022          INTERPRETATION:  CLINICAL INFORMATION: Abdominal pain. CT scan done    COMPARISON: Earlier the same day    TECHNIQUE: Sonography of the right upper quadrant.    FINDINGS:  Liver: Within normal limits.  Bile ducts: Normal caliber. Common bile duct measures 6 mm.  Gallbladder: Within normal limits.  Pancreas: Pancreatic calcifications are seen.  Right kidney: 10.9 cm. No hydronephrosis.  Ascites: None.  IVC: Visualized portions are within normal limits.    IMPRESSION:  Pancreatic calcifications. Please note that the appearance of   pancreatitis was diagnosed on the CT scan.        --- End of Report ---            MINA ELENA MD;Attending Interventional Radiologist  This document has been electronically signed. Aug 24 2022  6:41PM (08-24-22 @ 18:39)

## 2022-08-25 NOTE — SBIRT NOTE ADULT - NSSBIRTBRIEFINTDET_GEN_A_CORE
IRISSW educated the client of the potential harm that could be caused by drinking with her current medical condition.

## 2022-08-25 NOTE — PROGRESS NOTE ADULT - ASSESSMENT
46 yo F patient with a PMH of chronic pancreatitis probably alcoholic- induced, and recurrent episodes of acute pancreatitis, presents to the ED for abdominal pain.    # Abdominal pain  # History of chronic pancreatitis  Continuous abdominal pain that started the day before the presentation, located mainly in the epigastric area, radiating to the back  Patient drank 3-4 glasses of beer/ day over the weekend  Elevated lipase, CT scan of abdomen consistent with acute pancreatitis  Patient has a history of recurrent acute pancreatitis, on chronic pancreatitis  s/p IV fluids in the ED  Will maintain on D5 + NS= 200 cc/h + zofran 8 mg IV BID PRN + morphine PRN for pain  Clear liquid diet for now and advance as tolerated  Ordered triglyceride levels, and US of right upper quadrant to check for any gallstones  C/w Creon, gabapentin, oxycodone held  Hepatic steatosis, unclear if cirrhosis; US of right upper quadrant will better assess  GI recs:  - c/w LR @ 200/hour   - monitor BUN, Hct and UOP.   - pain control.   - correct electrolytes, thiamine and folate supplements   - trend LFTs   - Alcohol abstinence   - monitor withdrawal symptoms.   - NPO for today, advance to clear liquids tomorrow if symptoms improve.       # Alcohol use  # Elevated anion gap    Patient states that she has been trying to abstain, last alcohol use was on the weekend  AG= 16 with ketonuria on the UA; might be alcoholic ketoacidosis  Ordered serum b-hydroxybutyrate and VBG  Maintained on D5 + NS = 200 cc/h with IV KCl additive  On CIWA protocol symptom-triggered  C/w folic acid, thiamine and multivitamins  F/u AG on 8 pm and am labs      # Hypokalemia    Repleted in the ED, might decrease further as dextrose is added to the IV fluids  KCl added in the IV fluids  F/u 8 pm BMP      GI ppx: Home pantoprazole  DVT ppx: Lovenox 40 mg  Diet: Clear, for now  Activity: Ambulate as tolerated  Dispo: acute, floor  Full code
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
## 2022-08-25 NOTE — SBIRT NOTE ADULT - NSSBIRTCONCERNEDDRINK_GEN_A_CORE
Pt reports that she has been told by doctors that she needs to abstain from drinking due to her medical diagnosis./Yes, during the last year

## 2022-08-26 LAB
ALBUMIN SERPL ELPH-MCNC: 3.6 G/DL — SIGNIFICANT CHANGE UP (ref 3.5–5.2)
ALP SERPL-CCNC: 182 U/L — HIGH (ref 30–115)
ALT FLD-CCNC: 63 U/L — HIGH (ref 0–41)
ANION GAP SERPL CALC-SCNC: 11 MMOL/L — SIGNIFICANT CHANGE UP (ref 7–14)
AST SERPL-CCNC: 226 U/L — HIGH (ref 0–41)
BILIRUB SERPL-MCNC: 1 MG/DL — SIGNIFICANT CHANGE UP (ref 0.2–1.2)
BUN SERPL-MCNC: <3 MG/DL — LOW (ref 10–20)
CALCIUM SERPL-MCNC: 8.3 MG/DL — LOW (ref 8.5–10.1)
CHLORIDE SERPL-SCNC: 102 MMOL/L — SIGNIFICANT CHANGE UP (ref 98–110)
CO2 SERPL-SCNC: 26 MMOL/L — SIGNIFICANT CHANGE UP (ref 17–32)
CREAT SERPL-MCNC: 0.5 MG/DL — LOW (ref 0.7–1.5)
EGFR: 118 ML/MIN/1.73M2 — SIGNIFICANT CHANGE UP
GLUCOSE SERPL-MCNC: 98 MG/DL — SIGNIFICANT CHANGE UP (ref 70–99)
HCT VFR BLD CALC: 30.8 % — LOW (ref 37–47)
HGB BLD-MCNC: 10.6 G/DL — LOW (ref 12–16)
MAGNESIUM SERPL-MCNC: 2 MG/DL — SIGNIFICANT CHANGE UP (ref 1.8–2.4)
MCHC RBC-ENTMCNC: 31.2 PG — HIGH (ref 27–31)
MCHC RBC-ENTMCNC: 34.4 G/DL — SIGNIFICANT CHANGE UP (ref 32–37)
MCV RBC AUTO: 90.6 FL — SIGNIFICANT CHANGE UP (ref 81–99)
NRBC # BLD: 0 /100 WBCS — SIGNIFICANT CHANGE UP (ref 0–0)
PLATELET # BLD AUTO: 104 K/UL — LOW (ref 130–400)
POTASSIUM SERPL-MCNC: 3 MMOL/L — LOW (ref 3.5–5)
POTASSIUM SERPL-SCNC: 3 MMOL/L — LOW (ref 3.5–5)
PROT SERPL-MCNC: 6.7 G/DL — SIGNIFICANT CHANGE UP (ref 6–8)
RBC # BLD: 3.4 M/UL — LOW (ref 4.2–5.4)
RBC # FLD: 13.2 % — SIGNIFICANT CHANGE UP (ref 11.5–14.5)
SODIUM SERPL-SCNC: 139 MMOL/L — SIGNIFICANT CHANGE UP (ref 135–146)
WBC # BLD: 5.49 K/UL — SIGNIFICANT CHANGE UP (ref 4.8–10.8)
WBC # FLD AUTO: 5.49 K/UL — SIGNIFICANT CHANGE UP (ref 4.8–10.8)

## 2022-08-26 PROCEDURE — 99233 SBSQ HOSP IP/OBS HIGH 50: CPT

## 2022-08-26 RX ORDER — POTASSIUM CHLORIDE 20 MEQ
20 PACKET (EA) ORAL
Refills: 0 | Status: COMPLETED | OUTPATIENT
Start: 2022-08-26 | End: 2022-08-26

## 2022-08-26 RX ORDER — SODIUM CHLORIDE 9 MG/ML
1000 INJECTION, SOLUTION INTRAVENOUS
Refills: 0 | Status: DISCONTINUED | OUTPATIENT
Start: 2022-08-26 | End: 2022-09-01

## 2022-08-26 RX ORDER — HYDROMORPHONE HYDROCHLORIDE 2 MG/ML
1 INJECTION INTRAMUSCULAR; INTRAVENOUS; SUBCUTANEOUS EVERY 4 HOURS
Refills: 0 | Status: DISCONTINUED | OUTPATIENT
Start: 2022-08-26 | End: 2022-08-28

## 2022-08-26 RX ORDER — HYDROMORPHONE HYDROCHLORIDE 2 MG/ML
0.5 INJECTION INTRAMUSCULAR; INTRAVENOUS; SUBCUTANEOUS ONCE
Refills: 0 | Status: DISCONTINUED | OUTPATIENT
Start: 2022-08-26 | End: 2022-08-26

## 2022-08-26 RX ORDER — POTASSIUM CHLORIDE 20 MEQ
40 PACKET (EA) ORAL ONCE
Refills: 0 | Status: COMPLETED | OUTPATIENT
Start: 2022-08-26 | End: 2022-08-26

## 2022-08-26 RX ADMIN — HYDROMORPHONE HYDROCHLORIDE 0.5 MILLIGRAM(S): 2 INJECTION INTRAMUSCULAR; INTRAVENOUS; SUBCUTANEOUS at 14:35

## 2022-08-26 RX ADMIN — ENOXAPARIN SODIUM 40 MILLIGRAM(S): 100 INJECTION SUBCUTANEOUS at 18:10

## 2022-08-26 RX ADMIN — PANTOPRAZOLE SODIUM 40 MILLIGRAM(S): 20 TABLET, DELAYED RELEASE ORAL at 06:15

## 2022-08-26 RX ADMIN — HYDROMORPHONE HYDROCHLORIDE 1 MILLIGRAM(S): 2 INJECTION INTRAMUSCULAR; INTRAVENOUS; SUBCUTANEOUS at 00:35

## 2022-08-26 RX ADMIN — HYDROMORPHONE HYDROCHLORIDE 0.5 MILLIGRAM(S): 2 INJECTION INTRAMUSCULAR; INTRAVENOUS; SUBCUTANEOUS at 16:27

## 2022-08-26 RX ADMIN — Medication 1 MILLIGRAM(S): at 12:14

## 2022-08-26 RX ADMIN — HYDROMORPHONE HYDROCHLORIDE 1 MILLIGRAM(S): 2 INJECTION INTRAMUSCULAR; INTRAVENOUS; SUBCUTANEOUS at 10:04

## 2022-08-26 RX ADMIN — Medication 1 TABLET(S): at 12:14

## 2022-08-26 RX ADMIN — HYDROMORPHONE HYDROCHLORIDE 1 MILLIGRAM(S): 2 INJECTION INTRAMUSCULAR; INTRAVENOUS; SUBCUTANEOUS at 10:19

## 2022-08-26 RX ADMIN — Medication 20 MILLIEQUIVALENT(S): at 13:45

## 2022-08-26 RX ADMIN — SODIUM CHLORIDE 200 MILLILITER(S): 9 INJECTION, SOLUTION INTRAVENOUS at 00:00

## 2022-08-26 RX ADMIN — Medication 3 CAPSULE(S): at 09:47

## 2022-08-26 RX ADMIN — Medication 40 MILLIEQUIVALENT(S): at 22:21

## 2022-08-26 RX ADMIN — HYDROMORPHONE HYDROCHLORIDE 0.5 MILLIGRAM(S): 2 INJECTION INTRAMUSCULAR; INTRAVENOUS; SUBCUTANEOUS at 16:08

## 2022-08-26 RX ADMIN — HYDROMORPHONE HYDROCHLORIDE 0.5 MILLIGRAM(S): 2 INJECTION INTRAMUSCULAR; INTRAVENOUS; SUBCUTANEOUS at 22:20

## 2022-08-26 RX ADMIN — GABAPENTIN 300 MILLIGRAM(S): 400 CAPSULE ORAL at 06:15

## 2022-08-26 RX ADMIN — GABAPENTIN 300 MILLIGRAM(S): 400 CAPSULE ORAL at 22:20

## 2022-08-26 RX ADMIN — HYDROMORPHONE HYDROCHLORIDE 0.5 MILLIGRAM(S): 2 INJECTION INTRAMUSCULAR; INTRAVENOUS; SUBCUTANEOUS at 14:22

## 2022-08-26 RX ADMIN — Medication 3 CAPSULE(S): at 17:31

## 2022-08-26 RX ADMIN — Medication 3 CAPSULE(S): at 12:15

## 2022-08-26 RX ADMIN — GABAPENTIN 300 MILLIGRAM(S): 400 CAPSULE ORAL at 13:45

## 2022-08-26 RX ADMIN — HYDROMORPHONE HYDROCHLORIDE 1 MILLIGRAM(S): 2 INJECTION INTRAMUSCULAR; INTRAVENOUS; SUBCUTANEOUS at 06:00

## 2022-08-26 RX ADMIN — SODIUM CHLORIDE 200 MILLILITER(S): 9 INJECTION, SOLUTION INTRAVENOUS at 09:36

## 2022-08-26 RX ADMIN — Medication 100 MILLIGRAM(S): at 12:15

## 2022-08-26 RX ADMIN — HYDROMORPHONE HYDROCHLORIDE 1 MILLIGRAM(S): 2 INJECTION INTRAMUSCULAR; INTRAVENOUS; SUBCUTANEOUS at 18:11

## 2022-08-26 NOTE — PROGRESS NOTE ADULT - ASSESSMENT
46 yo F patient with a PMH of chronic pancreatitis probably alcoholic- induced, and recurrent episodes of acute pancreatitis, presents to the ED for abdominal pain.    # Acute on chronic pancreatitis  LR   pain control  advance diet as tolerated    # ETOH abuse    # Elevated anion gap likely alcoholic ketoacidosis, resolved    # Transaminitis likely ETOH induced, worsened , monitor     # Hepatic steatosis    # Hypomagnesemia resolved    #Suspected folic acid deficiency on supplementation     #Suspected thiamine deficiency on supplementation     #Hypokalemia replete     #Anemia no indication for transfusion     PROGRESS NOTE HANDOFF    Pending:  pain control , advance diet as tolerated , anticiapte dc within 48 hours     Family discussion: patient verbalized understanding and agreeable to plan of care     Disposition: Home

## 2022-08-26 NOTE — PROGRESS NOTE ADULT - SUBJECTIVE AND OBJECTIVE BOX
LAURA BARAJAS  47y  [facility] [id number]      Patient is a 47y old  Female who presents with a chief complaint of Abdominal pain (26 Aug 2022 10:18)      INTERVAL HPI/OVERNIGHT EVENTS:        REVIEW OF SYSTEMS:  CONSTITUTIONAL: No fever, weight loss, or fatigue  EYES: No eye pain, visual disturbances, or discharge  ENMT:  No difficulty hearing, tinnitus, vertigo; No sinus or throat pain  NECK: No pain or stiffness  BREASTS: No pain, masses, or nipple discharge  RESPIRATORY: No cough, wheezing, chills or hemoptysis; No shortness of breath  CARDIOVASCULAR: No chest pain, palpitations, dizziness, or leg swelling  GASTROINTESTINAL: No abdominal or epigastric pain. No nausea, vomiting, or hematemesis; No diarrhea or constipation. No melena or hematochezia.  GENITOURINARY: No dysuria, frequency, hematuria, or incontinence  NEUROLOGICAL: No headaches, memory loss, loss of strength, numbness, or tremors  SKIN: No itching, burning, rashes, or lesions   LYMPH NODES: No enlarged glands  ENDOCRINE: No heat or cold intolerance; No hair loss  MUSCULOSKELETAL: No joint pain or swelling; No muscle, back, or extremity pain  PSYCHIATRIC: No depression, anxiety, mood swings, or difficulty sleeping  HEME/LYMPH: No easy bruising, or bleeding gums  ALLERY AND IMMUNOLOGIC: No hives or eczema  FAMILY HISTORY:  Family history of hypertension  both father and mother    FH: pancreatic cancer  cousin    Family history of cholecystectomy  many female members in the family      T(C): 36.2 (08-26-22 @ 08:00), Max: 37.9 (08-26-22 @ 00:03)  HR: 84 (08-26-22 @ 08:00) (60 - 104)  BP: 131/79 (08-26-22 @ 08:00) (120/60 - 131/79)  RR: 18 (08-26-22 @ 08:00) (18 - 18)  SpO2: --  Wt(kg): --Vital Signs Last 24 Hrs  T(C): 36.2 (26 Aug 2022 08:00), Max: 37.9 (26 Aug 2022 00:03)  T(F): 97.2 (26 Aug 2022 08:00), Max: 100.2 (26 Aug 2022 00:03)  HR: 84 (26 Aug 2022 08:00) (60 - 104)  BP: 131/79 (26 Aug 2022 08:00) (120/60 - 131/79)  BP(mean): --  RR: 18 (26 Aug 2022 08:00) (18 - 18)  SpO2: --        PHYSICAL EXAM:  GENERAL: NAD, well-groomed, well-developed  HEAD:  Atraumatic, Normocephalic  EYES: EOMI, PERRLA, conjunctiva and sclera clear  ENMT: No tonsillar erythema, exudates, or enlargement; Moist mucous membranes, Good dentition, No lesions  NECK: Supple, No JVD, Normal thyroid  NERVOUS SYSTEM:  Alert & Oriented X3, Good concentration; Motor Strength 5/5 B/L upper and lower extremities; DTRs 2+ intact and symmetric  PULM: Clear to auscultation bilaterally  CARDIAC: Regular rate and rhythm; No murmurs, rubs, or gallops  GI: Soft, Nontender, Nondistended; Bowel sounds present  EXTREMITIES:  2+ Peripheral Pulses, No clubbing, cyanosis, or edema  LYMPH: No lymphadenopathy noted  SKIN: No rashes or lesions    Consultant(s) Notes Reviewed:  [x ] YES  [ ] NO  Care Discussed with Consultants/Other Providers [ x] YES  [ ] NO    LABS:                            10.6   5.49  )-----------( 104      ( 26 Aug 2022 06:27 )             30.8   08-26    139  |  102  |  <3<L>  ----------------------------<  98  3.0<L>   |  26  |  0.5<L>    Ca    8.3<L>      26 Aug 2022 06:27  Phos  2.4     08-24  Mg     2.0     08-26    TPro  6.7  /  Alb  3.6  /  TBili  1.0  /  DBili  x   /  AST  226<H>  /  ALT  63<H>  /  AlkPhos  182<H>  08-26            enoxaparin Injectable 40 milliGRAM(s) SubCutaneous every 24 hours  folic acid 1 milliGRAM(s) Oral daily  gabapentin 300 milliGRAM(s) Oral three times a day  HYDROmorphone  Injectable 0.5 milliGRAM(s) IV Push every 4 hours PRN  lactated ringers. 1000 milliLiter(s) IV Continuous <Continuous>  lactobacillus acidophilus 1 Tablet(s) Oral daily  LORazepam   Injectable 2 milliGRAM(s) IV Push every 1 hour PRN  multivitamin 1 Tablet(s) Oral daily  ondansetron Injectable 8 milliGRAM(s) IV Push every 12 hours PRN  pancrelipase  (CREON 12,000 Lipase Units) 3 Capsule(s) Oral three times a day with meals  pantoprazole    Tablet 40 milliGRAM(s) Oral before breakfast  polyethylene glycol 3350 17 Gram(s) Oral daily PRN  senna 2 Tablet(s) Oral at bedtime PRN  thiamine 100 milliGRAM(s) Oral daily      HEALTH ISSUES - PROBLEM Dx:          Case Discussed with House Staff   45 minutes spent on total encounter; more than 50% of the visit was spent counseling and/or coordinating care by the attending physician.   Spectra [phone]
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
## 2022-08-26 NOTE — PROGRESS NOTE ADULT - ASSESSMENT
46 yo F patient with a PMH of chronic pancreatitis probably alcoholic- induced, and recurrent episodes of acute pancreatitis, presents to the ED for abdominal pain in the epigastrium, radiating to the back, with associated nausea and clear, non-bloody emesis. Patient has been having consequently decrease in the appetite.  Admitted for acute pancreatitis.    # Acute on chronic pancreatitis secondary to ETOH use. improving   # Mild alcoholic hepatitis.     - Lipase 733  - hemodynamically stable, afebrile   - Typical epigastric pain radiates to the back with nausea and vomiting.  - CT Findings compatible with acute on chronic pancreatitis; no evidence of hepatic necrosis or peripancreatic fluid collections. hepatic steatosis.  - RUQUS: Pancreatic calcifications. CBD 6 mm.  - Not on any medications   -  8/2021: EUS w/ walled-off collection that measured 4.5 x 3.9 cm in largest dimension,  looked well circumscribed, heterogeneous, containing debris, consistent with  walled-off pancreatic necrosis. EUS w/ pancreatic parenchymal hypoechogenicity and calcifications in the pancreatic head, body, and tail, suggestive of chronic pancreatitis.     Plan   - c/w LR @ 200/hour   - monitor BUN, Hct and UOP.   - pain control.   - correct electrolytes, thiamine and folate supplements   - trend LFTs   - Alcohol abstinence   - monitor withdrawal symptoms.   - CLD, advance as tolerated
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
## 2022-08-26 NOTE — PROGRESS NOTE ADULT - ASSESSMENT
46 yo F patient with a PMH of chronic pancreatitis probably alcoholic- induced, and recurrent episodes of acute pancreatitis, presents to the ED for abdominal pain.    # Abdominal pain  # History of chronic pancreatitis  Continuous abdominal pain that started the day before the presentation, located mainly in the epigastric area, radiating to the back  Patient drank 3-4 glasses of beer/ day over the weekend  Elevated lipase, CT scan of abdomen consistent with acute pancreatitis  Patient has a history of recurrent acute pancreatitis, on chronic pancreatitis  s/p IV fluids in the ED  Will maintain on D5 + NS= 200 cc/h + zofran 8 mg IV BID PRN + morphine PRN for pain  Clear liquid diet for now and advance as tolerated  Ordered triglyceride levels, and US of right upper quadrant to check for any gallstones  C/w Creon, gabapentin, oxycodone held  Hepatic steatosis, unclear if cirrhosis; US of right upper quadrant will better assess  GI recs:  - c/w LR @ 200/hour   - monitor BUN, Hct and UOP.   - pain control.   - correct electrolytes, thiamine and folate supplements   - trend LFTs   - Alcohol abstinence   - monitor withdrawal symptoms.   - advanced to clear liquid diet today      # Alcohol use  # Elevated anion gap    Patient states that she has been trying to abstain, last alcohol use was on the weekend  AG= 18 with ketonuria on the UA; might be alcoholic ketoacidosis  Ordered serum b-hydroxybutyrate and VBG  Maintained on D5 + NS = 200 cc/h with IV KCl additive  On CIWA protocol symptom-triggered  C/w folic acid, thiamine and multivitamins  F/u AG on 8 pm and am labs      # Hypokalemia    Repleted in the ED, might decrease further as dextrose is added to the IV fluids  KCl added in the IV fluids  F/u 8 pm BMP      GI ppx: Home pantoprazole  DVT ppx: Lovenox 40 mg  Diet: Clear, for now  Activity: Ambulate as tolerated  Dispo: acute, floor  Full code

## 2022-08-27 LAB
ALBUMIN SERPL ELPH-MCNC: 3.6 G/DL — SIGNIFICANT CHANGE UP (ref 3.5–5.2)
ALP SERPL-CCNC: 222 U/L — HIGH (ref 30–115)
ALT FLD-CCNC: 87 U/L — HIGH (ref 0–41)
ANION GAP SERPL CALC-SCNC: 10 MMOL/L — SIGNIFICANT CHANGE UP (ref 7–14)
ANION GAP SERPL CALC-SCNC: 11 MMOL/L — SIGNIFICANT CHANGE UP (ref 7–14)
AST SERPL-CCNC: 277 U/L — HIGH (ref 0–41)
BILIRUB SERPL-MCNC: 1.3 MG/DL — HIGH (ref 0.2–1.2)
BUN SERPL-MCNC: <3 MG/DL — LOW (ref 10–20)
BUN SERPL-MCNC: <3 MG/DL — LOW (ref 10–20)
CALCIUM SERPL-MCNC: 8.5 MG/DL — SIGNIFICANT CHANGE UP (ref 8.5–10.1)
CALCIUM SERPL-MCNC: 9.3 MG/DL — SIGNIFICANT CHANGE UP (ref 8.5–10.1)
CHLORIDE SERPL-SCNC: 101 MMOL/L — SIGNIFICANT CHANGE UP (ref 98–110)
CHLORIDE SERPL-SCNC: 98 MMOL/L — SIGNIFICANT CHANGE UP (ref 98–110)
CO2 SERPL-SCNC: 27 MMOL/L — SIGNIFICANT CHANGE UP (ref 17–32)
CO2 SERPL-SCNC: 29 MMOL/L — SIGNIFICANT CHANGE UP (ref 17–32)
CREAT SERPL-MCNC: 0.5 MG/DL — LOW (ref 0.7–1.5)
CREAT SERPL-MCNC: 0.5 MG/DL — LOW (ref 0.7–1.5)
EGFR: 118 ML/MIN/1.73M2 — SIGNIFICANT CHANGE UP
EGFR: 118 ML/MIN/1.73M2 — SIGNIFICANT CHANGE UP
GLUCOSE SERPL-MCNC: 85 MG/DL — SIGNIFICANT CHANGE UP (ref 70–99)
GLUCOSE SERPL-MCNC: 89 MG/DL — SIGNIFICANT CHANGE UP (ref 70–99)
HCT VFR BLD CALC: 29.4 % — LOW (ref 37–47)
HGB BLD-MCNC: 10.5 G/DL — LOW (ref 12–16)
MAGNESIUM SERPL-MCNC: 1.6 MG/DL — LOW (ref 1.8–2.4)
MCHC RBC-ENTMCNC: 32.5 PG — HIGH (ref 27–31)
MCHC RBC-ENTMCNC: 35.7 G/DL — SIGNIFICANT CHANGE UP (ref 32–37)
MCV RBC AUTO: 91 FL — SIGNIFICANT CHANGE UP (ref 81–99)
NRBC # BLD: 0 /100 WBCS — SIGNIFICANT CHANGE UP (ref 0–0)
PLATELET # BLD AUTO: 139 K/UL — SIGNIFICANT CHANGE UP (ref 130–400)
POTASSIUM SERPL-MCNC: 3.2 MMOL/L — LOW (ref 3.5–5)
POTASSIUM SERPL-MCNC: 3.3 MMOL/L — LOW (ref 3.5–5)
POTASSIUM SERPL-SCNC: 3.2 MMOL/L — LOW (ref 3.5–5)
POTASSIUM SERPL-SCNC: 3.3 MMOL/L — LOW (ref 3.5–5)
PROT SERPL-MCNC: 6.7 G/DL — SIGNIFICANT CHANGE UP (ref 6–8)
RBC # BLD: 3.23 M/UL — LOW (ref 4.2–5.4)
RBC # FLD: 13.1 % — SIGNIFICANT CHANGE UP (ref 11.5–14.5)
SODIUM SERPL-SCNC: 138 MMOL/L — SIGNIFICANT CHANGE UP (ref 135–146)
SODIUM SERPL-SCNC: 138 MMOL/L — SIGNIFICANT CHANGE UP (ref 135–146)
WBC # BLD: 3.93 K/UL — LOW (ref 4.8–10.8)
WBC # FLD AUTO: 3.93 K/UL — LOW (ref 4.8–10.8)

## 2022-08-27 PROCEDURE — 93970 EXTREMITY STUDY: CPT | Mod: 26

## 2022-08-27 PROCEDURE — 99233 SBSQ HOSP IP/OBS HIGH 50: CPT

## 2022-08-27 RX ORDER — POTASSIUM CHLORIDE 20 MEQ
20 PACKET (EA) ORAL ONCE
Refills: 0 | Status: COMPLETED | OUTPATIENT
Start: 2022-08-27 | End: 2022-08-27

## 2022-08-27 RX ORDER — PANTOPRAZOLE SODIUM 20 MG/1
40 TABLET, DELAYED RELEASE ORAL DAILY
Refills: 0 | Status: DISCONTINUED | OUTPATIENT
Start: 2022-08-27 | End: 2022-09-01

## 2022-08-27 RX ORDER — MAGNESIUM SULFATE 500 MG/ML
2 VIAL (ML) INJECTION ONCE
Refills: 0 | Status: COMPLETED | OUTPATIENT
Start: 2022-08-27 | End: 2022-08-27

## 2022-08-27 RX ORDER — POTASSIUM CHLORIDE 20 MEQ
20 PACKET (EA) ORAL
Refills: 0 | Status: COMPLETED | OUTPATIENT
Start: 2022-08-27 | End: 2022-08-27

## 2022-08-27 RX ADMIN — PANTOPRAZOLE SODIUM 40 MILLIGRAM(S): 20 TABLET, DELAYED RELEASE ORAL at 05:56

## 2022-08-27 RX ADMIN — HYDROMORPHONE HYDROCHLORIDE 1 MILLIGRAM(S): 2 INJECTION INTRAMUSCULAR; INTRAVENOUS; SUBCUTANEOUS at 02:59

## 2022-08-27 RX ADMIN — HYDROMORPHONE HYDROCHLORIDE 0.5 MILLIGRAM(S): 2 INJECTION INTRAMUSCULAR; INTRAVENOUS; SUBCUTANEOUS at 06:30

## 2022-08-27 RX ADMIN — SODIUM CHLORIDE 200 MILLILITER(S): 9 INJECTION, SOLUTION INTRAVENOUS at 21:40

## 2022-08-27 RX ADMIN — Medication 3 CAPSULE(S): at 08:15

## 2022-08-27 RX ADMIN — HYDROMORPHONE HYDROCHLORIDE 1 MILLIGRAM(S): 2 INJECTION INTRAMUSCULAR; INTRAVENOUS; SUBCUTANEOUS at 14:38

## 2022-08-27 RX ADMIN — HYDROMORPHONE HYDROCHLORIDE 1 MILLIGRAM(S): 2 INJECTION INTRAMUSCULAR; INTRAVENOUS; SUBCUTANEOUS at 02:29

## 2022-08-27 RX ADMIN — Medication 25 GRAM(S): at 17:51

## 2022-08-27 RX ADMIN — Medication 100 MILLIGRAM(S): at 12:32

## 2022-08-27 RX ADMIN — Medication 3 CAPSULE(S): at 17:52

## 2022-08-27 RX ADMIN — PANTOPRAZOLE SODIUM 40 MILLIGRAM(S): 20 TABLET, DELAYED RELEASE ORAL at 13:40

## 2022-08-27 RX ADMIN — HYDROMORPHONE HYDROCHLORIDE 1 MILLIGRAM(S): 2 INJECTION INTRAMUSCULAR; INTRAVENOUS; SUBCUTANEOUS at 07:00

## 2022-08-27 RX ADMIN — GABAPENTIN 300 MILLIGRAM(S): 400 CAPSULE ORAL at 13:40

## 2022-08-27 RX ADMIN — Medication 20 MILLIEQUIVALENT(S): at 16:00

## 2022-08-27 RX ADMIN — GABAPENTIN 300 MILLIGRAM(S): 400 CAPSULE ORAL at 05:56

## 2022-08-27 RX ADMIN — Medication 1 TABLET(S): at 12:32

## 2022-08-27 RX ADMIN — GABAPENTIN 300 MILLIGRAM(S): 400 CAPSULE ORAL at 21:39

## 2022-08-27 RX ADMIN — HYDROMORPHONE HYDROCHLORIDE 0.5 MILLIGRAM(S): 2 INJECTION INTRAMUSCULAR; INTRAVENOUS; SUBCUTANEOUS at 06:13

## 2022-08-27 RX ADMIN — Medication 1 MILLIGRAM(S): at 12:32

## 2022-08-27 RX ADMIN — HYDROMORPHONE HYDROCHLORIDE 1 MILLIGRAM(S): 2 INJECTION INTRAMUSCULAR; INTRAVENOUS; SUBCUTANEOUS at 10:40

## 2022-08-27 RX ADMIN — HYDROMORPHONE HYDROCHLORIDE 1 MILLIGRAM(S): 2 INJECTION INTRAMUSCULAR; INTRAVENOUS; SUBCUTANEOUS at 18:00

## 2022-08-27 RX ADMIN — ENOXAPARIN SODIUM 40 MILLIGRAM(S): 100 INJECTION SUBCUTANEOUS at 18:05

## 2022-08-27 RX ADMIN — HYDROMORPHONE HYDROCHLORIDE 1 MILLIGRAM(S): 2 INJECTION INTRAMUSCULAR; INTRAVENOUS; SUBCUTANEOUS at 06:40

## 2022-08-27 RX ADMIN — HYDROMORPHONE HYDROCHLORIDE 1 MILLIGRAM(S): 2 INJECTION INTRAMUSCULAR; INTRAVENOUS; SUBCUTANEOUS at 22:27

## 2022-08-27 RX ADMIN — ONDANSETRON 8 MILLIGRAM(S): 8 TABLET, FILM COATED ORAL at 02:29

## 2022-08-27 RX ADMIN — Medication 3 CAPSULE(S): at 12:32
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
## 2022-08-27 NOTE — PROGRESS NOTE ADULT - ASSESSMENT
46 yo F patient with a PMH of chronic pancreatitis probably alcoholic- induced, and recurrent episodes of acute pancreatitis, presents to the ED for abdominal pain.    # Acute on Chronic pancreatitis   Continuous abdominal pain that started the day before the presentation, located mainly in the epigastric area, radiating to the back  Patient drank 3-4 glasses of beer/ day over the weekend  Elevated lipase, CT scan of abdomen consistent with acute pancreatitis  Patient has a history of recurrent acute pancreatitis, on chronic pancreatitis  -Dilauded prn for pain   -c/w LR @200  -Advancing diet as tolerated   Ordered triglyceride levels, and US of right upper quadrant to check for any gallstones  C/w Creon  Hepatic steatosis, unclear if cirrhosis; US of right upper quadrant will better assess      # Alcohol use  # Elevated anion gap    Patient states that she has been trying to abstain, last alcohol use was on the weekend  AG= 18 with ketonuria on the UA; might be alcoholic ketoacidosis on admission..... now resolveed  On CIWA protocol symptom-triggered  C/w folic acid, thiamine and multivitamins        GI ppx:  Protonix IV (since patient cannot tolerate swallowing po meds at this time)  DVT ppx: Lovenox 40 mg  Diet: advancing as tolerated   Activity: Ambulate as tolerated  Dispo: acute, floor  Full code

## 2022-08-27 NOTE — PROGRESS NOTE ADULT - ASSESSMENT
46 yo F patient with a PMH of chronic pancreatitis probably alcoholic- induced, and recurrent episodes of acute pancreatitis, presents to the ED for abdominal pain.    # Acute on chronic pancreatitis  LR   pain control  tolerated apple sauce , will advance     # ETOH abuse    # Elevated anion gap likely alcoholic ketoacidosis, resolved    # Transaminitis likely ETOH induced, worsened , monitor     # Hepatic steatosis    # Hypomagnesemia recurred , replete    #Suspected folic acid deficiency on supplementation     #Suspected thiamine deficiency on supplementation     #Hypokalemia replete     #Anemia no indication for transfusion     PROGRESS NOTE HANDOFF    Pending:  pain control , advance diet as tolerated ,anticipate for dc in am     Family discussion: patient verbalized understanding and agreeable to plan of care     Disposition: Home

## 2022-08-28 LAB
ALBUMIN SERPL ELPH-MCNC: 3.3 G/DL — LOW (ref 3.5–5.2)
ALP SERPL-CCNC: 207 U/L — HIGH (ref 30–115)
ALT FLD-CCNC: 84 U/L — HIGH (ref 0–41)
ANION GAP SERPL CALC-SCNC: 13 MMOL/L — SIGNIFICANT CHANGE UP (ref 7–14)
AST SERPL-CCNC: 228 U/L — HIGH (ref 0–41)
BASOPHILS # BLD AUTO: 0.02 K/UL — SIGNIFICANT CHANGE UP (ref 0–0.2)
BASOPHILS NFR BLD AUTO: 0.6 % — SIGNIFICANT CHANGE UP (ref 0–1)
BILIRUB SERPL-MCNC: 0.7 MG/DL — SIGNIFICANT CHANGE UP (ref 0.2–1.2)
BUN SERPL-MCNC: <3 MG/DL — LOW (ref 10–20)
CALCIUM SERPL-MCNC: 8.7 MG/DL — SIGNIFICANT CHANGE UP (ref 8.5–10.1)
CHLORIDE SERPL-SCNC: 101 MMOL/L — SIGNIFICANT CHANGE UP (ref 98–110)
CO2 SERPL-SCNC: 27 MMOL/L — SIGNIFICANT CHANGE UP (ref 17–32)
CREAT SERPL-MCNC: 0.5 MG/DL — LOW (ref 0.7–1.5)
EGFR: 118 ML/MIN/1.73M2 — SIGNIFICANT CHANGE UP
EOSINOPHIL # BLD AUTO: 0.08 K/UL — SIGNIFICANT CHANGE UP (ref 0–0.7)
EOSINOPHIL NFR BLD AUTO: 2.3 % — SIGNIFICANT CHANGE UP (ref 0–8)
GLUCOSE SERPL-MCNC: 108 MG/DL — HIGH (ref 70–99)
HCT VFR BLD CALC: 26.5 % — LOW (ref 37–47)
HGB BLD-MCNC: 9.4 G/DL — LOW (ref 12–16)
IMM GRANULOCYTES NFR BLD AUTO: 0.3 % — SIGNIFICANT CHANGE UP (ref 0.1–0.3)
LYMPHOCYTES # BLD AUTO: 0.79 K/UL — LOW (ref 1.2–3.4)
LYMPHOCYTES # BLD AUTO: 22.3 % — SIGNIFICANT CHANGE UP (ref 20.5–51.1)
MAGNESIUM SERPL-MCNC: 1.4 MG/DL — LOW (ref 1.8–2.4)
MCHC RBC-ENTMCNC: 32.3 PG — HIGH (ref 27–31)
MCHC RBC-ENTMCNC: 35.5 G/DL — SIGNIFICANT CHANGE UP (ref 32–37)
MCV RBC AUTO: 91.1 FL — SIGNIFICANT CHANGE UP (ref 81–99)
MONOCYTES # BLD AUTO: 0.36 K/UL — SIGNIFICANT CHANGE UP (ref 0.1–0.6)
MONOCYTES NFR BLD AUTO: 10.1 % — HIGH (ref 1.7–9.3)
NEUTROPHILS # BLD AUTO: 2.29 K/UL — SIGNIFICANT CHANGE UP (ref 1.4–6.5)
NEUTROPHILS NFR BLD AUTO: 64.4 % — SIGNIFICANT CHANGE UP (ref 42.2–75.2)
NRBC # BLD: 0 /100 WBCS — SIGNIFICANT CHANGE UP (ref 0–0)
PHOSPHATE SERPL-MCNC: 3.7 MG/DL — SIGNIFICANT CHANGE UP (ref 2.1–4.9)
PLATELET # BLD AUTO: 144 K/UL — SIGNIFICANT CHANGE UP (ref 130–400)
POTASSIUM SERPL-MCNC: 3.2 MMOL/L — LOW (ref 3.5–5)
POTASSIUM SERPL-SCNC: 3.2 MMOL/L — LOW (ref 3.5–5)
PROT SERPL-MCNC: 6.1 G/DL — SIGNIFICANT CHANGE UP (ref 6–8)
RBC # BLD: 2.91 M/UL — LOW (ref 4.2–5.4)
RBC # FLD: 13 % — SIGNIFICANT CHANGE UP (ref 11.5–14.5)
SODIUM SERPL-SCNC: 141 MMOL/L — SIGNIFICANT CHANGE UP (ref 135–146)
WBC # BLD: 3.55 K/UL — LOW (ref 4.8–10.8)
WBC # FLD AUTO: 3.55 K/UL — LOW (ref 4.8–10.8)

## 2022-08-28 PROCEDURE — 99232 SBSQ HOSP IP/OBS MODERATE 35: CPT

## 2022-08-28 RX ORDER — POTASSIUM CHLORIDE 20 MEQ
20 PACKET (EA) ORAL ONCE
Refills: 0 | Status: COMPLETED | OUTPATIENT
Start: 2022-08-28 | End: 2022-08-28

## 2022-08-28 RX ORDER — POTASSIUM CHLORIDE 20 MEQ
20 PACKET (EA) ORAL
Refills: 0 | Status: COMPLETED | OUTPATIENT
Start: 2022-08-28 | End: 2022-08-28

## 2022-08-28 RX ORDER — MAGNESIUM SULFATE 500 MG/ML
2 VIAL (ML) INJECTION ONCE
Refills: 0 | Status: COMPLETED | OUTPATIENT
Start: 2022-08-28 | End: 2022-08-28

## 2022-08-28 RX ORDER — HYDROMORPHONE HYDROCHLORIDE 2 MG/ML
1 INJECTION INTRAMUSCULAR; INTRAVENOUS; SUBCUTANEOUS EVERY 4 HOURS
Refills: 0 | Status: DISCONTINUED | OUTPATIENT
Start: 2022-08-28 | End: 2022-08-30

## 2022-08-28 RX ADMIN — PANTOPRAZOLE SODIUM 40 MILLIGRAM(S): 20 TABLET, DELAYED RELEASE ORAL at 13:27

## 2022-08-28 RX ADMIN — SODIUM CHLORIDE 200 MILLILITER(S): 9 INJECTION, SOLUTION INTRAVENOUS at 21:37

## 2022-08-28 RX ADMIN — HYDROMORPHONE HYDROCHLORIDE 1 MILLIGRAM(S): 2 INJECTION INTRAMUSCULAR; INTRAVENOUS; SUBCUTANEOUS at 21:37

## 2022-08-28 RX ADMIN — Medication 3 CAPSULE(S): at 17:35

## 2022-08-28 RX ADMIN — GABAPENTIN 300 MILLIGRAM(S): 400 CAPSULE ORAL at 13:28

## 2022-08-28 RX ADMIN — Medication 1 TABLET(S): at 13:25

## 2022-08-28 RX ADMIN — GABAPENTIN 300 MILLIGRAM(S): 400 CAPSULE ORAL at 21:37

## 2022-08-28 RX ADMIN — Medication 3 CAPSULE(S): at 13:24

## 2022-08-28 RX ADMIN — HYDROMORPHONE HYDROCHLORIDE 1 MILLIGRAM(S): 2 INJECTION INTRAMUSCULAR; INTRAVENOUS; SUBCUTANEOUS at 02:31

## 2022-08-28 RX ADMIN — Medication 20 MILLIEQUIVALENT(S): at 13:28

## 2022-08-28 RX ADMIN — GABAPENTIN 300 MILLIGRAM(S): 400 CAPSULE ORAL at 05:42

## 2022-08-28 RX ADMIN — Medication 25 GRAM(S): at 13:31

## 2022-08-28 RX ADMIN — ONDANSETRON 8 MILLIGRAM(S): 8 TABLET, FILM COATED ORAL at 06:27

## 2022-08-28 RX ADMIN — HYDROMORPHONE HYDROCHLORIDE 1 MILLIGRAM(S): 2 INJECTION INTRAMUSCULAR; INTRAVENOUS; SUBCUTANEOUS at 07:57

## 2022-08-28 RX ADMIN — Medication 3 CAPSULE(S): at 07:53

## 2022-08-28 RX ADMIN — Medication 1 TABLET(S): at 13:24

## 2022-08-28 RX ADMIN — SODIUM CHLORIDE 200 MILLILITER(S): 9 INJECTION, SOLUTION INTRAVENOUS at 18:28

## 2022-08-28 RX ADMIN — ENOXAPARIN SODIUM 40 MILLIGRAM(S): 100 INJECTION SUBCUTANEOUS at 17:36

## 2022-08-28 RX ADMIN — Medication 1 MILLIGRAM(S): at 13:28

## 2022-08-28 RX ADMIN — HYDROMORPHONE HYDROCHLORIDE 1 MILLIGRAM(S): 2 INJECTION INTRAMUSCULAR; INTRAVENOUS; SUBCUTANEOUS at 17:35

## 2022-08-28 RX ADMIN — SODIUM CHLORIDE 200 MILLILITER(S): 9 INJECTION, SOLUTION INTRAVENOUS at 07:56

## 2022-08-28 RX ADMIN — HYDROMORPHONE HYDROCHLORIDE 1 MILLIGRAM(S): 2 INJECTION INTRAMUSCULAR; INTRAVENOUS; SUBCUTANEOUS at 13:29

## 2022-08-28 NOTE — PROGRESS NOTE ADULT - SUBJECTIVE AND OBJECTIVE BOX
LAURA BARAJAS  47y  [facility] F3-4B [address]      Patient is a 47y old  Female who presents with a chief complaint of Abdominal pain (27 Aug 2022 13:51)      INTERVAL HPI/OVERNIGHT EVENTS:  still complaining of pain but wants to continue with diet       REVIEW OF SYSTEMS:  CONSTITUTIONAL: No fever, weight loss, or fatigue  EYES: No eye pain, visual disturbances, or discharge  ENMT:  No difficulty hearing, tinnitus, vertigo; No sinus or throat pain  NECK: No pain or stiffness  BREASTS: No pain, masses, or nipple discharge  RESPIRATORY: No cough, wheezing, chills or hemoptysis; No shortness of breath  CARDIOVASCULAR: No chest pain, palpitations, dizziness, or leg swelling  GASTROINTESTINAL: Abdominal pain  GENITOURINARY: No dysuria, frequency, hematuria, or incontinence  NEUROLOGICAL: No headaches, memory loss, loss of strength, numbness, or tremors  SKIN: No itching, burning, rashes, or lesions   LYMPH NODES: No enlarged glands  ENDOCRINE: No heat or cold intolerance; No hair loss  MUSCULOSKELETAL: No joint pain or swelling; No muscle, back, or extremity pain  PSYCHIATRIC: No depression, anxiety, mood swings, or difficulty sleeping  HEME/LYMPH: No easy bruising, or bleeding gums  ALLERY AND IMMUNOLOGIC: No hives or eczema  FAMILY HISTORY:  Family history of hypertension  both father and mother    FH: pancreatic cancer  cousin    Family history of cholecystectomy  many female members in the family      T(C): 36.8 (08-28-22 @ 07:41), Max: 37.2 (08-27-22 @ 23:47)  HR: 87 (08-28-22 @ 07:41) (86 - 90)  BP: 143/83 (08-28-22 @ 07:41) (125/76 - 143/83)  RR: 18 (08-28-22 @ 07:41) (18 - 18)  SpO2: --  Wt(kg): --Vital Signs Last 24 Hrs  T(C): 36.8 (28 Aug 2022 07:41), Max: 37.2 (27 Aug 2022 23:47)  T(F): 98.3 (28 Aug 2022 07:41), Max: 98.9 (27 Aug 2022 23:47)  HR: 87 (28 Aug 2022 07:41) (86 - 90)  BP: 143/83 (28 Aug 2022 07:41) (125/76 - 143/83)  BP(mean): --  RR: 18 (28 Aug 2022 07:41) (18 - 18)  SpO2: --        PHYSICAL EXAM:  GENERAL: NAD, well-groomed, well-developed  HEAD:  Atraumatic, Normocephalic  EYES: EOMI, PERRLA, conjunctiva and sclera clear  ENMT: No tonsillar erythema, exudates, or enlargement; Moist mucous membranes, Good dentition, No lesions  NECK: Supple, No JVD, Normal thyroid  NERVOUS SYSTEM:  Alert & Oriented X3, Good concentration; Motor Strength 5/5 B/L upper and lower extremities; DTRs 2+ intact and symmetric  PULM: Clear to auscultation bilaterally  CARDIAC: Regular rate and rhythm; No murmurs, rubs, or gallops  GI: Soft, Nontender, Nondistended; Bowel sounds present  EXTREMITIES:  2+ Peripheral Pulses, No clubbing, cyanosis, or edema  LYMPH: No lymphadenopathy noted  SKIN: No rashes or lesions    Consultant(s) Notes Reviewed:  [x ] YES  [ ] NO  Care Discussed with Consultants/Other Providers [ x] YES  [ ] NO    LABS:                            9.4    3.55  )-----------( 144      ( 28 Aug 2022 07:12 )             26.5   08-28    141  |  101  |  <3<L>  ----------------------------<  108<H>  3.2<L>   |  27  |  0.5<L>    Ca    8.7      28 Aug 2022 07:12  Phos  3.7     08-28  Mg     1.4     08-28    TPro  6.1  /  Alb  3.3<L>  /  TBili  0.7  /  DBili  x   /  AST  228<H>  /  ALT  84<H>  /  AlkPhos  207<H>  08-28            enoxaparin Injectable 40 milliGRAM(s) SubCutaneous every 24 hours  folic acid 1 milliGRAM(s) Oral daily  gabapentin 300 milliGRAM(s) Oral three times a day  lactated ringers. 1000 milliLiter(s) IV Continuous <Continuous>  lactobacillus acidophilus 1 Tablet(s) Oral daily  LORazepam   Injectable 2 milliGRAM(s) IV Push every 1 hour PRN  multivitamin 1 Tablet(s) Oral daily  ondansetron Injectable 8 milliGRAM(s) IV Push every 12 hours PRN  pancrelipase  (CREON 12,000 Lipase Units) 3 Capsule(s) Oral three times a day with meals  pantoprazole  Injectable 40 milliGRAM(s) IV Push daily  polyethylene glycol 3350 17 Gram(s) Oral daily PRN  senna 2 Tablet(s) Oral at bedtime PRN      HEALTH ISSUES - PROBLEM Dx:          Case Discussed with House Staff      Spectra [phone]

## 2022-08-28 NOTE — PROGRESS NOTE ADULT - ASSESSMENT
48 yo F patient with a PMH of chronic pancreatitis probably alcoholic- induced, and recurrent episodes of acute pancreatitis, presents to the ED for abdominal pain.    # Acute on chronic pancreatitis  LR maintain for now as not consistent with eating due to pain  pain control  in past 24 hours required 6 prn doses of dilaudid to control pain , if no improvement by am will recall gi     # ETOH abuse    # Elevated anion gap likely alcoholic ketoacidosis, resolved    # Transaminitis likely ETOH induced, improving     # Hepatic steatosis    # Hypomagnesemia worsened , replete and check in am     #Suspected folic acid deficiency on supplementation     #Suspected thiamine deficiency on supplementation     #Hypokalemia persistent replete     #Anemia no indication for transfusion     PROGRESS NOTE HANDOFF    Pending:  pain control as patient continues to need iv pain med need inpt monitoring/treatment     Family discussion: patient verbalized understanding and agreeable to plan of care     Disposition: Home

## 2022-08-29 LAB
ALBUMIN SERPL ELPH-MCNC: 3.4 G/DL — LOW (ref 3.5–5.2)
ALP SERPL-CCNC: 193 U/L — HIGH (ref 30–115)
ALT FLD-CCNC: 69 U/L — HIGH (ref 0–41)
ANION GAP SERPL CALC-SCNC: 10 MMOL/L — SIGNIFICANT CHANGE UP (ref 7–14)
AST SERPL-CCNC: 157 U/L — HIGH (ref 0–41)
BILIRUB SERPL-MCNC: 0.4 MG/DL — SIGNIFICANT CHANGE UP (ref 0.2–1.2)
BUN SERPL-MCNC: <3 MG/DL — LOW (ref 10–20)
CALCIUM SERPL-MCNC: 8.6 MG/DL — SIGNIFICANT CHANGE UP (ref 8.5–10.1)
CHLORIDE SERPL-SCNC: 101 MMOL/L — SIGNIFICANT CHANGE UP (ref 98–110)
CO2 SERPL-SCNC: 30 MMOL/L — SIGNIFICANT CHANGE UP (ref 17–32)
CREAT SERPL-MCNC: 0.5 MG/DL — LOW (ref 0.7–1.5)
EGFR: 118 ML/MIN/1.73M2 — SIGNIFICANT CHANGE UP
GLUCOSE SERPL-MCNC: 92 MG/DL — SIGNIFICANT CHANGE UP (ref 70–99)
HCT VFR BLD CALC: 25.9 % — LOW (ref 37–47)
HGB BLD-MCNC: 9.5 G/DL — LOW (ref 12–16)
MAGNESIUM SERPL-MCNC: 1.5 MG/DL — LOW (ref 1.8–2.4)
MCHC RBC-ENTMCNC: 32.9 PG — HIGH (ref 27–31)
MCHC RBC-ENTMCNC: 36.7 G/DL — SIGNIFICANT CHANGE UP (ref 32–37)
MCV RBC AUTO: 89.6 FL — SIGNIFICANT CHANGE UP (ref 81–99)
NRBC # BLD: 0 /100 WBCS — SIGNIFICANT CHANGE UP (ref 0–0)
PLATELET # BLD AUTO: 164 K/UL — SIGNIFICANT CHANGE UP (ref 130–400)
POTASSIUM SERPL-MCNC: 3.3 MMOL/L — LOW (ref 3.5–5)
POTASSIUM SERPL-SCNC: 3.3 MMOL/L — LOW (ref 3.5–5)
PROT SERPL-MCNC: 6 G/DL — SIGNIFICANT CHANGE UP (ref 6–8)
RBC # BLD: 2.89 M/UL — LOW (ref 4.2–5.4)
RBC # FLD: 13 % — SIGNIFICANT CHANGE UP (ref 11.5–14.5)
SODIUM SERPL-SCNC: 141 MMOL/L — SIGNIFICANT CHANGE UP (ref 135–146)
WBC # BLD: 3.36 K/UL — LOW (ref 4.8–10.8)
WBC # FLD AUTO: 3.36 K/UL — LOW (ref 4.8–10.8)

## 2022-08-29 PROCEDURE — 99233 SBSQ HOSP IP/OBS HIGH 50: CPT

## 2022-08-29 RX ORDER — POTASSIUM CHLORIDE 20 MEQ
40 PACKET (EA) ORAL ONCE
Refills: 0 | Status: COMPLETED | OUTPATIENT
Start: 2022-08-29 | End: 2022-08-29

## 2022-08-29 RX ORDER — MAGNESIUM SULFATE 500 MG/ML
2 VIAL (ML) INJECTION ONCE
Refills: 0 | Status: COMPLETED | OUTPATIENT
Start: 2022-08-29 | End: 2022-08-29

## 2022-08-29 RX ORDER — POLYETHYLENE GLYCOL 3350 17 G/17G
17 POWDER, FOR SOLUTION ORAL
Refills: 0 | Status: DISCONTINUED | OUTPATIENT
Start: 2022-08-29 | End: 2022-09-01

## 2022-08-29 RX ADMIN — SODIUM CHLORIDE 200 MILLILITER(S): 9 INJECTION, SOLUTION INTRAVENOUS at 08:25

## 2022-08-29 RX ADMIN — GABAPENTIN 300 MILLIGRAM(S): 400 CAPSULE ORAL at 21:29

## 2022-08-29 RX ADMIN — SODIUM CHLORIDE 200 MILLILITER(S): 9 INJECTION, SOLUTION INTRAVENOUS at 21:29

## 2022-08-29 RX ADMIN — Medication 25 GRAM(S): at 16:01

## 2022-08-29 RX ADMIN — HYDROMORPHONE HYDROCHLORIDE 1 MILLIGRAM(S): 2 INJECTION INTRAMUSCULAR; INTRAVENOUS; SUBCUTANEOUS at 14:29

## 2022-08-29 RX ADMIN — Medication 1 TABLET(S): at 11:40

## 2022-08-29 RX ADMIN — Medication 3 CAPSULE(S): at 08:25

## 2022-08-29 RX ADMIN — POLYETHYLENE GLYCOL 3350 17 GRAM(S): 17 POWDER, FOR SOLUTION ORAL at 17:14

## 2022-08-29 RX ADMIN — ONDANSETRON 8 MILLIGRAM(S): 8 TABLET, FILM COATED ORAL at 08:26

## 2022-08-29 RX ADMIN — Medication 3 CAPSULE(S): at 11:39

## 2022-08-29 RX ADMIN — PANTOPRAZOLE SODIUM 40 MILLIGRAM(S): 20 TABLET, DELAYED RELEASE ORAL at 11:41

## 2022-08-29 RX ADMIN — Medication 40 MILLIEQUIVALENT(S): at 10:11

## 2022-08-29 RX ADMIN — HYDROMORPHONE HYDROCHLORIDE 1 MILLIGRAM(S): 2 INJECTION INTRAMUSCULAR; INTRAVENOUS; SUBCUTANEOUS at 10:11

## 2022-08-29 RX ADMIN — GABAPENTIN 300 MILLIGRAM(S): 400 CAPSULE ORAL at 06:18

## 2022-08-29 RX ADMIN — HYDROMORPHONE HYDROCHLORIDE 1 MILLIGRAM(S): 2 INJECTION INTRAMUSCULAR; INTRAVENOUS; SUBCUTANEOUS at 06:18

## 2022-08-29 RX ADMIN — HYDROMORPHONE HYDROCHLORIDE 1 MILLIGRAM(S): 2 INJECTION INTRAMUSCULAR; INTRAVENOUS; SUBCUTANEOUS at 14:44

## 2022-08-29 RX ADMIN — HYDROMORPHONE HYDROCHLORIDE 1 MILLIGRAM(S): 2 INJECTION INTRAMUSCULAR; INTRAVENOUS; SUBCUTANEOUS at 18:43

## 2022-08-29 RX ADMIN — Medication 1 MILLIGRAM(S): at 11:39

## 2022-08-29 RX ADMIN — GABAPENTIN 300 MILLIGRAM(S): 400 CAPSULE ORAL at 13:12

## 2022-08-29 RX ADMIN — Medication 25 GRAM(S): at 13:13

## 2022-08-29 RX ADMIN — POLYETHYLENE GLYCOL 3350 17 GRAM(S): 17 POWDER, FOR SOLUTION ORAL at 01:47

## 2022-08-29 RX ADMIN — HYDROMORPHONE HYDROCHLORIDE 1 MILLIGRAM(S): 2 INJECTION INTRAMUSCULAR; INTRAVENOUS; SUBCUTANEOUS at 01:39

## 2022-08-29 RX ADMIN — Medication 3 CAPSULE(S): at 17:12

## 2022-08-29 RX ADMIN — ENOXAPARIN SODIUM 40 MILLIGRAM(S): 100 INJECTION SUBCUTANEOUS at 18:46

## 2022-08-29 RX ADMIN — HYDROMORPHONE HYDROCHLORIDE 1 MILLIGRAM(S): 2 INJECTION INTRAMUSCULAR; INTRAVENOUS; SUBCUTANEOUS at 23:20

## 2022-08-29 RX ADMIN — HYDROMORPHONE HYDROCHLORIDE 1 MILLIGRAM(S): 2 INJECTION INTRAMUSCULAR; INTRAVENOUS; SUBCUTANEOUS at 10:26

## 2022-08-29 NOTE — PROGRESS NOTE ADULT - SUBJECTIVE AND OBJECTIVE BOX
LAURA BARAJAS  47y  [facility] F3-4B 0[address]      Patient is a 47y old  Female who presents with a chief complaint of Abdominal pain (29 Aug 2022 12:54)      INTERVAL HPI/OVERNIGHT EVENTS:  pain remains persistent       REVIEW OF SYSTEMS:  CONSTITUTIONAL: No fever, weight loss, or fatigue  EYES: No eye pain, visual disturbances, or discharge  ENMT:  No difficulty hearing, tinnitus, vertigo; No sinus or throat pain  NECK: No pain or stiffness  BREASTS: No pain, masses, or nipple discharge  RESPIRATORY: No cough, wheezing, chills or hemoptysis; No shortness of breath  CARDIOVASCULAR: No chest pain, palpitations, dizziness, or leg swelling  GASTROINTESTINAL:+ ABdominal pain  GENITOURINARY: No dysuria, frequency, hematuria, or incontinence  NEUROLOGICAL: No headaches, memory loss, loss of strength, numbness, or tremors  SKIN: No itching, burning, rashes, or lesions   LYMPH NODES: No enlarged glands  ENDOCRINE: No heat or cold intolerance; No hair loss  MUSCULOSKELETAL: No joint pain or swelling; No muscle, back, or extremity pain  PSYCHIATRIC: No depression, anxiety, mood swings, or difficulty sleeping  HEME/LYMPH: No easy bruising, or bleeding gums  ALLERY AND IMMUNOLOGIC: No hives or eczema  FAMILY HISTORY:  Family history of hypertension  both father and mother    FH: pancreatic cancer  cousin    Family history of cholecystectomy  many female members in the family      T(C): 36.8 (08-29-22 @ 08:00), Max: 36.8 (08-29-22 @ 08:00)  HR: 81 (08-29-22 @ 08:00) (67 - 81)  BP: 136/85 (08-29-22 @ 08:00) (131/85 - 144/85)  RR: 18 (08-29-22 @ 08:00) (18 - 18)  SpO2: --  Wt(kg): --Vital Signs Last 24 Hrs  T(C): 36.8 (29 Aug 2022 08:00), Max: 36.8 (29 Aug 2022 08:00)  T(F): 98.2 (29 Aug 2022 08:00), Max: 98.2 (29 Aug 2022 08:00)  HR: 81 (29 Aug 2022 08:00) (67 - 81)  BP: 136/85 (29 Aug 2022 08:00) (131/85 - 144/85)  BP(mean): --  RR: 18 (29 Aug 2022 08:00) (18 - 18)  SpO2: --        PHYSICAL EXAM:  GENERAL: NAD, well-groomed, well-developed  HEAD:  Atraumatic, Normocephalic  EYES: EOMI, PERRLA, conjunctiva and sclera clear  ENMT: No tonsillar erythema, exudates, or enlargement; Moist mucous membranes, Good dentition, No lesions  NECK: Supple, No JVD, Normal thyroid  NERVOUS SYSTEM:  Alert & Oriented X3, Good concentration; Motor Strength 5/5 B/L upper and lower extremities; DTRs 2+ intact and symmetric  PULM: Clear to auscultation bilaterally  CARDIAC: Regular rate and rhythm; No murmurs, rubs, or gallops  GI: Soft, Nontender, Nondistended; Bowel sounds present  EXTREMITIES:  2+ Peripheral Pulses, No clubbing, cyanosis, or edema  LYMPH: No lymphadenopathy noted  SKIN: No rashes or lesions    Consultant(s) Notes Reviewed:  [x ] YES  [ ] NO  Care Discussed with Consultants/Other Providers [ x] YES  [ ] NO    LABS:                            9.5    3.36  )-----------( 164      ( 29 Aug 2022 07:43 )             25.9   08-29    141  |  101  |  <3<L>  ----------------------------<  92  3.3<L>   |  30  |  0.5<L>    Ca    8.6      29 Aug 2022 07:43  Phos  3.7     08-28  Mg     1.5     08-29    TPro  6.0  /  Alb  3.4<L>  /  TBili  0.4  /  DBili  x   /  AST  157<H>  /  ALT  69<H>  /  AlkPhos  193<H>  08-29            bisacodyl 5 milliGRAM(s) Oral every 12 hours  enoxaparin Injectable 40 milliGRAM(s) SubCutaneous every 24 hours  folic acid 1 milliGRAM(s) Oral daily  gabapentin 300 milliGRAM(s) Oral three times a day  HYDROmorphone  Injectable 1 milliGRAM(s) IV Push every 4 hours PRN  lactated ringers. 1000 milliLiter(s) IV Continuous <Continuous>  lactobacillus acidophilus 1 Tablet(s) Oral daily  LORazepam   Injectable 2 milliGRAM(s) IV Push every 1 hour PRN  magnesium sulfate  IVPB 2 Gram(s) IV Intermittent once  multivitamin 1 Tablet(s) Oral daily  ondansetron Injectable 8 milliGRAM(s) IV Push every 12 hours PRN  pancrelipase  (CREON 12,000 Lipase Units) 3 Capsule(s) Oral three times a day with meals  pantoprazole  Injectable 40 milliGRAM(s) IV Push daily  polyethylene glycol 3350 17 Gram(s) Oral two times a day      HEALTH ISSUES - PROBLEM Dx:          Case Discussed with House Staff   45 minutes spent on total encounter; more than 50% of the visit was spent counseling and/or coordinating care by the attending physician.   Spectra [phone]

## 2022-08-29 NOTE — PROGRESS NOTE ADULT - ASSESSMENT
46 yo F patient with a PMH of chronic pancreatitis probably alcoholic- induced, and recurrent episodes of acute pancreatitis, presents to the ED for abdominal pain.    # Acute on chronic pancreatitis  # ETOH abuse  # Hypokalemia, Hypomagnesemia  - still complaining from abdominal pain, tolerated diet with pain medications only   - CT on admission showed acute pancreatitis   - US RUQ showed pancreatic calcifications only   - Labs showed low K and Mg   - GI was contacted again: repeat CT abdomen   - continue with LR 200cc/hr   - pain control with hydromorphone 1mg q4h PRN   - on lorazepam per CIWA protocol   - continue with ondansetron as PRN, pancrelipase   - continue multivitamins, folic acid   - follow up CT abdomen      # Transaminitis likely ETOH induced, improving   # Hepatic steatosis  - follow up LFT   - CT abdomen showed diffuse hepatic steatosis       DVT prophylaxis: enoxaparin 40mg daily  GI prophylaxis: pantoprazole 40mg daily   Diet: DASH  Full code

## 2022-08-29 NOTE — PROGRESS NOTE ADULT - ASSESSMENT
46 yo F patient with a PMH of chronic pancreatitis probably alcoholic- induced, and recurrent episodes of acute pancreatitis, presents to the ED for abdominal pain in the epigastrium, radiating to the back, with associated nausea and clear, non-bloody emesis. Patient has been having consequently decrease in the appetite.  Admitted for acute pancreatitis.    # Acute on chronic pancreatitis secondary to ETOH use. improving.   # Mild alcoholic hepatitis.     - Lipase 733  - hemodynamically stable, afebrile   - Typical epigastric pain radiates to the back with nausea and vomiting.  - CT Findings compatible with acute on chronic pancreatitis; no evidence of hepatic necrosis or peripancreatic fluid collections. hepatic steatosis.  - RUQUS: Pancreatic calcifications. CBD 6 mm.  - Not on any medications   -  8/2021: EUS w/ walled-off collection that measured 4.5 x 3.9 cm in largest dimension,  looked well circumscribed, heterogeneous, containing debris, consistent with  walled-off pancreatic necrosis. EUS w/ pancreatic parenchymal hypoechogenicity and calcifications in the pancreatic head, body, and tail, suggestive of chronic pancreatitis.   - still complaining of constant epigastric pain 8/10 worsening with food     Plan   - LR @ 150 cc/hour until pain resolves   - monitor BUN, Hct and UOP.   - pain control.   - correct electrolytes, thiamine and folate supplements   - trend LFTs   - Alcohol abstinence   - monitor withdrawal symptoms.   - Diet: advance as tolerated   - CT w  contrast to rule out complicated pancreatitis    48 yo F patient with a PMH of chronic pancreatitis probably alcoholic- induced, and recurrent episodes of acute pancreatitis, presents to the ED for abdominal pain in the epigastrium, radiating to the back, with associated nausea and clear, non-bloody emesis. Patient has been having consequently decrease in the appetite.  Admitted for acute pancreatitis.    # Acute on chronic pancreatitis secondary to ETOH use, patient still complaining of constant epigastric pain 8/10 worsening with food   #  alcoholic hepatitis.   - hemodynamically stable, afebrile   - CT Findings compatible with acute on chronic pancreatitis; no evidence of hepatic necrosis or peripancreatic fluid collections, hepatic steatosis.  - RUQ US: Pancreatic calcifications. CBD 6 mm.  - Not on any medications   - Normal calcium and triglycerides         Plan   - Diet: low fat diet   - IV hydration   - monitor BUN, Hct and UOP.   - pain control.   - correct electrolytes, thiamine and folate supplements   - trend LFTs   - Alcohol abstinence   - monitor withdrawal symptoms.   - CT w  IV contrast to rule out complicated pancreatitis
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
## 2022-08-29 NOTE — PROGRESS NOTE ADULT - ASSESSMENT
46 yo F patient with a PMH of chronic pancreatitis probably alcoholic- induced, and recurrent episodes of acute pancreatitis, presents to the ED for abdominal pain.    # Acute on chronic pancreatitis  LR maintain for now as not consistent with eating due to pain  pain control  per gi will repeat ct scan      # ETOH abuse    # Elevated anion gap likely alcoholic ketoacidosis, resolved    # Transaminitis likely ETOH induced, improving     # Hepatic steatosis    # Hypomagnesemia persistent , repeat     #Suspected folic acid deficiency on supplementation     #Suspected thiamine deficiency on supplementation     #Hypokalemia replete     #Anemia no indication for transfusion     PROGRESS NOTE HANDOFF    Pending:  ct scan  , gi re following     Family discussion: patient verbalized understanding and agreeable to plan of care     Disposition: Home
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
## 2022-08-30 LAB
ALBUMIN SERPL ELPH-MCNC: 3.7 G/DL — SIGNIFICANT CHANGE UP (ref 3.5–5.2)
ALP SERPL-CCNC: 203 U/L — HIGH (ref 30–115)
ALT FLD-CCNC: 67 U/L — HIGH (ref 0–41)
ANION GAP SERPL CALC-SCNC: 13 MMOL/L — SIGNIFICANT CHANGE UP (ref 7–14)
AST SERPL-CCNC: 144 U/L — HIGH (ref 0–41)
BILIRUB SERPL-MCNC: 0.4 MG/DL — SIGNIFICANT CHANGE UP (ref 0.2–1.2)
BUN SERPL-MCNC: <3 MG/DL — LOW (ref 10–20)
CALCIUM SERPL-MCNC: 9.1 MG/DL — SIGNIFICANT CHANGE UP (ref 8.5–10.1)
CHLORIDE SERPL-SCNC: 98 MMOL/L — SIGNIFICANT CHANGE UP (ref 98–110)
CO2 SERPL-SCNC: 28 MMOL/L — SIGNIFICANT CHANGE UP (ref 17–32)
CREAT SERPL-MCNC: 0.5 MG/DL — LOW (ref 0.7–1.5)
EGFR: 118 ML/MIN/1.73M2 — SIGNIFICANT CHANGE UP
GLUCOSE SERPL-MCNC: 95 MG/DL — SIGNIFICANT CHANGE UP (ref 70–99)
HCT VFR BLD CALC: 31.1 % — LOW (ref 37–47)
HGB BLD-MCNC: 10.7 G/DL — LOW (ref 12–16)
MAGNESIUM SERPL-MCNC: 1.9 MG/DL — SIGNIFICANT CHANGE UP (ref 1.8–2.4)
MCHC RBC-ENTMCNC: 31.9 PG — HIGH (ref 27–31)
MCHC RBC-ENTMCNC: 34.4 G/DL — SIGNIFICANT CHANGE UP (ref 32–37)
MCV RBC AUTO: 92.8 FL — SIGNIFICANT CHANGE UP (ref 81–99)
NRBC # BLD: 0 /100 WBCS — SIGNIFICANT CHANGE UP (ref 0–0)
PLATELET # BLD AUTO: 246 K/UL — SIGNIFICANT CHANGE UP (ref 130–400)
POTASSIUM SERPL-MCNC: 4 MMOL/L — SIGNIFICANT CHANGE UP (ref 3.5–5)
POTASSIUM SERPL-SCNC: 4 MMOL/L — SIGNIFICANT CHANGE UP (ref 3.5–5)
PROT SERPL-MCNC: 7 G/DL — SIGNIFICANT CHANGE UP (ref 6–8)
RBC # BLD: 3.35 M/UL — LOW (ref 4.2–5.4)
RBC # FLD: 13.3 % — SIGNIFICANT CHANGE UP (ref 11.5–14.5)
SODIUM SERPL-SCNC: 139 MMOL/L — SIGNIFICANT CHANGE UP (ref 135–146)
WBC # BLD: 3.7 K/UL — LOW (ref 4.8–10.8)
WBC # FLD AUTO: 3.7 K/UL — LOW (ref 4.8–10.8)

## 2022-08-30 PROCEDURE — 74177 CT ABD & PELVIS W/CONTRAST: CPT | Mod: 26

## 2022-08-30 PROCEDURE — 99232 SBSQ HOSP IP/OBS MODERATE 35: CPT

## 2022-08-30 PROCEDURE — 99233 SBSQ HOSP IP/OBS HIGH 50: CPT

## 2022-08-30 RX ORDER — HYDROMORPHONE HYDROCHLORIDE 2 MG/ML
1 INJECTION INTRAMUSCULAR; INTRAVENOUS; SUBCUTANEOUS EVERY 4 HOURS
Refills: 0 | Status: DISCONTINUED | OUTPATIENT
Start: 2022-08-30 | End: 2022-08-31

## 2022-08-30 RX ADMIN — GABAPENTIN 300 MILLIGRAM(S): 400 CAPSULE ORAL at 21:57

## 2022-08-30 RX ADMIN — Medication 1 TABLET(S): at 12:08

## 2022-08-30 RX ADMIN — HYDROMORPHONE HYDROCHLORIDE 1 MILLIGRAM(S): 2 INJECTION INTRAMUSCULAR; INTRAVENOUS; SUBCUTANEOUS at 03:20

## 2022-08-30 RX ADMIN — SODIUM CHLORIDE 200 MILLILITER(S): 9 INJECTION, SOLUTION INTRAVENOUS at 23:03

## 2022-08-30 RX ADMIN — HYDROMORPHONE HYDROCHLORIDE 1 MILLIGRAM(S): 2 INJECTION INTRAMUSCULAR; INTRAVENOUS; SUBCUTANEOUS at 09:54

## 2022-08-30 RX ADMIN — HYDROMORPHONE HYDROCHLORIDE 1 MILLIGRAM(S): 2 INJECTION INTRAMUSCULAR; INTRAVENOUS; SUBCUTANEOUS at 04:02

## 2022-08-30 RX ADMIN — Medication 3 CAPSULE(S): at 17:52

## 2022-08-30 RX ADMIN — HYDROMORPHONE HYDROCHLORIDE 1 MILLIGRAM(S): 2 INJECTION INTRAMUSCULAR; INTRAVENOUS; SUBCUTANEOUS at 14:20

## 2022-08-30 RX ADMIN — Medication 5 MILLIGRAM(S): at 17:52

## 2022-08-30 RX ADMIN — HYDROMORPHONE HYDROCHLORIDE 1 MILLIGRAM(S): 2 INJECTION INTRAMUSCULAR; INTRAVENOUS; SUBCUTANEOUS at 18:30

## 2022-08-30 RX ADMIN — ENOXAPARIN SODIUM 40 MILLIGRAM(S): 100 INJECTION SUBCUTANEOUS at 17:59

## 2022-08-30 RX ADMIN — HYDROMORPHONE HYDROCHLORIDE 1 MILLIGRAM(S): 2 INJECTION INTRAMUSCULAR; INTRAVENOUS; SUBCUTANEOUS at 21:57

## 2022-08-30 RX ADMIN — Medication 1 MILLIGRAM(S): at 12:08

## 2022-08-30 RX ADMIN — HYDROMORPHONE HYDROCHLORIDE 1 MILLIGRAM(S): 2 INJECTION INTRAMUSCULAR; INTRAVENOUS; SUBCUTANEOUS at 13:50

## 2022-08-30 RX ADMIN — PANTOPRAZOLE SODIUM 40 MILLIGRAM(S): 20 TABLET, DELAYED RELEASE ORAL at 12:08

## 2022-08-30 RX ADMIN — GABAPENTIN 300 MILLIGRAM(S): 400 CAPSULE ORAL at 13:50

## 2022-08-30 RX ADMIN — SODIUM CHLORIDE 200 MILLILITER(S): 9 INJECTION, SOLUTION INTRAVENOUS at 12:17

## 2022-08-30 RX ADMIN — HYDROMORPHONE HYDROCHLORIDE 1 MILLIGRAM(S): 2 INJECTION INTRAMUSCULAR; INTRAVENOUS; SUBCUTANEOUS at 09:21

## 2022-08-30 RX ADMIN — POLYETHYLENE GLYCOL 3350 17 GRAM(S): 17 POWDER, FOR SOLUTION ORAL at 17:52

## 2022-08-30 RX ADMIN — POLYETHYLENE GLYCOL 3350 17 GRAM(S): 17 POWDER, FOR SOLUTION ORAL at 05:53

## 2022-08-30 RX ADMIN — Medication 3 CAPSULE(S): at 08:44

## 2022-08-30 RX ADMIN — HYDROMORPHONE HYDROCHLORIDE 1 MILLIGRAM(S): 2 INJECTION INTRAMUSCULAR; INTRAVENOUS; SUBCUTANEOUS at 17:52

## 2022-08-30 RX ADMIN — SODIUM CHLORIDE 200 MILLILITER(S): 9 INJECTION, SOLUTION INTRAVENOUS at 17:53

## 2022-08-30 RX ADMIN — GABAPENTIN 300 MILLIGRAM(S): 400 CAPSULE ORAL at 05:53

## 2022-08-30 RX ADMIN — Medication 3 CAPSULE(S): at 12:08

## 2022-08-30 RX ADMIN — ONDANSETRON 8 MILLIGRAM(S): 8 TABLET, FILM COATED ORAL at 09:25
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
## 2022-08-30 NOTE — PROGRESS NOTE ADULT - ASSESSMENT
48 yo F patient with a PMH of chronic pancreatitis probably alcoholic- induced, and recurrent episodes of acute pancreatitis, presents to the ED for abdominal pain.    # Acute on chronic pancreatitis  # ETOH abuse  # Hypokalemia, Hypomagnesemia - rseolved   - still complaining from abdominal pain, tolerated diet with pain medications only   - CT on admission showed acute pancreatitis   - US RUQ showed pancreatic calcifications only   - repeated CT abdomen showed: New 1.5 cm collection either adjacent to a small bowel loop or within the wall of small bowel loop just inferior to the pancreas. These inflammatory changes from the acute pancreatitis to the small bowel loop place the patient at risk for future fistula formation.    - GI on board: Pain control, advance diet as tolerated, follow up with advanced GI after 2 weeks of discharge   - continue with LR 200cc/hr   - pain control with hydromorphone 1mg q4h PRN   - on lorazepam per CIWA protocol   - continue with ondansetron as PRN, pancrelipase   - continue multivitamins, folic acid         # Transaminitis likely ETOH induced, improving   # Hepatic steatosis  - elevated LFT's   - CT abdomen showed diffuse hepatic steatosis   - monitor LFT       DVT prophylaxis: enoxaparin 40mg daily  GI prophylaxis: pantoprazole 40mg daily   Diet: DASH  Full code

## 2022-08-30 NOTE — PROGRESS NOTE ADULT - ASSESSMENT
46 yo F patient with a PMH of chronic pancreatitis probably alcoholic- induced, and recurrent episodes of acute pancreatitis, presents to the ED for abdominal pain.    # Acute on chronic pancreatitis  pain control   appreciate gi follow up     # ETOH abuse    # Elevated anion gap likely alcoholic ketoacidosis, resolved    # Transaminitis likely ETOH induced, improving     # Hepatic steatosis    # Hypomagnesemia persistent , repeat     #Suspected folic acid deficiency on supplementation     #Suspected thiamine deficiency on supplementation     #Hypokalemia resolved     #Anemia no indication for transfusion     PROGRESS NOTE HANDOFF    Pending:  cadvance diet , monitor for pain     Family discussion: patient verbalized understanding and agreeable to plan of care     Disposition: Home
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
## 2022-08-30 NOTE — PROGRESS NOTE ADULT - ASSESSMENT
48 yo F patient with a PMH of chronic pancreatitis probably alcoholic- induced, and recurrent episodes of acute pancreatitis, presents to the ED for abdominal pain in the epigastrium, radiating to the back, with associated nausea and clear, non-bloody emesis. Patient has been having consequently decrease in the appetite.  Admitted for acute pancreatitis.    # Acute on chronic pancreatitis secondary to ETOH use, small questionable collection in CT scan   #  alcoholic hepatitis.     - hemodynamically stable, afebrile   - CT Findings compatible with acute on chronic pancreatitis; no evidence of hepatic necrosis or peripancreatic fluid collections, hepatic steatosis.  - RUQ US: Pancreatic calcifications. CBD 6 mm.  - Not on any medications   - Normal calcium and triglycerides     Plan   - Diet: low fat diet   - IV hydration   - monitor BUN, Hct and UOP.   - pain control. (consider pain management eval)  - correct electrolytes, thiamine and folate supplements   - trend LFTs   - Alcohol abstinence   - monitor withdrawal symptoms.   - Please follow up with advanced GI Dr Elizondo in two weeks after discharge   [phone] [address] [place] [address]     46 yo F patient with a PMH of chronic pancreatitis probably alcoholic- induced, and recurrent episodes of acute pancreatitis, presents to the ED for abdominal pain in the epigastrium, radiating to the back, with associated nausea and clear, non-bloody emesis. Patient has been having consequently decrease in the appetite.  Admitted for acute pancreatitis.    # Acute on chronic pancreatitis secondary to ETOH use, small questionable collection in CT scan   #  alcoholic hepatitis.     - hemodynamically stable, afebrile   - CT Findings compatible with acute on chronic pancreatitis; no evidence of hepatic necrosis or peripancreatic fluid collections, hepatic steatosis.  - RUQ US: Pancreatic calcifications. CBD 6 mm.  - Not on any medications   - Normal calcium and triglycerides     Plan   - Diet: low fat diet   - IV hydration   - monitor BUN, Hct and UOP.   - pain control. (consider pain management eval)  - correct electrolytes, thiamine and folate supplements   - trend LFTs   - Alcohol abstinence   - monitor withdrawal symptoms.   - Please follow up with advanced GI (Dr Elizondo) in two weeks after discharge for EUS  [phone] [address] [place] [address]     46 yo F patient with a PMH of chronic pancreatitis probably alcoholic- induced, and recurrent episodes of acute pancreatitis, presents to the ED for abdominal pain in the epigastrium, radiating to the back, with associated nausea and clear, non-bloody emesis. Patient has been having consequently decrease in the appetite.  Admitted for acute pancreatitis.    # Acute on chronic pancreatitis secondary to ETOH use  #Stable masslike process involving the pancreatic head, likely a pancreatic pseudotumor related to the chronic pancreatitis  #New 1.5 cm collection either adjacent to a small bowel loop or within the wall of small bowel loop just inferior to the pancreas.  #dilated pancreatic duct   #  alcoholic hepatitis.     - hemodynamically stable, afebrile   - CT Findings compatible with acute on chronic pancreatitis; no evidence of hepatic necrosis or peripancreatic fluid collections, hepatic steatosis.  - RUQ US: Pancreatic calcifications. CBD 6 mm.  - Not on any medications   - Normal calcium and triglycerides     Plan   - Diet: low fat diet   - IV hydration   - monitor BUN, Hct and UOP.   - pain control. (consider pain management eval)  - correct electrolytes, thiamine and folate supplements   - trend LFTs   - Alcohol abstinence   - monitor withdrawal symptoms.   -CT findings discussed with advanced GI dr. Rogelio jackson, patient will benefit from outpatient EUS after resolution of acute pancreatitis   - Please follow up with advanced GI (Dr Elizondo/ or dr. Rogelio sal) in two weeks after discharge for EUS  [phone] [address] [facility] [place] [address]     46 yo F patient with a PMH of chronic pancreatitis probably alcoholic- induced, and recurrent episodes of acute pancreatitis, presents to the ED for abdominal pain in the epigastrium, radiating to the back, with associated nausea and clear, non-bloody emesis. Patient has been having consequently decrease in the appetite.  Admitted for acute pancreatitis.    # Acute on chronic pancreatitis secondary to ETOH use  #Stable masslike process involving the pancreatic head, likely a pancreatic pseudotumor related to the chronic pancreatitis  #New 1.5 cm collection either adjacent to a small bowel loop or within the wall of small bowel loop just inferior to the pancreas.  #dilated pancreatic duct   #  alcoholic hepatitis.     - hemodynamically stable, afebrile   - CT Findings compatible with acute on chronic pancreatitis; no evidence of hepatic necrosis or peripancreatic fluid collections, hepatic steatosis.  - RUQ US: Pancreatic calcifications. CBD 6 mm.  - Not on any medications   - Normal calcium and triglycerides     Plan   - Diet: low fat diet   - IV hydration   - monitor BUN, Hct and UOP.   - pain control. (consider pain management eval)  - correct electrolytes, thiamine and folate supplements   - trend LFTs   - Alcohol abstinence   - monitor withdrawal symptoms.   -CT findings discussed with advanced GI dr. Rogelio jackson, patient will benefit from outpatient EUS after resolution of acute pancreatitis   - Please follow up with advanced GI (Dr Elizondo/ or dr. Rogelio jackson) in two weeks after discharge for EUS  [phone]  [address] [place] [address]      #transaminitis : mixed pattern with ALT> AST  alcoholic hepatitis     REC:  trend LFTs  alcohol abstinence advised   patient to follow up with hepatology for further workup as outpatient   Follow-up at our [facility] [phone] , [address] [place] [address]

## 2022-08-30 NOTE — PROGRESS NOTE ADULT - SUBJECTIVE AND OBJECTIVE BOX
LAURA BARAJAS  47y  [facility] [id number] [address]      Patient is a 47y old  Female who presents with a chief complaint of Abdominal pain (30 Aug 2022 13:16)      INTERVAL HPI/OVERNIGHT EVENTS:    no acute events overnight     REVIEW OF SYSTEMS:  CONSTITUTIONAL: No fever, weight loss, or fatigue  EYES: No eye pain, visual disturbances, or discharge  ENMT:  No difficulty hearing, tinnitus, vertigo; No sinus or throat pain  NECK: No pain or stiffness  BREASTS: No pain, masses, or nipple discharge  RESPIRATORY: No cough, wheezing, chills or hemoptysis; No shortness of breath  CARDIOVASCULAR: No chest pain, palpitations, dizziness, or leg swelling  GASTROINTESTINAL: No abdominal or epigastric pain. No nausea, vomiting, or hematemesis; No diarrhea or constipation. No melena or hematochezia.  GENITOURINARY: No dysuria, frequency, hematuria, or incontinence  NEUROLOGICAL: No headaches, memory loss, loss of strength, numbness, or tremors  SKIN: No itching, burning, rashes, or lesions   LYMPH NODES: No enlarged glands  ENDOCRINE: No heat or cold intolerance; No hair loss  MUSCULOSKELETAL: No joint pain or swelling; No muscle, back, or extremity pain  PSYCHIATRIC: No depression, anxiety, mood swings, or difficulty sleeping  HEME/LYMPH: No easy bruising, or bleeding gums  ALLERY AND IMMUNOLOGIC: No hives or eczema  FAMILY HISTORY:  Family history of hypertension  both father and mother    FH: pancreatic cancer  cousin    Family history of cholecystectomy  many female members in the family      T(C): 36 (08-30-22 @ 08:00), Max: 36.2 (08-29-22 @ 15:30)  HR: 80 (08-30-22 @ 08:00) (66 - 80)  BP: 145/83 (08-30-22 @ 08:00) (137/88 - 159/99)  RR: 18 (08-30-22 @ 08:00) (18 - 18)  SpO2: 97% (08-30-22 @ 08:00) (97% - 97%)  Wt(kg): --Vital Signs Last 24 Hrs  T(C): 36 (30 Aug 2022 08:00), Max: 36.2 (29 Aug 2022 15:30)  T(F): 96.8 (30 Aug 2022 08:00), Max: 97.1 (29 Aug 2022 15:30)  HR: 80 (30 Aug 2022 08:00) (66 - 80)  BP: 145/83 (30 Aug 2022 08:00) (137/88 - 159/99)  BP(mean): --  RR: 18 (30 Aug 2022 08:00) (18 - 18)  SpO2: 97% (30 Aug 2022 08:00) (97% - 97%)    Parameters below as of 30 Aug 2022 08:00  Patient On (Oxygen Delivery Method): room air        PHYSICAL EXAM:  GENERAL: NAD, well-groomed, well-developed  HEAD:  Atraumatic, Normocephalic  EYES: EOMI, PERRLA, conjunctiva and sclera clear  ENMT: No tonsillar erythema, exudates, or enlargement; Moist mucous membranes, Good dentition, No lesions  NECK: Supple, No JVD, Normal thyroid  NERVOUS SYSTEM:  Alert & Oriented X3, Good concentration; Motor Strength 5/5 B/L upper and lower extremities; DTRs 2+ intact and symmetric  PULM: Clear to auscultation bilaterally  CARDIAC: Regular rate and rhythm; No murmurs, rubs, or gallops  GI: Soft, Nontender, Nondistended; Bowel sounds present  EXTREMITIES:  2+ Peripheral Pulses, No clubbing, cyanosis, or edema  LYMPH: No lymphadenopathy noted  SKIN: No rashes or lesions    Consultant(s) Notes Reviewed:  [x ] YES  [ ] NO  Care Discussed with Consultants/Other Providers [ x] YES  [ ] NO    LABS:                            10.7   3.70  )-----------( 246      ( 30 Aug 2022 09:51 )             31.1   08-30    139  |  98  |  <3<L>  ----------------------------<  95  4.0   |  28  |  0.5<L>    Ca    9.1      30 Aug 2022 09:51  Mg     1.9     08-30    TPro  7.0  /  Alb  3.7  /  TBili  0.4  /  DBili  x   /  AST  144<H>  /  ALT  67<H>  /  AlkPhos  203<H>  08-30            bisacodyl 5 milliGRAM(s) Oral every 12 hours  enoxaparin Injectable 40 milliGRAM(s) SubCutaneous every 24 hours  folic acid 1 milliGRAM(s) Oral daily  gabapentin 300 milliGRAM(s) Oral three times a day  HYDROmorphone  Injectable 1 milliGRAM(s) IV Push every 4 hours PRN  lactated ringers. 1000 milliLiter(s) IV Continuous <Continuous>  lactobacillus acidophilus 1 Tablet(s) Oral daily  LORazepam   Injectable 2 milliGRAM(s) IV Push every 1 hour PRN  multivitamin 1 Tablet(s) Oral daily  ondansetron Injectable 8 milliGRAM(s) IV Push every 12 hours PRN  pancrelipase  (CREON 12,000 Lipase Units) 3 Capsule(s) Oral three times a day with meals  pantoprazole  Injectable 40 milliGRAM(s) IV Push daily  polyethylene glycol 3350 17 Gram(s) Oral two times a day      HEALTH ISSUES - PROBLEM Dx:          Case Discussed with House Staff      Spectra [phone]

## 2022-08-31 ENCOUNTER — TRANSCRIPTION ENCOUNTER (OUTPATIENT)
Age: 47
End: 2022-08-31

## 2022-08-31 DIAGNOSIS — K85.90 ACUTE PANCREATITIS WITHOUT NECROSIS OR INFECTION, UNSPECIFIED: ICD-10-CM

## 2022-08-31 LAB
ALBUMIN SERPL ELPH-MCNC: 3.5 G/DL — SIGNIFICANT CHANGE UP (ref 3.5–5.2)
ALP SERPL-CCNC: 180 U/L — HIGH (ref 30–115)
ALT FLD-CCNC: 56 U/L — HIGH (ref 0–41)
ANION GAP SERPL CALC-SCNC: 12 MMOL/L — SIGNIFICANT CHANGE UP (ref 7–14)
AST SERPL-CCNC: 119 U/L — HIGH (ref 0–41)
BILIRUB SERPL-MCNC: 0.3 MG/DL — SIGNIFICANT CHANGE UP (ref 0.2–1.2)
BUN SERPL-MCNC: 4 MG/DL — LOW (ref 10–20)
CALCIUM SERPL-MCNC: 9.2 MG/DL — SIGNIFICANT CHANGE UP (ref 8.5–10.1)
CHLORIDE SERPL-SCNC: 100 MMOL/L — SIGNIFICANT CHANGE UP (ref 98–110)
CO2 SERPL-SCNC: 29 MMOL/L — SIGNIFICANT CHANGE UP (ref 17–32)
CREAT SERPL-MCNC: 0.6 MG/DL — LOW (ref 0.7–1.5)
EGFR: 113 ML/MIN/1.73M2 — SIGNIFICANT CHANGE UP
GLUCOSE SERPL-MCNC: 116 MG/DL — HIGH (ref 70–99)
HCT VFR BLD CALC: 31.3 % — LOW (ref 37–47)
HGB BLD-MCNC: 10.4 G/DL — LOW (ref 12–16)
MAGNESIUM SERPL-MCNC: 1.7 MG/DL — LOW (ref 1.8–2.4)
MCHC RBC-ENTMCNC: 31.2 PG — HIGH (ref 27–31)
MCHC RBC-ENTMCNC: 33.2 G/DL — SIGNIFICANT CHANGE UP (ref 32–37)
MCV RBC AUTO: 94 FL — SIGNIFICANT CHANGE UP (ref 81–99)
NRBC # BLD: 0 /100 WBCS — SIGNIFICANT CHANGE UP (ref 0–0)
PLATELET # BLD AUTO: 302 K/UL — SIGNIFICANT CHANGE UP (ref 130–400)
POTASSIUM SERPL-MCNC: 3.7 MMOL/L — SIGNIFICANT CHANGE UP (ref 3.5–5)
POTASSIUM SERPL-SCNC: 3.7 MMOL/L — SIGNIFICANT CHANGE UP (ref 3.5–5)
PROT SERPL-MCNC: 6.8 G/DL — SIGNIFICANT CHANGE UP (ref 6–8)
RBC # BLD: 3.33 M/UL — LOW (ref 4.2–5.4)
RBC # FLD: 13.2 % — SIGNIFICANT CHANGE UP (ref 11.5–14.5)
SODIUM SERPL-SCNC: 141 MMOL/L — SIGNIFICANT CHANGE UP (ref 135–146)
WBC # BLD: 3.36 K/UL — LOW (ref 4.8–10.8)
WBC # FLD AUTO: 3.36 K/UL — LOW (ref 4.8–10.8)

## 2022-08-31 PROCEDURE — 99233 SBSQ HOSP IP/OBS HIGH 50: CPT

## 2022-08-31 RX ORDER — MAGNESIUM OXIDE 400 MG ORAL TABLET 241.3 MG
400 TABLET ORAL
Refills: 0 | Status: DISCONTINUED | OUTPATIENT
Start: 2022-08-31 | End: 2022-09-01

## 2022-08-31 RX ORDER — CYCLOBENZAPRINE HYDROCHLORIDE 10 MG/1
5 TABLET, FILM COATED ORAL THREE TIMES A DAY
Refills: 0 | Status: DISCONTINUED | OUTPATIENT
Start: 2022-08-31 | End: 2022-09-01

## 2022-08-31 RX ORDER — HYDROMORPHONE HYDROCHLORIDE 2 MG/ML
6 INJECTION INTRAMUSCULAR; INTRAVENOUS; SUBCUTANEOUS EVERY 4 HOURS
Refills: 0 | Status: DISCONTINUED | OUTPATIENT
Start: 2022-08-31 | End: 2022-08-31

## 2022-08-31 RX ORDER — FOLIC ACID 0.8 MG
1 TABLET ORAL
Qty: 30 | Refills: 0
Start: 2022-08-31 | End: 2022-09-29

## 2022-08-31 RX ORDER — HYDROMORPHONE HYDROCHLORIDE 2 MG/ML
2 INJECTION INTRAMUSCULAR; INTRAVENOUS; SUBCUTANEOUS EVERY 4 HOURS
Refills: 0 | Status: DISCONTINUED | OUTPATIENT
Start: 2022-08-31 | End: 2022-09-01

## 2022-08-31 RX ORDER — HYDROMORPHONE HYDROCHLORIDE 2 MG/ML
2 INJECTION INTRAMUSCULAR; INTRAVENOUS; SUBCUTANEOUS EVERY 4 HOURS
Refills: 0 | Status: DISCONTINUED | OUTPATIENT
Start: 2022-08-31 | End: 2022-08-31

## 2022-08-31 RX ADMIN — HYDROMORPHONE HYDROCHLORIDE 1 MILLIGRAM(S): 2 INJECTION INTRAMUSCULAR; INTRAVENOUS; SUBCUTANEOUS at 11:03

## 2022-08-31 RX ADMIN — GABAPENTIN 300 MILLIGRAM(S): 400 CAPSULE ORAL at 06:24

## 2022-08-31 RX ADMIN — Medication 5 MILLIGRAM(S): at 17:11

## 2022-08-31 RX ADMIN — POLYETHYLENE GLYCOL 3350 17 GRAM(S): 17 POWDER, FOR SOLUTION ORAL at 17:11

## 2022-08-31 RX ADMIN — GABAPENTIN 300 MILLIGRAM(S): 400 CAPSULE ORAL at 21:52

## 2022-08-31 RX ADMIN — HYDROMORPHONE HYDROCHLORIDE 2 MILLIGRAM(S): 2 INJECTION INTRAMUSCULAR; INTRAVENOUS; SUBCUTANEOUS at 17:11

## 2022-08-31 RX ADMIN — SODIUM CHLORIDE 75 MILLILITER(S): 9 INJECTION, SOLUTION INTRAVENOUS at 17:12

## 2022-08-31 RX ADMIN — Medication 3 CAPSULE(S): at 17:11

## 2022-08-31 RX ADMIN — POLYETHYLENE GLYCOL 3350 17 GRAM(S): 17 POWDER, FOR SOLUTION ORAL at 06:25

## 2022-08-31 RX ADMIN — HYDROMORPHONE HYDROCHLORIDE 1 MILLIGRAM(S): 2 INJECTION INTRAMUSCULAR; INTRAVENOUS; SUBCUTANEOUS at 01:55

## 2022-08-31 RX ADMIN — Medication 3 CAPSULE(S): at 12:10

## 2022-08-31 RX ADMIN — PANTOPRAZOLE SODIUM 40 MILLIGRAM(S): 20 TABLET, DELAYED RELEASE ORAL at 12:10

## 2022-08-31 RX ADMIN — MAGNESIUM OXIDE 400 MG ORAL TABLET 400 MILLIGRAM(S): 241.3 TABLET ORAL at 17:11

## 2022-08-31 RX ADMIN — HYDROMORPHONE HYDROCHLORIDE 2 MILLIGRAM(S): 2 INJECTION INTRAMUSCULAR; INTRAVENOUS; SUBCUTANEOUS at 18:09

## 2022-08-31 RX ADMIN — HYDROMORPHONE HYDROCHLORIDE 2 MILLIGRAM(S): 2 INJECTION INTRAMUSCULAR; INTRAVENOUS; SUBCUTANEOUS at 21:52

## 2022-08-31 RX ADMIN — SODIUM CHLORIDE 200 MILLILITER(S): 9 INJECTION, SOLUTION INTRAVENOUS at 12:09

## 2022-08-31 RX ADMIN — Medication 3 CAPSULE(S): at 08:44

## 2022-08-31 RX ADMIN — ENOXAPARIN SODIUM 40 MILLIGRAM(S): 100 INJECTION SUBCUTANEOUS at 18:02

## 2022-08-31 RX ADMIN — Medication 1 TABLET(S): at 12:10

## 2022-08-31 RX ADMIN — HYDROMORPHONE HYDROCHLORIDE 1 MILLIGRAM(S): 2 INJECTION INTRAMUSCULAR; INTRAVENOUS; SUBCUTANEOUS at 06:24

## 2022-08-31 RX ADMIN — Medication 5 MILLIGRAM(S): at 06:24

## 2022-08-31 RX ADMIN — GABAPENTIN 300 MILLIGRAM(S): 400 CAPSULE ORAL at 13:22

## 2022-08-31 RX ADMIN — ONDANSETRON 8 MILLIGRAM(S): 8 TABLET, FILM COATED ORAL at 10:33

## 2022-08-31 RX ADMIN — HYDROMORPHONE HYDROCHLORIDE 1 MILLIGRAM(S): 2 INJECTION INTRAMUSCULAR; INTRAVENOUS; SUBCUTANEOUS at 10:33
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
## 2022-08-31 NOTE — DISCHARGE NOTE PROVIDER - CARE PROVIDERS DIRECT ADDRESSES
[email or web address]

## 2022-08-31 NOTE — PROGRESS NOTE ADULT - ASSESSMENT
48 yo F patient with a PMH of chronic pancreatitis probably alcoholic- induced, and recurrent episodes of acute pancreatitis, presents to the ED for abdominal pain.    #Acute on chronic pancreatitis  pain control - pain management recs appreciated  appreciate gi follow up     # ETOH abuse    # Elevated anion gap likely alcoholic ketoacidosis   resolved    # Transaminitis likely ETOH induced   improving     # Hepatic steatosis    # Hypomagnesemia persistent , repeat     #Suspected folic acid deficiency   on supplementation     #Suspected thiamine deficiency   on supplementation     #Hypokalemia   resolved     #Anemia   no indication for transfusion     PROGRESS NOTE HANDOFF  Pending: advance diet , monitor for pain   Family discussion: patient verbalized understanding and agreeable to plan of care   Disposition: Home

## 2022-08-31 NOTE — DISCHARGE NOTE PROVIDER - NSDCCPCAREPLAN_GEN_ALL_CORE_FT
PRINCIPAL DISCHARGE DIAGNOSIS  Diagnosis: Acute on chronic pancreatitis  Assessment and Plan of Treatment: You have pancreatitis likely due to alcohol use. Please refrain from drinking alcohol as it will make you come back to the hospital for pancreatitis. Please follow up with advanced GI (Dr Elizondo/ or dr. Rogelio jackson) in two weeks after discharge for EUS  [phone] [address] [place] [address]
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
## 2022-08-31 NOTE — DISCHARGE NOTE PROVIDER - NSDCMRMEDTOKEN_GEN_ALL_CORE_FT
cholecalciferol oral tablet: 2000 unit(s) orally once a day  Creon 12,000 units oral delayed release capsule: 3 cap(s) orally 3 times a day (with meals)  folic acid 1 mg oral tablet: 1 tab(s) orally once a day  gabapentin 300 mg oral capsule: 1 cap(s) orally 3 times a day  lactobacillus acidophilus oral capsule: 1 cap(s) orally 2 times a day   Multiple Vitamins with Minerals oral tablet: 1 tab(s) orally once a day  oxyCODONE 10 mg oral tablet: 1 tab(s) orally every 6 hours  polyethylene glycol 3350 oral powder for reconstitution: 17 gram(s) orally once a day, As Needed for for constipation  Protonix 40 mg oral delayed release tablet: 1 tab(s) orally once a day   senna oral tablet: 2 tab(s) orally once a day (at bedtime), As Needed for constipation   cholecalciferol oral tablet: 2000 unit(s) orally once a day  Creon 12,000 units oral delayed release capsule: 3 cap(s) orally 3 times a day (with meals)  cyclobenzaprine 5 mg oral tablet: 1 tab(s) orally 3 times a day, As needed, Muscle Spasm  folic acid 1 mg oral tablet: 1 tab(s) orally once a day  gabapentin 300 mg oral capsule: 1 cap(s) orally 3 times a day  lactobacillus acidophilus oral capsule: 1 cap(s) orally 2 times a day   magnesium oxide 400 mg oral tablet: 1 tab(s) orally 2 times a day (with meals)  Multiple Vitamins with Minerals oral tablet: 1 tab(s) orally once a day  polyethylene glycol 3350 oral powder for reconstitution: 17 gram(s) orally once a day, As Needed for for constipation  Protonix 40 mg oral delayed release tablet: 1 tab(s) orally once a day   senna oral tablet: 2 tab(s) orally once a day (at bedtime), As Needed for constipation   cholecalciferol oral tablet: 2000 unit(s) orally once a day  Creon 12,000 units oral delayed release capsule: 3 cap(s) orally 3 times a day (with meals)  cyclobenzaprine 5 mg oral tablet: 1 tab(s) orally 3 times a day, As needed, Muscle Spasm  Dilaudid 2 mg oral tablet: 3 tab(s) orally every 6 hours, As Needed -for severe pain MDD:Dosage 6mg: no more than 4 times a day   folic acid 1 mg oral tablet: 1 tab(s) orally once a day  gabapentin 300 mg oral capsule: 1 cap(s) orally 3 times a day  lactobacillus acidophilus oral capsule: 1 cap(s) orally 2 times a day   magnesium oxide 400 mg oral tablet: 1 tab(s) orally 2 times a day (with meals)  Multiple Vitamins with Minerals oral tablet: 1 tab(s) orally once a day  polyethylene glycol 3350 oral powder for reconstitution: 17 gram(s) orally once a day, As Needed for for constipation  Protonix 40 mg oral delayed release tablet: 1 tab(s) orally once a day   senna oral tablet: 2 tab(s) orally once a day (at bedtime), As Needed for constipation

## 2022-08-31 NOTE — DISCHARGE NOTE PROVIDER - CARE PROVIDER_API CALL
Petey Elizondo)  Gastroenterology; Internal Medicine  [address] [place] [address]  Phone: [phone]  Fax: [phone]  Follow Up Time: 1 week   Petey Elizondo (MD)  Gastroenterology; Internal Medicine  [address] [place] [address]  Phone: [phone]  Fax: [phone]  Follow Up Time: 2 weeks    Shola Gonzalez; MPH)  Anesthesiology; Pain Medicine  [address] [place] [address]  Phone: [phone]  Fax: [phone]  Follow Up Time: 1 week

## 2022-08-31 NOTE — CONSULT NOTE ADULT - ASSESSMENT
Patient is a 48 y/o woman with history of chronic pancreatitis and anxiety who was admitted on 8/24/2022 for a 1-2 day history of acute abdominal pain, nausea, and vomiting.
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
## 2022-08-31 NOTE — DISCHARGE NOTE PROVIDER - HOSPITAL COURSE
48 yo F patient with a PMH of chronic pancreatitis probably alcoholic- induced, and recurrent episodes of acute pancreatitis, presents to the ED for abdominal pain.  Patient started to have abdominal pain the day before the presentation, diffuse but mainly located in the epigastrium, radiating to the back, with associated nausea and clear, non-bloody emesis. Patient has been having consequently decrease in the appetite, without any fevers/ chills, change in bowel/ urinary habits, chest pain, shortness of breath, cough, myalgias/ arthralgias.    To note that the patient reports having abstained from alcohol use for several weeks, before having 3-4 glasses of alcohol/ day on the weekend.    Her previous episodes of acute pancreatitis chronic pancreatitis were complicated by necrosis and formation of a pseudocyst; no drainage was done inside [facility], but patient says that she underwent drainage in another hospital that was complicated by sepsis and she had to be discharged from the other hospital several weeks after with antibiotics.    Patient also ran out of medications, and she has not been taking any for several months.    In the ED, the patient was HD stable, tachycardic= 116, afebrile.  Labs showed WBC= 7.55, Hb= 12.9, plt= 126, K= 3.3, AST= 120, ALT= 124, lipase= 733.  CT scan of the abdomen shows diffusely enlarged and edematous pancreas, with diffuse stranding, no evidence of pancreatic necrosis, hepatic steatosis, and small volume ascites. The previously described pseudocyst resolved on this imaging study.    Patient is going to be admitted for management of acute pancreatitis.    Hospital course:   Patient managed for acute on chronic pancreatitis secondary to ETOH use.   Course complicated by persistent pain.   Repeat CT a/p IV con showed New 1.5 cm collection either adjacent to a small bowel loop or within the wall of small bowel loop just inferior to the pancreas.  GI recalled and said outpatient follow up for EUS.   Pain management was on board as well.

## 2022-08-31 NOTE — PROGRESS NOTE ADULT - ASSESSMENT
46 yo F patient with a PMH of chronic pancreatitis probably alcoholic- induced, and recurrent episodes of acute pancreatitis, presents to the ED for abdominal pain.    # Acute on chronic pancreatitis  # ETOH abuse  # Hypokalemia, Hypomagnesemia - resolved   - improved abdominal pain, tolerating regular diet   - CT on admission showed acute pancreatitis   - US RUQ showed pancreatic calcifications only   - repeated CT abdomen showed: New 1.5 cm collection either adjacent to a small bowel loop or within the wall of small bowel loop just inferior to the pancreas. These inflammatory changes from the acute pancreatitis to the small bowel loop place the patient at risk for future fistula formation.    - GI on board: Pain control, advance diet as tolerated, follow up with advanced GI after 2 weeks of discharge   - continue with LR 200cc/hr   - pain team consulted: Hydromorphone 2mg IV as inpatient, discharge on 6mg PO for 3-5 days. continue with gabapentin, can add cyclobenzaprine  - on lorazepam per CIWA protocol   - continue with ondansetron as PRN, pancrelipase   - continue multivitamins, folic acid   - for discharge tomorrow         # Transaminitis likely ETOH induced, improving   # Hepatic steatosis  - elevated LFT's   - CT abdomen showed diffuse hepatic steatosis   - monitor LFT       DVT prophylaxis: enoxaparin 40mg daily  GI prophylaxis: pantoprazole 40mg daily   Diet: DASH  Full code

## 2022-08-31 NOTE — DISCHARGE NOTE PROVIDER - PROVIDER TOKENS
PROVIDER:[TOKEN:[[id number]],FOLLOWUP:[1 week]] PROVIDER:[TOKEN:[[id number]],FOLLOWUP:[2 weeks]],PROVIDER:[TOKEN:[[id number]],FOLLOWUP:[1 week]]

## 2022-08-31 NOTE — CONSULT NOTE ADULT - PROBLEM SELECTOR RECOMMENDATION 9
No history of chronic opioid use. Moderate risk for opioid abuse per ORT.  1) May continue hydromorphone IV 2mg Q4h prn; may decrease or transition to hydromorphone PO 6mg Q4h prn when patient is fully tolerating PO intake. Would not discharge with greater than 3-5 day supply of opioids.  2) Continue gabapentin 300mg TID  3) May start cyclobenzaprine 5mg TID prn  4) Use caution with co-administration of opioids and benzodiazepines 2/2 risk of respiratory depression  5) Continue dulcolax, miralax

## 2022-09-01 ENCOUNTER — TRANSCRIPTION ENCOUNTER (OUTPATIENT)
Age: 47
End: 2022-09-01

## 2022-09-01 VITALS
RESPIRATION RATE: 18 BRPM | DIASTOLIC BLOOD PRESSURE: 90 MMHG | SYSTOLIC BLOOD PRESSURE: 141 MMHG | TEMPERATURE: 97 F | HEART RATE: 87 BPM

## 2022-09-01 LAB
ALBUMIN SERPL ELPH-MCNC: 3.6 G/DL — SIGNIFICANT CHANGE UP (ref 3.5–5.2)
ALP SERPL-CCNC: 156 U/L — HIGH (ref 30–115)
ALT FLD-CCNC: 48 U/L — HIGH (ref 0–41)
ANION GAP SERPL CALC-SCNC: 9 MMOL/L — SIGNIFICANT CHANGE UP (ref 7–14)
AST SERPL-CCNC: 101 U/L — HIGH (ref 0–41)
BILIRUB SERPL-MCNC: 0.2 MG/DL — SIGNIFICANT CHANGE UP (ref 0.2–1.2)
BUN SERPL-MCNC: 7 MG/DL — LOW (ref 10–20)
CALCIUM SERPL-MCNC: 9.2 MG/DL — SIGNIFICANT CHANGE UP (ref 8.5–10.1)
CHLORIDE SERPL-SCNC: 100 MMOL/L — SIGNIFICANT CHANGE UP (ref 98–110)
CO2 SERPL-SCNC: 30 MMOL/L — SIGNIFICANT CHANGE UP (ref 17–32)
CREAT SERPL-MCNC: 0.6 MG/DL — LOW (ref 0.7–1.5)
EGFR: 113 ML/MIN/1.73M2 — SIGNIFICANT CHANGE UP
GLUCOSE SERPL-MCNC: 96 MG/DL — SIGNIFICANT CHANGE UP (ref 70–99)
HCT VFR BLD CALC: 28.8 % — LOW (ref 37–47)
HGB BLD-MCNC: 10 G/DL — LOW (ref 12–16)
MAGNESIUM SERPL-MCNC: 1.6 MG/DL — LOW (ref 1.8–2.4)
MCHC RBC-ENTMCNC: 32.2 PG — HIGH (ref 27–31)
MCHC RBC-ENTMCNC: 34.7 G/DL — SIGNIFICANT CHANGE UP (ref 32–37)
MCV RBC AUTO: 92.6 FL — SIGNIFICANT CHANGE UP (ref 81–99)
NRBC # BLD: 0 /100 WBCS — SIGNIFICANT CHANGE UP (ref 0–0)
PLATELET # BLD AUTO: 333 K/UL — SIGNIFICANT CHANGE UP (ref 130–400)
POTASSIUM SERPL-MCNC: 3.8 MMOL/L — SIGNIFICANT CHANGE UP (ref 3.5–5)
POTASSIUM SERPL-SCNC: 3.8 MMOL/L — SIGNIFICANT CHANGE UP (ref 3.5–5)
PROT SERPL-MCNC: 6.4 G/DL — SIGNIFICANT CHANGE UP (ref 6–8)
RBC # BLD: 3.11 M/UL — LOW (ref 4.2–5.4)
RBC # FLD: 13.2 % — SIGNIFICANT CHANGE UP (ref 11.5–14.5)
SODIUM SERPL-SCNC: 139 MMOL/L — SIGNIFICANT CHANGE UP (ref 135–146)
WBC # BLD: 3.62 K/UL — LOW (ref 4.8–10.8)
WBC # FLD AUTO: 3.62 K/UL — LOW (ref 4.8–10.8)

## 2022-09-01 PROCEDURE — 99238 HOSP IP/OBS DSCHRG MGMT 30/<: CPT | Mod: GC

## 2022-09-01 RX ORDER — MAGNESIUM SULFATE 500 MG/ML
2 VIAL (ML) INJECTION
Refills: 0 | Status: DISCONTINUED | OUTPATIENT
Start: 2022-09-01 | End: 2022-09-01

## 2022-09-01 RX ORDER — CYCLOBENZAPRINE HYDROCHLORIDE 10 MG/1
1 TABLET, FILM COATED ORAL
Qty: 30 | Refills: 0
Start: 2022-09-01 | End: 2022-09-10

## 2022-09-01 RX ORDER — LIPASE/PROTEASE/AMYLASE 16-48-48K
3 CAPSULE,DELAYED RELEASE (ENTERIC COATED) ORAL
Qty: 270 | Refills: 2
Start: 2022-09-01 | End: 2022-11-29

## 2022-09-01 RX ORDER — HYDROMORPHONE HYDROCHLORIDE 2 MG/ML
3 INJECTION INTRAMUSCULAR; INTRAVENOUS; SUBCUTANEOUS
Qty: 36 | Refills: 0
Start: 2022-09-01 | End: 2022-09-03

## 2022-09-01 RX ORDER — GABAPENTIN 400 MG/1
1 CAPSULE ORAL
Qty: 90 | Refills: 1
Start: 2022-09-01 | End: 2022-10-30

## 2022-09-01 RX ORDER — MAGNESIUM OXIDE 400 MG ORAL TABLET 241.3 MG
1 TABLET ORAL
Qty: 8 | Refills: 0
Start: 2022-09-01 | End: 2022-09-04

## 2022-09-01 RX ADMIN — Medication 3 CAPSULE(S): at 08:00

## 2022-09-01 RX ADMIN — Medication 1 MILLIGRAM(S): at 12:11

## 2022-09-01 RX ADMIN — HYDROMORPHONE HYDROCHLORIDE 2 MILLIGRAM(S): 2 INJECTION INTRAMUSCULAR; INTRAVENOUS; SUBCUTANEOUS at 02:01

## 2022-09-01 RX ADMIN — PANTOPRAZOLE SODIUM 40 MILLIGRAM(S): 20 TABLET, DELAYED RELEASE ORAL at 12:12

## 2022-09-01 RX ADMIN — Medication 3 CAPSULE(S): at 12:11

## 2022-09-01 RX ADMIN — HYDROMORPHONE HYDROCHLORIDE 2 MILLIGRAM(S): 2 INJECTION INTRAMUSCULAR; INTRAVENOUS; SUBCUTANEOUS at 06:33

## 2022-09-01 RX ADMIN — Medication 1 TABLET(S): at 12:11

## 2022-09-01 RX ADMIN — Medication 5 MILLIGRAM(S): at 06:00

## 2022-09-01 RX ADMIN — SODIUM CHLORIDE 75 MILLILITER(S): 9 INJECTION, SOLUTION INTRAVENOUS at 06:01

## 2022-09-01 RX ADMIN — ONDANSETRON 8 MILLIGRAM(S): 8 TABLET, FILM COATED ORAL at 10:36

## 2022-09-01 RX ADMIN — MAGNESIUM OXIDE 400 MG ORAL TABLET 400 MILLIGRAM(S): 241.3 TABLET ORAL at 08:00

## 2022-09-01 RX ADMIN — GABAPENTIN 300 MILLIGRAM(S): 400 CAPSULE ORAL at 06:01

## 2022-09-01 RX ADMIN — GABAPENTIN 300 MILLIGRAM(S): 400 CAPSULE ORAL at 13:25

## 2022-09-01 RX ADMIN — Medication 25 GRAM(S): at 12:18

## 2022-09-01 RX ADMIN — POLYETHYLENE GLYCOL 3350 17 GRAM(S): 17 POWDER, FOR SOLUTION ORAL at 06:01

## 2022-09-01 RX ADMIN — HYDROMORPHONE HYDROCHLORIDE 2 MILLIGRAM(S): 2 INJECTION INTRAMUSCULAR; INTRAVENOUS; SUBCUTANEOUS at 10:29

## 2022-09-01 RX ADMIN — HYDROMORPHONE HYDROCHLORIDE 2 MILLIGRAM(S): 2 INJECTION INTRAMUSCULAR; INTRAVENOUS; SUBCUTANEOUS at 15:37
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
## 2022-09-01 NOTE — DISCHARGE NOTE NURSING/CASE MANAGEMENT/SOCIAL WORK - NSDCPEFALRISK_GEN_ALL_CORE
For information on Fall & Injury Prevention, visit: [email or web address] OR  [email or web address] OR  https://www.cdc.gov/steadi/patient.html

## 2022-09-01 NOTE — PROGRESS NOTE ADULT - ASSESSMENT
48 yo F patient with a PMH of chronic pancreatitis probably alcoholic- induced, and recurrent episodes of acute pancreatitis, presents to the ED for abdominal pain.    # Acute on chronic pancreatitis  # ETOH abuse  # Hypokalemia, Hypomagnesemia - resolved   - improved abdominal pain, tolerating regular diet   - CT on admission showed acute pancreatitis   - US RUQ showed pancreatic calcifications only   - repeated CT abdomen showed: New 1.5 cm collection either adjacent to a small bowel loop or within the wall of small bowel loop just inferior to the pancreas. These inflammatory changes from the acute pancreatitis to the small bowel loop place the patient at risk for future fistula formation.    - GI on board: Pain control, advance diet as tolerated, follow up with advanced GI after 2 weeks of discharge   - continue with LR 200cc/hr   - pain team consulted: Hydromorphone 2mg IV as inpatient, discharge on 6mg PO for 3-5 days. continue with gabapentin, can add cyclobenzaprine  - on lorazepam per CIWA protocol   - continue with ondansetron as PRN, pancrelipase   - continue multivitamins, folic acid   - to be followed by GI and pain team as outpatient         # Transaminitis likely ETOH induced, improving   # Hepatic steatosis  - elevated LFT's   - CT abdomen showed diffuse hepatic steatosis   - monitor LFT   - follow up with liver clinic as outpatient       DVT prophylaxis: enoxaparin 40mg daily  GI prophylaxis: pantoprazole 40mg daily   Diet: DASH  Full code

## 2022-09-01 NOTE — DISCHARGE NOTE NURSING/CASE MANAGEMENT/SOCIAL WORK - NSDCVIVACCINE_GEN_ALL_CORE_FT
influenza, injectable, quadrivalent, preservative free; 12-Oct-2019 14:23; Celsa Martins (RN); [employer]; [id number] (Exp. Date: 30-Jun-2020); IntraMuscular; Deltoid Left.; 0.5 milliLiter(s); VIS (VIS Published: 15-Aug-2019, VIS Presented: 12-Oct-2019);   influenza, injectable, quadrivalent, preservative free; 10-Sep-2020 12:19; Shani Fleming (RN); [employer]; [id number] (Exp. Date: 30-Jun-2021); IntraMuscular; Deltoid Left.; 0.5 milliLiter(s); VIS (VIS Published: 15-Aug-2019, VIS Presented: 10-Sep-2020);   influenza, injectable, quadrivalent, preservative free; 07-Oct-2021 16:12; Janessa Jaquez (RN); [employer]; [id number] (Exp. Date: 30-Jun-2022); IntraMuscular; Deltoid Left.; 0.5 milliLiter(s); VIS (VIS Published: 15-Aug-2019, VIS Presented: 07-Oct-2021);   Tdap; 01-Sep-2014 17:06; Cheryl Cotto (MELINDA); [employer]; [id number]; IntraMuscular; Deltoid Right.; 0.5 milliLiter(s);   Tdap; 01-Sep-2014 17:09; Cheryl Cotto (MELINDA); [employer]; [id number]; IntraMuscular; Deltoid Right.; 0.5 milliLiter(s);

## 2022-09-01 NOTE — PROGRESS NOTE ADULT - PROVIDER SPECIALTY LIST ADULT
Gastroenterology
Hospitalist
Hospitalist
Internal Medicine
Gastroenterology
Hospitalist
Hospitalist
Internal Medicine
Gastroenterology
Hospitalist
Internal Medicine
Hospitalist
Internal Medicine
Internal Medicine

## 2022-09-01 NOTE — DISCHARGE NOTE NURSING/CASE MANAGEMENT/SOCIAL WORK - PATIENT PORTAL LINK FT
You can access the FollowMyHealth Patient Portal offered by [facility] by registering at the following website: [email or web address]. By joining [facility]’s FollowMyHealth portal, you will also be able to view your health information using other applications (apps) compatible with our system.

## 2022-09-06 DIAGNOSIS — E83.42 HYPOMAGNESEMIA: ICD-10-CM

## 2022-09-06 DIAGNOSIS — K86.0 ALCOHOL-INDUCED CHRONIC PANCREATITIS: ICD-10-CM

## 2022-09-06 DIAGNOSIS — R74.01 ELEVATION OF LEVELS OF LIVER TRANSAMINASE LEVELS: ICD-10-CM

## 2022-09-06 DIAGNOSIS — D64.9 ANEMIA, UNSPECIFIED: ICD-10-CM

## 2022-09-06 DIAGNOSIS — F10.10 ALCOHOL ABUSE, UNCOMPLICATED: ICD-10-CM

## 2022-09-06 DIAGNOSIS — K70.10 ALCOHOLIC HEPATITIS WITHOUT ASCITES: ICD-10-CM

## 2022-09-06 DIAGNOSIS — K76.0 FATTY (CHANGE OF) LIVER, NOT ELSEWHERE CLASSIFIED: ICD-10-CM

## 2022-09-06 DIAGNOSIS — K85.20 ALCOHOL INDUCED ACUTE PANCREATITIS WITHOUT NECROSIS OR INFECTION: ICD-10-CM

## 2022-09-06 DIAGNOSIS — E87.6 HYPOKALEMIA: ICD-10-CM

## 2022-09-06 DIAGNOSIS — E51.9 THIAMINE DEFICIENCY, UNSPECIFIED: ICD-10-CM

## 2022-09-06 DIAGNOSIS — K85.21 ALCOHOL INDUCED ACUTE PANCREATITIS WITH UNINFECTED NECROSIS: ICD-10-CM

## 2022-09-06 DIAGNOSIS — E53.8 DEFICIENCY OF OTHER SPECIFIED B GROUP VITAMINS: ICD-10-CM

## 2022-09-06 DIAGNOSIS — E87.2 ACIDOSIS: ICD-10-CM

## 2022-12-23 NOTE — PATIENT PROFILE ADULT - DOES PATIENT HAVE ADVANCE DIRECTIVE
Covering for Dr. Chowdhury today.    RPR titer has decreased 4 fold so this indicates adequate response to treatment.   no

## 2023-02-15 ENCOUNTER — INPATIENT (INPATIENT)
Facility: HOSPITAL | Age: 48
LOS: 5 days | Discharge: HOME | DRG: 282 | End: 2023-02-21
Attending: INTERNAL MEDICINE | Admitting: INTERNAL MEDICINE
Payer: MEDICAID

## 2023-02-15 VITALS
HEART RATE: 130 BPM | OXYGEN SATURATION: 100 % | TEMPERATURE: 97 F | SYSTOLIC BLOOD PRESSURE: 134 MMHG | WEIGHT: 149.91 LBS | RESPIRATION RATE: 20 BRPM | DIASTOLIC BLOOD PRESSURE: 76 MMHG

## 2023-02-15 DIAGNOSIS — K85.90 ACUTE PANCREATITIS WITHOUT NECROSIS OR INFECTION, UNSPECIFIED: ICD-10-CM

## 2023-02-15 DIAGNOSIS — Z87.2 PERSONAL HISTORY OF DISEASES OF THE SKIN AND SUBCUTANEOUS TISSUE: Chronic | ICD-10-CM

## 2023-02-15 DIAGNOSIS — Z98.890 OTHER SPECIFIED POSTPROCEDURAL STATES: Chronic | ICD-10-CM

## 2023-02-15 LAB
ALBUMIN SERPL ELPH-MCNC: 4.8 G/DL — SIGNIFICANT CHANGE UP (ref 3.5–5.2)
ALP SERPL-CCNC: 107 U/L — SIGNIFICANT CHANGE UP (ref 30–115)
ALT FLD-CCNC: 67 U/L — HIGH (ref 0–41)
ANION GAP SERPL CALC-SCNC: 16 MMOL/L — HIGH (ref 7–14)
AST SERPL-CCNC: 264 U/L — HIGH (ref 0–41)
BASOPHILS # BLD AUTO: 0 K/UL — SIGNIFICANT CHANGE UP (ref 0–0.2)
BASOPHILS NFR BLD AUTO: 0 % — SIGNIFICANT CHANGE UP (ref 0–1)
BILIRUB SERPL-MCNC: 0.7 MG/DL — SIGNIFICANT CHANGE UP (ref 0.2–1.2)
BUN SERPL-MCNC: 10 MG/DL — SIGNIFICANT CHANGE UP (ref 10–20)
CALCIUM SERPL-MCNC: 8.9 MG/DL — SIGNIFICANT CHANGE UP (ref 8.4–10.5)
CHLORIDE SERPL-SCNC: 98 MMOL/L — SIGNIFICANT CHANGE UP (ref 98–110)
CO2 SERPL-SCNC: 19 MMOL/L — SIGNIFICANT CHANGE UP (ref 17–32)
CREAT SERPL-MCNC: 0.7 MG/DL — SIGNIFICANT CHANGE UP (ref 0.7–1.5)
EGFR: 109 ML/MIN/1.73M2 — SIGNIFICANT CHANGE UP
EOSINOPHIL # BLD AUTO: 0 K/UL — SIGNIFICANT CHANGE UP (ref 0–0.7)
EOSINOPHIL NFR BLD AUTO: 0 % — SIGNIFICANT CHANGE UP (ref 0–8)
GLUCOSE SERPL-MCNC: 140 MG/DL — HIGH (ref 70–99)
HCG SERPL QL: NEGATIVE — SIGNIFICANT CHANGE UP
HCT VFR BLD CALC: 34.7 % — LOW (ref 37–47)
HGB BLD-MCNC: 12.5 G/DL — SIGNIFICANT CHANGE UP (ref 12–16)
LACTATE SERPL-SCNC: 2.4 MMOL/L — HIGH (ref 0.7–2)
LIDOCAIN IGE QN: 30 U/L — SIGNIFICANT CHANGE UP (ref 7–60)
LYMPHOCYTES # BLD AUTO: 0.12 K/UL — LOW (ref 1.2–3.4)
LYMPHOCYTES # BLD AUTO: 2.6 % — LOW (ref 20.5–51.1)
MANUAL SMEAR VERIFICATION: SIGNIFICANT CHANGE UP
MCHC RBC-ENTMCNC: 33.8 PG — HIGH (ref 27–31)
MCHC RBC-ENTMCNC: 36 G/DL — SIGNIFICANT CHANGE UP (ref 32–37)
MCV RBC AUTO: 93.8 FL — SIGNIFICANT CHANGE UP (ref 81–99)
MONOCYTES # BLD AUTO: 0.08 K/UL — LOW (ref 0.1–0.6)
MONOCYTES NFR BLD AUTO: 1.7 % — SIGNIFICANT CHANGE UP (ref 1.7–9.3)
NEUTROPHILS # BLD AUTO: 4.43 K/UL — SIGNIFICANT CHANGE UP (ref 1.4–6.5)
NEUTROPHILS NFR BLD AUTO: 93.9 % — HIGH (ref 42.2–75.2)
NEUTS BAND # BLD: 1.8 % — SIGNIFICANT CHANGE UP (ref 0–6)
PLAT MORPH BLD: NORMAL — SIGNIFICANT CHANGE UP
PLATELET # BLD AUTO: 128 K/UL — LOW (ref 130–400)
POIKILOCYTOSIS BLD QL AUTO: SLIGHT — SIGNIFICANT CHANGE UP
POLYCHROMASIA BLD QL SMEAR: SLIGHT — SIGNIFICANT CHANGE UP
POTASSIUM SERPL-MCNC: 5.3 MMOL/L — HIGH (ref 3.5–5)
POTASSIUM SERPL-SCNC: 5.3 MMOL/L — HIGH (ref 3.5–5)
PROT SERPL-MCNC: 7.8 G/DL — SIGNIFICANT CHANGE UP (ref 6–8)
RBC # BLD: 3.7 M/UL — LOW (ref 4.2–5.4)
RBC # FLD: 11.6 % — SIGNIFICANT CHANGE UP (ref 11.5–14.5)
RBC BLD AUTO: ABNORMAL
SARS-COV-2 RNA SPEC QL NAA+PROBE: SIGNIFICANT CHANGE UP
SODIUM SERPL-SCNC: 133 MMOL/L — LOW (ref 135–146)
STOMATOCYTES BLD QL SMEAR: SLIGHT — SIGNIFICANT CHANGE UP
WBC # BLD: 4.63 K/UL — LOW (ref 4.8–10.8)
WBC # FLD AUTO: 4.63 K/UL — LOW (ref 4.8–10.8)

## 2023-02-15 PROCEDURE — 82150 ASSAY OF AMYLASE: CPT

## 2023-02-15 PROCEDURE — U0003: CPT

## 2023-02-15 PROCEDURE — U0005: CPT

## 2023-02-15 PROCEDURE — 71045 X-RAY EXAM CHEST 1 VIEW: CPT | Mod: 26

## 2023-02-15 PROCEDURE — 83605 ASSAY OF LACTIC ACID: CPT

## 2023-02-15 PROCEDURE — 76705 ECHO EXAM OF ABDOMEN: CPT

## 2023-02-15 PROCEDURE — 86900 BLOOD TYPING SEROLOGIC ABO: CPT

## 2023-02-15 PROCEDURE — 99223 1ST HOSP IP/OBS HIGH 75: CPT

## 2023-02-15 PROCEDURE — 86901 BLOOD TYPING SEROLOGIC RH(D): CPT

## 2023-02-15 PROCEDURE — C9113: CPT

## 2023-02-15 PROCEDURE — 85730 THROMBOPLASTIN TIME PARTIAL: CPT

## 2023-02-15 PROCEDURE — 86850 RBC ANTIBODY SCREEN: CPT

## 2023-02-15 PROCEDURE — 83735 ASSAY OF MAGNESIUM: CPT

## 2023-02-15 PROCEDURE — 80061 LIPID PANEL: CPT

## 2023-02-15 PROCEDURE — 80048 BASIC METABOLIC PNL TOTAL CA: CPT

## 2023-02-15 PROCEDURE — 85610 PROTHROMBIN TIME: CPT

## 2023-02-15 PROCEDURE — 80053 COMPREHEN METABOLIC PANEL: CPT

## 2023-02-15 PROCEDURE — 74177 CT ABD & PELVIS W/CONTRAST: CPT | Mod: 26,MA

## 2023-02-15 PROCEDURE — 84100 ASSAY OF PHOSPHORUS: CPT

## 2023-02-15 PROCEDURE — 76705 ECHO EXAM OF ABDOMEN: CPT | Mod: 26

## 2023-02-15 PROCEDURE — 36415 COLL VENOUS BLD VENIPUNCTURE: CPT

## 2023-02-15 PROCEDURE — 85025 COMPLETE CBC W/AUTO DIFF WBC: CPT

## 2023-02-15 RX ORDER — THIAMINE MONONITRATE (VIT B1) 100 MG
100 TABLET ORAL DAILY
Refills: 0 | Status: DISCONTINUED | OUTPATIENT
Start: 2023-02-15 | End: 2023-02-21

## 2023-02-15 RX ORDER — FOLIC ACID 0.8 MG
1 TABLET ORAL DAILY
Refills: 0 | Status: DISCONTINUED | OUTPATIENT
Start: 2023-02-15 | End: 2023-02-21

## 2023-02-15 RX ORDER — PANTOPRAZOLE SODIUM 20 MG/1
40 TABLET, DELAYED RELEASE ORAL
Refills: 0 | Status: DISCONTINUED | OUTPATIENT
Start: 2023-02-15 | End: 2023-02-20

## 2023-02-15 RX ORDER — MORPHINE SULFATE 50 MG/1
6 CAPSULE, EXTENDED RELEASE ORAL ONCE
Refills: 0 | Status: DISCONTINUED | OUTPATIENT
Start: 2023-02-15 | End: 2023-02-15

## 2023-02-15 RX ORDER — ONDANSETRON 8 MG/1
4 TABLET, FILM COATED ORAL ONCE
Refills: 0 | Status: COMPLETED | OUTPATIENT
Start: 2023-02-15 | End: 2023-02-15

## 2023-02-15 RX ORDER — SODIUM CHLORIDE 9 MG/ML
2000 INJECTION, SOLUTION INTRAVENOUS ONCE
Refills: 0 | Status: COMPLETED | OUTPATIENT
Start: 2023-02-15 | End: 2023-02-15

## 2023-02-15 RX ORDER — LIPASE/PROTEASE/AMYLASE 16-48-48K
3 CAPSULE,DELAYED RELEASE (ENTERIC COATED) ORAL
Refills: 0 | Status: DISCONTINUED | OUTPATIENT
Start: 2023-02-15 | End: 2023-02-21

## 2023-02-15 RX ORDER — FAMOTIDINE 10 MG/ML
20 INJECTION INTRAVENOUS ONCE
Refills: 0 | Status: COMPLETED | OUTPATIENT
Start: 2023-02-15 | End: 2023-02-15

## 2023-02-15 RX ORDER — HYDROMORPHONE HYDROCHLORIDE 2 MG/ML
0.5 INJECTION INTRAMUSCULAR; INTRAVENOUS; SUBCUTANEOUS EVERY 4 HOURS
Refills: 0 | Status: DISCONTINUED | OUTPATIENT
Start: 2023-02-15 | End: 2023-02-16

## 2023-02-15 RX ORDER — SODIUM CHLORIDE 9 MG/ML
1000 INJECTION, SOLUTION INTRAVENOUS
Refills: 0 | Status: DISCONTINUED | OUTPATIENT
Start: 2023-02-15 | End: 2023-02-17

## 2023-02-15 RX ORDER — ONDANSETRON 8 MG/1
4 TABLET, FILM COATED ORAL EVERY 4 HOURS
Refills: 0 | Status: DISCONTINUED | OUTPATIENT
Start: 2023-02-15 | End: 2023-02-21

## 2023-02-15 RX ORDER — MORPHINE SULFATE 50 MG/1
4 CAPSULE, EXTENDED RELEASE ORAL ONCE
Refills: 0 | Status: DISCONTINUED | OUTPATIENT
Start: 2023-02-15 | End: 2023-02-15

## 2023-02-15 RX ORDER — KETOROLAC TROMETHAMINE 30 MG/ML
15 SYRINGE (ML) INJECTION EVERY 8 HOURS
Refills: 0 | Status: DISCONTINUED | OUTPATIENT
Start: 2023-02-15 | End: 2023-02-17

## 2023-02-15 RX ORDER — ONDANSETRON 8 MG/1
4 TABLET, FILM COATED ORAL EVERY 8 HOURS
Refills: 0 | Status: DISCONTINUED | OUTPATIENT
Start: 2023-02-15 | End: 2023-02-15

## 2023-02-15 RX ORDER — MORPHINE SULFATE 50 MG/1
4 CAPSULE, EXTENDED RELEASE ORAL EVERY 4 HOURS
Refills: 0 | Status: DISCONTINUED | OUTPATIENT
Start: 2023-02-15 | End: 2023-02-15

## 2023-02-15 RX ORDER — ACETAMINOPHEN 500 MG
650 TABLET ORAL EVERY 6 HOURS
Refills: 0 | Status: DISCONTINUED | OUTPATIENT
Start: 2023-02-15 | End: 2023-02-21

## 2023-02-15 RX ORDER — LANOLIN ALCOHOL/MO/W.PET/CERES
3 CREAM (GRAM) TOPICAL AT BEDTIME
Refills: 0 | Status: DISCONTINUED | OUTPATIENT
Start: 2023-02-15 | End: 2023-02-21

## 2023-02-15 RX ORDER — ENOXAPARIN SODIUM 100 MG/ML
40 INJECTION SUBCUTANEOUS EVERY 24 HOURS
Refills: 0 | Status: DISCONTINUED | OUTPATIENT
Start: 2023-02-15 | End: 2023-02-21

## 2023-02-15 RX ORDER — INFLUENZA VIRUS VACCINE 15; 15; 15; 15 UG/.5ML; UG/.5ML; UG/.5ML; UG/.5ML
0.5 SUSPENSION INTRAMUSCULAR ONCE
Refills: 0 | Status: DISCONTINUED | OUTPATIENT
Start: 2023-02-15 | End: 2023-02-21

## 2023-02-15 RX ADMIN — MORPHINE SULFATE 6 MILLIGRAM(S): 50 CAPSULE, EXTENDED RELEASE ORAL at 12:11

## 2023-02-15 RX ADMIN — MORPHINE SULFATE 4 MILLIGRAM(S): 50 CAPSULE, EXTENDED RELEASE ORAL at 22:06

## 2023-02-15 RX ADMIN — Medication 100 MILLIGRAM(S): at 17:57

## 2023-02-15 RX ADMIN — MORPHINE SULFATE 6 MILLIGRAM(S): 50 CAPSULE, EXTENDED RELEASE ORAL at 14:15

## 2023-02-15 RX ADMIN — MORPHINE SULFATE 6 MILLIGRAM(S): 50 CAPSULE, EXTENDED RELEASE ORAL at 18:29

## 2023-02-15 RX ADMIN — FAMOTIDINE 20 MILLIGRAM(S): 10 INJECTION INTRAVENOUS at 11:49

## 2023-02-15 RX ADMIN — MORPHINE SULFATE 4 MILLIGRAM(S): 50 CAPSULE, EXTENDED RELEASE ORAL at 21:13

## 2023-02-15 RX ADMIN — HYDROMORPHONE HYDROCHLORIDE 0.5 MILLIGRAM(S): 2 INJECTION INTRAMUSCULAR; INTRAVENOUS; SUBCUTANEOUS at 23:50

## 2023-02-15 RX ADMIN — ONDANSETRON 4 MILLIGRAM(S): 8 TABLET, FILM COATED ORAL at 12:01

## 2023-02-15 RX ADMIN — MORPHINE SULFATE 4 MILLIGRAM(S): 50 CAPSULE, EXTENDED RELEASE ORAL at 16:00

## 2023-02-15 RX ADMIN — ONDANSETRON 4 MILLIGRAM(S): 8 TABLET, FILM COATED ORAL at 17:58

## 2023-02-15 RX ADMIN — SODIUM CHLORIDE 150 MILLILITER(S): 9 INJECTION, SOLUTION INTRAVENOUS at 17:57

## 2023-02-15 RX ADMIN — MORPHINE SULFATE 6 MILLIGRAM(S): 50 CAPSULE, EXTENDED RELEASE ORAL at 12:47

## 2023-02-15 RX ADMIN — MORPHINE SULFATE 4 MILLIGRAM(S): 50 CAPSULE, EXTENDED RELEASE ORAL at 18:29

## 2023-02-15 RX ADMIN — SODIUM CHLORIDE 2000 MILLILITER(S): 9 INJECTION, SOLUTION INTRAVENOUS at 12:47

## 2023-02-15 RX ADMIN — SODIUM CHLORIDE 2000 MILLILITER(S): 9 INJECTION, SOLUTION INTRAVENOUS at 12:01

## 2023-02-15 NOTE — H&P ADULT - HISTORY OF PRESENT ILLNESS
47-year-old female history of alcohol related chronic pancreatitis chronic last admission here 6 months ago.  Had a surgery for pancreatic cyst at [facility].  Now presents with 2 complaints.  1 ) epigastric abdominal pain and vomiting and retching since last night.  Nonbilious nonbloody.  No diarrhea.  No chills.  Pain and vomiting consistent with her previous episodes of chronic pancreatitis.  2) cough for 2 months after exposure to mold.  But no shortness of breath.  No fever 47-year-old female history of alcohol related chronic pancreatitis chronic last admission here 6 months ago.  Had a surgery for pancreatic cyst at [facility].  Now presents with 2 complaints.  1 ) epigastric abdominal pain and vomiting and retching since last night.  Nonbilious nonbloody.  No diarrhea.  No chills.  Pain and vomiting consistent with her previous episodes of chronic pancreatitis.  2) cough for 2 months after exposure to mold.  But no shortness of breath.  No fever. She reports that since 3AM, she began having a sharp abdominal pain radiating to her back. This was associated when a NBNB vomiting. No other associated symptoms. She had 3 glasses of wine prior to her abdominal pain. Pt admitted for further management.

## 2023-02-15 NOTE — ED ADULT NURSE REASSESSMENT NOTE - NS ED NURSE REASSESS COMMENT FT1
Spoke to Ultrasound suite and advised them that patient has a bed on 3A and report was given to Bertha LAWRENCE. US technician advised that patient will be transferred to admitted room once test is complete.

## 2023-02-15 NOTE — H&P ADULT - ASSESSMENT
48 yo F patient with a PMH of chronic pancreatitis probably alcoholic- induced, and recurrent episodes of acute pancreatitis, presents to the ED for abdominal pain.    # Acute on chronic pancreatitis  # ETOH abuse  - CT on admission showed acute on chronic pancreatitis   - continue with LR 200cc/hr   - pain team consulted   CIWA protocol   - continue with ondansetron as PRN, pancrelipase   - continue multivitamins, folic acid     # Transaminitis likely ETOH induced, improving   # Hepatic steatosis  - elevated LFT's   - CT abdomen showed diffuse hepatic steatosis   - monitor LFT       DVT prophylaxis: enoxaparin 40mg daily  GI prophylaxis: pantoprazole 40mg daily   Diet: DASH  Full code     NOTE IS INCOMPLETE AND RECOMMENDATIONS ARE NOT FINALIZED. COMPLETED NOTE TO FOLLOW.  46 yo F patient with a PMH of chronic pancreatitis probably alcoholic- induced, and recurrent episodes of acute pancreatitis, presents to the ED for abdominal pain.    # Acute on chronic pancreatitis  # ETOH abuse  # suspected thiamin/folate def  - CT on admission showed acute on chronic pancreatitis   - start LR at 150ml/hr   - pain and catch team consulted  - CIWA protocol   - continue with ondansetron as PRN, pancrelipase   - continue multivitamins, folic acid, thiamin     # Transaminitis likely ETOH induced   # Hepatic steatosis  - elevated LFT's   - CT abdomen showed diffuse hepatic steatosis   - monitor LFT     # Mold exposure  - can f/u with PCP    # DVT ppx- LMWH qd  # Activity- AAT  # Diet- as tolerated   # Code status full  # Dispo- admitted

## 2023-02-15 NOTE — ED PROVIDER NOTE - PHYSICAL EXAMINATION
VITAL SIGNS: I have reviewed nursing notes and confirm.  CONSTITUTIONAL: non-toxic, no respiratory distress  SKIN: no rash, no petechiae.  EYES: pink conjunctiva, anicteric  ENT: dry lips but MMM  NECK: Supple; no meningismus, no JVD, no crepitus  CARD: tachycardia RRR, no murmurs, equal radial pulses bilaterally 2+  RESP: CTAB, no respiratory distress  ABD: Soft, Positive epigastric pain and tenderness positive diffuse tenderness without peritoneal signs no peritoneal signs, no HSM, no CVA tenderness  EXT: Normal ROM x4. No edema. No calves tenderness  NEURO: Alert, oriented. CN2-12 intact, equal strength bilaterally, nl gait.  PSYCH: Cooperative, appropriate.

## 2023-02-15 NOTE — ED PROVIDER NOTE - CLINICAL SUMMARY MEDICAL DECISION MAKING FREE TEXT BOX
Acute on chronic pancreatitis.  Pain medicated.  CT scan obtained.    Labs and EKG were ordered and reviewed by me.  Imaging was ordered and reviewed by me.  Appropriate medications for patient's presenting complaints were ordered and effects were reassessed.  Patient's records prior hospital visits notes, GI consultations, previous imaging were reviewed.  Additional history was obtained from EMS.  Escalation to admission/observation was considered.  Patient requires inpatient hospitalization - monitored setting.

## 2023-02-15 NOTE — ED PROVIDER NOTE - OBJECTIVE STATEMENT
22-year-old female history of alcohol related chronic pancreatitis chronic last admission here 6 months ago.  Had a surgery for pancreatic cyst at [facility].  Now presents with 2 complaints.  1 ) epigastric abdominal pain and vomiting and retching since last night.  Nonbilious nonbloody.  No diarrhea.  No chills.  Pain and vomiting consistent with her previous episodes of chronic pancreatitis.  2) cough for 2 months after exposure to mold.  But no shortness of breath.  No fever

## 2023-02-15 NOTE — H&P ADULT - NSHPPHYSICALEXAM_GEN_ALL_CORE
VITALS:   Vital Signs Last 24 Hrs  T(C): 35.9 (15 Feb 2023 10:17), Max: 35.9 (15 Feb 2023 10:17)  T(F): 96.7 (15 Feb 2023 10:17), Max: 96.7 (15 Feb 2023 10:17)  HR: 130 (15 Feb 2023 10:17) (130 - 130)  BP: 134/76 (15 Feb 2023 10:17) (134/76 - 134/76)  BP(mean): --  RR: 20 (15 Feb 2023 10:17) (20 - 20)  SpO2: 100% (15 Feb 2023 10:17) (100% - 100%)    Parameters below as of 15 Feb 2023 10:17  Patient On (Oxygen Delivery Method): nasal cannula  O2 Flow (L/min): 2    I&O's Summary    CAPILLARY BLOOD GLUCOSE          PHYSICAL EXAM:  General: WN/WD NAD  HEENT: PERRLA, EOMI, moist mucous membranes  Neurology: A&Ox3, nonfocal, CARMOAN x 4  Respiratory: CTA B/L, normal respiratory effort, no wheezes, crackles, rales  CV: RRR, S1S2, no murmurs, rubs or gallops  Abdominal: Soft, NT, ND +BS, Last BM  Extremities: No edema, + peripheral pulses  Incisions:   Tubes: VITALS:   Vital Signs Last 24 Hrs  T(C): 35.9 (15 Feb 2023 10:17), Max: 35.9 (15 Feb 2023 10:17)  T(F): 96.7 (15 Feb 2023 10:17), Max: 96.7 (15 Feb 2023 10:17)  HR: 130 (15 Feb 2023 10:17) (130 - 130)  BP: 134/76 (15 Feb 2023 10:17) (134/76 - 134/76)  BP(mean): --  RR: 20 (15 Feb 2023 10:17) (20 - 20)  SpO2: 100% (15 Feb 2023 10:17) (100% - 100%)    Parameters below as of 15 Feb 2023 10:17  Patient On (Oxygen Delivery Method): nasal cannula  O2 Flow (L/min): 2    I&O's Summary    CAPILLARY BLOOD GLUCOSE          PHYSICAL EXAM:  General: WN/WD NAD  HEENT: PERRLA, EOMI, moist mucous membranes  Neurology: A&Ox3, nonfocal, CARMONA x 4  Respiratory: CTA B/L, normal respiratory effort, no wheezes, crackles, rales  CV: RRR, S1S2, no murmurs, rubs or gallops  Abdominal: tender to palpation throughout, soft,  Extremities: No edema,   Incisions:   Tubes:

## 2023-02-15 NOTE — ED PROVIDER NOTE - PROGRESS NOTE DETAILS
Note authored by Dr. Ponce: Pending CT scan.  Improved Authored by Dr. Ponce: still in pain. pending CT. labs reviewed Authored by Dr. Ponce: still in pain. ct reviewed. admit

## 2023-02-15 NOTE — PATIENT PROFILE ADULT - FALL HARM RISK - HARM RISK INTERVENTIONS

## 2023-02-15 NOTE — H&P ADULT - ATTENDING COMMENTS
Patient seen and examined at bedside independently of the residents. I read the resident's note and agree with the plan with the additions and corrections as noted below. My note supersedes the resident's note.     REVIEW OF SYSTEMS:  CONSTITUTIONAL: No weakness, fevers or chills  EYES/ENT: No visual changes;  No vertigo or throat pain   NECK: No pain or stiffness  RESPIRATORY: No cough, wheezing, hemoptysis; No shortness of breath  CARDIOVASCULAR: No chest pain or palpitations  GASTROINTESTINAL: + epigastric pain a/w nausea and vomiting. No diarrhea or constipation. No melena or hematochezia.  GENITOURINARY: No dysuria, frequency or hematuria  NEUROLOGICAL: No numbness or weakness  MSK: No pain. No weakness.   SKIN: No itching, rashes.     PMH: Alcohol related chronic pancreatitis, Pancreatic cyst, Alcohol use disorder     FHx: Reviewed. No fhx of asthma/copd, No fhx of liver and pulmonary disease. No fhx of hematological disorder.     Physical Exam:  GEN: No acute distress. Awake, Alert and oriented x 3.   Head: Atraumatic, Normocephalic.   Eye: PEERLA. No sclera icterus. EOMI.   ENT: Normal oropharynx, no thyromegaly, no mass, no lymphadenopathy.   LUNGS: Clear to auscultation bilaterally. No wheeze/rales/crackles.   HEART: Normal. S1/S2 present. RRR. No murmur/gallops.   ABD: Soft, non-tender, non-distended. Bowel sounds present.   EXT: No pitting edema. No erythema. No tenderness.  Integumentary: No rash, No sore, No petechia.   NEURO: CN III-XII intact. Strength: 5/5 b/l ULE. Sensory intact b/l ULE.     Vital Signs Last 24 Hrs  T(C): 36.3 (15 Feb 2023 16:35), Max: 36.3 (15 Feb 2023 16:35)  T(F): 97.3 (15 Feb 2023 16:35), Max: 97.3 (15 Feb 2023 16:35)  HR: 105 (15 Feb 2023 16:35) (105 - 130)  BP: 118/56 (15 Feb 2023 16:35) (118/56 - 134/76)  BP(mean): --  RR: 18 (15 Feb 2023 16:35) (18 - 20)  SpO2: 98% (15 Feb 2023 16:35) (98% - 100%)    Parameters below as of 15 Feb 2023 16:35  Patient On (Oxygen Delivery Method): room air      Please see the above notes for Labs and radiology.     Assessment and Plan:     48 yo F with hx of Alcohol related chronic pancreatitis and  had a surgery for pancreatic cyst at [facility] now presents to ED for epigastric abdominal pain a/w vomiting started since last night.     Acute on chronic pancreatitis   - CT abdomen shows Diffuse pancreatic calcifications and mild ductal dilation with minimal fat stranding surrounding the pancreatic body and tail. Findings are suggestive of acute on chronic pancreatitis.  - start on IVF LR @ 150cc/hr  - monitor strict I  & O, UO, Hct and BUN and adjust IVF accordingly.   - check RUQ US and lipid panel  - supportive care.     Transaminitis   - check RUQ US   - monitor LFT    Alcohol abuse/Thiamin deficiency  - [facility] protocol  - thiamine, folic acid and multivitamin     Mold exposure   - CXR unremarkable.     DVT ppx: Lovenox SC  GI ppx:  PPI   Diet: liquid diet, advance as tolerated.   Activity: as tolerated.     Date seen by the attendin/15/2023 Patient seen and examined at bedside independently of the residents. I read the resident's note and agree with the plan with the additions and corrections as noted below. My note supersedes the resident's note.     REVIEW OF SYSTEMS:  CONSTITUTIONAL: No weakness, fevers or chills  EYES/ENT: No visual changes;  No vertigo or throat pain   NECK: No pain or stiffness  RESPIRATORY: No cough, wheezing, hemoptysis; No shortness of breath  CARDIOVASCULAR: No chest pain or palpitations  GASTROINTESTINAL: + epigastric pain a/w nausea and vomiting. No diarrhea or constipation. No melena or hematochezia.  GENITOURINARY: No dysuria, frequency or hematuria  NEUROLOGICAL: No numbness or weakness  MSK: No pain. No weakness.   SKIN: No itching, rashes.     PMH: Alcohol related chronic pancreatitis, Pancreatic cyst, Alcohol use disorder     FHx: Reviewed. No fhx of asthma/copd, No fhx of liver and pulmonary disease. No fhx of hematological disorder.     Physical Exam:  GEN: No acute distress. Awake, Alert and oriented x 3.   Head: Atraumatic, Normocephalic.   Eye: PEERLA. No sclera icterus. EOMI.   ENT: Normal oropharynx, no thyromegaly, no mass, no lymphadenopathy.   LUNGS: Clear to auscultation bilaterally. No wheeze/rales/crackles.   HEART: Normal. S1/S2 present. RRR. No murmur/gallops.   ABD: Soft. + bowel sound. tenderness to palpation of epigastric area. No guarding/rigidity/rebound tenderness.   EXT: No pitting edema. No erythema. No tenderness.  Integumentary: No rash, No sore, No petechia.   NEURO: CN III-XII intact. Strength: 5/5 b/l ULE. Sensory intact b/l ULE.     Vital Signs Last 24 Hrs  T(C): 36.3 (15 Feb 2023 16:35), Max: 36.3 (15 Feb 2023 16:35)  T(F): 97.3 (15 Feb 2023 16:35), Max: 97.3 (15 Feb 2023 16:35)  HR: 105 (15 Feb 2023 16:35) (105 - 130)  BP: 118/56 (15 Feb 2023 16:35) (118/56 - 134/76)  BP(mean): --  RR: 18 (15 Feb 2023 16:35) (18 - 20)  SpO2: 98% (15 Feb 2023 16:35) (98% - 100%)    Parameters below as of 15 Feb 2023 16:35  Patient On (Oxygen Delivery Method): room air      Please see the above notes for Labs and radiology.     Assessment and Plan:     48 yo F with hx of Alcohol related chronic pancreatitis and  had a surgery for pancreatic cyst at [facility] now presents to ED for epigastric abdominal pain a/w vomiting started since last night.     Acute on chronic pancreatitis   - CT abdomen shows Diffuse pancreatic calcifications and mild ductal dilation with minimal fat stranding surrounding the pancreatic body and tail. Findings are suggestive of acute on chronic pancreatitis.  - NPO for now.   - s/p 2L LR bolus --> c/w IVF LR @ 150cc/hr  - monitor strict I  & O, UO, Hct and BUN and adjust IVF accordingly.   - check RUQ US and lipid panel  - supportive care.     Transaminitis   - check RUQ US   - monitor LFT    Alcohol abuse/Thiamin deficiency  - [facility] protocol  - thiamine, folic acid and multivitamin     Mold exposure   - CXR unremarkable.     DVT ppx: Lovenox SC  GI ppx:  PPI   Diet: NPO with IVF   Activity: as tolerated.     Date seen by the attendin/15/2023

## 2023-02-15 NOTE — ED ADULT NURSE NOTE - CHIEF COMPLAINT QUOTE
BIBA from home for abdominal pain and vomiting since 3AM, has a history of pancreatitis. received IVF via EMS

## 2023-02-15 NOTE — H&P ADULT - NSHPLABSRESULTS_GEN_ALL_CORE
.  LABS:                         12.5   4.63  )-----------( 128      ( 15 Feb 2023 12:20 )             34.7     02-15    133<L>  |  98  |  10  ----------------------------<  140<H>  5.3<H>   |  19  |  0.7    Ca    8.9      15 Feb 2023 12:20    TPro  7.8  /  Alb  4.8  /  TBili  0.7  /  DBili  x   /  AST  264<H>  /  ALT  67<H>  /  AlkPhos  107  02-15          Lactate, Blood: 2.4 mmol/L (02-15 @ 12:20)      RADIOLOGY, EKG & ADDITIONAL TESTS: Reviewed.

## 2023-02-15 NOTE — ED ADULT TRIAGE NOTE - CHIEF COMPLAINT QUOTE
BIBA from home for vomiting, has a history of pancreatitis. received IVF via EMS BIBA from home for abdominal pain and vomiting since 3AM, has a history of pancreatitis. received IVF via EMS

## 2023-02-16 DIAGNOSIS — K85.90 ACUTE PANCREATITIS WITHOUT NECROSIS OR INFECTION, UNSPECIFIED: ICD-10-CM

## 2023-02-16 LAB
ALBUMIN SERPL ELPH-MCNC: 4.3 G/DL — SIGNIFICANT CHANGE UP (ref 3.5–5.2)
ALP SERPL-CCNC: 80 U/L — SIGNIFICANT CHANGE UP (ref 30–115)
ALT FLD-CCNC: 44 U/L — HIGH (ref 0–41)
AMYLASE P1 CFR SERPL: 37 U/L — SIGNIFICANT CHANGE UP (ref 25–115)
ANION GAP SERPL CALC-SCNC: 13 MMOL/L — SIGNIFICANT CHANGE UP (ref 7–14)
APTT BLD: 29.5 SEC — SIGNIFICANT CHANGE UP (ref 27–39.2)
AST SERPL-CCNC: 164 U/L — HIGH (ref 0–41)
BASOPHILS # BLD AUTO: 0.01 K/UL — SIGNIFICANT CHANGE UP (ref 0–0.2)
BASOPHILS NFR BLD AUTO: 0.5 % — SIGNIFICANT CHANGE UP (ref 0–1)
BILIRUB SERPL-MCNC: 0.5 MG/DL — SIGNIFICANT CHANGE UP (ref 0.2–1.2)
BLD GP AB SCN SERPL QL: SIGNIFICANT CHANGE UP
BUN SERPL-MCNC: 9 MG/DL — LOW (ref 10–20)
CALCIUM SERPL-MCNC: 8.8 MG/DL — SIGNIFICANT CHANGE UP (ref 8.4–10.5)
CHLORIDE SERPL-SCNC: 99 MMOL/L — SIGNIFICANT CHANGE UP (ref 98–110)
CHOLEST SERPL-MCNC: 194 MG/DL — SIGNIFICANT CHANGE UP
CO2 SERPL-SCNC: 23 MMOL/L — SIGNIFICANT CHANGE UP (ref 17–32)
CREAT SERPL-MCNC: 0.6 MG/DL — LOW (ref 0.7–1.5)
EGFR: 113 ML/MIN/1.73M2 — SIGNIFICANT CHANGE UP
EOSINOPHIL # BLD AUTO: 0.02 K/UL — SIGNIFICANT CHANGE UP (ref 0–0.7)
EOSINOPHIL NFR BLD AUTO: 0.9 % — SIGNIFICANT CHANGE UP (ref 0–8)
GLUCOSE SERPL-MCNC: 90 MG/DL — SIGNIFICANT CHANGE UP (ref 70–99)
HCT VFR BLD CALC: 30 % — LOW (ref 37–47)
HDLC SERPL-MCNC: 96 MG/DL — SIGNIFICANT CHANGE UP
HGB BLD-MCNC: 10.6 G/DL — LOW (ref 12–16)
IMM GRANULOCYTES NFR BLD AUTO: 0 % — LOW (ref 0.1–0.3)
INR BLD: 0.96 RATIO — SIGNIFICANT CHANGE UP (ref 0.65–1.3)
LACTATE SERPL-SCNC: 0.9 MMOL/L — SIGNIFICANT CHANGE UP (ref 0.7–2)
LIPID PNL WITH DIRECT LDL SERPL: 69 MG/DL — SIGNIFICANT CHANGE UP
LYMPHOCYTES # BLD AUTO: 0.31 K/UL — LOW (ref 1.2–3.4)
LYMPHOCYTES # BLD AUTO: 14.6 % — LOW (ref 20.5–51.1)
MAGNESIUM SERPL-MCNC: 1.4 MG/DL — LOW (ref 1.8–2.4)
MCHC RBC-ENTMCNC: 33.8 PG — HIGH (ref 27–31)
MCHC RBC-ENTMCNC: 35.3 G/DL — SIGNIFICANT CHANGE UP (ref 32–37)
MCV RBC AUTO: 95.5 FL — SIGNIFICANT CHANGE UP (ref 81–99)
MONOCYTES # BLD AUTO: 0.12 K/UL — SIGNIFICANT CHANGE UP (ref 0.1–0.6)
MONOCYTES NFR BLD AUTO: 5.6 % — SIGNIFICANT CHANGE UP (ref 1.7–9.3)
NEUTROPHILS # BLD AUTO: 1.67 K/UL — SIGNIFICANT CHANGE UP (ref 1.4–6.5)
NEUTROPHILS NFR BLD AUTO: 78.4 % — HIGH (ref 42.2–75.2)
NON HDL CHOLESTEROL: 98 MG/DL — SIGNIFICANT CHANGE UP
NRBC # BLD: 0 /100 WBCS — SIGNIFICANT CHANGE UP (ref 0–0)
PHOSPHATE SERPL-MCNC: 3 MG/DL — SIGNIFICANT CHANGE UP (ref 2.1–4.9)
PLATELET # BLD AUTO: 106 K/UL — LOW (ref 130–400)
POTASSIUM SERPL-MCNC: 3.2 MMOL/L — LOW (ref 3.5–5)
POTASSIUM SERPL-SCNC: 3.2 MMOL/L — LOW (ref 3.5–5)
PROT SERPL-MCNC: 6.6 G/DL — SIGNIFICANT CHANGE UP (ref 6–8)
PROTHROM AB SERPL-ACNC: 10.9 SEC — SIGNIFICANT CHANGE UP (ref 9.95–12.87)
RBC # BLD: 3.14 M/UL — LOW (ref 4.2–5.4)
RBC # FLD: 11.9 % — SIGNIFICANT CHANGE UP (ref 11.5–14.5)
SODIUM SERPL-SCNC: 135 MMOL/L — SIGNIFICANT CHANGE UP (ref 135–146)
TRIGL SERPL-MCNC: 144 MG/DL — SIGNIFICANT CHANGE UP
WBC # BLD: 2.13 K/UL — LOW (ref 4.8–10.8)
WBC # FLD AUTO: 2.13 K/UL — LOW (ref 4.8–10.8)

## 2023-02-16 PROCEDURE — 99232 SBSQ HOSP IP/OBS MODERATE 35: CPT

## 2023-02-16 RX ORDER — MAGNESIUM SULFATE 500 MG/ML
2 VIAL (ML) INJECTION
Refills: 0 | Status: COMPLETED | OUTPATIENT
Start: 2023-02-16 | End: 2023-02-16

## 2023-02-16 RX ORDER — HYDROMORPHONE HYDROCHLORIDE 2 MG/ML
1 INJECTION INTRAMUSCULAR; INTRAVENOUS; SUBCUTANEOUS EVERY 4 HOURS
Refills: 0 | Status: DISCONTINUED | OUTPATIENT
Start: 2023-02-16 | End: 2023-02-19

## 2023-02-16 RX ORDER — GABAPENTIN 400 MG/1
300 CAPSULE ORAL THREE TIMES A DAY
Refills: 0 | Status: DISCONTINUED | OUTPATIENT
Start: 2023-02-16 | End: 2023-02-21

## 2023-02-16 RX ORDER — PANTOPRAZOLE SODIUM 20 MG/1
40 TABLET, DELAYED RELEASE ORAL DAILY
Refills: 0 | Status: DISCONTINUED | OUTPATIENT
Start: 2023-02-16 | End: 2023-02-18

## 2023-02-16 RX ADMIN — HYDROMORPHONE HYDROCHLORIDE 0.5 MILLIGRAM(S): 2 INJECTION INTRAMUSCULAR; INTRAVENOUS; SUBCUTANEOUS at 14:17

## 2023-02-16 RX ADMIN — SODIUM CHLORIDE 150 MILLILITER(S): 9 INJECTION, SOLUTION INTRAVENOUS at 08:14

## 2023-02-16 RX ADMIN — HYDROMORPHONE HYDROCHLORIDE 1 MILLIGRAM(S): 2 INJECTION INTRAMUSCULAR; INTRAVENOUS; SUBCUTANEOUS at 23:00

## 2023-02-16 RX ADMIN — Medication 3 CAPSULE(S): at 17:48

## 2023-02-16 RX ADMIN — ONDANSETRON 4 MILLIGRAM(S): 8 TABLET, FILM COATED ORAL at 12:12

## 2023-02-16 RX ADMIN — HYDROMORPHONE HYDROCHLORIDE 0.5 MILLIGRAM(S): 2 INJECTION INTRAMUSCULAR; INTRAVENOUS; SUBCUTANEOUS at 00:20

## 2023-02-16 RX ADMIN — Medication 3 CAPSULE(S): at 08:11

## 2023-02-16 RX ADMIN — SODIUM CHLORIDE 150 MILLILITER(S): 9 INJECTION, SOLUTION INTRAVENOUS at 06:25

## 2023-02-16 RX ADMIN — HYDROMORPHONE HYDROCHLORIDE 1 MILLIGRAM(S): 2 INJECTION INTRAMUSCULAR; INTRAVENOUS; SUBCUTANEOUS at 18:47

## 2023-02-16 RX ADMIN — PANTOPRAZOLE SODIUM 40 MILLIGRAM(S): 20 TABLET, DELAYED RELEASE ORAL at 06:06

## 2023-02-16 RX ADMIN — Medication 25 GRAM(S): at 21:28

## 2023-02-16 RX ADMIN — Medication 100 MILLIGRAM(S): at 12:11

## 2023-02-16 RX ADMIN — Medication 3 CAPSULE(S): at 12:11

## 2023-02-16 RX ADMIN — GABAPENTIN 300 MILLIGRAM(S): 400 CAPSULE ORAL at 21:28

## 2023-02-16 RX ADMIN — HYDROMORPHONE HYDROCHLORIDE 0.5 MILLIGRAM(S): 2 INJECTION INTRAMUSCULAR; INTRAVENOUS; SUBCUTANEOUS at 10:38

## 2023-02-16 RX ADMIN — Medication 1 TABLET(S): at 12:11

## 2023-02-16 RX ADMIN — HYDROMORPHONE HYDROCHLORIDE 1 MILLIGRAM(S): 2 INJECTION INTRAMUSCULAR; INTRAVENOUS; SUBCUTANEOUS at 18:17

## 2023-02-16 RX ADMIN — ENOXAPARIN SODIUM 40 MILLIGRAM(S): 100 INJECTION SUBCUTANEOUS at 08:14

## 2023-02-16 RX ADMIN — HYDROMORPHONE HYDROCHLORIDE 0.5 MILLIGRAM(S): 2 INJECTION INTRAMUSCULAR; INTRAVENOUS; SUBCUTANEOUS at 10:08

## 2023-02-16 RX ADMIN — Medication 15 MILLIGRAM(S): at 22:59

## 2023-02-16 RX ADMIN — Medication 15 MILLIGRAM(S): at 21:28

## 2023-02-16 RX ADMIN — HYDROMORPHONE HYDROCHLORIDE 0.5 MILLIGRAM(S): 2 INJECTION INTRAMUSCULAR; INTRAVENOUS; SUBCUTANEOUS at 04:20

## 2023-02-16 RX ADMIN — PANTOPRAZOLE SODIUM 40 MILLIGRAM(S): 20 TABLET, DELAYED RELEASE ORAL at 17:50

## 2023-02-16 RX ADMIN — Medication 25 GRAM(S): at 18:18

## 2023-02-16 RX ADMIN — ONDANSETRON 4 MILLIGRAM(S): 8 TABLET, FILM COATED ORAL at 03:24

## 2023-02-16 RX ADMIN — HYDROMORPHONE HYDROCHLORIDE 1 MILLIGRAM(S): 2 INJECTION INTRAMUSCULAR; INTRAVENOUS; SUBCUTANEOUS at 22:54

## 2023-02-16 RX ADMIN — Medication 15 MILLIGRAM(S): at 13:05

## 2023-02-16 RX ADMIN — Medication 1 MILLIGRAM(S): at 12:11

## 2023-02-16 RX ADMIN — HYDROMORPHONE HYDROCHLORIDE 0.5 MILLIGRAM(S): 2 INJECTION INTRAMUSCULAR; INTRAVENOUS; SUBCUTANEOUS at 03:50

## 2023-02-16 NOTE — CONSULT NOTE ADULT - ASSESSMENT
Patient is a 48 y/o woman with history of chronic pancreatitis and anxiety who was admitted on 2/15/2023 with abdominal pain, nausea, and vomiting likely 2/2 an acute exacerbation of chronic pancreatitis.

## 2023-02-16 NOTE — PROGRESS NOTE ADULT - SUBJECTIVE AND OBJECTIVE BOX
LAURA BARAJAS  [facility]-N 3A 004 B (SI-N 3A)      Patient was evaluated and examined  by bedside, c/o abdominal pain, nausea, no vomiting         REVIEW OF SYSTEMS:  please see pertinent positives mentioned above, all other 12 ROS negative      T(C): , Max: 38.1 (02-15-23 @ 20:30)  HR: 79 (02-16-23 @ 12:46)  BP: 135/89 (02-16-23 @ 12:46)  RR: 18 (02-16-23 @ 12:46)  SpO2: 98% (02-16-23 @ 10:22)  CAPILLARY BLOOD GLUCOSE          PHYSICAL EXAM:  General: NAD, AAOX3, patient is laying comfortably in bed  HEENT: AT, NC, Supple, NO JVD, NO CB  Lungs: CTA B/L, no wheezing, no rhonchi  CVS: normal S1, S2, RRR, NO M/G/R  Abdomen: soft, bowel sounds present, tender in epigastric area , non-distended  Extremities: no edema, no clubbing, no cyanosis, positive peripheral pulses b/l  Neuro: no acute focal neurological deficits  Skin: no rash, no ecchymosis      LAB  CBC  Date: 02-16-23 @ 08:00  Mean cell [id number]  Mean cell Hemoglobin Conc35.3  Mean cell Volum 95.5  Platelet count-Automate 106  RBC Count 3.14  Red Cell Distrib Width11.9  WBC Count2.13  % Albumin, Urine--  Hematocrit 30.0  Hemoglobin 10.6  CBC  Date: 02-15-23 @ 12:20  Mean cell [id number]  Mean cell Hemoglobin Conc36.0  Mean cell Volum 93.8  Platelet count-Automate 128  RBC Count 3.70  Red Cell Distrib Width11.6  WBC Count4.63  % Albumin, Urine--  Hematocrit 34.7  Hemoglobin 12.5    BMP  02-16-23 @ 08:00  Blood Gas Arterial-Calcium,Ionized--  Blood Urea Nitrogen, Serum 9 mg/dL<L> [10 - 20]  Carbon Dioxide, Serum23 mmol/L [17 - 32]  Chloride, Serum99 mmol/L [98 - 110]  Creatinie, Serum0.6 mg/dL<L> [0.7 - 1.5]  Glucose, Serum90 mg/dL [70 - 99]  Potassium, Serum3.2 mmol/L<L> [3.5 - 5.0]  Sodium, Serum 135 mmol/L [135 - 146]  [facility]  02-15-23 @ 12:20  Blood Gas Arterial-Calcium,Ionized--  Blood Urea Nitrogen, Serum 10 mg/dL [10 - 20]  Carbon Dioxide, Serum19 mmol/L [17 - 32]  Chloride, Serum98 mmol/L [98 - 110]  Creatinie, Serum0.7 mg/dL [0.7 - 1.5]  Glucose, [id number] mg/dL<H> [70 - 99]  Potassium, Serum5.3 mmol/L<H> [3.5 - 5.0] [Hemolyzed. Interpret with caution]  Sodium, Serum 133 mmol/L<L> [135 - 146]        PT/INR - ( 16 Feb 2023 08:00 )   PT: 10.90 sec;   INR: 0.96 ratio         PTT - ( 16 Feb 2023 08:00 )  PTT:29.5 sec      Medications:  acetaminophen     Tablet .. 650 milliGRAM(s) Oral every 6 hours PRN  enoxaparin Injectable 40 milliGRAM(s) SubCutaneous every 24 hours  folic acid 1 milliGRAM(s) Oral daily  gabapentin 300 milliGRAM(s) Oral three times a day  HYDROmorphone  Injectable 1 milliGRAM(s) IV Push every 4 hours PRN  influenza   Vaccine 0.5 milliLiter(s) IntraMuscular once  ketorolac   Injectable 15 milliGRAM(s) IV Push every 8 hours PRN  lactated ringers. 1000 milliLiter(s) IV Continuous <Continuous>  LORazepam   Injectable 2 milliGRAM(s) IV Push every 2 hours PRN  magnesium sulfate  IVPB 2 Gram(s) IV Intermittent every 2 hours  melatonin 3 milliGRAM(s) Oral at bedtime PRN  multivitamin 1 Tablet(s) Oral daily  ondansetron Injectable 4 milliGRAM(s) IV Push every 4 hours PRN  pancrelipase  (CREON 12,000 Lipase Units) 3 Capsule(s) Oral three times a day with meals  pantoprazole    Tablet 40 milliGRAM(s) Oral before breakfast  pantoprazole  Injectable 40 milliGRAM(s) IV Push daily  thiamine 100 milliGRAM(s) Oral daily        Assessment and Plan:  46 y/o F with hx of Alcohol related chronic pancreatitis and  had a surgery for pancreatic cyst at [facility] now presents to ED for epigastric abdominal pain a/w vomiting started since last night.     Acute on chronic pancreatitis   - CT abdomen shows Diffuse pancreatic calcifications and mild ductal dilation with minimal fat stranding surrounding the pancreatic body and tail. Findings are suggestive of acute on chronic pancreatitis.  - start on clear liquid diet - advance as tolerated   - c/w IVF LR @ 150cc/hr  - supportive care/ pain management      Transaminitis - due to alcohol use   -- monitor LFT    Alcohol abuse/Thiamin deficiency  - [facility] protocol  - thiamine, folic acid and multivitamin     Mold exposure   - CXR unremarkable.     DVT ppx: Lovenox SC  GI ppx: daily   PPI   Activity: as tolerated.     #Progress Note Handoff: continue IVF, pain management, advance diet as tolerated   Family discussion: medical plan of tx. d/w pt. by bedside Disposition: Home__ once medically stable    Total time spent to complete patient's bedside assessment, review medical chart, discuss medical plan of care with covering medical team was more than 35 minutes with >50% of time spent face to face with patient, discussion with patient/family and/or coordination of care
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
## 2023-02-16 NOTE — CONSULT NOTE ADULT - PROBLEM SELECTOR RECOMMENDATION 9
No history of chronic opioid use. Moderate risk for opioid abuse per ORT.  1) Increase hydromorphone IV to 1mg Q4h prn; may decrease or transition to hydromorphone PO 4mg Q4h prn when patient is fully tolerating PO intake. Would not discharge with greater than 3 day supply of opioids  2) When tolerating PO intake, start gabapentin 300mg TID  3) Continue ketorolac IV 15mg prn  4) Use caution with co-administration of opioids and benzodiazepines 2/2 risk of respiratory depression  5) Provide a prescription of naloxone on discharge IF she receives an opioid prescription due to chronic benzodiazepine use

## 2023-02-17 LAB
ALBUMIN SERPL ELPH-MCNC: 4.2 G/DL — SIGNIFICANT CHANGE UP (ref 3.5–5.2)
ALP SERPL-CCNC: 86 U/L — SIGNIFICANT CHANGE UP (ref 30–115)
ALT FLD-CCNC: 61 U/L — HIGH (ref 0–41)
ANION GAP SERPL CALC-SCNC: 19 MMOL/L — HIGH (ref 7–14)
AST SERPL-CCNC: 238 U/L — HIGH (ref 0–41)
BASOPHILS # BLD AUTO: 0.02 K/UL — SIGNIFICANT CHANGE UP (ref 0–0.2)
BASOPHILS NFR BLD AUTO: 0.9 % — SIGNIFICANT CHANGE UP (ref 0–1)
BILIRUB SERPL-MCNC: 0.4 MG/DL — SIGNIFICANT CHANGE UP (ref 0.2–1.2)
BUN SERPL-MCNC: 5 MG/DL — LOW (ref 10–20)
CALCIUM SERPL-MCNC: 8.9 MG/DL — SIGNIFICANT CHANGE UP (ref 8.4–10.5)
CHLORIDE SERPL-SCNC: 94 MMOL/L — LOW (ref 98–110)
CO2 SERPL-SCNC: 23 MMOL/L — SIGNIFICANT CHANGE UP (ref 17–32)
CREAT SERPL-MCNC: 0.6 MG/DL — LOW (ref 0.7–1.5)
EGFR: 113 ML/MIN/1.73M2 — SIGNIFICANT CHANGE UP
EOSINOPHIL # BLD AUTO: 0.04 K/UL — SIGNIFICANT CHANGE UP (ref 0–0.7)
EOSINOPHIL NFR BLD AUTO: 1.8 % — SIGNIFICANT CHANGE UP (ref 0–8)
GLUCOSE SERPL-MCNC: 65 MG/DL — LOW (ref 70–99)
HCT VFR BLD CALC: 33.5 % — LOW (ref 37–47)
HGB BLD-MCNC: 11.6 G/DL — LOW (ref 12–16)
IMM GRANULOCYTES NFR BLD AUTO: 0.4 % — HIGH (ref 0.1–0.3)
LYMPHOCYTES # BLD AUTO: 0.45 K/UL — LOW (ref 1.2–3.4)
LYMPHOCYTES # BLD AUTO: 20 % — LOW (ref 20.5–51.1)
MAGNESIUM SERPL-MCNC: 1.8 MG/DL — SIGNIFICANT CHANGE UP (ref 1.8–2.4)
MCHC RBC-ENTMCNC: 33.4 PG — HIGH (ref 27–31)
MCHC RBC-ENTMCNC: 34.6 G/DL — SIGNIFICANT CHANGE UP (ref 32–37)
MCV RBC AUTO: 96.5 FL — SIGNIFICANT CHANGE UP (ref 81–99)
MONOCYTES # BLD AUTO: 0.18 K/UL — SIGNIFICANT CHANGE UP (ref 0.1–0.6)
MONOCYTES NFR BLD AUTO: 8 % — SIGNIFICANT CHANGE UP (ref 1.7–9.3)
NEUTROPHILS # BLD AUTO: 1.55 K/UL — SIGNIFICANT CHANGE UP (ref 1.4–6.5)
NEUTROPHILS NFR BLD AUTO: 68.9 % — SIGNIFICANT CHANGE UP (ref 42.2–75.2)
NRBC # BLD: 0 /100 WBCS — SIGNIFICANT CHANGE UP (ref 0–0)
PLATELET # BLD AUTO: 139 K/UL — SIGNIFICANT CHANGE UP (ref 130–400)
POTASSIUM SERPL-MCNC: 3 MMOL/L — LOW (ref 3.5–5)
POTASSIUM SERPL-SCNC: 3 MMOL/L — LOW (ref 3.5–5)
PROT SERPL-MCNC: 7.2 G/DL — SIGNIFICANT CHANGE UP (ref 6–8)
RBC # BLD: 3.47 M/UL — LOW (ref 4.2–5.4)
RBC # FLD: 11.6 % — SIGNIFICANT CHANGE UP (ref 11.5–14.5)
SODIUM SERPL-SCNC: 136 MMOL/L — SIGNIFICANT CHANGE UP (ref 135–146)
WBC # BLD: 2.25 K/UL — LOW (ref 4.8–10.8)
WBC # FLD AUTO: 2.25 K/UL — LOW (ref 4.8–10.8)

## 2023-02-17 PROCEDURE — 99232 SBSQ HOSP IP/OBS MODERATE 35: CPT

## 2023-02-17 RX ORDER — POTASSIUM CHLORIDE 20 MEQ
20 PACKET (EA) ORAL
Refills: 0 | Status: DISCONTINUED | OUTPATIENT
Start: 2023-02-17 | End: 2023-02-17

## 2023-02-17 RX ORDER — POTASSIUM CHLORIDE 20 MEQ
40 PACKET (EA) ORAL ONCE
Refills: 0 | Status: COMPLETED | OUTPATIENT
Start: 2023-02-17 | End: 2023-02-17

## 2023-02-17 RX ORDER — KETOROLAC TROMETHAMINE 30 MG/ML
15 SYRINGE (ML) INJECTION EVERY 6 HOURS
Refills: 0 | Status: DISCONTINUED | OUTPATIENT
Start: 2023-02-17 | End: 2023-02-20

## 2023-02-17 RX ORDER — POTASSIUM CHLORIDE 20 MEQ
40 PACKET (EA) ORAL ONCE
Refills: 0 | Status: DISCONTINUED | OUTPATIENT
Start: 2023-02-17 | End: 2023-02-17

## 2023-02-17 RX ORDER — POTASSIUM CHLORIDE 20 MEQ
20 PACKET (EA) ORAL
Refills: 0 | Status: COMPLETED | OUTPATIENT
Start: 2023-02-17 | End: 2023-02-17

## 2023-02-17 RX ORDER — SODIUM CHLORIDE 9 MG/ML
1000 INJECTION, SOLUTION INTRAVENOUS
Refills: 0 | Status: DISCONTINUED | OUTPATIENT
Start: 2023-02-17 | End: 2023-02-18

## 2023-02-17 RX ADMIN — GABAPENTIN 300 MILLIGRAM(S): 400 CAPSULE ORAL at 13:44

## 2023-02-17 RX ADMIN — Medication 3 CAPSULE(S): at 13:45

## 2023-02-17 RX ADMIN — PANTOPRAZOLE SODIUM 40 MILLIGRAM(S): 20 TABLET, DELAYED RELEASE ORAL at 13:48

## 2023-02-17 RX ADMIN — SODIUM CHLORIDE 150 MILLILITER(S): 9 INJECTION, SOLUTION INTRAVENOUS at 16:47

## 2023-02-17 RX ADMIN — HYDROMORPHONE HYDROCHLORIDE 1 MILLIGRAM(S): 2 INJECTION INTRAMUSCULAR; INTRAVENOUS; SUBCUTANEOUS at 16:44

## 2023-02-17 RX ADMIN — Medication 40 MILLIEQUIVALENT(S): at 23:22

## 2023-02-17 RX ADMIN — Medication 15 MILLIGRAM(S): at 10:02

## 2023-02-17 RX ADMIN — HYDROMORPHONE HYDROCHLORIDE 1 MILLIGRAM(S): 2 INJECTION INTRAMUSCULAR; INTRAVENOUS; SUBCUTANEOUS at 05:16

## 2023-02-17 RX ADMIN — HYDROMORPHONE HYDROCHLORIDE 1 MILLIGRAM(S): 2 INJECTION INTRAMUSCULAR; INTRAVENOUS; SUBCUTANEOUS at 22:47

## 2023-02-17 RX ADMIN — HYDROMORPHONE HYDROCHLORIDE 1 MILLIGRAM(S): 2 INJECTION INTRAMUSCULAR; INTRAVENOUS; SUBCUTANEOUS at 08:31

## 2023-02-17 RX ADMIN — SODIUM CHLORIDE 150 MILLILITER(S): 9 INJECTION, SOLUTION INTRAVENOUS at 09:51

## 2023-02-17 RX ADMIN — GABAPENTIN 300 MILLIGRAM(S): 400 CAPSULE ORAL at 21:05

## 2023-02-17 RX ADMIN — ENOXAPARIN SODIUM 40 MILLIGRAM(S): 100 INJECTION SUBCUTANEOUS at 09:28

## 2023-02-17 RX ADMIN — Medication 3 CAPSULE(S): at 17:15

## 2023-02-17 RX ADMIN — HYDROMORPHONE HYDROCHLORIDE 1 MILLIGRAM(S): 2 INJECTION INTRAMUSCULAR; INTRAVENOUS; SUBCUTANEOUS at 17:00

## 2023-02-17 RX ADMIN — HYDROMORPHONE HYDROCHLORIDE 1 MILLIGRAM(S): 2 INJECTION INTRAMUSCULAR; INTRAVENOUS; SUBCUTANEOUS at 12:18

## 2023-02-17 RX ADMIN — HYDROMORPHONE HYDROCHLORIDE 1 MILLIGRAM(S): 2 INJECTION INTRAMUSCULAR; INTRAVENOUS; SUBCUTANEOUS at 12:33

## 2023-02-17 RX ADMIN — HYDROMORPHONE HYDROCHLORIDE 1 MILLIGRAM(S): 2 INJECTION INTRAMUSCULAR; INTRAVENOUS; SUBCUTANEOUS at 21:05

## 2023-02-17 RX ADMIN — GABAPENTIN 300 MILLIGRAM(S): 400 CAPSULE ORAL at 05:06

## 2023-02-17 RX ADMIN — Medication 3 CAPSULE(S): at 08:52

## 2023-02-17 RX ADMIN — HYDROMORPHONE HYDROCHLORIDE 1 MILLIGRAM(S): 2 INJECTION INTRAMUSCULAR; INTRAVENOUS; SUBCUTANEOUS at 03:21

## 2023-02-17 RX ADMIN — HYDROMORPHONE HYDROCHLORIDE 1 MILLIGRAM(S): 2 INJECTION INTRAMUSCULAR; INTRAVENOUS; SUBCUTANEOUS at 08:16

## 2023-02-17 RX ADMIN — Medication 15 MILLIGRAM(S): at 09:47

## 2023-02-17 NOTE — CONSULT NOTE ADULT - CONSULT REASON
pt has hx of etoh abuse and would like help to stop. Requesting that pt be seen by . Thanks!
acute exacerbation of chronic pancreatitis
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
## 2023-02-17 NOTE — PROGRESS NOTE ADULT - PROBLEM SELECTOR PLAN 1
No history of chronic opioid use. Moderate risk for opioid abuse per ORT.  1) Continue hydromorphone IV 1mg Q4h prn  2) When tolerating PO intake, transition to hydromorphone PO 2-4mg Q4h prn--would not discharge with greater than 3 day supply of opioids  3) When tolerating PO intake, start gabapentin 300mg TID  4) Continue ketorolac IV 15mg prn  5) Use caution with co-administration of opioids and benzodiazepines 2/2 risk of respiratory depression  6) Provide a prescription of naloxone on discharge due to chronic benzodiazepine use
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
## 2023-02-17 NOTE — PROGRESS NOTE ADULT - ATTENDING COMMENTS
48 y/o F with hx of Alcohol related chronic pancreatitis and  had a surgery for pancreatic cyst at [facility] now presents to ED for epigastric abdominal pain a/w vomiting started since last night.     Acute on chronic pancreatitis   - CT abdomen shows Diffuse pancreatic calcifications and mild ductal dilation with minimal fat stranding surrounding the pancreatic body and tail. Findings are suggestive of acute on chronic pancreatitis.  - on clear liquid diet - advance as tolerated   - c/w IVF LR @ 150cc/hr  - supportive care/ pain management as per Pain Specialist rec.       Transaminitis - due to alcohol use   -- monitor LFT    Alcohol abuse/Thiamin deficiency  - [facility] protocol  - thiamine, folic acid and multivitamin     Mold exposure   - CXR unremarkable.     DVT ppx: Lovenox SC  GI ppx: daily   PPI   Activity: as tolerated.     #Progress Note Handoff: continue IVF, pain management- patient still c/o persistent abdominal pain , advance diet as tolerated   Family discussion: medical plan of tx. d/w pt. by bedside Disposition: Home__ once medically stable    Total time spent to complete patient's bedside assessment, review medical chart, discuss medical plan of care with covering medical team was more than 35 minutes with >50% of time spent face to face with patient, discussion with patient/family and/or coordination of care

## 2023-02-17 NOTE — PROGRESS NOTE ADULT - SUBJECTIVE AND OBJECTIVE BOX
Pain Medicine Follow Up Visit      Subjective  The patient states that she had significant pain overnight because she lost IV access and was not able to receive her medications. Since she received an IV earlier today and has been receiving her medications on time, she states that her pain has been well controlled. No side effects with hydromorphone IV, including sedation. She states that she continues to have difficulty with PO intake, including liquids and solids.     Current Medication Regimen  acetaminophen     Tablet .. 650 milliGRAM(s) Oral every 6 hours PRN  dextrose 5% + lactated ringers. 1000 milliLiter(s) IV Continuous <Continuous>  enoxaparin Injectable 40 milliGRAM(s) SubCutaneous every 24 hours  folic acid 1 milliGRAM(s) Oral daily  gabapentin 300 milliGRAM(s) Oral three times a day  HYDROmorphone  Injectable 1 milliGRAM(s) IV Push every 4 hours PRN  influenza   Vaccine 0.5 milliLiter(s) IntraMuscular once  ketorolac   Injectable 15 milliGRAM(s) IV Push every 8 hours PRN  LORazepam   Injectable 2 milliGRAM(s) IV Push every 2 hours PRN  melatonin 3 milliGRAM(s) Oral at bedtime PRN  multivitamin 1 Tablet(s) Oral daily  ondansetron Injectable 4 milliGRAM(s) IV Push every 4 hours PRN  pancrelipase  (CREON 12,000 Lipase Units) 3 Capsule(s) Oral three times a day with meals  pantoprazole    Tablet 40 milliGRAM(s) Oral before breakfast  pantoprazole  Injectable 40 milliGRAM(s) IV Push daily  potassium chloride    Tablet ER 20 milliEquivalent(s) Oral every 2 hours  thiamine 100 milliGRAM(s) Oral daily        Allergies  Compazine (Unknown)          Physical Exam  T(C): 36.8 (02-17-23 @ 13:05), Max: 36.8 (02-17-23 @ 13:05)  HR: 70 (02-17-23 @ 13:05) (68 - 89)  BP: 140/87 (02-17-23 @ 13:05) (118/78 - 158/91)  RR: 16 (02-17-23 @ 13:05) (16 - 19)  SpO2: 93% (02-17-23 @ 13:05) (93% - 100%)  Gen: NAD  Eyes: wears glasses, no scleral icterus  Head: Normocephalic / Atraumatic  CV: no JVD  Lungs: nonlabored breathing  Abdomen: mildly distended, diffusely tender  : no adam catheter in place  Neuro: AOx3, Cranial nerves intact  Extremities: full ROM in upper/lower extremities  Psych: normal affect      Labs  CBC  2.25 > 11.6 g/dL / 33.5 % < 139 K/uL      Imaging  CT Abdomen/Pelvis (2/15/2023)  FINDINGS:    LOWER CHEST: Unremarkable.    HEPATOBILIARY: Diffuse hepatic steatosis.    SPLEEN: Unremarkable.    PANCREAS: Punctate calcification seen throughout the pancreatic   parenchyma. Borderline dilated main pancreatic duct measures up to 4 mm.   Minimal fat stranding seen surrounding the pancreatic body and tail.    ADRENAL GLANDS: Unremarkable.    KIDNEYS: Unremarkable right kidney. Hypodense foci within the left kidney   and areas of cortical scarring, likely sequela of prior infection.    ABDOMINOPELVIC NODES: No lymphadenopathy.    PELVIC ORGANS: Unremarkable.    PERITONEUM/MESENTERY/BOWEL: No bowel obstruction, ascites or   pneumoperitoneum.    BONES/SOFT TISSUES: Degenerative changes of the spine at L5-S1. Bilateral   L5 pars defects without significant spondylolisthesis.    IMPRESSION:  1.  Diffuse pancreatic calcifications and mild ductal dilation with   minimal fat stranding surrounding the pancreatic body and tail. Findings   are suggestive of acute on chronic pancreatitis.  2.  Hepatic steatosis.  3.  Left renal cortical scarring, likely sequela of prior infection.

## 2023-02-18 PROCEDURE — 99232 SBSQ HOSP IP/OBS MODERATE 35: CPT

## 2023-02-18 RX ORDER — PANTOPRAZOLE SODIUM 20 MG/1
40 TABLET, DELAYED RELEASE ORAL
Refills: 0 | Status: DISCONTINUED | OUTPATIENT
Start: 2023-02-18 | End: 2023-02-21

## 2023-02-18 RX ORDER — POTASSIUM CHLORIDE 20 MEQ
20 PACKET (EA) ORAL
Refills: 0 | Status: COMPLETED | OUTPATIENT
Start: 2023-02-18 | End: 2023-02-18

## 2023-02-18 RX ORDER — SODIUM CHLORIDE 9 MG/ML
1000 INJECTION INTRAMUSCULAR; INTRAVENOUS; SUBCUTANEOUS
Refills: 0 | Status: DISCONTINUED | OUTPATIENT
Start: 2023-02-18 | End: 2023-02-19

## 2023-02-18 RX ORDER — POTASSIUM CHLORIDE 20 MEQ
20 PACKET (EA) ORAL
Refills: 0 | Status: DISCONTINUED | OUTPATIENT
Start: 2023-02-18 | End: 2023-02-18

## 2023-02-18 RX ADMIN — Medication 15 MILLIGRAM(S): at 15:50

## 2023-02-18 RX ADMIN — HYDROMORPHONE HYDROCHLORIDE 1 MILLIGRAM(S): 2 INJECTION INTRAMUSCULAR; INTRAVENOUS; SUBCUTANEOUS at 23:07

## 2023-02-18 RX ADMIN — Medication 3 CAPSULE(S): at 13:40

## 2023-02-18 RX ADMIN — GABAPENTIN 300 MILLIGRAM(S): 400 CAPSULE ORAL at 13:40

## 2023-02-18 RX ADMIN — ENOXAPARIN SODIUM 40 MILLIGRAM(S): 100 INJECTION SUBCUTANEOUS at 08:55

## 2023-02-18 RX ADMIN — HYDROMORPHONE HYDROCHLORIDE 1 MILLIGRAM(S): 2 INJECTION INTRAMUSCULAR; INTRAVENOUS; SUBCUTANEOUS at 15:05

## 2023-02-18 RX ADMIN — Medication 1 MILLIGRAM(S): at 13:40

## 2023-02-18 RX ADMIN — HYDROMORPHONE HYDROCHLORIDE 1 MILLIGRAM(S): 2 INJECTION INTRAMUSCULAR; INTRAVENOUS; SUBCUTANEOUS at 16:50

## 2023-02-18 RX ADMIN — Medication 100 MILLIGRAM(S): at 13:40

## 2023-02-18 RX ADMIN — PANTOPRAZOLE SODIUM 40 MILLIGRAM(S): 20 TABLET, DELAYED RELEASE ORAL at 13:41

## 2023-02-18 RX ADMIN — SODIUM CHLORIDE 150 MILLILITER(S): 9 INJECTION, SOLUTION INTRAVENOUS at 03:21

## 2023-02-18 RX ADMIN — HYDROMORPHONE HYDROCHLORIDE 1 MILLIGRAM(S): 2 INJECTION INTRAMUSCULAR; INTRAVENOUS; SUBCUTANEOUS at 19:33

## 2023-02-18 RX ADMIN — Medication 20 MILLIEQUIVALENT(S): at 13:41

## 2023-02-18 RX ADMIN — HYDROMORPHONE HYDROCHLORIDE 1 MILLIGRAM(S): 2 INJECTION INTRAMUSCULAR; INTRAVENOUS; SUBCUTANEOUS at 11:50

## 2023-02-18 RX ADMIN — Medication 15 MILLIGRAM(S): at 02:24

## 2023-02-18 RX ADMIN — Medication 2 CAPSULE(S): at 08:55

## 2023-02-18 RX ADMIN — HYDROMORPHONE HYDROCHLORIDE 1 MILLIGRAM(S): 2 INJECTION INTRAMUSCULAR; INTRAVENOUS; SUBCUTANEOUS at 03:24

## 2023-02-18 RX ADMIN — GABAPENTIN 300 MILLIGRAM(S): 400 CAPSULE ORAL at 05:24

## 2023-02-18 RX ADMIN — HYDROMORPHONE HYDROCHLORIDE 1 MILLIGRAM(S): 2 INJECTION INTRAMUSCULAR; INTRAVENOUS; SUBCUTANEOUS at 19:03

## 2023-02-18 RX ADMIN — Medication 3 CAPSULE(S): at 17:27

## 2023-02-18 RX ADMIN — Medication 20 MILLIEQUIVALENT(S): at 17:26

## 2023-02-18 RX ADMIN — Medication 15 MILLIGRAM(S): at 13:42

## 2023-02-18 RX ADMIN — HYDROMORPHONE HYDROCHLORIDE 1 MILLIGRAM(S): 2 INJECTION INTRAMUSCULAR; INTRAVENOUS; SUBCUTANEOUS at 06:23

## 2023-02-18 RX ADMIN — SODIUM CHLORIDE 75 MILLILITER(S): 9 INJECTION INTRAMUSCULAR; INTRAVENOUS; SUBCUTANEOUS at 17:27

## 2023-02-18 RX ADMIN — HYDROMORPHONE HYDROCHLORIDE 1 MILLIGRAM(S): 2 INJECTION INTRAMUSCULAR; INTRAVENOUS; SUBCUTANEOUS at 02:06

## 2023-02-18 RX ADMIN — ONDANSETRON 4 MILLIGRAM(S): 8 TABLET, FILM COATED ORAL at 01:04

## 2023-02-18 RX ADMIN — Medication 20 MILLIEQUIVALENT(S): at 16:40

## 2023-02-18 RX ADMIN — GABAPENTIN 300 MILLIGRAM(S): 400 CAPSULE ORAL at 21:58

## 2023-02-18 RX ADMIN — HYDROMORPHONE HYDROCHLORIDE 1 MILLIGRAM(S): 2 INJECTION INTRAMUSCULAR; INTRAVENOUS; SUBCUTANEOUS at 10:19

## 2023-02-18 RX ADMIN — HYDROMORPHONE HYDROCHLORIDE 1 MILLIGRAM(S): 2 INJECTION INTRAMUSCULAR; INTRAVENOUS; SUBCUTANEOUS at 07:23

## 2023-02-18 RX ADMIN — Medication 15 MILLIGRAM(S): at 00:52

## 2023-02-18 NOTE — PROGRESS NOTE ADULT - SUBJECTIVE AND OBJECTIVE BOX
LAURA BARAJAS  [facility] [address] ([facility])      Patient was evaluated and examined  by bedside, sitting comfortably in a bed, tolerating clear liquid diet with crackers, c/o abdominal pain, no fever        REVIEW OF SYSTEMS:  please see pertinent positives mentioned above, all other 12 ROS negative      T(C): , Max: 37.2 (02-18-23 @ 13:05)  HR: 62 (02-18-23 @ 13:05)  BP: 136/76 (02-18-23 @ 13:05)  RR: 14 (02-18-23 @ 13:05)  SpO2: 97% (02-18-23 @ 04:15)  CAPILLARY BLOOD GLUCOSE          PHYSICAL EXAM:  General: NAD, AAOX3, patient is sitting comfortably in bed  HEENT: AT, NC, Supple, NO JVD, NO CB  Lungs: CTA B/L, no wheezing, no rhonchi  CVS: normal S1, S2, RRR, NO M/G/R  Abdomen: soft, bowel sounds present, non-tender, non-distended  Extremities: no edema, no clubbing, no cyanosis, positive peripheral pulses b/l  Neuro: no acute focal neurological deficits  Skin: no rash, no ecchymosis      LAB  CBC  Date: 02-17-23 @ 07:05  Mean cell Xdcyjsycyp11.4  Mean cell Hemoglobin Conc34.6  Mean cell Volum 96.5  Platelet count-Automate 139  RBC Count 3.47  Red Cell Distrib Width11.6  WBC Count2.25  % Albumin, Urine--  Hematocrit 33.5  Hemoglobin 11.6  CBC  Date: 02-16-23 @ 08:00  Mean cell Bbsvkkmsuc73.8  Mean cell Hemoglobin Conc35.3  Mean cell Volum 95.5  Platelet count-Automate 106  RBC Count 3.14  Red Cell Distrib Width11.9  WBC Count2.13  % Albumin, Urine--  Hematocrit 30.0  Hemoglobin 10.6  CBC  Date: 02-15-23 @ 12:20  Mean cell Yynjtbrwxy24.8  Mean cell Hemoglobin Conc36.0  Mean cell Volum 93.8  Platelet count-Automate 128  RBC Count 3.70  Red Cell Distrib Width11.6  WBC Count4.63  % Albumin, Urine--  Hematocrit 34.7  Hemoglobin 12.5    BMP  02-17-23 @ 07:05  Blood Gas Arterial-Calcium,Ionized--  Blood Urea Nitrogen, Serum 5 mg/dL<L> [10 - 20]  Carbon Dioxide, Serum23 mmol/L [17 - 32]  Chloride, Serum94 mmol/L<L> [98 - 110]  Creatinie, Serum0.6 mg/dL<L> [0.7 - 1.5]  Glucose, Serum65 mg/dL<L> [70 - 99]  Potassium, Serum3.0 mmol/L<L> [3.5 - 5.0]  Sodium, Serum 136 mmol/L [135 - 146]  [facility]  02-16-23 @ 08:00  Blood Gas Arterial-Calcium,Ionized--  Blood Urea Nitrogen, Serum 9 mg/dL<L> [10 - 20]  Carbon Dioxide, Serum23 mmol/L [17 - 32]  Chloride, Serum99 mmol/L [98 - 110]  Creatinie, Serum0.6 mg/dL<L> [0.7 - 1.5]  Glucose, Serum90 mg/dL [70 - 99]  Potassium, Serum3.2 mmol/L<L> [3.5 - 5.0]  Sodium, Serum 135 mmol/L [135 - 146]  [facility]  02-15-23 @ 12:20  Blood Gas Arterial-Calcium,Ionized--  Blood Urea Nitrogen, Serum 10 mg/dL [10 - 20]  Carbon Dioxide, Serum19 mmol/L [17 - 32]  Chloride, Serum98 mmol/L [98 - 110]  Creatinie, Serum0.7 mg/dL [0.7 - 1.5]  Glucose, Pdkyo114 mg/dL<H> [70 - 99]  Potassium, Serum5.3 mmol/L<H> [3.5 - 5.0] [Hemolyzed. Interpret with caution]  Sodium, Serum 133 mmol/L<L> [135 - 146]        Medications:  acetaminophen     Tablet .. 650 milliGRAM(s) Oral every 6 hours PRN  enoxaparin Injectable 40 milliGRAM(s) SubCutaneous every 24 hours  folic acid 1 milliGRAM(s) Oral daily  gabapentin 300 milliGRAM(s) Oral three times a day  HYDROmorphone  Injectable 1 milliGRAM(s) IV Push every 4 hours PRN  influenza   Vaccine 0.5 milliLiter(s) IntraMuscular once  ketorolac   Injectable 15 milliGRAM(s) IV Push every 6 hours PRN  LORazepam   Injectable 2 milliGRAM(s) IV Push every 2 hours PRN  melatonin 3 milliGRAM(s) Oral at bedtime PRN  multivitamin 1 Tablet(s) Oral daily  ondansetron Injectable 4 milliGRAM(s) IV Push every 4 hours PRN  pancrelipase  (CREON 12,000 Lipase Units) 3 Capsule(s) Oral three times a day with meals  pantoprazole    Tablet 40 milliGRAM(s) Oral before breakfast  pantoprazole    Tablet 40 milliGRAM(s) Oral before breakfast  potassium chloride   Powder 20 milliEquivalent(s) Oral every 2 hours  sodium chloride 0.9%. 1000 milliLiter(s) IV Continuous <Continuous>  thiamine 100 milliGRAM(s) Oral daily        Assessment and Plan:  46 y/o F with hx of Alcohol related chronic pancreatitis and  had a surgery for pancreatic cyst at [facility] now presents to ED for epigastric abdominal pain a/w vomiting started since last night.     Acute on chronic pancreatitis   - CT abdomen shows Diffuse pancreatic calcifications and mild ductal dilation with minimal fat stranding surrounding the pancreatic body and tail. Findings are suggestive of acute on chronic pancreatitis.  - on clear liquid diet - advance as tolerated   - c/w IVF NS at 75 cc/hr  - supportive care/ pain management as per Pain Specialist rec.     Hypokalemia - supplemented, f/up am BMP       Transaminitis - due to alcohol use   -- monitor LFT    Alcohol abuse/Thiamin deficiency  - [facility] protocol  - thiamine, folic acid and multivitamin     Mold exposure   - CXR unremarkable.     DVT ppx: Lovenox SC  GI ppx: daily   PPI   Activity: as tolerated.     #Progress Note Handoff: continue IVF, patient tolerating diet well , advanced to regular diet, patient anticipated for d/c home tomorrow    Family discussion: medical plan of tx. d/w pt. by bedside Disposition: Home__ tomorrow     Total time spent to complete patient's bedside assessment, review medical chart, discuss medical plan of care with covering medical team was more than 35 minutes with >50% of time spent face to face with patient, discussion with patient/family and/or coordination of care .

## 2023-02-19 ENCOUNTER — TRANSCRIPTION ENCOUNTER (OUTPATIENT)
Age: 48
End: 2023-02-19

## 2023-02-19 LAB
ANION GAP SERPL CALC-SCNC: 11 MMOL/L — SIGNIFICANT CHANGE UP (ref 7–14)
BUN SERPL-MCNC: <3 MG/DL — LOW (ref 10–20)
CALCIUM SERPL-MCNC: 8.9 MG/DL — SIGNIFICANT CHANGE UP (ref 8.4–10.5)
CHLORIDE SERPL-SCNC: 103 MMOL/L — SIGNIFICANT CHANGE UP (ref 98–110)
CO2 SERPL-SCNC: 27 MMOL/L — SIGNIFICANT CHANGE UP (ref 17–32)
CREAT SERPL-MCNC: 0.6 MG/DL — LOW (ref 0.7–1.5)
EGFR: 113 ML/MIN/1.73M2 — SIGNIFICANT CHANGE UP
GLUCOSE SERPL-MCNC: 81 MG/DL — SIGNIFICANT CHANGE UP (ref 70–99)
POTASSIUM SERPL-MCNC: 3.6 MMOL/L — SIGNIFICANT CHANGE UP (ref 3.5–5)
POTASSIUM SERPL-SCNC: 3.6 MMOL/L — SIGNIFICANT CHANGE UP (ref 3.5–5)
SODIUM SERPL-SCNC: 141 MMOL/L — SIGNIFICANT CHANGE UP (ref 135–146)

## 2023-02-19 PROCEDURE — 99232 SBSQ HOSP IP/OBS MODERATE 35: CPT

## 2023-02-19 RX ORDER — HYDROMORPHONE HYDROCHLORIDE 2 MG/ML
1 INJECTION INTRAMUSCULAR; INTRAVENOUS; SUBCUTANEOUS EVERY 6 HOURS
Refills: 0 | Status: DISCONTINUED | OUTPATIENT
Start: 2023-02-19 | End: 2023-02-19

## 2023-02-19 RX ORDER — SODIUM CHLORIDE 9 MG/ML
1000 INJECTION INTRAMUSCULAR; INTRAVENOUS; SUBCUTANEOUS
Refills: 0 | Status: DISCONTINUED | OUTPATIENT
Start: 2023-02-19 | End: 2023-02-20

## 2023-02-19 RX ORDER — HYDROMORPHONE HYDROCHLORIDE 2 MG/ML
2 INJECTION INTRAMUSCULAR; INTRAVENOUS; SUBCUTANEOUS EVERY 6 HOURS
Refills: 0 | Status: DISCONTINUED | OUTPATIENT
Start: 2023-02-19 | End: 2023-02-21

## 2023-02-19 RX ADMIN — HYDROMORPHONE HYDROCHLORIDE 1 MILLIGRAM(S): 2 INJECTION INTRAMUSCULAR; INTRAVENOUS; SUBCUTANEOUS at 04:41

## 2023-02-19 RX ADMIN — HYDROMORPHONE HYDROCHLORIDE 2 MILLIGRAM(S): 2 INJECTION INTRAMUSCULAR; INTRAVENOUS; SUBCUTANEOUS at 20:13

## 2023-02-19 RX ADMIN — Medication 3 CAPSULE(S): at 12:12

## 2023-02-19 RX ADMIN — Medication 1 TABLET(S): at 12:11

## 2023-02-19 RX ADMIN — HYDROMORPHONE HYDROCHLORIDE 2 MILLIGRAM(S): 2 INJECTION INTRAMUSCULAR; INTRAVENOUS; SUBCUTANEOUS at 13:21

## 2023-02-19 RX ADMIN — SODIUM CHLORIDE 75 MILLILITER(S): 9 INJECTION INTRAMUSCULAR; INTRAVENOUS; SUBCUTANEOUS at 09:05

## 2023-02-19 RX ADMIN — ONDANSETRON 4 MILLIGRAM(S): 8 TABLET, FILM COATED ORAL at 21:30

## 2023-02-19 RX ADMIN — HYDROMORPHONE HYDROCHLORIDE 2 MILLIGRAM(S): 2 INJECTION INTRAMUSCULAR; INTRAVENOUS; SUBCUTANEOUS at 19:43

## 2023-02-19 RX ADMIN — HYDROMORPHONE HYDROCHLORIDE 1 MILLIGRAM(S): 2 INJECTION INTRAMUSCULAR; INTRAVENOUS; SUBCUTANEOUS at 00:07

## 2023-02-19 RX ADMIN — GABAPENTIN 300 MILLIGRAM(S): 400 CAPSULE ORAL at 05:18

## 2023-02-19 RX ADMIN — ENOXAPARIN SODIUM 40 MILLIGRAM(S): 100 INJECTION SUBCUTANEOUS at 08:04

## 2023-02-19 RX ADMIN — GABAPENTIN 300 MILLIGRAM(S): 400 CAPSULE ORAL at 13:23

## 2023-02-19 RX ADMIN — Medication 15 MILLIGRAM(S): at 12:53

## 2023-02-19 RX ADMIN — GABAPENTIN 300 MILLIGRAM(S): 400 CAPSULE ORAL at 21:11

## 2023-02-19 RX ADMIN — HYDROMORPHONE HYDROCHLORIDE 1 MILLIGRAM(S): 2 INJECTION INTRAMUSCULAR; INTRAVENOUS; SUBCUTANEOUS at 08:23

## 2023-02-19 RX ADMIN — ONDANSETRON 4 MILLIGRAM(S): 8 TABLET, FILM COATED ORAL at 08:24

## 2023-02-19 RX ADMIN — HYDROMORPHONE HYDROCHLORIDE 1 MILLIGRAM(S): 2 INJECTION INTRAMUSCULAR; INTRAVENOUS; SUBCUTANEOUS at 04:11

## 2023-02-19 RX ADMIN — Medication 15 MILLIGRAM(S): at 18:39

## 2023-02-19 RX ADMIN — Medication 1 MILLIGRAM(S): at 12:11

## 2023-02-19 RX ADMIN — Medication 3 CAPSULE(S): at 17:54

## 2023-02-19 RX ADMIN — PANTOPRAZOLE SODIUM 40 MILLIGRAM(S): 20 TABLET, DELAYED RELEASE ORAL at 05:18

## 2023-02-19 RX ADMIN — Medication 3 CAPSULE(S): at 08:04

## 2023-02-19 RX ADMIN — SODIUM CHLORIDE 30 MILLILITER(S): 9 INJECTION INTRAMUSCULAR; INTRAVENOUS; SUBCUTANEOUS at 21:36

## 2023-02-19 RX ADMIN — Medication 100 MILLIGRAM(S): at 12:11

## 2023-02-19 NOTE — DISCHARGE NOTE PROVIDER - CARE PROVIDERS DIRECT ADDRESSES
,[email or web address]

## 2023-02-19 NOTE — DISCHARGE NOTE PROVIDER - PROVIDER TOKENS
PROVIDER:[TOKEN:[[id number]]] PROVIDER:[TOKEN:[[id number]]],PROVIDER:[TOKEN:[[id number]],FOLLOWUP:[2 weeks]]

## 2023-02-19 NOTE — DISCHARGE NOTE PROVIDER - NSDCMRMEDTOKEN_GEN_ALL_CORE_FT
Creon 12,000 units oral delayed release capsule: 3 cap(s) orally 3 times a day (with meals)   Creon 12,000 units oral delayed release capsule: 3 cap(s) orally 3 times a day (with meals)  folic acid 1 mg oral tablet: 1 tab(s) orally once a day  Multiple Vitamins oral tablet: 1 tab(s) orally once a day  ondansetron 4 mg oral tablet: 1 tab(s) orally every 8 hours   pantoprazole 40 mg oral delayed release tablet: 1 tab(s) orally once a day (before a meal)  thiamine 100 mg oral tablet: 1 tab(s) orally once a day

## 2023-02-19 NOTE — PROGRESS NOTE ADULT - SUBJECTIVE AND OBJECTIVE BOX
BARAJAS LAURA  [facility]-N 3A 004 B (SI-N 3A)      Patient was evaluated and examined  by bedside, still c/o persistent severe abdominal pain, requests to get IV Dilaudid, no vomiting, c/o nausea, no fever, no dysuria        REVIEW OF SYSTEMS:  please see pertinent positives mentioned above, all other 12 ROS negative      T(C): , Max: 37.2 (02-18-23 @ 13:05)  HR: 68 (02-19-23 @ 04:30)  BP: 124/68 (02-19-23 @ 04:30)  RR: 18 (02-19-23 @ 04:30)  SpO2: 97% (02-19-23 @ 04:30)  CAPILLARY BLOOD GLUCOSE          PHYSICAL EXAM:  General: NAD, AAOX3, patient is sitting  comfortably in bed  HEENT: AT, NC, Supple, NO JVD, NO CB  Lungs: CTA B/L, no wheezing, no rhonchi  CVS: normal S1, S2, RRR, NO M/G/R  Abdomen: soft, bowel sounds present, non-tender, non-distended  Extremities: no edema, no clubbing, no cyanosis, positive peripheral pulses b/l  Neuro: no acute focal neurological deficits  Skin: no rash, no ecchymosis      LAB  CBC  Date: 02-17-23 @ 07:05  Mean cell Vcdftsegrr93.4  Mean cell Hemoglobin Conc34.6  Mean cell Volum 96.5  Platelet count-Automate 139  RBC Count 3.47  Red Cell Distrib Width11.6  WBC Count2.25  % Albumin, Urine--  Hematocrit 33.5  Hemoglobin 11.6  CBC  Date: 02-16-23 @ 08:00  Mean cell Zwsvxlfrck78.8  Mean cell Hemoglobin Conc35.3  Mean cell Volum 95.5  Platelet count-Automate 106  RBC Count 3.14  Red Cell Distrib Width11.9  WBC Count2.13  % Albumin, Urine--  Hematocrit 30.0  Hemoglobin 10.6  CBC  Date: 02-15-23 @ 12:20  Mean cell Jrhwsmaalc59.8  Mean cell Hemoglobin Conc36.0  Mean cell Volum 93.8  Platelet count-Automate 128  RBC Count 3.70  Red Cell Distrib Width11.6  WBC Count4.63  % Albumin, Urine--  Hematocrit 34.7  Hemoglobin 12.5    BMP  02-19-23 @ 06:51  Blood Gas Arterial-Calcium,Ionized--  Blood Urea Nitrogen, Serum <3 mg/dL<L> [10 - 20]  Carbon Dioxide, Serum27 mmol/L [17 - 32]  Chloride, Hngwv009 mmol/L [98 - 110]  Creatinie, Serum0.6 mg/dL<L> [0.7 - 1.5]  Glucose, Serum81 mg/dL [70 - 99]  Potassium, Serum3.6 mmol/L [3.5 - 5.0]  Sodium, Serum 141 mmol/L [135 - 146]  [facility]  02-17-23 @ 07:05  Blood Gas Arterial-Calcium,Ionized--  Blood Urea Nitrogen, Serum 5 mg/dL<L> [10 - 20]  Carbon Dioxide, Serum23 mmol/L [17 - 32]  Chloride, Serum94 mmol/L<L> [98 - 110]  Creatinie, Serum0.6 mg/dL<L> [0.7 - 1.5]  Glucose, Serum65 mg/dL<L> [70 - 99]  Potassium, Serum3.0 mmol/L<L> [3.5 - 5.0]  Sodium, Serum 136 mmol/L [135 - 146]  [facility]  02-16-23 @ 08:00  Blood Gas Arterial-Calcium,Ionized--  Blood Urea Nitrogen, Serum 9 mg/dL<L> [10 - 20]  Carbon Dioxide, Serum23 mmol/L [17 - 32]  Chloride, Serum99 mmol/L [98 - 110]  Creatinie, Serum0.6 mg/dL<L> [0.7 - 1.5]  Glucose, Serum90 mg/dL [70 - 99]  Potassium, Serum3.2 mmol/L<L> [3.5 - 5.0]  Sodium, Serum 135 mmol/L [135 - 146]  [facility]  02-15-23 @ 12:20  Blood Gas Arterial-Calcium,Ionized--  Blood Urea Nitrogen, Serum 10 mg/dL [10 - 20]  Carbon Dioxide, Serum19 mmol/L [17 - 32]  Chloride, Serum98 mmol/L [98 - 110]  Creatinie, Serum0.7 mg/dL [0.7 - 1.5]  Glucose, Jofki512 mg/dL<H> [70 - 99]  Potassium, Serum5.3 mmol/L<H> [3.5 - 5.0] [Hemolyzed. Interpret with caution]  Sodium, Serum 133 mmol/L<L> [135 - 146]        Medications:  acetaminophen     Tablet .. 650 milliGRAM(s) Oral every 6 hours PRN  enoxaparin Injectable 40 milliGRAM(s) SubCutaneous every 24 hours  folic acid 1 milliGRAM(s) Oral daily  gabapentin 300 milliGRAM(s) Oral three times a day  HYDROmorphone  Injectable 1 milliGRAM(s) IV Push every 4 hours PRN  influenza   Vaccine 0.5 milliLiter(s) IntraMuscular once  ketorolac   Injectable 15 milliGRAM(s) IV Push every 6 hours PRN  LORazepam   Injectable 2 milliGRAM(s) IV Push every 2 hours PRN  melatonin 3 milliGRAM(s) Oral at bedtime PRN  multivitamin 1 Tablet(s) Oral daily  ondansetron Injectable 4 milliGRAM(s) IV Push every 4 hours PRN  pancrelipase  (CREON 12,000 Lipase Units) 3 Capsule(s) Oral three times a day with meals  pantoprazole    Tablet 40 milliGRAM(s) Oral before breakfast  pantoprazole    Tablet 40 milliGRAM(s) Oral before breakfast  sodium chloride 0.9%. 1000 milliLiter(s) IV Continuous <Continuous>  thiamine 100 milliGRAM(s) Oral daily        Assessment and Plan:  Patient is a 46 y/o F with hx of Alcohol related chronic pancreatitis and  had a surgery for pancreatic cyst at [facility] now presents to ED for epigastric abdominal pain a/w vomiting on the day of admission, dx. with acute on chronic pancreatitis.    Acute on chronic pancreatitis   - CT abdomen shows Diffuse pancreatic calcifications and mild ductal dilation with minimal fat stranding surrounding the pancreatic body and tail. Findings are suggestive of acute on chronic pancreatitis.  - advanced to regular diet , so far no vomiting, patient tolerating small meals with creon supplement  - will transition to PO Dilaudid tx. , d/c IVF. start d/c home planning with outpatient GI f/up  - patient was instructed to avoid  alcohol consumption post d/c home   - supportive care/ pain management as per Pain Specialist rec.     Hypokalemia - supplemented, f/up am BMP       Transaminitis - due to alcohol use   -- monitor LFT    Alcohol abuse/Thiamin deficiency  - [facility] protocol  - thiamine, folic acid and multivitamin     Mold exposure   - CXR unremarkable.     DVT ppx: Lovenox SC  GI ppx: daily   PPI   Activity: as tolerated.     #Progress Note Handoff: patient tolerating diet , still requests to receive dilaudid, with pt's benign abd. exam- no tenderness during palpation, no vomiting, will transition to PO Dilaudid and start d/c home planning   Family discussion: medical plan of tx. d/w pt. by bedside Disposition: Home__ possibly today     Total time spent to complete patient's bedside assessment, review medical chart, discuss medical plan of care with covering medical team was more than 35 minutes with >50% of time spent face to face with patient, discussion with patient/family and/or coordination of care .

## 2023-02-19 NOTE — DISCHARGE NOTE PROVIDER - CARE PROVIDER_API CALL
Haley Cobos)  Internal Medicine  [address] [place] [address], [address]st [place] [address]  Phone: [phone]  Fax: [phone]  Follow Up Time:    Haley Cobos)  Internal Medicine  [address] [facility], [address]st [place] [address]  Phone: [phone]  Fax: [phone]  Follow Up Time:     Beckie López)  Residency  Medicine  [address] [place] [address]  Phone: [phone]  Fax: [phone]  Follow Up Time: 2 weeks

## 2023-02-19 NOTE — DISCHARGE NOTE PROVIDER - NSDCFUADDINST_GEN_ALL_CORE_FT
Please follow up with the GI clinic for follow up on your chronic pancreatitis.  Please follow up with the GI clinic for follow up on your chronic pancreatitis and your elevated liver enzymes on an outpatient basis.

## 2023-02-19 NOTE — DISCHARGE NOTE PROVIDER - HOSPITAL COURSE
HPI:  47-year-old female history of alcohol related chronic pancreatitis chronic last admission here 6 months ago.  Had a surgery for pancreatic cyst at [facility].  Now presents with 2 complaints.  1 ) epigastric abdominal pain and vomiting and retching since last night.  Nonbilious nonbloody.  No diarrhea.  No chills.  Pain and vomiting consistent with her previous episodes of chronic pancreatitis.  2) cough for 2 months after exposure to mold.  But no shortness of breath.  No fever. She reports that since 3AM, she began having a sharp abdominal pain radiating to her back. This was associated when a NBNB vomiting. No other associated symptoms. She had 3 glasses of wine prior to her abdominal pain. Pt admitted for further management.  (15 Feb 2023 15:52)    Hospital Course: Following issues were addressed-    #Acute on chronic pancreatitis   - CT abdomen shows Diffuse pancreatic calcifications and mild ductal dilation with minimal fat stranding surrounding the pancreatic body and tail. Findings are suggestive of acute on chronic pancreatitis.  - patient tolerating small meals with creon supplement  - transitioned to PO Dilaudid tx. outpatient GI f/up  - patient was instructed to avoid  alcohol consumption post d/c home   - supportive care/ pain management as per Pain Specialist rec.     Transaminitis - due to alcohol use   -- monitored LFT    Alcohol abuse/Thiamine deficiency  - [facility] protocol  - thiamine, folic acid and multivitamin     Mold exposure   - CXR unremarkable.     Pt seen today and stable for discharge HPI:  47-year-old female history of alcohol related chronic pancreatitis chronic last admission here 6 months ago.  Had a surgery for pancreatic cyst at [facility].  Now presents with 2 complaints.  1 ) epigastric abdominal pain and vomiting and retching since last night.  Nonbilious nonbloody.  No diarrhea.  No chills.  Pain and vomiting consistent with her previous episodes of chronic pancreatitis.  2) cough for 2 months after exposure to mold.  But no shortness of breath.  No fever. She reports that since 3AM, she began having a sharp abdominal pain radiating to her back. This was associated when a NBNB vomiting. No other associated symptoms. She had 3 glasses of wine prior to her abdominal pain. Pt admitted for further management.  (15 Feb 2023 15:52)    Hospital Course: Following issues were addressed-    #Acute on chronic pancreatitis     - CT abdomen shows Diffuse pancreatic calcifications and mild ductal dilation with minimal fat stranding surrounding the pancreatic body and tail. Findings are suggestive of acute on chronic pancreatitis.  - patient tolerating small meals with creon supplement  - transitioned to PO Dilaudid tx. outpatient GI f/up  - patient was instructed to avoid  alcohol consumption post d/c home   - Patient was seen by the pain specialist team:  1) Continue hydromorphone IV 1mg Q4h prn  2) When tolerating PO intake, transition to hydromorphone PO 2-4mg Q4h prn--would not discharge with greater than 3 day supply of opioids  3) When tolerating PO intake, start gabapentin 300mg TID  4) Provide a prescription of naloxone on discharge due to chronic benzodiazepine use.  -Patient will be reffered to GI clinic for o/p follow up and sent home with creon supplement,     Transaminitis - due to alcohol use   -- monitored LFT    Alcohol abuse/Thiamine deficiency  - [facility] protocol  - thiamine, folic acid and multivitamin, Patient will be discharged with MV and folate    Mold exposure   - CXR unremarkable.     Pt seen today and stable for discharge HPI:  47-year-old female history of alcohol related chronic pancreatitis chronic last admission here 6 months ago.  Had a surgery for pancreatic cyst at [facility].  Now presents with 2 complaints.  1 ) epigastric abdominal pain and vomiting and retching since last night.  Nonbilious nonbloody.  No diarrhea.  No chills.  Pain and vomiting consistent with her previous episodes of chronic pancreatitis.  2) cough for 2 months after exposure to mold.  But no shortness of breath.  No fever. She reports that since 3AM, she began having a sharp abdominal pain radiating to her back. This was associated when a NBNB vomiting. No other associated symptoms. She had 3 glasses of wine prior to her abdominal pain. Pt admitted for further management.      Hospital Course: Following issues were addressed-    #Acute on chronic pancreatitis     - CT abdomen shows Diffuse pancreatic calcifications and mild ductal dilation with minimal fat stranding surrounding the pancreatic body and tail. Findings are suggestive of acute on chronic pancreatitis.  - patient tolerating small meals with creon supplement  - transitioned to PO Dilaudid tx. outpatient GI f/up  - patient was instructed to avoid  alcohol consumption post d/c home   - Patient was seen by the pain specialist team:    When tolerating PO intake, transition to hydromorphone PO 2-4mg Q4h prn--would not discharge with greater than 3 day supply of opioids  When tolerating PO intake, start gabapentin 300mg TID  Provide a prescription of naloxone on discharge due to chronic benzodiazepine use      Transaminitis - due to alcohol use   -- monitored LFT    Alcohol abuse/Thiamine deficiency  - [facility] protocol  - thiamine, folic acid and multivitamin, Patient will be discharged with MV, thiamine and  folate    Mold exposure   - CXR unremarkable.     Pt seen today and stable for discharge HPI:  47-year-old female history of alcohol related chronic pancreatitis chronic last admission here 6 months ago.  Had a surgery for pancreatic cyst at [facility].  Now presents with 2 complaints.  1 ) epigastric abdominal pain and vomiting and retching since last night.  Nonbilious nonbloody.  No diarrhea.  No chills.  Pain and vomiting consistent with her previous episodes of chronic pancreatitis.  2) cough for 2 months after exposure to mold.  But no shortness of breath.  No fever. She reports that since 3AM, she began having a sharp abdominal pain radiating to her back. This was associated when a NBNB vomiting. No other associated symptoms. She had 3 glasses of wine prior to her abdominal pain. Pt admitted for further management.      Hospital Course: Following issues were addressed-    #Acute on chronic pancreatitis     - CT abdomen shows Diffuse pancreatic calcifications and mild ductal dilation with minimal fat stranding surrounding the pancreatic body and tail. Findings are suggestive of acute on chronic pancreatitis.  - patient tolerating small meals with creon supplement  - transitioned to PO Dilaudid tx. outpatient GI f/up  - patient was instructed to avoid  alcohol consumption post d/c home   - Patient was seen by the pain specialist team:    When tolerating PO intake, transition to hydromorphone PO 2-4mg Q4h prn--would not discharge with greater than 3 day supply of opioids  When tolerating PO intake, start gabapentin 300mg TID  Provide a prescription of naloxone on discharge due to chronic benzodiazepine use      Transaminitis - due to alcohol use   -- monitored LFT  - your lever enzymes were also noted to uptrend during your stay, please follow up with your PMD and GI team regarding these levels outpatient.     Alcohol abuse/Thiamine deficiency  - [facility] protocol  - thiamine, folic acid and multivitamin, Patient will be discharged with MV, thiamine and  folate    Mold exposure   - CXR unremarkable.     Pt seen today and stable for discharge

## 2023-02-19 NOTE — DISCHARGE NOTE PROVIDER - NSFOLLOWUPCLINICS_GEN_ALL_ED_FT
[facility]  Gastroenterology  [address] [place] [address]  Phone: [phone]  Fax: [phone]

## 2023-02-19 NOTE — DISCHARGE NOTE PROVIDER - NSDCCPCAREPLAN_GEN_ALL_CORE_FT
PRINCIPAL DISCHARGE DIAGNOSIS  Diagnosis: Acute on chronic pancreatitis  Assessment and Plan of Treatment: You came in to the hospital for pancreatitis. We treated you with IV fluids, pain control. You gradually improved and are able to tolerate oral diet. We also were able to transition you to oral pain killers. Please follow up with GI as outpatient and obstain from alcohol use       PRINCIPAL DISCHARGE DIAGNOSIS  Diagnosis: Acute on chronic pancreatitis  Assessment and Plan of Treatment: Chronic pancreatitis is permanent inflammation and scarring of the pancreas that leads to pancreatic dysfunction. The pancreas is a gland that is found behind the stomach. The pancreas makes proteins (enzymes) that help to digest food. It also releases hormones called glucagon and insulin. These help regulate blood sugar (glucose). Damage to the pancreas may affect digestion, may cause pain in the upper abdomen and back, and may cause diabetes. Inflammation can also irritate other organs in the abdomen near the pancreas.  At first, pancreatitis may be sudden (acute). When you have repeated or long-lasting episodes of acute pancreatitis, damage to the pancreas can be permanent and lead to chronic pancreatitis. Sometimes, though, there is no history of acute pancreatitis.  What increases the risk?  This condition is more likely to develop in people who:  •Are male.  •Are 35–55 years old.  •Have a family history of pancreatitis.  •Smoke tobacco.  •Drink a lot of alcohol over a long period of time.  What are the signs or symptoms?  Symptoms of this condition may include:  •Pain in the abdomen or upper back. Pain may be severe and often gets worse after you eat.  •Nausea and vomiting.  •Fever.  •Weight loss.  •A change in the color and firmness (consistency) of stool (feces), such as stools that are oily, fatty, or june-colored.  During this hospitilization you wer given IV fluids and pain control. You were found to gradually improve, you will be sent home with a Multivitamin, antinausea medication, folate.         PRINCIPAL DISCHARGE DIAGNOSIS  Diagnosis: Acute on chronic pancreatitis  Assessment and Plan of Treatment: Chronic pancreatitis is permanent inflammation and scarring of the pancreas that leads to pancreatic dysfunction. The pancreas is a gland that is found behind the stomach. The pancreas makes proteins (enzymes) that help to digest food. It also releases hormones called glucagon and insulin. These help regulate blood sugar (glucose). Damage to the pancreas may affect digestion, may cause pain in the upper abdomen and back, and may cause diabetes. Inflammation can also irritate other organs in the abdomen near the pancreas.  At first, pancreatitis may be sudden (acute). When you have repeated or long-lasting episodes of acute pancreatitis, damage to the pancreas can be permanent and lead to chronic pancreatitis. Sometimes, though, there is no history of acute pancreatitis.  What increases the risk?  This condition is more likely to develop in people who:  •Are male.  •Are 35–55 years old.  •Have a family history of pancreatitis.  •Smoke tobacco.  •Drink a lot of alcohol over a long period of time.  During this hospitilization you were initially kept on an NPO diet (nothing by mouth) and instead given IV fluids and pain control. You were found to gradually improve and were slowely transitioned to a solid food diet to asses how well you would be able to tolerate food and oral medications.  You will be sent home with a Multivitamin, antinausea medication, folate and pain medication to use after discharge.

## 2023-02-20 PROCEDURE — 99232 SBSQ HOSP IP/OBS MODERATE 35: CPT

## 2023-02-20 RX ORDER — FOLIC ACID 0.8 MG
1 TABLET ORAL
Qty: 30 | Refills: 0
Start: 2023-02-20 | End: 2023-03-21

## 2023-02-20 RX ORDER — PANTOPRAZOLE SODIUM 20 MG/1
1 TABLET, DELAYED RELEASE ORAL
Qty: 30 | Refills: 0
Start: 2023-02-20

## 2023-02-20 RX ORDER — THIAMINE MONONITRATE (VIT B1) 100 MG
1 TABLET ORAL
Qty: 30 | Refills: 0
Start: 2023-02-20

## 2023-02-20 RX ORDER — ONDANSETRON 8 MG/1
1 TABLET, FILM COATED ORAL
Qty: 15 | Refills: 0
Start: 2023-02-20 | End: 2023-02-24

## 2023-02-20 RX ORDER — HYDROMORPHONE HYDROCHLORIDE 2 MG/ML
0.5 INJECTION INTRAMUSCULAR; INTRAVENOUS; SUBCUTANEOUS ONCE
Refills: 0 | Status: DISCONTINUED | OUTPATIENT
Start: 2023-02-20 | End: 2023-02-20

## 2023-02-20 RX ADMIN — GABAPENTIN 300 MILLIGRAM(S): 400 CAPSULE ORAL at 21:12

## 2023-02-20 RX ADMIN — ENOXAPARIN SODIUM 40 MILLIGRAM(S): 100 INJECTION SUBCUTANEOUS at 07:57

## 2023-02-20 RX ADMIN — HYDROMORPHONE HYDROCHLORIDE 0.5 MILLIGRAM(S): 2 INJECTION INTRAMUSCULAR; INTRAVENOUS; SUBCUTANEOUS at 01:18

## 2023-02-20 RX ADMIN — Medication 3 CAPSULE(S): at 14:55

## 2023-02-20 RX ADMIN — Medication 15 MILLIGRAM(S): at 11:22

## 2023-02-20 RX ADMIN — GABAPENTIN 300 MILLIGRAM(S): 400 CAPSULE ORAL at 05:23

## 2023-02-20 RX ADMIN — Medication 3 CAPSULE(S): at 07:57

## 2023-02-20 RX ADMIN — ONDANSETRON 4 MILLIGRAM(S): 8 TABLET, FILM COATED ORAL at 15:29

## 2023-02-20 RX ADMIN — HYDROMORPHONE HYDROCHLORIDE 2 MILLIGRAM(S): 2 INJECTION INTRAMUSCULAR; INTRAVENOUS; SUBCUTANEOUS at 21:11

## 2023-02-20 RX ADMIN — HYDROMORPHONE HYDROCHLORIDE 0.5 MILLIGRAM(S): 2 INJECTION INTRAMUSCULAR; INTRAVENOUS; SUBCUTANEOUS at 00:48

## 2023-02-20 RX ADMIN — HYDROMORPHONE HYDROCHLORIDE 2 MILLIGRAM(S): 2 INJECTION INTRAMUSCULAR; INTRAVENOUS; SUBCUTANEOUS at 15:29

## 2023-02-20 RX ADMIN — ONDANSETRON 4 MILLIGRAM(S): 8 TABLET, FILM COATED ORAL at 09:52

## 2023-02-20 RX ADMIN — HYDROMORPHONE HYDROCHLORIDE 2 MILLIGRAM(S): 2 INJECTION INTRAMUSCULAR; INTRAVENOUS; SUBCUTANEOUS at 09:31

## 2023-02-20 RX ADMIN — Medication 100 MILLIGRAM(S): at 11:25

## 2023-02-20 RX ADMIN — PANTOPRAZOLE SODIUM 40 MILLIGRAM(S): 20 TABLET, DELAYED RELEASE ORAL at 05:23

## 2023-02-20 RX ADMIN — Medication 3 MILLIGRAM(S): at 01:16

## 2023-02-20 RX ADMIN — Medication 3 CAPSULE(S): at 17:42

## 2023-02-20 RX ADMIN — GABAPENTIN 300 MILLIGRAM(S): 400 CAPSULE ORAL at 14:55

## 2023-02-20 NOTE — PROGRESS NOTE ADULT - ASSESSMENT
46 yo F patient with a PMH of chronic pancreatitis probably alcoholic- induced, and recurrent episodes of acute pancreatitis, presents to the ED for abdominal pain.    # Acute on chronic pancreatitis  # ETOH abuse  # suspected thiamin/folate def  - CT on admission showed acute on chronic pancreatitis   - on LR at 150ml/hr  - pain appreciated. Dilauded prn for severe pain 1 mg q4, toradol prn 15, and gabapentin 300 TID once patient tolerating PO   - CIWA protocol   - continue with ondansetron as PRN, pancrelipase   - continue multivitamins, folic acid, thiamin     # Transaminitis likely ETOH induced   # Hepatic steatosis  - elevated LFT's   - CT abdomen showed diffuse hepatic steatosis   - monitor LFT     # Mold exposure  - can f/u with PCP    # DVT ppx- LMWH qd  # Activity- AAT  # Diet- as tolerated   # Code status full  # Dispo- admitted   
Patient is a 46 y/o F with hx of Alcohol related chronic pancreatitis and  had a surgery for pancreatic cyst at [facility] now presents to ED for epigastric abdominal pain a/w vomiting on the day of admission, dx. with acute on chronic pancreatitis.    Acute on chronic pancreatitis   - CT abdomen shows Diffuse pancreatic calcifications and mild ductal dilation with minimal fat stranding surrounding the pancreatic body and tail. Findings are suggestive of acute on chronic pancreatitis.  - advanced to regular diet , so far no vomiting, patient tolerating small meals with creon supplement  - On Dilaudid 2mg , d/c IVF. start d/c home planning with outpatient GI f/up  - patient was instructed to avoid  alcohol consumption post d/c home   - supportive care/ pain management as per Pain Specialist rec.     Hypokalemia - supplemented, f/up am BMP       Transaminitis - due to alcohol use   -- monitor LFT    Alcohol abuse/Thiamin deficiency  - [facility] protocol  - thiamine, folic acid and multivitamin     Mold exposure   - CXR unremarkable.     DVT ppx: Lovenox SC  GI ppx: daily   PPI   Activity: as tolerated.     
Patient is a 48 y/o woman with history of chronic pancreatitis and anxiety who was admitted on 2/15/2023 with abdominal pain, nausea, and vomiting likely 2/2 an acute exacerbation of chronic pancreatitis.

## 2023-02-20 NOTE — PROGRESS NOTE ADULT - PROVIDER SPECIALTY LIST ADULT
Hospitalist
Hospitalist
Internal Medicine
Hospitalist
Internal Medicine
Hospitalist
Pain Medicine
no

## 2023-02-20 NOTE — PROGRESS NOTE ADULT - SUBJECTIVE AND OBJECTIVE BOX
LAURA BARAJAS  [facility] 3A 004 B (SI-N 3A)      Patient was evaluated and examined  by bedside, sitting comfortably in bed, c/o on/off abdominal pain, no fever, no dysuria, no vomiting, patient was very irritated and angry that IV Dilaudid was discontinued yesterday, patient was transitioned to PO Dilaudid tx. as per Pain management recommendations,  since pt. is tolerating po diet. Patient is currently on PO Regular diet.     REVIEW OF SYSTEMS:  please see pertinent positives mentioned above, all other 12 ROS negative      T(C): , Max: 36.8 (02-19-23 @ 13:10)  HR: 85 (02-20-23 @ 04:30)  BP: 121/63 (02-20-23 @ 04:30)  RR: 18 (02-20-23 @ 04:30)  SpO2: 95% (02-20-23 @ 04:30)  CAPILLARY BLOOD GLUCOSE          PHYSICAL EXAM:  General: NAD, AAOX3, patient is laying comfortably in bed  HEENT: AT, NC, Supple, NO JVD, NO CB  Lungs: CTA B/L, no wheezing, no rhonchi  CVS: normal S1, S2, RRR, NO M/G/R  Abdomen: soft, bowel sounds present, tender- during light palpation(patient is masking the pain) even when stethoscope was placed superficially for bowel sounds auscultation  patient was reporting tenderness, non-distended, no palpable masses.  Extremities: no edema, no clubbing, no cyanosis, positive peripheral pulses b/l  Neuro: no acute focal neurological deficits  Skin: no rash, no ecchymosis, small bruise noted on lower abd. wall area      LAB  CBC  Date: 02-17-23 @ 07:05  Mean cell Ezwxafdicy17.4  Mean cell Hemoglobin Conc34.6  Mean cell Volum 96.5  Platelet count-Automate 139  RBC Count 3.47  Red Cell Distrib Width11.6  WBC Count2.25  % Albumin, Urine--  Hematocrit 33.5  Hemoglobin 11.6  CBC  Date: 02-16-23 @ 08:00  Mean cell Ioumtkvdee45.8  Mean cell Hemoglobin Conc35.3  Mean cell Volum 95.5  Platelet count-Automate 106  RBC Count 3.14  Red Cell Distrib Width11.9  WBC Count2.13  % Albumin, Urine--  Hematocrit 30.0  Hemoglobin 10.6  CBC  Date: 02-15-23 @ 12:20  Mean cell Fmemydhrxb53.8  Mean cell Hemoglobin Conc36.0  Mean cell Volum 93.8  Platelet count-Automate 128  RBC Count 3.70  Red Cell Distrib Width11.6  WBC Count4.63  % Albumin, Urine--  Hematocrit 34.7  Hemoglobin 12.5    [facility]  02-19-23 @ 06:51  Blood Gas Arterial-Calcium,Ionized--  Blood Urea Nitrogen, Serum <3 mg/dL<L> [10 - 20]  Carbon Dioxide, Serum27 mmol/L [17 - 32]  Chloride, Aietm658 mmol/L [98 - 110]  Creatinie, Serum0.6 mg/dL<L> [0.7 - 1.5]  Glucose, Serum81 mg/dL [70 - 99]  Potassium, Serum3.6 mmol/L [3.5 - 5.0]  Sodium, Serum 141 mmol/L [135 - 146]  [facility]  02-17-23 @ 07:05  Blood Gas Arterial-Calcium,Ionized--  Blood Urea Nitrogen, Serum 5 mg/dL<L> [10 - 20]  Carbon Dioxide, Serum23 mmol/L [17 - 32]  Chloride, Serum94 mmol/L<L> [98 - 110]  Creatinie, Serum0.6 mg/dL<L> [0.7 - 1.5]  Glucose, Serum65 mg/dL<L> [70 - 99]  Potassium, Serum3.0 mmol/L<L> [3.5 - 5.0]  Sodium, Serum 136 mmol/L [135 - 146]  [facility]  02-16-23 @ 08:00  Blood Gas Arterial-Calcium,Ionized--  Blood Urea Nitrogen, Serum 9 mg/dL<L> [10 - 20]  Carbon Dioxide, Serum23 mmol/L [17 - 32]  Chloride, Serum99 mmol/L [98 - 110]  Creatinie, Serum0.6 mg/dL<L> [0.7 - 1.5]  Glucose, Serum90 mg/dL [70 - 99]  Potassium, Serum3.2 mmol/L<L> [3.5 - 5.0]  Sodium, Serum 135 mmol/L [135 - 146]  [facility]  02-15-23 @ 12:20  Blood Gas Arterial-Calcium,Ionized--  Blood Urea Nitrogen, Serum 10 mg/dL [10 - 20]  Carbon Dioxide, Serum19 mmol/L [17 - 32]  Chloride, Serum98 mmol/L [98 - 110]  Creatinie, Serum0.7 mg/dL [0.7 - 1.5]  Glucose, Tzjzf262 mg/dL<H> [70 - 99]  Potassium, Serum5.3 mmol/L<H> [3.5 - 5.0] [Hemolyzed. Interpret with caution]  Sodium, Serum 133 mmol/L<L> [135 - 146]        Medications:  acetaminophen     Tablet .. 650 milliGRAM(s) Oral every 6 hours PRN  enoxaparin Injectable 40 milliGRAM(s) SubCutaneous every 24 hours  folic acid 1 milliGRAM(s) Oral daily  gabapentin 300 milliGRAM(s) Oral three times a day  HYDROmorphone   Tablet 2 milliGRAM(s) Oral every 6 hours PRN  influenza   Vaccine 0.5 milliLiter(s) IntraMuscular once  ketorolac   Injectable 15 milliGRAM(s) IV Push every 6 hours PRN  LORazepam   Injectable 2 milliGRAM(s) IV Push every 2 hours PRN  melatonin 3 milliGRAM(s) Oral at bedtime PRN  multivitamin 1 Tablet(s) Oral daily  ondansetron Injectable 4 milliGRAM(s) IV Push every 4 hours PRN  pancrelipase  (CREON 12,000 Lipase Units) 3 Capsule(s) Oral three times a day with meals  pantoprazole    Tablet 40 milliGRAM(s) Oral before breakfast  thiamine 100 milliGRAM(s) Oral daily        Assessment and Plan:  Patient is a 46 y/o F with hx of Alcohol related chronic pancreatitis and  had a surgery for pancreatic cyst at [facility] now presents to ED for epigastric abdominal pain a/w vomiting on the day of admission, dx. with acute on chronic pancreatitis.    Acute on chronic pancreatitis   - CT abdomen shows Diffuse pancreatic calcifications and mild ductal dilation with minimal fat stranding surrounding the pancreatic body and tail. Findings are suggestive of acute on chronic pancreatitis.  - Patient is tolerating diet - patient was educated to eat very small frequent portions with creon supplement. Continue moderate oral fluid intake post d/c home.   - no further IVF needed, d/c IVF  - Patient expresses false abdominal pain based on clinical exam ( today during abdominal sounds auscultation, placing stethoscope superficially w/o pressure on abdominal wall, patient reported that she feels moderate abdominal pain, meanwhile laying comfortably in a bed, sitting up post exam in a bed w/o pain)  - patient was instructed to avoid  alcohol consumption post d/c home   - patient was initially tx. with IV Dilaudid tx. for pain control, once tolerated PO intake, on 2/19 transitioned to PO Dilaudid, on d/c will give 3 more days of PO Tramadol 50 mg every 6 hours prn. for severe pain.   -  patient was referred to f/up with outpatient GI f/up for further management of chronic pancreatitis.     Hypokalemia - corrected       Transaminitis - due to alcohol use   -- improved LFT's , outpatient GI f/up for further outpatient monitoring    Alcohol abuse/Thiamin deficiency  - [facility] protocol  - thiamine, folic acid and multivitamin     Chronic back pain- resumed on Neurontin tx.     Mold exposure   - CXR unremarkable.     DVT ppx: Lovenox SC  GI ppx: daily   PPI   Activity: as tolerated.     #Progress Note Handoff:  patient was educated on small/frequent meals intake with moderate PO fluid intake, patient was very irritated and angry that IV Dilaudid was d/c , I have explained to the patient that IV Dilaudid is not medically indicated , no episodes of Vomiting, patient tolerating po intake. Patient was medically optimized and stable for d/c home with close outpatient GI specialist f/up. Patient reports that she is not in agreement with d/c planning and very angry that she can no longer receive IV Dilaudid.  Medications compliance/Diet compliance and avoiding alcohol consumption was d/w pt.   Family discussion: medical plan of tx. d/w pt. by bedside Disposition: Home_ today     Total time spent to complete patient's bedside assessment, review medical chart, discuss discharge plan of care with covering medical team was more than 35 minutes with >50% of time spent face to face with patient, discussion with patient/family and/or coordination of care .       OVERNIGHT EVENTS: Patient endorses that she is having social issues at home and is requiring social work assistance prior to disscharge. (left voicemail message on 2/20)    SUBJECTIVE / INTERVAL HPI: Patient seen and examined at bedside.     VITAL SIGNS:  Vital Signs Last 24 Hrs  T(C): 36.2 (19 Feb 2023 20:52), Max: 36.2 (19 Feb 2023 20:52)  T(F): 97.2 (19 Feb 2023 20:52), Max: 97.2 (19 Feb 2023 20:52)  HR: 85 (20 Feb 2023 04:30) (69 - 85)  BP: 121/63 (20 Feb 2023 04:30) (121/63 - 140/84)  BP(mean): --  RR: 18 (20 Feb 2023 04:30) (18 - 18)  SpO2: 95% (20 Feb 2023 04:30) (95% - 97%)    Parameters below as of 19 Feb 2023 20:52  Patient On (Oxygen Delivery Method): room air        PHYSICAL EXAM:    General: Well developed, well nourished, no acute distress  HEENT: NC/AT; PERRL, anicteric sclera;  Neck: supple  Cardiovascular: +S1/S2, RRR, no murmurs, rubs, gallops  Respiratory: CTA B/L; no W/R/R  Gastrointestinal: slight tenderness on palpation; bilateral flank (turners sign?) +BSx4  Extremities: WWP; no edema, clubbing or cyanosis  Vascular: 2+ radial, DP/PT pulses B/L  Neurological: AAOx3; no focal deficits    MEDICATIONS:  MEDICATIONS  (STANDING):  enoxaparin Injectable 40 milliGRAM(s) SubCutaneous every 24 hours  folic acid 1 milliGRAM(s) Oral daily  gabapentin 300 milliGRAM(s) Oral three times a day  influenza   Vaccine 0.5 milliLiter(s) IntraMuscular once  multivitamin 1 Tablet(s) Oral daily  pancrelipase  (CREON 12,000 Lipase Units) 3 Capsule(s) Oral three times a day with meals  pantoprazole    Tablet 40 milliGRAM(s) Oral before breakfast  thiamine 100 milliGRAM(s) Oral daily    MEDICATIONS  (PRN):  acetaminophen     Tablet .. 650 milliGRAM(s) Oral every 6 hours PRN Temp greater or equal to 38C (100.4F), Mild Pain (1 - 3)  HYDROmorphone   Tablet 2 milliGRAM(s) Oral every 6 hours PRN Severe Pain (7 - 10)  LORazepam   Injectable 2 milliGRAM(s) IV Push every 2 hours PRN CIWA-Ar score increase by 2 points and a total score of 7 or less  melatonin 3 milliGRAM(s) Oral at bedtime PRN Insomnia  ondansetron Injectable 4 milliGRAM(s) IV Push every 4 hours PRN Nausea and/or Vomiting      ALLERGIES:  Allergies    Compazine (Unknown)    Intolerances        LABS:    02-19    141  |  103  |  <3<L>  ----------------------------<  81  3.6   |  27  |  0.6<L>    Ca    8.9      19 Feb 2023 06:51          CAPILLARY BLOOD GLUCOSE          RADIOLOGY & ADDITIONAL TESTS: Reviewed.    PLAN:

## 2023-02-21 ENCOUNTER — TRANSCRIPTION ENCOUNTER (OUTPATIENT)
Age: 48
End: 2023-02-21

## 2023-02-21 VITALS
TEMPERATURE: 98 F | SYSTOLIC BLOOD PRESSURE: 153 MMHG | RESPIRATION RATE: 18 BRPM | HEART RATE: 70 BPM | DIASTOLIC BLOOD PRESSURE: 107 MMHG

## 2023-02-21 LAB
ALBUMIN SERPL ELPH-MCNC: 4 G/DL — SIGNIFICANT CHANGE UP (ref 3.5–5.2)
ALP SERPL-CCNC: 139 U/L — HIGH (ref 30–115)
ALT FLD-CCNC: 113 U/L — HIGH (ref 0–41)
ANION GAP SERPL CALC-SCNC: 12 MMOL/L — SIGNIFICANT CHANGE UP (ref 7–14)
AST SERPL-CCNC: 288 U/L — HIGH (ref 0–41)
BASOPHILS # BLD AUTO: 0.02 K/UL — SIGNIFICANT CHANGE UP (ref 0–0.2)
BASOPHILS NFR BLD AUTO: 0.8 % — SIGNIFICANT CHANGE UP (ref 0–1)
BILIRUB SERPL-MCNC: 0.5 MG/DL — SIGNIFICANT CHANGE UP (ref 0.2–1.2)
BUN SERPL-MCNC: 4 MG/DL — LOW (ref 10–20)
CALCIUM SERPL-MCNC: 9.1 MG/DL — SIGNIFICANT CHANGE UP (ref 8.4–10.4)
CHLORIDE SERPL-SCNC: 101 MMOL/L — SIGNIFICANT CHANGE UP (ref 98–110)
CO2 SERPL-SCNC: 27 MMOL/L — SIGNIFICANT CHANGE UP (ref 17–32)
CREAT SERPL-MCNC: 0.7 MG/DL — SIGNIFICANT CHANGE UP (ref 0.7–1.5)
EGFR: 109 ML/MIN/1.73M2 — SIGNIFICANT CHANGE UP
EOSINOPHIL # BLD AUTO: 0.07 K/UL — SIGNIFICANT CHANGE UP (ref 0–0.7)
EOSINOPHIL NFR BLD AUTO: 2.8 % — SIGNIFICANT CHANGE UP (ref 0–8)
GLUCOSE SERPL-MCNC: 92 MG/DL — SIGNIFICANT CHANGE UP (ref 70–99)
HCT VFR BLD CALC: 32.4 % — LOW (ref 37–47)
HGB BLD-MCNC: 11.5 G/DL — LOW (ref 12–16)
IMM GRANULOCYTES NFR BLD AUTO: 0.4 % — HIGH (ref 0.1–0.3)
LYMPHOCYTES # BLD AUTO: 0.76 K/UL — LOW (ref 1.2–3.4)
LYMPHOCYTES # BLD AUTO: 29.9 % — SIGNIFICANT CHANGE UP (ref 20.5–51.1)
MAGNESIUM SERPL-MCNC: 1.5 MG/DL — LOW (ref 1.8–2.4)
MCHC RBC-ENTMCNC: 33.1 PG — HIGH (ref 27–31)
MCHC RBC-ENTMCNC: 35.5 G/DL — SIGNIFICANT CHANGE UP (ref 32–37)
MCV RBC AUTO: 93.4 FL — SIGNIFICANT CHANGE UP (ref 81–99)
MONOCYTES # BLD AUTO: 0.41 K/UL — SIGNIFICANT CHANGE UP (ref 0.1–0.6)
MONOCYTES NFR BLD AUTO: 16.1 % — HIGH (ref 1.7–9.3)
NEUTROPHILS # BLD AUTO: 1.27 K/UL — LOW (ref 1.4–6.5)
NEUTROPHILS NFR BLD AUTO: 50 % — SIGNIFICANT CHANGE UP (ref 42.2–75.2)
NRBC # BLD: 0 /100 WBCS — SIGNIFICANT CHANGE UP (ref 0–0)
PLATELET # BLD AUTO: 181 K/UL — SIGNIFICANT CHANGE UP (ref 130–400)
POTASSIUM SERPL-MCNC: 3.6 MMOL/L — SIGNIFICANT CHANGE UP (ref 3.5–5)
POTASSIUM SERPL-SCNC: 3.6 MMOL/L — SIGNIFICANT CHANGE UP (ref 3.5–5)
PROT SERPL-MCNC: 7 G/DL — SIGNIFICANT CHANGE UP (ref 6–8)
RBC # BLD: 3.47 M/UL — LOW (ref 4.2–5.4)
RBC # FLD: 11.7 % — SIGNIFICANT CHANGE UP (ref 11.5–14.5)
SODIUM SERPL-SCNC: 140 MMOL/L — SIGNIFICANT CHANGE UP (ref 135–146)
WBC # BLD: 2.54 K/UL — LOW (ref 4.8–10.8)
WBC # FLD AUTO: 2.54 K/UL — LOW (ref 4.8–10.8)

## 2023-02-21 PROCEDURE — 99239 HOSP IP/OBS DSCHRG MGMT >30: CPT

## 2023-02-21 RX ORDER — HYDROMORPHONE HYDROCHLORIDE 2 MG/ML
2 INJECTION INTRAMUSCULAR; INTRAVENOUS; SUBCUTANEOUS EVERY 4 HOURS
Refills: 0 | Status: DISCONTINUED | OUTPATIENT
Start: 2023-02-21 | End: 2023-02-21

## 2023-02-21 RX ORDER — SODIUM CHLORIDE 9 MG/ML
1000 INJECTION, SOLUTION INTRAVENOUS ONCE
Refills: 0 | Status: COMPLETED | OUTPATIENT
Start: 2023-02-21 | End: 2023-02-21

## 2023-02-21 RX ORDER — KETOROLAC TROMETHAMINE 30 MG/ML
1 SYRINGE (ML) INJECTION
Qty: 15 | Refills: 0
Start: 2023-02-21 | End: 2023-02-25

## 2023-02-21 RX ADMIN — GABAPENTIN 300 MILLIGRAM(S): 400 CAPSULE ORAL at 05:52

## 2023-02-21 RX ADMIN — Medication 3 CAPSULE(S): at 11:38

## 2023-02-21 RX ADMIN — SODIUM CHLORIDE 1000 MILLILITER(S): 9 INJECTION, SOLUTION INTRAVENOUS at 10:11

## 2023-02-21 RX ADMIN — HYDROMORPHONE HYDROCHLORIDE 2 MILLIGRAM(S): 2 INJECTION INTRAMUSCULAR; INTRAVENOUS; SUBCUTANEOUS at 10:11

## 2023-02-21 RX ADMIN — GABAPENTIN 300 MILLIGRAM(S): 400 CAPSULE ORAL at 16:11

## 2023-02-21 RX ADMIN — Medication 1 TABLET(S): at 11:39

## 2023-02-21 RX ADMIN — HYDROMORPHONE HYDROCHLORIDE 2 MILLIGRAM(S): 2 INJECTION INTRAMUSCULAR; INTRAVENOUS; SUBCUTANEOUS at 05:52

## 2023-02-21 RX ADMIN — Medication 100 MILLIGRAM(S): at 11:37

## 2023-02-21 RX ADMIN — PANTOPRAZOLE SODIUM 40 MILLIGRAM(S): 20 TABLET, DELAYED RELEASE ORAL at 05:51

## 2023-02-21 RX ADMIN — HYDROMORPHONE HYDROCHLORIDE 2 MILLIGRAM(S): 2 INJECTION INTRAMUSCULAR; INTRAVENOUS; SUBCUTANEOUS at 14:21

## 2023-02-21 RX ADMIN — ENOXAPARIN SODIUM 40 MILLIGRAM(S): 100 INJECTION SUBCUTANEOUS at 08:57

## 2023-02-21 RX ADMIN — Medication 1 MILLIGRAM(S): at 11:39

## 2023-02-21 RX ADMIN — Medication 3 CAPSULE(S): at 08:56

## 2023-02-21 NOTE — DISCHARGE NOTE NURSING/CASE MANAGEMENT/SOCIAL WORK - PATIENT PORTAL LINK FT
You can access the FollowMyHealth Patient Portal offered by [facility] by registering at the following website: [email or web address]. By joining [employer]’s FollowMyHealth portal, you will also be able to view your health information using other applications (apps) compatible with our system.

## 2023-02-21 NOTE — DISCHARGE NOTE NURSING/CASE MANAGEMENT/SOCIAL WORK - NSDCVIVACCINE_GEN_ALL_CORE_FT
influenza, injectable, quadrivalent, preservative free; 12-Oct-2019 14:23; Celsa Martins (RN); [employer]; [id number] (Exp. Date: 30-Jun-2020); IntraMuscular; Deltoid Left.; 0.5 milliLiter(s); VIS (VIS Published: 15-Aug-2019, VIS Presented: 12-Oct-2019);   influenza, injectable, quadrivalent, preservative free; 10-Sep-2020 12:19; Shani Fleming (RN); [employer]; [id number] (Exp. Date: 30-Jun-2021); IntraMuscular; Deltoid Left.; 0.5 milliLiter(s); VIS (VIS Published: 15-Aug-2019, VIS Presented: 10-Sep-2020);   influenza, injectable, quadrivalent, preservative free; 07-Oct-2021 16:12; Janessa Jaquez (RN); [employer]; [id number] (Exp. Date: 30-Jun-2022); IntraMuscular; Deltoid Left.; 0.5 milliLiter(s); VIS (VIS Published: 15-Aug-2019, VIS Presented: 07-Oct-2021);   Tdap; 01-Sep-2014 17:06; Cheryl Cotto (MELINDA); [employer]; [id number]; IntraMuscular; Deltoid Right.; 0.5 milliLiter(s);   Tdap; 01-Sep-2014 17:09; Cheryl Cotto (MELINDA); [employer]; [id number]; IntraMuscular; Deltoid Right.; 0.5 milliLiter(s);

## 2023-02-26 DIAGNOSIS — K76.0 FATTY (CHANGE OF) LIVER, NOT ELSEWHERE CLASSIFIED: ICD-10-CM

## 2023-02-26 DIAGNOSIS — Z77.120 CONTACT WITH AND (SUSPECTED) EXPOSURE TO MOLD (TOXIC): ICD-10-CM

## 2023-02-26 DIAGNOSIS — K86.1 OTHER CHRONIC PANCREATITIS: ICD-10-CM

## 2023-02-26 DIAGNOSIS — F10.10 ALCOHOL ABUSE, UNCOMPLICATED: ICD-10-CM

## 2023-02-26 DIAGNOSIS — Z88.8 ALLERGY STATUS TO OTHER DRUGS, MEDICAMENTS AND BIOLOGICAL SUBSTANCES: ICD-10-CM

## 2023-02-26 DIAGNOSIS — E87.6 HYPOKALEMIA: ICD-10-CM

## 2023-02-26 DIAGNOSIS — E51.9 THIAMINE DEFICIENCY, UNSPECIFIED: ICD-10-CM

## 2023-02-26 DIAGNOSIS — E83.42 HYPOMAGNESEMIA: ICD-10-CM

## 2023-02-26 DIAGNOSIS — K85.90 ACUTE PANCREATITIS WITHOUT NECROSIS OR INFECTION, UNSPECIFIED: ICD-10-CM

## 2023-02-26 DIAGNOSIS — E53.8 DEFICIENCY OF OTHER SPECIFIED B GROUP VITAMINS: ICD-10-CM

## 2023-02-26 DIAGNOSIS — R74.01 ELEVATION OF LEVELS OF LIVER TRANSAMINASE LEVELS: ICD-10-CM

## 2023-03-31 NOTE — PROGRESS NOTE ADULT - PROVIDER SPECIALTY LIST ADULT
Cervix appears thick and closed.   D/c home with strict precautions and rx for flagyll and flexaril.    Internal Medicine

## 2023-04-09 NOTE — PROGRESS NOTE ADULT - SUBJECTIVE AND OBJECTIVE BOX
DAILY PROGRESS NOTE  ===========================================================    Patient Information:  LAURA BARAJAS  /  46y  /  Female  /  MRN#: [id number]    Hospital Day: 17d     |:::::::::::::::::::::::::::| SUBJECTIVE |:::::::::::::::::::::::::::|    OVERNIGHT EVENTS:   TODAY: Patient was seen today at bedside. Review of systems is otherwise negative.    |:::::::::::::::::::::::::::| OBJECTIVE |:::::::::::::::::::::::::::|    VITAL SIGNS: Last 24 Hours  T(C): 35.6 (23 Nov 2021 05:45), Max: 36.7 (22 Nov 2021 21:06)  T(F): 96 (23 Nov 2021 05:45), Max: 98.1 (22 Nov 2021 21:06)  HR: 77 (23 Nov 2021 05:45) (73 - 77)  BP: 122/85 (23 Nov 2021 05:45) (115/69 - 122/85)  BP(mean): --  RR: 18 (23 Nov 2021 05:45) (18 - 18)  SpO2: 97% (23 Nov 2021 05:45) (97% - 97%)    PHYSICAL EXAM:  GENERAL:   Awake, alert; NAD.  HEENT:  Head NC/AT; Conjunctivae pink, Sclera anicteric; Oral mucosa moist.  CARDIO:   Regular rate; Regular rhythm  RESP:   No rales, wheezing, or rhonchi appreciated.  GI:   Soft; NT/ND; BS; No guarding; No rebound tenderness.  EXT:   No edema.       LAB RESULTS:                        10.9   4.82  )-----------( 522      ( 23 Nov 2021 04:30 )             33.2     11-23    138  |  102  |  5<L>  ----------------------------<  99  4.0   |  22  |  0.6<L>    Ca    9.6      23 Nov 2021 04:30  Mg     1.7     11-23    TPro  7.1  /  Alb  4.0  /  TBili  <0.2  /  DBili  x   /  AST  211<H>  /  ALT  46<H>  /  AlkPhos  124<H>  11-23                MICROBIOLOGY:    RADIOLOGY:    ALLERGIES:  Compazine (Unknown)      ===========================================================   30

## 2023-05-29 NOTE — PRE-OP CHECKLIST - ALLERGIES REVIEWED
Quit 2/2023. Scheduled for LDCT later this week.  
X1.5mo per pt and family report. No trigger elicited in history. [facility] - will attempt to obtain for review. Discussed possible etiologies. Using shared decision making, we decided to proceed with MRI for further evaluation. Will follow up with results when available. Return precautions reviewed.  
done

## 2023-08-08 NOTE — ED ADULT NURSE NOTE - BREATHING, MLM
----- Message from Bayron Zuleta MD sent at 8/7/2023  2:50 PM EDT -----  Haydee, your recent Pap screen came back normal   Spontaneous, unlabored and symmetrical

## 2023-10-10 ENCOUNTER — EMERGENCY (EMERGENCY)
Facility: HOSPITAL | Age: 48
LOS: 0 days | Discharge: HOME | End: 2023-10-11
Attending: STUDENT IN AN ORGANIZED HEALTH CARE EDUCATION/TRAINING PROGRAM
Payer: MEDICAID

## 2023-10-10 VITALS
OXYGEN SATURATION: 98 % | SYSTOLIC BLOOD PRESSURE: 144 MMHG | DIASTOLIC BLOOD PRESSURE: 91 MMHG | TEMPERATURE: 98 F | HEART RATE: 98 BPM | RESPIRATION RATE: 18 BRPM

## 2023-10-10 VITALS
TEMPERATURE: 97 F | WEIGHT: 139.99 LBS | DIASTOLIC BLOOD PRESSURE: 102 MMHG | HEIGHT: 64 IN | OXYGEN SATURATION: 98 % | SYSTOLIC BLOOD PRESSURE: 147 MMHG | HEART RATE: 115 BPM | RESPIRATION RATE: 20 BRPM

## 2023-10-10 DIAGNOSIS — Z87.2 PERSONAL HISTORY OF DISEASES OF THE SKIN AND SUBCUTANEOUS TISSUE: Chronic | ICD-10-CM

## 2023-10-10 DIAGNOSIS — Z88.8 ALLERGY STATUS TO OTHER DRUGS, MEDICAMENTS AND BIOLOGICAL SUBSTANCES: ICD-10-CM

## 2023-10-10 DIAGNOSIS — Z98.890 OTHER SPECIFIED POSTPROCEDURAL STATES: Chronic | ICD-10-CM

## 2023-10-10 DIAGNOSIS — R11.10 VOMITING, UNSPECIFIED: ICD-10-CM

## 2023-10-10 DIAGNOSIS — R74.01 ELEVATION OF LEVELS OF LIVER TRANSAMINASE LEVELS: ICD-10-CM
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
## 2023-10-10 DIAGNOSIS — R10.13 EPIGASTRIC PAIN: ICD-10-CM
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
## 2023-10-10 DIAGNOSIS — K86.1 OTHER CHRONIC PANCREATITIS: ICD-10-CM

## 2023-10-10 DIAGNOSIS — Z87.19 PERSONAL HISTORY OF OTHER DISEASES OF THE DIGESTIVE SYSTEM: ICD-10-CM

## 2023-10-10 DIAGNOSIS — M54.2 CERVICALGIA: ICD-10-CM

## 2023-10-10 PROCEDURE — 71045 X-RAY EXAM CHEST 1 VIEW: CPT

## 2023-10-10 PROCEDURE — 71250 CT THORAX DX C-: CPT | Mod: MA

## 2023-10-10 PROCEDURE — 96375 TX/PRO/DX INJ NEW DRUG ADDON: CPT

## 2023-10-10 PROCEDURE — 96374 THER/PROPH/DIAG INJ IV PUSH: CPT | Mod: XU

## 2023-10-10 PROCEDURE — 74177 CT ABD & PELVIS W/CONTRAST: CPT | Mod: MA

## 2023-10-10 PROCEDURE — 71250 CT THORAX DX C-: CPT | Mod: 26,MA

## 2023-10-10 PROCEDURE — 99285 EMERGENCY DEPT VISIT HI MDM: CPT

## 2023-10-10 PROCEDURE — 76705 ECHO EXAM OF ABDOMEN: CPT | Mod: 26

## 2023-10-10 PROCEDURE — 71045 X-RAY EXAM CHEST 1 VIEW: CPT | Mod: 26

## 2023-10-10 PROCEDURE — 74177 CT ABD & PELVIS W/CONTRAST: CPT | Mod: 26,MA

## 2023-10-10 PROCEDURE — 99285 EMERGENCY DEPT VISIT HI MDM: CPT | Mod: 25

## 2023-10-10 PROCEDURE — 72125 CT NECK SPINE W/O DYE: CPT | Mod: 26,MA

## 2023-10-10 PROCEDURE — 36415 COLL VENOUS BLD VENIPUNCTURE: CPT

## 2023-10-10 PROCEDURE — 80053 COMPREHEN METABOLIC PANEL: CPT

## 2023-10-10 PROCEDURE — 96376 TX/PRO/DX INJ SAME DRUG ADON: CPT

## 2023-10-10 PROCEDURE — 76705 ECHO EXAM OF ABDOMEN: CPT

## 2023-10-10 PROCEDURE — 83605 ASSAY OF LACTIC ACID: CPT

## 2023-10-10 PROCEDURE — 81001 URINALYSIS AUTO W/SCOPE: CPT

## 2023-10-10 PROCEDURE — 85025 COMPLETE CBC W/AUTO DIFF WBC: CPT

## 2023-10-10 PROCEDURE — 84703 CHORIONIC GONADOTROPIN ASSAY: CPT

## 2023-10-10 PROCEDURE — 84484 ASSAY OF TROPONIN QUANT: CPT

## 2023-10-10 PROCEDURE — 72125 CT NECK SPINE W/O DYE: CPT | Mod: MA

## 2023-10-10 PROCEDURE — 87086 URINE CULTURE/COLONY COUNT: CPT

## 2023-10-10 PROCEDURE — 93010 ELECTROCARDIOGRAM REPORT: CPT

## 2023-10-10 PROCEDURE — 83690 ASSAY OF LIPASE: CPT

## 2023-10-10 PROCEDURE — 93005 ELECTROCARDIOGRAM TRACING: CPT

## 2023-10-10 RX ORDER — SODIUM CHLORIDE 9 MG/ML
2000 INJECTION INTRAMUSCULAR; INTRAVENOUS; SUBCUTANEOUS ONCE
Refills: 0 | Status: COMPLETED | OUTPATIENT
Start: 2023-10-10 | End: 2023-10-10

## 2023-10-10 RX ORDER — ONDANSETRON 8 MG/1
4 TABLET, FILM COATED ORAL ONCE
Refills: 0 | Status: COMPLETED | OUTPATIENT
Start: 2023-10-10 | End: 2023-10-10

## 2023-10-10 RX ORDER — MORPHINE SULFATE 50 MG/1
4 CAPSULE, EXTENDED RELEASE ORAL ONCE
Refills: 0 | Status: DISCONTINUED | OUTPATIENT
Start: 2023-10-10 | End: 2023-10-10

## 2023-10-10 RX ORDER — HYDROMORPHONE HYDROCHLORIDE 2 MG/ML
0.5 INJECTION INTRAMUSCULAR; INTRAVENOUS; SUBCUTANEOUS ONCE
Refills: 0 | Status: DISCONTINUED | OUTPATIENT
Start: 2023-10-10 | End: 2023-10-10

## 2023-10-10 RX ORDER — ALPRAZOLAM 0.25 MG
0.25 TABLET ORAL ONCE
Refills: 0 | Status: DISCONTINUED | OUTPATIENT
Start: 2023-10-10 | End: 2023-10-10

## 2023-10-10 RX ADMIN — ONDANSETRON 4 MILLIGRAM(S): 8 TABLET, FILM COATED ORAL at 20:30

## 2023-10-10 RX ADMIN — ONDANSETRON 4 MILLIGRAM(S): 8 TABLET, FILM COATED ORAL at 22:37

## 2023-10-10 RX ADMIN — HYDROMORPHONE HYDROCHLORIDE 0.5 MILLIGRAM(S): 2 INJECTION INTRAMUSCULAR; INTRAVENOUS; SUBCUTANEOUS at 22:37

## 2023-10-10 RX ADMIN — MORPHINE SULFATE 4 MILLIGRAM(S): 50 CAPSULE, EXTENDED RELEASE ORAL at 20:30

## 2023-10-10 RX ADMIN — SODIUM CHLORIDE 2000 MILLILITER(S): 9 INJECTION INTRAMUSCULAR; INTRAVENOUS; SUBCUTANEOUS at 20:30

## 2023-10-10 RX ADMIN — Medication 0.25 MILLIGRAM(S): at 20:30
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
## 2023-10-10 NOTE — ED ADULT NURSE NOTE - NS ED NOTE  TALK SOMEONE YN
It was discussed and explained that monovision is a compromise and that vision will be limited during certain activities. Activities include: driving at night and near tasks that require good depth perception. No
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
## 2023-10-10 NOTE — ED PROVIDER NOTE - PHYSICAL EXAMINATION
Physical Exam    Vital Signs: I have reviewed the initial vital signs.  Constitutional: appears stated age, no acute distress  Eyes: Sclera clear, EOMI.  Cardiovascular: S1 and S2, regular rate, regular rhythm, well-perfused extremities, radial pulses equal and 2+  Respiratory: unlabored respiratory effort, clear to auscultation bilaterally no wheezing, rales, or rhonchi  Gastrointestinal:  abdomen soft, tenderness to palpation at epigastrium, no pulsatile mass, bowel sounds within normal limits  Musculoskeletal: supple neck, no lower extremity edema, +cervical spine midline tenderness, no bony tenderness  Integumentary: warm, dry, ecchymosis over left chest wall and right lower abdomen  Neurologic: awake, alert, oriented x3, extremities’ motor and sensory functions grossly intact
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
## 2023-10-10 NOTE — ED ADULT NURSE NOTE - NSSUHOSCREENINGYN_ED_ALL_ED
Beverly Tom MD Yes - the patient is able to be screened

## 2023-10-10 NOTE — ED ADULT TRIAGE NOTE - WEIGHT IN KG
Patient presents with complaints of constipation for the past few weeks. Patient did have a BM this morning. His PCP sent him here for concerns for blockage. He states that this has been ongoing for the past few month. Denies any nausea or vomiting today. Patient states he is also not eating as much as he normally does. Patient is passing gas.  ABC intact without need for intervention at this time.      63.5
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
## 2023-10-10 NOTE — ED ADULT TRIAGE NOTE - BMI (KG/M2)
Called to follow up with patient. She is home now and states she isnt feeling better. Asked her if she was going to follow up with her PCP and she said she would if she doesnt get better.    24

## 2023-10-10 NOTE — ED PROVIDER NOTE - CARE PROVIDER_API CALL
Petey Romano  Gastroenterology  [address] [place] [address]  Phone: [phone]  Fax: [phone]  Follow Up Time:

## 2023-10-10 NOTE — ED PROVIDER NOTE - PATIENT PORTAL LINK FT
You can access the FollowMyHealth Patient Portal offered by [facility] by registering at the following website: [email or web address]. By joining [employer]’s FollowMyHealth portal, you will also be able to view your health information using other applications (apps) compatible with our system.

## 2023-10-11 RX ORDER — FAMOTIDINE 10 MG/ML
1 INJECTION INTRAVENOUS
Qty: 28 | Refills: 0
Start: 2023-10-11 | End: 2023-10-24

## 2023-10-11 RX ORDER — PANTOPRAZOLE SODIUM 20 MG/1
1 TABLET, DELAYED RELEASE ORAL
Qty: 14 | Refills: 0
Start: 2023-10-11 | End: 2023-10-24

## 2023-10-11 RX ORDER — SUCRALFATE 1 G
10 TABLET ORAL
Qty: 210 | Refills: 0
Start: 2023-10-11 | End: 2023-10-17

## 2023-10-11 RX ORDER — ALPRAZOLAM 0.25 MG
0.25 TABLET ORAL ONCE
Refills: 0 | Status: DISCONTINUED | OUTPATIENT
Start: 2023-10-11 | End: 2023-10-11

## 2023-10-11 RX ORDER — METRONIDAZOLE 500 MG
500 TABLET ORAL ONCE
Refills: 0 | Status: DISCONTINUED | OUTPATIENT
Start: 2023-10-11 | End: 2023-10-11

## 2023-10-11 RX ADMIN — Medication 0.25 MILLIGRAM(S): at 01:05

## 2023-10-12 LAB
CULTURE RESULTS: SIGNIFICANT CHANGE UP
SPECIMEN SOURCE: SIGNIFICANT CHANGE UP

## 2024-01-04 NOTE — PROGRESS NOTE ADULT - ATTENDING SUPERVISION STATEMENT
Resident Quality 226: Preventive Care And Screening: Tobacco Use: Screening And Cessation Intervention: Tobacco Screening not Performed Quality 130: Documentation Of Current Medications In The Medical Record: Current Medications Documented Detail Level: Generalized Quality 431: Preventive Care And Screening: Unhealthy Alcohol Use - Screening: Patient identified as an unhealthy alcohol user when screened for unhealthy alcohol use using a systematic screening method and received brief counseling

## 2024-02-19 NOTE — DISCHARGE NOTE NURSING/CASE MANAGEMENT/SOCIAL WORK - NSDPDISTOALTCARESITEOTHER_GEN_ALL_CORE_FT
[facility] ED Follow up Call    Reason for ED visit:  KNEE PAIN         Hi Gabby , this is DAVID from Paddy Foy'Mert Rosa MD Perry County Memorial HospitalTOLPP   8/6/2024  2:30 PM [facility]        [facility]

## 2024-03-07 NOTE — ED PROVIDER NOTE - DISPOSITION TYPE
[Fully active, able to carry on all pre-disease performance without restriction] : Status 0 - Fully active, able to carry on all pre-disease performance without restriction [Normal] : affect appropriate [de-identified] : cervical lymphadenopathy ADMIT

## 2024-04-30 NOTE — DIETITIAN INITIAL EVALUATION ADULT. - NUTRITIONGOAL OUTCOME1
(Tylenol).   When should you call for help?   Call 911 if you have symptoms of a heart attack. These may include:    Chest pain or pressure, or a strange feeling in the chest.     Sweating.     Shortness of breath.     Pain, pressure, or a strange feeling in the back, neck, jaw, or upper belly or in one or both shoulders or arms.     Lightheadedness or sudden weakness.     A fast or irregular heartbeat.   After you call 911, the  may tell you to chew 1 adult-strength or 2 to 4 low-dose aspirin. Wait for an ambulance. Do not try to drive yourself.  Watch closely for changes in your health, and be sure to contact your doctor if you have any problems.  Where can you learn more?  Go to https://www.[employer].net/patientEd and enter F075 to learn more about \"A Healthy Heart: Care Instructions.\"  Current as of: June 24, 2023               Content Version: 14.0  © 3074-9434 [employer].   Care instructions adapted under license by [employer]. If you have questions about a medical condition or this instruction, always ask your healthcare professional. [employer] disclaims any warranty or liability for your use of this information.      Personalized Preventive Plan for Tobias Hall - 4/30/2024  Medicare offers a range of preventive health benefits. Some of the tests and screenings are paid in full while other may be subject to a deductible, co-insurance, and/or copay.    Some of these benefits include a comprehensive review of your medical history including lifestyle, illnesses that may run in your family, and various assessments and screenings as appropriate.    After reviewing your medical record and screening and assessments performed today your provider may have ordered immunizations, labs, imaging, and/or referrals for you.  A list of these orders (if applicable) as well as your Preventive Care list are included within your After Visit Summary for your review.    Other  pt to meet >75% estimated energy requirements in the next 7 days

## 2024-06-10 ENCOUNTER — EMERGENCY (EMERGENCY)
Facility: HOSPITAL | Age: 49
LOS: 0 days | Discharge: HOME | End: 2024-06-10
Attending: EMERGENCY MEDICINE
Payer: MEDICAID

## 2024-06-10 VITALS
RESPIRATION RATE: 19 BRPM | SYSTOLIC BLOOD PRESSURE: 138 MMHG | DIASTOLIC BLOOD PRESSURE: 88 MMHG | OXYGEN SATURATION: 97 % | HEART RATE: 102 BPM | TEMPERATURE: 98 F | WEIGHT: 139.99 LBS | HEIGHT: 64 IN

## 2024-06-10 DIAGNOSIS — L50.9 URTICARIA, UNSPECIFIED: ICD-10-CM

## 2024-06-10 DIAGNOSIS — H05.221 EDEMA OF RIGHT ORBIT: ICD-10-CM

## 2024-06-10 DIAGNOSIS — F17.200 NICOTINE DEPENDENCE, UNSPECIFIED, UNCOMPLICATED: ICD-10-CM

## 2024-06-10 DIAGNOSIS — Z87.2 PERSONAL HISTORY OF DISEASES OF THE SKIN AND SUBCUTANEOUS TISSUE: Chronic | ICD-10-CM

## 2024-06-10 DIAGNOSIS — T78.1XXA OTHER ADVERSE FOOD REACTIONS, NOT ELSEWHERE CLASSIFIED, INITIAL ENCOUNTER: ICD-10-CM

## 2024-06-10 DIAGNOSIS — Z98.890 OTHER SPECIFIED POSTPROCEDURAL STATES: Chronic | ICD-10-CM

## 2024-06-10 DIAGNOSIS — Z88.6 ALLERGY STATUS TO ANALGESIC AGENT: ICD-10-CM

## 2024-06-10 DIAGNOSIS — R22.0 LOCALIZED SWELLING, MASS AND LUMP, HEAD: ICD-10-CM

## 2024-06-10 LAB
ANION GAP SERPL CALC-SCNC: 22 MMOL/L — HIGH (ref 7–14)
BASOPHILS # BLD AUTO: 0.05 K/UL — SIGNIFICANT CHANGE UP (ref 0–0.2)
BASOPHILS NFR BLD AUTO: 1 % — SIGNIFICANT CHANGE UP (ref 0–1)
BUN SERPL-MCNC: 8 MG/DL — LOW (ref 10–20)
CALCIUM SERPL-MCNC: 9.9 MG/DL — SIGNIFICANT CHANGE UP (ref 8.4–10.4)
CHLORIDE SERPL-SCNC: 98 MMOL/L — SIGNIFICANT CHANGE UP (ref 98–110)
CO2 SERPL-SCNC: 20 MMOL/L — SIGNIFICANT CHANGE UP (ref 17–32)
CREAT SERPL-MCNC: 0.6 MG/DL — LOW (ref 0.7–1.5)
EGFR: 112 ML/MIN/1.73M2 — SIGNIFICANT CHANGE UP
EOSINOPHIL # BLD AUTO: 0.1 K/UL — SIGNIFICANT CHANGE UP (ref 0–0.7)
EOSINOPHIL NFR BLD AUTO: 2 % — SIGNIFICANT CHANGE UP (ref 0–8)
GLUCOSE SERPL-MCNC: 90 MG/DL — SIGNIFICANT CHANGE UP (ref 70–99)
HCG SERPL QL: NEGATIVE — SIGNIFICANT CHANGE UP
HCT VFR BLD CALC: 41.6 % — SIGNIFICANT CHANGE UP (ref 37–47)
HGB BLD-MCNC: 14.7 G/DL — SIGNIFICANT CHANGE UP (ref 12–16)
IMM GRANULOCYTES NFR BLD AUTO: 0.2 % — SIGNIFICANT CHANGE UP (ref 0.1–0.3)
LYMPHOCYTES # BLD AUTO: 1.88 K/UL — SIGNIFICANT CHANGE UP (ref 1.2–3.4)
LYMPHOCYTES # BLD AUTO: 37.4 % — SIGNIFICANT CHANGE UP (ref 20.5–51.1)
MCHC RBC-ENTMCNC: 34.2 PG — HIGH (ref 27–31)
MCHC RBC-ENTMCNC: 35.3 G/DL — SIGNIFICANT CHANGE UP (ref 32–37)
MCV RBC AUTO: 96.7 FL — SIGNIFICANT CHANGE UP (ref 81–99)
MONOCYTES # BLD AUTO: 0.23 K/UL — SIGNIFICANT CHANGE UP (ref 0.1–0.6)
MONOCYTES NFR BLD AUTO: 4.6 % — SIGNIFICANT CHANGE UP (ref 1.7–9.3)
NEUTROPHILS # BLD AUTO: 2.76 K/UL — SIGNIFICANT CHANGE UP (ref 1.4–6.5)
NEUTROPHILS NFR BLD AUTO: 54.8 % — SIGNIFICANT CHANGE UP (ref 42.2–75.2)
NRBC # BLD: 0 /100 WBCS — SIGNIFICANT CHANGE UP (ref 0–0)
PLATELET # BLD AUTO: 227 K/UL — SIGNIFICANT CHANGE UP (ref 130–400)
PMV BLD: 10.7 FL — HIGH (ref 7.4–10.4)
POTASSIUM SERPL-MCNC: 3.6 MMOL/L — SIGNIFICANT CHANGE UP (ref 3.5–5)
POTASSIUM SERPL-SCNC: 3.6 MMOL/L — SIGNIFICANT CHANGE UP (ref 3.5–5)
RBC # BLD: 4.3 M/UL — SIGNIFICANT CHANGE UP (ref 4.2–5.4)
RBC # FLD: 11.6 % — SIGNIFICANT CHANGE UP (ref 11.5–14.5)
SODIUM SERPL-SCNC: 140 MMOL/L — SIGNIFICANT CHANGE UP (ref 135–146)
WBC # BLD: 5.03 K/UL — SIGNIFICANT CHANGE UP (ref 4.8–10.8)
WBC # FLD AUTO: 5.03 K/UL — SIGNIFICANT CHANGE UP (ref 4.8–10.8)

## 2024-06-10 PROCEDURE — 96375 TX/PRO/DX INJ NEW DRUG ADDON: CPT

## 2024-06-10 PROCEDURE — 99285 EMERGENCY DEPT VISIT HI MDM: CPT | Mod: 25

## 2024-06-10 PROCEDURE — 96372 THER/PROPH/DIAG INJ SC/IM: CPT | Mod: XU

## 2024-06-10 PROCEDURE — 84703 CHORIONIC GONADOTROPIN ASSAY: CPT

## 2024-06-10 PROCEDURE — 80048 BASIC METABOLIC PNL TOTAL CA: CPT

## 2024-06-10 PROCEDURE — 99291 CRITICAL CARE FIRST HOUR: CPT

## 2024-06-10 PROCEDURE — 96374 THER/PROPH/DIAG INJ IV PUSH: CPT

## 2024-06-10 PROCEDURE — 85025 COMPLETE CBC W/AUTO DIFF WBC: CPT

## 2024-06-10 PROCEDURE — 36415 COLL VENOUS BLD VENIPUNCTURE: CPT

## 2024-06-10 RX ORDER — ONDANSETRON 8 MG/1
4 TABLET, FILM COATED ORAL ONCE
Refills: 0 | Status: COMPLETED | OUTPATIENT
Start: 2024-06-10 | End: 2024-06-10

## 2024-06-10 RX ORDER — EPINEPHRINE 0.3 MG/.3ML
0.3 INJECTION INTRAMUSCULAR; SUBCUTANEOUS ONCE
Refills: 0 | Status: COMPLETED | OUTPATIENT
Start: 2024-06-10 | End: 2024-06-10

## 2024-06-10 RX ORDER — FAMOTIDINE 10 MG/ML
20 INJECTION INTRAVENOUS ONCE
Refills: 0 | Status: COMPLETED | OUTPATIENT
Start: 2024-06-10 | End: 2024-06-10

## 2024-06-10 RX ORDER — DEXAMETHASONE 0.5 MG/5ML
10 ELIXIR ORAL ONCE
Refills: 0 | Status: COMPLETED | OUTPATIENT
Start: 2024-06-10 | End: 2024-06-10

## 2024-06-10 RX ORDER — EPINEPHRINE 0.3 MG/.3ML
0.3 INJECTION INTRAMUSCULAR; SUBCUTANEOUS
Qty: 1 | Refills: 0
Start: 2024-06-10 | End: 2024-06-10

## 2024-06-10 RX ADMIN — ONDANSETRON 4 MILLIGRAM(S): 8 TABLET, FILM COATED ORAL at 22:28

## 2024-06-10 RX ADMIN — Medication 10 MILLIGRAM(S): at 21:09

## 2024-06-10 RX ADMIN — EPINEPHRINE 0.3 MILLIGRAM(S): 0.3 INJECTION INTRAMUSCULAR; SUBCUTANEOUS at 20:50

## 2024-06-10 RX ADMIN — FAMOTIDINE 20 MILLIGRAM(S): 10 INJECTION INTRAVENOUS at 21:09

## 2024-06-10 NOTE — ED PROVIDER NOTE - NSPTACCESSSVCSAPPTDETAILS_ED_ALL_ED_FT
Please schedule appointment for patient who was seen in ED for anaphylaxis and received FAA, has received EpiPen prescription.  Needs formal allergy testing.

## 2024-06-10 NOTE — ED PROVIDER NOTE - OBJECTIVE STATEMENT
Patient is a 48-year-old female with PMH acute pancreatitis, Hx allergic reaction to Compazine not requiring epi, who presents to the ED with concerns for allergic reaction.  Patient reports that 2 hours ago she had eaten a salad that she had made at home and immediately began feeling sensation of throat closing with difficulty breathing and right-sided periorbital swelling.  Patient had taken 2 oral tablets of Benadryl at home with minimal relief.  Patient also reports itchiness with hives to the left side of her neck.  Denies fever, chills, chest pain, nausea, vomiting, abdominal pain.  Denies introduction of any new soaps, detergents, fragrances, medications, or foods to diet.  Has never had formal allergy testing.

## 2024-06-10 NOTE — ED PROVIDER NOTE - CARE PROVIDER_API CALL
Isa Mcgovern  Allergy and Immunology  [address] [place] [id number]  Phone: [phone]  Fax: [phone]  Follow Up Time: 1-3 Days

## 2024-06-10 NOTE — ED PROVIDER NOTE - CLINICAL SUMMARY MEDICAL DECISION MAKING FREE TEXT BOX
Will continue to reassess symptoms rapidly improving    Due to a high probability of clinically significant, life threatening deterioration, the patient required my highest level of preparedness to intervene emergently and I personally spent this critical care time directly and personally managing the patient. This critical care time included obtaining a history; examining the patient; pulse oximetry; ordering and review of studies; arranging urgent treatment with development of a management plan; evaluation of patient's response to treatment; frequent reassessment; and, discussions with other providers and the patient/patients family. This critical care time was performed to assess and manage the high probability of imminent, life-threatening deterioration that could result in multi-organ failure.

## 2024-06-10 NOTE — ED ADULT NURSE NOTE - NSFALLUNIVINTERV_ED_ALL_ED
Bed/Stretcher in lowest position, wheels locked, appropriate side rails in place/Call bell, personal items and telephone in reach/Instruct patient to call for assistance before getting out of bed/chair/stretcher/Non-slip footwear applied when patient is off stretcher/[place] to call system/Physically safe environment - no spills, clutter or unnecessary equipment/Purposeful proactive rounding/Room/bathroom lighting operational, light cord in reach

## 2024-06-10 NOTE — ED PROVIDER NOTE - PATIENT PORTAL LINK FT
You can access the FollowMyHealth Patient Portal offered by [facility] by registering at the following website: [email or web address]. By joining [employer]’s FollowMyHealth portal, you will also be able to view your health information using other applications (apps) compatible with our system.

## 2024-06-10 NOTE — ED PROVIDER NOTE - ATTENDING CONTRIBUTION TO CARE
48-year-old female to ED with acute allergic reaction started just prior to arrival.  Patient not sure what she ate she was eating salad and nociceptors noticed swelling to her face and tingling in her lips.  No trauma or fall slightly raspy voice.  Noted evidence of throat closing.  No hives in her chest abdomen pelvis or extremities.  No concern for pregnancy.  Patient seen immediately on arrival    .  On exam no evidence of any intraoral edema tongue is normal and posterior pharynx is without edema.  No wheezing in her lungs.  Only hives noted on the face.

## 2024-06-10 NOTE — ED PROVIDER NOTE - PROGRESS NOTE DETAILS
CR: Patient given epi, pepcid, and decadron (took benadryl PTA); will observe for 3 hours while on cardiac monitor. CR: Patient given epi, pepcid, and decadron (took benadryl PTA); will observe for 4 hours while on cardiac monitor. PS: Swelling improved. Pt feeling much better. Will dc with allergy follow up. Epipen sent to pharmacy

## 2024-06-10 NOTE — ED PROVIDER NOTE - PHYSICAL EXAMINATION
VITAL SIGNS: I have reviewed nursing notes and confirm.  CONSTITUTIONAL: In no acute distress.  SKIN: Skin exam is warm and dry. Hives to the left side of the neck.   HEAD: Normocephalic; atraumatic.  EYES: PERRL, EOM intact; (+) right periorbital swelling. Conjunctiva and sclera clear.  ENT: No nasal discharge; airway clear. Posterior oropharynx without malocclusion or edema. No uvular, tongue, or lip swelling. Tolerating oral secretions without pooling or drooling.   NECK: Supple; non tender.  CARD: S1, S2; Regular rate and rhythm.  RESP: CTAB, no wheezing. Speaking in full sentences.   ABD: Normal bowel sounds; soft; non-distended; non-tender; No rebound or guarding.  EXT: Normal ROM.  NEURO: Alert, oriented. Grossly unremarkable. No focal deficits.

## 2024-06-10 NOTE — ED PROVIDER NOTE - NSFOLLOWUPINSTRUCTIONS_ED_ALL_ED_FT
Our Emergency Department Referral Coordinators will be reaching out to you in the next 24-48 hours from 9:00am to 5:00pm with a follow up appointment. Please expect a phone call from the hospital in that time frame. If you do not receive a call or if you have any questions or concerns, you can reach them at   [phone].     Anaphylaxis    An anaphylactic reaction (anaphylaxis) is a sudden, severe allergic reaction that affects multiple areas of the body. An allergic reaction is an abnormal reaction to a substance (allergen) by the body's defense system. Common allergens include medicines, food, insect bites or stings, and blood products. The body releases certain proteins into the blood that can cause a variety of symptoms such as an itchy rash, wheezing, swelling of the face/lips/tongue/throat, abdominal pain, nausea or vomiting. An allergic reaction is usually treated with medication. If your health care provider prescribed you an epinephrine injection device, make sure to keep it with you at all times.    SEEK IMMEDIATE MEDICAL CARE IF YOU HAVE THE FOLLOWING SYMPTOMS: allergic reaction severe enough that required you to use epinephrine, tightness in your chest, swelling around your lips/tongue/throat, abdominal pain, vomiting or diarrhea, or lightheadedness/dizziness. These symptoms may represent a serious problem that is an emergency. Do not wait to see if the symptoms will go away. Use your auto-injector pen or anaphylaxis kit as you have been instructed, and get medical help right away. Call your local emergency services (051 in the [place] Do not drive yourself to the hospital.

## 2024-06-10 NOTE — ED ADULT TRIAGE NOTE - CHIEF COMPLAINT QUOTE
pt presented to ED c/o allergic reaction after eating salad. pt noted to have eye swelling and redness. pt state she took benadryl x1 hr without relief.

## 2024-06-24 ENCOUNTER — INPATIENT (INPATIENT)
Facility: HOSPITAL | Age: 49
LOS: 4 days | Discharge: HOME | DRG: 251 | End: 2024-06-29
Attending: STUDENT IN AN ORGANIZED HEALTH CARE EDUCATION/TRAINING PROGRAM | Admitting: STUDENT IN AN ORGANIZED HEALTH CARE EDUCATION/TRAINING PROGRAM
Payer: MEDICAID

## 2024-06-24 VITALS
SYSTOLIC BLOOD PRESSURE: 103 MMHG | RESPIRATION RATE: 18 BRPM | HEIGHT: 64 IN | DIASTOLIC BLOOD PRESSURE: 71 MMHG | WEIGHT: 134.92 LBS | TEMPERATURE: 98 F | HEART RATE: 89 BPM | OXYGEN SATURATION: 99 %

## 2024-06-24 DIAGNOSIS — Z87.2 PERSONAL HISTORY OF DISEASES OF THE SKIN AND SUBCUTANEOUS TISSUE: Chronic | ICD-10-CM

## 2024-06-24 DIAGNOSIS — Z98.890 OTHER SPECIFIED POSTPROCEDURAL STATES: Chronic | ICD-10-CM

## 2024-06-24 PROCEDURE — 99285 EMERGENCY DEPT VISIT HI MDM: CPT

## 2024-06-24 NOTE — PROGRESS NOTE ADULT - SUBJECTIVE AND OBJECTIVE BOX
LAURA BARAJAS  47y  [facility] [id number] [address]      Patient is a 47y old  Female who presents with a chief complaint of Abdominal pain (27 Aug 2022 12:21)      INTERVAL HPI/OVERNIGHT EVENTS:      decrease in abdominal pain   REVIEW OF SYSTEMS:  CONSTITUTIONAL: No fever, weight loss, or fatigue  EYES: No eye pain, visual disturbances, or discharge  ENMT:  No difficulty hearing, tinnitus, vertigo; No sinus or throat pain  NECK: No pain or stiffness  BREASTS: No pain, masses, or nipple discharge  RESPIRATORY: No cough, wheezing, chills or hemoptysis; No shortness of breath  CARDIOVASCULAR: No chest pain, palpitations, dizziness, or leg swelling  GASTROINTESTINAL: improvement in abdominal pain   GENITOURINARY: No dysuria, frequency, hematuria, or incontinence  NEUROLOGICAL: No headaches, memory loss, loss of strength, numbness, or tremors  SKIN: No itching, burning, rashes, or lesions   LYMPH NODES: No enlarged glands  ENDOCRINE: No heat or cold intolerance; No hair loss  MUSCULOSKELETAL: No joint pain or swelling; No muscle, back, or extremity pain  PSYCHIATRIC: No depression, anxiety, mood swings, or difficulty sleeping  HEME/LYMPH: No easy bruising, or bleeding gums  ALLERY AND IMMUNOLOGIC: No hives or eczema  FAMILY HISTORY:  Family history of hypertension  both father and mother    FH: pancreatic cancer  cousin    Family history of cholecystectomy  many female members in the family      T(C): 37.6 (08-27-22 @ 08:27), Max: 37.6 (08-27-22 @ 08:27)  HR: 73 (08-27-22 @ 08:27) (73 - 95)  BP: 123/85 (08-27-22 @ 08:27) (123/85 - 134/90)  RR: 18 (08-27-22 @ 08:27) (18 - 18)  SpO2: --  Wt(kg): --Vital Signs Last 24 Hrs  T(C): 37.6 (27 Aug 2022 08:27), Max: 37.6 (27 Aug 2022 08:27)  T(F): 99.6 (27 Aug 2022 08:27), Max: 99.6 (27 Aug 2022 08:27)  HR: 73 (27 Aug 2022 08:27) (73 - 95)  BP: 123/85 (27 Aug 2022 08:27) (123/85 - 134/90)  BP(mean): --  RR: 18 (27 Aug 2022 08:27) (18 - 18)  SpO2: --        PHYSICAL EXAM:  GENERAL: NAD, well-groomed, well-developed  HEAD:  Atraumatic, Normocephalic  EYES: EOMI, PERRLA, conjunctiva and sclera clear  ENMT: No tonsillar erythema, exudates, or enlargement; Moist mucous membranes, Good dentition, No lesions  NECK: Supple, No JVD, Normal thyroid  NERVOUS SYSTEM:  Alert & Oriented X3, Good concentration; Motor Strength 5/5 B/L upper and lower extremities; DTRs 2+ intact and symmetric  PULM: Clear to auscultation bilaterally  CARDIAC: Regular rate and rhythm; No murmurs, rubs, or gallops  GI: Soft, Nontender, Nondistended; Bowel sounds present  EXTREMITIES:  2+ Peripheral Pulses, No clubbing, cyanosis, or edema  LYMPH: No lymphadenopathy noted  SKIN: No rashes or lesions    Consultant(s) Notes Reviewed:  [x ] YES  [ ] NO  Care Discussed with Consultants/Other Providers [ x] YES  [ ] NO    LABS:                            10.5   3.93  )-----------( 139      ( 27 Aug 2022 06:48 )             29.4   08-27    138  |  101  |  <3<L>  ----------------------------<  85  3.2<L>   |  27  |  0.5<L>    Ca    8.5      27 Aug 2022 06:48  Mg     1.6     08-27    TPro  6.7  /  Alb  3.6  /  TBili  1.3<H>  /  DBili  x   /  AST  277<H>  /  ALT  87<H>  /  AlkPhos  222<H>  08-27            enoxaparin Injectable 40 milliGRAM(s) SubCutaneous every 24 hours  folic acid 1 milliGRAM(s) Oral daily  gabapentin 300 milliGRAM(s) Oral three times a day  HYDROmorphone  Injectable 1 milliGRAM(s) IV Push every 4 hours PRN  lactated ringers. 1000 milliLiter(s) IV Continuous <Continuous>  lactobacillus acidophilus 1 Tablet(s) Oral daily  LORazepam   Injectable 2 milliGRAM(s) IV Push every 1 hour PRN  multivitamin 1 Tablet(s) Oral daily  ondansetron Injectable 8 milliGRAM(s) IV Push every 12 hours PRN  pancrelipase  (CREON 12,000 Lipase Units) 3 Capsule(s) Oral three times a day with meals  pantoprazole  Injectable 40 milliGRAM(s) IV Push daily  polyethylene glycol 3350 17 Gram(s) Oral daily PRN  senna 2 Tablet(s) Oral at bedtime PRN      HEALTH ISSUES - PROBLEM Dx:          Case Discussed with House Staff      Spectra [phone]   Risk Assessment Explanation (Does Not Render In The Note): Clinical determination of the probability and/or consequences of an event, such as surgery. Clinical assessment of the level of risk is affected by the nature of the event under consideration for the patient. Modifier 57 is used to indicate an Evaluation and Management (E/M) service resulted in the initial decision to perform surgery either the day before a major surgery (90 day global) or the day of a major surgery. Date Of Surgery - Today Or Tomorrow?: today Discussion: All risks, benefits, and options of the procedure reviewed with the patient, including the option of no treatment. Identified Risk Factors Documented?: yes Complexity (Necessary For Coding; Major - 90 Day Global With Some Exceptions; Minor - 10 Day Global): major

## 2024-06-25 DIAGNOSIS — R10.9 UNSPECIFIED ABDOMINAL PAIN: ICD-10-CM

## 2024-06-25 LAB
ALBUMIN SERPL ELPH-MCNC: 4.7 G/DL — SIGNIFICANT CHANGE UP (ref 3.5–5.2)
ALBUMIN SERPL ELPH-MCNC: 5.7 G/DL — HIGH (ref 3.5–5.2)
ALP SERPL-CCNC: 116 U/L — HIGH (ref 30–115)
ALP SERPL-CCNC: 158 U/L — HIGH (ref 30–115)
ALT FLD-CCNC: 72 U/L — HIGH (ref 0–41)
ALT FLD-CCNC: 96 U/L — HIGH (ref 0–41)
ANION GAP SERPL CALC-SCNC: 17 MMOL/L — HIGH (ref 7–14)
ANION GAP SERPL CALC-SCNC: 18 MMOL/L — HIGH (ref 7–14)
ANION GAP SERPL CALC-SCNC: 27 MMOL/L — HIGH (ref 7–14)
APPEARANCE UR: CLEAR — SIGNIFICANT CHANGE UP
AST SERPL-CCNC: 313 U/L — HIGH (ref 0–41)
AST SERPL-CCNC: 523 U/L — HIGH (ref 0–41)
B-OH-BUTYR SERPL-SCNC: 2.6 MMOL/L — HIGH
B-OH-BUTYR SERPL-SCNC: 3.6 MMOL/L — HIGH
BASOPHILS # BLD AUTO: 0.06 K/UL — SIGNIFICANT CHANGE UP (ref 0–0.2)
BASOPHILS NFR BLD AUTO: 0.5 % — SIGNIFICANT CHANGE UP (ref 0–1)
BILIRUB DIRECT SERPL-MCNC: 0.3 MG/DL — SIGNIFICANT CHANGE UP (ref 0–0.3)
BILIRUB INDIRECT FLD-MCNC: 0.6 MG/DL — SIGNIFICANT CHANGE UP (ref 0.2–1.2)
BILIRUB SERPL-MCNC: 0.9 MG/DL — SIGNIFICANT CHANGE UP (ref 0.2–1.2)
BILIRUB SERPL-MCNC: 0.9 MG/DL — SIGNIFICANT CHANGE UP (ref 0.2–1.2)
BILIRUB SERPL-MCNC: 1.5 MG/DL — HIGH (ref 0.2–1.2)
BILIRUB UR-MCNC: NEGATIVE — SIGNIFICANT CHANGE UP
BUN SERPL-MCNC: 25 MG/DL — HIGH (ref 10–20)
BUN SERPL-MCNC: 25 MG/DL — HIGH (ref 10–20)
BUN SERPL-MCNC: 26 MG/DL — HIGH (ref 10–20)
CALCIUM SERPL-MCNC: 10.7 MG/DL — HIGH (ref 8.4–10.5)
CALCIUM SERPL-MCNC: 8.8 MG/DL — SIGNIFICANT CHANGE UP (ref 8.4–10.5)
CALCIUM SERPL-MCNC: 9.1 MG/DL — SIGNIFICANT CHANGE UP (ref 8.4–10.5)
CHLORIDE SERPL-SCNC: 89 MMOL/L — LOW (ref 98–110)
CHLORIDE SERPL-SCNC: 94 MMOL/L — LOW (ref 98–110)
CHLORIDE SERPL-SCNC: 95 MMOL/L — LOW (ref 98–110)
CO2 SERPL-SCNC: 18 MMOL/L — SIGNIFICANT CHANGE UP (ref 17–32)
CO2 SERPL-SCNC: 20 MMOL/L — SIGNIFICANT CHANGE UP (ref 17–32)
CO2 SERPL-SCNC: 22 MMOL/L — SIGNIFICANT CHANGE UP (ref 17–32)
COLOR SPEC: SIGNIFICANT CHANGE UP
CREAT SERPL-MCNC: 1.1 MG/DL — SIGNIFICANT CHANGE UP (ref 0.7–1.5)
CREAT SERPL-MCNC: 1.1 MG/DL — SIGNIFICANT CHANGE UP (ref 0.7–1.5)
CREAT SERPL-MCNC: 1.5 MG/DL — SIGNIFICANT CHANGE UP (ref 0.7–1.5)
DIFF PNL FLD: NEGATIVE — SIGNIFICANT CHANGE UP
EGFR: 43 ML/MIN/1.73M2 — LOW
EGFR: 43 ML/MIN/1.73M2 — LOW
EGFR: 63 ML/MIN/1.73M2 — SIGNIFICANT CHANGE UP
EOSINOPHIL # BLD AUTO: 0.01 K/UL — SIGNIFICANT CHANGE UP (ref 0–0.7)
EOSINOPHIL NFR BLD AUTO: 0.1 % — SIGNIFICANT CHANGE UP (ref 0–8)
ETHANOL SERPL-MCNC: <10 MG/DL — SIGNIFICANT CHANGE UP
GAS PNL BLDV: SIGNIFICANT CHANGE UP
GLUCOSE SERPL-MCNC: 129 MG/DL — HIGH (ref 70–99)
GLUCOSE SERPL-MCNC: 76 MG/DL — SIGNIFICANT CHANGE UP (ref 70–99)
GLUCOSE SERPL-MCNC: 78 MG/DL — SIGNIFICANT CHANGE UP (ref 70–99)
GLUCOSE UR QL: NEGATIVE MG/DL — SIGNIFICANT CHANGE UP
HCG SERPL QL: NEGATIVE — SIGNIFICANT CHANGE UP
HCT VFR BLD CALC: 41.7 % — SIGNIFICANT CHANGE UP (ref 37–47)
HGB BLD-MCNC: 14.8 G/DL — SIGNIFICANT CHANGE UP (ref 12–16)
IMM GRANULOCYTES NFR BLD AUTO: 0.2 % — SIGNIFICANT CHANGE UP (ref 0.1–0.3)
KETONES UR-MCNC: 15 MG/DL
LEUKOCYTE ESTERASE UR-ACNC: NEGATIVE — SIGNIFICANT CHANGE UP
LIDOCAIN IGE QN: 18 U/L — SIGNIFICANT CHANGE UP (ref 7–60)
LYMPHOCYTES # BLD AUTO: 1 K/UL — LOW (ref 1.2–3.4)
LYMPHOCYTES # BLD AUTO: 8 % — LOW (ref 20.5–51.1)
MAGNESIUM SERPL-MCNC: 2.9 MG/DL — HIGH (ref 1.8–2.4)
MCHC RBC-ENTMCNC: 34.2 PG — HIGH (ref 27–31)
MCHC RBC-ENTMCNC: 35.5 G/DL — SIGNIFICANT CHANGE UP (ref 32–37)
MCV RBC AUTO: 96.3 FL — SIGNIFICANT CHANGE UP (ref 81–99)
MONOCYTES # BLD AUTO: 0.41 K/UL — SIGNIFICANT CHANGE UP (ref 0.1–0.6)
MONOCYTES NFR BLD AUTO: 3.3 % — SIGNIFICANT CHANGE UP (ref 1.7–9.3)
NEUTROPHILS # BLD AUTO: 11.04 K/UL — HIGH (ref 1.4–6.5)
NEUTROPHILS NFR BLD AUTO: 87.9 % — HIGH (ref 42.2–75.2)
NITRITE UR-MCNC: NEGATIVE — SIGNIFICANT CHANGE UP
NRBC # BLD: 0 /100 WBCS — SIGNIFICANT CHANGE UP (ref 0–0)
NRBC BLD-RTO: 0 /100 WBCS — SIGNIFICANT CHANGE UP (ref 0–0)
PH UR: 6 — SIGNIFICANT CHANGE UP (ref 5–8)
PHOSPHATE SERPL-MCNC: 2.6 MG/DL — SIGNIFICANT CHANGE UP (ref 2.1–4.9)
PLATELET # BLD AUTO: 211 K/UL — SIGNIFICANT CHANGE UP (ref 130–400)
PMV BLD: 11.3 FL — HIGH (ref 7.4–10.4)
POTASSIUM SERPL-MCNC: 3 MMOL/L — LOW (ref 3.5–5)
POTASSIUM SERPL-MCNC: 3 MMOL/L — LOW (ref 3.5–5)
POTASSIUM SERPL-MCNC: 4.3 MMOL/L — SIGNIFICANT CHANGE UP (ref 3.5–5)
POTASSIUM SERPL-SCNC: 3 MMOL/L — LOW (ref 3.5–5)
POTASSIUM SERPL-SCNC: 3 MMOL/L — LOW (ref 3.5–5)
POTASSIUM SERPL-SCNC: 4.3 MMOL/L — SIGNIFICANT CHANGE UP (ref 3.5–5)
PROT SERPL-MCNC: 7.7 G/DL — SIGNIFICANT CHANGE UP (ref 6–8)
PROT SERPL-MCNC: 9.7 G/DL — HIGH (ref 6–8)
PROT UR-MCNC: 100 MG/DL
RBC # BLD: 4.33 M/UL — SIGNIFICANT CHANGE UP (ref 4.2–5.4)
RBC # FLD: 11.1 % — LOW (ref 11.5–14.5)
SODIUM SERPL-SCNC: 133 MMOL/L — LOW (ref 135–146)
SODIUM SERPL-SCNC: 133 MMOL/L — LOW (ref 135–146)
SODIUM SERPL-SCNC: 134 MMOL/L — LOW (ref 135–146)
SP GR SPEC: >1.03 — HIGH (ref 1–1.03)
TROPONIN T, HIGH SENSITIVITY RESULT: <6 NG/L — SIGNIFICANT CHANGE UP (ref 6–13)
UROBILINOGEN FLD QL: 1 MG/DL — SIGNIFICANT CHANGE UP (ref 0.2–1)
WBC # BLD: 12.55 K/UL — HIGH (ref 4.8–10.8)
WBC # FLD AUTO: 12.55 K/UL — HIGH (ref 4.8–10.8)

## 2024-06-25 PROCEDURE — 82746 ASSAY OF FOLIC ACID SERUM: CPT

## 2024-06-25 PROCEDURE — 86901 BLOOD TYPING SEROLOGIC RH(D): CPT

## 2024-06-25 PROCEDURE — 83735 ASSAY OF MAGNESIUM: CPT

## 2024-06-25 PROCEDURE — 83550 IRON BINDING TEST: CPT

## 2024-06-25 PROCEDURE — 83520 IMMUNOASSAY QUANT NOS NONAB: CPT

## 2024-06-25 PROCEDURE — 86038 ANTINUCLEAR ANTIBODIES: CPT

## 2024-06-25 PROCEDURE — 99223 1ST HOSP IP/OBS HIGH 75: CPT

## 2024-06-25 PROCEDURE — 83540 ASSAY OF IRON: CPT

## 2024-06-25 PROCEDURE — 86850 RBC ANTIBODY SCREEN: CPT

## 2024-06-25 PROCEDURE — 82310 ASSAY OF CALCIUM: CPT

## 2024-06-25 PROCEDURE — 74177 CT ABD & PELVIS W/CONTRAST: CPT | Mod: 26,MC

## 2024-06-25 PROCEDURE — 76705 ECHO EXAM OF ABDOMEN: CPT

## 2024-06-25 PROCEDURE — 85027 COMPLETE CBC AUTOMATED: CPT

## 2024-06-25 PROCEDURE — 85730 THROMBOPLASTIN TIME PARTIAL: CPT

## 2024-06-25 PROCEDURE — 85045 AUTOMATED RETICULOCYTE COUNT: CPT

## 2024-06-25 PROCEDURE — 93010 ELECTROCARDIOGRAM REPORT: CPT

## 2024-06-25 PROCEDURE — 84100 ASSAY OF PHOSPHORUS: CPT

## 2024-06-25 PROCEDURE — 86900 BLOOD TYPING SEROLOGIC ABO: CPT

## 2024-06-25 PROCEDURE — 80048 BASIC METABOLIC PNL TOTAL CA: CPT

## 2024-06-25 PROCEDURE — 85025 COMPLETE CBC W/AUTO DIFF WBC: CPT

## 2024-06-25 PROCEDURE — 82248 BILIRUBIN DIRECT: CPT

## 2024-06-25 PROCEDURE — 82784 ASSAY IGA/IGD/IGG/IGM EACH: CPT

## 2024-06-25 PROCEDURE — 86381 MITOCHONDRIAL ANTIBODY EACH: CPT

## 2024-06-25 PROCEDURE — 80074 ACUTE HEPATITIS PANEL: CPT

## 2024-06-25 PROCEDURE — 36415 COLL VENOUS BLD VENIPUNCTURE: CPT

## 2024-06-25 PROCEDURE — 80061 LIPID PANEL: CPT

## 2024-06-25 PROCEDURE — 80053 COMPREHEN METABOLIC PANEL: CPT

## 2024-06-25 PROCEDURE — 82010 KETONE BODYS QUAN: CPT

## 2024-06-25 PROCEDURE — 80321 ALCOHOLS BIOMARKERS 1OR 2: CPT

## 2024-06-25 PROCEDURE — 87040 BLOOD CULTURE FOR BACTERIA: CPT

## 2024-06-25 PROCEDURE — C9113: CPT

## 2024-06-25 PROCEDURE — 84436 ASSAY OF TOTAL THYROXINE: CPT

## 2024-06-25 PROCEDURE — 84443 ASSAY THYROID STIM HORMONE: CPT

## 2024-06-25 PROCEDURE — 82103 ALPHA-1-ANTITRYPSIN TOTAL: CPT

## 2024-06-25 PROCEDURE — 99222 1ST HOSP IP/OBS MODERATE 55: CPT

## 2024-06-25 PROCEDURE — 83970 ASSAY OF PARATHORMONE: CPT

## 2024-06-25 PROCEDURE — 85610 PROTHROMBIN TIME: CPT

## 2024-06-25 PROCEDURE — 82104 ALPHA-1-ANTITRYPSIN PHENO: CPT

## 2024-06-25 PROCEDURE — 83036 HEMOGLOBIN GLYCOSYLATED A1C: CPT

## 2024-06-25 PROCEDURE — 86376 MICROSOMAL ANTIBODY EACH: CPT

## 2024-06-25 PROCEDURE — 86140 C-REACTIVE PROTEIN: CPT

## 2024-06-25 PROCEDURE — 81001 URINALYSIS AUTO W/SCOPE: CPT

## 2024-06-25 PROCEDURE — 86255 FLUORESCENT ANTIBODY SCREEN: CPT

## 2024-06-25 PROCEDURE — 76705 ECHO EXAM OF ABDOMEN: CPT | Mod: 26

## 2024-06-25 PROCEDURE — 82607 VITAMIN B-12: CPT

## 2024-06-25 PROCEDURE — 82247 BILIRUBIN TOTAL: CPT

## 2024-06-25 PROCEDURE — 82728 ASSAY OF FERRITIN: CPT

## 2024-06-25 PROCEDURE — 84484 ASSAY OF TROPONIN QUANT: CPT

## 2024-06-25 RX ORDER — ACETAMINOPHEN 500 MG/5ML
650 LIQUID (ML) ORAL EVERY 6 HOURS
Refills: 0 | Status: DISCONTINUED | OUTPATIENT
Start: 2024-06-25 | End: 2024-06-25

## 2024-06-25 RX ORDER — MAGNESIUM SULFATE 500 MG/ML
2 SYRINGE (ML) INJECTION ONCE
Refills: 0 | Status: COMPLETED | OUTPATIENT
Start: 2024-06-25 | End: 2024-06-25

## 2024-06-25 RX ORDER — TRAMADOL HYDROCHLORIDE 50 MG/1
50 TABLET, FILM COATED ORAL THREE TIMES A DAY
Refills: 0 | Status: DISCONTINUED | OUTPATIENT
Start: 2024-06-26 | End: 2024-06-29

## 2024-06-25 RX ORDER — MAGNESIUM, ALUMINUM HYDROXIDE 200-200 MG
30 TABLET,CHEWABLE ORAL ONCE
Refills: 0 | Status: COMPLETED | OUTPATIENT
Start: 2024-06-25 | End: 2024-06-26

## 2024-06-25 RX ORDER — HYDROMORPHONE/SOD CHLOR,ISO/PF 2 MG/10 ML
0.2 SYRINGE (ML) INJECTION EVERY 6 HOURS
Refills: 0 | Status: DISCONTINUED | OUTPATIENT
Start: 2024-06-25 | End: 2024-06-25

## 2024-06-25 RX ORDER — METOCLOPRAMIDE HCL 10 MG
10 TABLET ORAL ONCE
Refills: 0 | Status: COMPLETED | OUTPATIENT
Start: 2024-06-25 | End: 2024-06-25

## 2024-06-25 RX ORDER — ONDANSETRON HCL/PF 4 MG/2 ML
4 VIAL (ML) INJECTION ONCE
Refills: 0 | Status: COMPLETED | OUTPATIENT
Start: 2024-06-25 | End: 2024-06-25

## 2024-06-25 RX ORDER — HYDROMORPHONE/SOD CHLOR,ISO/PF 2 MG/10 ML
0.5 SYRINGE (ML) INJECTION EVERY 6 HOURS
Refills: 0 | Status: DISCONTINUED | OUTPATIENT
Start: 2024-06-25 | End: 2024-06-25

## 2024-06-25 RX ORDER — SUCRALFATE 1 G
1 TABLET ORAL
Refills: 0 | Status: DISCONTINUED | OUTPATIENT
Start: 2024-06-25 | End: 2024-06-29

## 2024-06-25 RX ORDER — MIDAZOLAM IN 0.9 % SOD.CHLORID 1 MG/ML
1 PLASTIC BAG, INJECTION (ML) INTRAVENOUS ONCE
Refills: 0 | Status: DISCONTINUED | OUTPATIENT
Start: 2024-06-25 | End: 2024-06-25

## 2024-06-25 RX ORDER — MAGNESIUM SULFATE 500 MG/ML
1 SYRINGE (ML) INJECTION ONCE
Refills: 0 | Status: DISCONTINUED | OUTPATIENT
Start: 2024-06-25 | End: 2024-06-25

## 2024-06-25 RX ORDER — HEPARIN SODIUM 1000 [USP'U]/ML
5000 INJECTION INTRAVENOUS; SUBCUTANEOUS EVERY 12 HOURS
Refills: 0 | Status: DISCONTINUED | OUTPATIENT
Start: 2024-06-25 | End: 2024-06-29

## 2024-06-25 RX ORDER — HYDROMORPHONE/SOD CHLOR,ISO/PF 2 MG/10 ML
0.2 SYRINGE (ML) INJECTION EVERY 4 HOURS
Refills: 0 | Status: DISCONTINUED | OUTPATIENT
Start: 2024-06-25 | End: 2024-06-26

## 2024-06-25 RX ORDER — ONDANSETRON HCL/PF 4 MG/2 ML
4 VIAL (ML) INJECTION EVERY 8 HOURS
Refills: 0 | Status: DISCONTINUED | OUTPATIENT
Start: 2024-06-25 | End: 2024-06-29

## 2024-06-25 RX ORDER — SODIUM CHLORIDE 9 G/1000ML
1000 INJECTION, SOLUTION INTRAVENOUS ONCE
Refills: 0 | Status: COMPLETED | OUTPATIENT
Start: 2024-06-25 | End: 2024-06-25

## 2024-06-25 RX ORDER — MELATONIN 5 MG
3 TABLET ORAL AT BEDTIME
Refills: 0 | Status: DISCONTINUED | OUTPATIENT
Start: 2024-06-25 | End: 2024-06-29

## 2024-06-25 RX ORDER — HYDROMORPHONE/SOD CHLOR,ISO/PF 2 MG/10 ML
1 SYRINGE (ML) INJECTION ONCE
Refills: 0 | Status: DISCONTINUED | OUTPATIENT
Start: 2024-06-25 | End: 2024-06-25

## 2024-06-25 RX ORDER — KETOROLAC TROMETHAMINE 30 MG/ML
15 INJECTION, SOLUTION INTRAMUSCULAR; INTRAVENOUS ONCE
Refills: 0 | Status: DISCONTINUED | OUTPATIENT
Start: 2024-06-25 | End: 2024-06-25

## 2024-06-25 RX ORDER — SODIUM CHLORIDE 9 G/1000ML
1000 INJECTION, SOLUTION INTRAVENOUS
Refills: 0 | Status: DISCONTINUED | OUTPATIENT
Start: 2024-06-25 | End: 2024-06-25

## 2024-06-25 RX ORDER — HYDROMORPHONE/SOD CHLOR,ISO/PF 2 MG/10 ML
0.5 SYRINGE (ML) INJECTION EVERY 4 HOURS
Refills: 0 | Status: DISCONTINUED | OUTPATIENT
Start: 2024-06-25 | End: 2024-06-25

## 2024-06-25 RX ORDER — SODIUM CHLORIDE 9 G/1000ML
1000 INJECTION, SOLUTION INTRAVENOUS
Refills: 0 | Status: DISCONTINUED | OUTPATIENT
Start: 2024-06-25 | End: 2024-06-27

## 2024-06-25 RX ORDER — FOLIC ACID 1 MG/1
1 TABLET ORAL DAILY
Refills: 0 | Status: DISCONTINUED | OUTPATIENT
Start: 2024-06-25 | End: 2024-06-29

## 2024-06-25 RX ADMIN — Medication 0.2 MILLIGRAM(S): at 17:30

## 2024-06-25 RX ADMIN — Medication 0.2 MILLIGRAM(S): at 16:54

## 2024-06-25 RX ADMIN — Medication 1 GRAM(S): at 23:13

## 2024-06-25 RX ADMIN — Medication 50 MILLIEQUIVALENT(S): at 18:36

## 2024-06-25 RX ADMIN — Medication 20 MILLIGRAM(S): at 07:00

## 2024-06-25 RX ADMIN — Medication 4 MILLIGRAM(S): at 01:32

## 2024-06-25 RX ADMIN — Medication 104 MILLIGRAM(S): at 07:01

## 2024-06-25 RX ADMIN — Medication 1 MILLIGRAM(S): at 07:01

## 2024-06-25 RX ADMIN — Medication 25 GRAM(S): at 09:55

## 2024-06-25 RX ADMIN — Medication 4 MILLIGRAM(S): at 07:01

## 2024-06-25 RX ADMIN — Medication 105 MILLIGRAM(S): at 11:27

## 2024-06-25 RX ADMIN — HEPARIN SODIUM 5000 UNIT(S): 1000 INJECTION INTRAVENOUS; SUBCUTANEOUS at 18:48

## 2024-06-25 RX ADMIN — Medication 4 MILLIGRAM(S): at 14:42

## 2024-06-25 RX ADMIN — Medication 0.2 MILLIGRAM(S): at 21:04

## 2024-06-25 RX ADMIN — Medication 105 MILLIGRAM(S): at 21:39

## 2024-06-25 RX ADMIN — Medication 0.2 MILLIGRAM(S): at 22:20

## 2024-06-25 RX ADMIN — Medication 1 MILLIGRAM(S): at 03:28

## 2024-06-25 RX ADMIN — Medication 0.5 MILLIGRAM(S): at 10:45

## 2024-06-25 RX ADMIN — Medication 0.5 MILLIGRAM(S): at 10:11

## 2024-06-25 RX ADMIN — Medication 1000 MILLILITER(S): at 01:32

## 2024-06-25 RX ADMIN — KETOROLAC TROMETHAMINE 15 MILLIGRAM(S): 30 INJECTION, SOLUTION INTRAMUSCULAR; INTRAVENOUS at 01:31

## 2024-06-26 LAB
A1C WITH ESTIMATED AVERAGE GLUCOSE RESULT: 5.2 % — SIGNIFICANT CHANGE UP (ref 4–5.6)
ALBUMIN SERPL ELPH-MCNC: 4.4 G/DL — SIGNIFICANT CHANGE UP (ref 3.5–5.2)
ALBUMIN SERPL ELPH-MCNC: 5 G/DL — SIGNIFICANT CHANGE UP (ref 3.5–5.2)
ALP SERPL-CCNC: 105 U/L — SIGNIFICANT CHANGE UP (ref 30–115)
ALP SERPL-CCNC: 114 U/L — SIGNIFICANT CHANGE UP (ref 30–115)
ALT FLD-CCNC: 101 U/L — HIGH (ref 0–41)
ALT FLD-CCNC: 99 U/L — HIGH (ref 0–41)
ANION GAP SERPL CALC-SCNC: 16 MMOL/L — HIGH (ref 7–14)
ANION GAP SERPL CALC-SCNC: 19 MMOL/L — HIGH (ref 7–14)
APTT BLD: 32.1 SEC — SIGNIFICANT CHANGE UP (ref 27–39.2)
AST SERPL-CCNC: 393 U/L — HIGH (ref 0–41)
AST SERPL-CCNC: 468 U/L — HIGH (ref 0–41)
BASOPHILS # BLD AUTO: 0.04 K/UL — SIGNIFICANT CHANGE UP (ref 0–0.2)
BASOPHILS NFR BLD AUTO: 1 % — SIGNIFICANT CHANGE UP (ref 0–1)
BILIRUB SERPL-MCNC: 1.1 MG/DL — SIGNIFICANT CHANGE UP (ref 0.2–1.2)
BILIRUB SERPL-MCNC: 1.3 MG/DL — HIGH (ref 0.2–1.2)
BUN SERPL-MCNC: 13 MG/DL — SIGNIFICANT CHANGE UP (ref 10–20)
BUN SERPL-MCNC: 14 MG/DL — SIGNIFICANT CHANGE UP (ref 10–20)
CALCIUM SERPL-MCNC: 9 MG/DL — SIGNIFICANT CHANGE UP (ref 8.4–10.5)
CALCIUM SERPL-MCNC: 9 MG/DL — SIGNIFICANT CHANGE UP (ref 8.4–10.5)
CALCIUM SERPL-MCNC: 9.5 MG/DL — SIGNIFICANT CHANGE UP (ref 8.4–10.5)
CHLORIDE SERPL-SCNC: 101 MMOL/L — SIGNIFICANT CHANGE UP (ref 98–110)
CHLORIDE SERPL-SCNC: 98 MMOL/L — SIGNIFICANT CHANGE UP (ref 98–110)
CHOLEST SERPL-MCNC: 224 MG/DL — HIGH
CO2 SERPL-SCNC: 20 MMOL/L — SIGNIFICANT CHANGE UP (ref 17–32)
CO2 SERPL-SCNC: 20 MMOL/L — SIGNIFICANT CHANGE UP (ref 17–32)
CREAT SERPL-MCNC: 0.7 MG/DL — SIGNIFICANT CHANGE UP (ref 0.7–1.5)
CREAT SERPL-MCNC: 0.8 MG/DL — SIGNIFICANT CHANGE UP (ref 0.7–1.5)
CRP SERPL-MCNC: 16 MG/L — HIGH
EGFR: 108 ML/MIN/1.73M2 — SIGNIFICANT CHANGE UP
EGFR: 108 ML/MIN/1.73M2 — SIGNIFICANT CHANGE UP
EGFR: 92 ML/MIN/1.73M2 — SIGNIFICANT CHANGE UP
EGFR: 92 ML/MIN/1.73M2 — SIGNIFICANT CHANGE UP
EOSINOPHIL # BLD AUTO: 0.08 K/UL — SIGNIFICANT CHANGE UP (ref 0–0.7)
EOSINOPHIL NFR BLD AUTO: 2 % — SIGNIFICANT CHANGE UP (ref 0–8)
ESTIMATED AVERAGE GLUCOSE: 103 MG/DL — SIGNIFICANT CHANGE UP (ref 68–114)
FOLATE SERPL-MCNC: 11 NG/ML — SIGNIFICANT CHANGE UP
GLUCOSE SERPL-MCNC: 103 MG/DL — HIGH (ref 70–99)
GLUCOSE SERPL-MCNC: 99 MG/DL — SIGNIFICANT CHANGE UP (ref 70–99)
HAV IGM SER-ACNC: SIGNIFICANT CHANGE UP
HBV CORE IGM SER-ACNC: SIGNIFICANT CHANGE UP
HBV SURFACE AG SER-ACNC: SIGNIFICANT CHANGE UP
HCT VFR BLD CALC: 31.5 % — LOW (ref 37–47)
HCV AB S/CO SERPL IA: 0.13 S/CO — SIGNIFICANT CHANGE UP (ref 0–0.99)
HCV AB SERPL-IMP: SIGNIFICANT CHANGE UP
HDLC SERPL-MCNC: 53 MG/DL — SIGNIFICANT CHANGE UP
HGB BLD-MCNC: 11.2 G/DL — LOW (ref 12–16)
IMM GRANULOCYTES NFR BLD AUTO: 0.3 % — SIGNIFICANT CHANGE UP (ref 0.1–0.3)
INR BLD: 0.95 RATIO — SIGNIFICANT CHANGE UP (ref 0.65–1.3)
LDLC SERPL-MCNC: 147 MG/DL — HIGH
LIPID PNL WITH DIRECT LDL SERPL: 147 MG/DL — HIGH
LYMPHOCYTES # BLD AUTO: 0.7 K/UL — LOW (ref 1.2–3.4)
LYMPHOCYTES # BLD AUTO: 17.8 % — LOW (ref 20.5–51.1)
MAGNESIUM SERPL-MCNC: 2 MG/DL — SIGNIFICANT CHANGE UP (ref 1.8–2.4)
MCHC RBC-ENTMCNC: 34 PG — HIGH (ref 27–31)
MCHC RBC-ENTMCNC: 35.6 G/DL — SIGNIFICANT CHANGE UP (ref 32–37)
MCV RBC AUTO: 95.7 FL — SIGNIFICANT CHANGE UP (ref 81–99)
MONOCYTES # BLD AUTO: 0.23 K/UL — SIGNIFICANT CHANGE UP (ref 0.1–0.6)
MONOCYTES NFR BLD AUTO: 5.9 % — SIGNIFICANT CHANGE UP (ref 1.7–9.3)
NEUTROPHILS # BLD AUTO: 2.87 K/UL — SIGNIFICANT CHANGE UP (ref 1.4–6.5)
NEUTROPHILS NFR BLD AUTO: 73 % — SIGNIFICANT CHANGE UP (ref 42.2–75.2)
NONHDLC SERPL-MCNC: 171 MG/DL — HIGH
NRBC # BLD: 0 /100 WBCS — SIGNIFICANT CHANGE UP (ref 0–0)
NRBC BLD-RTO: 0 /100 WBCS — SIGNIFICANT CHANGE UP (ref 0–0)
PHOSPHATE SERPL-MCNC: 2.7 MG/DL — SIGNIFICANT CHANGE UP (ref 2.1–4.9)
PLATELET # BLD AUTO: 123 K/UL — LOW (ref 130–400)
PMV BLD: 11.5 FL — HIGH (ref 7.4–10.4)
POTASSIUM SERPL-MCNC: 3.1 MMOL/L — LOW (ref 3.5–5)
POTASSIUM SERPL-MCNC: 3.4 MMOL/L — LOW (ref 3.5–5)
POTASSIUM SERPL-SCNC: 3.1 MMOL/L — LOW (ref 3.5–5)
POTASSIUM SERPL-SCNC: 3.4 MMOL/L — LOW (ref 3.5–5)
PROT SERPL-MCNC: 7.4 G/DL — SIGNIFICANT CHANGE UP (ref 6–8)
PROT SERPL-MCNC: 8 G/DL — SIGNIFICANT CHANGE UP (ref 6–8)
PROTHROM AB SERPL-ACNC: 10.8 SEC — SIGNIFICANT CHANGE UP (ref 9.95–12.87)
PTH-INTACT FLD-MCNC: 23 PG/ML — SIGNIFICANT CHANGE UP (ref 15–65)
RBC # BLD: 3.29 M/UL — LOW (ref 4.2–5.4)
RBC # FLD: 10.9 % — LOW (ref 11.5–14.5)
SODIUM SERPL-SCNC: 137 MMOL/L — SIGNIFICANT CHANGE UP (ref 135–146)
SODIUM SERPL-SCNC: 137 MMOL/L — SIGNIFICANT CHANGE UP (ref 135–146)
TRIGL SERPL-MCNC: 121 MG/DL — SIGNIFICANT CHANGE UP
VIT B12 SERPL-MCNC: 685 PG/ML — SIGNIFICANT CHANGE UP (ref 232–1245)
WBC # BLD: 3.93 K/UL — LOW (ref 4.8–10.8)
WBC # FLD AUTO: 3.93 K/UL — LOW (ref 4.8–10.8)

## 2024-06-26 PROCEDURE — 99233 SBSQ HOSP IP/OBS HIGH 50: CPT

## 2024-06-26 RX ORDER — ACETAMINOPHEN 500 MG/5ML
650 LIQUID (ML) ORAL EVERY 6 HOURS
Refills: 0 | Status: DISCONTINUED | OUTPATIENT
Start: 2024-06-26 | End: 2024-06-29

## 2024-06-26 RX ORDER — B1/B2/B3/B5/B6/B12/VIT C/FOLIC 500-0.5 MG
1 TABLET ORAL DAILY
Refills: 0 | Status: DISCONTINUED | OUTPATIENT
Start: 2024-06-26 | End: 2024-06-29

## 2024-06-26 RX ORDER — HYDROMORPHONE/SOD CHLOR,ISO/PF 2 MG/10 ML
0.5 SYRINGE (ML) INJECTION EVERY 6 HOURS
Refills: 0 | Status: DISCONTINUED | OUTPATIENT
Start: 2024-06-26 | End: 2024-06-29

## 2024-06-26 RX ORDER — POTASSIUM CHLORIDE, DEXTROSE MONOHYDRATE AND SODIUM CHLORIDE 150; 5; 900 MG/100ML; G/100ML; MG/100ML
1000 INJECTION, SOLUTION INTRAVENOUS
Refills: 0 | Status: DISCONTINUED | OUTPATIENT
Start: 2024-06-26 | End: 2024-06-28

## 2024-06-26 RX ADMIN — Medication 650 MILLIGRAM(S): at 09:39

## 2024-06-26 RX ADMIN — Medication 0.2 MILLIGRAM(S): at 06:36

## 2024-06-26 RX ADMIN — Medication 1 TABLET(S): at 11:27

## 2024-06-26 RX ADMIN — Medication 650 MILLIGRAM(S): at 10:30

## 2024-06-26 RX ADMIN — POTASSIUM CHLORIDE, DEXTROSE MONOHYDRATE AND SODIUM CHLORIDE 100 MILLILITER(S): 150; 5; 900 INJECTION, SOLUTION INTRAVENOUS at 22:13

## 2024-06-26 RX ADMIN — Medication 105 MILLIGRAM(S): at 21:44

## 2024-06-26 RX ADMIN — POTASSIUM CHLORIDE, DEXTROSE MONOHYDRATE AND SODIUM CHLORIDE 100 MILLILITER(S): 150; 5; 900 INJECTION, SOLUTION INTRAVENOUS at 00:59

## 2024-06-26 RX ADMIN — Medication 105 MILLIGRAM(S): at 12:27

## 2024-06-26 RX ADMIN — Medication 1 GRAM(S): at 17:40

## 2024-06-26 RX ADMIN — Medication 0.2 MILLIGRAM(S): at 01:35

## 2024-06-26 RX ADMIN — Medication 0.5 MILLIGRAM(S): at 12:24

## 2024-06-26 RX ADMIN — TRAMADOL HYDROCHLORIDE 50 MILLIGRAM(S): 50 TABLET, FILM COATED ORAL at 10:30

## 2024-06-26 RX ADMIN — HEPARIN SODIUM 5000 UNIT(S): 1000 INJECTION INTRAVENOUS; SUBCUTANEOUS at 17:33

## 2024-06-26 RX ADMIN — Medication 0.2 MILLIGRAM(S): at 01:07

## 2024-06-26 RX ADMIN — Medication 40 MILLIGRAM(S): at 06:43

## 2024-06-26 RX ADMIN — Medication 0.5 MILLIGRAM(S): at 22:44

## 2024-06-26 RX ADMIN — Medication 30 MILLILITER(S): at 02:40

## 2024-06-26 RX ADMIN — Medication 1 GRAM(S): at 05:11

## 2024-06-26 RX ADMIN — Medication 650 MILLIGRAM(S): at 17:32

## 2024-06-26 RX ADMIN — TRAMADOL HYDROCHLORIDE 50 MILLIGRAM(S): 50 TABLET, FILM COATED ORAL at 18:00

## 2024-06-26 RX ADMIN — Medication 1 GRAM(S): at 11:27

## 2024-06-26 RX ADMIN — Medication 1 GRAM(S): at 23:02

## 2024-06-26 RX ADMIN — HEPARIN SODIUM 5000 UNIT(S): 1000 INJECTION INTRAVENOUS; SUBCUTANEOUS at 05:11

## 2024-06-26 RX ADMIN — Medication 105 MILLIGRAM(S): at 05:11

## 2024-06-26 RX ADMIN — Medication 0.2 MILLIGRAM(S): at 05:10

## 2024-06-26 RX ADMIN — FOLIC ACID 1 MILLIGRAM(S): 1 TABLET ORAL at 11:27

## 2024-06-26 RX ADMIN — Medication 0.5 MILLIGRAM(S): at 12:45

## 2024-06-26 RX ADMIN — TRAMADOL HYDROCHLORIDE 50 MILLIGRAM(S): 50 TABLET, FILM COATED ORAL at 09:33

## 2024-06-26 RX ADMIN — TRAMADOL HYDROCHLORIDE 50 MILLIGRAM(S): 50 TABLET, FILM COATED ORAL at 17:31

## 2024-06-26 RX ADMIN — Medication 4 MILLIGRAM(S): at 17:32

## 2024-06-26 RX ADMIN — POTASSIUM CHLORIDE, DEXTROSE MONOHYDRATE AND SODIUM CHLORIDE 100 MILLILITER(S): 150; 5; 900 INJECTION, SOLUTION INTRAVENOUS at 11:26

## 2024-06-27 DIAGNOSIS — D61.818 OTHER PANCYTOPENIA: ICD-10-CM

## 2024-06-27 DIAGNOSIS — E53.8 DEFICIENCY OF OTHER SPECIFIED B GROUP VITAMINS: ICD-10-CM

## 2024-06-27 DIAGNOSIS — F10.20 ALCOHOL DEPENDENCE, UNCOMPLICATED: ICD-10-CM

## 2024-06-27 DIAGNOSIS — E51.9 THIAMINE DEFICIENCY, UNSPECIFIED: ICD-10-CM

## 2024-06-27 DIAGNOSIS — K86.1 OTHER CHRONIC PANCREATITIS: ICD-10-CM

## 2024-06-27 DIAGNOSIS — K70.10 ALCOHOLIC HEPATITIS WITHOUT ASCITES: ICD-10-CM

## 2024-06-27 DIAGNOSIS — K22.70 BARRETT'S ESOPHAGUS WITHOUT DYSPLASIA: ICD-10-CM

## 2024-06-27 DIAGNOSIS — R91.1 SOLITARY PULMONARY NODULE: ICD-10-CM

## 2024-06-27 DIAGNOSIS — D84.9 IMMUNODEFICIENCY, UNSPECIFIED: ICD-10-CM

## 2024-06-27 DIAGNOSIS — R10.9 UNSPECIFIED ABDOMINAL PAIN: ICD-10-CM

## 2024-06-27 LAB
A1AT SERPL-MCNC: 137 MG/DL — SIGNIFICANT CHANGE UP (ref 90–200)
ALBUMIN SERPL ELPH-MCNC: 4.3 G/DL — SIGNIFICANT CHANGE UP (ref 3.5–5.2)
ALP SERPL-CCNC: 92 U/L — SIGNIFICANT CHANGE UP (ref 30–115)
ALT FLD-CCNC: 89 U/L — HIGH (ref 0–41)
ANION GAP SERPL CALC-SCNC: 11 MMOL/L — SIGNIFICANT CHANGE UP (ref 7–14)
ANION GAP SERPL CALC-SCNC: 12 MMOL/L — SIGNIFICANT CHANGE UP (ref 7–14)
APTT BLD: 31.4 SEC — SIGNIFICANT CHANGE UP (ref 27–39.2)
AST SERPL-CCNC: 271 U/L — HIGH (ref 0–41)
BILIRUB SERPL-MCNC: 0.6 MG/DL — SIGNIFICANT CHANGE UP (ref 0.2–1.2)
BUN SERPL-MCNC: 4 MG/DL — LOW (ref 10–20)
BUN SERPL-MCNC: 5 MG/DL — LOW (ref 10–20)
CALCIUM SERPL-MCNC: 9 MG/DL — SIGNIFICANT CHANGE UP (ref 8.4–10.4)
CALCIUM SERPL-MCNC: 9.1 MG/DL — SIGNIFICANT CHANGE UP (ref 8.4–10.5)
CHLORIDE SERPL-SCNC: 103 MMOL/L — SIGNIFICANT CHANGE UP (ref 98–110)
CHLORIDE SERPL-SCNC: 104 MMOL/L — SIGNIFICANT CHANGE UP (ref 98–110)
CO2 SERPL-SCNC: 21 MMOL/L — SIGNIFICANT CHANGE UP (ref 17–32)
CO2 SERPL-SCNC: 23 MMOL/L — SIGNIFICANT CHANGE UP (ref 17–32)
CREAT SERPL-MCNC: 0.7 MG/DL — SIGNIFICANT CHANGE UP (ref 0.7–1.5)
CREAT SERPL-MCNC: 0.7 MG/DL — SIGNIFICANT CHANGE UP (ref 0.7–1.5)
EGFR: 108 ML/MIN/1.73M2 — SIGNIFICANT CHANGE UP
GLUCOSE SERPL-MCNC: 100 MG/DL — HIGH (ref 70–99)
GLUCOSE SERPL-MCNC: 83 MG/DL — SIGNIFICANT CHANGE UP (ref 70–99)
HCT VFR BLD CALC: 31.5 % — LOW (ref 37–47)
HGB BLD-MCNC: 11 G/DL — LOW (ref 12–16)
IGA FLD-MCNC: 192 MG/DL — SIGNIFICANT CHANGE UP (ref 84–499)
IGG FLD-MCNC: 1360 MG/DL — SIGNIFICANT CHANGE UP (ref 610–1660)
IGM SERPL-MCNC: 390 MG/DL — HIGH (ref 35–242)
INR BLD: 1.02 RATIO — SIGNIFICANT CHANGE UP (ref 0.65–1.3)
KAPPA LC SER QL IFE: 2.08 MG/DL — HIGH (ref 0.33–1.94)
KAPPA/LAMBDA FREE LIGHT CHAIN RATIO, SERUM: 1.03 RATIO — SIGNIFICANT CHANGE UP (ref 0.26–1.65)
LAMBDA LC SER QL IFE: 2.02 MG/DL — SIGNIFICANT CHANGE UP (ref 0.57–2.63)
MAGNESIUM SERPL-MCNC: 1.9 MG/DL — SIGNIFICANT CHANGE UP (ref 1.8–2.4)
MCHC RBC-ENTMCNC: 33.6 PG — HIGH (ref 27–31)
MCHC RBC-ENTMCNC: 34.9 G/DL — SIGNIFICANT CHANGE UP (ref 32–37)
MCV RBC AUTO: 96.3 FL — SIGNIFICANT CHANGE UP (ref 81–99)
NRBC # BLD: 0 /100 WBCS — SIGNIFICANT CHANGE UP (ref 0–0)
NRBC BLD-RTO: 0 /100 WBCS — SIGNIFICANT CHANGE UP (ref 0–0)
PLATELET # BLD AUTO: 126 K/UL — LOW (ref 130–400)
PMV BLD: 11.8 FL — HIGH (ref 7.4–10.4)
POTASSIUM SERPL-MCNC: 3.5 MMOL/L — SIGNIFICANT CHANGE UP (ref 3.5–5)
POTASSIUM SERPL-MCNC: 3.6 MMOL/L — SIGNIFICANT CHANGE UP (ref 3.5–5)
POTASSIUM SERPL-SCNC: 3.5 MMOL/L — SIGNIFICANT CHANGE UP (ref 3.5–5)
POTASSIUM SERPL-SCNC: 3.6 MMOL/L — SIGNIFICANT CHANGE UP (ref 3.5–5)
PROT SERPL-MCNC: 7 G/DL — SIGNIFICANT CHANGE UP (ref 6–8)
PROTHROM AB SERPL-ACNC: 11.6 SEC — SIGNIFICANT CHANGE UP (ref 9.95–12.87)
RBC # BLD: 3.27 M/UL — LOW (ref 4.2–5.4)
RBC # FLD: 11 % — LOW (ref 11.5–14.5)
SODIUM SERPL-SCNC: 136 MMOL/L — SIGNIFICANT CHANGE UP (ref 135–146)
SODIUM SERPL-SCNC: 138 MMOL/L — SIGNIFICANT CHANGE UP (ref 135–146)
WBC # BLD: 2.36 K/UL — LOW (ref 4.8–10.8)
WBC # FLD AUTO: 2.36 K/UL — LOW (ref 4.8–10.8)

## 2024-06-27 PROCEDURE — 99232 SBSQ HOSP IP/OBS MODERATE 35: CPT

## 2024-06-27 RX ADMIN — TRAMADOL HYDROCHLORIDE 50 MILLIGRAM(S): 50 TABLET, FILM COATED ORAL at 09:02

## 2024-06-27 RX ADMIN — TRAMADOL HYDROCHLORIDE 50 MILLIGRAM(S): 50 TABLET, FILM COATED ORAL at 13:28

## 2024-06-27 RX ADMIN — Medication 105 MILLIGRAM(S): at 04:57

## 2024-06-27 RX ADMIN — Medication 105 MILLIGRAM(S): at 14:02

## 2024-06-27 RX ADMIN — Medication 1 GRAM(S): at 17:15

## 2024-06-27 RX ADMIN — Medication 0.5 MILLIGRAM(S): at 19:55

## 2024-06-27 RX ADMIN — Medication 0.5 MILLIGRAM(S): at 04:46

## 2024-06-27 RX ADMIN — Medication 105 MILLIGRAM(S): at 23:24

## 2024-06-27 RX ADMIN — Medication 1 TABLET(S): at 12:56

## 2024-06-27 RX ADMIN — Medication 40 MILLIGRAM(S): at 17:15

## 2024-06-27 RX ADMIN — Medication 0.5 MILLIGRAM(S): at 13:00

## 2024-06-27 RX ADMIN — Medication 0.5 MILLIGRAM(S): at 19:08

## 2024-06-27 RX ADMIN — Medication 0.5 MILLIGRAM(S): at 14:35

## 2024-06-27 RX ADMIN — Medication 0.5 MILLIGRAM(S): at 05:32

## 2024-06-27 RX ADMIN — FOLIC ACID 1 MILLIGRAM(S): 1 TABLET ORAL at 12:56

## 2024-06-27 RX ADMIN — Medication 40 MILLIGRAM(S): at 05:32

## 2024-06-27 RX ADMIN — Medication 1 GRAM(S): at 05:32

## 2024-06-27 RX ADMIN — POTASSIUM CHLORIDE, DEXTROSE MONOHYDRATE AND SODIUM CHLORIDE 100 MILLILITER(S): 150; 5; 900 INJECTION, SOLUTION INTRAVENOUS at 14:02

## 2024-06-27 RX ADMIN — TRAMADOL HYDROCHLORIDE 50 MILLIGRAM(S): 50 TABLET, FILM COATED ORAL at 23:27

## 2024-06-27 RX ADMIN — HEPARIN SODIUM 5000 UNIT(S): 1000 INJECTION INTRAVENOUS; SUBCUTANEOUS at 05:32

## 2024-06-27 RX ADMIN — HEPARIN SODIUM 5000 UNIT(S): 1000 INJECTION INTRAVENOUS; SUBCUTANEOUS at 17:15

## 2024-06-27 RX ADMIN — Medication 4 MILLIGRAM(S): at 12:51

## 2024-06-27 RX ADMIN — Medication 1 GRAM(S): at 12:56

## 2024-06-28 ENCOUNTER — TRANSCRIPTION ENCOUNTER (OUTPATIENT)
Age: 49
End: 2024-06-28

## 2024-06-28 LAB
ALBUMIN SERPL ELPH-MCNC: 4.5 G/DL — SIGNIFICANT CHANGE UP (ref 3.5–5.2)
ALP SERPL-CCNC: 114 U/L — SIGNIFICANT CHANGE UP (ref 30–115)
ALT FLD-CCNC: 95 U/L — HIGH (ref 0–41)
ANA TITR SER: NEGATIVE — SIGNIFICANT CHANGE UP
ANION GAP SERPL CALC-SCNC: 11 MMOL/L — SIGNIFICANT CHANGE UP (ref 7–14)
APTT BLD: 36 SEC — SIGNIFICANT CHANGE UP (ref 27–39.2)
AST SERPL-CCNC: 277 U/L — HIGH (ref 0–41)
BILIRUB SERPL-MCNC: 0.7 MG/DL — SIGNIFICANT CHANGE UP (ref 0.2–1.2)
BUN SERPL-MCNC: 4 MG/DL — LOW (ref 10–20)
CALCIUM SERPL-MCNC: 9.7 MG/DL — SIGNIFICANT CHANGE UP (ref 8.4–10.4)
CHLORIDE SERPL-SCNC: 101 MMOL/L — SIGNIFICANT CHANGE UP (ref 98–110)
CO2 SERPL-SCNC: 24 MMOL/L — SIGNIFICANT CHANGE UP (ref 17–32)
CREAT SERPL-MCNC: 0.7 MG/DL — SIGNIFICANT CHANGE UP (ref 0.7–1.5)
EGFR: 108 ML/MIN/1.73M2 — SIGNIFICANT CHANGE UP
EGFR: 108 ML/MIN/1.73M2 — SIGNIFICANT CHANGE UP
FERRITIN SERPL-MCNC: 782 NG/ML — HIGH (ref 15–150)
FOLATE SERPL-MCNC: 17.4 NG/ML — SIGNIFICANT CHANGE UP
GLUCOSE SERPL-MCNC: 84 MG/DL — SIGNIFICANT CHANGE UP (ref 70–99)
HCT VFR BLD CALC: 31.3 % — LOW (ref 37–47)
HGB BLD-MCNC: 10.8 G/DL — LOW (ref 12–16)
INR BLD: 1 RATIO — SIGNIFICANT CHANGE UP (ref 0.65–1.3)
IRON SATN MFR SERPL: 39 % — SIGNIFICANT CHANGE UP (ref 15–50)
IRON SATN MFR SERPL: 97 UG/DL — SIGNIFICANT CHANGE UP (ref 35–150)
LKM AB SER-ACNC: <20.1 UNITS — SIGNIFICANT CHANGE UP (ref 0–20)
MCHC RBC-ENTMCNC: 34 PG — HIGH (ref 27–31)
MCHC RBC-ENTMCNC: 34.5 G/DL — SIGNIFICANT CHANGE UP (ref 32–37)
MCV RBC AUTO: 98.4 FL — SIGNIFICANT CHANGE UP (ref 81–99)
MITOCHONDRIA AB SER-ACNC: SIGNIFICANT CHANGE UP
NRBC # BLD: 0 /100 WBCS — SIGNIFICANT CHANGE UP (ref 0–0)
NRBC BLD-RTO: 0 /100 WBCS — SIGNIFICANT CHANGE UP (ref 0–0)
PLATELET # BLD AUTO: 139 K/UL — SIGNIFICANT CHANGE UP (ref 130–400)
PMV BLD: 11.7 FL — HIGH (ref 7.4–10.4)
POTASSIUM SERPL-MCNC: 3.4 MMOL/L — LOW (ref 3.5–5)
POTASSIUM SERPL-SCNC: 3.4 MMOL/L — LOW (ref 3.5–5)
PROT SERPL-MCNC: 7.5 G/DL — SIGNIFICANT CHANGE UP (ref 6–8)
PROTHROM AB SERPL-ACNC: 11.4 SEC — SIGNIFICANT CHANGE UP (ref 9.95–12.87)
RBC # BLD: 3.18 M/UL — LOW (ref 4.2–5.4)
RBC # BLD: 3.18 M/UL — LOW (ref 4.2–5.4)
RBC # FLD: 11.1 % — LOW (ref 11.5–14.5)
RETICS #: 50.9 K/UL — SIGNIFICANT CHANGE UP (ref 25–125)
RETICS/RBC NFR: 1.6 % — HIGH (ref 0.5–1.5)
SMOOTH MUSCLE AB SER-ACNC: SIGNIFICANT CHANGE UP
SODIUM SERPL-SCNC: 136 MMOL/L — SIGNIFICANT CHANGE UP (ref 135–146)
T4 AB SER-ACNC: 8 UG/DL — SIGNIFICANT CHANGE UP (ref 4.6–12)
TIBC SERPL-MCNC: 249 UG/DL — SIGNIFICANT CHANGE UP (ref 220–430)
TSH SERPL-MCNC: 4.96 UIU/ML — HIGH (ref 0.27–4.2)
UIBC SERPL-MCNC: 152 UG/DL — SIGNIFICANT CHANGE UP (ref 110–370)
VIT B12 SERPL-MCNC: 747 PG/ML — SIGNIFICANT CHANGE UP (ref 232–1245)
WBC # BLD: 2.43 K/UL — LOW (ref 4.8–10.8)
WBC # FLD AUTO: 2.43 K/UL — LOW (ref 4.8–10.8)

## 2024-06-28 PROCEDURE — 99232 SBSQ HOSP IP/OBS MODERATE 35: CPT | Mod: GC

## 2024-06-28 PROCEDURE — 99232 SBSQ HOSP IP/OBS MODERATE 35: CPT

## 2024-06-28 RX ORDER — FOLIC ACID 1 MG/1
1 TABLET ORAL
Qty: 30 | Refills: 0
Start: 2024-06-28 | End: 2024-07-27

## 2024-06-28 RX ORDER — FOLIC ACID 1 MG/1
1 TABLET ORAL
Qty: 0 | Refills: 0 | DISCHARGE
Start: 2024-06-28

## 2024-06-28 RX ORDER — B1/B2/B3/B5/B6/B12/VIT C/FOLIC 500-0.5 MG
1 TABLET ORAL
Qty: 0 | Refills: 0 | DISCHARGE
Start: 2024-06-28

## 2024-06-28 RX ORDER — B1/B2/B3/B5/B6/B12/VIT C/FOLIC 500-0.5 MG
1 TABLET ORAL
Qty: 30 | Refills: 0
Start: 2024-06-28 | End: 2024-07-27

## 2024-06-28 RX ORDER — SUCRALFATE 1 G
1 TABLET ORAL
Qty: 120 | Refills: 0
Start: 2024-06-28 | End: 2024-07-27

## 2024-06-28 RX ORDER — SUCRALFATE 1 G
1 TABLET ORAL
Qty: 0 | Refills: 0 | DISCHARGE
Start: 2024-06-28

## 2024-06-28 RX ADMIN — Medication 1 GRAM(S): at 06:21

## 2024-06-28 RX ADMIN — Medication 1 GRAM(S): at 00:15

## 2024-06-28 RX ADMIN — Medication 1 TABLET(S): at 11:55

## 2024-06-28 RX ADMIN — Medication 1 GRAM(S): at 18:23

## 2024-06-28 RX ADMIN — Medication 1 GRAM(S): at 23:51

## 2024-06-28 RX ADMIN — Medication 0.5 MILLIGRAM(S): at 08:06

## 2024-06-28 RX ADMIN — Medication 40 MILLIGRAM(S): at 18:23

## 2024-06-28 RX ADMIN — TRAMADOL HYDROCHLORIDE 50 MILLIGRAM(S): 50 TABLET, FILM COATED ORAL at 21:49

## 2024-06-28 RX ADMIN — HEPARIN SODIUM 5000 UNIT(S): 1000 INJECTION INTRAVENOUS; SUBCUTANEOUS at 06:20

## 2024-06-28 RX ADMIN — Medication 0.5 MILLIGRAM(S): at 11:52

## 2024-06-28 RX ADMIN — TRAMADOL HYDROCHLORIDE 50 MILLIGRAM(S): 50 TABLET, FILM COATED ORAL at 11:55

## 2024-06-28 RX ADMIN — TRAMADOL HYDROCHLORIDE 50 MILLIGRAM(S): 50 TABLET, FILM COATED ORAL at 12:16

## 2024-06-28 RX ADMIN — HEPARIN SODIUM 5000 UNIT(S): 1000 INJECTION INTRAVENOUS; SUBCUTANEOUS at 18:24

## 2024-06-28 RX ADMIN — Medication 40 MILLIGRAM(S): at 06:21

## 2024-06-28 RX ADMIN — Medication 650 MILLIGRAM(S): at 06:21

## 2024-06-28 RX ADMIN — Medication 0.5 MILLIGRAM(S): at 01:58

## 2024-06-28 RX ADMIN — Medication 4 MILLIGRAM(S): at 08:05

## 2024-06-28 RX ADMIN — Medication 40 MILLIEQUIVALENT(S): at 11:54

## 2024-06-28 RX ADMIN — FOLIC ACID 1 MILLIGRAM(S): 1 TABLET ORAL at 11:55

## 2024-06-28 RX ADMIN — POTASSIUM CHLORIDE, DEXTROSE MONOHYDRATE AND SODIUM CHLORIDE 100 MILLILITER(S): 150; 5; 900 INJECTION, SOLUTION INTRAVENOUS at 14:17

## 2024-06-28 RX ADMIN — Medication 1 GRAM(S): at 11:55

## 2024-06-28 RX ADMIN — Medication 0.5 MILLIGRAM(S): at 18:23

## 2024-06-28 RX ADMIN — Medication 0.5 MILLIGRAM(S): at 19:08

## 2024-06-28 RX ADMIN — Medication 105 MILLIGRAM(S): at 04:59

## 2024-06-29 ENCOUNTER — TRANSCRIPTION ENCOUNTER (OUTPATIENT)
Age: 49
End: 2024-06-29

## 2024-06-29 VITALS
RESPIRATION RATE: 18 BRPM | SYSTOLIC BLOOD PRESSURE: 126 MMHG | HEART RATE: 75 BPM | DIASTOLIC BLOOD PRESSURE: 75 MMHG | TEMPERATURE: 98 F

## 2024-06-29 LAB
ANION GAP SERPL CALC-SCNC: 15 MMOL/L — HIGH (ref 7–14)
BASOPHILS # BLD AUTO: 0.02 K/UL — SIGNIFICANT CHANGE UP (ref 0–0.2)
BASOPHILS NFR BLD AUTO: 0.8 % — SIGNIFICANT CHANGE UP (ref 0–1)
BUN SERPL-MCNC: 4 MG/DL — LOW (ref 10–20)
CALCIUM SERPL-MCNC: 10.1 MG/DL — SIGNIFICANT CHANGE UP (ref 8.4–10.4)
CHLORIDE SERPL-SCNC: 100 MMOL/L — SIGNIFICANT CHANGE UP (ref 98–110)
CO2 SERPL-SCNC: 24 MMOL/L — SIGNIFICANT CHANGE UP (ref 17–32)
CREAT SERPL-MCNC: 0.7 MG/DL — SIGNIFICANT CHANGE UP (ref 0.7–1.5)
EGFR: 108 ML/MIN/1.73M2 — SIGNIFICANT CHANGE UP
EGFR: 108 ML/MIN/1.73M2 — SIGNIFICANT CHANGE UP
EOSINOPHIL # BLD AUTO: 0.07 K/UL — SIGNIFICANT CHANGE UP (ref 0–0.7)
EOSINOPHIL NFR BLD AUTO: 2.9 % — SIGNIFICANT CHANGE UP (ref 0–8)
GLUCOSE SERPL-MCNC: 95 MG/DL — SIGNIFICANT CHANGE UP (ref 70–99)
HCT VFR BLD CALC: 31.9 % — LOW (ref 37–47)
HGB BLD-MCNC: 11.3 G/DL — LOW (ref 12–16)
IMM GRANULOCYTES NFR BLD AUTO: 0 % — LOW (ref 0.1–0.3)
LYMPHOCYTES # BLD AUTO: 0.71 K/UL — LOW (ref 1.2–3.4)
LYMPHOCYTES # BLD AUTO: 29.7 % — SIGNIFICANT CHANGE UP (ref 20.5–51.1)
MAGNESIUM SERPL-MCNC: 1.6 MG/DL — LOW (ref 1.8–2.4)
MCHC RBC-ENTMCNC: 34.3 PG — HIGH (ref 27–31)
MCHC RBC-ENTMCNC: 35.4 G/DL — SIGNIFICANT CHANGE UP (ref 32–37)
MCV RBC AUTO: 97 FL — SIGNIFICANT CHANGE UP (ref 81–99)
MONOCYTES # BLD AUTO: 0.28 K/UL — SIGNIFICANT CHANGE UP (ref 0.1–0.6)
MONOCYTES NFR BLD AUTO: 11.7 % — HIGH (ref 1.7–9.3)
NEUTROPHILS # BLD AUTO: 1.31 K/UL — LOW (ref 1.4–6.5)
NEUTROPHILS NFR BLD AUTO: 54.9 % — SIGNIFICANT CHANGE UP (ref 42.2–75.2)
NRBC # BLD: 0 /100 WBCS — SIGNIFICANT CHANGE UP (ref 0–0)
NRBC BLD-RTO: 0 /100 WBCS — SIGNIFICANT CHANGE UP (ref 0–0)
PLATELET # BLD AUTO: 151 K/UL — SIGNIFICANT CHANGE UP (ref 130–400)
PMV BLD: 11.7 FL — HIGH (ref 7.4–10.4)
POTASSIUM SERPL-MCNC: 3.7 MMOL/L — SIGNIFICANT CHANGE UP (ref 3.5–5)
POTASSIUM SERPL-SCNC: 3.7 MMOL/L — SIGNIFICANT CHANGE UP (ref 3.5–5)
RBC # BLD: 3.29 M/UL — LOW (ref 4.2–5.4)
RBC # FLD: 11.2 % — LOW (ref 11.5–14.5)
SODIUM SERPL-SCNC: 139 MMOL/L — SIGNIFICANT CHANGE UP (ref 135–146)
WBC # BLD: 2.39 K/UL — LOW (ref 4.8–10.8)
WBC # FLD AUTO: 2.39 K/UL — LOW (ref 4.8–10.8)

## 2024-06-29 PROCEDURE — 99239 HOSP IP/OBS DSCHRG MGMT >30: CPT

## 2024-06-29 RX ORDER — ONDANSETRON HCL/PF 4 MG/2 ML
4 VIAL (ML) INJECTION EVERY 8 HOURS
Refills: 0 | Status: DISCONTINUED | OUTPATIENT
Start: 2024-06-29 | End: 2024-06-29

## 2024-06-29 RX ORDER — ONDANSETRON HCL/PF 4 MG/2 ML
1 VIAL (ML) INJECTION
Qty: 3 | Refills: 0
Start: 2024-06-29 | End: 2024-07-28

## 2024-06-29 RX ORDER — ONDANSETRON HCL/PF 4 MG/2 ML
1 VIAL (ML) INJECTION
Qty: 30 | Refills: 0
Start: 2024-06-29 | End: 2024-07-08

## 2024-06-29 RX ORDER — MAGNESIUM OXIDE 400 MG
400 TABLET ORAL ONCE
Refills: 0 | Status: COMPLETED | OUTPATIENT
Start: 2024-06-29 | End: 2024-06-29

## 2024-06-29 RX ADMIN — Medication 1 TABLET(S): at 11:38

## 2024-06-29 RX ADMIN — Medication 4 MILLIGRAM(S): at 07:53

## 2024-06-29 RX ADMIN — Medication 1 GRAM(S): at 05:57

## 2024-06-29 RX ADMIN — Medication 400 MILLIGRAM(S): at 11:38

## 2024-06-29 RX ADMIN — FOLIC ACID 1 MILLIGRAM(S): 1 TABLET ORAL at 11:38

## 2024-06-29 RX ADMIN — Medication 100 MILLIGRAM(S): at 11:38

## 2024-06-29 RX ADMIN — Medication 0.5 MILLIGRAM(S): at 08:24

## 2024-06-29 RX ADMIN — Medication 0.5 MILLIGRAM(S): at 07:54

## 2024-06-29 RX ADMIN — Medication 40 MILLIGRAM(S): at 05:56

## 2024-06-29 RX ADMIN — Medication 0.5 MILLIGRAM(S): at 00:56

## 2024-06-29 RX ADMIN — Medication 1 GRAM(S): at 11:38

## 2024-06-29 RX ADMIN — HEPARIN SODIUM 5000 UNIT(S): 1000 INJECTION INTRAVENOUS; SUBCUTANEOUS at 05:56

## 2024-06-30 LAB
CULTURE RESULTS: SIGNIFICANT CHANGE UP
SPECIMEN SOURCE: SIGNIFICANT CHANGE UP

## 2024-07-01 LAB
A1AT PHENOTYP SERPL-IMP: SIGNIFICANT CHANGE UP
A1AT SERPL-MCNC: 140 MG/DL — SIGNIFICANT CHANGE UP (ref 101–187)

## 2024-07-02 DIAGNOSIS — K70.10 ALCOHOLIC HEPATITIS WITHOUT ASCITES: ICD-10-CM

## 2024-07-02 DIAGNOSIS — E86.0 DEHYDRATION: ICD-10-CM

## 2024-07-02 DIAGNOSIS — R91.1 SOLITARY PULMONARY NODULE: ICD-10-CM

## 2024-07-02 DIAGNOSIS — K22.70 BARRETT'S ESOPHAGUS WITHOUT DYSPLASIA: ICD-10-CM

## 2024-07-02 DIAGNOSIS — E53.8 DEFICIENCY OF OTHER SPECIFIED B GROUP VITAMINS: ICD-10-CM

## 2024-07-02 DIAGNOSIS — E51.9 THIAMINE DEFICIENCY, UNSPECIFIED: ICD-10-CM

## 2024-07-02 DIAGNOSIS — E83.52 HYPERCALCEMIA: ICD-10-CM

## 2024-07-02 DIAGNOSIS — K76.0 FATTY (CHANGE OF) LIVER, NOT ELSEWHERE CLASSIFIED: ICD-10-CM

## 2024-07-02 DIAGNOSIS — N17.9 ACUTE KIDNEY FAILURE, UNSPECIFIED: ICD-10-CM

## 2024-07-02 DIAGNOSIS — D61.818 OTHER PANCYTOPENIA: ICD-10-CM

## 2024-07-02 DIAGNOSIS — F10.20 ALCOHOL DEPENDENCE, UNCOMPLICATED: ICD-10-CM

## 2024-07-02 DIAGNOSIS — E87.29 OTHER ACIDOSIS: ICD-10-CM

## 2024-07-02 DIAGNOSIS — E87.1 HYPO-OSMOLALITY AND HYPONATREMIA: ICD-10-CM

## 2024-07-02 DIAGNOSIS — D84.89 OTHER IMMUNODEFICIENCIES: ICD-10-CM

## 2024-07-02 DIAGNOSIS — K86.0 ALCOHOL-INDUCED CHRONIC PANCREATITIS: ICD-10-CM

## 2024-07-02 DIAGNOSIS — K29.20 ALCOHOLIC GASTRITIS WITHOUT BLEEDING: ICD-10-CM

## 2024-07-02 LAB — SOLUBLE LIVER IGG SER IA-ACNC: 1.2 — SIGNIFICANT CHANGE UP (ref 0–20)

## 2024-07-13 ENCOUNTER — INPATIENT (INPATIENT)
Facility: HOSPITAL | Age: 49
LOS: 5 days | Discharge: HOME | DRG: 815 | End: 2024-07-19
Attending: INTERNAL MEDICINE | Admitting: INTERNAL MEDICINE
Payer: MEDICAID

## 2024-07-13 VITALS
HEIGHT: 64 IN | HEART RATE: 124 BPM | TEMPERATURE: 98 F | DIASTOLIC BLOOD PRESSURE: 82 MMHG | OXYGEN SATURATION: 99 % | SYSTOLIC BLOOD PRESSURE: 124 MMHG | RESPIRATION RATE: 20 BRPM

## 2024-07-13 DIAGNOSIS — Z98.890 OTHER SPECIFIED POSTPROCEDURAL STATES: Chronic | ICD-10-CM

## 2024-07-13 DIAGNOSIS — Z87.2 PERSONAL HISTORY OF DISEASES OF THE SKIN AND SUBCUTANEOUS TISSUE: Chronic | ICD-10-CM

## 2024-07-13 LAB
ALBUMIN SERPL ELPH-MCNC: 4.8 G/DL — SIGNIFICANT CHANGE UP (ref 3.5–5.2)
ALP SERPL-CCNC: 153 U/L — HIGH (ref 30–115)
ALT FLD-CCNC: 57 U/L — HIGH (ref 0–41)
ANION GAP SERPL CALC-SCNC: 35 MMOL/L — HIGH (ref 7–14)
AST SERPL-CCNC: 211 U/L — HIGH (ref 0–41)
B-OH-BUTYR SERPL-SCNC: >9 MMOL/L — HIGH
BASOPHILS # BLD AUTO: 0.09 K/UL — SIGNIFICANT CHANGE UP (ref 0–0.2)
BASOPHILS NFR BLD AUTO: 0.6 % — SIGNIFICANT CHANGE UP (ref 0–1)
BILIRUB SERPL-MCNC: 1.2 MG/DL — SIGNIFICANT CHANGE UP (ref 0.2–1.2)
BUN SERPL-MCNC: 13 MG/DL — SIGNIFICANT CHANGE UP (ref 10–20)
CALCIUM SERPL-MCNC: 8.8 MG/DL — SIGNIFICANT CHANGE UP (ref 8.4–10.5)
CHLORIDE SERPL-SCNC: 91 MMOL/L — LOW (ref 98–110)
CO2 SERPL-SCNC: 5 MMOL/L — CRITICAL LOW (ref 17–32)
CREAT SERPL-MCNC: 0.9 MG/DL — SIGNIFICANT CHANGE UP (ref 0.7–1.5)
EGFR: 80 ML/MIN/1.73M2 — SIGNIFICANT CHANGE UP
EOSINOPHIL # BLD AUTO: 0 K/UL — SIGNIFICANT CHANGE UP (ref 0–0.7)
EOSINOPHIL NFR BLD AUTO: 0 % — SIGNIFICANT CHANGE UP (ref 0–8)
GLUCOSE SERPL-MCNC: 96 MG/DL — SIGNIFICANT CHANGE UP (ref 70–99)
HCG SERPL QL: NEGATIVE — SIGNIFICANT CHANGE UP
HCT VFR BLD CALC: 35.1 % — LOW (ref 37–47)
HGB BLD-MCNC: 12 G/DL — SIGNIFICANT CHANGE UP (ref 12–16)
IMM GRANULOCYTES NFR BLD AUTO: 0.6 % — HIGH (ref 0.1–0.3)
LACTATE SERPL-SCNC: 4.3 MMOL/L — CRITICAL HIGH (ref 0.7–2)
LIDOCAIN IGE QN: 17 U/L — SIGNIFICANT CHANGE UP (ref 7–60)
LYMPHOCYTES # BLD AUTO: 0.88 K/UL — LOW (ref 1.2–3.4)
LYMPHOCYTES # BLD AUTO: 5.9 % — LOW (ref 20.5–51.1)
MCHC RBC-ENTMCNC: 34.1 PG — HIGH (ref 27–31)
MCHC RBC-ENTMCNC: 34.2 G/DL — SIGNIFICANT CHANGE UP (ref 32–37)
MCV RBC AUTO: 99.7 FL — HIGH (ref 81–99)
MONOCYTES # BLD AUTO: 0.46 K/UL — SIGNIFICANT CHANGE UP (ref 0.1–0.6)
MONOCYTES NFR BLD AUTO: 2.9 % — SIGNIFICANT CHANGE UP (ref 1.7–9.3)
NEUTROPHILS # BLD AUTO: 14.41 K/UL — HIGH (ref 1.4–6.5)
NEUTROPHILS NFR BLD AUTO: 90 % — HIGH (ref 42.2–75.2)
NRBC # BLD: 0 /100 WBCS — SIGNIFICANT CHANGE UP (ref 0–0)
PLATELET # BLD AUTO: 241 K/UL — SIGNIFICANT CHANGE UP (ref 130–400)
PMV BLD: 10.5 FL — HIGH (ref 7.4–10.4)
POTASSIUM SERPL-MCNC: 4.8 MMOL/L — SIGNIFICANT CHANGE UP (ref 3.5–5)
POTASSIUM SERPL-SCNC: 4.8 MMOL/L — SIGNIFICANT CHANGE UP (ref 3.5–5)
PROT SERPL-MCNC: 8.4 G/DL — HIGH (ref 6–8)
RBC # BLD: 3.52 M/UL — LOW (ref 4.2–5.4)
RBC # FLD: 11.4 % — LOW (ref 11.5–14.5)
SODIUM SERPL-SCNC: 131 MMOL/L — LOW (ref 135–146)
WBC # BLD: 15.87 K/UL — HIGH (ref 4.8–10.8)
WBC # FLD AUTO: 15.87 K/UL — HIGH (ref 4.8–10.8)

## 2024-07-13 PROCEDURE — 93010 ELECTROCARDIOGRAM REPORT: CPT

## 2024-07-13 PROCEDURE — 99291 CRITICAL CARE FIRST HOUR: CPT

## 2024-07-13 PROCEDURE — 74177 CT ABD & PELVIS W/CONTRAST: CPT | Mod: 26,MC

## 2024-07-13 RX ORDER — DEXTROSE MONOHYDRATE AND SODIUM CHLORIDE 5; .3 G/100ML; G/100ML
1000 INJECTION, SOLUTION INTRAVENOUS ONCE
Refills: 0 | Status: COMPLETED | OUTPATIENT
Start: 2024-07-13 | End: 2024-07-13

## 2024-07-13 RX ORDER — HYDROMORPHONE HCL 0.2 MG/ML
0.5 INJECTION, SOLUTION INTRAVENOUS ONCE
Refills: 0 | Status: DISCONTINUED | OUTPATIENT
Start: 2024-07-13 | End: 2024-07-13

## 2024-07-13 RX ORDER — ONDANSETRON HYDROCHLORIDE 2 MG/ML
4 INJECTION INTRAMUSCULAR; INTRAVENOUS ONCE
Refills: 0 | Status: COMPLETED | OUTPATIENT
Start: 2024-07-13 | End: 2024-07-13

## 2024-07-13 RX ORDER — DEXTROSE MONOHYDRATE AND SODIUM CHLORIDE 5; .3 G/100ML; G/100ML
1000 INJECTION, SOLUTION INTRAVENOUS
Refills: 0 | Status: DISCONTINUED | OUTPATIENT
Start: 2024-07-13 | End: 2024-07-14

## 2024-07-13 RX ORDER — MORPHINE SULFATE 100 MG/1
4 TABLET, EXTENDED RELEASE ORAL ONCE
Refills: 0 | Status: DISCONTINUED | OUTPATIENT
Start: 2024-07-13 | End: 2024-07-13

## 2024-07-13 RX ORDER — LORAZEPAM 0.5 MG
1 TABLET ORAL ONCE
Refills: 0 | Status: DISCONTINUED | OUTPATIENT
Start: 2024-07-13 | End: 2024-07-13

## 2024-07-13 RX ADMIN — ONDANSETRON HYDROCHLORIDE 4 MILLIGRAM(S): 2 INJECTION INTRAMUSCULAR; INTRAVENOUS at 22:29

## 2024-07-13 RX ADMIN — ONDANSETRON HYDROCHLORIDE 4 MILLIGRAM(S): 2 INJECTION INTRAMUSCULAR; INTRAVENOUS at 21:47

## 2024-07-13 RX ADMIN — DEXTROSE MONOHYDRATE AND SODIUM CHLORIDE 1000 MILLILITER(S): 5; .3 INJECTION, SOLUTION INTRAVENOUS at 22:46

## 2024-07-13 RX ADMIN — HYDROMORPHONE HCL 0.5 MILLIGRAM(S): 0.2 INJECTION, SOLUTION INTRAVENOUS at 22:30

## 2024-07-13 RX ADMIN — DEXTROSE MONOHYDRATE AND SODIUM CHLORIDE 1000 MILLILITER(S): 5; .3 INJECTION, SOLUTION INTRAVENOUS at 21:47

## 2024-07-13 RX ADMIN — DEXTROSE MONOHYDRATE AND SODIUM CHLORIDE 1000 MILLILITER(S): 5; .3 INJECTION, SOLUTION INTRAVENOUS at 23:38

## 2024-07-13 RX ADMIN — Medication 1 MILLIGRAM(S): at 22:47

## 2024-07-13 RX ADMIN — MORPHINE SULFATE 4 MILLIGRAM(S): 100 TABLET, EXTENDED RELEASE ORAL at 21:47

## 2024-07-13 NOTE — ED PROVIDER NOTE - DIFFERENTIAL DIAGNOSIS
Differential Diagnosis Pancreatitis, hepatic failure, obstructive uropathy, diverticulitis, colitis, abscess, bowel obstruction, bowel perforation. Electrolyte abnormalities, TERESITA, and GI bleeding.  r/o cardiac arrhythmia.

## 2024-07-13 NOTE — ED PROVIDER NOTE - PHYSICAL EXAMINATION
CONSTITUTIONAL: female actively wretch ing on exam, nad  SKIN: skin exam is warm and dry  HEAD: Normocephalic; atraumatic.  EYES: PERRL, EOM intact; conjunctiva and sclera clear.  ENT: dry MM   NECK: ROM intact.  CARD: S1, S2 normal, no murmur  RESP: Good air movement bilaterally  ABD: soft; non-distended; +epigastric tenderness  EXT: Normal ROM.   NEURO: awake, alert, following commands, oriented, grossly unremarkable. No Focal deficits. GCS 15.   PSYCH: Cooperative

## 2024-07-13 NOTE — ED PROVIDER NOTE - CARE PLAN
Principal Discharge DX:	Starvation ketoacidosis  Secondary Diagnosis:	Intractable nausea and vomiting  Secondary Diagnosis:	Intractable abdominal pain   1

## 2024-07-13 NOTE — ED PROVIDER NOTE - CLINICAL SUMMARY MEDICAL DECISION MAKING FREE TEXT BOX
patient remained hemodynamically stable, but continued with sinus tachycardia and nausea, in-spite of receiving multiple medications. Consulted ICU and is admitted to ICU in stable condition.

## 2024-07-13 NOTE — ED PROVIDER NOTE - NS ED ATTENDING STATEMENT MOD
10 feet I have personally provided the amount of critical care time documented below excluding time spent on separate procedures.

## 2024-07-13 NOTE — ED PROVIDER NOTE - CRITICAL CARE ATTENDING CONTRIBUTION TO CARE
I personally evaluated patient. Discussed patient management with PA and patient care supervised directly.  I provided critical care for a total of 40 minutes. I provided a substantive portion of the care and the majority of the critical care time."    Patient is c/o abd pain, epigastric abd pain with nausea/vomiting. Denies f/c/cp/sob. Denies trauma.   Lungs: CTA, no wheezing, no crackles.  Abd: +BS, +epigastric tenderness, ND, soft,   CNS: awake, alert, o x 3, no focal neurologic deficits.  A/P: Abd pain with nausea/vomiting,   Labs, EKG, Medications,   CT, reevaluation.

## 2024-07-13 NOTE — ED PROVIDER NOTE - OBJECTIVE STATEMENT
48 year old female, past medical history chronic pancreatitis 2/2 alcohol use, fatty liver disease, who presents with intractable abd pain with nausea/vomiting that began last night. patient recently admitted for pancreatitis, was feeling well, however, reports progressively worsening pain with nausea/vomiting prompting return to ed. denies f/c, chest pain, shortness of breath, urinary symptoms.

## 2024-07-14 DIAGNOSIS — T73.0XXA STARVATION, INITIAL ENCOUNTER: ICD-10-CM

## 2024-07-14 LAB
ALBUMIN SERPL ELPH-MCNC: 4.2 G/DL — SIGNIFICANT CHANGE UP (ref 3.5–5.2)
ALBUMIN SERPL ELPH-MCNC: 4.7 G/DL — SIGNIFICANT CHANGE UP (ref 3.5–5.2)
ALP SERPL-CCNC: 114 U/L — SIGNIFICANT CHANGE UP (ref 30–115)
ALP SERPL-CCNC: 140 U/L — HIGH (ref 30–115)
ALT FLD-CCNC: 33 U/L — SIGNIFICANT CHANGE UP (ref 0–41)
ALT FLD-CCNC: 46 U/L — HIGH (ref 0–41)
ANION GAP SERPL CALC-SCNC: 13 MMOL/L — SIGNIFICANT CHANGE UP (ref 7–14)
ANION GAP SERPL CALC-SCNC: 14 MMOL/L — SIGNIFICANT CHANGE UP (ref 7–14)
ANION GAP SERPL CALC-SCNC: 21 MMOL/L — HIGH (ref 7–14)
AST SERPL-CCNC: 126 U/L — HIGH (ref 0–41)
AST SERPL-CCNC: 167 U/L — HIGH (ref 0–41)
B-OH-BUTYR SERPL-SCNC: 4.1 MMOL/L — HIGH
BASE EXCESS BLDV CALC-SCNC: -21.6 MMOL/L — LOW (ref -2–3)
BILIRUB SERPL-MCNC: 1.1 MG/DL — SIGNIFICANT CHANGE UP (ref 0.2–1.2)
BILIRUB SERPL-MCNC: 1.4 MG/DL — HIGH (ref 0.2–1.2)
BUN SERPL-MCNC: 10 MG/DL — SIGNIFICANT CHANGE UP (ref 10–20)
BUN SERPL-MCNC: 6 MG/DL — LOW (ref 10–20)
BUN SERPL-MCNC: 7 MG/DL — LOW (ref 10–20)
CA-I SERPL-SCNC: 1.18 MMOL/L — SIGNIFICANT CHANGE UP (ref 1.15–1.33)
CALCIUM SERPL-MCNC: 7.8 MG/DL — LOW (ref 8.4–10.5)
CALCIUM SERPL-MCNC: 8.2 MG/DL — LOW (ref 8.4–10.5)
CALCIUM SERPL-MCNC: 8.3 MG/DL — LOW (ref 8.4–10.5)
CHLORIDE SERPL-SCNC: 97 MMOL/L — LOW (ref 98–110)
CHLORIDE SERPL-SCNC: 97 MMOL/L — LOW (ref 98–110)
CHLORIDE SERPL-SCNC: 99 MMOL/L — SIGNIFICANT CHANGE UP (ref 98–110)
CO2 SERPL-SCNC: 12 MMOL/L — LOW (ref 17–32)
CO2 SERPL-SCNC: 18 MMOL/L — SIGNIFICANT CHANGE UP (ref 17–32)
CO2 SERPL-SCNC: 23 MMOL/L — SIGNIFICANT CHANGE UP (ref 17–32)
CREAT SERPL-MCNC: 0.7 MG/DL — SIGNIFICANT CHANGE UP (ref 0.7–1.5)
CREAT SERPL-MCNC: 0.7 MG/DL — SIGNIFICANT CHANGE UP (ref 0.7–1.5)
CREAT SERPL-MCNC: 0.8 MG/DL — SIGNIFICANT CHANGE UP (ref 0.7–1.5)
CRP SERPL-MCNC: 5.4 MG/L — HIGH
EGFR: 108 ML/MIN/1.73M2 — SIGNIFICANT CHANGE UP
EGFR: 108 ML/MIN/1.73M2 — SIGNIFICANT CHANGE UP
EGFR: 92 ML/MIN/1.73M2 — SIGNIFICANT CHANGE UP
ETHANOL SERPL-MCNC: <10 MG/DL — SIGNIFICANT CHANGE UP
GAS PNL BLDA: SIGNIFICANT CHANGE UP
GAS PNL BLDV: 127 MMOL/L — LOW (ref 136–145)
GAS PNL BLDV: SIGNIFICANT CHANGE UP
GAS PNL BLDV: SIGNIFICANT CHANGE UP
GLUCOSE SERPL-MCNC: 120 MG/DL — HIGH (ref 70–99)
GLUCOSE SERPL-MCNC: 144 MG/DL — HIGH (ref 70–99)
GLUCOSE SERPL-MCNC: 152 MG/DL — HIGH (ref 70–99)
HCO3 BLDV-SCNC: 7 MMOL/L — CRITICAL LOW (ref 22–29)
HCT VFR BLDA CALC: 32 % — LOW (ref 34.5–46.5)
HGB BLD CALC-MCNC: 10.7 G/DL — LOW (ref 11.7–16.1)
LACTATE BLDV-MCNC: 2.2 MMOL/L — HIGH (ref 0.5–2)
LACTATE SERPL-SCNC: 1.6 MMOL/L — SIGNIFICANT CHANGE UP (ref 0.7–2)
LACTATE SERPL-SCNC: 4.1 MMOL/L — CRITICAL HIGH (ref 0.7–2)
MAGNESIUM SERPL-MCNC: 1.8 MG/DL — SIGNIFICANT CHANGE UP (ref 1.8–2.4)
PCO2 BLDV: 23 MMHG — LOW (ref 39–42)
PH BLDV: 7.08 — CRITICAL LOW (ref 7.32–7.43)
PHOSPHATE SERPL-MCNC: 1.2 MG/DL — LOW (ref 2.1–4.9)
PHOSPHATE SERPL-MCNC: 1.4 MG/DL — LOW (ref 2.1–4.9)
PO2 BLDV: 46 MMHG — HIGH (ref 25–45)
POTASSIUM BLDV-SCNC: 5.2 MMOL/L — HIGH (ref 3.5–5.1)
POTASSIUM SERPL-MCNC: 3.5 MMOL/L — SIGNIFICANT CHANGE UP (ref 3.5–5)
POTASSIUM SERPL-MCNC: 3.7 MMOL/L — SIGNIFICANT CHANGE UP (ref 3.5–5)
POTASSIUM SERPL-MCNC: 4.6 MMOL/L — SIGNIFICANT CHANGE UP (ref 3.5–5)
POTASSIUM SERPL-SCNC: 3.5 MMOL/L — SIGNIFICANT CHANGE UP (ref 3.5–5)
POTASSIUM SERPL-SCNC: 3.7 MMOL/L — SIGNIFICANT CHANGE UP (ref 3.5–5)
POTASSIUM SERPL-SCNC: 4.6 MMOL/L — SIGNIFICANT CHANGE UP (ref 3.5–5)
PROT SERPL-MCNC: 7.1 G/DL — SIGNIFICANT CHANGE UP (ref 6–8)
PROT SERPL-MCNC: 8 G/DL — SIGNIFICANT CHANGE UP (ref 6–8)
SAO2 % BLDV: 75.8 % — SIGNIFICANT CHANGE UP (ref 67–88)
SODIUM SERPL-SCNC: 130 MMOL/L — LOW (ref 135–146)
SODIUM SERPL-SCNC: 131 MMOL/L — LOW (ref 135–146)
SODIUM SERPL-SCNC: 133 MMOL/L — LOW (ref 135–146)
TRIGL SERPL-MCNC: 292 MG/DL — HIGH
TROPONIN T, HIGH SENSITIVITY RESULT: <6 NG/L — SIGNIFICANT CHANGE UP (ref 6–13)

## 2024-07-14 PROCEDURE — 87040 BLOOD CULTURE FOR BACTERIA: CPT

## 2024-07-14 PROCEDURE — 76705 ECHO EXAM OF ABDOMEN: CPT

## 2024-07-14 PROCEDURE — 82248 BILIRUBIN DIRECT: CPT

## 2024-07-14 PROCEDURE — 93005 ELECTROCARDIOGRAM TRACING: CPT

## 2024-07-14 PROCEDURE — 86140 C-REACTIVE PROTEIN: CPT

## 2024-07-14 PROCEDURE — 80053 COMPREHEN METABOLIC PANEL: CPT

## 2024-07-14 PROCEDURE — 84295 ASSAY OF SERUM SODIUM: CPT

## 2024-07-14 PROCEDURE — 84100 ASSAY OF PHOSPHORUS: CPT

## 2024-07-14 PROCEDURE — 80074 ACUTE HEPATITIS PANEL: CPT

## 2024-07-14 PROCEDURE — 83735 ASSAY OF MAGNESIUM: CPT

## 2024-07-14 PROCEDURE — 83605 ASSAY OF LACTIC ACID: CPT

## 2024-07-14 PROCEDURE — 85018 HEMOGLOBIN: CPT

## 2024-07-14 PROCEDURE — 85025 COMPLETE CBC W/AUTO DIFF WBC: CPT

## 2024-07-14 PROCEDURE — 99223 1ST HOSP IP/OBS HIGH 75: CPT

## 2024-07-14 PROCEDURE — 80048 BASIC METABOLIC PNL TOTAL CA: CPT

## 2024-07-14 PROCEDURE — 82010 KETONE BODYS QUAN: CPT

## 2024-07-14 PROCEDURE — 82330 ASSAY OF CALCIUM: CPT

## 2024-07-14 PROCEDURE — 82803 BLOOD GASES ANY COMBINATION: CPT

## 2024-07-14 PROCEDURE — 84478 ASSAY OF TRIGLYCERIDES: CPT

## 2024-07-14 PROCEDURE — 71045 X-RAY EXAM CHEST 1 VIEW: CPT | Mod: 26

## 2024-07-14 PROCEDURE — 84132 ASSAY OF SERUM POTASSIUM: CPT

## 2024-07-14 PROCEDURE — 80076 HEPATIC FUNCTION PANEL: CPT

## 2024-07-14 PROCEDURE — 36415 COLL VENOUS BLD VENIPUNCTURE: CPT

## 2024-07-14 PROCEDURE — 71045 X-RAY EXAM CHEST 1 VIEW: CPT

## 2024-07-14 PROCEDURE — 85014 HEMATOCRIT: CPT

## 2024-07-14 RX ORDER — FOLIC ACID
1 POWDER (GRAM) MISCELLANEOUS DAILY
Refills: 0 | Status: DISCONTINUED | OUTPATIENT
Start: 2024-07-14 | End: 2024-07-19

## 2024-07-14 RX ORDER — LORAZEPAM 0.5 MG
2 TABLET ORAL
Refills: 0 | Status: DISCONTINUED | OUTPATIENT
Start: 2024-07-14 | End: 2024-07-15

## 2024-07-14 RX ORDER — TRAMADOL HYDROCHLORIDE 50 MG/1
25 TABLET, FILM COATED ORAL EVERY 8 HOURS
Refills: 0 | Status: DISCONTINUED | OUTPATIENT
Start: 2024-07-14 | End: 2024-07-19

## 2024-07-14 RX ORDER — THIAMINE HCL 100 MG
100 TABLET ORAL ONCE
Refills: 0 | Status: COMPLETED | OUTPATIENT
Start: 2024-07-14 | End: 2024-07-14

## 2024-07-14 RX ORDER — MORPHINE SULFATE 100 MG/1
2 TABLET, EXTENDED RELEASE ORAL EVERY 4 HOURS
Refills: 0 | Status: DISCONTINUED | OUTPATIENT
Start: 2024-07-14 | End: 2024-07-14

## 2024-07-14 RX ORDER — ONDANSETRON HYDROCHLORIDE 2 MG/ML
4 INJECTION INTRAMUSCULAR; INTRAVENOUS ONCE
Refills: 0 | Status: COMPLETED | OUTPATIENT
Start: 2024-07-14 | End: 2024-07-14

## 2024-07-14 RX ORDER — ENOXAPARIN SODIUM 100 MG/ML
40 INJECTION SUBCUTANEOUS EVERY 24 HOURS
Refills: 0 | Status: DISCONTINUED | OUTPATIENT
Start: 2024-07-14 | End: 2024-07-19

## 2024-07-14 RX ORDER — HYDROMORPHONE HCL 0.2 MG/ML
0.5 INJECTION, SOLUTION INTRAVENOUS ONCE
Refills: 0 | Status: DISCONTINUED | OUTPATIENT
Start: 2024-07-14 | End: 2024-07-14

## 2024-07-14 RX ORDER — THIAMINE HCL 100 MG
500 TABLET ORAL EVERY 8 HOURS
Refills: 0 | Status: DISCONTINUED | OUTPATIENT
Start: 2024-07-14 | End: 2024-07-15

## 2024-07-14 RX ORDER — HYDROMORPHONE HCL 0.2 MG/ML
0.5 INJECTION, SOLUTION INTRAVENOUS EVERY 6 HOURS
Refills: 0 | Status: DISCONTINUED | OUTPATIENT
Start: 2024-07-14 | End: 2024-07-14

## 2024-07-14 RX ORDER — PANTOPRAZOLE SODIUM 40 MG/10ML
40 INJECTION, POWDER, FOR SOLUTION INTRAVENOUS EVERY 24 HOURS
Refills: 0 | Status: DISCONTINUED | OUTPATIENT
Start: 2024-07-14 | End: 2024-07-18

## 2024-07-14 RX ORDER — DEXTROSE MONOHYDRATE AND SODIUM CHLORIDE 5; .3 G/100ML; G/100ML
1000 INJECTION, SOLUTION INTRAVENOUS
Refills: 0 | Status: DISCONTINUED | OUTPATIENT
Start: 2024-07-14 | End: 2024-07-14

## 2024-07-14 RX ORDER — SODIUM BICARBONATE 650 MG/1
0.24 TABLET ORAL
Qty: 150 | Refills: 0 | Status: DISCONTINUED | OUTPATIENT
Start: 2024-07-14 | End: 2024-07-15

## 2024-07-14 RX ORDER — ONDANSETRON HYDROCHLORIDE 2 MG/ML
4 INJECTION INTRAMUSCULAR; INTRAVENOUS EVERY 8 HOURS
Refills: 0 | Status: DISCONTINUED | OUTPATIENT
Start: 2024-07-14 | End: 2024-07-19

## 2024-07-14 RX ORDER — KETOROLAC TROMETHAMINE 30 MG/ML
15 INJECTION, SOLUTION INTRAMUSCULAR EVERY 6 HOURS
Refills: 0 | Status: DISCONTINUED | OUTPATIENT
Start: 2024-07-14 | End: 2024-07-15

## 2024-07-14 RX ADMIN — ONDANSETRON HYDROCHLORIDE 4 MILLIGRAM(S): 2 INJECTION INTRAMUSCULAR; INTRAVENOUS at 04:24

## 2024-07-14 RX ADMIN — KETOROLAC TROMETHAMINE 15 MILLIGRAM(S): 30 INJECTION, SOLUTION INTRAMUSCULAR at 22:01

## 2024-07-14 RX ADMIN — HYDROMORPHONE HCL 0.5 MILLIGRAM(S): 0.2 INJECTION, SOLUTION INTRAVENOUS at 08:00

## 2024-07-14 RX ADMIN — HYDROMORPHONE HCL 0.5 MILLIGRAM(S): 0.2 INJECTION, SOLUTION INTRAVENOUS at 08:15

## 2024-07-14 RX ADMIN — Medication 63.75 MILLIMOLE(S): at 18:06

## 2024-07-14 RX ADMIN — PANTOPRAZOLE SODIUM 40 MILLIGRAM(S): 40 INJECTION, POWDER, FOR SOLUTION INTRAVENOUS at 06:52

## 2024-07-14 RX ADMIN — DEXTROSE MONOHYDRATE AND SODIUM CHLORIDE 150 MILLILITER(S): 5; .3 INJECTION, SOLUTION INTRAVENOUS at 02:21

## 2024-07-14 RX ADMIN — Medication 105 MILLIGRAM(S): at 15:55

## 2024-07-14 RX ADMIN — TRAMADOL HYDROCHLORIDE 25 MILLIGRAM(S): 50 TABLET, FILM COATED ORAL at 04:25

## 2024-07-14 RX ADMIN — ENOXAPARIN SODIUM 40 MILLIGRAM(S): 100 INJECTION SUBCUTANEOUS at 06:52

## 2024-07-14 RX ADMIN — HYDROMORPHONE HCL 0.5 MILLIGRAM(S): 0.2 INJECTION, SOLUTION INTRAVENOUS at 05:51

## 2024-07-14 RX ADMIN — Medication 1 APPLICATION(S): at 05:23

## 2024-07-14 RX ADMIN — Medication 2 MILLIGRAM(S): at 18:45

## 2024-07-14 RX ADMIN — Medication 63.75 MILLIMOLE(S): at 08:40

## 2024-07-14 RX ADMIN — Medication 105 MILLIGRAM(S): at 21:07

## 2024-07-14 RX ADMIN — HYDROMORPHONE HCL 0.5 MILLIGRAM(S): 0.2 INJECTION, SOLUTION INTRAVENOUS at 12:00

## 2024-07-14 RX ADMIN — Medication 1 MILLIGRAM(S): at 00:28

## 2024-07-14 RX ADMIN — HYDROMORPHONE HCL 0.5 MILLIGRAM(S): 0.2 INJECTION, SOLUTION INTRAVENOUS at 05:23

## 2024-07-14 RX ADMIN — TRAMADOL HYDROCHLORIDE 25 MILLIGRAM(S): 50 TABLET, FILM COATED ORAL at 20:27

## 2024-07-14 RX ADMIN — Medication 100 MILLIGRAM(S): at 00:28

## 2024-07-14 RX ADMIN — TRAMADOL HYDROCHLORIDE 25 MILLIGRAM(S): 50 TABLET, FILM COATED ORAL at 20:58

## 2024-07-14 RX ADMIN — TRAMADOL HYDROCHLORIDE 25 MILLIGRAM(S): 50 TABLET, FILM COATED ORAL at 04:49

## 2024-07-14 RX ADMIN — HYDROMORPHONE HCL 0.5 MILLIGRAM(S): 0.2 INJECTION, SOLUTION INTRAVENOUS at 02:21

## 2024-07-14 RX ADMIN — Medication 1 APPLICATION(S): at 11:55

## 2024-07-14 RX ADMIN — ONDANSETRON HYDROCHLORIDE 4 MILLIGRAM(S): 2 INJECTION INTRAMUSCULAR; INTRAVENOUS at 14:10

## 2024-07-14 RX ADMIN — HYDROMORPHONE HCL 0.5 MILLIGRAM(S): 0.2 INJECTION, SOLUTION INTRAVENOUS at 02:40

## 2024-07-14 RX ADMIN — SODIUM BICARBONATE 100 MEQ/KG/HR: 650 TABLET ORAL at 11:53

## 2024-07-14 RX ADMIN — KETOROLAC TROMETHAMINE 15 MILLIGRAM(S): 30 INJECTION, SOLUTION INTRAMUSCULAR at 22:16

## 2024-07-14 RX ADMIN — Medication 105 MILLIGRAM(S): at 06:52

## 2024-07-14 NOTE — H&P ADULT - NSHPLABSRESULTS_GEN_ALL_CORE
VITAL SIGNS: Last 24 Hours  T(C): 36.9 (13 Jul 2024 23:32), Max: 36.9 (13 Jul 2024 20:15)  T(F): 98.5 (13 Jul 2024 23:32), Max: 98.5 (13 Jul 2024 20:15)  HR: 139 (13 Jul 2024 23:32) (124 - 139)  BP: 129/85 (13 Jul 2024 23:32) (124/82 - 129/85)  BP(mean): --  RR: 20 (13 Jul 2024 23:32) (20 - 20)  SpO2: 100% (13 Jul 2024 23:32) (99% - 100%)    LABS:                        12.0   15.87 )-----------( 241      ( 13 Jul 2024 20:38 )             35.1     07-13    131<L>  |  91<L>  |  13  ----------------------------<  96  4.8   |  5<LL>  |  0.9    Ca    8.8      13 Jul 2024 20:38    TPro  8.4<H>  /  Alb  4.8  /  TBili  1.2  /  DBili  x   /  AST  211<H>  /  ALT  57<H>  /  AlkPhos  153<H>  07-13      Urinalysis Basic - ( 13 Jul 2024 20:38 )    Color: x / Appearance: x / SG: x / pH: x  Gluc: 96 mg/dL / Ketone: x  / Bili: x / Urobili: x   Blood: x / Protein: x / Nitrite: x   Leuk Esterase: x / RBC: x / WBC x   Sq Epi: x / Non Sq Epi: x / Bacteria: x        Lactate, Blood: 4.3 mmol/L *HH* (07-13-24 @ 20:38)          RADIOLOGY:

## 2024-07-14 NOTE — CHART NOTE - NSCHARTNOTEFT_GEN_A_CORE
Transfer Note    Transfer from: MICU    Transfer to: (  ) Medicine    (  ) Telemetry    (  ) RCU                               (  ) Palliative    (  ) Stroke Unit    (  ) MICU    ( x ) STEPDOWN      HOSPITAL COURSE:  HPI:  Case of 48-year-old female with PMHx of chronic pancreatitis and hepatic steatosis (due to alcohol use?), who presented with abdominal pain. Pain started around 24 hours prior, intractable with nausea/vomiting. Radiating to back. No fever, chest pain, shortness of breath, urinary symptoms. Does not drink daily anymore and does not binge drink, last drink was on Wednesday.    Of note, patient was recently admitted for pancreatitis, was feeling well, however, reports progressively worsening pain with nausea/vomiting prompting return to ed.     In the ED, vitals were   · /82 mm Hg  · Heart Rate 124 /min  · Respiration Rate (breaths/min) 20 /min  · Temp (F) 98.5 Degrees F  · SpO2 (%) 99 %    Labs remarkable for WBC 15k with neutrophilic shift, BHB >9, sodium 131, bicarb 5, AG 35, glucose 96, lactate 4.3    CT AP with IVC: No evidence of an acute intra-abdominal abnormality.  PANCREAS: Unchanged appearance of the pancreas with calcifications,   atrophy and ductal prominence.    In the ED, given 2L LR + symptomatic management (morphine, Dilaudid ativan)     (14 Jul 2024 00:36)    Pt admitted to MICU for alcoholic vs starvation ketoacidosis.   Pt's vitals were stable, started on diet. Thiamine and folate repleted.     Vital Signs Last 24 Hrs  T(C): 37.2 (14 Jul 2024 12:00), Max: 37.2 (14 Jul 2024 12:00)  T(F): 98.9 (14 Jul 2024 12:00), Max: 98.9 (14 Jul 2024 12:00)  HR: 99 (14 Jul 2024 12:00) (89 - 141)  BP: 139/86 (14 Jul 2024 12:00) (113/76 - 142/96)  BP(mean): 105 (14 Jul 2024 12:00) (90 - 111)  RR: 23 (14 Jul 2024 12:00) (1 - 29)  SpO2: 99% (14 Jul 2024 12:00) (97% - 100%)    Parameters below as of 14 Jul 2024 12:00  Patient On (Oxygen Delivery Method): room air        I&O's Summary    13 Jul 2024 07:01  -  14 Jul 2024 07:00  --------------------------------------------------------  IN: 1000 mL / OUT: 700 mL / NET: 300 mL    14 Jul 2024 07:01  -  14 Jul 2024 16:24  --------------------------------------------------------  IN: 300 mL / OUT: 0 mL / NET: 300 mL        Physical Exam    PHYSICAL EXAM:  GEN: NAD, Resting comfortably in bed, cooperative  PULM: Clear to auscultation bilaterally, No wheezing, rales, rhonchi  CVS: Regular rate and rhythm, S1-S2, no murmurs, heaves, thrills  ABD: Soft, non-tender, non-distended, no guarding, BS +  EXT: No edema/cyanosis, extremities warm/dry, peripheral pulses palpable   NEURO: A&Ox3, no focal deficits        LABS:                               12.0   15.87 )-----------( 241      ( 13 Jul 2024 20:38 )             35.1       07-14    131<L>  |  99  |  7<L>  ----------------------------<  152<H>  3.5   |  18  |  0.7    Ca    7.8<L>      14 Jul 2024 12:20  Phos  1.4     07-14  Mg     1.8     07-14    TPro  8.0  /  Alb  4.7  /  TBili  1.4<H>  /  DBili  x   /  AST  167<H>  /  ALT  46<H>  /  AlkPhos  140<H>  07-14          ABG - ( 14 Jul 2024 15:31 )  pH, Arterial: 7.44  pH, Blood: x     /  pCO2: 34    /  pO2: 61    / HCO3: 23    / Base Excess: -0.4  /  SaO2: 93.3        ASSESSMENT & PLAN:   #Possible acute on chronic pancreatitis   - CT negative for pancreatitis, Lipase 17  - Pt started on diet  - Zofran PRN for nausea     #Starvation vs alcoholic ketoacidosis  #Elevated lactic acid  #Hx of alcohol use  - Pt on D5 LR at 75 cc/hr     #Transaminitis due to alcoholic hepatitis  #Hepatic steatosis   - Continue to monitor LFTs     #Leukocytosis - possible reactive, no active source of infection    #misc  Diet: clear liquid  DVT ppx: lovenox  GI ppx: protonix  Dispo: SDU

## 2024-07-14 NOTE — PHARMACOTHERAPY INTERVENTION NOTE - COMMENTS
-duplicate hydromorphone 0.5mg IV x2-d/w team, cancel 1 order  -plan add ondansetron 4mg -d/w team, IV q8h prn n/v

## 2024-07-14 NOTE — H&P ADULT - ASSESSMENT
IMPRESSION:    #Abdominal pain , N/V  #Possible acute on chronic pancreatitis   #Starvation ketoacidosis  #Hx of alcohol use  #Transaminitis due to alcoholic hepatitis  #Hepatic steatosis   #Leukocytosis - possible reactive, no active source of infection    PLAN:    CNS: Avoid CNS Depressants. Ativan PRN per MIGUEL.    HEENT: Oral care. Aspiration precautions     PULMONARY: HOB @ 45. Monitor Pulse Ox. Keep > 93%. Supplement as needed.    CARDIOVASCULAR:   - Daily Weight. Strict I&Os. Keep I < O  - Trend lactate    GI: GI prophylaxis. NPO for now for. Pain management PRN. Get TG levels CRP.    RENAL: Avoid nephrotoxic agents. Mg and P levels. Get ABG/VBG.    INFECTIOUS DISEASE: Monitor off Abx. Get blood Cx.    HEMATOLOGICAL: DVT prophylaxis.    ENDOCRINE: Start D5W-NS at 150cc/hour. Thiamine 500mg IV TID. Repeat BHB levels.     MUSCULOSKELETAL: Ambulate as tolerated       DISPOSITION: MICU                 IMPRESSION:    #Abdominal pain , N/V  #Possible acute on chronic pancreatitis   #Starvation ketoacidosis  #Hx of alcohol use  #Transaminitis due to alcoholic hepatitis  #Hepatic steatosis   #Leukocytosis - possible reactive, no active source of infection    PLAN:    CNS: Avoid CNS Depressants. No need for CIWA monitoring.    HEENT: Oral care. Aspiration precautions     PULMONARY: HOB @ 45. Monitor Pulse Ox. Keep > 93%. Supplement as needed.    CARDIOVASCULAR:   - Daily Weight. Strict I&Os. Keep I < O  - Trend lactate    GI: GI prophylaxis. NPO for now for. Pain management PRN. Get TG levels. Trend CRP.    RENAL: Avoid nephrotoxic agents. Mg and P levels. Get ABG/VBG.    INFECTIOUS DISEASE: Monitor off Abx. Get blood Cx.    HEMATOLOGICAL: DVT prophylaxis.    ENDOCRINE: Start D5W-NS at 150cc/hour. Thiamine 500mg IV TID. Repeat BHB levels.     MUSCULOSKELETAL: Ambulate as tolerated       DISPOSITION: MICU                 IMPRESSION:    #Abdominal pain , N/V  #Possible acute on chronic pancreatitis   #Starvation ketoacidosis  #Elevated lactic acid  #Hx of alcohol use  #Transaminitis due to alcoholic hepatitis  #Hepatic steatosis   #Leukocytosis - possible reactive, no active source of infection    PLAN:    CNS: Avoid CNS Depressants. No need for CIWA monitoring.    HEENT: Oral care. Aspiration precautions     PULMONARY: HOB @ 45. Monitor Pulse Ox. Keep > 93%. Supplement as needed.    CARDIOVASCULAR:   - Daily Weight. Strict I&Os. Keep I < O  - Trend lactate    GI: GI prophylaxis. NPO for now for. Pain management PRN. Get TG levels. Trend CRP.    RENAL: Avoid nephrotoxic agents. Mg and P levels. Get ABG/VBG.    INFECTIOUS DISEASE: Monitor off Abx. Get blood Cx.    HEMATOLOGICAL: DVT prophylaxis.    ENDOCRINE: Start D5W-NS at 150cc/hour. Thiamine 500mg IV TID. Repeat BHB levels.     MUSCULOSKELETAL: Ambulate as tolerated       DISPOSITION: MICU

## 2024-07-14 NOTE — CONSULT NOTE ADULT - ASSESSMENT
IMPRESSION:    #Abdominal pain , N/V  #Possible acute on chronic pancreatitis   #Starvation ketoacidosis  #Elevated lactic acid  #Hx of alcohol use  #Transaminitis due to alcoholic hepatitis  #Hepatic steatosis   #Leukocytosis - possible reactive, no active source of infection    PLAN:    CNS: Avoid CNS Depressants. Thiamine and folate. CIWA.     HEENT: Oral care. Aspiration precautions     PULMONARY: On room air. Spo2 100%. VBG today. CXR as well.     CARDIOVASCULAR: Lactate is better. IV fluids with bicarb for now.     GI: GI prophylaxis. Advance diet as tolerated. Zofran PRN. GI evaluation. Lipase is normal. RUQ sono. LFTs this afternoon.     RENAL: VBG today. D5W with bicarb at 100cc/hr. CMP in the afternoon.     INFECTIOUS DISEASE: Monitor off Abx. Get blood Cx. UA. Procal.     HEMATOLOGICAL: DVT prophylaxis. Early ambulation.     ENDOCRINE: Beta hydroxy noted. Triglyceride noted.     MUSCULOSKELETAL: Ambulate as tolerated     DISPOSITION: MICU for now.    Repeat labs in the PM, and check on diet status; if all improved then SDU.

## 2024-07-14 NOTE — H&P ADULT - NSHPPHYSICALEXAM_GEN_ALL_CORE
PHYSICAL EXAM:  CONSTITUTIONAL: No acute distress, well-developed, well-groomed, AAOx3  HEAD: Atraumatic, normocephalic  EYES: EOM intact, PERRLA, conjunctiva and sclera clear  ENT: Supple, no masses, no thyromegaly, no bruits, no JVD; moist mucous membranes  PULMONARY: Clear to auscultation bilaterally; no wheezes, rales, or rhonchi  CARDIOVASCULAR: Regular rate and rhythm; no murmurs, rubs, or gallops  GASTROINTESTINAL: Soft, non-tender, non-distended; bowel sounds present  MUSCULOSKELETAL: 2+ peripheral pulses; no clubbing, no cyanosis, no edema  NEUROLOGY: non-focal  SKIN: No rashes or lesions; warm and dry PHYSICAL EXAM:  CONSTITUTIONAL: No acute distress,  AAOx3  PULMONARY: Clear to auscultation bilaterally; no wheezes  CARDIOVASCULAR: Regular rate and rhythm; tachy  GASTROINTESTINAL: soft, tender, non-distended;   MUSCULOSKELETAL: 2+ peripheral pulses; no edema  NEUROLOGY: non-focal

## 2024-07-14 NOTE — PATIENT PROFILE ADULT - FALL HARM RISK - UNIVERSAL INTERVENTIONS
Bed in lowest position, wheels locked, appropriate side rails in place/Call bell, personal items and telephone in reach/Instruct patient to call for assistance before getting out of bed or chair/Non-slip footwear when patient is out of bed/[place] to call system/Physically safe environment - no spills, clutter or unnecessary equipment/Purposeful Proactive Rounding/Room/bathroom lighting operational, light cord in reach

## 2024-07-14 NOTE — ED ADULT NURSE NOTE - CAS TRG GENERAL AIRWAY, MLM
ANESTHESIA POSTOP CHECK    72y Male POSTOP DAY 1    Vital Signs Last 24 Hrs  T(C): 36.6 (17 Dec 2018 10:39), Max: 36.6 (16 Dec 2018 16:58)  T(F): 97.8 (17 Dec 2018 10:39), Max: 97.8 (16 Dec 2018 16:58)  HR: 84 (17 Dec 2018 10:39) (67 - 86)  BP: 120/72 (17 Dec 2018 10:39) (105/68 - 144/80)  BP(mean): --  RR: 18 (17 Dec 2018 10:39) (17 - 18)  SpO2: 99% (17 Dec 2018 10:39) (96% - 100%)  I&O's Summary    16 Dec 2018 07:01  -  17 Dec 2018 07:00  --------------------------------------------------------  IN: 3120 mL / OUT: 942.5 mL / NET: 2177.5 mL    17 Dec 2018 07:01  -  17 Dec 2018 12:35  --------------------------------------------------------  IN: 0 mL / OUT: 350 mL / NET: -350 mL        [X ] NO APPARENT ANESTHESIA COMPLICATIONS      Comments: Patent

## 2024-07-14 NOTE — H&P ADULT - HISTORY OF PRESENT ILLNESS
Case of 48-year-old female with PMHx of chronic pancreatitis 2/2 alcohol use, fatty liver disease, who presents with abodminal pain.    Pain started around 24 hours ago, intractable with nausea/vomiting.    No fever, chest pain, shortness of breath, urinary symptoms.    Of note, patient was recently admitted for pancreatitis, was feeling well, however, reports progressively worsening pain with nausea/vomiting prompting return to ed.     In the ED, vitals were   · /82 mm Hg  · Heart Rate 124 /min  · Respiration Rate (breaths/min) 20 /min  · Temp (F) 98.5 Degrees F  · SpO2 (%) 99 %    Labs remarkable for WBC 15k with neutrophilic shift, BHB >9, sodium 131, bicarb 5, AG 35, glucose 96, lactate 4.3    CT AP with IVC: No evidence of an acute intra-abdominal abnormality.  PANCREAS: Unchanged appearance of the pancreas with calcifications,   atrophy and ductal prominence.    In the ED, given 2L LR + symptomatic management (morphine, Dilaudid ativan?)     Case of 48-year-old female with PMHx of chronic pancreatitis 2/2 alcohol use, hepatic steatosis (alcoholic), who presents with abdominal pain.    Pain started around 24 hours ago, intractable with nausea/vomiting. Radiating to back.    No fever, chest pain, shortness of breath, urinary symptoms.    Does not drink daily anymore, last drink was on Wednesday    Of note, patient was recently admitted for pancreatitis, was feeling well, however, reports progressively worsening pain with nausea/vomiting prompting return to ed.     In the ED, vitals were   · /82 mm Hg  · Heart Rate 124 /min  · Respiration Rate (breaths/min) 20 /min  · Temp (F) 98.5 Degrees F  · SpO2 (%) 99 %    Labs remarkable for WBC 15k with neutrophilic shift, BHB >9, sodium 131, bicarb 5, AG 35, glucose 96, lactate 4.3    CT AP with IVC: No evidence of an acute intra-abdominal abnormality.  PANCREAS: Unchanged appearance of the pancreas with calcifications,   atrophy and ductal prominence.    In the ED, given 2L LR + symptomatic management (morphine, Dilaudid ativan?)     Case of 48-year-old female with PMHx of chronic pancreatitis and hepatic steatosis (due to alcohol use?), presenting witth abdominal pain.    Pain started around 24 hours ago, intractable with nausea/vomiting. Radiating to back.    No fever, chest pain, shortness of breath, urinary symptoms.    Does not drink daily anymore and does not binge drink, last drink was on Wednesday.    Of note, patient was recently admitted for pancreatitis, was feeling well, however, reports progressively worsening pain with nausea/vomiting prompting return to ed.     In the ED, vitals were   · /82 mm Hg  · Heart Rate 124 /min  · Respiration Rate (breaths/min) 20 /min  · Temp (F) 98.5 Degrees F  · SpO2 (%) 99 %    Labs remarkable for WBC 15k with neutrophilic shift, BHB >9, sodium 131, bicarb 5, AG 35, glucose 96, lactate 4.3    CT AP with IVC: No evidence of an acute intra-abdominal abnormality.  PANCREAS: Unchanged appearance of the pancreas with calcifications,   atrophy and ductal prominence.    In the ED, given 2L LR + symptomatic management (morphine, Dilaudid ativan?)

## 2024-07-15 LAB
ALBUMIN SERPL ELPH-MCNC: 4.3 G/DL — SIGNIFICANT CHANGE UP (ref 3.5–5.2)
ALP SERPL-CCNC: 110 U/L — SIGNIFICANT CHANGE UP (ref 30–115)
ALT FLD-CCNC: 37 U/L — SIGNIFICANT CHANGE UP (ref 0–41)
ANION GAP SERPL CALC-SCNC: 11 MMOL/L — SIGNIFICANT CHANGE UP (ref 7–14)
ANION GAP SERPL CALC-SCNC: 12 MMOL/L — SIGNIFICANT CHANGE UP (ref 7–14)
AST SERPL-CCNC: 158 U/L — HIGH (ref 0–41)
BASOPHILS # BLD AUTO: 0.03 K/UL — SIGNIFICANT CHANGE UP (ref 0–0.2)
BASOPHILS NFR BLD AUTO: 0.8 % — SIGNIFICANT CHANGE UP (ref 0–1)
BILIRUB SERPL-MCNC: 1 MG/DL — SIGNIFICANT CHANGE UP (ref 0.2–1.2)
BUN SERPL-MCNC: 4 MG/DL — LOW (ref 10–20)
BUN SERPL-MCNC: <3 MG/DL — LOW (ref 10–20)
CALCIUM SERPL-MCNC: 8.2 MG/DL — LOW (ref 8.4–10.5)
CALCIUM SERPL-MCNC: 8.3 MG/DL — LOW (ref 8.4–10.5)
CHLORIDE SERPL-SCNC: 95 MMOL/L — LOW (ref 98–110)
CHLORIDE SERPL-SCNC: 97 MMOL/L — LOW (ref 98–110)
CO2 SERPL-SCNC: 27 MMOL/L — SIGNIFICANT CHANGE UP (ref 17–32)
CO2 SERPL-SCNC: 32 MMOL/L — SIGNIFICANT CHANGE UP (ref 17–32)
CREAT SERPL-MCNC: 0.6 MG/DL — LOW (ref 0.7–1.5)
CREAT SERPL-MCNC: 0.7 MG/DL — SIGNIFICANT CHANGE UP (ref 0.7–1.5)
EGFR: 108 ML/MIN/1.73M2 — SIGNIFICANT CHANGE UP
EGFR: 112 ML/MIN/1.73M2 — SIGNIFICANT CHANGE UP
EOSINOPHIL # BLD AUTO: 0.02 K/UL — SIGNIFICANT CHANGE UP (ref 0–0.7)
EOSINOPHIL NFR BLD AUTO: 0.5 % — SIGNIFICANT CHANGE UP (ref 0–8)
GLUCOSE SERPL-MCNC: 115 MG/DL — HIGH (ref 70–99)
GLUCOSE SERPL-MCNC: 98 MG/DL — SIGNIFICANT CHANGE UP (ref 70–99)
HCT VFR BLD CALC: 29.1 % — LOW (ref 37–47)
HGB BLD-MCNC: 10.7 G/DL — LOW (ref 12–16)
IMM GRANULOCYTES NFR BLD AUTO: 0.5 % — HIGH (ref 0.1–0.3)
LYMPHOCYTES # BLD AUTO: 0.81 K/UL — LOW (ref 1.2–3.4)
LYMPHOCYTES # BLD AUTO: 21.4 % — SIGNIFICANT CHANGE UP (ref 20.5–51.1)
MAGNESIUM SERPL-MCNC: 1.3 MG/DL — LOW (ref 1.8–2.4)
MAGNESIUM SERPL-MCNC: 2.1 MG/DL — SIGNIFICANT CHANGE UP (ref 1.8–2.4)
MCHC RBC-ENTMCNC: 33.9 PG — HIGH (ref 27–31)
MCHC RBC-ENTMCNC: 36.8 G/DL — SIGNIFICANT CHANGE UP (ref 32–37)
MCV RBC AUTO: 92.1 FL — SIGNIFICANT CHANGE UP (ref 81–99)
MONOCYTES # BLD AUTO: 0.19 K/UL — SIGNIFICANT CHANGE UP (ref 0.1–0.6)
MONOCYTES NFR BLD AUTO: 5 % — SIGNIFICANT CHANGE UP (ref 1.7–9.3)
NEUTROPHILS # BLD AUTO: 2.72 K/UL — SIGNIFICANT CHANGE UP (ref 1.4–6.5)
NEUTROPHILS NFR BLD AUTO: 71.8 % — SIGNIFICANT CHANGE UP (ref 42.2–75.2)
NRBC # BLD: 0 /100 WBCS — SIGNIFICANT CHANGE UP (ref 0–0)
PHOSPHATE SERPL-MCNC: 0.8 MG/DL — LOW (ref 2.1–4.9)
PLATELET # BLD AUTO: 139 K/UL — SIGNIFICANT CHANGE UP (ref 130–400)
PMV BLD: 10.6 FL — HIGH (ref 7.4–10.4)
POTASSIUM SERPL-MCNC: 3.3 MMOL/L — LOW (ref 3.5–5)
POTASSIUM SERPL-MCNC: 3.3 MMOL/L — LOW (ref 3.5–5)
POTASSIUM SERPL-SCNC: 3.3 MMOL/L — LOW (ref 3.5–5)
POTASSIUM SERPL-SCNC: 3.3 MMOL/L — LOW (ref 3.5–5)
PROT SERPL-MCNC: 7 G/DL — SIGNIFICANT CHANGE UP (ref 6–8)
RBC # BLD: 3.16 M/UL — LOW (ref 4.2–5.4)
RBC # FLD: 11.2 % — LOW (ref 11.5–14.5)
SODIUM SERPL-SCNC: 136 MMOL/L — SIGNIFICANT CHANGE UP (ref 135–146)
SODIUM SERPL-SCNC: 138 MMOL/L — SIGNIFICANT CHANGE UP (ref 135–146)
WBC # BLD: 3.79 K/UL — LOW (ref 4.8–10.8)
WBC # FLD AUTO: 3.79 K/UL — LOW (ref 4.8–10.8)

## 2024-07-15 PROCEDURE — 76705 ECHO EXAM OF ABDOMEN: CPT | Mod: 26

## 2024-07-15 PROCEDURE — 99233 SBSQ HOSP IP/OBS HIGH 50: CPT

## 2024-07-15 RX ORDER — MAGNESIUM SULFATE 100 %
2 POWDER (GRAM) MISCELLANEOUS ONCE
Refills: 0 | Status: COMPLETED | OUTPATIENT
Start: 2024-07-15 | End: 2024-07-15

## 2024-07-15 RX ORDER — HYDROMORPHONE HCL 0.2 MG/ML
0.5 INJECTION, SOLUTION INTRAVENOUS ONCE
Refills: 0 | Status: DISCONTINUED | OUTPATIENT
Start: 2024-07-15 | End: 2024-07-15

## 2024-07-15 RX ORDER — THIAMINE HCL 100 MG
100 TABLET ORAL DAILY
Refills: 0 | Status: DISCONTINUED | OUTPATIENT
Start: 2024-07-15 | End: 2024-07-19

## 2024-07-15 RX ORDER — POTASSIUM CHLORIDE 600 MG/1
40 TABLET, FILM COATED, EXTENDED RELEASE ORAL ONCE
Refills: 0 | Status: COMPLETED | OUTPATIENT
Start: 2024-07-15 | End: 2024-07-15

## 2024-07-15 RX ORDER — HYDROMORPHONE HCL 0.2 MG/ML
0.5 INJECTION, SOLUTION INTRAVENOUS THREE TIMES A DAY
Refills: 0 | Status: DISCONTINUED | OUTPATIENT
Start: 2024-07-15 | End: 2024-07-17

## 2024-07-15 RX ORDER — POTASSIUM CHLORIDE 600 MG/1
40 TABLET, FILM COATED, EXTENDED RELEASE ORAL EVERY 4 HOURS
Refills: 0 | Status: COMPLETED | OUTPATIENT
Start: 2024-07-15 | End: 2024-07-15

## 2024-07-15 RX ORDER — POTASSIUM PHOSPHATE, MONOBASIC POTASSIUM PHOSPHATE, DIBASIC INJECTION, 236; 224 MG/ML; MG/ML
30 SOLUTION, CONCENTRATE INTRAVENOUS ONCE
Refills: 0 | Status: COMPLETED | OUTPATIENT
Start: 2024-07-15 | End: 2024-07-15

## 2024-07-15 RX ORDER — POTASSIUM CHLORIDE 600 MG/1
20 TABLET, FILM COATED, EXTENDED RELEASE ORAL ONCE
Refills: 0 | Status: COMPLETED | OUTPATIENT
Start: 2024-07-15 | End: 2024-07-15

## 2024-07-15 RX ORDER — ONDANSETRON HYDROCHLORIDE 2 MG/ML
4 INJECTION INTRAMUSCULAR; INTRAVENOUS ONCE
Refills: 0 | Status: COMPLETED | OUTPATIENT
Start: 2024-07-15 | End: 2024-07-15

## 2024-07-15 RX ORDER — DEXTROSE MONOHYDRATE AND SODIUM CHLORIDE 5; .3 G/100ML; G/100ML
1000 INJECTION, SOLUTION INTRAVENOUS
Refills: 0 | Status: DISCONTINUED | OUTPATIENT
Start: 2024-07-15 | End: 2024-07-17

## 2024-07-15 RX ADMIN — DEXTROSE MONOHYDRATE AND SODIUM CHLORIDE 60 MILLILITER(S): 5; .3 INJECTION, SOLUTION INTRAVENOUS at 12:08

## 2024-07-15 RX ADMIN — ONDANSETRON HYDROCHLORIDE 4 MILLIGRAM(S): 2 INJECTION INTRAMUSCULAR; INTRAVENOUS at 05:02

## 2024-07-15 RX ADMIN — KETOROLAC TROMETHAMINE 15 MILLIGRAM(S): 30 INJECTION, SOLUTION INTRAMUSCULAR at 15:55

## 2024-07-15 RX ADMIN — ONDANSETRON HYDROCHLORIDE 4 MILLIGRAM(S): 2 INJECTION INTRAMUSCULAR; INTRAVENOUS at 00:37

## 2024-07-15 RX ADMIN — HYDROMORPHONE HCL 0.5 MILLIGRAM(S): 0.2 INJECTION, SOLUTION INTRAVENOUS at 09:00

## 2024-07-15 RX ADMIN — TRAMADOL HYDROCHLORIDE 25 MILLIGRAM(S): 50 TABLET, FILM COATED ORAL at 19:20

## 2024-07-15 RX ADMIN — POTASSIUM CHLORIDE 40 MILLIEQUIVALENT(S): 600 TABLET, FILM COATED, EXTENDED RELEASE ORAL at 13:50

## 2024-07-15 RX ADMIN — HYDROMORPHONE HCL 0.5 MILLIGRAM(S): 0.2 INJECTION, SOLUTION INTRAVENOUS at 12:08

## 2024-07-15 RX ADMIN — POTASSIUM CHLORIDE 40 MILLIEQUIVALENT(S): 600 TABLET, FILM COATED, EXTENDED RELEASE ORAL at 21:18

## 2024-07-15 RX ADMIN — POTASSIUM PHOSPHATE, MONOBASIC POTASSIUM PHOSPHATE, DIBASIC INJECTION, 83.33 MILLIMOLE(S): 236; 224 SOLUTION, CONCENTRATE INTRAVENOUS at 18:28

## 2024-07-15 RX ADMIN — HYDROMORPHONE HCL 0.5 MILLIGRAM(S): 0.2 INJECTION, SOLUTION INTRAVENOUS at 21:24

## 2024-07-15 RX ADMIN — Medication 105 MILLIGRAM(S): at 05:24

## 2024-07-15 RX ADMIN — HYDROMORPHONE HCL 0.5 MILLIGRAM(S): 0.2 INJECTION, SOLUTION INTRAVENOUS at 13:00

## 2024-07-15 RX ADMIN — Medication 25 GRAM(S): at 12:03

## 2024-07-15 RX ADMIN — HYDROMORPHONE HCL 0.5 MILLIGRAM(S): 0.2 INJECTION, SOLUTION INTRAVENOUS at 20:35

## 2024-07-15 RX ADMIN — DEXTROSE MONOHYDRATE AND SODIUM CHLORIDE 60 MILLILITER(S): 5; .3 INJECTION, SOLUTION INTRAVENOUS at 12:04

## 2024-07-15 RX ADMIN — KETOROLAC TROMETHAMINE 15 MILLIGRAM(S): 30 INJECTION, SOLUTION INTRAMUSCULAR at 05:02

## 2024-07-15 RX ADMIN — POTASSIUM CHLORIDE 40 MILLIEQUIVALENT(S): 600 TABLET, FILM COATED, EXTENDED RELEASE ORAL at 18:28

## 2024-07-15 RX ADMIN — ONDANSETRON HYDROCHLORIDE 4 MILLIGRAM(S): 2 INJECTION INTRAMUSCULAR; INTRAVENOUS at 13:52

## 2024-07-15 RX ADMIN — ENOXAPARIN SODIUM 40 MILLIGRAM(S): 100 INJECTION SUBCUTANEOUS at 05:01

## 2024-07-15 RX ADMIN — PANTOPRAZOLE SODIUM 40 MILLIGRAM(S): 40 INJECTION, POWDER, FOR SOLUTION INTRAVENOUS at 05:01

## 2024-07-15 RX ADMIN — KETOROLAC TROMETHAMINE 15 MILLIGRAM(S): 30 INJECTION, SOLUTION INTRAMUSCULAR at 17:00

## 2024-07-15 RX ADMIN — TRAMADOL HYDROCHLORIDE 25 MILLIGRAM(S): 50 TABLET, FILM COATED ORAL at 18:27

## 2024-07-15 RX ADMIN — HYDROMORPHONE HCL 0.5 MILLIGRAM(S): 0.2 INJECTION, SOLUTION INTRAVENOUS at 08:29

## 2024-07-15 RX ADMIN — HYDROMORPHONE HCL 0.5 MILLIGRAM(S): 0.2 INJECTION, SOLUTION INTRAVENOUS at 00:33

## 2024-07-15 RX ADMIN — Medication 1 APPLICATION(S): at 12:45

## 2024-07-15 NOTE — PROGRESS NOTE ADULT - ASSESSMENT
IMPRESSION:    Abdominal pain , N/V  Possible acute on chronic pancreatitis   Starvation ketoacidosis  Elevated lactic acid  HO of alcohol use  Transaminitis due to alcoholic hepatitis  Hepatic steatosis   Leukocytosis - possible reactive, no active source of infection    PLAN:    CNS: Avoid CNS Depressants. Thiamine and folate. pain control. CATCH team.    HEENT: Oral care. Aspiration precautions     PULMONARY: On room air. Spo2 99%.     CARDIOVASCULAR: Lactate is better. LR 60 cc/hr    GI: GI prophylaxis. Advance diet as tolerated. Zofran PRN. GI evaluation. Lipase is normal. RUQ sono.    RENAL: BMP at 4 pm. Correct lytes as needed.     INFECTIOUS DISEASE: Monitor off Abx.     HEMATOLOGICAL: DVT prophylaxis.     ENDOCRINE: Beta hydroxy noted. Triglyceride noted.     MUSCULOSKELETAL: Ambulate as tolerated     DISPOSITION: SDU IMPRESSION:    Abdominal pain , N/V  Possible acute on chronic pancreatitis   Starvation ketoacidosis resolved  Elevated lactic acid  HO of alcohol use  Transaminitis  Hepatic steatosis     PLAN:    CNS: Avoid CNS Depressants. Thiamine and folate. pain control. CATCH team.    HEENT: Oral care. Aspiration precautions     PULMONARY: On room air. Spo2 99%.     CARDIOVASCULAR: Lactate is better. LR 60 cc/hr dc bicarb    GI: GI prophylaxis. Advance diet as tolerated. Zofran PRN. GI evaluation. Lipase is normal. RUQ sono.    RENAL: BMP at 4 pm. Correct lytes as needed.     INFECTIOUS DISEASE: cx noted  HEMATOLOGICAL: DVT prophylaxis.     ENDOCRINE: Beta hydroxy noted. Triglyceride noted.     MUSCULOSKELETAL: Ambulate as tolerated     DISPOSITION: SDU

## 2024-07-15 NOTE — PROGRESS NOTE ADULT - SUBJECTIVE AND OBJECTIVE BOX
LAURA BARAJAS  48y Female    CHIEF COMPLAINT:    Patient is a 48y old  Female who presents with a chief complaint of     INTERVAL HPI/OVERNIGHT EVENTS:    Patient seen and examined. No acute events overnight. Complains of abdominal pain     ROS: All other systems are negative.    Vital Signs:    T(F): 98.6 (07-15-24 @ 12:00), Max: 99 (07-14-24 @ 20:00)  HR: 99 (07-15-24 @ 12:00) (74 - 99)  BP: 128/89 (07-15-24 @ 12:00) (123/78 - 152/70)  RR: 18 (07-15-24 @ 12:00) (10 - 25)  SpO2: 97% (07-15-24 @ 12:00) (97% - 100%)    14 Jul 2024 07:01  -  15 Jul 2024 07:00  --------------------------------------------------------  IN: 2250 mL / OUT: 0 mL / NET: 2250 mL    PHYSICAL EXAM:    GENERAL:  NAD  SKIN: No rashes or lesions  HEENT: Atraumatic. Normocephalic.   NECK: Supple, No JVD.  .  PULMONARY: CTA B/L. No wheezing. No rales  CVS: Normal S1, S2. Rate and Rhythm are regular   ABDOMEN/GI: Soft, diffusely tender, worse L side , Nondistended; BS present  MSK:  No clubbing or cyanosis   NEUROLOGIC: moves all extremities   PSYCH: Alert & oriented x 3, normal affect    Consultant(s) Notes Reviewed:  [x ] YES  [ ] NO  Care Discussed with Consultants/Other Providers [ x] YES  [ ] NO    LABS:                        10.7   3.79  )-----------( x        ( 15 Jul 2024 12:40 )             29.1     138  |  95<L>  |  4<L>  ----------------------------<  115<H>  3.3<L>   |  32  |  0.6<L>    Ca    8.3<L>      15 Jul 2024 06:22  Phos  0.8     07-15  Mg     1.3     07-15    TPro  7.0  /  Alb  4.3  /  TBili  1.0  /  DBili  x   /  AST  158<H>  /  ALT  37  /  AlkPhos  110  07-15    Culture - Blood (collected 14 Jul 2024 05:05)  Source: .Blood Blood-Venous  Preliminary Report (15 Jul 2024 13:02):    No growth at 24 hours    RADIOLOGY & ADDITIONAL TESTS:  Imaging or report Personally Reviewed:  [x] YES  [ ] NO  EKG reviewed: [x] YES  [ ] NO    Medications:  Standing  chlorhexidine 2% Cloths 1 Application(s) Topical daily  enoxaparin Injectable 40 milliGRAM(s) SubCutaneous every 24 hours  folic acid 1 milliGRAM(s) Oral daily  lactated ringers. 1000 milliLiter(s) IV Continuous <Continuous>  pantoprazole  Injectable 40 milliGRAM(s) IV Push every 24 hours  potassium chloride    Tablet ER 40 milliEquivalent(s) Oral every 4 hours  thiamine 100 milliGRAM(s) Oral daily    PRN Meds  HYDROmorphone  Injectable 0.5 milliGRAM(s) IV Push three times a day PRN  ketorolac   Injectable 15 milliGRAM(s) IV Push every 6 hours PRN  ondansetron Injectable 4 milliGRAM(s) IV Push every 8 hours PRN  traMADol 25 milliGRAM(s) Oral every 8 hours PRN

## 2024-07-15 NOTE — PROGRESS NOTE ADULT - ASSESSMENT
48 y F with PMHx of chronic pancreatitis over the last 8 years and hepatic steatosis presents to the ED with abdominal pain that radiates to back associated with nausea and vomiting. Pt reports having 2 drink 2xweek, although suspect likely more. IN the ED, found to have  increased BHB and anion gap of 35 is suggestive of ketoacidosis with unclear etiology, initially admitted to MICU, now downgraded to SDU    Chronic Pancreatitis  Nausea/VOmiting/Inability to tolerate PO  Starvation keotacidosis  Hypokalemia/Hypophosphatemia/hypomagnesemia  Elevated lactate - resolved   - CT negative for pancreatitis, Lipase 17 but with typical pain  - patient reports severe pain, still unable to tolerate PO  - K 3.3, Mg 1.3, Phos 0.9 - replete all electrolytes, monitor BMP/Mg/Phos q12H   - Zofran PRN for nausea, daily EKG to monitor QTc  - c/w LR , c/w folate and thiamine   - CLD  - GI eval given inability tolerate PO/chronic pancreatitis     Recent admission for transaminitis due to alcoholic hepatitis  Hepatic steatosis   Alcohol Use  - per prior admissions, patient with ETOh abuse, however on questioning, patient reports drinking 2x week, mostly wine  - Continue to monitor LFTs, downtrending  - folate and thiamine  - s/p catch team eval     Pending: improvement in pain, CLD, electrolytes q12H, labs, gi eval   all discussed with patient at bedside     Patient seen at bedside, total time spent to evaluate and treat the patient's acute illness and chronic medical conditions as well as time spent reviewing prior records, labs, radiology, documenting in electronic medical records,  discussing medical plan with   medical team was more than 52 minutes with >50% of time spent face to face with patient, discussing with patient/family as well as coordination of care

## 2024-07-15 NOTE — PROGRESS NOTE ADULT - SUBJECTIVE AND OBJECTIVE BOX
Patient is a 48y old  Female who presents with a chief complaint of       Over Night Events: On RA. Off insulin. On Biacrab 100 cc       ROS:     All ROS are negative except HPI       PHYSICAL EXAM    ICU Vital Signs Last 24 Hrs  T(C): 37.2 (15 Jul 2024 08:00), Max: 37.2 (14 Jul 2024 12:00)  T(F): 98.9 (15 Jul 2024 08:00), Max: 99 (14 Jul 2024 20:00)  HR: 87 (15 Jul 2024 08:00) (74 - 99)  BP: 127/89 (15 Jul 2024 08:00) (123/78 - 152/70)  BP(mean): 104 (15 Jul 2024 08:00) (97 - 109)  RR: 17 (15 Jul 2024 08:00) (10 - 53)  SpO2: 99% (15 Jul 2024 08:00) (96% - 100%)    O2 Parameters below as of 15 Jul 2024 08:00  Patient On (Oxygen Delivery Method): room air            ENT: Airway patent,  EYES: Pupils equal, Round and reactive to light.  CARDIAC: Normal rate, Regular rhythm.    RESPIRATORY: No wheezing, Bilateral BS, Not tachypneic, No use of accessory muscles  GASTROINTESTINAL: Abdomen soft, tender, No guarding,   NEUROLOGICAL: Alert and oriented   SKIN: Skin normal color for race, Warm and dry and intact.         07-14-24 @ 07:01  -  07-15-24 @ 07:00  --------------------------------------------------------  IN:    IV PiggyBack: 350 mL    Sodium Bicarbonate: 1900 mL  Total IN: 2250 mL    OUT:  Total OUT: 0 mL    Total NET: 2250 mL          LABS:                            12.0   15.87 )-----------( 241      ( 13 Jul 2024 20:38 )             35.1                                               07-15    138  |  95<L>  |  4<L>  ----------------------------<  115<H>  3.3<L>   |  32  |  0.6<L>    Ca    8.3<L>      15 Jul 2024 06:22  Phos  0.8     07-15  Mg     1.3     07-15    TPro  7.0  /  Alb  4.3  /  TBili  1.0  /  DBili  x   /  AST  158<H>  /  ALT  37  /  AlkPhos  110  07-15                                             Urinalysis Basic - ( 15 Jul 2024 06:22 )    Color: x / Appearance: x / SG: x / pH: x  Gluc: 115 mg/dL / Ketone: x  / Bili: x / Urobili: x   Blood: x / Protein: x / Nitrite: x   Leuk Esterase: x / RBC: x / WBC x   Sq Epi: x / Non Sq Epi: x / Bacteria: x                                                  LIVER FUNCTIONS - ( 15 Jul 2024 06:22 )  Alb: 4.3 g/dL / Pro: 7.0 g/dL / ALK PHOS: 110 U/L / ALT: 37 U/L / AST: 158 U/L / GGT: x                                                                                                                                   ABG - ( 14 Jul 2024 15:31 )  pH, Arterial: 7.44  pH, Blood: x     /  pCO2: 34    /  pO2: 61    / HCO3: 23    / Base Excess: -0.4  /  SaO2: 93.3          MEDICATIONS  (STANDING):  chlorhexidine 2% Cloths 1 Application(s) Topical daily  enoxaparin Injectable 40 milliGRAM(s) SubCutaneous every 24 hours  folic acid 1 milliGRAM(s) Oral daily  pantoprazole  Injectable 40 milliGRAM(s) IV Push every 24 hours  sodium bicarbonate  Infusion 0.245 mEq/kG/Hr (100 mL/Hr) IV Continuous <Continuous>]  thiamine IVPB 500 milliGRAM(s) IV Intermittent every 8 hours    MEDICATIONS  (PRN):  ketorolac   Injectable 15 milliGRAM(s) IV Push every 6 hours PRN Severe Pain (7 - 10)  LORazepam     Tablet 2 milliGRAM(s) Oral every 2 hours PRN CIWA-Ar score 8 or greater  ondansetron Injectable 4 milliGRAM(s) IV Push every 8 hours PRN Nausea and/or Vomiting  traMADol 25 milliGRAM(s) Oral every 8 hours PRN Moderate Pain (4 - 6)         Patient is a 48y old  Female who presents with a chief complaint of abd pain      Over Night Events: On RA. Off insulin. On Bicarb 100 cc         PHYSICAL EXAM    ICU Vital Signs Last 24 Hrs  T(C): 37.2 (15 Jul 2024 08:00), Max: 37.2 (14 Jul 2024 12:00)  T(F): 98.9 (15 Jul 2024 08:00), Max: 99 (14 Jul 2024 20:00)  HR: 87 (15 Jul 2024 08:00) (74 - 99)  BP: 127/89 (15 Jul 2024 08:00) (123/78 - 152/70)  BP(mean): 104 (15 Jul 2024 08:00) (97 - 109)  RR: 17 (15 Jul 2024 08:00) (10 - 53)  SpO2: 99% (15 Jul 2024 08:00) (96% - 100%)    O2 Parameters below as of 15 Jul 2024 08:00  Patient On (Oxygen Delivery Method): room air          comfortable  ENT: Airway patent,  EYES: Pupils equal, Round and reactive to light.  CARDIAC: Normal rate, Regular rhythm.    RESPIRATORY: No wheezing, Bilateral BS, Not tachypneic, No use of accessory muscles  GASTROINTESTINAL: Abdomen soft, tender, No guarding,   NEUROLOGICAL: Alert and oriented   SKIN: Skin normal color for race, Warm and dry and intact.         07-14-24 @ 07:01  -  07-15-24 @ 07:00  --------------------------------------------------------  IN:    IV PiggyBack: 350 mL    Sodium Bicarbonate: 1900 mL  Total IN: 2250 mL    OUT:  Total OUT: 0 mL    Total NET: 2250 mL          LABS:                            12.0   15.87 )-----------( 241      ( 13 Jul 2024 20:38 )             35.1                                               07-15    138  |  95<L>  |  4<L>  ----------------------------<  115<H>  3.3<L>   |  32  |  0.6<L>    Ca    8.3<L>      15 Jul 2024 06:22  Phos  0.8     07-15  Mg     1.3     07-15    TPro  7.0  /  Alb  4.3  /  TBili  1.0  /  DBili  x   /  AST  158<H>  /  ALT  37  /  AlkPhos  110  07-15                                             Urinalysis Basic - ( 15 Jul 2024 06:22 )    Color: x / Appearance: x / SG: x / pH: x  Gluc: 115 mg/dL / Ketone: x  / Bili: x / Urobili: x   Blood: x / Protein: x / Nitrite: x   Leuk Esterase: x / RBC: x / WBC x   Sq Epi: x / Non Sq Epi: x / Bacteria: x                                                  LIVER FUNCTIONS - ( 15 Jul 2024 06:22 )  Alb: 4.3 g/dL / Pro: 7.0 g/dL / ALK PHOS: 110 U/L / ALT: 37 U/L / AST: 158 U/L / GGT: x                                                                                                                                   ABG - ( 14 Jul 2024 15:31 )  pH, Arterial: 7.44  pH, Blood: x     /  pCO2: 34    /  pO2: 61    / HCO3: 23    / Base Excess: -0.4  /  SaO2: 93.3          MEDICATIONS  (STANDING):  chlorhexidine 2% Cloths 1 Application(s) Topical daily  enoxaparin Injectable 40 milliGRAM(s) SubCutaneous every 24 hours  folic acid 1 milliGRAM(s) Oral daily  pantoprazole  Injectable 40 milliGRAM(s) IV Push every 24 hours  sodium bicarbonate  Infusion 0.245 mEq/kG/Hr (100 mL/Hr) IV Continuous <Continuous>]  thiamine IVPB 500 milliGRAM(s) IV Intermittent every 8 hours    MEDICATIONS  (PRN):  ketorolac   Injectable 15 milliGRAM(s) IV Push every 6 hours PRN Severe Pain (7 - 10)  LORazepam     Tablet 2 milliGRAM(s) Oral every 2 hours PRN CIWA-Ar score 8 or greater  ondansetron Injectable 4 milliGRAM(s) IV Push every 8 hours PRN Nausea and/or Vomiting  traMADol 25 milliGRAM(s) Oral every 8 hours PRN Moderate Pain (4 - 6)

## 2024-07-15 NOTE — PROGRESS NOTE ADULT - ASSESSMENT
48 y F with PMHx of chronic pancreatitis and hepatic steatosis presents to the ED with abdominal pain that radiates to back associated with nausea and vomiting. Pt has a history of drinking but reports that she does not drink anymore. Elevated WBC, increased BHB and anion gap of 35 is suggestive of ketoacidosis with unclear etiology. Sent to MICU for tx of ketoacidosis and transferred to SDU for monitoring.     #Possible acute on chronic pancreatitis   - CT negative for pancreatitis, Lipase 17 but has classic abdominal pain that radiates to back   - unclear if this is pancreatitis as pt does not meet 2/3 criteria  - Zofran PRN for nausea  - cannot tolerate diet as pt vomits so currently NPO and can advance to clear liquid diet as per pt   - pain regimen with toradol and dilaudid for abdominal pain  -fluids    #Starvation vs alcoholic ketoacidosis  - Hx of alcohol use  - Elevated lactic acid, BHB, and anion gap  - was on bicarb drip, anion gap closed so now dc'ed  - Pt on D5 LR at 75 cc/hr   - given thiamine, folate, and started on diet in MICU  -recheck BMP to replete lytes    #Transaminitis due to alcoholic hepatitis  #Hepatic steatosis   - Continue to monitor LFTs, downtrending    #Leukocytosis - possible reactive, no active source of infection  - WBC downtrending, HD stable    #misc  Diet: clear liquid  DVT ppx: lovenox  GI ppx: protonix  Dispo: SDU.

## 2024-07-15 NOTE — PROGRESS NOTE ADULT - SUBJECTIVE AND OBJECTIVE BOX
CC: abdominal pain    INTERVAL HPI/OVERNIGHT EVENTS:  Patient seen and examined bedside. Complains of excruciating nausea and "black and green" emesis the night prior. Continues to complain of epigastric abdominal pain rated as a 9/10.       Vital Signs Last 24 Hrs  T(C): 37 (15 Jul 2024 12:00), Max: 37.2 (14 Jul 2024 20:00)  T(F): 98.6 (15 Jul 2024 12:00), Max: 99 (14 Jul 2024 20:00)  HR: 99 (15 Jul 2024 12:00) (74 - 99)  BP: 128/89 (15 Jul 2024 12:00) (123/78 - 152/70)  BP(mean): 105 (15 Jul 2024 12:00) (97 - 109)  RR: 18 (15 Jul 2024 12:00) (10 - 25)  SpO2: 97% (15 Jul 2024 12:00) (97% - 100%)    Parameters below as of 15 Jul 2024 12:00  Patient On (Oxygen Delivery Method): room air      PHYSICAL EXAM:  General: in no acute distress, complains of pain, conversational  Respiratory: No wheezes, rales or rhonchi  Cardiovascular: Regular rate and rhythm. S1 and S2 Normal; No murmurs, gallops or rubs  Gastrointestinal: tenderness in mid epigastric region, but no distention, bowel sounds present  Extremities: Normal range of motion, No clubbing, cyanosis or edema  Neurological: Alert and oriented x4  Skin: Warm and dry. No acute rash      I&O's Detail    14 Jul 2024 07:01  -  15 Jul 2024 07:00  --------------------------------------------------------  IN:    IV PiggyBack: 350 mL    Sodium Bicarbonate: 1900 mL  Total IN: 2250 mL    OUT:  Total OUT: 0 mL    Total NET: 2250 mL                                    10.7   3.79  )-----------( x        ( 15 Jul 2024 12:40 )             29.1     15 Jul 2024 06:22    138    |  95     |  4      ----------------------------<  115    3.3     |  32     |  0.6      Ca    8.3        15 Jul 2024 06:22  Phos  0.8       15 Jul 2024 06:22  Mg     1.3       15 Jul 2024 06:22    TPro  7.0    /  Alb  4.3    /  TBili  1.0    /  DBili  x      /  AST  158    /  ALT  37     /  AlkPhos  110    15 Jul 2024 06:22      CAPILLARY BLOOD GLUCOSE        LIVER FUNCTIONS - ( 15 Jul 2024 06:22 )  Alb: 4.3 g/dL / Pro: 7.0 g/dL / ALK PHOS: 110 U/L / ALT: 37 U/L / AST: 158 U/L / GGT: x           Urinalysis Basic - ( 15 Jul 2024 06:22 )    Color: x / Appearance: x / SG: x / pH: x  Gluc: 115 mg/dL / Ketone: x  / Bili: x / Urobili: x   Blood: x / Protein: x / Nitrite: x   Leuk Esterase: x / RBC: x / WBC x   Sq Epi: x / Non Sq Epi: x / Bacteria: x        MEDICATIONS  (STANDING):  chlorhexidine 2% Cloths 1 Application(s) Topical daily  enoxaparin Injectable 40 milliGRAM(s) SubCutaneous every 24 hours  folic acid 1 milliGRAM(s) Oral daily  lactated ringers. 1000 milliLiter(s) (60 mL/Hr) IV Continuous <Continuous>  pantoprazole  Injectable 40 milliGRAM(s) IV Push every 24 hours  potassium chloride    Tablet ER 40 milliEquivalent(s) Oral every 4 hours  thiamine 100 milliGRAM(s) Oral daily    MEDICATIONS  (PRN):  HYDROmorphone  Injectable 0.5 milliGRAM(s) IV Push three times a day PRN Severe Pain (7 - 10)  ketorolac   Injectable 15 milliGRAM(s) IV Push every 6 hours PRN Severe Pain (7 - 10)  ondansetron Injectable 4 milliGRAM(s) IV Push every 8 hours PRN Nausea and/or Vomiting  traMADol 25 milliGRAM(s) Oral every 8 hours PRN Moderate Pain (4 - 6)      RADIOLOGY & ADDITIONAL TESTS:

## 2024-07-16 LAB
ANION GAP SERPL CALC-SCNC: 12 MMOL/L — SIGNIFICANT CHANGE UP (ref 7–14)
ANION GAP SERPL CALC-SCNC: 13 MMOL/L — SIGNIFICANT CHANGE UP (ref 7–14)
BASOPHILS # BLD AUTO: 0.01 K/UL — SIGNIFICANT CHANGE UP (ref 0–0.2)
BASOPHILS NFR BLD AUTO: 0.4 % — SIGNIFICANT CHANGE UP (ref 0–1)
BUN SERPL-MCNC: <3 MG/DL — LOW (ref 10–20)
BUN SERPL-MCNC: <3 MG/DL — LOW (ref 10–20)
CALCIUM SERPL-MCNC: 8.4 MG/DL — SIGNIFICANT CHANGE UP (ref 8.4–10.5)
CALCIUM SERPL-MCNC: 9.2 MG/DL — SIGNIFICANT CHANGE UP (ref 8.4–10.4)
CHLORIDE SERPL-SCNC: 100 MMOL/L — SIGNIFICANT CHANGE UP (ref 98–110)
CHLORIDE SERPL-SCNC: 100 MMOL/L — SIGNIFICANT CHANGE UP (ref 98–110)
CO2 SERPL-SCNC: 25 MMOL/L — SIGNIFICANT CHANGE UP (ref 17–32)
CO2 SERPL-SCNC: 26 MMOL/L — SIGNIFICANT CHANGE UP (ref 17–32)
CREAT SERPL-MCNC: 0.5 MG/DL — LOW (ref 0.7–1.5)
CREAT SERPL-MCNC: 0.6 MG/DL — LOW (ref 0.7–1.5)
EGFR: 112 ML/MIN/1.73M2 — SIGNIFICANT CHANGE UP
EGFR: 117 ML/MIN/1.73M2 — SIGNIFICANT CHANGE UP
EOSINOPHIL # BLD AUTO: 0.02 K/UL — SIGNIFICANT CHANGE UP (ref 0–0.7)
EOSINOPHIL NFR BLD AUTO: 0.9 % — SIGNIFICANT CHANGE UP (ref 0–8)
GLUCOSE SERPL-MCNC: 106 MG/DL — HIGH (ref 70–99)
GLUCOSE SERPL-MCNC: 97 MG/DL — SIGNIFICANT CHANGE UP (ref 70–99)
HCT VFR BLD CALC: 26.5 % — LOW (ref 37–47)
HGB BLD-MCNC: 9.4 G/DL — LOW (ref 12–16)
IMM GRANULOCYTES NFR BLD AUTO: 0.4 % — HIGH (ref 0.1–0.3)
LYMPHOCYTES # BLD AUTO: 0.62 K/UL — LOW (ref 1.2–3.4)
LYMPHOCYTES # BLD AUTO: 27.1 % — SIGNIFICANT CHANGE UP (ref 20.5–51.1)
MAGNESIUM SERPL-MCNC: 1.9 MG/DL — SIGNIFICANT CHANGE UP (ref 1.8–2.4)
MCHC RBC-ENTMCNC: 33.7 PG — HIGH (ref 27–31)
MCHC RBC-ENTMCNC: 35.5 G/DL — SIGNIFICANT CHANGE UP (ref 32–37)
MCV RBC AUTO: 95 FL — SIGNIFICANT CHANGE UP (ref 81–99)
MONOCYTES # BLD AUTO: 0.15 K/UL — SIGNIFICANT CHANGE UP (ref 0.1–0.6)
MONOCYTES NFR BLD AUTO: 6.6 % — SIGNIFICANT CHANGE UP (ref 1.7–9.3)
NEUTROPHILS # BLD AUTO: 1.48 K/UL — SIGNIFICANT CHANGE UP (ref 1.4–6.5)
NEUTROPHILS NFR BLD AUTO: 64.6 % — SIGNIFICANT CHANGE UP (ref 42.2–75.2)
NRBC # BLD: 0 /100 WBCS — SIGNIFICANT CHANGE UP (ref 0–0)
PHOSPHATE SERPL-MCNC: 1.9 MG/DL — LOW (ref 2.1–4.9)
PLATELET # BLD AUTO: 100 K/UL — LOW (ref 130–400)
PMV BLD: 11 FL — HIGH (ref 7.4–10.4)
POTASSIUM SERPL-MCNC: 3.4 MMOL/L — LOW (ref 3.5–5)
POTASSIUM SERPL-MCNC: 3.5 MMOL/L — SIGNIFICANT CHANGE UP (ref 3.5–5)
POTASSIUM SERPL-SCNC: 3.4 MMOL/L — LOW (ref 3.5–5)
POTASSIUM SERPL-SCNC: 3.5 MMOL/L — SIGNIFICANT CHANGE UP (ref 3.5–5)
RBC # BLD: 2.79 M/UL — LOW (ref 4.2–5.4)
RBC # FLD: 11.1 % — LOW (ref 11.5–14.5)
SODIUM SERPL-SCNC: 137 MMOL/L — SIGNIFICANT CHANGE UP (ref 135–146)
SODIUM SERPL-SCNC: 139 MMOL/L — SIGNIFICANT CHANGE UP (ref 135–146)
WBC # BLD: 2.29 K/UL — LOW (ref 4.8–10.8)
WBC # FLD AUTO: 2.29 K/UL — LOW (ref 4.8–10.8)

## 2024-07-16 PROCEDURE — 99233 SBSQ HOSP IP/OBS HIGH 50: CPT

## 2024-07-16 PROCEDURE — 99223 1ST HOSP IP/OBS HIGH 75: CPT

## 2024-07-16 RX ORDER — POTASSIUM CHLORIDE 600 MG/1
40 TABLET, FILM COATED, EXTENDED RELEASE ORAL DAILY
Refills: 0 | Status: COMPLETED | OUTPATIENT
Start: 2024-07-16 | End: 2024-07-16

## 2024-07-16 RX ORDER — POTASSIUM CHLORIDE 600 MG/1
40 TABLET, FILM COATED, EXTENDED RELEASE ORAL ONCE
Refills: 0 | Status: COMPLETED | OUTPATIENT
Start: 2024-07-16 | End: 2024-07-16

## 2024-07-16 RX ORDER — HYDROMORPHONE HCL 0.2 MG/ML
0.5 INJECTION, SOLUTION INTRAVENOUS ONCE
Refills: 0 | Status: DISCONTINUED | OUTPATIENT
Start: 2024-07-16 | End: 2024-07-16

## 2024-07-16 RX ORDER — POTASSIUM CHLORIDE 600 MG/1
40 TABLET, FILM COATED, EXTENDED RELEASE ORAL ONCE
Refills: 0 | Status: DISCONTINUED | OUTPATIENT
Start: 2024-07-16 | End: 2024-07-16

## 2024-07-16 RX ADMIN — Medication 100 MILLIGRAM(S): at 12:15

## 2024-07-16 RX ADMIN — HYDROMORPHONE HCL 0.5 MILLIGRAM(S): 0.2 INJECTION, SOLUTION INTRAVENOUS at 16:27

## 2024-07-16 RX ADMIN — HYDROMORPHONE HCL 0.5 MILLIGRAM(S): 0.2 INJECTION, SOLUTION INTRAVENOUS at 22:37

## 2024-07-16 RX ADMIN — TRAMADOL HYDROCHLORIDE 25 MILLIGRAM(S): 50 TABLET, FILM COATED ORAL at 05:21

## 2024-07-16 RX ADMIN — TRAMADOL HYDROCHLORIDE 25 MILLIGRAM(S): 50 TABLET, FILM COATED ORAL at 05:58

## 2024-07-16 RX ADMIN — ONDANSETRON HYDROCHLORIDE 4 MILLIGRAM(S): 2 INJECTION INTRAMUSCULAR; INTRAVENOUS at 08:44

## 2024-07-16 RX ADMIN — TRAMADOL HYDROCHLORIDE 25 MILLIGRAM(S): 50 TABLET, FILM COATED ORAL at 22:01

## 2024-07-16 RX ADMIN — ENOXAPARIN SODIUM 40 MILLIGRAM(S): 100 INJECTION SUBCUTANEOUS at 05:13

## 2024-07-16 RX ADMIN — PANTOPRAZOLE SODIUM 40 MILLIGRAM(S): 40 INJECTION, POWDER, FOR SOLUTION INTRAVENOUS at 05:13

## 2024-07-16 RX ADMIN — HYDROMORPHONE HCL 0.5 MILLIGRAM(S): 0.2 INJECTION, SOLUTION INTRAVENOUS at 02:34

## 2024-07-16 RX ADMIN — POTASSIUM CHLORIDE 40 MILLIEQUIVALENT(S): 600 TABLET, FILM COATED, EXTENDED RELEASE ORAL at 23:27

## 2024-07-16 RX ADMIN — HYDROMORPHONE HCL 0.5 MILLIGRAM(S): 0.2 INJECTION, SOLUTION INTRAVENOUS at 08:44

## 2024-07-16 RX ADMIN — DEXTROSE MONOHYDRATE AND SODIUM CHLORIDE 60 MILLILITER(S): 5; .3 INJECTION, SOLUTION INTRAVENOUS at 21:21

## 2024-07-16 RX ADMIN — TRAMADOL HYDROCHLORIDE 25 MILLIGRAM(S): 50 TABLET, FILM COATED ORAL at 13:54

## 2024-07-16 RX ADMIN — Medication 1 MILLIGRAM(S): at 12:15

## 2024-07-16 RX ADMIN — POTASSIUM CHLORIDE 50 MILLIEQUIVALENT(S): 600 TABLET, FILM COATED, EXTENDED RELEASE ORAL at 00:05

## 2024-07-16 RX ADMIN — HYDROMORPHONE HCL 0.5 MILLIGRAM(S): 0.2 INJECTION, SOLUTION INTRAVENOUS at 21:20

## 2024-07-16 NOTE — PROGRESS NOTE ADULT - ASSESSMENT
IMPRESSION:    Abdominal pain , N/V  chronic pancreatitis/ gastritis  Starvation ketoacidosis resolved  HO of alcohol use  Hepatic steatosis     PLAN:    CNS: Avoid CNS Depressants. Thiamine and folate.     HEENT: Oral care. Aspiration precautions     PULMONARY: On room air. Spo2 99%.     CARDIOVASCULAR: Lactate is better. LR     GI: GI prophylaxis. Advance diet as tolerated. Zofran PRN.     RENAL: BMP at 4 pm. Correct lytes as needed.     INFECTIOUS DISEASE: cx noted  HEMATOLOGICAL: DVT prophylaxis.     ENDOCRINE: Beta hydroxy noted. Triglyceride noted.     MUSCULOSKELETAL: Ambulate as tolerated     DISPOSITION: DGTF

## 2024-07-16 NOTE — PROGRESS NOTE ADULT - SUBJECTIVE AND OBJECTIVE BOX
CC: N/V/Abd pain (16 Jul 2024 06:54)      INTERVAL HPI/OVERNIGHT EVENTS:  Patient seen and examined bedside. pt states nausea is better overnight but continues to complain of abdominal pain. Still cannot tolerate liquids.       Vital Signs Last 24 Hrs  T(C): 36.7 (16 Jul 2024 11:00), Max: 37.1 (15 Jul 2024 16:00)  T(F): 98 (16 Jul 2024 11:00), Max: 98.8 (15 Jul 2024 16:00)  HR: 99 (16 Jul 2024 11:00) (72 - 99)  BP: 134/88 (16 Jul 2024 11:00) (126/75 - 155/93)  BP(mean): 105 (16 Jul 2024 11:00) (104 - 114)  RR: 18 (16 Jul 2024 11:00) (18 - 20)  SpO2: 96% (16 Jul 2024 11:00) (95% - 99%)      PHYSICAL EXAM:  General: in no acute distress, complains of pain, conversational  Respiratory: No wheezes, rales or rhonchi  Cardiovascular: Regular rate and rhythm. S1 and S2 Normal; No murmurs, gallops or rubs  Gastrointestinal: tenderness in mid epigastric region, but no distention, bowel sounds present  Extremities: Normal range of motion, No clubbing, cyanosis or edema  Neurological: Alert and oriented x4  Skin: Warm and dry. No acute rash    I&O's Detail    15 Jul 2024 07:01  -  16 Jul 2024 07:00  --------------------------------------------------------  IN:    IV PiggyBack: 80 mL    Lactated Ringers: 1080 mL    Sodium Bicarbonate: 400 mL  Total IN: 1560 mL    OUT:  Total OUT: 0 mL    Total NET: 1560 mL      16 Jul 2024 07:01  -  16 Jul 2024 12:12  --------------------------------------------------------  IN:    Lactated Ringers: 120 mL  Total IN: 120 mL    OUT:  Total OUT: 0 mL    Total NET: 120 mL                                    9.4    2.29  )-----------( 100      ( 16 Jul 2024 06:24 )             26.5     16 Jul 2024 06:24    137    |  100    |  <3     ----------------------------<  97     3.5     |  25     |  0.5      Ca    8.4        16 Jul 2024 06:24  Phos  1.9       16 Jul 2024 06:24  Mg     1.9       16 Jul 2024 06:24    TPro  7.0    /  Alb  4.3    /  TBili  1.0    /  DBili  x      /  AST  158    /  ALT  37     /  AlkPhos  110    15 Jul 2024 06:22      CAPILLARY BLOOD GLUCOSE        LIVER FUNCTIONS - ( 15 Jul 2024 06:22 )  Alb: 4.3 g/dL / Pro: 7.0 g/dL / ALK PHOS: 110 U/L / ALT: 37 U/L / AST: 158 U/L / GGT: x           Urinalysis Basic - ( 16 Jul 2024 06:24 )    Color: x / Appearance: x / SG: x / pH: x  Gluc: 97 mg/dL / Ketone: x  / Bili: x / Urobili: x   Blood: x / Protein: x / Nitrite: x   Leuk Esterase: x / RBC: x / WBC x   Sq Epi: x / Non Sq Epi: x / Bacteria: x        MEDICATIONS  (STANDING):  chlorhexidine 2% Cloths 1 Application(s) Topical daily  enoxaparin Injectable 40 milliGRAM(s) SubCutaneous every 24 hours  folic acid 1 milliGRAM(s) Oral daily  lactated ringers. 1000 milliLiter(s) (60 mL/Hr) IV Continuous <Continuous>  pantoprazole  Injectable 40 milliGRAM(s) IV Push every 24 hours  thiamine 100 milliGRAM(s) Oral daily    MEDICATIONS  (PRN):  HYDROmorphone  Injectable 0.5 milliGRAM(s) IV Push three times a day PRN Severe Pain (7 - 10)  ondansetron Injectable 4 milliGRAM(s) IV Push every 8 hours PRN Nausea and/or Vomiting  traMADol 25 milliGRAM(s) Oral every 8 hours PRN Moderate Pain (4 - 6)      RADIOLOGY & ADDITIONAL TESTS:

## 2024-07-16 NOTE — PROGRESS NOTE ADULT - ASSESSMENT
48 y F with PMHx of chronic pancreatitis over the last 8 years and hepatic steatosis presents to the ED with abdominal pain that radiates to back associated with nausea and vomiting. Pt reports having 2 drink 2xweek, although suspect likely more. IN the ED, found to have  increased BHB and anion gap of 35 is suggestive of ketoacidosis with unclear etiology, initially admitted to MICU, now downgraded to SDU    Chronic Pancreatitis  Nausea/Vomiting/Inability to tolerate PO  Starvation keotacidosis  Hypokalemia/Hypophosphatemia/hypomagnesemia  Elevated lactate - resolved   - CT negative for pancreatitis, Lipase 17 but with typical pain  - patient reports severe pain, still unable to tolerate PO  - monitor BMP/Mg/Phos q12H   - Zofran PRN for nausea, daily EKG to monitor QTc  - c/w LR , c/w folate and thiamine   - CLD  - GI eval given inability tolerate PO/chronic pancreatitis     Recent admission for transaminitis due to alcoholic hepatitis  Hepatic steatosis   Alcohol Use  - per prior admissions, patient with ETOh abuse, however on questioning, patient reports drinking 2x week, mostly wine  - Continue to monitor LFTs, downtrending  - folate and thiamine  - s/p catch team eval     Pending: improvement in pain, CLD, electrolytes q12H, labs, gi eval   all discussed with patient at bedside     Patient seen at bedside, total time spent to evaluate and treat the patient's acute illness and chronic medical conditions as well as time spent reviewing prior records, labs, radiology, documenting in electronic medical records,  discussing medical plan with   medical team was more than 50 minutes with >50% of time spent face to face with patient, discussing with patient/family as well as coordination of care

## 2024-07-16 NOTE — PROGRESS NOTE ADULT - SUBJECTIVE AND OBJECTIVE BOX
Over Night Events: events noted, on RA, afebrile    PHYSICAL EXAM    ICU Vital Signs Last 24 Hrs  T(C): 36.3 (16 Jul 2024 04:21), Max: 37.2 (15 Jul 2024 08:00)  T(F): 97.3 (16 Jul 2024 04:21), Max: 98.9 (15 Jul 2024 08:00)  HR: 88 (16 Jul 2024 04:21) (72 - 99)  BP: 126/75 (16 Jul 2024 04:21) (126/75 - 155/93)  BP(mean): 114 (15 Jul 2024 20:00) (104 - 114)  RR: 20 (16 Jul 2024 04:21) (17 - 20)  SpO2: 98% (16 Jul 2024 04:21) (95% - 99%)    O2 Parameters below as of 15 Jul 2024 12:00  Patient On (Oxygen Delivery Method): room air            General: comfortable  Lungs: dec bs both bases  Cardiovascular: Regular   Abdomen: Soft, Positive BS, epigastric pain  Extremities: No clubbing   Skin: Warm  Neurological: Non focal       07-14-24 @ 07:01  -  07-15-24 @ 07:00  --------------------------------------------------------  IN:    IV PiggyBack: 350 mL    Sodium Bicarbonate: 2000 mL  Total IN: 2350 mL    OUT:  Total OUT: 0 mL    Total NET: 2350 mL      07-15-24 @ 07:01  -  07-16-24 @ 06:54  --------------------------------------------------------  IN:    IV PiggyBack: 80 mL    Lactated Ringers: 1080 mL    Sodium Bicarbonate: 400 mL  Total IN: 1560 mL    OUT:  Total OUT: 0 mL    Total NET: 1560 mL          LABS:                          10.7   3.79  )-----------( 139      ( 15 Jul 2024 12:40 )             29.1                                               07-15    136  |  97<L>  |  <3<L>  ----------------------------<  98  3.3<L>   |  27  |  0.7    Ca    8.2<L>      15 Jul 2024 21:13  Phos  0.8     07-15  Mg     2.1     07-15    TPro  7.0  /  Alb  4.3  /  TBili  1.0  /  DBili  x   /  AST  158<H>  /  ALT  37  /  AlkPhos  110  07-15                                             Urinalysis Basic - ( 15 Jul 2024 21:13 )    Color: x / Appearance: x / SG: x / pH: x  Gluc: 98 mg/dL / Ketone: x  / Bili: x / Urobili: x   Blood: x / Protein: x / Nitrite: x   Leuk Esterase: x / RBC: x / WBC x   Sq Epi: x / Non Sq Epi: x / Bacteria: x                                                  LIVER FUNCTIONS - ( 15 Jul 2024 06:22 )  Alb: 4.3 g/dL / Pro: 7.0 g/dL / ALK PHOS: 110 U/L / ALT: 37 U/L / AST: 158 U/L / GGT: x                                                  Culture - Blood (collected 14 Jul 2024 05:05)  Source: .Blood Blood-Venous  Preliminary Report (15 Jul 2024 13:02):    No growth at 24 hours                                                                                       ABG - ( 14 Jul 2024 15:31 )  pH, Arterial: 7.44  pH, Blood: x     /  pCO2: 34    /  pO2: 61    / HCO3: 23    / Base Excess: -0.4  /  SaO2: 93.3                MEDICATIONS  (STANDING):  chlorhexidine 2% Cloths 1 Application(s) Topical daily  enoxaparin Injectable 40 milliGRAM(s) SubCutaneous every 24 hours  folic acid 1 milliGRAM(s) Oral daily  lactated ringers. 1000 milliLiter(s) (60 mL/Hr) IV Continuous <Continuous>  pantoprazole  Injectable 40 milliGRAM(s) IV Push every 24 hours  thiamine 100 milliGRAM(s) Oral daily    MEDICATIONS  (PRN):  HYDROmorphone  Injectable 0.5 milliGRAM(s) IV Push three times a day PRN Severe Pain (7 - 10)  ondansetron Injectable 4 milliGRAM(s) IV Push every 8 hours PRN Nausea and/or Vomiting  traMADol 25 milliGRAM(s) Oral every 8 hours PRN Moderate Pain (4 - 6)

## 2024-07-16 NOTE — PROGRESS NOTE ADULT - ASSESSMENT
48 y F with PMHx of chronic pancreatitis and hepatic steatosis presents to the ED with abdominal pain that radiates to back associated with nausea and vomiting. Pt has a history of drinking but reports that she does not drink anymore. Elevated WBC, increased BHB and anion gap of 35 is suggestive of ketoacidosis with unclear etiology. Sent to MICU for tx of ketoacidosis and transferred to SDU for monitoring.     #Possible acute on chronic pancreatitis   - CT negative for pancreatitis, Lipase 17 but has classic abdominal pain that radiates to back   - unclear if this is pancreatitis as pt does not meet 2/3 criteria  - Zofran PRN for nausea  - pt not tolerating CLD as she claims she vomits and her abdominal pain worsens  - pain regimen with toradol and dilaudid for abdominal pain  - cw with LR  - GI consult since pt is still not able to tolerate anything PO    #Starvation vs alcoholic ketoacidosis  - Hx of alcohol use  - Elevated lactic acid, BHB, and anion gap  - was on bicarb drip, anion gap closed so now dc'ed  - Pt on D5 LR at 75 cc/hr   - given thiamine, folate, and started on diet in MICU  -recheck BMP to replete lytes    #Transaminitis due to alcoholic hepatitis  #Hepatic steatosis   - Continue to monitor LFTs, downtrending    #Leukocytosis - possible reactive, no active source of infection  - WBC downtrending, HD stable    #misc  Diet: clear liquid  DVT ppx: lovenox  GI ppx: protonix  Dispo: SDU.    Pending: Gi consult, advance diet if possible, electrolytes

## 2024-07-16 NOTE — CONSULT NOTE ADULT - ATTENDING COMMENTS
Chronic pancreatitis possibly due to AUD. We recommend follow up for EUS guided celiac plexus block/
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
## 2024-07-16 NOTE — CONSULT NOTE ADULT - TIME BILLING
-Continue nifed 90mg daily 75 minutes spent on total encounter; more than 50% of the visit was spent counseling and / or coordinating care by the attending physician.  The necessity of the time spent during the encounter on this date of service was due to: Coordination of care. -on nifed 90mg daily; will hold as BP softer and had NORMAN

## 2024-07-16 NOTE — PROGRESS NOTE ADULT - SUBJECTIVE AND OBJECTIVE BOX
LAURA BARAJAS  48y Female    CHIEF COMPLAINT:    Patient is a 48y old  Female who presents with a chief complaint of N/V/Abd pain (2024 06:54)    INTERVAL HPI/OVERNIGHT EVENTS:    Patient seen and examined. No acute events overnight. nausea improved    ROS: All other systems are negative.    Vital Signs:    T(F): 97.3 (24 @ 04:21), Max: 98.8 (07-15-24 @ 16:00)  HR: 86 (24 @ 08:00) (72 - 99)  BP: 149/82 (24 @ 08:00) (126/75 - 155/93)  RR: 20 (24 @ 08:00) (18 - 20)  SpO2: 99% (24 @ 08:00) (95% - 99%)    15 Jul 2024 07:01  -  2024 07:00  --------------------------------------------------------  IN: 1560 mL / OUT: 0 mL / NET: 1560 mL    2024 07:01  -  2024 09:34  --------------------------------------------------------  IN: 120 mL / OUT: 0 mL / NET: 120 mL    Daily Weight in k (2024 04:21)    PHYSICAL EXAM:    GENERAL:  NAD  SKIN: No rashes or lesions  HEENT: Atraumatic. Normocephalic.   NECK: Supple, No JVD.   PULMONARY: CTA B/L. No wheezing. No rales  CVS: Normal S1, S2. Rate and Rhythm are regular.   ABDOMEN/GI: Soft, Nontender, Nondistended  MSK:  No clubbing or cyanosis  NEUROLOGIC:  moves all extremities   PSYCH: Awake and alert     Consultant(s) Notes Reviewed:  [x ] YES  [ ] NO  Care Discussed with Consultants/Other Providers [ x] YES  [ ] NO    LABS:                        9.4    2.29  )-----------( 100      ( 2024 06:24 )             26.5     137  |  100  |  <3<L>  ----------------------------<  97  3.5   |  25  |  0.5<L>    Ca    8.4      2024 06:24  Phos  1.9     07-16  Mg     1.9     07-16    TPro  7.0  /  Alb  4.3  /  TBili  1.0  /  DBili  x   /  AST  158<H>  /  ALT  37  /  AlkPhos  110  07-15    Culture - Blood (collected 2024 05:05)  Source: .Blood Blood-Venous  Preliminary Report (15 Jul 2024 13:02):    No growth at 24 hours    RADIOLOGY & ADDITIONAL TESTS:  Imaging or report Personally Reviewed:  [x] YES  [ ] NO  EKG reviewed: [x] YES  [ ] NO    Medications:  Standing  chlorhexidine 2% Cloths 1 Application(s) Topical daily  enoxaparin Injectable 40 milliGRAM(s) SubCutaneous every 24 hours  folic acid 1 milliGRAM(s) Oral daily  lactated ringers. 1000 milliLiter(s) IV Continuous <Continuous>  pantoprazole  Injectable 40 milliGRAM(s) IV Push every 24 hours  thiamine 100 milliGRAM(s) Oral daily    PRN Meds  HYDROmorphone  Injectable 0.5 milliGRAM(s) IV Push three times a day PRN  ondansetron Injectable 4 milliGRAM(s) IV Push every 8 hours PRN  traMADol 25 milliGRAM(s) Oral every 8 hours PRN

## 2024-07-17 LAB
ALBUMIN SERPL ELPH-MCNC: 4.4 G/DL — SIGNIFICANT CHANGE UP (ref 3.5–5.2)
ALP SERPL-CCNC: 106 U/L — SIGNIFICANT CHANGE UP (ref 30–115)
ALP SERPL-CCNC: 119 U/L — HIGH (ref 30–115)
ALP SERPL-CCNC: 121 U/L — HIGH (ref 30–115)
ALT FLD-CCNC: 58 U/L — HIGH (ref 0–41)
ALT FLD-CCNC: 62 U/L — HIGH (ref 0–41)
ALT FLD-CCNC: 65 U/L — HIGH (ref 0–41)
ANION GAP SERPL CALC-SCNC: 15 MMOL/L — HIGH (ref 7–14)
ANION GAP SERPL CALC-SCNC: 9 MMOL/L — SIGNIFICANT CHANGE UP (ref 7–14)
AST SERPL-CCNC: 259 U/L — HIGH (ref 0–41)
AST SERPL-CCNC: 282 U/L — HIGH (ref 0–41)
AST SERPL-CCNC: 285 U/L — HIGH (ref 0–41)
BASOPHILS # BLD AUTO: 0.02 K/UL — SIGNIFICANT CHANGE UP (ref 0–0.2)
BASOPHILS NFR BLD AUTO: 0.6 % — SIGNIFICANT CHANGE UP (ref 0–1)
BILIRUB DIRECT SERPL-MCNC: 0.3 MG/DL — SIGNIFICANT CHANGE UP (ref 0–0.3)
BILIRUB INDIRECT FLD-MCNC: 0.6 MG/DL — SIGNIFICANT CHANGE UP (ref 0.2–1.2)
BILIRUB SERPL-MCNC: 0.9 MG/DL — SIGNIFICANT CHANGE UP (ref 0.2–1.2)
BILIRUB SERPL-MCNC: 0.9 MG/DL — SIGNIFICANT CHANGE UP (ref 0.2–1.2)
BILIRUB SERPL-MCNC: 1 MG/DL — SIGNIFICANT CHANGE UP (ref 0.2–1.2)
BUN SERPL-MCNC: <3 MG/DL — LOW (ref 10–20)
BUN SERPL-MCNC: <3 MG/DL — LOW (ref 10–20)
CALCIUM SERPL-MCNC: 9.2 MG/DL — SIGNIFICANT CHANGE UP (ref 8.4–10.5)
CALCIUM SERPL-MCNC: 9.4 MG/DL — SIGNIFICANT CHANGE UP (ref 8.4–10.4)
CHLORIDE SERPL-SCNC: 100 MMOL/L — SIGNIFICANT CHANGE UP (ref 98–110)
CHLORIDE SERPL-SCNC: 101 MMOL/L — SIGNIFICANT CHANGE UP (ref 98–110)
CO2 SERPL-SCNC: 24 MMOL/L — SIGNIFICANT CHANGE UP (ref 17–32)
CO2 SERPL-SCNC: 27 MMOL/L — SIGNIFICANT CHANGE UP (ref 17–32)
CREAT SERPL-MCNC: 0.5 MG/DL — LOW (ref 0.7–1.5)
CREAT SERPL-MCNC: 0.6 MG/DL — LOW (ref 0.7–1.5)
EGFR: 112 ML/MIN/1.73M2 — SIGNIFICANT CHANGE UP
EGFR: 117 ML/MIN/1.73M2 — SIGNIFICANT CHANGE UP
EOSINOPHIL # BLD AUTO: 0.05 K/UL — SIGNIFICANT CHANGE UP (ref 0–0.7)
EOSINOPHIL NFR BLD AUTO: 1.5 % — SIGNIFICANT CHANGE UP (ref 0–8)
GLUCOSE SERPL-MCNC: 102 MG/DL — HIGH (ref 70–99)
GLUCOSE SERPL-MCNC: 78 MG/DL — SIGNIFICANT CHANGE UP (ref 70–99)
HCT VFR BLD CALC: 27.8 % — LOW (ref 37–47)
HGB BLD-MCNC: 9.9 G/DL — LOW (ref 12–16)
IMM GRANULOCYTES NFR BLD AUTO: 0.6 % — HIGH (ref 0.1–0.3)
LYMPHOCYTES # BLD AUTO: 1.01 K/UL — LOW (ref 1.2–3.4)
LYMPHOCYTES # BLD AUTO: 29.5 % — SIGNIFICANT CHANGE UP (ref 20.5–51.1)
MAGNESIUM SERPL-MCNC: 1.9 MG/DL — SIGNIFICANT CHANGE UP (ref 1.8–2.4)
MCHC RBC-ENTMCNC: 33.8 PG — HIGH (ref 27–31)
MCHC RBC-ENTMCNC: 35.6 G/DL — SIGNIFICANT CHANGE UP (ref 32–37)
MCV RBC AUTO: 94.9 FL — SIGNIFICANT CHANGE UP (ref 81–99)
MONOCYTES # BLD AUTO: 0.28 K/UL — SIGNIFICANT CHANGE UP (ref 0.1–0.6)
MONOCYTES NFR BLD AUTO: 8.2 % — SIGNIFICANT CHANGE UP (ref 1.7–9.3)
NEUTROPHILS # BLD AUTO: 2.04 K/UL — SIGNIFICANT CHANGE UP (ref 1.4–6.5)
NEUTROPHILS NFR BLD AUTO: 59.6 % — SIGNIFICANT CHANGE UP (ref 42.2–75.2)
NRBC # BLD: 0 /100 WBCS — SIGNIFICANT CHANGE UP (ref 0–0)
PHOSPHATE SERPL-MCNC: 2.8 MG/DL — SIGNIFICANT CHANGE UP (ref 2.1–4.9)
PLATELET # BLD AUTO: 132 K/UL — SIGNIFICANT CHANGE UP (ref 130–400)
PMV BLD: 10.8 FL — HIGH (ref 7.4–10.4)
POTASSIUM SERPL-MCNC: 3.7 MMOL/L — SIGNIFICANT CHANGE UP (ref 3.5–5)
POTASSIUM SERPL-MCNC: 4.1 MMOL/L — SIGNIFICANT CHANGE UP (ref 3.5–5)
POTASSIUM SERPL-SCNC: 3.7 MMOL/L — SIGNIFICANT CHANGE UP (ref 3.5–5)
POTASSIUM SERPL-SCNC: 4.1 MMOL/L — SIGNIFICANT CHANGE UP (ref 3.5–5)
PROT SERPL-MCNC: 7 G/DL — SIGNIFICANT CHANGE UP (ref 6–8)
PROT SERPL-MCNC: 7.2 G/DL — SIGNIFICANT CHANGE UP (ref 6–8)
PROT SERPL-MCNC: 7.6 G/DL — SIGNIFICANT CHANGE UP (ref 6–8)
RBC # BLD: 2.93 M/UL — LOW (ref 4.2–5.4)
RBC # FLD: 11.3 % — LOW (ref 11.5–14.5)
SODIUM SERPL-SCNC: 137 MMOL/L — SIGNIFICANT CHANGE UP (ref 135–146)
SODIUM SERPL-SCNC: 139 MMOL/L — SIGNIFICANT CHANGE UP (ref 135–146)
WBC # BLD: 3.42 K/UL — LOW (ref 4.8–10.8)
WBC # FLD AUTO: 3.42 K/UL — LOW (ref 4.8–10.8)

## 2024-07-17 PROCEDURE — 99232 SBSQ HOSP IP/OBS MODERATE 35: CPT

## 2024-07-17 PROCEDURE — 93010 ELECTROCARDIOGRAM REPORT: CPT

## 2024-07-17 RX ORDER — FAMOTIDINE 40 MG
20 TABLET ORAL DAILY
Refills: 0 | Status: DISCONTINUED | OUTPATIENT
Start: 2024-07-17 | End: 2024-07-19

## 2024-07-17 RX ORDER — HYDROMORPHONE HCL 0.2 MG/ML
2 INJECTION, SOLUTION INTRAVENOUS EVERY 8 HOURS
Refills: 0 | Status: DISCONTINUED | OUTPATIENT
Start: 2024-07-17 | End: 2024-07-18

## 2024-07-17 RX ORDER — GABAPENTIN
100 POWDER (GRAM) MISCELLANEOUS
Refills: 0 | Status: DISCONTINUED | OUTPATIENT
Start: 2024-07-17 | End: 2024-07-19

## 2024-07-17 RX ADMIN — TRAMADOL HYDROCHLORIDE 25 MILLIGRAM(S): 50 TABLET, FILM COATED ORAL at 09:55

## 2024-07-17 RX ADMIN — Medication 100 MILLIGRAM(S): at 11:33

## 2024-07-17 RX ADMIN — Medication 1 MILLIGRAM(S): at 11:33

## 2024-07-17 RX ADMIN — HYDROMORPHONE HCL 2 MILLIGRAM(S): 0.2 INJECTION, SOLUTION INTRAVENOUS at 23:14

## 2024-07-17 RX ADMIN — HYDROMORPHONE HCL 0.5 MILLIGRAM(S): 0.2 INJECTION, SOLUTION INTRAVENOUS at 05:16

## 2024-07-17 RX ADMIN — TRAMADOL HYDROCHLORIDE 25 MILLIGRAM(S): 50 TABLET, FILM COATED ORAL at 02:17

## 2024-07-17 RX ADMIN — HYDROMORPHONE HCL 0.5 MILLIGRAM(S): 0.2 INJECTION, SOLUTION INTRAVENOUS at 13:43

## 2024-07-17 RX ADMIN — Medication 100 MILLIGRAM(S): at 17:11

## 2024-07-17 RX ADMIN — PANTOPRAZOLE SODIUM 40 MILLIGRAM(S): 40 INJECTION, POWDER, FOR SOLUTION INTRAVENOUS at 05:15

## 2024-07-17 RX ADMIN — ONDANSETRON HYDROCHLORIDE 4 MILLIGRAM(S): 2 INJECTION INTRAMUSCULAR; INTRAVENOUS at 09:55

## 2024-07-17 RX ADMIN — Medication 1 APPLICATION(S): at 11:32

## 2024-07-17 RX ADMIN — ENOXAPARIN SODIUM 40 MILLIGRAM(S): 100 INJECTION SUBCUTANEOUS at 05:15

## 2024-07-17 NOTE — DIETITIAN INITIAL EVALUATION ADULT - PERTINENT MEDS FT
MEDICATIONS  (STANDING):  enoxaparin Injectable 40 milliGRAM(s) SubCutaneous every 24 hours  folic acid 1 milliGRAM(s) Oral daily  lactated ringers. 1000 milliLiter(s) (60 mL/Hr) IV Continuous <Continuous>  pantoprazole  Injectable 40 milliGRAM(s) IV Push every 24 hours  thiamine 100 milliGRAM(s) Oral daily    MEDICATIONS  (PRN):  HYDROmorphone  Injectable 0.5 milliGRAM(s) IV Push three times a day PRN Severe Pain (7 - 10)  ondansetron Injectable 4 milliGRAM(s) IV Push every 8 hours PRN Nausea and/or Vomiting  traMADol 25 milliGRAM(s) Oral every 8 hours PRN Moderate Pain (4 - 6)

## 2024-07-17 NOTE — PROGRESS NOTE ADULT - ASSESSMENT
48 y F with PMHx of chronic pancreatitis over the last 8 years and hepatic steatosis presents to the ED with abdominal pain that radiates to back associated with nausea and vomiting. Pt reports having 2 drink 2xweek, although suspect likely more. IN the ED, found to have  increased BHB and anion gap of 35 is suggestive of ketoacidosis with unclear etiology, initially admitted to MICU, now downgraded to SDU    Chronic Pancreatitis  Nausea/Vomiting/Inability to tolerate PO  Starvation keotacidosis  Hypokalemia/Hypophosphatemia/hypomagnesemia  Elevated lactate - resolved   - CT negative for pancreatitis, Lipase 17 but with typical pain  - patient reports nausea-- was given soft food today  - monitor BMP/Mg/Phos q12H   - Zofran PRN for nausea, ekg pending  - c/w LR , c/w folate and thiamine   - CLD w/u was all normal  - GI eval given inability tolerate PO/chronic pancreatitis -- suggested trial of SSRI or gabapentin-- DC dilaudid    Recent admission for transaminitis due to alcoholic hepatitis  Hepatic steatosis   Alcohol Use  - per prior admissions, patient with ETOh abuse, however on questioning, patient reports drinking 2x week, mostly wine  - Continue to monitor LFTs, downtrending  - folate and thiamine  - s/p catch team eval     Pending: improvement in pain,patient denying alcohol abuse

## 2024-07-17 NOTE — DIETITIAN INITIAL EVALUATION ADULT - OTHER CALCULATIONS
Energy: 1241-1489kcal/day (MSJ 1-1.2AF)  Protein: 61-73g/day (using 1-1.2g/kg)   Fluid: 1mL/kcal/day

## 2024-07-17 NOTE — DIETITIAN INITIAL EVALUATION ADULT - ENERGY INTAKE
at admit, pt is on a clears diet and says that she has been drinking but it makes her stomach hurt. No nausea or vomiting reported. Pt reports that her diet is supposed to advance today as per MD. Discussed nutrition supplements.
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
## 2024-07-17 NOTE — PROGRESS NOTE ADULT - SUBJECTIVE AND OBJECTIVE BOX
SUBJECTIVE/OVERNIGHT EVENTS  Today is hospital day 3d. This morning patient was seen and examined at bedside, resting comfortably in bed. No acute or major events overnight. She was having pain last night and got 0.5 dilaudid. Continues to feel nausous but wants to try soft food today for lunch. Pain better after overnight dilaudid.       MEDICATIONS  STANDING MEDICATIONS  chlorhexidine 2% Cloths 1 Application(s) Topical daily  enoxaparin Injectable 40 milliGRAM(s) SubCutaneous every 24 hours  folic acid 1 milliGRAM(s) Oral daily  lactated ringers. 1000 milliLiter(s) IV Continuous <Continuous>  pantoprazole  Injectable 40 milliGRAM(s) IV Push every 24 hours  thiamine 100 milliGRAM(s) Oral daily    PRN MEDICATIONS  HYDROmorphone  Injectable 0.5 milliGRAM(s) IV Push three times a day PRN  ondansetron Injectable 4 milliGRAM(s) IV Push every 8 hours PRN  traMADol 25 milliGRAM(s) Oral every 8 hours PRN    VITALS  T(F): 98.6 (07-17-24 @ 07:48), Max: 99.8 (07-16-24 @ 16:00)  HR: 90 (07-17-24 @ 07:48) (80 - 96)  BP: 146/91 (07-17-24 @ 07:48) (118/55 - 164/55)  RR: 18 (07-17-24 @ 07:48) (18 - 18)  SpO2: 98% (07-16-24 @ 22:47) (98% - 100%)    PHYSICAL EXAM      GENERAL: NAD, sitting in bed comfortably  HEAD:  Atraumatic, normocephalic  EYES: EOMI, PERRLA, conjunctiva and sclera clear  ENT: Moist mucous membranes  NECK: Supple, no JVD  HEART: Regular rate and rhythm, no murmurs, rubs, or gallops  LUNGS: Unlabored respirations.  Clear to auscultation bilaterally, no crackles, wheezing, or rhonchi  ABDOMEN: tenderness in mid epigastric region, but no distention, bowel sounds present  EXTREMITIES: 2+ peripheral pulses bilaterally. No clubbing, cyanosis, or edema  NERVOUS SYSTEM:  A&Ox3, no focal deficits   SKIN: No rashes or lesions    LABS             9.9    3.42  )-----------( 132      ( 07-17-24 @ 07:12 )             27.8     139  |  100  |  <3  -------------------------<  102   07-17-24 @ 07:12  3.7  |  24  |  0.6    Ca      9.4     07-17-24 @ 07:12  Phos   2.8     07-17-24 @ 07:12  Mg     1.9     07-17-24 @ 07:12    TPro  7.0  /  Alb  4.4  /  TBili  0.9  /  DBili  x   /  AST  285  /  ALT  65  /  AlkPhos  121  /  GGT  x     07-17-24 @ 07:12        Urinalysis Basic - ( 17 Jul 2024 07:12 )    Color: x / Appearance: x / SG: x / pH: x  Gluc: 102 mg/dL / Ketone: x  / Bili: x / Urobili: x   Blood: x / Protein: x / Nitrite: x   Leuk Esterase: x / RBC: x / WBC x   Sq Epi: x / Non Sq Epi: x / Bacteria: x          IMAGING

## 2024-07-17 NOTE — DIETITIAN INITIAL EVALUATION ADULT - ADD RECOMMEND
1. Change diet to low fat/low fiber   2. add Ensure Clear 2x/day (480kcal/16g PRO)   3. encourage and po intake
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
## 2024-07-17 NOTE — DIETITIAN INITIAL EVALUATION ADULT - PERTINENT LABORATORY DATA
07-17    139  |  100  |  <3<L>  ----------------------------<  102<H>  3.7   |  24  |  0.6<L>    Ca    9.4      17 Jul 2024 07:12  Phos  2.8     07-17  Mg     1.9     07-17    TPro  7.0  /  Alb  4.4  /  TBili  0.9  /  DBili  x   /  AST  285<H>  /  ALT  65<H>  /  AlkPhos  121<H>  07-17  A1C with Estimated Average Glucose Result: 5.2 % (06-26-24 @ 05:16)
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
## 2024-07-18 ENCOUNTER — TRANSCRIPTION ENCOUNTER (OUTPATIENT)
Age: 49
End: 2024-07-18

## 2024-07-18 LAB
ALBUMIN SERPL ELPH-MCNC: 4.9 G/DL — SIGNIFICANT CHANGE UP (ref 3.5–5.2)
ALP SERPL-CCNC: 137 U/L — HIGH (ref 30–115)
ALT FLD-CCNC: 84 U/L — HIGH (ref 0–41)
ANION GAP SERPL CALC-SCNC: 19 MMOL/L — HIGH (ref 7–14)
AST SERPL-CCNC: 328 U/L — HIGH (ref 0–41)
BASOPHILS # BLD AUTO: 0.03 K/UL — SIGNIFICANT CHANGE UP (ref 0–0.2)
BASOPHILS NFR BLD AUTO: 0.6 % — SIGNIFICANT CHANGE UP (ref 0–1)
BILIRUB SERPL-MCNC: 1 MG/DL — SIGNIFICANT CHANGE UP (ref 0.2–1.2)
BUN SERPL-MCNC: 5 MG/DL — LOW (ref 10–20)
CALCIUM SERPL-MCNC: 10.4 MG/DL — SIGNIFICANT CHANGE UP (ref 8.4–10.5)
CHLORIDE SERPL-SCNC: 96 MMOL/L — LOW (ref 98–110)
CO2 SERPL-SCNC: 22 MMOL/L — SIGNIFICANT CHANGE UP (ref 17–32)
CREAT SERPL-MCNC: 0.6 MG/DL — LOW (ref 0.7–1.5)
EGFR: 112 ML/MIN/1.73M2 — SIGNIFICANT CHANGE UP
EOSINOPHIL # BLD AUTO: 0.05 K/UL — SIGNIFICANT CHANGE UP (ref 0–0.7)
EOSINOPHIL NFR BLD AUTO: 1 % — SIGNIFICANT CHANGE UP (ref 0–8)
GLUCOSE SERPL-MCNC: 101 MG/DL — HIGH (ref 70–99)
HAV IGM SER-ACNC: SIGNIFICANT CHANGE UP
HBV CORE IGM SER-ACNC: SIGNIFICANT CHANGE UP
HBV SURFACE AG SER-ACNC: SIGNIFICANT CHANGE UP
HCT VFR BLD CALC: 32.2 % — LOW (ref 37–47)
HCV AB S/CO SERPL IA: 0.11 S/CO — SIGNIFICANT CHANGE UP (ref 0–0.99)
HCV AB SERPL-IMP: SIGNIFICANT CHANGE UP
HGB BLD-MCNC: 11.5 G/DL — LOW (ref 12–16)
IMM GRANULOCYTES NFR BLD AUTO: 0.2 % — SIGNIFICANT CHANGE UP (ref 0.1–0.3)
LYMPHOCYTES # BLD AUTO: 1.31 K/UL — SIGNIFICANT CHANGE UP (ref 1.2–3.4)
LYMPHOCYTES # BLD AUTO: 25.2 % — SIGNIFICANT CHANGE UP (ref 20.5–51.1)
MAGNESIUM SERPL-MCNC: 1.7 MG/DL — LOW (ref 1.8–2.4)
MCHC RBC-ENTMCNC: 33.9 PG — HIGH (ref 27–31)
MCHC RBC-ENTMCNC: 35.7 G/DL — SIGNIFICANT CHANGE UP (ref 32–37)
MCV RBC AUTO: 95 FL — SIGNIFICANT CHANGE UP (ref 81–99)
MONOCYTES # BLD AUTO: 0.45 K/UL — SIGNIFICANT CHANGE UP (ref 0.1–0.6)
MONOCYTES NFR BLD AUTO: 8.7 % — SIGNIFICANT CHANGE UP (ref 1.7–9.3)
NEUTROPHILS # BLD AUTO: 3.34 K/UL — SIGNIFICANT CHANGE UP (ref 1.4–6.5)
NEUTROPHILS NFR BLD AUTO: 64.3 % — SIGNIFICANT CHANGE UP (ref 42.2–75.2)
NRBC # BLD: 0 /100 WBCS — SIGNIFICANT CHANGE UP (ref 0–0)
PLATELET # BLD AUTO: 161 K/UL — SIGNIFICANT CHANGE UP (ref 130–400)
PMV BLD: 11.1 FL — HIGH (ref 7.4–10.4)
POTASSIUM SERPL-MCNC: 3.6 MMOL/L — SIGNIFICANT CHANGE UP (ref 3.5–5)
POTASSIUM SERPL-SCNC: 3.6 MMOL/L — SIGNIFICANT CHANGE UP (ref 3.5–5)
PROT SERPL-MCNC: 8.6 G/DL — HIGH (ref 6–8)
RBC # BLD: 3.39 M/UL — LOW (ref 4.2–5.4)
RBC # FLD: 11.5 % — SIGNIFICANT CHANGE UP (ref 11.5–14.5)
SODIUM SERPL-SCNC: 137 MMOL/L — SIGNIFICANT CHANGE UP (ref 135–146)
WBC # BLD: 5.19 K/UL — SIGNIFICANT CHANGE UP (ref 4.8–10.8)
WBC # FLD AUTO: 5.19 K/UL — SIGNIFICANT CHANGE UP (ref 4.8–10.8)

## 2024-07-18 PROCEDURE — 99232 SBSQ HOSP IP/OBS MODERATE 35: CPT

## 2024-07-18 RX ORDER — MAGNESIUM SULFATE 100 %
2 POWDER (GRAM) MISCELLANEOUS ONCE
Refills: 0 | Status: DISCONTINUED | OUTPATIENT
Start: 2024-07-18 | End: 2024-07-18

## 2024-07-18 RX ORDER — PANTOPRAZOLE SODIUM 40 MG/10ML
40 INJECTION, POWDER, FOR SOLUTION INTRAVENOUS
Refills: 0 | Status: DISCONTINUED | OUTPATIENT
Start: 2024-07-18 | End: 2024-07-19

## 2024-07-18 RX ORDER — OXYCODONE HYDROCHLORIDE 100 MG/5ML
5 SOLUTION ORAL THREE TIMES A DAY
Refills: 0 | Status: DISCONTINUED | OUTPATIENT
Start: 2024-07-18 | End: 2024-07-19

## 2024-07-18 RX ORDER — MAGNESIUM OXIDE 400 MG/1
400 TABLET ORAL ONCE
Refills: 0 | Status: COMPLETED | OUTPATIENT
Start: 2024-07-18 | End: 2024-07-18

## 2024-07-18 RX ORDER — ALPRAZOLAM 2 MG/1
0.25 TABLET ORAL THREE TIMES A DAY
Refills: 0 | Status: DISCONTINUED | OUTPATIENT
Start: 2024-07-18 | End: 2024-07-19

## 2024-07-18 RX ADMIN — Medication 1 MILLIGRAM(S): at 11:40

## 2024-07-18 RX ADMIN — TRAMADOL HYDROCHLORIDE 25 MILLIGRAM(S): 50 TABLET, FILM COATED ORAL at 05:57

## 2024-07-18 RX ADMIN — Medication 100 MILLIGRAM(S): at 05:20

## 2024-07-18 RX ADMIN — Medication 100 MILLIGRAM(S): at 17:14

## 2024-07-18 RX ADMIN — PANTOPRAZOLE SODIUM 40 MILLIGRAM(S): 40 INJECTION, POWDER, FOR SOLUTION INTRAVENOUS at 05:22

## 2024-07-18 RX ADMIN — Medication 100 MILLIGRAM(S): at 11:40

## 2024-07-18 RX ADMIN — PANTOPRAZOLE SODIUM 40 MILLIGRAM(S): 40 INJECTION, POWDER, FOR SOLUTION INTRAVENOUS at 07:19

## 2024-07-18 RX ADMIN — Medication 20 MILLIGRAM(S): at 11:40

## 2024-07-18 RX ADMIN — MAGNESIUM OXIDE 400 MILLIGRAM(S): 400 TABLET ORAL at 14:13

## 2024-07-18 RX ADMIN — Medication 1 APPLICATION(S): at 11:40

## 2024-07-18 RX ADMIN — ENOXAPARIN SODIUM 40 MILLIGRAM(S): 100 INJECTION SUBCUTANEOUS at 05:23

## 2024-07-18 RX ADMIN — OXYCODONE HYDROCHLORIDE 5 MILLIGRAM(S): 100 SOLUTION ORAL at 18:35

## 2024-07-18 RX ADMIN — HYDROMORPHONE HCL 2 MILLIGRAM(S): 0.2 INJECTION, SOLUTION INTRAVENOUS at 09:43

## 2024-07-18 RX ADMIN — HYDROMORPHONE HCL 2 MILLIGRAM(S): 0.2 INJECTION, SOLUTION INTRAVENOUS at 10:54

## 2024-07-18 RX ADMIN — ALPRAZOLAM 0.25 MILLIGRAM(S): 2 TABLET ORAL at 14:20

## 2024-07-18 NOTE — PROGRESS NOTE ADULT - ASSESSMENT
48 y F with PMHx of chronic pancreatitis over the last 8 years and hepatic steatosis presents to the ED with abdominal pain that radiates to back associated with nausea and vomiting. Pt reports having 2 drink 2xweek, although suspect likely more. IN the ED, found to have  increased BHB and anion gap of 35 is suggestive of ketoacidosis with unclear etiology, initially admitted to MICU, now downgraded to SDU    Chronic Pancreatitis  Nausea/Vomiting/Inability to tolerate PO  Starvation keotacidosis  Hypokalemia/Hypophosphatemia/hypomagnesemia  Elevated lactate - resolved   - CT negative for pancreatitis, Lipase 17 but with typical pain  - patient reports nausea-- was given soft food   -  - Zofran PRN for nausea, ekg  qtc is 413  - , c/w folate and thiamine   - CLD w/u was all normal  - GI eval given inability tolerate PO/chronic pancreatitis -- suggested trial of SSRI or gabapentin-- DC dilaudid    Recent admission for transaminitis due to alcoholic hepatitis  Hepatic steatosis   Alcohol Use-- AST>ALT  - per prior admissions, patient with ETOh abuse, however on questioning, patient reports drinking 2x week, mostly wine  - Continue to monitor LFTs, remain slightly elevated- folate and thiamine  - s/p catch team eval     Pending: improvement in pain,patient denying alcohol abuse DC plan AM
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
## 2024-07-18 NOTE — DISCHARGE NOTE PROVIDER - NSDCFUADDINST_GEN_ALL_CORE_FT
Avoid alcohol use and recheck Blood pressure when out of hospital.  Your insurance does not cover [facility] practise for primary care so you may need another physician who accepts your insurance Avoid alcohol use and recheck Blood pressure when out of hospital.  Your insurance does not cover [facility] practise for primary care so you may need another physician who accepts your insurance  Mental health clinic  referral given by

## 2024-07-18 NOTE — PROGRESS NOTE ADULT - SUBJECTIVE AND OBJECTIVE BOX
SUBJECTIVE/OVERNIGHT EVENTS  Today is hospital day 4d. This morning patient was seen and examined at bedside, resting comfortably in bed. No acute or major events overnight.    HOSPITAL COURSE  Day 1:   Day 2:   Day 3:     CODE STATUS:    FAMILY COMMUNICATION  Contact date:  Name of person contacted:  Relationship to patient:  Communication details:    MEDICATIONS  STANDING MEDICATIONS  chlorhexidine 2% Cloths 1 Application(s) Topical daily  enoxaparin Injectable 40 milliGRAM(s) SubCutaneous every 24 hours  famotidine    Tablet 20 milliGRAM(s) Oral daily  folic acid 1 milliGRAM(s) Oral daily  gabapentin 100 milliGRAM(s) Oral two times a day  pantoprazole    Tablet 40 milliGRAM(s) Oral before breakfast  thiamine 100 milliGRAM(s) Oral daily    PRN MEDICATIONS  ALPRAZolam 0.25 milliGRAM(s) Oral three times a day PRN  ondansetron Injectable 4 milliGRAM(s) IV Push every 8 hours PRN  oxyCODONE    IR 5 milliGRAM(s) Oral three times a day PRN  traMADol 25 milliGRAM(s) Oral every 8 hours PRN    VITALS  T(F): 98.6 (07-18-24 @ 07:50), Max: 98.9 (07-17-24 @ 18:15)  HR: 96 (07-18-24 @ 07:50) (82 - 97)  BP: 150/105 (07-18-24 @ 07:50) (139/95 - 176/116)  RR: 18 (07-18-24 @ 07:50) (18 - 19)  SpO2: 99% (07-18-24 @ 07:50) (99% - 99%)    PHYSICAL EXAM    GENERAL: NAD, sitting in bed comfortably  HEAD:  Atraumatic, normocephalic  EYES: EOMI, PERRLA, conjunctiva and sclera clear  ENT: Moist mucous membranes  NECK: Supple, no JVD  HEART: Regular rate and rhythm, no murmurs, rubs, or gallops  LUNGS: Unlabored respirations.  Clear to auscultation bilaterally, no crackles, wheezing, or rhonchi  ABDOMEN: tenderness iand patient flinches on palpation in all quadrants, no guarding rigidity massess   EXTREMITIES: 2+ peripheral pulses bilaterally. No clubbing, cyanosis, or edema  NERVOUS SYSTEM:  A&Ox3, no focal deficits   SKIN: No rashes or lesions      LABS             11.5   5.19  )-----------( 161      ( 07-18-24 @ 07:38 )             32.2     137  |  96  |  5   -------------------------<  101   07-18-24 @ 07:38  3.6  |  22  |  0.6    Ca      10.4     07-18-24 @ 07:38  Phos   2.8     07-17-24 @ 07:12  Mg     1.7     07-18-24 @ 07:38    TPro  8.6  /  Alb  4.9  /  TBili  1.0  /  DBili  x   /  AST  328  /  ALT  84  /  AlkPhos  137  /  GGT  x     07-18-24 @ 07:38        Urinalysis Basic - ( 18 Jul 2024 07:38 )    Color: x / Appearance: x / SG: x / pH: x  Gluc: 101 mg/dL / Ketone: x  / Bili: x / Urobili: x   Blood: x / Protein: x / Nitrite: x   Leuk Esterase: x / RBC: x / WBC x   Sq Epi: x / Non Sq Epi: x / Bacteria: x          IMAGING

## 2024-07-18 NOTE — DISCHARGE NOTE PROVIDER - HOSPITAL COURSE
48 y F with PMHx of chronic pancreatitis over the last 8 years and hepatic steatosis presents to the ED with abdominal pain that radiates to back associated with nausea and vomiting. Pt reports having 2 drink 2xweek, although suspect likely more. IN the ED, found to have  increased BHB and anion gap of 35 is suggestive of ketoacidosis with unclear etiology, initially admitted to MICU, now downgraded to SDU    Chronic Pancreatitis  Nausea/Vomiting/Inability to tolerate PO  Starvation keotacidosis  Hypokalemia/Hypophosphatemia/hypomagnesemia  Elevated lactate - resolved   - CT negative for pancreatitis, Lipase 17 but with typical pain  - patient reports nausea-- was given soft food   - Zofran PRN for nausea, ekg  qtc is 413  - , c/w folate and thiamine   - CLD w/u was all normal  - GI eval given inability tolerate PO/chronic pancreatitis -- suggested trial of SSRI or gabapentin-- DC dilaudid  - per pain medicine recs (below):  - Increase frequency of Hydromorphone PO 2mg q6h PRN; can send home with 3 day supply  - Tramadol 25mg q8h PRN; administer no sooner than 1 hour after PO dilaudid given  - Gabapentin 300mg TID  - Naloxone PRN       Recent admission for transaminitis due to alcoholic hepatitis  Hepatic steatosis   Alcohol Use-- AST>ALT  - per prior admissions, patient with ETOh abuse, however on questioning, patient reports drinking 2x week, mostly wine  - Continue to monitor LFTs, remain slightly elevated- folate and thiamine  - s/p catch team eval   - seen by social work     Pending: improvement in pain,patient denying alcohol abuse DC plan AM   48 y F with PMHx of chronic pancreatitis over the last 8 years and hepatic steatosis presents to the ED with abdominal pain that radiates to back associated with nausea and vomiting. Pt reports having 2 drink 2xweek, although suspect likely more. IN the ED, found to have  increased BHB and anion gap of 35 is suggestive of ketoacidosis with unclear etiology, initially admitted to MICU, now downgraded to SDU    Chronic Pancreatitis  Nausea/Vomiting/Inability to tolerate PO  Starvation keotacidosis  Hypokalemia/Hypophosphatemia/hypomagnesemia  Elevated lactate - resolved   - CT negative for pancreatitis, Lipase 17 but with typical pain  - patient reports nausea-- was given soft food   - Zofran PRN for nausea, ekg  qtc is 413  - c/w folate and thiamine   - CLD w/u was all normal  - GI eval given inability tolerate PO/chronic pancreatitis -- suggested trial of SSRI or gabapentin-- DC dilaudid  - per pain medicine recs (below):  - Increase frequency of Hydromorphone PO 2mg q6h PRN; can send home with 3 day supply  - Tramadol 25mg q8h PRN; administer no sooner than 1 hour after PO dilaudid given  - Gabapentin 300mg TID  - Naloxone PRN       Recent admission for transaminitis due to alcoholic hepatitis  Hepatic steatosis   Alcohol Use-- AST>ALT  - per prior admissions, patient with ETOh abuse, however on questioning, patient reports drinking 2x week, mostly wine  - Continue to monitor LFTs, remain slightly elevated- folate and thiamine  - s/p catch team eval   - seen by social work     Pending: improvement in pain,patient denying alcohol abuse DC plan AM   48 y F with PMHx of chronic pancreatitis over the last 8 years and hepatic steatosis presents to the ED with abdominal pain that radiates to back associated with nausea and vomiting. Pt reports having 2 drink 2xweek, although suspect likely more. IN the ED, found to have  increased BHB and anion gap of 35 is suggestive of ketoacidosis with unclear etiology, initially admitted to MICU, now downgraded to SDU    Chronic Pancreatitis  Nausea/Vomiting/Inability to tolerate PO  Starvation keotacidosis  Hypokalemia/Hypophosphatemia/hypomagnesemia  Elevated lactate - resolved   - CT negative for pancreatitis, Lipase 17 but with typical pain  - patient reports nausea-- was given soft food   - Zofran PRN for nausea, ekg  qtc is 413  - c/w folate and thiamine   - CLD w/u was all normal  - GI eval given inability tolerate PO/chronic pancreatitis -- suggested trial of SSRI or gabapentin-- DC dilaudid  - per pain medicine recs (below):  -got oxycodone and xanax that helped her  - Naloxone PRN       Recent admission for transaminitis due to alcoholic hepatitis  Hepatic steatosis   Alcohol Use-- AST>ALT  - per prior admissions, patient with ETOh abuse, however on questioning, patient reports drinking 2x week, mostly wine  - Continue to monitor LFTs, remain slightly elevated- folate and thiamine  - s/p catch team eval   - seen by social work
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
## 2024-07-18 NOTE — CONSULT NOTE ADULT - ASSESSMENT
A/P: Patient is a 48F with PMH of chronic pancreatitis over the last 8 years and hepatic steatosis presented with abdominal pain that radiates to back associated with nausea and vomiting. Patient endorsing "7/10" abdominal pain with intermittent nausea and vomiting Patient states the pain is aggravated when eating meals. Patient states at home she does not take anything for pain. Patient denies any confusion, nausea, lethargy, or itching.     #Abdominal pain  - Increase frequency of Hydromorphone PO 2mg q6h PRN; can send home with 3 day supply  - Tramadol 25mg q8h PRN; administer no sooner than 1 hour after PO dilaudid given  - Gabapentin 300mg TID  - Naloxone PRN     #Opioid induced constipation  - Bowel regimen as  per primary team
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
## 2024-07-18 NOTE — PROGRESS NOTE ADULT - REASON FOR ADMISSION
low eating intake
pancreatitis, abdominal pain
decreased po input
abdominal pain
abdominal pain
N/V/Abd pain

## 2024-07-18 NOTE — DISCHARGE NOTE PROVIDER - NSDCMRMEDTOKEN_GEN_ALL_CORE_FT
ondansetron 4 mg oral tablet, disintegratin tab(s) orally every 8 hours as needed for  nausea  pantoprazole 40 mg oral delayed release tablet: 1 tab(s) orally every 12 hours   famotidine 20 mg oral tablet: 1 tab(s) orally once a day (at bedtime)  folic acid 1 mg oral tablet: 1 tab(s) orally once a day  ondansetron 4 mg oral tablet, disintegratin tab(s) orally every 8 hours as needed for  nausea  pantoprazole 40 mg oral delayed release tablet: 1 tab(s) orally every 12 hours  thiamine 100 mg oral tablet: 1 tab(s) orally once a day   famotidine 20 mg oral tablet: 1 tab(s) orally once a day (at bedtime)  folic acid 1 mg oral tablet: 1 tab(s) orally once a day  naloxone 4 mg/0.1 mL nasal spray: 1 spray(s) intranasally once a day as needed for  overdose  ondansetron 4 mg oral tablet, disintegratin tab(s) orally every 8 hours as needed for  nausea  oxyCODONE 5 mg oral tablet: 1 tab(s) orally once a day as needed for  moderate pain MDD: 1 pill a day  pantoprazole 40 mg oral delayed release tablet: 1 tab(s) orally every 12 hours  thiamine 100 mg oral tablet: 1 tab(s) orally once a day  Xanax 0.25 mg oral tablet: 1 tab(s) orally 2 times a day as needed for  anxiety MDD: 2 pills a day   famotidine 20 mg oral tablet: 1 tab(s) orally once a day (at bedtime)  folic acid 1 mg oral tablet: 1 tab(s) orally once a day  naloxone 4 mg/0.1 mL nasal spray: 1 spray(s) intranasally once a day as needed for  overdose  oxyCODONE 5 mg oral tablet: 1 tab(s) orally once a day as needed for  moderate pain MDD: 1 pill a day  pantoprazole 40 mg oral delayed release tablet: 1 tab(s) orally every 12 hours  thiamine 100 mg oral tablet: 1 tab(s) orally once a day  Xanax 0.25 mg oral tablet: 1 tab(s) orally 2 times a day as needed for  anxiety MDD: 2 pills a day

## 2024-07-18 NOTE — DISCHARGE NOTE PROVIDER - NSDCCPCAREPLAN_GEN_ALL_CORE_FT
PRINCIPAL DISCHARGE DIAGNOSIS  Diagnosis: Starvation ketoacidosis  Assessment and Plan of Treatment: You came into the hospital and had elevated ketones, suggesting lack of nutrients to your organs likely from not eating well. We gave you medication for [place] and pain, and did labs and imaging to look for any disease causing this but the cause is not clear. Please also follow up with the GI clinic, Dr. Thakkar for further care. Please follow up with the therapy appointment we will set you up with. New medicines we are giving you are Xanax 0.25 mg x3/day (as needed for anxiety), famotidine x2/day for GERD, gabapentin 300mg x3/day (as needed for pain), tramadol 25mg x2/day (as needed for pain).  Please continue to eat as you are able to, take all your medications as prescribed, follow up outpatient PCP, and avoid alcohol use. In the case of decreased breathing from your pain medications please use the naloxone we are sending you home with.      SECONDARY DISCHARGE DIAGNOSES  Diagnosis: Intractable nausea and vomiting  Assessment and Plan of Treatment:     Diagnosis: Intractable abdominal pain  Assessment and Plan of Treatment:

## 2024-07-18 NOTE — DISCHARGE NOTE PROVIDER - CARE PROVIDER_API CALL
Petey Elizondo  Gastroenterology  [address] [place] [address]  Phone: [phone]  Fax: [phone]  Follow Up Time: 2 weeks

## 2024-07-19 ENCOUNTER — TRANSCRIPTION ENCOUNTER (OUTPATIENT)
Age: 49
End: 2024-07-19

## 2024-07-19 VITALS
TEMPERATURE: 97 F | SYSTOLIC BLOOD PRESSURE: 145 MMHG | DIASTOLIC BLOOD PRESSURE: 98 MMHG | OXYGEN SATURATION: 98 % | RESPIRATION RATE: 18 BRPM | HEART RATE: 116 BPM

## 2024-07-19 LAB
ALBUMIN SERPL ELPH-MCNC: 4.7 G/DL — SIGNIFICANT CHANGE UP (ref 3.5–5.2)
ALP SERPL-CCNC: 124 U/L — HIGH (ref 30–115)
ALT FLD-CCNC: 85 U/L — HIGH (ref 0–41)
ANION GAP SERPL CALC-SCNC: 17 MMOL/L — HIGH (ref 7–14)
AST SERPL-CCNC: 278 U/L — HIGH (ref 0–41)
BASOPHILS # BLD AUTO: 0.02 K/UL — SIGNIFICANT CHANGE UP (ref 0–0.2)
BASOPHILS NFR BLD AUTO: 0.5 % — SIGNIFICANT CHANGE UP (ref 0–1)
BILIRUB SERPL-MCNC: 0.8 MG/DL — SIGNIFICANT CHANGE UP (ref 0.2–1.2)
BUN SERPL-MCNC: 9 MG/DL — LOW (ref 10–20)
CALCIUM SERPL-MCNC: 9.7 MG/DL — SIGNIFICANT CHANGE UP (ref 8.4–10.5)
CHLORIDE SERPL-SCNC: 100 MMOL/L — SIGNIFICANT CHANGE UP (ref 98–110)
CO2 SERPL-SCNC: 23 MMOL/L — SIGNIFICANT CHANGE UP (ref 17–32)
CREAT SERPL-MCNC: 0.7 MG/DL — SIGNIFICANT CHANGE UP (ref 0.7–1.5)
CULTURE RESULTS: SIGNIFICANT CHANGE UP
EGFR: 108 ML/MIN/1.73M2 — SIGNIFICANT CHANGE UP
EOSINOPHIL # BLD AUTO: 0.09 K/UL — SIGNIFICANT CHANGE UP (ref 0–0.7)
EOSINOPHIL NFR BLD AUTO: 2.2 % — SIGNIFICANT CHANGE UP (ref 0–8)
GLUCOSE SERPL-MCNC: 105 MG/DL — HIGH (ref 70–99)
HCT VFR BLD CALC: 32.3 % — LOW (ref 37–47)
HGB BLD-MCNC: 11.2 G/DL — LOW (ref 12–16)
IMM GRANULOCYTES NFR BLD AUTO: 0.2 % — SIGNIFICANT CHANGE UP (ref 0.1–0.3)
LYMPHOCYTES # BLD AUTO: 1.16 K/UL — LOW (ref 1.2–3.4)
LYMPHOCYTES # BLD AUTO: 28.1 % — SIGNIFICANT CHANGE UP (ref 20.5–51.1)
MCHC RBC-ENTMCNC: 33.9 PG — HIGH (ref 27–31)
MCHC RBC-ENTMCNC: 34.7 G/DL — SIGNIFICANT CHANGE UP (ref 32–37)
MCV RBC AUTO: 97.9 FL — SIGNIFICANT CHANGE UP (ref 81–99)
MONOCYTES # BLD AUTO: 0.48 K/UL — SIGNIFICANT CHANGE UP (ref 0.1–0.6)
MONOCYTES NFR BLD AUTO: 11.6 % — HIGH (ref 1.7–9.3)
NEUTROPHILS # BLD AUTO: 2.37 K/UL — SIGNIFICANT CHANGE UP (ref 1.4–6.5)
NEUTROPHILS NFR BLD AUTO: 57.4 % — SIGNIFICANT CHANGE UP (ref 42.2–75.2)
NRBC # BLD: 0 /100 WBCS — SIGNIFICANT CHANGE UP (ref 0–0)
PLATELET # BLD AUTO: 164 K/UL — SIGNIFICANT CHANGE UP (ref 130–400)
PMV BLD: 11.3 FL — HIGH (ref 7.4–10.4)
POTASSIUM SERPL-MCNC: 4 MMOL/L — SIGNIFICANT CHANGE UP (ref 3.5–5)
POTASSIUM SERPL-SCNC: 4 MMOL/L — SIGNIFICANT CHANGE UP (ref 3.5–5)
PROT SERPL-MCNC: 7.6 G/DL — SIGNIFICANT CHANGE UP (ref 6–8)
RBC # BLD: 3.3 M/UL — LOW (ref 4.2–5.4)
RBC # FLD: 11.9 % — SIGNIFICANT CHANGE UP (ref 11.5–14.5)
SODIUM SERPL-SCNC: 140 MMOL/L — SIGNIFICANT CHANGE UP (ref 135–146)
SPECIMEN SOURCE: SIGNIFICANT CHANGE UP
WBC # BLD: 4.13 K/UL — LOW (ref 4.8–10.8)
WBC # FLD AUTO: 4.13 K/UL — LOW (ref 4.8–10.8)

## 2024-07-19 PROCEDURE — 99239 HOSP IP/OBS DSCHRG MGMT >30: CPT

## 2024-07-19 RX ORDER — PANTOPRAZOLE SODIUM 40 MG/10ML
1 INJECTION, POWDER, FOR SOLUTION INTRAVENOUS
Qty: 60 | Refills: 0
Start: 2024-07-19 | End: 2024-08-17

## 2024-07-19 RX ORDER — AMLODIPINE BESYLATE 2.5 MG/1
2.5 TABLET ORAL DAILY
Refills: 0 | Status: DISCONTINUED | OUTPATIENT
Start: 2024-07-19 | End: 2024-07-19

## 2024-07-19 RX ORDER — THIAMINE HCL 100 MG
1 TABLET ORAL
Qty: 30 | Refills: 0
Start: 2024-07-19 | End: 2024-08-17

## 2024-07-19 RX ORDER — GABAPENTIN
1 POWDER (GRAM) MISCELLANEOUS
Qty: 90 | Refills: 0
Start: 2024-07-19 | End: 2024-08-17

## 2024-07-19 RX ORDER — TRAMADOL HYDROCHLORIDE 50 MG/1
0.5 TABLET, FILM COATED ORAL
Qty: 0 | Refills: 0 | DISCHARGE
Start: 2024-07-19

## 2024-07-19 RX ORDER — NALOXONE HYDROCHLORIDE 1 MG/ML
1 INJECTION PARENTERAL
Qty: 3 | Refills: 0
Start: 2024-07-19

## 2024-07-19 RX ORDER — FOLIC ACID
1 POWDER (GRAM) MISCELLANEOUS
Qty: 30 | Refills: 0
Start: 2024-07-19 | End: 2024-08-17

## 2024-07-19 RX ORDER — FAMOTIDINE 40 MG
1 TABLET ORAL
Qty: 30 | Refills: 0
Start: 2024-07-19 | End: 2024-08-17

## 2024-07-19 RX ORDER — ONDANSETRON HYDROCHLORIDE 2 MG/ML
1 INJECTION INTRAMUSCULAR; INTRAVENOUS
Qty: 3 | Refills: 0
Start: 2024-07-19 | End: 2024-08-17

## 2024-07-19 RX ORDER — OXYCODONE HYDROCHLORIDE 100 MG/5ML
1 SOLUTION ORAL
Qty: 12 | Refills: 0
Start: 2024-07-19

## 2024-07-19 RX ORDER — ALPRAZOLAM 2 MG/1
1 TABLET ORAL
Qty: 12 | Refills: 0
Start: 2024-07-19

## 2024-07-19 RX ORDER — ALPRAZOLAM 2 MG/1
1 TABLET ORAL
Qty: 90 | Refills: 0
Start: 2024-07-19 | End: 2024-08-17

## 2024-07-19 RX ADMIN — Medication 100 MILLIGRAM(S): at 11:08

## 2024-07-19 RX ADMIN — Medication 20 MILLIGRAM(S): at 11:09

## 2024-07-19 RX ADMIN — AMLODIPINE BESYLATE 2.5 MILLIGRAM(S): 2.5 TABLET ORAL at 11:07

## 2024-07-19 RX ADMIN — PANTOPRAZOLE SODIUM 40 MILLIGRAM(S): 40 INJECTION, POWDER, FOR SOLUTION INTRAVENOUS at 05:06

## 2024-07-19 RX ADMIN — ENOXAPARIN SODIUM 40 MILLIGRAM(S): 100 INJECTION SUBCUTANEOUS at 05:09

## 2024-07-19 RX ADMIN — OXYCODONE HYDROCHLORIDE 5 MILLIGRAM(S): 100 SOLUTION ORAL at 11:08

## 2024-07-19 RX ADMIN — OXYCODONE HYDROCHLORIDE 5 MILLIGRAM(S): 100 SOLUTION ORAL at 11:39

## 2024-07-19 RX ADMIN — Medication 1 APPLICATION(S): at 11:07

## 2024-07-19 RX ADMIN — ALPRAZOLAM 0.25 MILLIGRAM(S): 2 TABLET ORAL at 00:35

## 2024-07-19 RX ADMIN — Medication 100 MILLIGRAM(S): at 05:06

## 2024-07-19 RX ADMIN — Medication 1 MILLIGRAM(S): at 11:07

## 2024-07-19 NOTE — DISCHARGE NOTE NURSING/CASE MANAGEMENT/SOCIAL WORK - NSDCVIVACCINE_GEN_ALL_CORE_FT
influenza, injectable, quadrivalent, preservative free; 12-Oct-2019 14:23; Celsa Martins (RN); [employer]; [id number] (Exp. Date: 30-Jun-2020); IntraMuscular; Deltoid Left.; 0.5 milliLiter(s); VIS (VIS Published: 15-Aug-2019, VIS Presented: 12-Oct-2019);   influenza, injectable, quadrivalent, preservative free; 10-Sep-2020 12:19; Shani Fleming (RN); [employer]; [id number] (Exp. Date: 30-Jun-2021); IntraMuscular; Deltoid Left.; 0.5 milliLiter(s); VIS (VIS Published: 15-Aug-2019, VIS Presented: 10-Sep-2020);   influenza, injectable, quadrivalent, preservative free; 07-Oct-2021 16:12; Janessa Jaquez (RN); [employer]; [id number] (Exp. Date: 30-Jun-2022); IntraMuscular; Deltoid Left.; 0.5 milliLiter(s); VIS (VIS Published: 15-Aug-2019, VIS Presented: 07-Oct-2021);   Tdap; 01-Sep-2014 17:06; Cheryl Cotto (MELINDA); [employer]; [id number]; IntraMuscular; Deltoid Right.; 0.5 milliLiter(s);   Tdap; 01-Sep-2014 17:09; Cheryl Cotto (MELINDA); [employer]; [id number]; IntraMuscular; Deltoid Right.; 0.5 milliLiter(s);

## 2024-07-19 NOTE — DISCHARGE NOTE NURSING/CASE MANAGEMENT/SOCIAL WORK - PATIENT PORTAL LINK FT
You can access the FollowMyHealth Patient Portal offered by [facility] by registering at the following website: [email or web address]. By joining [employer]’s FollowMyHealth portal, you will also be able to view your health information using other applications (apps) compatible with our system.

## 2024-07-19 NOTE — PROGRESS NOTE ADULT - ASSESSMENT
48 y F with PMHx of chronic pancreatitis over the last 8 years and hepatic steatosis presents to the ED with abdominal pain that radiates to back associated with nausea and vomiting. Pt reports having 2 drink 2xweek, although suspect likely more. IN the ED, found to have  increased BHB and anion gap of 35 is suggestive of ketoacidosis with unclear etiology, initially admitted to MICU, now downgraded to SDU     Suspected Chronic Pancreatitis  Nausea/Vomiting/Inability to tolerate PO  Starvation keotacidosis  Hypokalemia/Hypophosphatemia/hypomagnesemia  Elevated lactate - resolved   - CT negative for pancreatitis, Lipase 17 but with typical pain  - patient reports nausea-- was given soft food --she is a picky eater  -  - Zofran PRN for nausea, ekg  qtc is 413  - , c/w folate and thiamine   - CLD w/u was all normal  - GI eval given inability tolerate PO/chronic pancreatitis -- suggested trial of SSRI or gabapentin-- DC dilaudid    Recent admission for transaminitis due to alcoholic hepatitis  Hepatic steatosis   Alcohol Use-- AST>ALT  - per prior admissions, patient with ETOh abuse, however on questioning, patient reports drinking 2x week, mostly wine-- alcohol level is <10  - Continue to monitor LFTs, remain slightly elevated- folate and thiamine  - s/p catch team eval     DC home today on xanax and oxycodone today--spent more than 30mins.

## 2024-07-19 NOTE — PROGRESS NOTE ADULT - PROVIDER SPECIALTY LIST ADULT
Critical Care
Internal Medicine
Critical Care
Hospitalist
Hospitalist
Internal Medicine
Hospitalist
Internal Medicine
Internal Medicine
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
## 2024-07-19 NOTE — DISCHARGE NOTE NURSING/CASE MANAGEMENT/SOCIAL WORK - NSDCPEFALRISK_GEN_ALL_CORE
For information on Fall & Injury Prevention, visit: [email or web address] OR  [email or web address] OR  https://www.cdc.gov/steadi/patient.html

## 2024-07-20 NOTE — PATIENT PROFILE ADULT - HOME ACCESSIBILITY CONCERNS
Patient discharged by PA. Results and discharge instructions reviewed with the patient by PA.  Patient verbalizes understanding.  Patient discharged home, stable, ambulatory, in no acute distress.      Patient will follow up on Tuesday to get blood work drawn.         none
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
## 2024-07-25 DIAGNOSIS — E87.29 OTHER ACIDOSIS: ICD-10-CM

## 2024-07-25 DIAGNOSIS — K86.0 ALCOHOL-INDUCED CHRONIC PANCREATITIS: ICD-10-CM

## 2024-07-25 DIAGNOSIS — E87.6 HYPOKALEMIA: ICD-10-CM

## 2024-07-25 DIAGNOSIS — F10.10 ALCOHOL ABUSE, UNCOMPLICATED: ICD-10-CM

## 2024-07-25 DIAGNOSIS — S30.1XXA CONTUSION OF ABDOMINAL WALL, INITIAL ENCOUNTER: ICD-10-CM

## 2024-07-25 DIAGNOSIS — T40.2X5A ADVERSE EFFECT OF OTHER OPIOIDS, INITIAL ENCOUNTER: ICD-10-CM

## 2024-07-25 DIAGNOSIS — T73.0XXA STARVATION, INITIAL ENCOUNTER: ICD-10-CM

## 2024-07-25 DIAGNOSIS — K76.0 FATTY (CHANGE OF) LIVER, NOT ELSEWHERE CLASSIFIED: ICD-10-CM

## 2024-07-25 DIAGNOSIS — K59.03 DRUG INDUCED CONSTIPATION: ICD-10-CM

## 2024-07-25 DIAGNOSIS — R74.01 ELEVATION OF LEVELS OF LIVER TRANSAMINASE LEVELS: ICD-10-CM

## 2024-07-25 DIAGNOSIS — E83.39 OTHER DISORDERS OF PHOSPHORUS METABOLISM: ICD-10-CM
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
## 2024-07-25 DIAGNOSIS — K29.70 GASTRITIS, UNSPECIFIED, WITHOUT BLEEDING: ICD-10-CM

## 2024-07-25 DIAGNOSIS — E83.42 HYPOMAGNESEMIA: ICD-10-CM

## 2024-07-25 DIAGNOSIS — D72.829 ELEVATED WHITE BLOOD CELL COUNT, UNSPECIFIED: ICD-10-CM

## 2024-07-25 DIAGNOSIS — T76.11XA ADULT PHYSICAL ABUSE, SUSPECTED, INITIAL ENCOUNTER: ICD-10-CM

## 2024-07-25 DIAGNOSIS — K70.10 ALCOHOLIC HEPATITIS WITHOUT ASCITES: ICD-10-CM

## 2024-08-01 NOTE — PATIENT PROFILE ADULT - NSPROPTRIGHTNOTIFY_GEN_A_NUR
"Subjective   Patient ID: Pallavi Wallace is a 64 y.o. female who presents for Rash.    HPI 2 weeks ago went blueberry picking and BF got poison ivt  Then she had poison ivy  Treated with hydrocortisone cream  Had a facial and yesterdya night and had rash     Review of Systems  Constitutional: No fever or chills  Cardiovascular: no chest pain, no palpitations and no syncope.   Respiratory: no cough, no shortness of breath during exertion and no shortness of breath at rest.   Gastrointestinal: no abdominal pain, no nausea and no vomiting.  Neuro: No Headache, no dizziness    Objective   /75   Pulse 86   Ht 1.575 m (5' 2\")   Wt 51.3 kg (113 lb)   SpO2 95%   BMI 20.67 kg/m²     Physical Exam  Constitutional: Alert and in no acute distress. Well developed, well nourished  Head and Face: Head and face: Normal.    Cardiovascular: Heart rate and rhythm were normal, normal S1 and S2. No peripheral edema.   Pulmonary: No respiratory distress. Clear bilateral breath sounds.  Musculoskeletal: Gait and station: Normal. Muscle strength/tone: Normal.   Skin: Normal skin color and pigmentation, normal skin turgor, and no rash.    Psychiatric: Judgment and insight: Intact. Mood and affect: Normal.    Lab Results   Component Value Date    WBC 5.6 09/27/2023    HGB 13.5 09/27/2023    HCT 43.3 09/27/2023     09/27/2023    CHOL 201 (H) 09/27/2023    TRIG 119 09/27/2023    HDL 65.2 09/27/2023    ALT 14 09/27/2023    AST 20 09/27/2023     09/27/2023    K 4.9 09/27/2023     09/27/2023    CREATININE 0.76 09/27/2023    BUN 15 09/27/2023    CO2 27 09/27/2023    TSH 6.48 (H) 09/27/2023    HGBA1C 5.3 09/27/2023           Assessment/Plan   Diagnoses and all orders for this visit:  Rash        Dear Pallavi Wallace     It was my pleasure to take care of you today in the office. Below are the things we discussed today:    1. Rash - Start Prednisone and take Zytrec and Pecid daily for 7 days. If rahs not better let me " know      Your yearly Physical is due in: Oct 2024  When you call the office for your yearly Physical, please ask them to inform me to order your blood work, so that you can get the fasting blood work before your appointment and we can discuss the results at your physical.      Please call me if any questions arise from now until your next visit. I will call you after I am done seeing patients. A Doctor is always available by phone when the office is closed. Please feel free to call for help with any problem that you feel shouldn't wait until the office re-opens.     Rachel Reed MD    declines

## 2024-09-03 NOTE — PATIENT PROFILE ADULT - DO YOU FEEL UNSAFE AT SCHOOL?
YEARLY PHYSICAL    Date of service: 9/3/2024    Cooper Sims  Is a 55 y.o.  , female    PT's PCP is: Logan Stevenson MD     : 1969                                             Subjective:       No LMP recorded. Patient has had a hysterectomy.     Are your menses regular: not applicable    OB History    Para Term  AB Living   3 3 3     2   SAB IAB Ectopic Molar Multiple Live Births             2      # Outcome Date GA Lbr Orestes/2nd Weight Sex Type Anes PTL Lv   3 Term 10/19/95 40w0d   F CS-Unspec  N KAMRON   2 Term 01/10/90 40w0d   F CS-Unspec      1 Term 88 40w0d   M CS-Unspec  N KAMRON        Social History     Tobacco Use   Smoking Status Every Day    Current packs/day: 0.50    Types: Cigarettes   Smokeless Tobacco Never        Social History     Substance and Sexual Activity   Alcohol Use Yes    Alcohol/week: 0.0 standard drinks of alcohol    Comment: socially       Family History   Problem Relation Age of Onset    Colon Cancer Maternal Grandmother     Cancer Father         bladder    Colon Cancer Maternal Cousin     Colon Cancer Maternal Uncle        Any family history of breast or ovarian cancer: No    Any family history of blood clots: No    Allergies: Asa [aspirin]      Current Outpatient Medications:     venlafaxine (EFFEXOR XR) 37.5 MG extended release capsule, Take 1 capsule by mouth daily, Disp: 90 capsule, Rfl: 0    Multiple Vitamins-Minerals (ONE-A-DAY MENOPAUSE FORMULA PO), Take by mouth, Disp: , Rfl:     Cranberry 1000 MG CAPS, Take by mouth, Disp: , Rfl:     Misc Natural Products (FIBER 7 PO), Take by mouth, Disp: , Rfl:     progesterone (PROMETRIUM) 100 MG CAPS capsule, Take 1 capsule by mouth nightly (Patient not taking: Reported on 9/3/2024), Disp: 90 capsule, Rfl: 0    Social History     Substance and Sexual Activity   Sexual Activity Yes    Partners: Male       Any bleeding or pain with intercourse:  not applicable

## 2024-09-26 NOTE — BEHAVIORAL HEALTH ASSESSMENT NOTE - SOURCE OF INFORMATION
Patient has Abnormal Phosphorus: hypophosphatemia. Will continue to monitor electrolytes closely. Will replace the affected electrolytes and repeat labs to be done after interventions completed. The patient's phosphorus results have been reviewed and are listed below.  Recent Labs   Lab 09/26/24  1151   PHOS 2.0*       Patient Oriented - self; Oriented - place; Oriented - time

## 2024-09-30 NOTE — ED ADULT TRIAGE NOTE - PATIENT ON (OXYGEN DELIVERY METHOD)
I acted within this role throughout the entirety of the procedure performed by the primary surgeon
room air

## 2025-04-09 NOTE — ED ADULT NURSE NOTE - NS_SISCREENINGSR_GEN_ALL_ED
[Thin] : thin [Normal] : PERRL, EOMI, no conjunctival infection, anicteric [de-identified] : generalized xeroderma Negative

## 2025-07-13 NOTE — PATIENT PROFILE ADULT - SAFE PLACE TO LIVE
Clean wound with peroxide and water 3 times a day as needed.  Apply antibiotic ointment.  Sutures should be removed in 5 days.  May use Tylenol, ibuprofen for pain.  Return for worsening symptoms   no